# Patient Record
Sex: MALE | Race: WHITE
[De-identification: names, ages, dates, MRNs, and addresses within clinical notes are randomized per-mention and may not be internally consistent; named-entity substitution may affect disease eponyms.]

---

## 2017-05-18 ENCOUNTER — HOSPITAL ENCOUNTER (OUTPATIENT)
Dept: HOSPITAL 8 - RAD | Age: 77
Discharge: HOME | End: 2017-05-18
Attending: FAMILY MEDICINE
Payer: MEDICARE

## 2017-05-18 DIAGNOSIS — R90.82: ICD-10-CM

## 2017-05-18 DIAGNOSIS — I67.82: Primary | ICD-10-CM

## 2017-05-18 LAB — BUN SERPL-MCNC: 21 MG/DL (ref 7–18)

## 2017-05-18 PROCEDURE — 36415 COLL VENOUS BLD VENIPUNCTURE: CPT

## 2017-05-18 PROCEDURE — 70553 MRI BRAIN STEM W/O & W/DYE: CPT

## 2017-05-18 PROCEDURE — A9585 GADOBUTROL INJECTION: HCPCS

## 2017-05-18 PROCEDURE — 84520 ASSAY OF UREA NITROGEN: CPT

## 2017-05-18 PROCEDURE — 82565 ASSAY OF CREATININE: CPT

## 2017-07-11 ENCOUNTER — HOSPITAL ENCOUNTER (OUTPATIENT)
Facility: MEDICAL CENTER | Age: 77
DRG: 853 | End: 2017-07-11
Attending: NURSE PRACTITIONER
Payer: MEDICARE

## 2017-07-11 ENCOUNTER — OFFICE VISIT (OUTPATIENT)
Dept: URGENT CARE | Facility: CLINIC | Age: 77
End: 2017-07-11
Payer: MEDICARE

## 2017-07-11 VITALS
HEART RATE: 95 BPM | RESPIRATION RATE: 14 BRPM | OXYGEN SATURATION: 93 % | HEIGHT: 68 IN | WEIGHT: 125 LBS | BODY MASS INDEX: 18.94 KG/M2 | TEMPERATURE: 99.3 F | SYSTOLIC BLOOD PRESSURE: 124 MMHG | DIASTOLIC BLOOD PRESSURE: 70 MMHG

## 2017-07-11 DIAGNOSIS — R30.0 DYSURIA: ICD-10-CM

## 2017-07-11 DIAGNOSIS — R39.9 UTI SYMPTOMS: ICD-10-CM

## 2017-07-11 DIAGNOSIS — R82.90 URINE FINDINGS ABNORMAL: ICD-10-CM

## 2017-07-11 LAB
APPEARANCE UR: NORMAL
BILIRUB UR STRIP-MCNC: NORMAL MG/DL
COLOR UR AUTO: NORMAL
GLUCOSE UR STRIP.AUTO-MCNC: NORMAL MG/DL
KETONES UR STRIP.AUTO-MCNC: 40 MG/DL
LEUKOCYTE ESTERASE UR QL STRIP.AUTO: NORMAL
NITRITE UR QL STRIP.AUTO: NORMAL
PH UR STRIP.AUTO: 5 [PH] (ref 5–8)
PROT UR QL STRIP: 100 MG/DL
RBC UR QL AUTO: NORMAL
SP GR UR STRIP.AUTO: 1.02
UROBILINOGEN UR STRIP-MCNC: 1 MG/DL

## 2017-07-11 PROCEDURE — 99204 OFFICE O/P NEW MOD 45 MIN: CPT | Performed by: NURSE PRACTITIONER

## 2017-07-11 PROCEDURE — 81002 URINALYSIS NONAUTO W/O SCOPE: CPT | Performed by: NURSE PRACTITIONER

## 2017-07-11 RX ORDER — MELOXICAM 15 MG/1
15 TABLET ORAL DAILY
Status: ON HOLD | COMMUNITY
Start: 2017-06-12 | End: 2017-12-25

## 2017-07-11 RX ORDER — OMEPRAZOLE 20 MG/1
20 CAPSULE, DELAYED RELEASE ORAL DAILY
Status: ON HOLD | COMMUNITY
Start: 2017-06-12 | End: 2017-12-25

## 2017-07-11 RX ORDER — ALFUZOSIN HYDROCHLORIDE 10 MG/1
10 TABLET, EXTENDED RELEASE ORAL DAILY
COMMUNITY
Start: 2017-04-12 | End: 2017-12-18

## 2017-07-11 RX ORDER — MULTIVIT WITH MINERALS/LUTEIN
TABLET ORAL
COMMUNITY
End: 2017-07-14

## 2017-07-11 RX ORDER — PREDNISONE 20 MG/1
TABLET ORAL
COMMUNITY
Start: 2017-05-19 | End: 2017-07-14

## 2017-07-11 RX ORDER — DIPHENOXYLATE HYDROCHLORIDE AND ATROPINE SULFATE 2.5; .025 MG/1; MG/1
TABLET ORAL
COMMUNITY
End: 2017-07-14

## 2017-07-11 RX ORDER — GABAPENTIN 100 MG/1
100 CAPSULE ORAL 3 TIMES DAILY
COMMUNITY
End: 2017-12-19

## 2017-07-11 RX ORDER — CIPROFLOXACIN 500 MG/1
500 TABLET, FILM COATED ORAL 2 TIMES DAILY
Qty: 20 TAB | Refills: 0 | Status: SHIPPED | OUTPATIENT
Start: 2017-07-11 | End: 2017-07-14

## 2017-07-11 NOTE — PROGRESS NOTES
Chief Complaint   Patient presents with   • Dysuria     x 3 day, pt state he has been having lower abdominal pain and dark urine       HISTORY OF PRESENT ILLNESS: Patient is a 76 y.o. male who presents today due to four days of urinary burning, urgency, and frequency. Reports some associated pelvic pressure and fatigue. Denies confusion, hematuria, fever, chills, flank pain, N/V, testicle pain or swelling. Denies a history of pyelononephritis or kidney stones. Denies a history of UTIs. Admits to history of enlarged prostate, takes Uroxatral daily. He saw his urologist last month with no changes to regimen. He has not taken anything else for symptom relief.     There are no active problems to display for this patient.      Allergies:Review of patient's allergies indicates no known allergies.    Current Outpatient Prescriptions Ordered in [facility]   Medication Sig Dispense Refill   • gabapentin (NEURONTIN) 100 MG Cap Take 100 mg by mouth.     • meloxicam (MOBIC) 15 MG tablet      • omeprazole (PRILOSEC) 20 MG delayed-release capsule      • predniSONE (DELTASONE) 20 MG Tab      • alfuzosin (UROXATRAL) 10 MG SR tablet      • lamotrigine (LAMICTAL) 200 MG tablet Take 200 mg by mouth every day.     • alfuzosin (UROXATRAL) 10 MG SR tablet Take  by mouth every day.     • tramadol (ULTRAM) 50 MG TABS Take  mg by mouth every four hours as needed.     • Multiple Vitamin (MULTI-VITAMINS) Tab Take  by mouth.     • vitamin E (VITAMIN E) 1000 UNIT Cap Take  by mouth.       No current Epic-ordered facility-administered medications on file.       No past medical history on file.    Social History   Substance Use Topics   • Smoking status: Never Smoker    • Smokeless tobacco: Not on file   • Alcohol Use: Yes       No family status information on file.   No family history on file.    ROS:  Review of Systems   Constitutional: Negative for fever, chills, weight loss and malaise/fatigue.   HENT: Negative for ear pain, nosebleeds,  "congestion, sore throat and neck pain.    Eyes: Negative for vision changes.   Neuro: Negative for headache, sensory changes, weakness, seizure, LOC.   Cardiovascular: Negative for chest pain, palpitations, orthopnea and leg swelling.   Respiratory: Negative for cough, sputum production, shortness of breath and wheezing.   Gastrointestinal: Positive for lower abdominal pressure. Negative for abdominal pain, nausea, vomiting or diarrhea.   Genitourinary: Positive for dysuria, urgency and frequency. Negative for hematuria or flank pain.   Skin: Negative for rash, diaphoresis.     Exam:  Blood pressure 124/70, pulse 95, temperature 37.4 °C (99.3 °F), resp. rate 14, height 1.727 m (5' 8\"), weight 56.7 kg (125 lb), SpO2 93 %.  General: well-nourished, well-developed male in NAD  Head: normocephalic, atraumatic  Eyes: PERRLA, no conjunctival injection, acuity grossly intact, lids normal.  Lymph: no cervical adenopathy. No supraclavicular adenopathy.   Neuro: alert and oriented. Cranial nerves 1-12 grossly intact. No sensory deficit.   Cardiovascular: regular rate and rhythm. No edema.  Pulmonary: no distress. Chest is symmetrical with respiration, no wheezes, crackles, or rhonchi.   Abdomen: soft, non-tender, no guarding, no hepatosplenomegaly. No CVA tenderness.   Musculoskeletal: no clubbing, appropriate muscle tone, gait is stable.  Skin: warm, dry, intact, no clubbing, no cyanosis, no rashes.   Psych: appropriate mood, affect, judgement.         Assessment/Plan:  1. UTI symptoms  POCT Urinalysis    URINE CULTURE(NEW)    ciprofloxacin (CIPRO) 500 MG Tab   2. Dysuria  URINE CULTURE(NEW)    ciprofloxacin (CIPRO) 500 MG Tab   3. Urine findings abnormal           Cipro as directed for suspected UTI. Increase fluid intake. Urine sent for culture. Follow up as soon as possible with PCP and/or urologist for abnormalities in urine (protein, ketone, etc)  Supportive care, differential diagnoses, and indications for immediate " follow-up discussed with patient.   Pathogenesis of diagnosis discussed including typical length and natural progression.   Instructed to return to clinic or nearest emergency department for any change in condition, further concerns, or worsening of symptoms.  Patient states understanding of the plan of care and discharge instructions.  Instructed to make an appointment, for follow up, with their primary care provider.        Please note that this dictation was created using voice recognition software. I have made every reasonable attempt to correct obvious errors, but I expect that there are errors of grammar and possibly content that I did not discover before finalizing the note.      SELENA Najera.

## 2017-07-11 NOTE — MR AVS SNAPSHOT
"        Marcelo Colon   2017 11:45 AM   Office Visit   MRN: [id number]    Department:  [facility]   Dept Phone:  [phone]    Description:  Male : 1940   Provider:  DEVORA Franklin           Reason for Visit     Dysuria x 3 day, pt state he has been having lower abdominal pain and dark urine      Allergies as of 2017     No Known Allergies      You were diagnosed with     UTI symptoms   [985832]       Dysuria   [788.1.ICD-9-CM]       Urine findings abnormal   [355157]         Vital Signs     Blood Pressure Pulse Temperature Respirations Height Weight    124/70 mmHg 95 37.4 °C (99.3 °F) 14 1.727 m (5' 8\") 56.7 kg (125 lb)    Body Mass Index Oxygen Saturation Smoking Status             19.01 kg/m2 93% Never Smoker          Basic Information     Date Of Birth Sex Race Ethnicity Preferred Language    1940 Male White Non- English      Health Maintenance        Date Due Completion Dates    IMM DTaP/Tdap/Td Vaccine (1 - Tdap) 10/25/1960 ---    COLONOSCOPY 10/25/1991 ---    IMM ZOSTER VACCINE 10/25/2001 ---    IMM PNEUMOCOCCAL 65+ (ADULT) LOW/MEDIUM RISK SERIES (1 of 2 - PCV13) 10/25/2006 ---    IMM INFLUENZA (1) 2017 ---            Current Immunizations     No immunizations on file.      Below and/or attached are the medications your provider expects you to take. Review all of your home medications and newly ordered medications with your provider and/or pharmacist. Follow medication instructions as directed by your provider and/or pharmacist. Please keep your medication list with you and share with your provider. Update the information when medications are discontinued, doses are changed, or new medications (including over-the-counter products) are added; and carry medication information at all times in the event of emergency situations     Allergies:  No Known Allergies          Medications  Valid as of: 2017 - 12:21 PM    Generic Name Brand Name Tablet Size " Instructions for use    Alfuzosin HCl (TABLET SR 24 HR) UROXATRAL 10 MG Take  by mouth every day.        Alfuzosin HCl (TABLET SR 24 HR) UROXATRAL 10 MG         Ciprofloxacin HCl (Tab) CIPRO 500 MG Take 1 Tab by mouth 2 times a day for 10 days.        Gabapentin (Cap) NEURONTIN 100 MG Take 100 mg by mouth.        LamoTRIgine (Tab) LAMICTAL 200 MG Take 200 mg by mouth every day.        Meloxicam (Tab) MOBIC 15 MG         Multiple Vitamin (Tab) MULTI-VITAMINS  Take  by mouth.        Omeprazole (CAPSULE DELAYED RELEASE) PRILOSEC 20 MG         PredniSONE (Tab) DELTASONE 20 MG         TraMADol HCl (Tab) ULTRAM 50 MG Take  mg by mouth every four hours as needed.        Vitamin E (Cap) VITAMIN E 1000 UNIT Take  by mouth.        .                 Medicines prescribed today were sent to:     [facility] [address]    [address] [place] [address]    Phone: [phone] Fax: [phone]    Open 24 Hours?: No      Medication refill instructions:       If your prescription bottle indicates you have medication refills left, it is not necessary to call your provider’s office. Please contact your pharmacy and they will refill your medication.    If your prescription bottle indicates you do not have any refills left, you may request refills at any time through one of the following ways: The online [employer] system (except Urgent Care), by calling your provider’s office, or by asking your pharmacy to contact your provider’s office with a refill request. Medication refills are processed only during regular business hours and may not be available until the next business day. Your provider may request additional information or to have a follow-up visit with you prior to refilling your medication.   *Please Note: Medication refills are assigned a new Rx number when refilled electronically. Your pharmacy may indicate that no refills were authorized even though a new prescription for the same  medication is available at the pharmacy. Please request the medicine by name with the pharmacy before contacting your provider for a refill.        Your To Do List     Future Labs/Procedures Complete By Expires    URINE CULTURE(NEW)  As directed 7/11/2018         [employer] Access Code: [email or web address]  Expires: 8/10/2017 12:21 PM    Your email address is not on file at [facility].  Email Addresses are required for you to sign up for [employer], please contact [phone] to verify your personal information and to provide your email address prior to attempting to register for [employer].    Marcelo Colon  [address] [place] [address]    [employer]  A secure, online tool to manage your health information     [facility]’s [employer]® is a secure, online tool that connects you to your personalized health information from the privacy of your home -- day or night - making it very easy for you to manage your healthcare. Once the activation process is completed, you can even access your medical information using the [employer] ricardo, which is available for free in the Apple Ricardo store or Google Play store.     To learn more about [employer], visit [email or web address]    There are two levels of access available (as shown below):   My Chart Features  [facility] Primary Care Doctor [facility]  Specialists [facility]  Urgent  Care Non-[facility] Primary Care Doctor   Email your healthcare team securely and privately 24/7 X X X    Manage appointments: schedule your next appointment; view details of past/upcoming appointments X      Request prescription refills. X      View recent personal medical records, including lab and immunizations X X X X   View health record, including health history, allergies, medications X X X X   Read reports about your outpatient visits, procedures, consult and ER notes X X X X   See your discharge summary, which is a recap of your hospital and/or ER visit that includes your diagnosis, lab results, and care plan X X  X      How to register for [employer]:  Once your e-mail address has been verified, follow the following steps to sign up for [employer].     1. Go to  [email or web address]  2. Click on the Sign Up Now box, which takes you to the New Member Sign Up page. You will need to provide the following information:  a. Enter your [employer] Access Code exactly as it appears at the top of this page. (You will not need to use this code after you’ve completed the sign-up process. If you do not sign up before the expiration date, you must request a new code.)   b. Enter your date of birth.   c. Enter your home email address.   d. Click Submit, and follow the next screen’s instructions.  3. Create a U2opia Mobilet ID. This will be your [employer] login ID and cannot be changed, so think of one that is secure and easy to remember.  4. Create a [employer] Mobilet password. You can change your password at any time.  5. Enter your Password Reset Question and Answer. This can be used at a later time if you forget your password.   6. Enter your e-mail address. This allows you to receive e-mail notifications when new information is available in [employer].  7. Click Sign Up. You can now view your health information.    For assistance activating your [employer] account, call [phone]

## 2017-07-13 LAB
BACTERIA UR CULT: NORMAL
SIGNIFICANT IND 70042: NORMAL
SOURCE SOURCE: NORMAL

## 2017-07-14 ENCOUNTER — HOSPITAL ENCOUNTER (INPATIENT)
Facility: MEDICAL CENTER | Age: 77
LOS: 35 days | DRG: 853 | End: 2017-08-18
Attending: EMERGENCY MEDICINE | Admitting: INTERNAL MEDICINE
Payer: MEDICARE

## 2017-07-14 ENCOUNTER — TELEPHONE (OUTPATIENT)
Dept: URGENT CARE | Facility: PHYSICIAN GROUP | Age: 77
End: 2017-07-14

## 2017-07-14 ENCOUNTER — APPOINTMENT (OUTPATIENT)
Dept: RADIOLOGY | Facility: MEDICAL CENTER | Age: 77
DRG: 853 | End: 2017-07-14
Attending: EMERGENCY MEDICINE
Payer: MEDICARE

## 2017-07-14 ENCOUNTER — RESOLUTE PROFESSIONAL BILLING HOSPITAL PROF FEE (OUTPATIENT)
Dept: HOSPITALIST | Facility: MEDICAL CENTER | Age: 77
End: 2017-07-14
Payer: MEDICARE

## 2017-07-14 DIAGNOSIS — R74.8 ALKALINE PHOSPHATASE ELEVATION: ICD-10-CM

## 2017-07-14 DIAGNOSIS — K65.1 INTRA-ABDOMINAL ABSCESS (HCC): ICD-10-CM

## 2017-07-14 DIAGNOSIS — E87.1 HYPONATREMIA: ICD-10-CM

## 2017-07-14 DIAGNOSIS — N39.0 URINARY TRACT INFECTION WITHOUT HEMATURIA, SITE UNSPECIFIED: ICD-10-CM

## 2017-07-14 DIAGNOSIS — K65.1 PELVIC ABSCESS IN MALE (HCC): ICD-10-CM

## 2017-07-14 DIAGNOSIS — A41.9 SEPSIS, DUE TO UNSPECIFIED ORGANISM: ICD-10-CM

## 2017-07-14 DIAGNOSIS — R74.01 ELEVATED ALT MEASUREMENT: ICD-10-CM

## 2017-07-14 PROBLEM — F32.A DEPRESSION: Status: ACTIVE | Noted: 2017-07-14

## 2017-07-14 PROBLEM — N40.0 BPH (BENIGN PROSTATIC HYPERTROPHY): Status: ACTIVE | Noted: 2017-07-14

## 2017-07-14 PROBLEM — M19.90 OSTEOARTHRITIS: Status: ACTIVE | Noted: 2017-07-14

## 2017-07-14 LAB
ALBUMIN SERPL BCP-MCNC: 3.3 G/DL (ref 3.2–4.9)
ALBUMIN/GLOB SERPL: 0.8 G/DL
ALP SERPL-CCNC: 164 U/L (ref 30–99)
ALT SERPL-CCNC: 64 U/L (ref 2–50)
ANION GAP SERPL CALC-SCNC: 10 MMOL/L (ref 0–11.9)
APPEARANCE UR: CLEAR
AST SERPL-CCNC: 41 U/L (ref 12–45)
BACTERIA #/AREA URNS HPF: NEGATIVE /HPF
BASOPHILS # BLD AUTO: 0 % (ref 0–1.8)
BASOPHILS # BLD: 0 K/UL (ref 0–0.12)
BILIRUB SERPL-MCNC: 1.1 MG/DL (ref 0.1–1.5)
BILIRUB UR QL STRIP.AUTO: ABNORMAL
BUN SERPL-MCNC: 14 MG/DL (ref 8–22)
CALCIUM SERPL-MCNC: 9.8 MG/DL (ref 8.5–10.5)
CHLORIDE SERPL-SCNC: 92 MMOL/L (ref 96–112)
CO2 SERPL-SCNC: 26 MMOL/L (ref 20–33)
COLOR UR: ABNORMAL
CREAT SERPL-MCNC: 1.01 MG/DL (ref 0.5–1.4)
EOSINOPHIL # BLD AUTO: 0 K/UL (ref 0–0.51)
EOSINOPHIL NFR BLD: 0 % (ref 0–6.9)
EPI CELLS #/AREA URNS HPF: ABNORMAL /HPF
ERYTHROCYTE [DISTWIDTH] IN BLOOD BY AUTOMATED COUNT: 45.4 FL (ref 35.9–50)
GFR SERPL CREATININE-BSD FRML MDRD: >60 ML/MIN/1.73 M 2
GLOBULIN SER CALC-MCNC: 3.9 G/DL (ref 1.9–3.5)
GLUCOSE SERPL-MCNC: 125 MG/DL (ref 65–99)
GLUCOSE UR STRIP.AUTO-MCNC: NEGATIVE MG/DL
HCT VFR BLD AUTO: 39 % (ref 42–52)
HGB BLD-MCNC: 12.8 G/DL (ref 14–18)
HYALINE CASTS #/AREA URNS LPF: ABNORMAL /LPF
KETONES UR STRIP.AUTO-MCNC: 15 MG/DL
LACTATE BLD-SCNC: 1.1 MMOL/L (ref 0.5–2)
LACTATE BLD-SCNC: 1.9 MMOL/L (ref 0.5–2)
LEUKOCYTE ESTERASE UR QL STRIP.AUTO: ABNORMAL
LYMPHOCYTES # BLD AUTO: 1.67 K/UL (ref 1–4.8)
LYMPHOCYTES NFR BLD: 11.1 % (ref 22–41)
MANUAL DIFF BLD: NORMAL
MCH RBC QN AUTO: 29 PG (ref 27–33)
MCHC RBC AUTO-ENTMCNC: 32.8 G/DL (ref 33.7–35.3)
MCV RBC AUTO: 88.2 FL (ref 81.4–97.8)
MICRO URNS: ABNORMAL
MONOCYTES # BLD AUTO: 0.65 K/UL (ref 0–0.85)
MONOCYTES NFR BLD AUTO: 4.3 % (ref 0–13.4)
MORPHOLOGY BLD-IMP: NORMAL
NEUTROPHILS # BLD AUTO: 12.69 K/UL (ref 1.82–7.42)
NEUTROPHILS NFR BLD: 76.9 % (ref 44–72)
NEUTS BAND NFR BLD MANUAL: 7.7 % (ref 0–10)
NITRITE UR QL STRIP.AUTO: POSITIVE
NRBC # BLD AUTO: 0 K/UL
NRBC BLD AUTO-RTO: 0 /100 WBC
PH UR STRIP.AUTO: 5.5 [PH]
PLATELET # BLD AUTO: 440 K/UL (ref 164–446)
PLATELET BLD QL SMEAR: NORMAL
PMV BLD AUTO: 8.2 FL (ref 9–12.9)
POTASSIUM SERPL-SCNC: 3.7 MMOL/L (ref 3.6–5.5)
PROT SERPL-MCNC: 7.2 G/DL (ref 6–8.2)
PROT UR QL STRIP: 100 MG/DL
RBC # BLD AUTO: 4.42 M/UL (ref 4.7–6.1)
RBC # URNS HPF: ABNORMAL /HPF
RBC BLD AUTO: NORMAL
RBC UR QL AUTO: NEGATIVE
SODIUM SERPL-SCNC: 128 MMOL/L (ref 135–145)
SP GR UR STRIP.AUTO: 1.02
TSH SERPL DL<=0.005 MIU/L-ACNC: 0.34 UIU/ML (ref 0.3–3.7)
UROBILINOGEN UR STRIP.AUTO-MCNC: 2 MG/DL
WBC # BLD AUTO: 15 K/UL (ref 4.8–10.8)
WBC #/AREA URNS HPF: ABNORMAL /HPF

## 2017-07-14 PROCEDURE — A9270 NON-COVERED ITEM OR SERVICE: HCPCS | Performed by: EMERGENCY MEDICINE

## 2017-07-14 PROCEDURE — 700111 HCHG RX REV CODE 636 W/ 250 OVERRIDE (IP): Performed by: EMERGENCY MEDICINE

## 2017-07-14 PROCEDURE — 99223 1ST HOSP IP/OBS HIGH 75: CPT | Mod: AI | Performed by: INTERNAL MEDICINE

## 2017-07-14 PROCEDURE — 700101 HCHG RX REV CODE 250: Performed by: EMERGENCY MEDICINE

## 2017-07-14 PROCEDURE — 700101 HCHG RX REV CODE 250: Performed by: INTERNAL MEDICINE

## 2017-07-14 PROCEDURE — 700102 HCHG RX REV CODE 250 W/ 637 OVERRIDE(OP): Performed by: INTERNAL MEDICINE

## 2017-07-14 PROCEDURE — 84443 ASSAY THYROID STIM HORMONE: CPT

## 2017-07-14 PROCEDURE — 700102 HCHG RX REV CODE 250 W/ 637 OVERRIDE(OP): Performed by: EMERGENCY MEDICINE

## 2017-07-14 PROCEDURE — 96361 HYDRATE IV INFUSION ADD-ON: CPT

## 2017-07-14 PROCEDURE — 700105 HCHG RX REV CODE 258: Performed by: EMERGENCY MEDICINE

## 2017-07-14 PROCEDURE — 770020 HCHG ROOM/CARE - TELE (206)

## 2017-07-14 PROCEDURE — 83605 ASSAY OF LACTIC ACID: CPT

## 2017-07-14 PROCEDURE — 85007 BL SMEAR W/DIFF WBC COUNT: CPT

## 2017-07-14 PROCEDURE — 700117 HCHG RX CONTRAST REV CODE 255: Performed by: EMERGENCY MEDICINE

## 2017-07-14 PROCEDURE — 71010 DX-CHEST-PORTABLE (1 VIEW): CPT

## 2017-07-14 PROCEDURE — 99285 EMERGENCY DEPT VISIT HI MDM: CPT

## 2017-07-14 PROCEDURE — 81001 URINALYSIS AUTO W/SCOPE: CPT

## 2017-07-14 PROCEDURE — 96365 THER/PROPH/DIAG IV INF INIT: CPT

## 2017-07-14 PROCEDURE — 85027 COMPLETE CBC AUTOMATED: CPT

## 2017-07-14 PROCEDURE — 80053 COMPREHEN METABOLIC PANEL: CPT

## 2017-07-14 PROCEDURE — 74177 CT ABD & PELVIS W/CONTRAST: CPT

## 2017-07-14 PROCEDURE — 87086 URINE CULTURE/COLONY COUNT: CPT

## 2017-07-14 PROCEDURE — 96367 TX/PROPH/DG ADDL SEQ IV INF: CPT

## 2017-07-14 PROCEDURE — A9270 NON-COVERED ITEM OR SERVICE: HCPCS | Performed by: INTERNAL MEDICINE

## 2017-07-14 PROCEDURE — 87040 BLOOD CULTURE FOR BACTERIA: CPT | Mod: 91

## 2017-07-14 RX ORDER — AMOXICILLIN 250 MG
2 CAPSULE ORAL 2 TIMES DAILY
Status: DISCONTINUED | OUTPATIENT
Start: 2017-07-14 | End: 2017-07-22

## 2017-07-14 RX ORDER — TAMSULOSIN HYDROCHLORIDE 0.4 MG/1
0.4 CAPSULE ORAL
Status: DISCONTINUED | OUTPATIENT
Start: 2017-07-14 | End: 2017-08-18 | Stop reason: HOSPADM

## 2017-07-14 RX ORDER — TRAMADOL HYDROCHLORIDE 50 MG/1
50 TABLET ORAL EVERY 4 HOURS PRN
Status: DISCONTINUED | OUTPATIENT
Start: 2017-07-14 | End: 2017-08-11

## 2017-07-14 RX ORDER — BISACODYL 10 MG
10 SUPPOSITORY, RECTAL RECTAL
Status: DISCONTINUED | OUTPATIENT
Start: 2017-07-14 | End: 2017-07-22

## 2017-07-14 RX ORDER — ALFUZOSIN HYDROCHLORIDE 10 MG/1
10 TABLET, EXTENDED RELEASE ORAL DAILY
Status: DISCONTINUED | OUTPATIENT
Start: 2017-07-14 | End: 2017-07-14

## 2017-07-14 RX ORDER — ONDANSETRON 2 MG/ML
4 INJECTION INTRAMUSCULAR; INTRAVENOUS EVERY 4 HOURS PRN
Status: DISCONTINUED | OUTPATIENT
Start: 2017-07-14 | End: 2017-08-18 | Stop reason: HOSPADM

## 2017-07-14 RX ORDER — LAMOTRIGINE 150 MG/1
150 TABLET ORAL DAILY
COMMUNITY
End: 2017-12-19

## 2017-07-14 RX ORDER — POLYETHYLENE GLYCOL 3350 17 G/17G
1 POWDER, FOR SOLUTION ORAL
Status: DISCONTINUED | OUTPATIENT
Start: 2017-07-14 | End: 2017-07-22

## 2017-07-14 RX ORDER — ACETAMINOPHEN 325 MG/1
650 TABLET ORAL ONCE
Status: COMPLETED | OUTPATIENT
Start: 2017-07-14 | End: 2017-07-14

## 2017-07-14 RX ORDER — MORPHINE SULFATE 4 MG/ML
1-5 INJECTION, SOLUTION INTRAMUSCULAR; INTRAVENOUS EVERY 4 HOURS PRN
Status: DISCONTINUED | OUTPATIENT
Start: 2017-07-14 | End: 2017-07-14

## 2017-07-14 RX ORDER — OXYCODONE HYDROCHLORIDE 5 MG/1
5 TABLET ORAL EVERY 4 HOURS PRN
Status: DISCONTINUED | OUTPATIENT
Start: 2017-07-14 | End: 2017-08-18 | Stop reason: HOSPADM

## 2017-07-14 RX ORDER — CIPROFLOXACIN 500 MG/1
500 TABLET, FILM COATED ORAL 2 TIMES DAILY
Status: ON HOLD | COMMUNITY
Start: 2017-07-11 | End: 2017-08-11

## 2017-07-14 RX ORDER — SODIUM CHLORIDE AND POTASSIUM CHLORIDE 150; 900 MG/100ML; MG/100ML
INJECTION, SOLUTION INTRAVENOUS CONTINUOUS
Status: DISCONTINUED | OUTPATIENT
Start: 2017-07-14 | End: 2017-07-21

## 2017-07-14 RX ORDER — GABAPENTIN 100 MG/1
100 CAPSULE ORAL 3 TIMES DAILY
Status: DISCONTINUED | OUTPATIENT
Start: 2017-07-14 | End: 2017-07-19

## 2017-07-14 RX ORDER — SODIUM CHLORIDE 9 MG/ML
30 INJECTION, SOLUTION INTRAVENOUS
Status: COMPLETED | OUTPATIENT
Start: 2017-07-14 | End: 2017-07-14

## 2017-07-14 RX ORDER — LORAZEPAM 2 MG/ML
0.5 INJECTION INTRAMUSCULAR EVERY 6 HOURS PRN
Status: DISCONTINUED | OUTPATIENT
Start: 2017-07-14 | End: 2017-07-22

## 2017-07-14 RX ORDER — CEFTRIAXONE 1 G/1
1 INJECTION, POWDER, FOR SOLUTION INTRAMUSCULAR; INTRAVENOUS ONCE
Status: COMPLETED | OUTPATIENT
Start: 2017-07-14 | End: 2017-07-14

## 2017-07-14 RX ORDER — LABETALOL HYDROCHLORIDE 5 MG/ML
10 INJECTION, SOLUTION INTRAVENOUS EVERY 4 HOURS PRN
Status: DISCONTINUED | OUTPATIENT
Start: 2017-07-14 | End: 2017-08-18 | Stop reason: HOSPADM

## 2017-07-14 RX ORDER — LORAZEPAM 1 MG/1
0.5 TABLET ORAL EVERY 6 HOURS PRN
Status: DISCONTINUED | OUTPATIENT
Start: 2017-07-14 | End: 2017-07-22

## 2017-07-14 RX ORDER — LAMOTRIGINE 100 MG/1
150 TABLET ORAL DAILY
Status: DISCONTINUED | OUTPATIENT
Start: 2017-07-14 | End: 2017-08-18 | Stop reason: HOSPADM

## 2017-07-14 RX ORDER — MORPHINE SULFATE 10 MG/ML
5 INJECTION, SOLUTION INTRAMUSCULAR; INTRAVENOUS EVERY 4 HOURS PRN
Status: DISCONTINUED | OUTPATIENT
Start: 2017-07-14 | End: 2017-07-22

## 2017-07-14 RX ORDER — ONDANSETRON 4 MG/1
4 TABLET, ORALLY DISINTEGRATING ORAL EVERY 4 HOURS PRN
Status: DISCONTINUED | OUTPATIENT
Start: 2017-07-14 | End: 2017-08-18 | Stop reason: HOSPADM

## 2017-07-14 RX ORDER — MELOXICAM 7.5 MG/1
15 TABLET ORAL DAILY
Status: DISCONTINUED | OUTPATIENT
Start: 2017-07-14 | End: 2017-07-20

## 2017-07-14 RX ORDER — OMEPRAZOLE 20 MG/1
20 CAPSULE, DELAYED RELEASE ORAL DAILY
Status: DISCONTINUED | OUTPATIENT
Start: 2017-07-14 | End: 2017-08-18 | Stop reason: HOSPADM

## 2017-07-14 RX ORDER — MORPHINE SULFATE 4 MG/ML
1-4 INJECTION, SOLUTION INTRAMUSCULAR; INTRAVENOUS EVERY 4 HOURS PRN
Status: DISCONTINUED | OUTPATIENT
Start: 2017-07-14 | End: 2017-07-22

## 2017-07-14 RX ORDER — TRAMADOL HYDROCHLORIDE 50 MG/1
50-100 TABLET ORAL EVERY 4 HOURS PRN
Status: DISCONTINUED | OUTPATIENT
Start: 2017-07-14 | End: 2017-07-14

## 2017-07-14 RX ORDER — TRAMADOL HYDROCHLORIDE 50 MG/1
100 TABLET ORAL EVERY 4 HOURS PRN
Status: DISCONTINUED | OUTPATIENT
Start: 2017-07-14 | End: 2017-08-11

## 2017-07-14 RX ADMIN — OMEPRAZOLE 20 MG: 20 CAPSULE, DELAYED RELEASE ORAL at 16:01

## 2017-07-14 RX ADMIN — CEFTRIAXONE SODIUM 1 G: 1 INJECTION, POWDER, FOR SOLUTION INTRAMUSCULAR; INTRAVENOUS at 14:05

## 2017-07-14 RX ADMIN — GABAPENTIN 100 MG: 100 CAPSULE ORAL at 20:13

## 2017-07-14 RX ADMIN — SODIUM CHLORIDE 1677 ML: 9 INJECTION, SOLUTION INTRAVENOUS at 12:01

## 2017-07-14 RX ADMIN — POTASSIUM CHLORIDE AND SODIUM CHLORIDE: 900; 150 INJECTION, SOLUTION INTRAVENOUS at 16:01

## 2017-07-14 RX ADMIN — ACETAMINOPHEN 650 MG: 325 TABLET, FILM COATED ORAL at 12:04

## 2017-07-14 RX ADMIN — IOHEXOL 100 ML: 350 INJECTION, SOLUTION INTRAVENOUS at 13:42

## 2017-07-14 RX ADMIN — TAMSULOSIN HYDROCHLORIDE 0.4 MG: 0.4 CAPSULE ORAL at 16:01

## 2017-07-14 RX ADMIN — TRAMADOL HYDROCHLORIDE 50 MG: 50 TABLET, COATED ORAL at 20:13

## 2017-07-14 RX ADMIN — GABAPENTIN 100 MG: 100 CAPSULE ORAL at 16:01

## 2017-07-14 RX ADMIN — METRONIDAZOLE 500 MG: 500 INJECTION, SOLUTION INTRAVENOUS at 14:43

## 2017-07-14 RX ADMIN — METRONIDAZOLE 500 MG: 500 INJECTION, SOLUTION INTRAVENOUS at 20:13

## 2017-07-14 ASSESSMENT — COGNITIVE AND FUNCTIONAL STATUS - GENERAL
SUGGESTED CMS G CODE MODIFIER DAILY ACTIVITY: CK
WALKING IN HOSPITAL ROOM: A LITTLE
EATING MEALS: A LITTLE
MOBILITY SCORE: 18
SUGGESTED CMS G CODE MODIFIER MOBILITY: CK
STANDING UP FROM CHAIR USING ARMS: A LOT
MOVING TO AND FROM BED TO CHAIR: A LITTLE
PERSONAL GROOMING: A LITTLE
DRESSING REGULAR LOWER BODY CLOTHING: A LOT
CLIMB 3 TO 5 STEPS WITH RAILING: A LOT
TOILETING: A LOT
DRESSING REGULAR UPPER BODY CLOTHING: A LITTLE
HELP NEEDED FOR BATHING: A LOT
DAILY ACTIVITIY SCORE: 15

## 2017-07-14 ASSESSMENT — LIFESTYLE VARIABLES
HOW MANY TIMES IN THE PAST YEAR HAVE YOU HAD 5 OR MORE DRINKS IN A DAY: 0
CONSUMPTION TOTAL: NEGATIVE
HAVE YOU EVER FELT YOU SHOULD CUT DOWN ON YOUR DRINKING: NO
CONSUMPTION TOTAL: NEGATIVE
HAVE PEOPLE ANNOYED YOU BY CRITICIZING YOUR DRINKING: NO
HAVE YOU EVER FELT YOU SHOULD CUT DOWN ON YOUR DRINKING: NO
TOTAL SCORE: 0
HOW MANY TIMES IN THE PAST YEAR HAVE YOU HAD 5 OR MORE DRINKS IN A DAY: 0
AVERAGE NUMBER OF DAYS PER WEEK YOU HAVE A DRINK CONTAINING ALCOHOL: 4
EVER_SMOKED: YES
HAVE PEOPLE ANNOYED YOU BY CRITICIZING YOUR DRINKING: NO
TOTAL SCORE: 0
TOTAL SCORE: 0
AVERAGE NUMBER OF DAYS PER WEEK YOU HAVE A DRINK CONTAINING ALCOHOL: 2
EVER_SMOKED: YES
TOTAL SCORE: 0
TOTAL SCORE: 0
ON A TYPICAL DAY WHEN YOU DRINK ALCOHOL HOW MANY DRINKS DO YOU HAVE: 2
EVER HAD A DRINK FIRST THING IN THE MORNING TO STEADY YOUR NERVES TO GET RID OF A HANGOVER: NO
ALCOHOL_USE: YES
TOTAL SCORE: 0
DO YOU DRINK ALCOHOL: YES
ON A TYPICAL DAY WHEN YOU DRINK ALCOHOL HOW MANY DRINKS DO YOU HAVE: 2
EVER FELT BAD OR GUILTY ABOUT YOUR DRINKING: NO
EVER FELT BAD OR GUILTY ABOUT YOUR DRINKING: NO
EVER HAD A DRINK FIRST THING IN THE MORNING TO STEADY YOUR NERVES TO GET RID OF A HANGOVER: NO

## 2017-07-14 ASSESSMENT — PAIN SCALES - GENERAL
PAINLEVEL_OUTOF10: 6
PAINLEVEL_OUTOF10: 3

## 2017-07-14 ASSESSMENT — ENCOUNTER SYMPTOMS
EYES NEGATIVE: 1
BLOOD IN STOOL: 0
DIARRHEA: 1
CARDIOVASCULAR NEGATIVE: 1
WEAKNESS: 0
FEVER: 0
ABDOMINAL PAIN: 1
MYALGIAS: 1
SHORTNESS OF BREATH: 0
NEUROLOGICAL NEGATIVE: 1
DIZZINESS: 0
PSYCHIATRIC NEGATIVE: 1
COUGH: 0
HEADACHES: 0
BLURRED VISION: 0
BRUISES/BLEEDS EASILY: 0
DEPRESSION: 0
RESPIRATORY NEGATIVE: 1

## 2017-07-14 ASSESSMENT — PATIENT HEALTH QUESTIONNAIRE - PHQ9
SUM OF ALL RESPONSES TO PHQ QUESTIONS 1-9: 0
2. FEELING DOWN, DEPRESSED, IRRITABLE, OR HOPELESS: NOT AT ALL
SUM OF ALL RESPONSES TO PHQ9 QUESTIONS 1 AND 2: 0
1. LITTLE INTEREST OR PLEASURE IN DOING THINGS: NOT AT ALL

## 2017-07-14 ASSESSMENT — COPD QUESTIONNAIRES
DO YOU EVER COUGH UP ANY MUCUS OR PHLEGM?: NO/ONLY WITH OCCASIONAL COLDS OR INFECTIONS
HAVE YOU SMOKED AT LEAST 100 CIGARETTES IN YOUR ENTIRE LIFE: YES
COPD SCREENING SCORE: 4
DURING THE PAST 4 WEEKS HOW MUCH DID YOU FEEL SHORT OF BREATH: NONE/LITTLE OF THE TIME

## 2017-07-14 NOTE — ED PROVIDER NOTES
ED Provider Note    Scribed for Rosales Bruno M.D. by Prince Pagan. 7/14/2017  11:53 AM    Primary care provider: Irvin Ferreira M.D.  Means of arrival: Wheel Chair  History obtained from: Patient  History limited by: None    CHIEF COMPLAINT  Chief Complaint   Patient presents with   • UTI     started yesterday   • Abdominal Pain     x3-4 days, sharp pain, better when laying down   • Diarrhea       HPI  Marcelo Colon is a 76 y.o. male who presents to the Emergency Department complaining of severe groin pain onset four days ago. He reports that when sitting down the patient experiences an abrupt episode of pain. The patient states that he visited urgent care yesterday and told he has a UTI. He was told to come here if his symptoms increased in severity. The patient began taking his antibiotics yesterday afternoon. The patient has associated urinary retention and dark orange colored urine, and diarrhea with a consistency of jello and white in color. He denies any lower extremity pain, swelling of the genitals, or vomiting. He was previously diagnosed with adeline, but [place] told him that his diagnoses was not likely correct.    REVIEW OF SYSTEMS  Pertinent positives include severe groin pain, urinary retention, dark orange colored urine, and diarrhea.   Pertinent negatives include no vomiting, swelling of the genitals, or vomiting.    All other systems reviewed and negative.    C    PAST MEDICAL HISTORY   has a past medical history of Enlarged prostate; Depression; Neck pain; and GERD (gastroesophageal reflux disease).    SURGICAL HISTORY  patient denies any surgical history    SOCIAL HISTORY  Social History   Substance Use Topics   • Smoking status: Never Smoker    • Smokeless tobacco: None   • Alcohol Use: Yes      History   Drug Use No       FAMILY HISTORY  History reviewed. No pertinent family history.    CURRENT MEDICATIONS  Home Medications     Reviewed by Nuris Tracy  "(Pharmacy Tech) on 07/14/17 at 1217  Med List Status: Complete    Medication Last Dose Status    alfuzosin (UROXATRAL) 10 MG SR tablet 7/13/2017 Active    ciprofloxacin (CIPRO) 500 MG Tab 7/13/2017 Active    gabapentin (NEURONTIN) 100 MG Cap 7/13/2017 Active    lamotrigine (LAMICTAL) 150 MG tablet 7/13/2017 Active    meloxicam (MOBIC) 15 MG tablet 7/13/2017 Active    omeprazole (PRILOSEC) 20 MG delayed-release capsule 7/13/2017 Active    tramadol (ULTRAM) 50 MG TABS 7/13/2017 Active                ALLERGIES  No Known Allergies    PHYSICAL EXAM  VITAL SIGNS: /68 mmHg  Pulse 105  Temp(Src) 38.1 °C (100.6 °F)  Resp 16  Ht 1.727 m (5' 8\")  Wt 55.9 kg (123 lb 3.8 oz)  BMI 18.74 kg/m2  SpO2 94%    Nursing note and vitals reviewed.  Constitutional: Well-developed and well-nourished. No distress.   HENT: Head is normocephalic and atraumatic. Oropharynx is clear and moist without exudate or erythema.   Eyes: Pupils are equal, round, and reactive to light. Conjunctiva are normal.   Cardiovascular: Normal rate and regular rhythm. No murmur heard. Normal radial pulses.  Pulmonary/Chest: Breath sounds normal. No wheezes or rales.   Abdominal: Soft, Mild lower abdominal tenderness. Non-distended. Normal active bowel sounds. No guarding or peritoneal signs. No palpable abdominal aortic aneurysm. No masses. No tenderness at McBurney's point.   Musculoskeletal: Extremities exhibit normal range of motion without edema or tenderness.   Neurological: Awake, alert and oriented to person, place, and time. No focal deficits noted.  Skin: Skin is warm and dry. No rash.  Psychiatric: Normal mood and affect. Appropriate for clinical situation    DIAGNOSTIC STUDIES / PROCEDURES    LABS  Results for orders placed or performed during the hospital encounter of 07/14/17   Lactic acid (lactate)   Result Value Ref Range    Lactic Acid 1.9 0.5 - 2.0 mmol/L   CBC WITH DIFFERENTIAL   Result Value Ref Range    WBC 15.0 (H) 4.8 - 10.8 K/uL "    RBC 4.42 (L) 4.70 - 6.10 M/uL    Hemoglobin 12.8 (L) 14.0 - 18.0 g/dL    Hematocrit 39.0 (L) 42.0 - 52.0 %    MCV 88.2 81.4 - 97.8 fL    MCH 29.0 27.0 - 33.0 pg    MCHC 32.8 (L) 33.7 - 35.3 g/dL    RDW 45.4 35.9 - 50.0 fL    Platelet Count 440 164 - 446 K/uL    MPV 8.2 (L) 9.0 - 12.9 fL    Nucleated RBC 0.00 /100 WBC    NRBC (Absolute) 0.00 K/uL    Neutrophils-Polys 76.90 (H) 44.00 - 72.00 %    Lymphocytes 11.10 (L) 22.00 - 41.00 %    Monocytes 4.30 0.00 - 13.40 %    Eosinophils 0.00 0.00 - 6.90 %    Basophils 0.00 0.00 - 1.80 %    Neutrophils (Absolute) 12.69 (H) 1.82 - 7.42 K/uL    Lymphs (Absolute) 1.67 1.00 - 4.80 K/uL    Monos (Absolute) 0.65 0.00 - 0.85 K/uL    Eos (Absolute) 0.00 0.00 - 0.51 K/uL    Baso (Absolute) 0.00 0.00 - 0.12 K/uL   COMP METABOLIC PANEL   Result Value Ref Range    Sodium 128 (L) 135 - 145 mmol/L    Potassium 3.7 3.6 - 5.5 mmol/L    Chloride 92 (L) 96 - 112 mmol/L    Co2 26 20 - 33 mmol/L    Anion Gap 10.0 0.0 - 11.9    Glucose 125 (H) 65 - 99 mg/dL    Bun 14 8 - 22 mg/dL    Creatinine 1.01 0.50 - 1.40 mg/dL    Calcium 9.8 8.5 - 10.5 mg/dL    AST(SGOT) 41 12 - 45 U/L    ALT(SGPT) 64 (H) 2 - 50 U/L    Alkaline Phosphatase 164 (H) 30 - 99 U/L    Total Bilirubin 1.1 0.1 - 1.5 mg/dL    Albumin 3.3 3.2 - 4.9 g/dL    Total Protein 7.2 6.0 - 8.2 g/dL    Globulin 3.9 (H) 1.9 - 3.5 g/dL    A-G Ratio 0.8 g/dL   URINALYSIS   Result Value Ref Range    Color DK Yellow     Character Clear     Specific Gravity 1.023 <1.035    Ph 5.5 5.0-8.0    Glucose Negative Negative mg/dL    Ketones 15 (A) Negative mg/dL    Protein 100 (A) Negative mg/dL    Bilirubin Moderate (A) Negative    Urobilinogen, Urine 2.0 Negative    Nitrite Positive (A) Negative    Leukocyte Esterase Small (A) Negative    Occult Blood Negative Negative    Micro Urine Req Microscopic    URINE MICROSCOPIC (W/UA)   Result Value Ref Range    WBC 5-10 (A) /hpf    RBC 10-20 (A) /hpf    Bacteria Negative None /hpf    Epithelial Cells Few /hpf     Hyaline Cast 11-20 (A) /lpf   ESTIMATED GFR   Result Value Ref Range    GFR If African American >60 >60 mL/min/1.73 m 2    GFR If Non African American >60 >60 mL/min/1.73 m 2   DIFFERENTIAL MANUAL   Result Value Ref Range    Bands-Stabs 7.70 0.00 - 10.00 %    Manual Diff Status PERFORMED    PERIPHERAL SMEAR REVIEW   Result Value Ref Range    Peripheral Smear Review see below    PLATELET ESTIMATE   Result Value Ref Range    Plt Estimation Normal    MORPHOLOGY   Result Value Ref Range    RBC Morphology Normal      All labs reviewed by me.    RADIOLOGY  CT-ABDOMEN-PELVIS WITH   Final Result      1.  Small inflammatory phlegmon/developing abscess in the RIGHT lower quadrant, in close proximity to the appendiceal tip, concerning for perforated appendicitis.   2.  Large complex fluid collection in the central pelvis, likely abscess, with adjacent inflammation of sigmoid colon.  This may be secondary to appendicitis or diverticulitis.   3.  Minimal pneumoperitoneum consistent with bowel perforation.   4.  Enlarged prostate.         DX-CHEST-PORTABLE (1 VIEW)   Final Result      No pulmonary consolidation.        The radiologist's interpretation of all radiological studies have been reviewed by me.    COURSE & MEDICAL DECISION MAKING  Nursing notes, VS, PMSFHx reviewed in chart.       11:53 AM - Patient seen and examined at bedside. I informed the patient that I would like imaging and blood tests. He understands and agrees. Patient will be treated with Tylenol tablet 650 mg and NS bolus infusion 1677 ml for dehydration. Ordered DX chest lactic acid, Urine microscopic with U/A, CBC with differential, CMP, U/A , Urine culture, and Blood culture to evaluate his symptoms. The differential diagnoses include but are not limited to: Urinary tract infection, colitis    12:00 PM I ordered an estimated GFR, Differential manual, Peripheral smear review, platelet summary, and morphology to evaluate.    1:20 PM I ordered a CT abdomen  pelvis with contrast to evaluate.    1:45 PM I ordered Rocephin injection 1g to treat the patient's symptoms.    2:11 PM Radiologist informed me that the patient has an 8 cm intraabdominal abscess.    2:12 PM Paged General Surgery.    2:20 PM I discussed the patient's case and the above findings with Dr. Rodriguez (General Surgery) who agrees to evaluate the patient.    2:24 PM I discussed the patient's case and the above findings with Dr. Howell (Hospitalist) who agrees to transfer care of the patient at this time.    CRITICAL CARE  I provided critical care services, which included medication orders, frequent reevaluations of the patient's condition and response to treatment, ordering and reviewing test results, and discussing the case with various consultants.  The critical care time associated with the care of the patient was 35 minutes. Review chart for interventions. This time is exclusive of any other billable procedures.     DISPOSITION:  Patient will be admitted to Dr. Howell, Hospitalist, in guarded condition.    FINAL IMPRESSION  1. Intra-abdominal abscess (CMS-HCC)    2. Urinary tract infection without hematuria, site unspecified    3. Hyponatremia    4. Elevated ALT measurement    5. Alkaline phosphatase elevation    6. Sepsis, due to unspecified organism (CMS-HCC)       Total critical care time of 35 minutes, as outlined above.     I, Prince Pagan (Scribe), am scribing for, and in the presence of, Rosales Bruno M.D..    Electronically signed by: Prince Pagan ([place]), 7/14/2017    Rosales ABDULLAHI M.D. personally performed the services described in this documentation, as scribed by Prince Pagan in my presence, and it is both accurate and complete.    The note accurately reflects work and decisions made by me.  Rosales Bruno  7/14/2017  4:59 PM

## 2017-07-14 NOTE — H&P
Hospital Medicine History and Physical    Date of Service  7/14/2017    Chief Complaint  Chief Complaint   Patient presents with   • UTI     started yesterday   • Abdominal Pain     x3-4 days, sharp pain, better when laying down   • Diarrhea       History of Presenting Illness  76 y.o. male who presented 7/14/2017 with history or BPH presents with B groin ache over last several days, UTI noted yesterday, CT shows large diverticular abscess on L and likely a perforated appendiceal abscess on R.  Looks remarkably comfortable and non-toxic appearing, ?chronic?  Dr. Rodriguez consulted, will have IR place drains, follow for now with abx, get cultures, will have ID consult tomorrow.  Some diarrhea yesterday, since resolved.    Primary Care Physician  Irvin Ferreira M.D.    Consultants  Dr. Rodriguez, surgery    Code Status  Full    Review of Systems  Review of Systems   Constitutional: Negative for fever and malaise/fatigue.   HENT: Negative.    Eyes: Negative.  Negative for blurred vision.   Respiratory: Negative.  Negative for cough and shortness of breath.    Cardiovascular: Negative.  Negative for chest pain and leg swelling.   Gastrointestinal: Positive for abdominal pain and diarrhea. Negative for blood in stool.   Genitourinary: Positive for dysuria and urgency. Negative for frequency.   Musculoskeletal: Positive for myalgias.   Skin: Negative.  Negative for rash.   Neurological: Negative.  Negative for dizziness, weakness and headaches.   Endo/Heme/Allergies: Negative.  Does not bruise/bleed easily.   Psychiatric/Behavioral: Negative.  Negative for depression.          Past Medical History  Past Medical History   Diagnosis Date   • Enlarged prostate    • Depression    • Neck pain    • GERD (gastroesophageal reflux disease)        Surgical History  History reviewed. No pertinent past surgical history.    Medications    Current facility-administered medications:   •  metronidazole (FLAGYL) IVPB 500 mg,  500 mg, Intravenous, Once, Rosales Bruno M.D., Last Rate: 100 mL/hr at 07/14/17 1443, 500 mg at 07/14/17 1443  •  gabapentin (NEURONTIN) capsule 100 mg, 100 mg, Oral, TID, Dennis Howell M.D.  •  lamotrigine (LAMICTAL) tablet 150 mg, 150 mg, Oral, DAILY, Dennis Howell M.D.  •  meloxicam (MOBIC) tablet 15 mg, 15 mg, Oral, DAILY, Dennis Howell M.D.  •  omeprazole (PRILOSEC) capsule 20 mg, 20 mg, Oral, DAILY, Dennis Howell M.D.  •  [START ON 7/15/2017] cefTRIAXone (ROCEPHIN) 2 g in  mL IVPB, 2 g, Intravenous, Q24HRS, Dennis Howell M.D.  •  metronidazole (FLAGYL) IVPB 500 mg, 500 mg, Intravenous, Q8HRS, Dennis Howell M.D.  •  senna-docusate (PERICOLACE or SENOKOT S) 8.6-50 MG per tablet 2 Tab, 2 Tab, Oral, BID **AND** polyethylene glycol/lytes (MIRALAX) PACKET 1 Packet, 1 Packet, Oral, QDAY PRN **AND** magnesium hydroxide (MILK OF MAGNESIA) suspension 30 mL, 30 mL, Oral, QDAY PRN **AND** bisacodyl (DULCOLAX) suppository 10 mg, 10 mg, Rectal, QDAY PRN, Dennis Howell M.D.  •  Respiratory Care per Protocol, , Nebulization, Continuous RT, Dennis Howell M.D.  •  0.9 % NaCl with KCl 20 mEq infusion, , Intravenous, Continuous, Dennis Howell M.D.  •  [START ON 7/15/2017] enoxaparin (LOVENOX) inj 40 mg, 40 mg, Subcutaneous, DAILY, Dennis Howell M.D.  •  labetalol (NORMODYNE,TRANDATE) injection 10 mg, 10 mg, Intravenous, Q4HRS PRN, Dennis Howell M.D.  •  ondansetron (ZOFRAN) syringe/vial injection 4 mg, 4 mg, Intravenous, Q4HRS PRN, Dennis Howell M.D.  •  ondansetron (ZOFRAN ODT) dispertab 4 mg, 4 mg, Oral, Q4HRS PRN, Dennis Howell M.D.  •  lorazepam (ATIVAN) tablet 0.5 mg, 0.5 mg, Oral, Q6HRS PRN **OR** lorazepam (ATIVAN) injection 0.5 mg, 0.5 mg, Intravenous, Q6HRS PRN, Dennis Howell M.D.  •  oxycodone immediate-release (ROXICODONE) tablet 5 mg, 5 mg, Oral, Q4HRS PRN, Dennis Howell M.D.  •  tamsulosin (FLOMAX) capsule 0.4 mg, 0.4 mg, Oral, AFTER BREAKFAST, Dennis Howell M.D.  •  tramadol  (ULTRAM) 50 MG tablet 50 mg, 50 mg, Oral, Q4HRS PRN **OR** tramadol (ULTRAM) 50 MG tablet 100 mg, 100 mg, Oral, Q4HRS PRN, Dennis Howell M.D.  •  morphine (pf) 4 mg/ml injection 1-4 mg, 1-4 mg, Intravenous, Q4HRS PRN **OR** morphine (pf) 10 mg/ml injection 5 mg, 5 mg, Intravenous, Q4HRS PRN, Dennis Howell M.D.    Current outpatient prescriptions:   •  ciprofloxacin (CIPRO) 500 MG Tab, Take 500 mg by mouth 2 times a day. 10 day course started on 17, Disp: , Rfl:   •  lamotrigine (LAMICTAL) 150 MG tablet, Take 150 mg by mouth every day., Disp: , Rfl:   •  gabapentin (NEURONTIN) 100 MG Cap, Take 100 mg by mouth every bedtime. Pt may increase to BID and then to TID, Disp: , Rfl:   •  meloxicam (MOBIC) 15 MG tablet, Take 15 mg by mouth every day., Disp: , Rfl:   •  omeprazole (PRILOSEC) 20 MG delayed-release capsule, Take 20 mg by mouth every day., Disp: , Rfl:   •  alfuzosin (UROXATRAL) 10 MG SR tablet, Take 10 mg by mouth every day., Disp: , Rfl:   •  tramadol (ULTRAM) 50 MG TABS, Take  mg by mouth every four hours as needed., Disp: , Rfl:     Family History  History reviewed. No pertinent family history.    Social History  Social History   Substance Use Topics   • Smoking status: Never Smoker    • Smokeless tobacco: None   • Alcohol Use: Yes       Allergies  No Known Allergies     Physical Exam  Laboratory   Hemodynamics  Temp (24hrs), Av.1 °C (100.6 °F), Min:38.1 °C (100.6 °F), Max:38.1 °C (100.6 °F)   Temperature: (!) 38.1 °C (100.6 °F)  Pulse  Av.5  Min: 57  Max: 105 Heart Rate (Monitored): 74  Blood Pressure : 106/68 mmHg, NIBP: 104/56 mmHg      Respiratory      Respiration: 16, Pulse Oximetry: 96 %        RLL Breath Sounds: Diminished, LLL Breath Sounds: Diminished    Physical Exam   Constitutional: He is oriented to person, place, and time. He appears well-developed and well-nourished. No distress.   HENT:   Head: Normocephalic and atraumatic.   Right Ear: External ear normal.   Left Ear:  External ear normal.   Nose: Nose normal.   Mouth/Throat: Oropharynx is clear and moist. No oropharyngeal exudate.   Eyes: Conjunctivae and EOM are normal. Pupils are equal, round, and reactive to light. Right eye exhibits no discharge. Left eye exhibits no discharge. No scleral icterus.   Neck: Normal range of motion. Neck supple. No JVD present. No tracheal deviation present. No thyromegaly present.   Cardiovascular: Normal rate, regular rhythm, normal heart sounds and intact distal pulses.  Exam reveals no gallop and no friction rub.    No murmur heard.  Pulmonary/Chest: Effort normal and breath sounds normal. No stridor. No respiratory distress. He has no wheezes. He has no rales. He exhibits no tenderness.   Abdominal: Soft. Bowel sounds are normal. He exhibits no distension and no mass. There is no tenderness. There is no rebound and no guarding.   Fairly soft abdomen   Musculoskeletal: Normal range of motion. He exhibits no edema or tenderness.   Lymphadenopathy:     He has no cervical adenopathy.   Neurological: He is alert and oriented to person, place, and time. He has normal reflexes. No cranial nerve deficit. Coordination normal.   Skin: Skin is warm and dry. No rash noted. He is not diaphoretic. No erythema. No pallor.   Psychiatric: He has a normal mood and affect. His behavior is normal. Judgment and thought content normal.   Nursing note and vitals reviewed.      Recent Labs      07/14/17   1200   WBC  15.0*   RBC  4.42*   HEMOGLOBIN  12.8*   HEMATOCRIT  39.0*   MCV  88.2   MCH  29.0   MCHC  32.8*   RDW  45.4   PLATELETCT  440   MPV  8.2*     Recent Labs      07/14/17   1200   SODIUM  128*   POTASSIUM  3.7   CHLORIDE  92*   CO2  26   GLUCOSE  125*   BUN  14   CREATININE  1.01   CALCIUM  9.8     Recent Labs      07/14/17   1200   ALTSGPT  64*   ASTSGOT  41   ALKPHOSPHAT  164*   TBILIRUBIN  1.1   GLUCOSE  125*                   Imaging  CT abd/pelvis    1.  Small inflammatory phlegmon/developing  abscess in the RIGHT lower quadrant, in close proximity to the appendiceal tip, concerning for perforated appendicitis.  2.  Large complex fluid collection in the central pelvis, likely abscess, with adjacent inflammation of sigmoid colon.  This may be secondary to appendicitis or diverticulitis.  3.  Minimal pneumoperitoneum consistent with bowel perforation.  4.  Enlarged prostate.    CXR negative     Assessment/Plan     I anticipate this patient will require at least two midnights for appropriate medical management, necessitating inpatient admission.    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  Will have IR place drains, Dr. Rodriguez following, rocephin, flagyl    Sepsis (CMS-[facility])  Assessment & Plan  IVF, rocephin, flagyl, ?appendiceal abscess is acute?  Following closely    UTI (urinary tract infection)  Assessment & Plan  Rocephin, follow cx, IVF    Hyponatremia  Assessment & Plan  IVF, follow    BPH (benign prostatic hypertrophy)  Assessment & Plan  Flomax    Depression  Assessment & Plan  Follow    Osteoarthritis  Assessment & Plan  Mobic, prns        VTE prophylaxis: lovenox.

## 2017-07-14 NOTE — TELEPHONE ENCOUNTER
The patient was called for re-evaluation, urine culture negative, a message was left, encouraged to call back to the clinic or return to clinic with any questions or concerns. Follow up with PCP.

## 2017-07-14 NOTE — IP AVS SNAPSHOT
" <p align=\"LEFT\"><IMG SRC=\"//[email or web address]$/[email or web address]\" alt=\"Image\" WIDTH=\"50%\" HEIGHT=\"200\" BORDER=\"\"></p>                   Name:Marcelo Colon  Medical Record Number:[id number]  CSN: [id number]    YOB: 1940   Age: 76 y.o.  Sex: male  HT:1.727 m (5' 8\") WT: 66.4 kg (146 lb 6.2 oz)          Admit Date: 7/14/2017     Discharge Date:   Today's Date: 8/18/2017  Attending Doctor:  Garcia Bernal M.D.                  Allergies:  Review of patient's allergies indicates no known allergies.             Medication List      Take these Medications        Instructions    alfuzosin 10 MG SR tablet   Commonly known as:  UROXATRAL    Take 10 mg by mouth every day.   Dose:  10 mg       amoxicillin-clavulanate 875-125 MG Tabs   Commonly known as:  AUGMENTIN    Take 1 Tab by mouth every 12 hours.   Dose:  1 Tab       fluconazole 200 MG Tabs   Commonly known as:  DIFLUCAN    Take 1 Tab by mouth every day.   Dose:  200 mg       gabapentin 100 MG Caps   Commonly known as:  NEURONTIN    Take 100 mg by mouth every bedtime. Pt may increase to BID and then to TID   Dose:  100 mg       lamotrigine 150 MG tablet   Commonly known as:  LAMICTAL    Take 150 mg by mouth every day.   Dose:  150 mg       linezolid 600 MG Tabs   Commonly known as:  ZYVOX    Take 1 Tab by mouth every 12 hours.   Dose:  600 mg       meloxicam 15 MG tablet   Commonly known as:  MOBIC    Take 15 mg by mouth every day.   Dose:  15 mg       NS SOLN 250 mL with vancomycin 10 GM SOLR 1,300 mg    1,300 mg by Intravenous route every 24 hours for 7 days.   Dose:  20 mg/kg       omeprazole 20 MG delayed-release capsule   Commonly known as:  PRILOSEC    Take 20 mg by mouth every day.   Dose:  20 mg       oxybutynin 5 MG Tabs   Commonly known as:  DITROPAN    Take 1 Tab by mouth 2 Times a Day.   Dose:  5 mg       oxycodone immediate-release 5 MG Tabs   Commonly known as:  ROXICODONE    Take 1 Tab by mouth every four hours as needed.   Dose:  5 mg       oyster " shell calcium/vitamin D 250-125 MG-UNIT Tabs tablet    Take 1 Tab by mouth every day.   Dose:  1 Tab       polyethylene glycol/lytes Pack   Commonly known as:  MIRALAX    Take 1 Packet by mouth every day.   Dose:  17 g       tamsulosin 0.4 MG capsule   Commonly known as:  FLOMAX    Take 1 Cap by mouth ONE-HALF HOUR AFTER BREAKFAST.   Dose:  0.4 mg       tramadol 50 MG Tabs   Commonly known as:  ULTRAM    Take  mg by mouth every four hours as needed.   Dose:   mg

## 2017-07-14 NOTE — IP AVS SNAPSHOT
8/18/2017    Marcelo Colon  [address] [place] [address]    Dear Marcelo:    [facility] wants to ensure your discharge home is safe and you or your loved ones have had all of your questions answered regarding your care after you leave the hospital.    Below is a list of resources and contact information should you have any questions regarding your hospital stay, follow-up instructions, or active medical symptoms.    Questions or Concerns Regarding… Contact   Medical Questions Related to Your Discharge  (7 days a week, 8am-5pm) Contact a Nurse Care Coordinator   [phone]   Medical Questions Not Related to Your Discharge  (24 hours a day / 7 days a week)  Contact the Nurse Health Line   [phone]    Medications or Discharge Instructions Refer to your discharge packet   or contact your [facility] Primary Care Provider   [phone]   Follow-up Appointment(s) Schedule your appointment via Qoopl   or contact Scheduling [phone]   Billing Review your statement via Qoopl  or contact Billing [phone]   Medical Records Review your records via Qoopl   or contact Medical Records [phone]     You may receive a telephone call within two days of discharge. This call is to make certain you understand your discharge instructions and have the opportunity to have any questions answered. You can also easily access your medical information, test results and upcoming appointments via the Qoopl free online health management tool. You can learn more and sign up at [facility] [facility]/Qoopl. For assistance setting up your Qoopl account, please call [phone].    Once again, we want to ensure your discharge home is safe and that you have a clear understanding of any next steps in your care. If you have any questions or concerns, please do not hesitate to contact us, we are here for you. Thank you for choosing [facility] for your healthcare needs.    Sincerely,    Your [facility] Healthcare Team

## 2017-07-14 NOTE — IP AVS SNAPSHOT
ChangeMob Access Code: [email or web address]  Expires: 9/17/2017  3:16 PM    Your email address is not on file at "[employer]".  Email Addresses are required for you to sign up for ChangeMob, please contact [phone] to verify your personal information and to provide your email address prior to attempting to register for ChangeMob.    Marcelo Colon  [address] [place] [address]    TrialPayt  A secure, online tool to manage your health information     "[employer]"’s ChangeMob® is a secure, online tool that connects you to your personalized health information from the privacy of your home -- day or night - making it very easy for you to manage your healthcare. Once the activation process is completed, you can even access your medical information using the ChangeMob ricardo, which is available for free in the Apple Ricardo store or Google Play store.     To learn more about ChangeMob, visit [email or web address]    There are two levels of access available (as shown below):   My Chart Features  [facility] Primary Care Doctor [facility]  Specialists [facility]  Urgent  Care [facility] Primary Care Doctor   Email your healthcare team securely and privately 24/7 X X X    Manage appointments: schedule your next appointment; view details of past/upcoming appointments X      Request prescription refills. X      View recent personal medical records, including lab and immunizations X X X X   View health record, including health history, allergies, medications X X X X   Read reports about your outpatient visits, procedures, consult and ER notes X X X X   See your discharge summary, which is a recap of your hospital and/or ER visit that includes your diagnosis, lab results, and care plan X X  X     How to register for TrialPayt:  Once your e-mail address has been verified, follow the following steps to sign up for ChangeMob.     1. Go to  [email or web address]  2. Click on the Sign Up Now box, which takes you to the New Member Sign Up page. You will  need to provide the following information:  a. Enter your Ariisto Access Code exactly as it appears at the top of this page. (You will not need to use this code after you’ve completed the sign-up process. If you do not sign up before the expiration date, you must request a new code.)   b. Enter your date of birth.   c. Enter your home email address.   d. Click Submit, and follow the next screen’s instructions.  3. Create a [email or web address] ID. This will be your Ariisto login ID and cannot be changed, so think of one that is secure and easy to remember.  4. Create a Ariisto password. You can change your password at any time.  5. Enter your Password Reset Question and Answer. This can be used at a later time if you forget your password.   6. Enter your e-mail address. This allows you to receive e-mail notifications when new information is available in Ariisto.  7. Click Sign Up. You can now view your health information.    For assistance activating your Ariisto account, call [phone]

## 2017-07-14 NOTE — ED NOTES
The Medication Reconciliation process has been completed by interviewing the patient and calling [facility]    Allergies have been reviewed  Antibiotic use in 30 days - Cipro started 7/11/17    Home Pharmacy:  [facility]

## 2017-07-14 NOTE — ED NOTES
Wheeled to triage  Chief Complaint   Patient presents with   • UTI     started yesterday   • Abdominal Pain     x3-4 days, sharp pain, better when laying down   • Diarrhea     Pt has also had a 12lb weight loss in about 10 days.  Sepsis score of 3, charge RN aware, taken to rm 21.

## 2017-07-14 NOTE — IP AVS SNAPSHOT
" Home Care Instructions                                                                                                                  Name:Marcelo Colon  Medical Record Number:[id number]  CSN: [id number]    YOB: 1940   Age: 76 y.o.  Sex: male  HT:1.727 m (5' 8\") WT: 66.4 kg (146 lb 6.2 oz)          Admit Date: 7/14/2017     Discharge Date:   Today's Date: 8/18/2017  Attending Doctor:  Garcia Bernal M.D.                  Allergies:  Review of patient's allergies indicates no known allergies.            Discharge Instructions       Discharge Instructions    Discharged to other by medical transportation with escort. Discharged via wheelchair, hospital escort: Yes.  Special equipment needed: Wheelchair    Be sure to schedule a follow-up appointment with your primary care doctor or any specialists as instructed.     Discharge Plan:   Diet Plan: Discussed  Activity Level: Discussed  Confirmed Follow up Appointment: Patient to Call and Schedule Appointment  Confirmed Symptoms Management: Discussed  Medication Reconciliation Updated: Yes  Influenza Vaccine Indication: Not indicated: Previously immunized this influenza season and > 8 years of age    I understand that a diet low in cholesterol, fat, and sodium is recommended for good health. Unless I have been given specific instructions below for another diet, I accept this instruction as my diet prescription.   Other diet: GI Soft (Low Fiber)    Special Instructions: None    · Is patient discharged on Warfarin / Coumadin?   No     · Is patient Post Blood Transfusion?  No    Depression / Suicide Risk    As you are discharged from this [facility] facility, it is important to learn how to keep safe from harming yourself.    Recognize the warning signs:  · Abrupt changes in personality, positive or negative- including increase in energy   · Giving away possessions  · Change in eating patterns- significant weight changes-  positive or negative  · Change in " sleeping patterns- unable to sleep or sleeping all the time   · Unwillingness or inability to communicate  · Depression  · Unusual sadness, discouragement and loneliness  · Talk of wanting to die  · Neglect of personal appearance   · Rebelliousness- reckless behavior  · Withdrawal from people/activities they love  · Confusion- inability to concentrate     If you or a loved one observes any of these behaviors or has concerns about self-harm, here's what you can do:  · Talk about it- your feelings and reasons for harming yourself  · Remove any means that you might use to hurt yourself (examples: pills, rope, extension cords, firearm)  · Get professional help from the community (Mental Health, Substance Abuse, psychological counseling)  · Do not be alone:Call your Safe Contact- someone whom you trust who will be there for you.  · Call your local CRISIS HOTLINE 019-9710 or 044-817-3017  · Call your local Children's Mobile Crisis Response Team [place] (513) 639-0913 or www.Kleo  · Call the toll free National Suicide Prevention Hotlines   · National Suicide Prevention Lifeline 073-230-GFXP (9589)  · National Hope Line Network 800-SUICIDE (442-0189)      Exploratory Laparotomy, Adult  Exploratory laparotomy is a surgical procedure to examine the organs inside your belly (abdomen). Another name for this is abdominal exploration. You may have this procedure if you have abdominal pain, trauma, bleeding, infection, or obstruction. The procedure may be done if your health care provider cannot make a diagnosis from only an exam and testing.  Exploratory laparotomy may be a planned procedure or an emergency procedure. You may have surgical treatment as part of the laparotomy, or you may have additional treatment after your laparotomy. This will depend on what your surgeon finds during the procedure.  LET YOUR HEALTH CARE PROVIDER KNOW ABOUT:  · Any allergies you have.  · All medicines you are taking, including  vitamins, herbs, eye drops, creams, and over-the-counter medicines.  · Previous problems you or members of your family have had with the use of anesthetics.  · Any blood disorders you have.  · Previous surgeries you have had.  · Medical conditions you have.  RISKS AND COMPLICATIONS  Generally, this is a safe procedure. However, problems can occur and include:  2. Bleeding.  3. Infection.  4. A blood clot that forms in your leg and travels to your lungs.  5. Damage to organs inside your abdomen.  6. Scar tissue that blocks your digestive tract.  BEFORE THE PROCEDURE  · Ask your health care provider about:  ¨ Changing or stopping your regular medicines. This is especially important if you are taking diabetes medicines or blood thinners.  ¨ Taking medicines such as aspirin and ibuprofen. These medicines can thin your blood. Do not take these medicines before your procedure if your health care provider instructs you not to.  · Do not eat or drink anything after midnight on the night before the procedure or as directed by your health care provider.  · You may be given instructions for clearing out your bowel before surgery (bowel prep). If you are already in the hospital, the bowel prep may be done there.  PROCEDURE  · An IV tube may be inserted into a vein. You may receive fluids and medicine through the IV tube. This may include antibiotic medicine to treat or prevent infection.  · You will be given a medicine that makes you go to sleep (general anesthetic).  · You may have a tube placed through your nose and into your stomach (nasogastric tube) to drain your stomach fluids.  · You may have a tube placed into your bladder (urinary catheter) to drain urine.  · Your abdomen will be cleaned with a germ-killing solution (antiseptic).  · The surgeon will make a surgical cut (incision) in your abdomen. This is usually an up-and-down incision in the midsection of your abdomen. The incision will go through the inside lining of  your abdomen (peritoneum).  · Your surgeon will spread the incision wide enough to examine the inside of your abdomen.  · The rest of the procedure will depend on what the surgeon finds:  ¨ The surgeon will check all organs in your abdomen for damage or obstruction. Repairs will be made when possible.  ¨ If there is blood in the abdomen, the surgeon will look for the source of the bleeding in order to stop it.  ¨ If there is yellowish-white fluid (pus) or gastric fluids in your abdomen, the surgeon will check for an infection or a hole (perforation) in your digestive tract.  ¨ If the surgeon finds infection, a drain may be placed to empty fluid that can build up in your abdomen after surgery.  ¨ If there is a growth (tumor) inside your abdomen, the surgeon may remove a piece of the growth (biopsy) to examine it under a microscope.  · When all procedures are complete, the surgeon will close your abdomen with layers of stitches (sutures).  · The incision through the skin of your abdomen will be closed with sutures or staples.  AFTER THE PROCEDURE  · Your blood pressure, heart rate, breathing rate, and blood oxygen level will be monitored often until the medicines you were given have worn off.  · You will continue to receive fluids and nutrition through your IV tube. This will stop when you can eat and drink on your own.  · You may also get antibiotic medicine and pain medicine through your IV tube.  · Your nasogastric tube may be removed when you start to pass gas.  · Your urinary catheter may be removed when the anesthetic wears off.     This information is not intended to replace advice given to you by your health care provider. Make sure you discuss any questions you have with your health care provider.     Document Released: 09/12/2002 Document Revised: 01/08/2016 Document Reviewed: 08/05/2015  [employer] Interactive Patient Education ©2016 [employer]    Colostomy Surgery, Adult  A colostomy surgery is a  procedure that redirects a section of the large intestine (colon) to an opening in the abdomen. This opening is called a stoma or ostomy. A bag is attached to the stoma on the outside of the body. This bag collects waste, since the waste can no longer travel through the rest of the colon. Where the stoma is located and what it looks like depends on the type of colostomy performed. A colostomy may be temporary or permanent. The hospital stay after this procedure is typically 3-7 days.  LET YOUR HEALTH CARE PROVIDER KNOW ABOUT:  · Allergies to food or medicine.  · Medicines taken, including vitamins, health supplements, herbs, eye drops, over-the-counter medicines, and creams.  · Use of steroids (by mouth or creams).  · Previous problems with anesthetics or numbing medicines.  · History of bleeding problems or blood clots.  · Previous surgery.  · Other health problems, including diabetes and kidney problems.  · Possibility of pregnancy, if this applies.  RISKS AND COMPLICATIONS  General surgical complications may include:  7. Reaction to anesthetics.  8. Damage to surrounding nerves, tissues, or structures.  9. Infection.  10. Blood clot.  11. Bleeding.  12. Scarring.  13. Pain that lasts longer than 3 months.  Specific risks for colostomy, while rare, may include:  · Intestinal blockage.  · Skin irritation.  · Wound opening.  · Narrowing or collapsing of the stoma.  · Hernia.  BEFORE THE PROCEDURE  It is important to follow your health care provider's instructions prior to your procedure to avoid complications. Steps before your procedure may include:  · A physical exam, blood and urine tests, stool test, X-rays, and other procedures.  · Chemotherapy or radiation therapy.  · A review of the procedure, the anesthetic being used, and what to expect after the procedure. You may meet with an ostomy advisor.  You may be asked to:  · Stop taking certain medicines for several days prior to your procedure such as blood  thinners (including aspirin).  · Take certain medicines, such as antibiotics or stool softeners.  · Follow a special diet for several days prior to the procedure and to avoid eating and drinking after midnight the night before the procedure. This will help you to avoid complications from the anesthetic.  · Take an antibacterial shower the night before, or the morning of, the procedure.  · Quit smoking. Smoking increases the chances of an infection or a healing problem after your procedure.  Arrange for someone to drive you home after surgery. You should also arrange to have someone help you with activities while you recover.  PROCEDURE  There are several types of colostomy procedures. The 2 main procedure types are loop colostomy or end colostomy. During a loop colostomy, the surgeon pulls 2 ends of the intestine out toward the abdomen, using 2 openings. During an end colostomy, the surgeon pulls 1 end of the intestine out toward the abdomen, through 1 opening. You will be given medicine that makes you sleep (general anesthetic). The procedure may be done as open surgery, with a large cut (incision), or as laparoscopic surgery, with several small incisions.  AFTER THE PROCEDURE  · You will be given pain medicine.  · You may be able to suck on ice. You may begin drinking clear fluids the next day and begin a normal diet after 2 days, or as directed by your health care provider.  · Your stoma will be covered with bandages or a pouch.  · Initial drainage from the stoma will be liquid.  · The stoma may be dark-colored, swollen, and bruised until it has more time to heal.     This information is not intended to replace advice given to you by your health care provider. Make sure you discuss any questions you have with your health care provider.     Document Released: 05/09/2012 Document Revised: 05/03/2016 Document Reviewed: 05/09/2012  [employer] Interactive Patient Education ©2016 [employer]    Infection Control in the  Home  If you have an infection or you are taking care of someone who has an infection, it is important to know how to keep the infection from spreading. Follow these guidelines to help stop the spread of infection, and talk to your health care provider.  HOW ARE INFECTIONS SPREAD?  In order for an infection to spread, the following must be present:  · A germ. This may be a virus, bacteria, fungus, or parasite.  · A place for the germ to live. This may be:  ¨ On or in a person, animal, plant, or food.  ¨ In soil or water.  ¨ On surfaces, such as a door handle.  · A susceptible host. This is a person or animal who does not have resistance (immunity) to the germ.  · A way for the germ to enter the host. This may occur by:  ¨ Direct contact. This may happen by making contact--such as shaking hands or hugging--with an infected person or animal. Some germs can also travel through the air and spread to you if an infected person coughs or sneezes on you or near you.  ¨ Indirect contact. This is when the germ enters the host through contact with an infected object. Examples include eating contaminated food, drinking contaminated water, or touching a contaminated surface with your hands and then touching your face, nose, or mouth soon after that.  HOW CAN I HELP TO PREVENT INFECTION FROM SPREADING?  There are several things that you can do to help prevent infection from spreading.  Hand Washing  It is very important to wash your hands correctly, following these steps:  14. Wet your hands with clean, running water.  15. Apply soap to your hands. Liquid soap is better than bar soap.  16. Rub your hands together quickly to create lather.  17. Keep rubbing your hands together for at least 20 seconds. Thoroughly scrub all parts of your hands, including under your fingernails and between your fingers.  18. Rinse your hands with clean, running water until all of the soap is gone.  19. Dry your hands with an air dryer or a clean  paper or cloth towel, or let your hands air-dry. Do not use your clothing or a soiled towel to dry your hands.  20. If you are in a public restroom, use your towel to turn off the water faucet and to open the bathroom door.  Make sure to wash your hands:  · Before:  ¨ Visiting a baby or anyone with a weakened or lowered defense (immune) system.  ¨ Putting in and taking out any contact lenses.  · After:  ¨ Working or playing outside.  ¨ Touching an animal or its toys or leash.  ¨ Handling livestock.  ¨ Using the bathroom or helping a child or adult to use the bathroom.  ¨ Using household  or toxic chemicals.  ¨ Touching or taking out the garbage.  ¨ Touching anything dirty around your home.  ¨ Handling soiled clothes or rags.  ¨ Taking care of a sick child. This includes touching used tissues, toys, and clothes.  ¨ Sneezing, coughing, or blowing your nose.  ¨ Using public transportation.  ¨ Shaking hands.  ¨ Using a phone, including your mobile phone.  ¨ Touching money.  · Before and after:  ¨ Preparing food.  ¨ Preparing a bottle for a baby.  ¨ Feeding a baby or a young child.  ¨ Eating.  ¨ Visiting or taking care of someone who is sick.  ¨ Changing a diaper.  ¨ Changing a bandage (dressing) or taking care of an injury or wound.  ¨ Giving or taking medicine.  Taking Care of Your Home  · Make sure that you have enough cleaning supplies at all times. These include:  ¨ Disinfectants.  ¨ Reusable cleaning cloths. Wash these after each use.  ¨ Paper towels.  ¨ Utility gloves. Replace your gloves if they are cracked or torn or if they start to peel.  · Use bleach safely. Never mix it with other cleaning products, especially those that contain ammonia. This mixture can create a dangerous gas that may be deadly.  · Take care of your cleaning supplies. Toilet brushes, mops, and sponges can breed germs. Soak them in bleach and water for 5 minutes after each use.  · Do not pour used mop water down the sink. Pour it  down the toilet instead.  · Maintain proper ventilation in your home.  · If you have a pet, ensure that your pet stays clean. Do not let people with weak immune systems touch bird droppings, fish tank water, or a litter box.  ¨ If you have a cat, be sure to change the litter every day.  · In the bathroom, make sure you:  ¨ Provide liquid soap.  ¨ Change towels and washcloths frequently. Avoid sharing towels and washcloths.  ¨ Change toothbrushes often and store them separately in a clean, dry place.  ¨ Disinfect the toilet.  ¨ Clean the tub, shower, and sink with standard cleaning products.    ¨ Mop the floor with a standard .  ¨ Do not share personal items, such as razors, toothbrushes, drinking glasses, deodorant, renner, brushes, towels, and washcloths.    · In the kitchen, make sure you:  ¨ Store food carefully.  ¨ Refrigerate leftovers promptly in covered containers.  ¨ Throw out stale or spoiled food.  ¨ Clean the inside of your refrigerator each week.  ¨ Keep your refrigerator set at 40°F (4°C) or less, and set your freezer at 0°F (-18°C) or less.  ¨ Thaw foods in the refrigerator or microwave, not at room temperature.  ¨ Serve foods at the proper temperature. Do not eat raw meat. Make sure it is cooked to the appropriate temperature. Cook eggs until they are firm.  ¨ Wash fruits and vegetables under running water.  ¨ Use separate cutting boards, plates, and utensils for raw foods and cooked foods.  ¨ Keep work surfaces clean.  ¨ Use a clean spoon each time you sample food while cooking.  ¨ Wash your dishes in hot, soapy water. Air-dry your dishes or use a .  ¨ Do not share forks, cups, or spoons during meals.  · Wear gloves if laundry is visibly soiled.  · Change linens each week or whenever they are soiled.  · Do not shake soiled linens. Doing that may send germs into the air. Put dressings, sanitary or incontinence pads, diapers, and gloves in plastic garbage bags for disposal.     This  information is not intended to replace advice given to you by your health care provider. Make sure you discuss any questions you have with your health care provider.     Document Released: 09/26/2009 Document Revised: 01/08/2016 Document Reviewed: 08/20/2015  [employer] Interactive Patient Education ©2016 [employer]  Colostomy Home Guide  A colostomy is an opening for stool to leave your body when a medical condition prevents it from leaving through the usual opening (rectum). During a surgery, a piece of large intestine (colon) is brought through a hole in the abdominal wall. The new opening is called a stoma or ostomy. A bag or pouch fits over the stoma to catch stool and gas. Your stool may be liquid, somewhat pasty, or formed.  CARING FOR YOUR STOMA   Normally, the stoma looks a lot like the inside of your cheek: pink, red, and moist. At first it may be swollen, but this swelling will decrease within 6 weeks.  Keep the skin around your stoma clean and dry. You can gently wash your stoma and the skin around your stoma in the shower with a clean, soft washcloth. If you develop any skin irritation, your caregiver may give you a stoma powder or ointment to help heal the area. Do not use any products other than those specifically given to you by your caregiver.   Your stoma should not be uncomfortable. If you notice any stinging or burning, your pouch may be leaking, and the skin around your stoma may be coming into contact with stool. This can cause skin irritation. If you notice stinging, replace your pouch with a new one and discard the old one.  OSTOMY POUCHES   The pouch that fits over the ostomy can be made up of either 1 or 2 pieces. A one-piece pouch has a skin barrier piece and the pouch itself in one unit. A two-piece pouch has a skin barrier with a separate pouch that snaps on and off of the skin barrier. Either way, you should empty the pouch when it is only  to ½ full. Do not let more stool or gas build  up. This could cause the pouch to leak.  Some ostomy bags have a built-in gas release valve. Ostomy deodorizer (5 drops) can be put into the pouch to prevent odor. Some people use ostomy lubricant drops inside the pouch to help the stool slide out of the bag more easily and completely.   EMPTYING YOUR OSTOMY POUCH   You may get lessons on how to empty your pouch from a wound-ostomy nurse before you leave the hospital. Here are the basic steps:  · Wash your hands with soap and water.  · Sit far back on the toilet.  · Put several pieces of toilet paper into the toilet water. This will prevent splashing as you empty the stool into the toilet bowl.  · Unclip or unvelcro the tail end of the pouch.  · Unroll the tail and empty stool into the toilet.  · Clean the tail with toilet paper.  · Reroll the tail, and clip or velcro it closed.  · Wash your hands again.  CHANGING YOUR OSTOMY POUCH   Change your ostomy pouch about every 3 to 4 days for the first 6 weeks, then every 5 to7 days. Always change the bag sooner if there is any leakage or you begin to notice any discomfort or irritation of the skin around the stoma. When possible, plan to change your ostomy pouch before eating or drinking as this will lessen the chance of stool coming out during the pouch change. A wound-ostomy nurse may teach you how to change your pouch before you leave the hospital. Here are the basic steps:  21. Lay out your supplies.  22. Wash your hands with soap and water.  23. Carefully remove the old pouch.  24. Wash the stoma and allow it to dry. Men may be advised to shave any hair around the stoma very carefully. This will make the adhesive stick better.  25. Use the stoma measuring guide that comes with your pouch set to decide what size hole you will need to cut in the skin barrier piece. Choose the smallest possible size that will hold the stoma but will not touch it.  26. Use the guide to trace the [place] on the back of the skin barrier  piece. Cut out the hole.  27. Hold the skin barrier piece over the stoma to make sure the hole is the correct size.  28. Remove the adhesive paper backing from the skin barrier piece.  29. Squeeze stoma paste around the opening of the skin barrier piece.  30. Clean and dry the skin around the stoma again.  31. Carefully fit the skin barrier piece over your stoma.  32. If you are using a two-piece pouch, snap the pouch onto the skin barrier piece.  33. Close the tail of the pouch.  34. Put your hand over the top of the skin barrier piece to help warm it for about 5 minutes, so that it conforms to your body better.  35. Wash your hands again.  DIET TIPS   · Continue to follow your usual diet.  · Drink about eight 8 oz glasses of water each day.  · You can prevent gas by eating slowly and chewing your food thoroughly.  · If you feel concerned that you have too much gas, you can cut back on gas-producing foods, such as:  ¨ Spicy foods.  ¨ Onions and garlic.  ¨ Cruciferous vegetables (cabbage, broccoli, cauliflower, Blue sprouts).  ¨ Beans and legumes.  ¨ Some cheeses.  ¨ Eggs.  ¨ Fish.  ¨ Bubbly (carbonated) drinks.  ¨ Chewing gum.  GENERAL TIPS   · You can shower with or without the bag in place.  · Always keep the bag on if you are bathing or swimming.  · If your bag gets wet, you can dry it with a blow-dryer set to cool.  · Avoid wearing tight clothing directly over your stoma so that it does not become irritated or bleed. Tight clothing can also prevent stool from draining into the pouch.  · It is helpful to always have an extra skin barrier and pouch with you when traveling. Do not leave them anywhere too warm, as parts of them can melt.  · Do not let your seat belt rest on your stoma. Try to keep the seat belt either above or below your stoma, or use a tiny pillow to cushion it.  · You can still participate in sports, but you should avoid activities in which there is a risk of getting hit in the  abdomen.  · You can still have sex. It is a good idea to empty your pouch prior to sex. Some people and their partners feel very comfortable seeing the pouch during sex. Others choose to wear lingerie or a T-shirt that covers the device.  SEEK IMMEDIATE MEDICAL CARE IF:  · You notice a change in the size or color of the stoma, especially if it becomes very red, purple, black, or pale white.  · You have bloody stools or bleeding from the stoma.  · You have abdominal pain, nausea, vomiting, or bloating.  · There is anything unusual protruding from the stoma.  · You have irritation or red skin around the stoma.  · No stool is passing from the stoma.  · You have diarrhea (requiring more frequent than normal pouch emptying).     This information is not intended to replace advice given to you by your health care provider. Make sure you discuss any questions you have with your health care provider.     Document Released: 12/20/2004 Document Revised: 03/11/2013 Document Reviewed: 05/16/2012  [employer] Patient Education ©2016 [employer]         Discharge Medication Instructions:    Below are the medications your physician expects you to take upon discharge:    Review all your home medications and newly ordered medications with your doctor and/or pharmacist. Follow medication instructions as directed by your doctor and/or pharmacist.    Please keep your medication list with you and share with your physician.               Medication List      START taking these medications        Instructions    Morning Afternoon Evening Bedtime    amoxicillin-clavulanate 875-125 MG Tabs   Last time this was given:  1 Tab on 8/18/2017  9:48 AM   Commonly known as:  AUGMENTIN        Take 1 Tab by mouth every 12 hours.   Dose:  1 Tab                        fluconazole 200 MG Tabs   Last time this was given:  200 mg on 8/17/2017  9:26 AM   Commonly known as:  DIFLUCAN        Take 1 Tab by mouth every day.   Dose:  200 mg                         linezolid 600 MG Tabs   Last time this was given:  600 mg on 8/17/2017  9:26 AM   Commonly known as:  ZYVOX        Take 1 Tab by mouth every 12 hours.   Dose:  600 mg                        NS SOLN 250 mL with vancomycin 10 GM SOLR 1,300 mg   Last time this was given:  1,300 mg on 8/18/2017 12:17 PM        1,300 mg by Intravenous route every 24 hours for 7 days.   Dose:  20 mg/kg                        oxybutynin 5 MG Tabs   Last time this was given:  5 mg on 8/18/2017  9:49 AM   Commonly known as:  DITROPAN        Take 1 Tab by mouth 2 Times a Day.   Dose:  5 mg                        oxycodone immediate-release 5 MG Tabs   Last time this was given:  5 mg on 8/18/2017 12:16 PM   Commonly known as:  ROXICODONE        Take 1 Tab by mouth every four hours as needed.   Dose:  5 mg                        oyster shell calcium/vitamin D 250-125 MG-UNIT Tabs tablet   Last time this was given:  1 Tab on 8/18/2017  9:49 AM        Take 1 Tab by mouth every day.   Dose:  1 Tab                        polyethylene glycol/lytes Pack   Last time this was given:  1 Packet on 8/18/2017  9:48 AM   Commonly known as:  MIRALAX        Take 1 Packet by mouth every day.   Dose:  17 g                        tamsulosin 0.4 MG capsule   Last time this was given:  0.4 mg on 8/18/2017  9:48 AM   Commonly known as:  FLOMAX        Take 1 Cap by mouth ONE-HALF HOUR AFTER BREAKFAST.   Dose:  0.4 mg                          CONTINUE taking these medications        Instructions    Morning Afternoon Evening Bedtime    alfuzosin 10 MG SR tablet   Commonly known as:  UROXATRAL        Take 10 mg by mouth every day.   Dose:  10 mg                        gabapentin 100 MG Caps   Last time this was given:  400 mg on 8/18/2017  2:50 PM   Commonly known as:  NEURONTIN        Take 100 mg by mouth every bedtime. Pt may increase to BID and then to TID   Dose:  100 mg                        lamotrigine 150 MG tablet   Last time this was given:  150  mg on 8/18/2017  9:48 AM   Commonly known as:  LAMICTAL        Take 150 mg by mouth every day.   Dose:  150 mg                        meloxicam 15 MG tablet   Last time this was given:  15 mg on 7/20/2017  9:44 AM   Commonly known as:  MOBIC        Take 15 mg by mouth every day.   Dose:  15 mg                        omeprazole 20 MG delayed-release capsule   Last time this was given:  20 mg on 8/18/2017  9:49 AM   Commonly known as:  PRILOSEC        Take 20 mg by mouth every day.   Dose:  20 mg                        tramadol 50 MG Tabs   Last time this was given:  50 mg on 8/5/2017  3:02 PM   Commonly known as:  ULTRAM        Take  mg by mouth every four hours as needed.   Dose:   mg                          STOP taking these medications     ciprofloxacin 500 MG Tabs   Commonly known as:  CIPRO                    Where to Get Your Medications      Information about where to get these medications is not yet available     ! Ask your nurse or doctor about these medications    - amoxicillin-clavulanate 875-125 MG Tabs  - fluconazole 200 MG Tabs  - linezolid 600 MG Tabs  - NS SOLN 250 mL with vancomycin 10 GM SOLR 1,300 mg  - oxybutynin 5 MG Tabs  - oxycodone immediate-release 5 MG Tabs  - oyster shell calcium/vitamin D 250-125 MG-UNIT Tabs tablet  - polyethylene glycol/lytes Pack  - tamsulosin 0.4 MG capsule            Orders for after discharge     REFERRAL TO SKILLED NURSING FACILITY    Complete by:  As directed              Instructions           Diet / Nutrition:    Follow any diet instructions given to you by your doctor or the dietician, including how much salt (sodium) you are allowed each day.    If you are overweight, talk to your doctor about a weight reduction plan.    Activity:    Remain physically active following your doctor's instructions about exercise and activity.    Rest often.     Any time you become even a little tired or short of breath, SIT DOWN and rest.    Worsening  Symptoms:    Report any of the following signs and symptoms to the doctor's office immediately:    *Pain of jaw, arm, or neck  *Chest pain not relieved by medication                               *Dizziness or loss of consciousness  *Difficulty breathing even when at rest   *More tired than usual                                       *Bleeding drainage or swelling of surgical site  *Swelling of feet, ankles, legs or stomach                 *Fever (>100ºF)  *Pink or blood tinged sputum  *Weight gain (3lbs/day or 5lbs /week)           *Shock from internal defibrillator (if applicable)  *Palpitations or irregular heartbeats                *Cool and/or numb extremities    Stroke Awareness    Common Risk Factors for Stroke include:    Age  Atrial Fibrillation  Carotid Artery Stenosis  Diabetes Mellitus  Excessive alcohol consumption  High blood pressure  Overweight   Physical inactivity  Smoking    Warning signs and symptoms of a stroke include:    *Sudden numbness or weakness of the face, arm or leg (especially on one side of the body).  *Sudden confusion, trouble speaking or understanding.  *Sudden trouble seeing in one or both eyes.  *Sudden trouble walking, dizziness, loss of balance or coordination.Sudden severe headache with no known cause.    It is very important to get treatment quickly when a stroke occurs. If you experience any of the above warning signs, call 911 immediately.                   Disclaimer         Quit Smoking / Tobacco Use:    I understand the use of any tobacco products increases my chance of suffering from future heart disease or stroke and could cause other illnesses which may shorten my life. Quitting the use of tobacco products is the single most important thing I can do to improve my health. For further information on smoking / tobacco cessation call a Toll Free Quit Line at 1-345.341.3725 (*[employer] [place]) or 1-773.313.5089 (American [employer] Association) or you can access the web  based program at www.lungusa.org.    [place] Tobacco Users Help Line:  (701) 430-7516       Toll Free: 1-935.699.5786  Quit Tobacco Program [facility] (678)132-4798    Crisis Hotline:    [place] Crisis Hotline:  9-670-YUPQIBI or 1-973.853.3283    [place] Crisis Hotline:    1-289.874.4094 or 929-796-1104    Discharge Survey:   Thank you for choosing [facility]. We hope we did everything we could to make your hospital stay a pleasant one. You may be receiving a phone survey and we would appreciate your time and participation in answering the questions. Your input is very valuable to us in our efforts to improve our service to our patients and their families.        My signature on this form indicates that:    1. I have reviewed and understand the above information.  2. My questions regarding this information have been answered to my satisfaction.  3. I have formulated a plan with my discharge nurse to obtain my prescribed medications for home.                  Disclaimer         __________________________________                     __________       ________                       Patient Signature                                                 Date                    Time

## 2017-07-15 ENCOUNTER — APPOINTMENT (OUTPATIENT)
Dept: RADIOLOGY | Facility: MEDICAL CENTER | Age: 77
DRG: 853 | End: 2017-07-15
Attending: INTERNAL MEDICINE
Payer: MEDICARE

## 2017-07-15 PROBLEM — D72.829 LEUCOCYTOSIS: Status: ACTIVE | Noted: 2017-07-15

## 2017-07-15 LAB
ALBUMIN SERPL BCP-MCNC: 2.4 G/DL (ref 3.2–4.9)
ALBUMIN/GLOB SERPL: 0.8 G/DL
ALP SERPL-CCNC: 117 U/L (ref 30–99)
ALT SERPL-CCNC: 45 U/L (ref 2–50)
ANION GAP SERPL CALC-SCNC: 11 MMOL/L (ref 0–11.9)
AST SERPL-CCNC: 30 U/L (ref 12–45)
BILIRUB SERPL-MCNC: 0.6 MG/DL (ref 0.1–1.5)
BUN SERPL-MCNC: 11 MG/DL (ref 8–22)
CALCIUM SERPL-MCNC: 8.5 MG/DL (ref 8.5–10.5)
CHLORIDE SERPL-SCNC: 104 MMOL/L (ref 96–112)
CO2 SERPL-SCNC: 20 MMOL/L (ref 20–33)
CREAT SERPL-MCNC: 0.77 MG/DL (ref 0.5–1.4)
ERYTHROCYTE [DISTWIDTH] IN BLOOD BY AUTOMATED COUNT: 46.8 FL (ref 35.9–50)
GFR SERPL CREATININE-BSD FRML MDRD: >60 ML/MIN/1.73 M 2
GLOBULIN SER CALC-MCNC: 2.9 G/DL (ref 1.9–3.5)
GLUCOSE SERPL-MCNC: 96 MG/DL (ref 65–99)
HCT VFR BLD AUTO: 31.5 % (ref 42–52)
HGB BLD-MCNC: 10.2 G/DL (ref 14–18)
INR PPP: 1.28 (ref 0.87–1.13)
MCH RBC QN AUTO: 29.2 PG (ref 27–33)
MCHC RBC AUTO-ENTMCNC: 33 G/DL (ref 33.7–35.3)
MCV RBC AUTO: 88.5 FL (ref 81.4–97.8)
PLATELET # BLD AUTO: 357 K/UL (ref 164–446)
PMV BLD AUTO: 8.3 FL (ref 9–12.9)
POTASSIUM SERPL-SCNC: 3.6 MMOL/L (ref 3.6–5.5)
PROT SERPL-MCNC: 5.3 G/DL (ref 6–8.2)
PROTHROMBIN TIME: 16.4 SEC (ref 12–14.6)
RBC # BLD AUTO: 3.49 M/UL (ref 4.7–6.1)
SODIUM SERPL-SCNC: 135 MMOL/L (ref 135–145)
WBC # BLD AUTO: 11 K/UL (ref 4.8–10.8)

## 2017-07-15 PROCEDURE — 36415 COLL VENOUS BLD VENIPUNCTURE: CPT

## 2017-07-15 PROCEDURE — A9270 NON-COVERED ITEM OR SERVICE: HCPCS | Performed by: INTERNAL MEDICINE

## 2017-07-15 PROCEDURE — 87077 CULTURE AEROBIC IDENTIFY: CPT

## 2017-07-15 PROCEDURE — 87076 CULTURE ANAEROBE IDENT EACH: CPT

## 2017-07-15 PROCEDURE — 87070 CULTURE OTHR SPECIMN AEROBIC: CPT

## 2017-07-15 PROCEDURE — 87186 SC STD MICRODIL/AGAR DIL: CPT | Mod: 91

## 2017-07-15 PROCEDURE — 85027 COMPLETE CBC AUTOMATED: CPT

## 2017-07-15 PROCEDURE — 700111 HCHG RX REV CODE 636 W/ 250 OVERRIDE (IP): Performed by: INTERNAL MEDICINE

## 2017-07-15 PROCEDURE — 700102 HCHG RX REV CODE 250 W/ 637 OVERRIDE(OP): Performed by: INTERNAL MEDICINE

## 2017-07-15 PROCEDURE — 700101 HCHG RX REV CODE 250: Performed by: INTERNAL MEDICINE

## 2017-07-15 PROCEDURE — 700105 HCHG RX REV CODE 258: Performed by: INTERNAL MEDICINE

## 2017-07-15 PROCEDURE — 87205 SMEAR GRAM STAIN: CPT

## 2017-07-15 PROCEDURE — 0W9J30Z DRAINAGE OF PELVIC CAVITY WITH DRAINAGE DEVICE, PERCUTANEOUS APPROACH: ICD-10-PCS | Performed by: RADIOLOGY

## 2017-07-15 PROCEDURE — 85610 PROTHROMBIN TIME: CPT

## 2017-07-15 PROCEDURE — 770020 HCHG ROOM/CARE - TELE (206)

## 2017-07-15 PROCEDURE — 4410213 CT-DRAIN-PERITONEAL

## 2017-07-15 PROCEDURE — 80053 COMPREHEN METABOLIC PANEL: CPT

## 2017-07-15 PROCEDURE — 700111 HCHG RX REV CODE 636 W/ 250 OVERRIDE (IP)

## 2017-07-15 PROCEDURE — 99233 SBSQ HOSP IP/OBS HIGH 50: CPT | Performed by: INTERNAL MEDICINE

## 2017-07-15 PROCEDURE — 700111 HCHG RX REV CODE 636 W/ 250 OVERRIDE (IP): Performed by: RADIOLOGY

## 2017-07-15 RX ORDER — SODIUM CHLORIDE 9 MG/ML
500 INJECTION, SOLUTION INTRAVENOUS
Status: ACTIVE | OUTPATIENT
Start: 2017-07-15 | End: 2017-07-15

## 2017-07-15 RX ORDER — MIDAZOLAM HYDROCHLORIDE 1 MG/ML
.5-2 INJECTION INTRAMUSCULAR; INTRAVENOUS PRN
Status: ACTIVE | OUTPATIENT
Start: 2017-07-15 | End: 2017-07-15

## 2017-07-15 RX ORDER — MIDAZOLAM HYDROCHLORIDE 1 MG/ML
INJECTION INTRAMUSCULAR; INTRAVENOUS
Status: COMPLETED
Start: 2017-07-15 | End: 2017-07-15

## 2017-07-15 RX ORDER — ONDANSETRON 2 MG/ML
INJECTION INTRAMUSCULAR; INTRAVENOUS
Status: COMPLETED
Start: 2017-07-15 | End: 2017-07-15

## 2017-07-15 RX ORDER — ONDANSETRON 2 MG/ML
4 INJECTION INTRAMUSCULAR; INTRAVENOUS PRN
Status: ACTIVE | OUTPATIENT
Start: 2017-07-15 | End: 2017-07-15

## 2017-07-15 RX ADMIN — TRAMADOL HYDROCHLORIDE 50 MG: 50 TABLET, COATED ORAL at 06:04

## 2017-07-15 RX ADMIN — TAMSULOSIN HYDROCHLORIDE 0.4 MG: 0.4 CAPSULE ORAL at 09:12

## 2017-07-15 RX ADMIN — MIDAZOLAM HYDROCHLORIDE 0.5 MG: 1 INJECTION, SOLUTION INTRAMUSCULAR; INTRAVENOUS at 14:06

## 2017-07-15 RX ADMIN — ONDANSETRON 4 MG: 2 INJECTION INTRAMUSCULAR; INTRAVENOUS at 13:46

## 2017-07-15 RX ADMIN — GABAPENTIN 100 MG: 100 CAPSULE ORAL at 09:12

## 2017-07-15 RX ADMIN — MELOXICAM 15 MG: 7.5 TABLET ORAL at 09:13

## 2017-07-15 RX ADMIN — FENTANYL CITRATE 25 MCG: 50 INJECTION, SOLUTION INTRAMUSCULAR; INTRAVENOUS at 14:04

## 2017-07-15 RX ADMIN — OMEPRAZOLE 20 MG: 20 CAPSULE, DELAYED RELEASE ORAL at 09:14

## 2017-07-15 RX ADMIN — POTASSIUM CHLORIDE AND SODIUM CHLORIDE: 900; 150 INJECTION, SOLUTION INTRAVENOUS at 20:36

## 2017-07-15 RX ADMIN — METRONIDAZOLE 500 MG: 500 INJECTION, SOLUTION INTRAVENOUS at 15:35

## 2017-07-15 RX ADMIN — MIDAZOLAM HYDROCHLORIDE 1 MG: 1 INJECTION, SOLUTION INTRAMUSCULAR; INTRAVENOUS at 13:54

## 2017-07-15 RX ADMIN — GABAPENTIN 100 MG: 100 CAPSULE ORAL at 15:34

## 2017-07-15 RX ADMIN — OXYCODONE HYDROCHLORIDE 5 MG: 5 TABLET ORAL at 09:18

## 2017-07-15 RX ADMIN — FENTANYL CITRATE 50 MCG: 50 INJECTION, SOLUTION INTRAMUSCULAR; INTRAVENOUS at 13:51

## 2017-07-15 RX ADMIN — POTASSIUM CHLORIDE AND SODIUM CHLORIDE: 900; 150 INJECTION, SOLUTION INTRAVENOUS at 01:05

## 2017-07-15 RX ADMIN — LAMOTRIGINE 150 MG: 100 TABLET ORAL at 09:13

## 2017-07-15 RX ADMIN — GABAPENTIN 100 MG: 100 CAPSULE ORAL at 20:30

## 2017-07-15 RX ADMIN — TRAMADOL HYDROCHLORIDE 50 MG: 50 TABLET, COATED ORAL at 20:30

## 2017-07-15 RX ADMIN — MIDAZOLAM HYDROCHLORIDE 0.5 MG: 1 INJECTION, SOLUTION INTRAMUSCULAR; INTRAVENOUS at 14:17

## 2017-07-15 RX ADMIN — METRONIDAZOLE 500 MG: 500 INJECTION, SOLUTION INTRAVENOUS at 06:00

## 2017-07-15 RX ADMIN — POTASSIUM CHLORIDE AND SODIUM CHLORIDE: 900; 150 INJECTION, SOLUTION INTRAVENOUS at 09:06

## 2017-07-15 RX ADMIN — CEFTRIAXONE SODIUM 2 G: 2 INJECTION, POWDER, FOR SOLUTION INTRAMUSCULAR; INTRAVENOUS at 09:00

## 2017-07-15 RX ADMIN — MIDAZOLAM 1 MG: 1 INJECTION INTRAMUSCULAR; INTRAVENOUS at 13:54

## 2017-07-15 RX ADMIN — METRONIDAZOLE 500 MG: 500 INJECTION, SOLUTION INTRAVENOUS at 20:29

## 2017-07-15 RX ADMIN — FENTANYL CITRATE 25 MCG: 50 INJECTION, SOLUTION INTRAMUSCULAR; INTRAVENOUS at 14:14

## 2017-07-15 ASSESSMENT — ENCOUNTER SYMPTOMS
VOMITING: 0
COUGH: 0
ORTHOPNEA: 0
FEVER: 0
DIARRHEA: 0
EYE DISCHARGE: 0
SEIZURES: 0
ABDOMINAL PAIN: 1
STRIDOR: 0
PALPITATIONS: 0
DIZZINESS: 0
INSOMNIA: 0
SHORTNESS OF BREATH: 0
SPUTUM PRODUCTION: 0
MYALGIAS: 0
BLURRED VISION: 0
DEPRESSION: 0
HEADACHES: 0
ABDOMINAL PAIN: 0
BACK PAIN: 0
HEARTBURN: 1
CHILLS: 0
NAUSEA: 0
FOCAL WEAKNESS: 0
NERVOUS/ANXIOUS: 0
NECK PAIN: 0
WEIGHT LOSS: 0
EYE PAIN: 0
NAUSEA: 1

## 2017-07-15 ASSESSMENT — PAIN SCALES - GENERAL
PAINLEVEL_OUTOF10: 0
PAINLEVEL_OUTOF10: 8
PAINLEVEL_OUTOF10: 4
PAINLEVEL_OUTOF10: 4
PAINLEVEL_OUTOF10: 6
PAINLEVEL_OUTOF10: 2

## 2017-07-15 NOTE — CONSULTS
Chief complaint: BL groin pain    History of present illness: Mr. Colon is a 76-year-old male with bilateral groin pain for 4 days. He states that this was gradual in onset. He states that the pain as a 7 out of 10 at its worst. He specifically denies any abdominal pain, including right lower quadrant pain or left lower quadrant pain. He states he has been unable to urinate effectively for approximate 1 week, but has had no fevers, no chills, no nausea, and no vomiting. He states he is unsure of the last time he passed flatus, but his last bowel movement was 2-3 days ago. He has had a poor appetite for what he terms a while. He was seen in urgent care 3 days ago and diagnosed with a urinary tract infection and started on ciprofloxacin. He continued to have pain, and presented to the ER today where a CT scan was performed. This showed a large abscess in the pelvis, as well as a much smaller possible abscess near the appendix. He has been admitted to the medical service, and a surgical consultation has been obtained.    Past Medical History:   #1. Benign prostatic Hyperplasia   #2 chronic neck pain   #3 bipolar disorder with depression   #4 gastroesophageal reflux disease    Past Surgical History: Removal of right back mass    Allergies: No known drug allergies    Medications:  Uroxatral 10 mg by mouth daily  Ciprofloxacin 500 mg by mouth twice a day  Neurontin 100 mg by mouth daily  Lamictal 150 mg by mouth daily  Meloxicam 15 mg by mouth daily  Prilosec 20 mg by mouth daily  Tramadol  mg every 4 hours when necessary pain    Social History: He drinks 3-4 glasses of wine per night. He quit smoking 50 years ago. He denies drug use. He is a retired contractor.    Family History: Noncontributory    Review of Systems: Loss of appetite. He has lost 12 pounds in the last 1.5 weeks. Otherwise, a 16 point review of systems is negative except as noted in the HPI.    Physical Exam:    Vitals: Temperature 38.1, heart rate  74, blood pressure 106/68, respirations 16, weight 55.9 kg    HEENT: Pupils equal, round, and reactive to light. Extraocular movement is intact. He has moist oral mucosa.    Neck: Supple, no JVD, no lymphadenopathy    BACK: Nontender both in the midline at the costovertebral angles    Chest: Lungs are clear to auscultation bilaterally with a normal bilateral chest rise    Abdomen: Nondistended, positive decreased bowel sounds, soft, and completely nontender. He has no peritoneal signs. His bilateral inguinal regions are very minimally tender to palpation, and he has no obvious hernia.    : Bilateral inguinal regions minimally tender, without obvious hernia.    Rectal: Deferred    Extremities: 1+ pulses all 4 extremities    Neurologic: Cranial nerves II through XII grossly intact. He has no focal deficits, and his gait is not examined.    Labs: CBC is remarkable for an elevated white blood cell count at 15,000. He has a left shift, with 77% neutrophils. His sodium is low at 128, his chloride is low at 92, and his blood sugar is elevated at 125. His ALC is elevated at 64 and his alkaline phosphatase is elevated at 164. His lactic acid was 1.9 on admission and that dropped to 1.1, both of which are within normal limits. His urinalysis shows elevated ketones, elevated bilirubin, elevated protein, positive nitrate, and a small amount of leukocyte esterase. He has 5-10 white blood cells per high-powered field, 10-20 red blood cells per high-powered field, and no bacteria.    Imaging: CT scan of the abdomen and pelvis shows Large complex fluid collection centrally in the abdomen with irregular margins and air fluid level.  Complex measures 7.4 x 9.4 x 7.9 cm.  Wall thickening of the sigmoid colon.  Colonic diverticula present.  Small apparent fluid collection in the RIGHT lower quadrant with peripheral enhancement, measuring 2.2 cm.  This appears to be close proximity to the appendiceal tip.    Assessment: 76-year-old  male with  1. Benign prostatic Hyperplasia   2. chronic neck pain   3. bipolar disorder with depression   4. gastroesophageal reflux disease  5. Pelvic abscess    Given the fact that this patient has a completely benign exam and is not toxic appearing, I recommended that he undergo an interventional radiology drainage of this large abscess, as well as aspiration of the smaller abscess, if possible. I discussed this with Dr. Howell and Dr. Mg.    Plan:  Dr. Howell will order the interventional radiology drainage procedure. I will continue to follow along, and will be available if this patient needs surgical intervention in the future.

## 2017-07-15 NOTE — PROGRESS NOTES
CT guided drainage of large pelvic abscess performed by Dr Mg via left transgluteal approach. #8F looped drain inserted, secured to patient's skin, and attached to compressed bulb device. 300 ml malodorous green material aspirated and sample taken to the lab for culture.  Also attempted to localize 2nd collection but unsuccessful. Patient tolerated procedure well and was returned to his room in good condition.     [facility]F [facility] [id number] lot #[id number]

## 2017-07-15 NOTE — PROGRESS NOTES
[facility] Hospitalist Progress Note    Date of Service: 7/15/2017    Chief Complaint  76 y.o. male admitted 2017 with abdominal pain    Interval Problem Update  77 y/o M with PMH of peripheral neuropathy, BPH, Depression: admitted for above and found to have pelvis abscess on imaging.    Consultants/Specialty  Surgery    Disposition  Remain in the hospital        Review of Systems   Constitutional: Negative for fever, chills and weight loss.   HENT: Negative for congestion and nosebleeds.    Eyes: Negative for blurred vision, pain and discharge.   Respiratory: Negative for cough, sputum production, shortness of breath and stridor.    Cardiovascular: Negative for chest pain, palpitations and orthopnea.   Gastrointestinal: Positive for heartburn, nausea and abdominal pain. Negative for vomiting and diarrhea.   Genitourinary: Negative for dysuria, urgency and frequency.   Musculoskeletal: Negative for myalgias, back pain and neck pain.   Skin: Negative for itching and rash.   Neurological: Negative for dizziness, focal weakness, seizures and headaches.   Psychiatric/Behavioral: Negative for depression. The patient is not nervous/anxious and does not have insomnia.       Physical Exam  Laboratory/Imaging   Hemodynamics  Temp (24hrs), Av.7 °C (99.8 °F), Min:37.1 °C (98.8 °F), Max:38.1 °C (100.6 °F)   Temperature: 37.4 °C (99.4 °F)  Pulse  Av  Min: 57  Max: 105 Heart Rate (Monitored): 69  Blood Pressure : 144/69 mmHg, NIBP: 114/49 mmHg      Respiratory      Respiration: 20, Pulse Oximetry: 92 %        RUL Breath Sounds: Clear, RML Breath Sounds: Clear, RLL Breath Sounds: Diminished, TOSHIA Breath Sounds: Clear, LLL Breath Sounds: Diminished    Fluids    Intake/Output Summary (Last 24 hours) at 07/15/17 0864  Last data filed at 07/15/17 0400   Gross per 24 hour   Intake      0 ml   Output    600 ml   Net   -600 ml       Nutrition  Orders Placed This Encounter   Procedures   • Diet NPO     Standing Status: Standing       Number of Occurrences: 1      Standing Expiration Date:      Order Specific Question:  Restrict to:     Answer:  Sips with Medications [3]     Physical Exam   Constitutional: He is oriented to person, place, and time. No distress.   HENT:   Head: Normocephalic and atraumatic.   Mouth/Throat: Oropharynx is clear and moist.   Eyes: Conjunctivae and EOM are normal. Pupils are equal, round, and reactive to light.   Neck: Normal range of motion. Neck supple. No tracheal deviation present. No thyromegaly present.   Cardiovascular: Normal rate and regular rhythm.    No murmur heard.  Pulmonary/Chest: Effort normal and breath sounds normal. No respiratory distress. He has no wheezes.   Abdominal: Soft. Bowel sounds are normal. He exhibits no distension. There is tenderness.   Musculoskeletal: He exhibits no edema or tenderness.   Neurological: He is alert and oriented to person, place, and time. No cranial nerve deficit.   Skin: Skin is warm and dry. He is not diaphoretic. No erythema.   Psychiatric: He has a normal mood and affect. His behavior is normal. Thought content normal.       Recent Labs      07/14/17   1200  07/15/17   0236   WBC  15.0*  11.0*   RBC  4.42*  3.49*   HEMOGLOBIN  12.8*  10.2*   HEMATOCRIT  39.0*  31.5*   MCV  88.2  88.5   MCH  29.0  29.2   MCHC  32.8*  33.0*   RDW  45.4  46.8   PLATELETCT  440  357   MPV  8.2*  8.3*     Recent Labs      07/14/17   1200  07/15/17   0236   SODIUM  128*  135   POTASSIUM  3.7  3.6   CHLORIDE  92*  104   CO2  26  20   GLUCOSE  125*  96   BUN  14  11   CREATININE  1.01  0.77   CALCIUM  9.8  8.5                      Assessment/Plan     * Abdominal abscess (CMS-[facility])  Assessment & Plan  Bilateral pelvic abscesses with the larger one 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    Will have IR place drains, Dr. Rodriguez following, rocephin, flagyl    Sepsis (CMS-[facility])  Assessment & Plan  From pelvic abscess and possible appendiceal abscess on imaging  Surgery  consulted and recommended IR drainage  IVF, rocephin, flagyl  Follow culture    UTI (urinary tract infection)  Assessment & Plan  Rocephin, follow cx, IVF    Hyponatremia  Assessment & Plan  IVF, follow  Improving  Follow cmp in am    BPH (benign prostatic hypertrophy)  Assessment & Plan  Flomax    Depression  Assessment & Plan  Follow    Osteoarthritis  Assessment & Plan  Mobic, prns    Leucocytosis  Assessment & Plan  From abscess/sepsis  Plan as above    Labs reviewed, Medications reviewed and Radiology images reviewed        DVT Prophylaxis: Enoxaparin (Lovenox)      Antibiotics: Treating active infection/contamination beyond 24 hours perioperative coverage

## 2017-07-15 NOTE — ASSESSMENT & PLAN NOTE
Resolved  Continue IV abx of tygacyl and diflucan for 2 weeks for VRE and candida abscess  Monitor WBC

## 2017-07-15 NOTE — ASSESSMENT & PLAN NOTE
"Repeat CT 08/12/2017 revealing \"Interval decrease in size of the fluid collection in the pelvis measuring 2.6 x 1.9 x 1.6 cm. Pigtail catheter is along the edge of the collection\".   Now worsening thromboctyopenia. Leukopenia. I was concerned Zyvox related  Discussed with ID. Transitioned to Vancomycin (See interval history above)  Interval CT requested. F/U please.   If worsening abscess then vancomycin may not appropriate  Monitor for vancomycin toxicity and therapeutics    "

## 2017-07-15 NOTE — PROGRESS NOTES
Surgical Progress Note    Author: Jorge Rodriguez Date & Time created: 7/15/2017   10:41 AM     Interval Events:  States he had abdominal pain for the first time this morning, which has now resolved after being given ultram.  Scheduled for drain placement today.    Review of Systems   Constitutional: Negative for malaise/fatigue.   Gastrointestinal: Negative for nausea, vomiting and abdominal pain.     Hemodynamics:  Temp (24hrs), Av.6 °C (99.7 °F), Min:37.1 °C (98.8 °F), Max:38.1 °C (100.6 °F)  Temperature: 37.4 °C (99.4 °F)  Pulse  Av.7  Min: 57  Max: 105Heart Rate (Monitored): 69  Blood Pressure : 128/71 mmHg, NIBP: 114/49 mmHg     Respiratory:    Respiration: 16, Pulse Oximetry: 93 %        RUL Breath Sounds: Clear, RML Breath Sounds: Clear, RLL Breath Sounds: Diminished, TOSHIA Breath Sounds: Clear, LLL Breath Sounds: Diminished  Neuro:  GCS = 15       Fluids:    Intake/Output Summary (Last 24 hours) at 07/15/17 1041  Last data filed at 07/15/17 0400   Gross per 24 hour   Intake      0 ml   Output    600 ml   Net   -600 ml     Weight: 55.9 kg (123 lb 3.8 oz)  Current Diet Order   Procedures   • Diet NPO     Physical Exam   Constitutional: He is oriented to person, place, and time. He appears well-developed and well-nourished.   HENT:   Head: Normocephalic.   Eyes: Pupils are equal, round, and reactive to light. No scleral icterus.   Cardiovascular: Normal rate, regular rhythm and normal heart sounds.    Pulmonary/Chest: Effort normal. No respiratory distress.   Abdominal: Bowel sounds are normal. He exhibits no distension. There is no tenderness. There is no rebound.   Neurological: He is alert and oriented to person, place, and time.     Labs:  Recent Results (from the past 24 hour(s))   URINALYSIS    Collection Time: 17 11:45 AM   Result Value Ref Range    Color DK Yellow     Character Clear     Specific Gravity 1.023 <1.035    Ph 5.5 5.0-8.0    Glucose Negative Negative mg/dL    Ketones 15  (A) Negative mg/dL    Protein 100 (A) Negative mg/dL    Bilirubin Moderate (A) Negative    Urobilinogen, Urine 2.0 Negative    Nitrite Positive (A) Negative    Leukocyte Esterase Small (A) Negative    Occult Blood Negative Negative    Micro Urine Req Microscopic    URINE MICROSCOPIC (W/UA)    Collection Time: 07/14/17 11:45 AM   Result Value Ref Range    WBC 5-10 (A) /hpf    RBC 10-20 (A) /hpf    Bacteria Negative None /hpf    Epithelial Cells Few /hpf    Hyaline Cast 11-20 (A) /lpf   Lactic acid (lactate)    Collection Time: 07/14/17 12:00 PM   Result Value Ref Range    Lactic Acid 1.9 0.5 - 2.0 mmol/L   CBC WITH DIFFERENTIAL    Collection Time: 07/14/17 12:00 PM   Result Value Ref Range    WBC 15.0 (H) 4.8 - 10.8 K/uL    RBC 4.42 (L) 4.70 - 6.10 M/uL    Hemoglobin 12.8 (L) 14.0 - 18.0 g/dL    Hematocrit 39.0 (L) 42.0 - 52.0 %    MCV 88.2 81.4 - 97.8 fL    MCH 29.0 27.0 - 33.0 pg    MCHC 32.8 (L) 33.7 - 35.3 g/dL    RDW 45.4 35.9 - 50.0 fL    Platelet Count 440 164 - 446 K/uL    MPV 8.2 (L) 9.0 - 12.9 fL    Nucleated RBC 0.00 /100 WBC    NRBC (Absolute) 0.00 K/uL    Neutrophils-Polys 76.90 (H) 44.00 - 72.00 %    Lymphocytes 11.10 (L) 22.00 - 41.00 %    Monocytes 4.30 0.00 - 13.40 %    Eosinophils 0.00 0.00 - 6.90 %    Basophils 0.00 0.00 - 1.80 %    Neutrophils (Absolute) 12.69 (H) 1.82 - 7.42 K/uL    Lymphs (Absolute) 1.67 1.00 - 4.80 K/uL    Monos (Absolute) 0.65 0.00 - 0.85 K/uL    Eos (Absolute) 0.00 0.00 - 0.51 K/uL    Baso (Absolute) 0.00 0.00 - 0.12 K/uL   COMP METABOLIC PANEL    Collection Time: 07/14/17 12:00 PM   Result Value Ref Range    Sodium 128 (L) 135 - 145 mmol/L    Potassium 3.7 3.6 - 5.5 mmol/L    Chloride 92 (L) 96 - 112 mmol/L    Co2 26 20 - 33 mmol/L    Anion Gap 10.0 0.0 - 11.9    Glucose 125 (H) 65 - 99 mg/dL    Bun 14 8 - 22 mg/dL    Creatinine 1.01 0.50 - 1.40 mg/dL    Calcium 9.8 8.5 - 10.5 mg/dL    AST(SGOT) 41 12 - 45 U/L    ALT(SGPT) 64 (H) 2 - 50 U/L    Alkaline Phosphatase 164 (H) 30  - 99 U/L    Total Bilirubin 1.1 0.1 - 1.5 mg/dL    Albumin 3.3 3.2 - 4.9 g/dL    Total Protein 7.2 6.0 - 8.2 g/dL    Globulin 3.9 (H) 1.9 - 3.5 g/dL    A-G Ratio 0.8 g/dL   BLOOD CULTURE    Collection Time: 07/14/17 12:00 PM   Result Value Ref Range    Significant Indicator NEG     Source BLD     Site PERIPHERAL     Blood Culture       No Growth    Note: Blood cultures are incubated for 5 days and  are monitored continuously.Positive blood cultures  are called to the RN and reported as soon as  they are identified.     ESTIMATED GFR    Collection Time: 07/14/17 12:00 PM   Result Value Ref Range    GFR If African American >60 >60 mL/min/1.73 m 2    GFR If Non African American >60 >60 mL/min/1.73 m 2   DIFFERENTIAL MANUAL    Collection Time: 07/14/17 12:00 PM   Result Value Ref Range    Bands-Stabs 7.70 0.00 - 10.00 %    Manual Diff Status PERFORMED    PERIPHERAL SMEAR REVIEW    Collection Time: 07/14/17 12:00 PM   Result Value Ref Range    Peripheral Smear Review see below    PLATELET ESTIMATE    Collection Time: 07/14/17 12:00 PM   Result Value Ref Range    Plt Estimation Normal    MORPHOLOGY    Collection Time: 07/14/17 12:00 PM   Result Value Ref Range    RBC Morphology Normal    BLOOD CULTURE    Collection Time: 07/14/17 12:26 PM   Result Value Ref Range    Significant Indicator NEG     Source BLD     Site PERIPHERAL     Blood Culture       No Growth    Note: Blood cultures are incubated for 5 days and  are monitored continuously.Positive blood cultures  are called to the RN and reported as soon as  they are identified.     Lactic acid (lactate)    Collection Time: 07/14/17  3:16 PM   Result Value Ref Range    Lactic Acid 1.1 0.5 - 2.0 mmol/L   TSH (Thyroid Stimulating Hormone)    Collection Time: 07/14/17  3:19 PM   Result Value Ref Range    TSH 0.340 0.300 - 3.700 uIU/mL   CBC without Differential    Collection Time: 07/15/17  2:36 AM   Result Value Ref Range    WBC 11.0 (H) 4.8 - 10.8 K/uL    RBC 3.49 (L) 4.70  - 6.10 M/uL    Hemoglobin 10.2 (L) 14.0 - 18.0 g/dL    Hematocrit 31.5 (L) 42.0 - 52.0 %    MCV 88.5 81.4 - 97.8 fL    MCH 29.2 27.0 - 33.0 pg    MCHC 33.0 (L) 33.7 - 35.3 g/dL    RDW 46.8 35.9 - 50.0 fL    Platelet Count 357 164 - 446 K/uL    MPV 8.3 (L) 9.0 - 12.9 fL   Comp Metabolic Panel (CMP)    Collection Time: 07/15/17  2:36 AM   Result Value Ref Range    Sodium 135 135 - 145 mmol/L    Potassium 3.6 3.6 - 5.5 mmol/L    Chloride 104 96 - 112 mmol/L    Co2 20 20 - 33 mmol/L    Anion Gap 11.0 0.0 - 11.9    Glucose 96 65 - 99 mg/dL    Bun 11 8 - 22 mg/dL    Creatinine 0.77 0.50 - 1.40 mg/dL    Calcium 8.5 8.5 - 10.5 mg/dL    AST(SGOT) 30 12 - 45 U/L    ALT(SGPT) 45 2 - 50 U/L    Alkaline Phosphatase 117 (H) 30 - 99 U/L    Total Bilirubin 0.6 0.1 - 1.5 mg/dL    Albumin 2.4 (L) 3.2 - 4.9 g/dL    Total Protein 5.3 (L) 6.0 - 8.2 g/dL    Globulin 2.9 1.9 - 3.5 g/dL    A-G Ratio 0.8 g/dL   ESTIMATED GFR    Collection Time: 07/15/17  2:36 AM   Result Value Ref Range    GFR If African American >60 >60 mL/min/1.73 m 2    GFR If Non African American >60 >60 mL/min/1.73 m 2   PROTHROMBIN TIME    Collection Time: 07/15/17  8:57 AM   Result Value Ref Range    PT 16.4 (H) 12.0 - 14.6 sec    INR 1.28 (H) 0.87 - 1.13     Medical Decision Making, by Problem:  Active Hospital Problems    Diagnosis   • Sepsis (CMS-HCC) [A41.9]     Priority: High   • Abdominal abscess (CMS-HCC) [K65.1]     Priority: High   • UTI (urinary tract infection) [N39.0]     Priority: High   • Leucocytosis [D72.829]   • Hyponatremia [E87.1]   • BPH (benign prostatic hypertrophy) [N40.0]   • Depression [F32.9]   • Osteoarthritis [M19.90]     Plan:  Follow up drain placement, exam.    Quality Measures:  Labs reviewed and Medications reviewed  Ruff catheter: No Ruff            Antibiotics: Treating active infection/contamination beyond 24 hours perioperative coverage        Discussed patient condition with Patient

## 2017-07-15 NOTE — OR SURGEON
"Immediate Post- Operative Note        PostOp Diagnosis: PELVIC ABSCESS    PREVIOUSLY SEEN RLQ \"SMALL ROUNDED FLUID COLLECTION\" POSSIBLY APPENDIX IS NOW AIR FILLED AND CONTIGUOUS WITH A NORMAL BOWEL LOOP. THIS DOES NOT REPRESENT APPENDICITIS OR ABSCESS. NO NEEDLE ASPIRATION PERFORMED OR INDICATED.       Procedure(s): CT GUIDED CATHETER DRAINAGE WITH PLACEMENT OF 8 [place] LOCKING LOOP CATHETER  VIA LEFT TRANSGLUTEAL APPROACH    300 ML BEIGE COLORED THICK MALODOROUS PUS EVACUATED      Estimated Blood Loss: <1CC        Complications: NONE        7/15/2017     2:42 PM     Seven Mg      "

## 2017-07-16 LAB
ALBUMIN SERPL BCP-MCNC: 2.4 G/DL (ref 3.2–4.9)
ALBUMIN/GLOB SERPL: 0.9 G/DL
ALP SERPL-CCNC: 101 U/L (ref 30–99)
ALT SERPL-CCNC: 36 U/L (ref 2–50)
ANION GAP SERPL CALC-SCNC: 8 MMOL/L (ref 0–11.9)
AST SERPL-CCNC: 20 U/L (ref 12–45)
BACTERIA UR CULT: NORMAL
BASOPHILS # BLD AUTO: 0.5 % (ref 0–1.8)
BASOPHILS # BLD: 0.04 K/UL (ref 0–0.12)
BILIRUB SERPL-MCNC: 0.4 MG/DL (ref 0.1–1.5)
BUN SERPL-MCNC: 13 MG/DL (ref 8–22)
CALCIUM SERPL-MCNC: 8.6 MG/DL (ref 8.5–10.5)
CHLORIDE SERPL-SCNC: 108 MMOL/L (ref 96–112)
CO2 SERPL-SCNC: 24 MMOL/L (ref 20–33)
CREAT SERPL-MCNC: 0.78 MG/DL (ref 0.5–1.4)
EOSINOPHIL # BLD AUTO: 0.1 K/UL (ref 0–0.51)
EOSINOPHIL NFR BLD: 1.3 % (ref 0–6.9)
ERYTHROCYTE [DISTWIDTH] IN BLOOD BY AUTOMATED COUNT: 49.1 FL (ref 35.9–50)
GFR SERPL CREATININE-BSD FRML MDRD: >60 ML/MIN/1.73 M 2
GLOBULIN SER CALC-MCNC: 2.8 G/DL (ref 1.9–3.5)
GLUCOSE SERPL-MCNC: 106 MG/DL (ref 65–99)
GRAM STN SPEC: ABNORMAL
HCT VFR BLD AUTO: 32.3 % (ref 42–52)
HGB BLD-MCNC: 10.2 G/DL (ref 14–18)
IMM GRANULOCYTES # BLD AUTO: 0.17 K/UL (ref 0–0.11)
IMM GRANULOCYTES NFR BLD AUTO: 2.2 % (ref 0–0.9)
LYMPHOCYTES # BLD AUTO: 0.84 K/UL (ref 1–4.8)
LYMPHOCYTES NFR BLD: 10.7 % (ref 22–41)
MCH RBC QN AUTO: 28.7 PG (ref 27–33)
MCHC RBC AUTO-ENTMCNC: 31.6 G/DL (ref 33.7–35.3)
MCV RBC AUTO: 91 FL (ref 81.4–97.8)
MONOCYTES # BLD AUTO: 0.67 K/UL (ref 0–0.85)
MONOCYTES NFR BLD AUTO: 8.5 % (ref 0–13.4)
NEUTROPHILS # BLD AUTO: 6.03 K/UL (ref 1.82–7.42)
NEUTROPHILS NFR BLD: 76.8 % (ref 44–72)
NRBC # BLD AUTO: 0 K/UL
NRBC BLD AUTO-RTO: 0 /100 WBC
PLATELET # BLD AUTO: 321 K/UL (ref 164–446)
PMV BLD AUTO: 8.3 FL (ref 9–12.9)
POTASSIUM SERPL-SCNC: 4.2 MMOL/L (ref 3.6–5.5)
PROT SERPL-MCNC: 5.2 G/DL (ref 6–8.2)
RBC # BLD AUTO: 3.55 M/UL (ref 4.7–6.1)
SIGNIFICANT IND 70042: ABNORMAL
SIGNIFICANT IND 70042: NORMAL
SITE SITE: ABNORMAL
SITE SITE: NORMAL
SODIUM SERPL-SCNC: 140 MMOL/L (ref 135–145)
SOURCE SOURCE: ABNORMAL
SOURCE SOURCE: NORMAL
WBC # BLD AUTO: 7.9 K/UL (ref 4.8–10.8)

## 2017-07-16 PROCEDURE — 700111 HCHG RX REV CODE 636 W/ 250 OVERRIDE (IP): Performed by: INTERNAL MEDICINE

## 2017-07-16 PROCEDURE — 700102 HCHG RX REV CODE 250 W/ 637 OVERRIDE(OP): Performed by: INTERNAL MEDICINE

## 2017-07-16 PROCEDURE — A9270 NON-COVERED ITEM OR SERVICE: HCPCS | Performed by: INTERNAL MEDICINE

## 2017-07-16 PROCEDURE — 99233 SBSQ HOSP IP/OBS HIGH 50: CPT | Performed by: INTERNAL MEDICINE

## 2017-07-16 PROCEDURE — 770020 HCHG ROOM/CARE - TELE (206)

## 2017-07-16 PROCEDURE — 80053 COMPREHEN METABOLIC PANEL: CPT

## 2017-07-16 PROCEDURE — 83735 ASSAY OF MAGNESIUM: CPT

## 2017-07-16 PROCEDURE — 85025 COMPLETE CBC W/AUTO DIFF WBC: CPT

## 2017-07-16 PROCEDURE — 700105 HCHG RX REV CODE 258: Performed by: INTERNAL MEDICINE

## 2017-07-16 PROCEDURE — 700101 HCHG RX REV CODE 250: Performed by: INTERNAL MEDICINE

## 2017-07-16 PROCEDURE — 36415 COLL VENOUS BLD VENIPUNCTURE: CPT

## 2017-07-16 RX ADMIN — MELOXICAM 15 MG: 7.5 TABLET ORAL at 09:17

## 2017-07-16 RX ADMIN — METRONIDAZOLE 500 MG: 500 INJECTION, SOLUTION INTRAVENOUS at 20:26

## 2017-07-16 RX ADMIN — GABAPENTIN 100 MG: 100 CAPSULE ORAL at 09:18

## 2017-07-16 RX ADMIN — CEFTRIAXONE SODIUM 2 G: 2 INJECTION, POWDER, FOR SOLUTION INTRAMUSCULAR; INTRAVENOUS at 09:27

## 2017-07-16 RX ADMIN — OMEPRAZOLE 20 MG: 20 CAPSULE, DELAYED RELEASE ORAL at 09:18

## 2017-07-16 RX ADMIN — TAMSULOSIN HYDROCHLORIDE 0.4 MG: 0.4 CAPSULE ORAL at 08:30

## 2017-07-16 RX ADMIN — POTASSIUM CHLORIDE AND SODIUM CHLORIDE: 900; 150 INJECTION, SOLUTION INTRAVENOUS at 05:30

## 2017-07-16 RX ADMIN — POTASSIUM CHLORIDE AND SODIUM CHLORIDE: 900; 150 INJECTION, SOLUTION INTRAVENOUS at 20:28

## 2017-07-16 RX ADMIN — GABAPENTIN 100 MG: 100 CAPSULE ORAL at 20:26

## 2017-07-16 RX ADMIN — GABAPENTIN 100 MG: 100 CAPSULE ORAL at 15:57

## 2017-07-16 RX ADMIN — OXYCODONE HYDROCHLORIDE 5 MG: 5 TABLET ORAL at 20:26

## 2017-07-16 RX ADMIN — OXYCODONE HYDROCHLORIDE 5 MG: 5 TABLET ORAL at 09:18

## 2017-07-16 RX ADMIN — LAMOTRIGINE 150 MG: 100 TABLET ORAL at 09:27

## 2017-07-16 RX ADMIN — METRONIDAZOLE 500 MG: 500 INJECTION, SOLUTION INTRAVENOUS at 05:30

## 2017-07-16 RX ADMIN — METRONIDAZOLE 500 MG: 500 INJECTION, SOLUTION INTRAVENOUS at 15:58

## 2017-07-16 RX ADMIN — ENOXAPARIN SODIUM 40 MG: 100 INJECTION SUBCUTANEOUS at 09:20

## 2017-07-16 ASSESSMENT — ENCOUNTER SYMPTOMS
STRIDOR: 0
COUGH: 0
CONSTIPATION: 0
HEMOPTYSIS: 0
FOCAL WEAKNESS: 0
DIZZINESS: 0
NAUSEA: 0
ABDOMINAL PAIN: 1
FEVER: 0
TINGLING: 0
EYE DISCHARGE: 0
HEARTBURN: 1
BLURRED VISION: 0
DEPRESSION: 0
WEIGHT LOSS: 0
BACK PAIN: 0
NERVOUS/ANXIOUS: 0
SPUTUM PRODUCTION: 0
HEADACHES: 0
SHORTNESS OF BREATH: 0
ABDOMINAL PAIN: 0
SEIZURES: 0
NAUSEA: 1
ORTHOPNEA: 0
INSOMNIA: 0
DIARRHEA: 0
MYALGIAS: 0
EYE PAIN: 0
CHILLS: 0
NECK PAIN: 0
VOMITING: 0

## 2017-07-16 ASSESSMENT — PAIN SCALES - GENERAL
PAINLEVEL_OUTOF10: 6
PAINLEVEL_OUTOF10: 8

## 2017-07-16 NOTE — PROGRESS NOTES
Surgical Progress Note    Author: Jorge Rodriguez Date & Time created: 2017   7:26 AM     Interval Events:  Denies abdominal pain.  Tolerating diet.  Passing flatus.  Drain placed yesterday    Review of Systems   Gastrointestinal: Negative for nausea, vomiting and abdominal pain.     Hemodynamics:  Temp (24hrs), Av.9 °C (98.5 °F), Min:36.3 °C (97.4 °F), Max:37.4 °C (99.4 °F)  Temperature: 37 °C (98.6 °F)  Pulse  Av.8  Min: 57  Max: 105   Blood Pressure : 103/50 mmHg (RN notified. )     Respiratory:    Respiration: 16, Pulse Oximetry: 92 %        RUL Breath Sounds: Clear, RML Breath Sounds: Diminished, RLL Breath Sounds: Diminished, TOSHIA Breath Sounds: Clear, LLL Breath Sounds: Diminished  Neuro:  GCS = 15       Fluids:  No intake or output data in the 24 hours ending 17 0726  Weight: 61.1 kg (134 lb 11.2 oz)  Current Diet Order   Procedures   • DIET ORDER     Physical Exam   Constitutional: He appears well-developed and well-nourished. No distress.   HENT:   Head: Normocephalic.   Eyes: Pupils are equal, round, and reactive to light.   Cardiovascular: Normal rate, regular rhythm and normal heart sounds.    Pulmonary/Chest: Effort normal and breath sounds normal. No respiratory distress.   Abdominal: Soft. Bowel sounds are normal. He exhibits no distension. There is no tenderness. There is no rebound and no guarding.   Drain in place, output not recorded   Neurological: He is alert.   Psychiatric: He has a normal mood and affect. His behavior is normal.     Labs:  Recent Results (from the past 24 hour(s))   PROTHROMBIN TIME    Collection Time: 07/15/17  8:57 AM   Result Value Ref Range    PT 16.4 (H) 12.0 - 14.6 sec    INR 1.28 (H) 0.87 - 1.13   CBC WITH DIFFERENTIAL    Collection Time: 17  2:07 AM   Result Value Ref Range    WBC 7.9 4.8 - 10.8 K/uL    RBC 3.55 (L) 4.70 - 6.10 M/uL    Hemoglobin 10.2 (L) 14.0 - 18.0 g/dL    Hematocrit 32.3 (L) 42.0 - 52.0 %    MCV 91.0 81.4 - 97.8 fL     MCH 28.7 27.0 - 33.0 pg    MCHC 31.6 (L) 33.7 - 35.3 g/dL    RDW 49.1 35.9 - 50.0 fL    Platelet Count 321 164 - 446 K/uL    MPV 8.3 (L) 9.0 - 12.9 fL    Neutrophils-Polys 76.80 (H) 44.00 - 72.00 %    Lymphocytes 10.70 (L) 22.00 - 41.00 %    Monocytes 8.50 0.00 - 13.40 %    Eosinophils 1.30 0.00 - 6.90 %    Basophils 0.50 0.00 - 1.80 %    Immature Granulocytes 2.20 (H) 0.00 - 0.90 %    Nucleated RBC 0.00 /100 WBC    Neutrophils (Absolute) 6.03 1.82 - 7.42 K/uL    Lymphs (Absolute) 0.84 (L) 1.00 - 4.80 K/uL    Monos (Absolute) 0.67 0.00 - 0.85 K/uL    Eos (Absolute) 0.10 0.00 - 0.51 K/uL    Baso (Absolute) 0.04 0.00 - 0.12 K/uL    Immature Granulocytes (abs) 0.17 (H) 0.00 - 0.11 K/uL    NRBC (Absolute) 0.00 K/uL   COMP METABOLIC PANEL    Collection Time: 07/16/17  2:07 AM   Result Value Ref Range    Sodium 140 135 - 145 mmol/L    Potassium 4.2 3.6 - 5.5 mmol/L    Chloride 108 96 - 112 mmol/L    Co2 24 20 - 33 mmol/L    Anion Gap 8.0 0.0 - 11.9    Glucose 106 (H) 65 - 99 mg/dL    Bun 13 8 - 22 mg/dL    Creatinine 0.78 0.50 - 1.40 mg/dL    Calcium 8.6 8.5 - 10.5 mg/dL    AST(SGOT) 20 12 - 45 U/L    ALT(SGPT) 36 2 - 50 U/L    Alkaline Phosphatase 101 (H) 30 - 99 U/L    Total Bilirubin 0.4 0.1 - 1.5 mg/dL    Albumin 2.4 (L) 3.2 - 4.9 g/dL    Total Protein 5.2 (L) 6.0 - 8.2 g/dL    Globulin 2.8 1.9 - 3.5 g/dL    A-G Ratio 0.9 g/dL   ESTIMATED GFR    Collection Time: 07/16/17  2:07 AM   Result Value Ref Range    GFR If African American >60 >60 mL/min/1.73 m 2    GFR If Non African American >60 >60 mL/min/1.73 m 2     Medical Decision Making, by Problem:  Active Hospital Problems    Diagnosis   • Sepsis (CMS-[facility]) [A41.9]     Priority: High   • Abdominal abscess (CMS-[facility]) [K65.1]     Priority: High   • UTI (urinary tract infection) [N39.0]     Priority: High   • Leucocytosis [D72.829]   • Hyponatremia [E87.1]   • BPH (benign prostatic hypertrophy) [N40.0]   • Depression [F32.9]   • Osteoarthritis [M19.90]     Plan:  Follow  up drain output, culture and sensitivity    Quality Measures:  Labs reviewed and Medications reviewed  Ruff catheter: No Ruff      DVT Prophylaxis: Enoxaparin (Lovenox)    Ulcer prophylaxis: Not indicated  Antibiotics: Treating active infection/contamination beyond 24 hours perioperative coverage        Discussed patient condition with Patient

## 2017-07-16 NOTE — PROGRESS NOTES
[facility] Hospitalist Progress Note    Date of Service: 2017    Chief Complaint  76 y.o. male admitted 2017 with abdominal pain    Interval Problem Update  77 y/o M with PMH of peripheral neuropathy, BPH, Depression: admitted for above and found to have pelvis abscess on imaging.    No acute issue overnight. Denies abdominal pain, nausea, vomiting, tolerating diet.  Consultants/Specialty  Surgery    Disposition  Remain in the hospital        Review of Systems   Constitutional: Negative for fever, chills and weight loss.   HENT: Negative for congestion and nosebleeds.    Eyes: Negative for blurred vision, pain and discharge.   Respiratory: Negative for cough, hemoptysis, sputum production, shortness of breath and stridor.    Cardiovascular: Negative for chest pain and orthopnea.   Gastrointestinal: Positive for heartburn, nausea and abdominal pain. Negative for vomiting, diarrhea and constipation.   Genitourinary: Negative for dysuria, urgency and frequency.   Musculoskeletal: Negative for myalgias, back pain and neck pain.   Skin: Negative for itching and rash.   Neurological: Negative for dizziness, tingling, focal weakness, seizures and headaches.   Psychiatric/Behavioral: Negative for depression. The patient is not nervous/anxious and does not have insomnia.       Physical Exam  Laboratory/Imaging   Hemodynamics  Temp (24hrs), Av.9 °C (98.5 °F), Min:36.3 °C (97.4 °F), Max:37.4 °C (99.4 °F)   Temperature: 37 °C (98.6 °F)  Pulse  Av.8  Min: 57  Max: 105    Blood Pressure : 103/50 mmHg (RN notified. )      Respiratory      Respiration: 16, Pulse Oximetry: 92 %        RUL Breath Sounds: Clear, RML Breath Sounds: Diminished, RLL Breath Sounds: Diminished, TOSHIA Breath Sounds: Clear, LLL Breath Sounds: Diminished    Fluids  No intake or output data in the 24 hours ending 17 0730    Nutrition  Orders Placed This Encounter   Procedures   • DIET ORDER     Standing Status: Standing      Number of  Occurrences: 1      Standing Expiration Date:      Order Specific Question:  Diet:     Answer:  Regular [1]     Physical Exam   Constitutional: He is oriented to person, place, and time. No distress.   HENT:   Head: Normocephalic and atraumatic.   Mouth/Throat: Oropharynx is clear and moist.   Eyes: Conjunctivae and EOM are normal. Pupils are equal, round, and reactive to light. Left eye exhibits no discharge.   Neck: Normal range of motion. Neck supple. No tracheal deviation present. No thyromegaly present.   Cardiovascular: Normal rate and regular rhythm.    No murmur heard.  Pulmonary/Chest: Effort normal and breath sounds normal. No respiratory distress. He has no wheezes. He has no rales.   Abdominal: Soft. Bowel sounds are normal. He exhibits no distension. There is tenderness. There is no rebound.   Musculoskeletal: He exhibits no edema or tenderness.   Neurological: He is alert and oriented to person, place, and time. No cranial nerve deficit.   Skin: Skin is warm and dry. He is not diaphoretic. No erythema.   Psychiatric: He has a normal mood and affect. His behavior is normal. Thought content normal.       Recent Labs      07/14/17   1200  07/15/17   0236  07/16/17   0207   WBC  15.0*  11.0*  7.9   RBC  4.42*  3.49*  3.55*   HEMOGLOBIN  12.8*  10.2*  10.2*   HEMATOCRIT  39.0*  31.5*  32.3*   MCV  88.2  88.5  91.0   MCH  29.0  29.2  28.7   MCHC  32.8*  33.0*  31.6*   RDW  45.4  46.8  49.1   PLATELETCT  440  357  321   MPV  8.2*  8.3*  8.3*     Recent Labs      07/14/17   1200  07/15/17   0236  07/16/17   0207   SODIUM  128*  135  140   POTASSIUM  3.7  3.6  4.2   CHLORIDE  92*  104  108   CO2  26  20  24   GLUCOSE  125*  96  106*   BUN  14  11  13   CREATININE  1.01  0.77  0.78   CALCIUM  9.8  8.5  8.6     Recent Labs      07/15/17   0857   INR  1.28*                  Assessment/Plan     * Abdominal abscess (CMS-HCC)  Assessment & Plan  Bilateral pelvic abscesses with the larger one likely being chronic,  possibly both are chronic.    Plan as above.    Sepsis (CMS-HCC)  Assessment & Plan  From pelvic abscess and possible appendiceal abscess on imaging  IR drain placed yesterday  IVF, rocephin, flagyl  Follow culture  Surgery following    UTI (urinary tract infection)  Assessment & Plan  Rocephin, follow cx, IVF    Hyponatremia  Assessment & Plan  IVF, follow  Improved  Follow cmp in am    BPH (benign prostatic hypertrophy)  Assessment & Plan  Flomax    Depression  Assessment & Plan  Follow    Osteoarthritis  Assessment & Plan  Mobic, prns    Leucocytosis  Assessment & Plan  From abscess/sepsis  Plan as above      Labs reviewed, Medications reviewed and Radiology images reviewed        DVT Prophylaxis: Enoxaparin (Lovenox)      Antibiotics: Treating active infection/contamination beyond 24 hours perioperative coverage

## 2017-07-17 LAB — MAGNESIUM SERPL-MCNC: 1.7 MG/DL (ref 1.5–2.5)

## 2017-07-17 PROCEDURE — 700101 HCHG RX REV CODE 250: Performed by: INTERNAL MEDICINE

## 2017-07-17 PROCEDURE — 97535 SELF CARE MNGMENT TRAINING: CPT

## 2017-07-17 PROCEDURE — A9270 NON-COVERED ITEM OR SERVICE: HCPCS | Performed by: INTERNAL MEDICINE

## 2017-07-17 PROCEDURE — 700102 HCHG RX REV CODE 250 W/ 637 OVERRIDE(OP): Performed by: INTERNAL MEDICINE

## 2017-07-17 PROCEDURE — 99233 SBSQ HOSP IP/OBS HIGH 50: CPT | Performed by: INTERNAL MEDICINE

## 2017-07-17 PROCEDURE — 700105 HCHG RX REV CODE 258: Performed by: INTERNAL MEDICINE

## 2017-07-17 PROCEDURE — 700111 HCHG RX REV CODE 636 W/ 250 OVERRIDE (IP): Performed by: INTERNAL MEDICINE

## 2017-07-17 PROCEDURE — 770020 HCHG ROOM/CARE - TELE (206)

## 2017-07-17 RX ADMIN — GABAPENTIN 100 MG: 100 CAPSULE ORAL at 15:33

## 2017-07-17 RX ADMIN — OXYCODONE HYDROCHLORIDE 5 MG: 5 TABLET ORAL at 08:36

## 2017-07-17 RX ADMIN — GABAPENTIN 100 MG: 100 CAPSULE ORAL at 08:36

## 2017-07-17 RX ADMIN — ENOXAPARIN SODIUM 40 MG: 100 INJECTION SUBCUTANEOUS at 08:35

## 2017-07-17 RX ADMIN — MELOXICAM 15 MG: 7.5 TABLET ORAL at 08:36

## 2017-07-17 RX ADMIN — METRONIDAZOLE 500 MG: 500 INJECTION, SOLUTION INTRAVENOUS at 21:53

## 2017-07-17 RX ADMIN — CEFTRIAXONE SODIUM 2 G: 2 INJECTION, POWDER, FOR SOLUTION INTRAMUSCULAR; INTRAVENOUS at 08:37

## 2017-07-17 RX ADMIN — METRONIDAZOLE 500 MG: 500 INJECTION, SOLUTION INTRAVENOUS at 05:10

## 2017-07-17 RX ADMIN — OMEPRAZOLE 20 MG: 20 CAPSULE, DELAYED RELEASE ORAL at 08:36

## 2017-07-17 RX ADMIN — METRONIDAZOLE 500 MG: 500 INJECTION, SOLUTION INTRAVENOUS at 15:33

## 2017-07-17 RX ADMIN — LAMOTRIGINE 150 MG: 100 TABLET ORAL at 08:36

## 2017-07-17 RX ADMIN — POTASSIUM CHLORIDE AND SODIUM CHLORIDE: 900; 150 INJECTION, SOLUTION INTRAVENOUS at 15:35

## 2017-07-17 RX ADMIN — OXYCODONE HYDROCHLORIDE 5 MG: 5 TABLET ORAL at 20:04

## 2017-07-17 RX ADMIN — TRAMADOL HYDROCHLORIDE 50 MG: 50 TABLET, COATED ORAL at 21:53

## 2017-07-17 RX ADMIN — GABAPENTIN 100 MG: 100 CAPSULE ORAL at 20:04

## 2017-07-17 RX ADMIN — OXYCODONE HYDROCHLORIDE 5 MG: 5 TABLET ORAL at 15:35

## 2017-07-17 RX ADMIN — TAMSULOSIN HYDROCHLORIDE 0.4 MG: 0.4 CAPSULE ORAL at 08:36

## 2017-07-17 ASSESSMENT — ENCOUNTER SYMPTOMS
ABDOMINAL PAIN: 1
ABDOMINAL PAIN: 0
SEIZURES: 0
RESPIRATORY NEGATIVE: 1
CHILLS: 0
BACK PAIN: 0
NERVOUS/ANXIOUS: 0
INSOMNIA: 0
HEADACHES: 0
SHORTNESS OF BREATH: 0
DEPRESSION: 0
WEIGHT LOSS: 0
TINGLING: 0
EYE PAIN: 0
FEVER: 0
VOMITING: 0
COUGH: 0
CARDIOVASCULAR NEGATIVE: 1
DIARRHEA: 0
SPUTUM PRODUCTION: 0
NECK PAIN: 0
FOCAL WEAKNESS: 0
DIZZINESS: 0
CONSTIPATION: 0
HEARTBURN: 0
STRIDOR: 0
NAUSEA: 0
BLURRED VISION: 0
MYALGIAS: 0
EYE DISCHARGE: 0
ORTHOPNEA: 0

## 2017-07-17 ASSESSMENT — ACTIVITIES OF DAILY LIVING (ADL): TOILETING: INDEPENDENT

## 2017-07-17 ASSESSMENT — PAIN SCALES - GENERAL
PAINLEVEL_OUTOF10: 9
PAINLEVEL_OUTOF10: 7
PAINLEVEL_OUTOF10: 5
PAINLEVEL_OUTOF10: 3
PAINLEVEL_OUTOF10: 7
PAINLEVEL_OUTOF10: 3

## 2017-07-17 NOTE — PROGRESS NOTES
Surgical Progress Note    Author: Jorge Rodriguez Date & Time created: 2017   8:27 AM     Interval Events:  Tolerating diet with excellent appetite.  Pain well controlled.  Passing flatus, + BM this morning.  Drain output not recorded on night shift, 60 cc recorded on day shift yesterday.  Nurse states night shift nurse stated no drain output over night.    Review of Systems   Constitutional: Negative for fever, chills and malaise/fatigue.   Respiratory: Negative.    Cardiovascular: Negative.    Gastrointestinal: Negative for nausea, vomiting and abdominal pain.     Hemodynamics:  Temp (24hrs), Av.8 °C (98.2 °F), Min:36.3 °C (97.4 °F), Max:37.2 °C (98.9 °F)  Temperature: 37.1 °C (98.7 °F)  Pulse  Av.3  Min: 57  Max: 105   Blood Pressure : 130/66 mmHg     Respiratory:    Respiration: 17, Pulse Oximetry: 93 %        RUL Breath Sounds: Clear, RML Breath Sounds: Diminished, RLL Breath Sounds: Diminished, TOSHIA Breath Sounds: Clear, LLL Breath Sounds: Diminished  Neuro:  GCS = 15       Fluids:    Intake/Output Summary (Last 24 hours) at 17 0827  Last data filed at 17   Gross per 24 hour   Intake    480 ml   Output    870 ml   Net   -390 ml     Weight: 65 kg (143 lb 4.8 oz)  Current Diet Order   Procedures   • DIET ORDER     Physical Exam   Constitutional: He is oriented to person, place, and time. He appears well-developed and well-nourished. No distress.   HENT:   Head: Normocephalic.   Eyes: Pupils are equal, round, and reactive to light. No scleral icterus.   Cardiovascular: Normal rate.    Pulmonary/Chest: Effort normal. No respiratory distress.   Abdominal: Soft. Bowel sounds are normal. He exhibits no distension. There is no tenderness. There is no rebound and no guarding.   Drain in place with exit through buttock, with minimal fluid in suction cannister.   Neurological: He is alert and oriented to person, place, and time.   Psychiatric: He has a normal mood and affect. His  behavior is normal. Judgment and thought content normal.     Labs:  Recent Results (from the past 24 hour(s))   MAGNESIUM    Collection Time: 07/16/17 11:47 PM   Result Value Ref Range    Magnesium 1.7 1.5 - 2.5 mg/dL     Medical Decision Making, by Problem:  Active Hospital Problems    Diagnosis   • Sepsis (CMS-[facility]) [A41.9]     Priority: High   • Abdominal abscess (CMS-[facility]) [K65.1]     Priority: High   • UTI (urinary tract infection) [N39.0]     Priority: High   • Leucocytosis [D72.829]   • Hyponatremia [E87.1]   • BPH (benign prostatic hypertrophy) [N40.0]   • Depression [F32.9]   • Osteoarthritis [M19.90]     Plan:  Recommend leave drain in place today, consider re-imaging tomorrow if still no to minimal drain output, to give information on whether to remove or reposition drain.    Quality Measures:  Labs reviewed and Medications reviewed        DVT Prophylaxis: Enoxaparin (Lovenox)    Ulcer prophylaxis: Yes  Antibiotics: Treating active infection/contamination beyond 24 hours perioperative coverage        Discussed patient condition with RN and Patient

## 2017-07-17 NOTE — THERAPY
Occupational Therapy Contact Note    OT order received. Pt educated on the role of OT in acute care, pt initially agreeable to OOB ax then reported he was too tired. Went over appropriate AE/DME to consider looking into. Pt requesting OTR come back tomorrow for assessment. Will attempt tomorrow.     [place], OTR/L

## 2017-07-17 NOTE — CARE PLAN
Problem: Safety  Goal: Will remain free from injury  Outcome: PROGRESSING AS EXPECTED  Fall precautions in place. Bed in lowest position. Non-skid socks in place. Personal possessions within reach. Mobility sign on door. Bed-alarm on. Call light within reach. Pt educated regarding fall prevention and states understanding.     Problem: Venous Thromboembolism (VTW)/Deep Vein Thrombosis (DVT) Prevention:  Goal: Patient will participate in Venous Thrombosis (VTE)/Deep Vein Thrombosis (DVT)Prevention Measures  Outcome: PROGRESSING AS EXPECTED  Lovenox for VTE, frequent ambulation offered

## 2017-07-17 NOTE — DIETARY
"Nutrition Services: Patient seen for poor PO intake prior to admit.    76 y.o. male with admitting DX of Sepsis (CMS-HCC), Abdominal abscess (CMS-HCC) s/p drain placement.    Height: 172.7 cm (5' 8\")  Weight: 65 kg (143 lb 4.8 oz)  Body mass index is 21.79 kg/(m^2).     Patient stated his appetite is much improved;  happy to have snacks between meals.  Patient reported approximately 14# wt loss in the past two weeks.  Patient is on a regular diet now eating %  Labs, medications and past medical history reviewed.  Intervention:  Adding snacks between meals.  Recommend:  Nutrition Representative will continue to see him for ongoing meal and snack preferences.  RD following.      "

## 2017-07-17 NOTE — PROGRESS NOTES
[facility] Hospitalist Progress Note    Date of Service: 2017    Chief Complaint  76 y.o. male admitted 2017 with abdominal pain    Interval Problem Update  77 y/o M with PMH of peripheral neuropathy, BPH, Depression: admitted for above and found to have pelvis abscess on imaging.    + for abdominal pain. Ok with diet. No nausea, vomiting.  Consultants/Specialty  Surgery    Disposition  Remain in the hospital        Review of Systems   Constitutional: Negative for fever and weight loss.   HENT: Negative for congestion and nosebleeds.    Eyes: Negative for blurred vision, pain and discharge.   Respiratory: Negative for cough, sputum production, shortness of breath and stridor.    Cardiovascular: Negative for chest pain and orthopnea.   Gastrointestinal: Positive for abdominal pain. Negative for heartburn, nausea, vomiting, diarrhea and constipation.   Genitourinary: Negative for dysuria, urgency and frequency.   Musculoskeletal: Negative for myalgias, back pain and neck pain.   Skin: Negative for itching and rash.   Neurological: Negative for dizziness, tingling, focal weakness, seizures and headaches.   Psychiatric/Behavioral: Negative for depression. The patient is not nervous/anxious and does not have insomnia.       Physical Exam  Laboratory/Imaging   Hemodynamics  Temp (24hrs), Av.7 °C (98.1 °F), Min:36.3 °C (97.4 °F), Max:37.2 °C (98.9 °F)   Temperature: 36.3 °C (97.4 °F)  Pulse  Av.3  Min: 57  Max: 105    Blood Pressure : 107/50 mmHg (RN notified. )      Respiratory      Respiration: 18, Pulse Oximetry: 93 %        RUL Breath Sounds: Clear, RML Breath Sounds: Diminished, RLL Breath Sounds: Diminished, TOSHIA Breath Sounds: Clear, LLL Breath Sounds: Diminished    Fluids    Intake/Output Summary (Last 24 hours) at 17 0733  Last data filed at 17   Gross per 24 hour   Intake    480 ml   Output    870 ml   Net   -390 ml       Nutrition  Orders Placed This Encounter   Procedures   • DIET  ORDER     Standing Status: Standing      Number of Occurrences: 1      Standing Expiration Date:      Order Specific Question:  Diet:     Answer:  Regular [1]     Physical Exam   Constitutional: He is oriented to person, place, and time. No distress.   HENT:   Head: Normocephalic and atraumatic.   Mouth/Throat: Oropharynx is clear and moist.   Eyes: Conjunctivae and EOM are normal. Pupils are equal, round, and reactive to light. Right eye exhibits no discharge. Left eye exhibits no discharge.   Neck: Normal range of motion. Neck supple. No JVD present. No tracheal deviation present. No thyromegaly present.   Cardiovascular: Normal rate and regular rhythm.    No murmur heard.  Pulmonary/Chest: Effort normal and breath sounds normal. No respiratory distress. He has no rales.   Abdominal: Soft. Bowel sounds are normal. He exhibits no distension. There is tenderness. There is no rebound.   Musculoskeletal: He exhibits no edema or tenderness.   Neurological: He is alert and oriented to person, place, and time. No cranial nerve deficit.   Skin: Skin is warm and dry. No rash noted. He is not diaphoretic. No erythema.   Psychiatric: He has a normal mood and affect. His behavior is normal. Thought content normal.       Recent Labs      07/14/17   1200  07/15/17   0236  07/16/17   0207   WBC  15.0*  11.0*  7.9   RBC  4.42*  3.49*  3.55*   HEMOGLOBIN  12.8*  10.2*  10.2*   HEMATOCRIT  39.0*  31.5*  32.3*   MCV  88.2  88.5  91.0   MCH  29.0  29.2  28.7   MCHC  32.8*  33.0*  31.6*   RDW  45.4  46.8  49.1   PLATELETCT  440  357  321   MPV  8.2*  8.3*  8.3*     Recent Labs      07/14/17   1200  07/15/17   0236  07/16/17   0207   SODIUM  128*  135  140   POTASSIUM  3.7  3.6  4.2   CHLORIDE  92*  104  108   CO2  26  20  24   GLUCOSE  125*  96  106*   BUN  14  11  13   CREATININE  1.01  0.77  0.78   CALCIUM  9.8  8.5  8.6     Recent Labs      07/15/17   0857   INR  1.28*                  Assessment/Plan     * Abdominal abscess  (CMS-[facility])  Assessment & Plan  Bilateral pelvic abscesses with the larger one 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    Plan as above.    Sepsis (CMS-[facility])  Assessment & Plan  From pelvic abscess and possible appendiceal abscess on imaging  IR drain placed yesterday  IVF, rocephin, flagyl  Cultures: G+cocci, G+rods, G-rods  Awaiting final sensitivity  Surgery following  To get CT abd/pelvis tomorrow: and if improve, to remove drain    UTI (urinary tract infection)  Assessment & Plan  Rocephin  Culture: NTD    Hyponatremia  Assessment & Plan  IVF, follow  Improved  Follow cmp in am    BPH (benign prostatic hypertrophy)  Assessment & Plan  Flomax    Depression  Assessment & Plan  Follow    Osteoarthritis  Assessment & Plan  Mobic, prns    Leucocytosis  Assessment & Plan  From abscess/sepsis  Plan as above      Labs reviewed, Medications reviewed and Radiology images reviewed        DVT Prophylaxis: Enoxaparin (Lovenox)      Antibiotics: Treating active infection/contamination beyond 24 hours perioperative coverage

## 2017-07-17 NOTE — PROGRESS NOTES
Pt with 9 beats VT, Asymptomatic and vitals stable. KERRI Dyer notified. New lab orders received. Will continue to monitor

## 2017-07-18 ENCOUNTER — APPOINTMENT (OUTPATIENT)
Dept: RADIOLOGY | Facility: MEDICAL CENTER | Age: 77
DRG: 853 | End: 2017-07-18
Attending: HOSPITALIST
Payer: MEDICARE

## 2017-07-18 LAB
BACTERIA WND AEROBE CULT: ABNORMAL
GRAM STN SPEC: ABNORMAL
SIGNIFICANT IND 70042: ABNORMAL
SITE SITE: ABNORMAL
SOURCE SOURCE: ABNORMAL

## 2017-07-18 PROCEDURE — 700105 HCHG RX REV CODE 258: Performed by: INTERNAL MEDICINE

## 2017-07-18 PROCEDURE — 700101 HCHG RX REV CODE 250: Performed by: INTERNAL MEDICINE

## 2017-07-18 PROCEDURE — 700117 HCHG RX CONTRAST REV CODE 255: Performed by: INTERNAL MEDICINE

## 2017-07-18 PROCEDURE — 770020 HCHG ROOM/CARE - TELE (206)

## 2017-07-18 PROCEDURE — G8987 SELF CARE CURRENT STATUS: HCPCS | Mod: CK

## 2017-07-18 PROCEDURE — 700111 HCHG RX REV CODE 636 W/ 250 OVERRIDE (IP): Performed by: INTERNAL MEDICINE

## 2017-07-18 PROCEDURE — 99232 SBSQ HOSP IP/OBS MODERATE 35: CPT | Mod: GC | Performed by: INTERNAL MEDICINE

## 2017-07-18 PROCEDURE — A9270 NON-COVERED ITEM OR SERVICE: HCPCS | Performed by: INTERNAL MEDICINE

## 2017-07-18 PROCEDURE — 72193 CT PELVIS W/DYE: CPT

## 2017-07-18 PROCEDURE — 700111 HCHG RX REV CODE 636 W/ 250 OVERRIDE (IP): Performed by: HOSPITALIST

## 2017-07-18 PROCEDURE — 700102 HCHG RX REV CODE 250 W/ 637 OVERRIDE(OP): Performed by: INTERNAL MEDICINE

## 2017-07-18 PROCEDURE — G8988 SELF CARE GOAL STATUS: HCPCS | Mod: CI

## 2017-07-18 PROCEDURE — 700105 HCHG RX REV CODE 258: Performed by: HOSPITALIST

## 2017-07-18 PROCEDURE — 97166 OT EVAL MOD COMPLEX 45 MIN: CPT

## 2017-07-18 RX ORDER — AMPICILLIN 2 G/1
2 INJECTION, POWDER, FOR SOLUTION INTRAVENOUS EVERY 6 HOURS
Status: DISCONTINUED | OUTPATIENT
Start: 2017-07-18 | End: 2017-07-18

## 2017-07-18 RX ORDER — METRONIDAZOLE 500 MG/1
500 TABLET ORAL EVERY 8 HOURS
Status: DISCONTINUED | OUTPATIENT
Start: 2017-07-18 | End: 2017-07-18

## 2017-07-18 RX ADMIN — POTASSIUM CHLORIDE AND SODIUM CHLORIDE: 900; 150 INJECTION, SOLUTION INTRAVENOUS at 11:39

## 2017-07-18 RX ADMIN — IOHEXOL 100 ML: 350 INJECTION, SOLUTION INTRAVENOUS at 15:09

## 2017-07-18 RX ADMIN — OMEPRAZOLE 20 MG: 20 CAPSULE, DELAYED RELEASE ORAL at 08:02

## 2017-07-18 RX ADMIN — TRAMADOL HYDROCHLORIDE 100 MG: 50 TABLET, COATED ORAL at 11:45

## 2017-07-18 RX ADMIN — GABAPENTIN 100 MG: 100 CAPSULE ORAL at 20:11

## 2017-07-18 RX ADMIN — OXYCODONE HYDROCHLORIDE 5 MG: 5 TABLET ORAL at 15:34

## 2017-07-18 RX ADMIN — TAMSULOSIN HYDROCHLORIDE 0.4 MG: 0.4 CAPSULE ORAL at 08:03

## 2017-07-18 RX ADMIN — LAMOTRIGINE 150 MG: 100 TABLET ORAL at 08:02

## 2017-07-18 RX ADMIN — CEFTRIAXONE SODIUM 2 G: 2 INJECTION, POWDER, FOR SOLUTION INTRAMUSCULAR; INTRAVENOUS at 08:03

## 2017-07-18 RX ADMIN — GABAPENTIN 100 MG: 100 CAPSULE ORAL at 08:02

## 2017-07-18 RX ADMIN — GABAPENTIN 100 MG: 100 CAPSULE ORAL at 15:34

## 2017-07-18 RX ADMIN — MELOXICAM 15 MG: 7.5 TABLET ORAL at 08:02

## 2017-07-18 RX ADMIN — PIPERACILLIN SODIUM AND TAZOBACTAM SODIUM 3.38 G: 3; .375 INJECTION, POWDER, FOR SOLUTION INTRAVENOUS at 16:24

## 2017-07-18 RX ADMIN — ENOXAPARIN SODIUM 40 MG: 100 INJECTION SUBCUTANEOUS at 08:02

## 2017-07-18 RX ADMIN — OXYCODONE HYDROCHLORIDE 5 MG: 5 TABLET ORAL at 20:11

## 2017-07-18 RX ADMIN — OXYCODONE HYDROCHLORIDE 5 MG: 5 TABLET ORAL at 08:03

## 2017-07-18 RX ADMIN — METRONIDAZOLE 500 MG: 500 INJECTION, SOLUTION INTRAVENOUS at 06:01

## 2017-07-18 RX ADMIN — METRONIDAZOLE 500 MG: 500 TABLET ORAL at 15:34

## 2017-07-18 ASSESSMENT — PAIN SCALES - GENERAL
PAINLEVEL_OUTOF10: 0
PAINLEVEL_OUTOF10: 10
PAINLEVEL_OUTOF10: 5
PAINLEVEL_OUTOF10: 7
PAINLEVEL_OUTOF10: 5
PAINLEVEL_OUTOF10: 3

## 2017-07-18 ASSESSMENT — ENCOUNTER SYMPTOMS
NECK PAIN: 0
VOMITING: 0
CONSTIPATION: 0
EYE PAIN: 0
TINGLING: 0
NERVOUS/ANXIOUS: 0
BACK PAIN: 0
ORTHOPNEA: 0
SPUTUM PRODUCTION: 0
HEADACHES: 0
FEVER: 0
SEIZURES: 0
HEARTBURN: 0
NAUSEA: 0
ABDOMINAL PAIN: 1
DIZZINESS: 0
MYALGIAS: 0
BLURRED VISION: 0
STRIDOR: 0
FOCAL WEAKNESS: 0
COUGH: 0
INSOMNIA: 0
WEIGHT LOSS: 0
SHORTNESS OF BREATH: 0
EYE DISCHARGE: 0
DIARRHEA: 0
DEPRESSION: 0

## 2017-07-18 ASSESSMENT — COGNITIVE AND FUNCTIONAL STATUS - GENERAL
DRESSING REGULAR UPPER BODY CLOTHING: A LITTLE
DRESSING REGULAR LOWER BODY CLOTHING: A LITTLE
DAILY ACTIVITIY SCORE: 18
PERSONAL GROOMING: A LITTLE
TOILETING: A LITTLE
SUGGESTED CMS G CODE MODIFIER DAILY ACTIVITY: CK
HELP NEEDED FOR BATHING: A LOT

## 2017-07-18 NOTE — THERAPY
"Occupational Therapy Evaluation completed.   Functional Status:  Pt seen today for OT eval. Pt requires CGA for functional mobility with FWW. CGA for LB cares. Supv grooming seated due to fatigue. Pt educated on importance of maintaining endurance whilst in the hospital, however he is a bit self limiting at times. Pt limited by weakness, fatigue, and slightly impaired balance which impacts independence in ADLs and functional mobility.  Plan of Care: Will benefit from Occupational Therapy 3 times per week  Discharge Recommendations:  Equipment: Front-Wheel Walker, Shower Chair and Will Continue to Assess for Equipment Needs. Post-acute therapy undetermined. Pt lives alone, but does have support from a friend down the streets. Depends on progress made. Please encourage patient to mobilize at least 3x/day.    See \"Rehab Therapy-Acute\" Patient Summary Report for complete documentation.    "

## 2017-07-18 NOTE — CARE PLAN
Problem: Safety  Goal: Will remain free from injury  Outcome: PROGRESSING AS EXPECTED  Safety measures maintained    Problem: Knowledge Deficit  Goal: Knowledge of disease process/condition, treatment plan, diagnostic tests, and medications will improve  Outcome: PROGRESSING AS EXPECTED  Pt educated on antibiotics

## 2017-07-18 NOTE — PROGRESS NOTES
Assumed care at 1915. Bedside report received from Day BART Nance. Patient's chart and MAR reviewed. 12 hour chart check complete. Pt is awake in chair. Family at bedside. Patient was updated on plan of care for the night. Questions answered and concerns addressed.  Pt denies any additional needs at this time. White board updated. Call light, phone and personal belongings within reach. Vital signs stable

## 2017-07-18 NOTE — PROGRESS NOTES
Updated JOYA Dyer about 9/10 pain and new sanguineous drainage from MAREK drain when patient got back into bed. Minimal drainage. Vitals stable. Continue to monitor. Pt. Medicated per MAR.

## 2017-07-18 NOTE — PROGRESS NOTES
[facility] Hospitalist Progress Note    Date of Service: 2017    Chief Complaint  76 y.o. male admitted 2017 with abdominal pain    Interval Problem Update  75 y/o M with PMH of peripheral neuropathy, BPH, Depression: admitted for above and found to have pelvis abscess on imaging.    No acute events overnight - patient states he feels well    Abdominal abscess - IR drains in place, no real drainage, surgery following  CT scan for today    Consultants/Specialty  Surgery    Disposition  Remain in the hospital        Review of Systems   Constitutional: Negative for fever and weight loss.   HENT: Negative for congestion and nosebleeds.    Eyes: Negative for blurred vision, pain and discharge.   Respiratory: Negative for cough, sputum production, shortness of breath and stridor.    Cardiovascular: Negative for chest pain and orthopnea.   Gastrointestinal: Positive for abdominal pain. Negative for heartburn, nausea, vomiting, diarrhea and constipation.   Genitourinary: Negative for dysuria, urgency and frequency.   Musculoskeletal: Negative for myalgias, back pain and neck pain.   Skin: Negative for itching and rash.   Neurological: Negative for dizziness, tingling, focal weakness, seizures and headaches.   Psychiatric/Behavioral: Negative for depression. The patient is not nervous/anxious and does not have insomnia.       Physical Exam  Laboratory/Imaging   Hemodynamics  Temp (24hrs), Av.8 °C (98.3 °F), Min:36.3 °C (97.3 °F), Max:37.1 °C (98.7 °F)   Temperature: 37 °C (98.6 °F)  Pulse  Av.2  Min: 57  Max: 105    Blood Pressure : 128/80 mmHg      Respiratory      Respiration: 18, Pulse Oximetry: 93 %        RUL Breath Sounds: Clear, RML Breath Sounds: Diminished, RLL Breath Sounds: Diminished, TOSHIA Breath Sounds: Clear, LLL Breath Sounds: Diminished    Fluids    Intake/Output Summary (Last 24 hours) at 17 1441  Last data filed at 17 1100   Gross per 24 hour   Intake   1320 ml   Output    840 ml    Net    480 ml       Nutrition  Orders Placed This Encounter   Procedures   • DIET ORDER     Standing Status: Standing      Number of Occurrences: 1      Standing Expiration Date:      Order Specific Question:  Diet:     Answer:  Regular [1]     Physical Exam   Constitutional: He is oriented to person, place, and time. No distress.   HENT:   Head: Normocephalic and atraumatic.   Mouth/Throat: Oropharynx is clear and moist.   Eyes: Conjunctivae and EOM are normal. Pupils are equal, round, and reactive to light. Right eye exhibits no discharge. Left eye exhibits no discharge.   Neck: Normal range of motion. Neck supple. No JVD present. No tracheal deviation present. No thyromegaly present.   Cardiovascular: Normal rate and regular rhythm.    No murmur heard.  Pulmonary/Chest: Effort normal and breath sounds normal. No respiratory distress. He has no rales.   Abdominal: Soft. Bowel sounds are normal. He exhibits no distension. There is tenderness. There is no rebound.   Musculoskeletal: He exhibits no edema or tenderness.   Neurological: He is alert and oriented to person, place, and time. No cranial nerve deficit.   Skin: Skin is warm and dry. No rash noted. He is not diaphoretic. No erythema.   Psychiatric: He has a normal mood and affect. His behavior is normal. Thought content normal.       Recent Labs      07/16/17   0207   WBC  7.9   RBC  3.55*   HEMOGLOBIN  10.2*   HEMATOCRIT  32.3*   MCV  91.0   MCH  28.7   MCHC  31.6*   RDW  49.1   PLATELETCT  321   MPV  8.3*     Recent Labs      07/16/17   0207   SODIUM  140   POTASSIUM  4.2   CHLORIDE  108   CO2  24   GLUCOSE  106*   BUN  13   CREATININE  0.78   CALCIUM  8.6                      Assessment/Plan     * Sepsis (CMS-HCC)  Assessment & Plan  From pelvic abscess and possible appendiceal abscess on imaging  IR drain placed yesterday  IVF, rocephin, ampicillin, flagyl  Cultures: bacteroides fragilis, e. 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    Plan as above.    UTI (urinary tract infection)  Assessment & Plan  Rocephin  Culture: NTD    Hyponatremia  Assessment & Plan  IVF, follow  Improved  Follow cmp in am    BPH (benign prostatic hypertrophy)  Assessment & Plan  Flomax    Depression  Assessment & Plan  Follow    Osteoarthritis  Assessment & Plan  stable    Leucocytosis  Assessment & Plan  From abscess/sepsis  Plan as above      Labs reviewed, Medications reviewed and Radiology images reviewed        DVT Prophylaxis: Enoxaparin (Lovenox)      Antibiotics: Treating active infection/contamination beyond 24 hours perioperative coverage

## 2017-07-19 ENCOUNTER — APPOINTMENT (OUTPATIENT)
Dept: RADIOLOGY | Facility: MEDICAL CENTER | Age: 77
DRG: 853 | End: 2017-07-19
Attending: INTERNAL MEDICINE
Payer: MEDICARE

## 2017-07-19 ENCOUNTER — APPOINTMENT (OUTPATIENT)
Dept: RADIOLOGY | Facility: MEDICAL CENTER | Age: 77
DRG: 853 | End: 2017-07-19
Attending: RADIOLOGY
Payer: MEDICARE

## 2017-07-19 LAB
ANION GAP SERPL CALC-SCNC: 6 MMOL/L (ref 0–11.9)
BACTERIA BLD CULT: NORMAL
BACTERIA BLD CULT: NORMAL
BASOPHILS # BLD AUTO: 0.6 % (ref 0–1.8)
BASOPHILS # BLD: 0.05 K/UL (ref 0–0.12)
BUN SERPL-MCNC: 4 MG/DL (ref 8–22)
CALCIUM SERPL-MCNC: 8.4 MG/DL (ref 8.5–10.5)
CHLORIDE SERPL-SCNC: 106 MMOL/L (ref 96–112)
CO2 SERPL-SCNC: 27 MMOL/L (ref 20–33)
CREAT SERPL-MCNC: 0.67 MG/DL (ref 0.5–1.4)
EOSINOPHIL # BLD AUTO: 0.3 K/UL (ref 0–0.51)
EOSINOPHIL NFR BLD: 3.9 % (ref 0–6.9)
ERYTHROCYTE [DISTWIDTH] IN BLOOD BY AUTOMATED COUNT: 48.4 FL (ref 35.9–50)
GFR SERPL CREATININE-BSD FRML MDRD: >60 ML/MIN/1.73 M 2
GLUCOSE SERPL-MCNC: 113 MG/DL (ref 65–99)
GRAM STN SPEC: NORMAL
HCT VFR BLD AUTO: 33.9 % (ref 42–52)
HGB BLD-MCNC: 10.9 G/DL (ref 14–18)
IMM GRANULOCYTES # BLD AUTO: 0.31 K/UL (ref 0–0.11)
IMM GRANULOCYTES NFR BLD AUTO: 4 % (ref 0–0.9)
LYMPHOCYTES # BLD AUTO: 0.76 K/UL (ref 1–4.8)
LYMPHOCYTES NFR BLD: 9.8 % (ref 22–41)
MCH RBC QN AUTO: 28.8 PG (ref 27–33)
MCHC RBC AUTO-ENTMCNC: 32.2 G/DL (ref 33.7–35.3)
MCV RBC AUTO: 89.4 FL (ref 81.4–97.8)
MONOCYTES # BLD AUTO: 0.73 K/UL (ref 0–0.85)
MONOCYTES NFR BLD AUTO: 9.4 % (ref 0–13.4)
NEUTROPHILS # BLD AUTO: 5.63 K/UL (ref 1.82–7.42)
NEUTROPHILS NFR BLD: 72.3 % (ref 44–72)
NRBC # BLD AUTO: 0 K/UL
NRBC BLD AUTO-RTO: 0 /100 WBC
PLATELET # BLD AUTO: 357 K/UL (ref 164–446)
PMV BLD AUTO: 8.2 FL (ref 9–12.9)
POTASSIUM SERPL-SCNC: 4.1 MMOL/L (ref 3.6–5.5)
RBC # BLD AUTO: 3.79 M/UL (ref 4.7–6.1)
SIGNIFICANT IND 70042: NORMAL
SITE SITE: NORMAL
SODIUM SERPL-SCNC: 139 MMOL/L (ref 135–145)
SOURCE SOURCE: NORMAL
WBC # BLD AUTO: 7.8 K/UL (ref 4.8–10.8)

## 2017-07-19 PROCEDURE — 85025 COMPLETE CBC W/AUTO DIFF WBC: CPT

## 2017-07-19 PROCEDURE — 87186 SC STD MICRODIL/AGAR DIL: CPT

## 2017-07-19 PROCEDURE — 700101 HCHG RX REV CODE 250: Performed by: INTERNAL MEDICINE

## 2017-07-19 PROCEDURE — 700111 HCHG RX REV CODE 636 W/ 250 OVERRIDE (IP)

## 2017-07-19 PROCEDURE — 87077 CULTURE AEROBIC IDENTIFY: CPT

## 2017-07-19 PROCEDURE — 700111 HCHG RX REV CODE 636 W/ 250 OVERRIDE (IP): Performed by: INTERNAL MEDICINE

## 2017-07-19 PROCEDURE — 49424 ASSESS CYST CONTRAST INJECT: CPT

## 2017-07-19 PROCEDURE — 770020 HCHG ROOM/CARE - TELE (206)

## 2017-07-19 PROCEDURE — 700111 HCHG RX REV CODE 636 W/ 250 OVERRIDE (IP): Performed by: HOSPITALIST

## 2017-07-19 PROCEDURE — 99153 MOD SED SAME PHYS/QHP EA: CPT

## 2017-07-19 PROCEDURE — 700102 HCHG RX REV CODE 250 W/ 637 OVERRIDE(OP): Performed by: INTERNAL MEDICINE

## 2017-07-19 PROCEDURE — 76080 X-RAY EXAM OF FISTULA: CPT

## 2017-07-19 PROCEDURE — 700105 HCHG RX REV CODE 258: Performed by: HOSPITALIST

## 2017-07-19 PROCEDURE — A9270 NON-COVERED ITEM OR SERVICE: HCPCS | Performed by: HOSPITALIST

## 2017-07-19 PROCEDURE — 700111 HCHG RX REV CODE 636 W/ 250 OVERRIDE (IP): Performed by: RADIOLOGY

## 2017-07-19 PROCEDURE — 80048 BASIC METABOLIC PNL TOTAL CA: CPT

## 2017-07-19 PROCEDURE — 0W2JX0Z CHANGE DRAINAGE DEVICE IN PELVIC CAVITY, EXTERNAL APPROACH: ICD-10-PCS | Performed by: RADIOLOGY

## 2017-07-19 PROCEDURE — 87070 CULTURE OTHR SPECIMN AEROBIC: CPT

## 2017-07-19 PROCEDURE — 99233 SBSQ HOSP IP/OBS HIGH 50: CPT | Mod: GC | Performed by: INTERNAL MEDICINE

## 2017-07-19 PROCEDURE — A9270 NON-COVERED ITEM OR SERVICE: HCPCS | Performed by: INTERNAL MEDICINE

## 2017-07-19 PROCEDURE — 700102 HCHG RX REV CODE 250 W/ 637 OVERRIDE(OP): Performed by: HOSPITALIST

## 2017-07-19 PROCEDURE — 87205 SMEAR GRAM STAIN: CPT

## 2017-07-19 PROCEDURE — 36415 COLL VENOUS BLD VENIPUNCTURE: CPT

## 2017-07-19 RX ORDER — MIDAZOLAM HYDROCHLORIDE 1 MG/ML
.5-2 INJECTION INTRAMUSCULAR; INTRAVENOUS PRN
Status: DISCONTINUED | OUTPATIENT
Start: 2017-07-19 | End: 2017-07-19

## 2017-07-19 RX ORDER — SODIUM CHLORIDE 9 MG/ML
500 INJECTION, SOLUTION INTRAVENOUS PRN
Status: COMPLETED | OUTPATIENT
Start: 2017-07-19 | End: 2017-07-25

## 2017-07-19 RX ORDER — GABAPENTIN 100 MG/1
200 CAPSULE ORAL 3 TIMES DAILY
Status: DISCONTINUED | OUTPATIENT
Start: 2017-07-19 | End: 2017-08-12

## 2017-07-19 RX ORDER — ONDANSETRON 2 MG/ML
4 INJECTION INTRAMUSCULAR; INTRAVENOUS PRN
Status: ACTIVE | OUTPATIENT
Start: 2017-07-19 | End: 2017-07-19

## 2017-07-19 RX ORDER — SODIUM CHLORIDE 9 MG/ML
500 INJECTION, SOLUTION INTRAVENOUS PRN
Status: COMPLETED | OUTPATIENT
Start: 2017-07-19 | End: 2017-07-28

## 2017-07-19 RX ORDER — MIDAZOLAM HYDROCHLORIDE 1 MG/ML
INJECTION INTRAMUSCULAR; INTRAVENOUS
Status: DISPENSED
Start: 2017-07-19 | End: 2017-07-19

## 2017-07-19 RX ORDER — MIDAZOLAM HYDROCHLORIDE 1 MG/ML
.5-2 INJECTION INTRAMUSCULAR; INTRAVENOUS PRN
Status: ACTIVE | OUTPATIENT
Start: 2017-07-19 | End: 2017-07-19

## 2017-07-19 RX ORDER — MIDAZOLAM HYDROCHLORIDE 1 MG/ML
INJECTION INTRAMUSCULAR; INTRAVENOUS
Status: COMPLETED
Start: 2017-07-19 | End: 2017-07-19

## 2017-07-19 RX ADMIN — FENTANYL CITRATE 50 MCG: 50 INJECTION, SOLUTION INTRAMUSCULAR; INTRAVENOUS at 11:27

## 2017-07-19 RX ADMIN — GABAPENTIN 100 MG: 100 CAPSULE ORAL at 14:48

## 2017-07-19 RX ADMIN — LAMOTRIGINE 150 MG: 100 TABLET ORAL at 09:14

## 2017-07-19 RX ADMIN — MORPHINE SULFATE 4 MG: 4 INJECTION INTRAVENOUS at 09:11

## 2017-07-19 RX ADMIN — GABAPENTIN 100 MG: 100 CAPSULE ORAL at 09:14

## 2017-07-19 RX ADMIN — MIDAZOLAM 1 MG: 1 INJECTION INTRAMUSCULAR; INTRAVENOUS at 11:24

## 2017-07-19 RX ADMIN — MELOXICAM 15 MG: 7.5 TABLET ORAL at 09:14

## 2017-07-19 RX ADMIN — POTASSIUM CHLORIDE AND SODIUM CHLORIDE: 900; 150 INJECTION, SOLUTION INTRAVENOUS at 20:05

## 2017-07-19 RX ADMIN — PIPERACILLIN SODIUM AND TAZOBACTAM SODIUM 3.38 G: 3; .375 INJECTION, POWDER, FOR SOLUTION INTRAVENOUS at 17:51

## 2017-07-19 RX ADMIN — PIPERACILLIN SODIUM AND TAZOBACTAM SODIUM 3.38 G: 3; .375 INJECTION, POWDER, FOR SOLUTION INTRAVENOUS at 06:07

## 2017-07-19 RX ADMIN — FENTANYL CITRATE 25 MCG: 50 INJECTION, SOLUTION INTRAMUSCULAR; INTRAVENOUS at 12:48

## 2017-07-19 RX ADMIN — FENTANYL CITRATE 50 MCG: 50 INJECTION, SOLUTION INTRAMUSCULAR; INTRAVENOUS at 11:24

## 2017-07-19 RX ADMIN — GABAPENTIN 200 MG: 100 CAPSULE ORAL at 20:17

## 2017-07-19 RX ADMIN — OXYCODONE HYDROCHLORIDE 5 MG: 5 TABLET ORAL at 18:29

## 2017-07-19 RX ADMIN — POTASSIUM CHLORIDE AND SODIUM CHLORIDE: 900; 150 INJECTION, SOLUTION INTRAVENOUS at 00:17

## 2017-07-19 RX ADMIN — PIPERACILLIN SODIUM AND TAZOBACTAM SODIUM 3.38 G: 3; .375 INJECTION, POWDER, FOR SOLUTION INTRAVENOUS at 23:45

## 2017-07-19 RX ADMIN — MIDAZOLAM HYDROCHLORIDE 1 MG: 1 INJECTION, SOLUTION INTRAMUSCULAR; INTRAVENOUS at 12:48

## 2017-07-19 RX ADMIN — MIDAZOLAM HYDROCHLORIDE 1 MG: 1 INJECTION, SOLUTION INTRAMUSCULAR; INTRAVENOUS at 11:27

## 2017-07-19 RX ADMIN — MIDAZOLAM HYDROCHLORIDE 1 MG: 1 INJECTION, SOLUTION INTRAMUSCULAR; INTRAVENOUS at 11:31

## 2017-07-19 RX ADMIN — MIDAZOLAM HYDROCHLORIDE 1 MG: 1 INJECTION, SOLUTION INTRAMUSCULAR; INTRAVENOUS at 11:24

## 2017-07-19 RX ADMIN — PIPERACILLIN SODIUM AND TAZOBACTAM SODIUM 3.38 G: 3; .375 INJECTION, POWDER, FOR SOLUTION INTRAVENOUS at 00:17

## 2017-07-19 RX ADMIN — FENTANYL CITRATE 50 MCG: 50 INJECTION, SOLUTION INTRAMUSCULAR; INTRAVENOUS at 12:57

## 2017-07-19 RX ADMIN — OMEPRAZOLE 20 MG: 20 CAPSULE, DELAYED RELEASE ORAL at 09:14

## 2017-07-19 RX ADMIN — TAMSULOSIN HYDROCHLORIDE 0.4 MG: 0.4 CAPSULE ORAL at 09:14

## 2017-07-19 ASSESSMENT — ENCOUNTER SYMPTOMS
RESPIRATORY NEGATIVE: 1
DEPRESSION: 0
BACK PAIN: 0
TINGLING: 0
CARDIOVASCULAR NEGATIVE: 1
DIZZINESS: 0
FOCAL WEAKNESS: 0
SPUTUM PRODUCTION: 0
EYE DISCHARGE: 0
SHORTNESS OF BREATH: 0
STRIDOR: 0
SEIZURES: 0
INSOMNIA: 0
HEADACHES: 0
WEIGHT LOSS: 0
VOMITING: 0
HEARTBURN: 0
NAUSEA: 0
CHILLS: 0
CONSTIPATION: 0
FEVER: 0
NERVOUS/ANXIOUS: 0
EYE PAIN: 0
COUGH: 0
MYALGIAS: 0
BLURRED VISION: 0
ABDOMINAL PAIN: 0
ORTHOPNEA: 0
ABDOMINAL PAIN: 1
NECK PAIN: 0
DIARRHEA: 0

## 2017-07-19 ASSESSMENT — PAIN SCALES - GENERAL
PAINLEVEL_OUTOF10: 2
PAINLEVEL_OUTOF10: 3
PAINLEVEL_OUTOF10: 0

## 2017-07-19 NOTE — PROGRESS NOTES
Assumed care at 1915. Bedside report received from Day BART Nance. Patient's chart and MAR reviewed. 12 hour chart check complete. Pt is awake in bed. Patient was updated on plan of care for the night. Questions answered and concerns addressed.  Pt denies any additional needs at this time. White board updated. Call light, phone and personal belongings within reach. Bed alarm on and working appropriately. Vital signs stable

## 2017-07-19 NOTE — PROGRESS NOTES
IR Progress Note:    Fluoroscopy guided peritoneal drain reposition attempted by Dr. Wei. Existing peritoneal drain removed.    Pt moved to CT by RN for Insertion of new peritoneal drain by Dr. Wei.  Pt tolerated procedure well and remained hemodynamically stable throughout. Report called to BART Gil. Pt on transport to T8.    [employer] Flexima Locking Pigtail Drainage Catheter, 12 F x 25 cm, REF#[id number], LOT # [id number]

## 2017-07-19 NOTE — OR SURGEON
Immediate Post- Operative Note        PostOp Diagnosis: perisigmoid abscess, possible fistula      Procedure(s): CT guided drain replacement      Estimated Blood Loss: Less than 5 ml        Complications: None            7/19/2017     1:07 PM     Jony Wei

## 2017-07-19 NOTE — PROGRESS NOTES
Sarah Clemons Fall Risk Assessment:     Last Known Fall: No falls  Mobility: Dizziness/generalized weakness  Medications: Cardiovascular or central nervous system meds  Mental Status/LOC/Awareness: Awake, alert, and oriented to date, place, and person  Toileting Needs: Use of assistive device (Bedside commode, bedpan, urinal)  Volume/Electrolyte Status: Use of IV fluids/tube feeds  Communication/Sensory: Visual (Glasses)/hearing deficit  Behavior: Appropriate behavior  Sarah Clemons Fall Risk Total: 10  Fall Risk Level: LOW RISK    Universal Fall Precautions:  call light/belongings in reach, bed in low position and locked, wheelchairs and assistive devices out of sight, siderails up x 2, use non-slip footwear, adequate lighting, clutter free and spill free environment, educate on level of risk, educate to call for assistance    Fall Risk Level Interventions:   TRIAL (TELE 8, NEURO, MED ERNESTO 5) Low Fall Risk Interventions  Place yellow fall risk ID band on patient: refused  Provide patient/family education based on risk assessment: completed  Educate patient/family to call staff for assistance when getting out of bed: completed  Place fall precaution signage outside patient door: completed      Patient Specific Interventions:     Medication: review medications with patient and family, assess for medications that can be discontinued or dosage decreased and limit combination of prn medications  Mental Status/LOC/Awareness: not applicable  Toileting: monitor intake and output/use of appropriate interventions  Volume/Electrolyte Status: ensure patient remains hydrated, monitor abnormal lab values and ensure IV fluids are appropriate  Communication/Sensory: update plan of care on whiteboard and ensure patient has glasses/contacts and hearing aids/dentures  Behavioral: not applicable  Mobility: schedule physical activity throughout the day, ensure bed is locked and in lowest position, provide appropriate assistive device,  instruct patient to exit bed on their strongest side and collaborate with doctor for possible PT/OT consult

## 2017-07-19 NOTE — PROGRESS NOTES
[facility] Hospitalist Progress Note    Date of Service: 2017    Chief Complaint  76 y.o. male admitted 2017 with abdominal pain    Interval Problem Update  75 y/o M with PMH of peripheral neuropathy, BPH, Depression: admitted for above and found to have pelvis abscess on imaging.    No acute events overnight - patient states he feels well    CT showed malposition of drain, patient taken down to IR, unable to reposition instead  Took to CT to put new drain in. Needle decompression, 10 cc pus.  Interventional radiologist believes possible fistula formation to the sigmoid  Colon.    Consultants/Specialty  Surgery  Interventional Radiology    Disposition  Inpatient for management of pelvic abscesses        Review of Systems   Constitutional: Negative for fever and weight loss.   HENT: Negative for congestion and nosebleeds.    Eyes: Negative for blurred vision, pain and discharge.   Respiratory: Negative for cough, sputum production, shortness of breath and stridor.    Cardiovascular: Negative for chest pain and orthopnea.   Gastrointestinal: Positive for abdominal pain. Negative for heartburn, nausea, vomiting, diarrhea and constipation.   Genitourinary: Negative for dysuria, urgency and frequency.   Musculoskeletal: Negative for myalgias, back pain and neck pain.   Skin: Negative for itching and rash.   Neurological: Negative for dizziness, tingling, focal weakness, seizures and headaches.   Psychiatric/Behavioral: Negative for depression. The patient is not nervous/anxious and does not have insomnia.       Physical Exam  Laboratory/Imaging   Hemodynamics  Temp (24hrs), Av.8 °C (98.3 °F), Min:36.4 °C (97.5 °F), Max:37.2 °C (99 °F)   Temperature:  (Pt. in IR )  Pulse  Av.1  Min: 57  Max: 105 Heart Rate (Monitored): 71  Blood Pressure : 141/77 mmHg, NIBP: 144/72 mmHg      Respiratory      Respiration: 18, Pulse Oximetry: 98 %        RUL Breath Sounds: Clear, RML Breath Sounds: Diminished, RLL Breath  Sounds: Diminished, TOSHIA Breath Sounds: Clear, LLL Breath Sounds: Diminished    Fluids    Intake/Output Summary (Last 24 hours) at 07/19/17 1323  Last data filed at 07/19/17 0607   Gross per 24 hour   Intake   1140 ml   Output   1125 ml   Net     15 ml       Nutrition  Orders Placed This Encounter   Procedures   • DIET NPO     Standing Status: Standing      Number of Occurrences: 8      Standing Expiration Date:      Order Specific Question:  Restrict to:     Answer:  Sips with Medications [3]     Physical Exam   Constitutional: He is oriented to person, place, and time. No distress.   HENT:   Head: Normocephalic and atraumatic.   Mouth/Throat: Oropharynx is clear and moist.   Eyes: Conjunctivae and EOM are normal. Pupils are equal, round, and reactive to light. Right eye exhibits no discharge. Left eye exhibits no discharge.   Neck: Normal range of motion. Neck supple. No JVD present. No tracheal deviation present. No thyromegaly present.   Cardiovascular: Normal rate and regular rhythm.    No murmur heard.  Pulmonary/Chest: Effort normal and breath sounds normal. No respiratory distress. He has no rales.   Abdominal: Soft. Bowel sounds are normal. He exhibits no distension. There is tenderness. There is no rebound.   Musculoskeletal: He exhibits no edema or tenderness.   Neurological: He is alert and oriented to person, place, and time. No cranial nerve deficit.   Skin: Skin is warm and dry. No rash noted. He is not diaphoretic. No erythema.   Psychiatric: He has a normal mood and affect. His behavior is normal. Thought content normal.       Recent Labs      07/19/17   0232   WBC  7.8   RBC  3.79*   HEMOGLOBIN  10.9*   HEMATOCRIT  33.9*   MCV  89.4   MCH  28.8   MCHC  32.2*   RDW  48.4   PLATELETCT  357   MPV  8.2*     Recent Labs      07/19/17   0232   SODIUM  139   POTASSIUM  4.1   CHLORIDE  106   CO2  27   GLUCOSE  113*   BUN  4*   CREATININE  0.67   CALCIUM  8.4*                      Assessment/Plan     *  Sepsis (CMS-HCC) (present on admission)  Assessment & Plan  From pelvic abscess and possible appendiceal abscess on imaging  IR drain placed yesterday  Cultures: bacteroides fragilis, e. Coli, enterococcus faecium, strep viridans  IV abx - zosyn  Surgery following  CT showed malposition of drain, down to IR, unable to reposition instead  Took to CT to put new drain in. Needle decompression, 10 cc pus.  Interventional radiologist believes possible fistula formation to the sigmoid  Colon.    Abdominal abscess (CMS-HCC) (present on admission)  Assessment & Plan  Bilateral pelvic abscesses   CT showed malposition of drain, down to IR, unable to reposition instead  Took to CT to put new drain in. Needle decompression, 10 cc pus.  Interventional radiologist believes possible fistula formation to the sigmoid  Colon.    UTI (urinary tract infection) (present on admission)  Assessment & Plan  Cont zosyn    Hyponatremia  Assessment & Plan  IVF, follow  Improved  Follow cmp in am    BPH (benign prostatic hypertrophy)  Assessment & Plan  Flomax    Depression  Assessment & Plan  Follow    Osteoarthritis  Assessment & Plan  stable    Leucocytosis  Assessment & Plan  From abscess/sepsis  Plan as above      Labs reviewed, Medications reviewed and Radiology images reviewed        DVT Prophylaxis: Enoxaparin (Lovenox)      Antibiotics: Treating active infection/contamination beyond 24 hours perioperative coverage

## 2017-07-19 NOTE — CARE PLAN
Problem: Safety  Goal: Will remain free from injury  Outcome: PROGRESSING AS EXPECTED  Patient without hospital acquired injury or fall so far this admission. Safety precautions including fall precautions continued and explained to patient. Patient in agreement with plan of care.    Problem: Pain Management  Goal: Pain level will decrease to patient’s comfort goal  Outcome: PROGRESSING AS EXPECTED  Patient continuing with pain at drain site but reports adequate relief with meds

## 2017-07-19 NOTE — THERAPY
Patient refused therapy evaluation due to fatigue and pain from procedure. Requested therapist return tomorrow.

## 2017-07-19 NOTE — CARE PLAN
Problem: Safety  Goal: Will remain free from falls  Outcome: PROGRESSING AS EXPECTED  Fall risk assessed and fall precautions in place, pt 1-1 assist. Bed alarm on, appropriate signs in place, call light and personal belongings within reach. Pt educated to call for help and not get up without assistance. Verbalized understanding.        Problem: Knowledge Deficit  Goal: Knowledge of disease process/condition, treatment plan, diagnostic tests, and medications will improve  Outcome: PROGRESSING AS EXPECTED  Pt educated regarding plan of care for the night, activity, diet, and medications. Verbalized understanding.

## 2017-07-19 NOTE — PROGRESS NOTES
Surgical Progress Note    Author: Jorge Rodriguez Date & Time created: 2017   7:12 AM     Interval Events:  CT scan done yesterday shows drain pulled back, no longer in fluid collection.  Minimal to no abdominal pain.  Passing flatus. Last BM yesterday,by patient report (also recorded in [facility]).  NPO for drain repositioning today    Review of Systems   Constitutional: Negative for fever, chills and malaise/fatigue.   Respiratory: Negative.    Cardiovascular: Negative.    Gastrointestinal: Negative for nausea, vomiting and abdominal pain.     Hemodynamics:  Temp (24hrs), Av.9 °C (98.4 °F), Min:36.4 °C (97.5 °F), Max:37.2 °C (99 °F)  Temperature: 36.8 °C (98.3 °F)  Pulse  Av.5  Min: 57  Max: 105   Blood Pressure : 118/56 mmHg     Respiratory:    Respiration: 18, Pulse Oximetry: 91 %        RUL Breath Sounds: Clear, RML Breath Sounds: Diminished, RLL Breath Sounds: Diminished, TOSHIA Breath Sounds: Clear, LLL Breath Sounds: Diminished  Neuro:  GCS = 15       Fluids:    Intake/Output Summary (Last 24 hours) at 17 0712  Last data filed at 17 0607   Gross per 24 hour   Intake   1500 ml   Output   1425 ml   Net     75 ml     Weight: 62.8 kg (138 lb 7.2 oz)  Current Diet Order   Procedures   • DIET NPO     Physical Exam   Constitutional: He is oriented to person, place, and time. He appears well-developed and well-nourished. No distress.   HENT:   Head: Normocephalic.   Eyes: Pupils are equal, round, and reactive to light. No scleral icterus.   Cardiovascular: Normal rate, regular rhythm and normal heart sounds.    Pulmonary/Chest: Effort normal and breath sounds normal. No respiratory distress.   Abdominal: Soft. Bowel sounds are normal. He exhibits no distension. There is no tenderness. There is no rebound and no guarding.   Drain in place, nothing in bulb  No drain output recorded in last 24 hours   Neurological: He is alert and oriented to person, place, and time.   Skin: Skin is warm and  dry.   Psychiatric: He has a normal mood and affect. His behavior is normal. Judgment and thought content normal.     Labs:  Recent Results (from the past 24 hour(s))   BASIC METABOLIC PANEL    Collection Time: 07/19/17  2:32 AM   Result Value Ref Range    Sodium 139 135 - 145 mmol/L    Potassium 4.1 3.6 - 5.5 mmol/L    Chloride 106 96 - 112 mmol/L    Co2 27 20 - 33 mmol/L    Glucose 113 (H) 65 - 99 mg/dL    Bun 4 (L) 8 - 22 mg/dL    Creatinine 0.67 0.50 - 1.40 mg/dL    Calcium 8.4 (L) 8.5 - 10.5 mg/dL    Anion Gap 6.0 0.0 - 11.9   CBC WITH DIFFERENTIAL    Collection Time: 07/19/17  2:32 AM   Result Value Ref Range    WBC 7.8 4.8 - 10.8 K/uL    RBC 3.79 (L) 4.70 - 6.10 M/uL    Hemoglobin 10.9 (L) 14.0 - 18.0 g/dL    Hematocrit 33.9 (L) 42.0 - 52.0 %    MCV 89.4 81.4 - 97.8 fL    MCH 28.8 27.0 - 33.0 pg    MCHC 32.2 (L) 33.7 - 35.3 g/dL    RDW 48.4 35.9 - 50.0 fL    Platelet Count 357 164 - 446 K/uL    MPV 8.2 (L) 9.0 - 12.9 fL    Neutrophils-Polys 72.30 (H) 44.00 - 72.00 %    Lymphocytes 9.80 (L) 22.00 - 41.00 %    Monocytes 9.40 0.00 - 13.40 %    Eosinophils 3.90 0.00 - 6.90 %    Basophils 0.60 0.00 - 1.80 %    Immature Granulocytes 4.00 (H) 0.00 - 0.90 %    Nucleated RBC 0.00 /100 WBC    Neutrophils (Absolute) 5.63 1.82 - 7.42 K/uL    Lymphs (Absolute) 0.76 (L) 1.00 - 4.80 K/uL    Monos (Absolute) 0.73 0.00 - 0.85 K/uL    Eos (Absolute) 0.30 0.00 - 0.51 K/uL    Baso (Absolute) 0.05 0.00 - 0.12 K/uL    Immature Granulocytes (abs) 0.31 (H) 0.00 - 0.11 K/uL    NRBC (Absolute) 0.00 K/uL   ESTIMATED GFR    Collection Time: 07/19/17  2:32 AM   Result Value Ref Range    GFR If African American >60 >60 mL/min/1.73 m 2    GFR If Non African American >60 >60 mL/min/1.73 m 2     Medical Decision Making, by Problem:  Active Hospital Problems    Diagnosis   • Sepsis (CMS-[facility]) [A41.9]     Priority: High   • Abdominal abscess (CMS-[facility]) [K65.1]     Priority: High   • UTI (urinary tract infection) [N39.0]     Priority: High   •  Leucocytosis [D72.829]   • Hyponatremia [E87.1]   • BPH (benign prostatic hypertrophy) [N40.0]   • Depression [F32.9]   • Osteoarthritis [M19.90]     Plan:  Agree with repositioning of peritoneal drain.  Will continue to follow.    Quality Measures:  Labs reviewed and Medications reviewed  Ruff catheter: No Ruff            Antibiotics: Treating active infection/contamination beyond 24 hours perioperative coverage        Discussed patient condition with Patient

## 2017-07-20 PROCEDURE — 700102 HCHG RX REV CODE 250 W/ 637 OVERRIDE(OP): Performed by: HOSPITALIST

## 2017-07-20 PROCEDURE — 700102 HCHG RX REV CODE 250 W/ 637 OVERRIDE(OP): Performed by: INTERNAL MEDICINE

## 2017-07-20 PROCEDURE — 97161 PT EVAL LOW COMPLEX 20 MIN: CPT

## 2017-07-20 PROCEDURE — 770020 HCHG ROOM/CARE - TELE (206)

## 2017-07-20 PROCEDURE — A9270 NON-COVERED ITEM OR SERVICE: HCPCS | Performed by: HOSPITALIST

## 2017-07-20 PROCEDURE — A9270 NON-COVERED ITEM OR SERVICE: HCPCS | Performed by: INTERNAL MEDICINE

## 2017-07-20 PROCEDURE — 700101 HCHG RX REV CODE 250: Performed by: INTERNAL MEDICINE

## 2017-07-20 PROCEDURE — 700111 HCHG RX REV CODE 636 W/ 250 OVERRIDE (IP): Performed by: HOSPITALIST

## 2017-07-20 PROCEDURE — G8979 MOBILITY GOAL STATUS: HCPCS | Mod: CI

## 2017-07-20 PROCEDURE — G8980 MOBILITY D/C STATUS: HCPCS | Mod: CI

## 2017-07-20 PROCEDURE — 99232 SBSQ HOSP IP/OBS MODERATE 35: CPT | Mod: GC | Performed by: INTERNAL MEDICINE

## 2017-07-20 PROCEDURE — 700105 HCHG RX REV CODE 258: Performed by: HOSPITALIST

## 2017-07-20 PROCEDURE — G8978 MOBILITY CURRENT STATUS: HCPCS | Mod: CI

## 2017-07-20 RX ORDER — SODIUM CHLORIDE 9 MG/ML
INJECTION, SOLUTION INTRAVENOUS CONTINUOUS
Status: DISCONTINUED | OUTPATIENT
Start: 2017-07-20 | End: 2017-07-21

## 2017-07-20 RX ADMIN — GABAPENTIN 200 MG: 100 CAPSULE ORAL at 20:37

## 2017-07-20 RX ADMIN — PIPERACILLIN SODIUM AND TAZOBACTAM SODIUM 3.38 G: 3; .375 INJECTION, POWDER, FOR SOLUTION INTRAVENOUS at 23:59

## 2017-07-20 RX ADMIN — OXYCODONE HYDROCHLORIDE 5 MG: 5 TABLET ORAL at 10:02

## 2017-07-20 RX ADMIN — PIPERACILLIN SODIUM AND TAZOBACTAM SODIUM 3.38 G: 3; .375 INJECTION, POWDER, FOR SOLUTION INTRAVENOUS at 18:00

## 2017-07-20 RX ADMIN — GABAPENTIN 200 MG: 100 CAPSULE ORAL at 09:44

## 2017-07-20 RX ADMIN — PIPERACILLIN SODIUM AND TAZOBACTAM SODIUM 3.38 G: 3; .375 INJECTION, POWDER, FOR SOLUTION INTRAVENOUS at 05:29

## 2017-07-20 RX ADMIN — OMEPRAZOLE 20 MG: 20 CAPSULE, DELAYED RELEASE ORAL at 09:44

## 2017-07-20 RX ADMIN — LAMOTRIGINE 150 MG: 100 TABLET ORAL at 09:44

## 2017-07-20 RX ADMIN — ENOXAPARIN SODIUM 40 MG: 100 INJECTION SUBCUTANEOUS at 15:35

## 2017-07-20 RX ADMIN — MELOXICAM 15 MG: 7.5 TABLET ORAL at 09:44

## 2017-07-20 RX ADMIN — GABAPENTIN 200 MG: 100 CAPSULE ORAL at 05:00

## 2017-07-20 RX ADMIN — SODIUM CHLORIDE: 9 INJECTION, SOLUTION INTRAVENOUS at 14:40

## 2017-07-20 RX ADMIN — PIPERACILLIN SODIUM AND TAZOBACTAM SODIUM 3.38 G: 3; .375 INJECTION, POWDER, FOR SOLUTION INTRAVENOUS at 12:42

## 2017-07-20 RX ADMIN — TAMSULOSIN HYDROCHLORIDE 0.4 MG: 0.4 CAPSULE ORAL at 09:44

## 2017-07-20 RX ADMIN — POTASSIUM CHLORIDE AND SODIUM CHLORIDE: 900; 150 INJECTION, SOLUTION INTRAVENOUS at 12:42

## 2017-07-20 ASSESSMENT — PATIENT HEALTH QUESTIONNAIRE - PHQ9
SUM OF ALL RESPONSES TO PHQ QUESTIONS 1-9: 0
SUM OF ALL RESPONSES TO PHQ9 QUESTIONS 1 AND 2: 0
1. LITTLE INTEREST OR PLEASURE IN DOING THINGS: NOT AT ALL
2. FEELING DOWN, DEPRESSED, IRRITABLE, OR HOPELESS: NOT AT ALL

## 2017-07-20 ASSESSMENT — COGNITIVE AND FUNCTIONAL STATUS - GENERAL
SUGGESTED CMS G CODE MODIFIER MOBILITY: CI
CLIMB 3 TO 5 STEPS WITH RAILING: A LITTLE
MOBILITY SCORE: 23

## 2017-07-20 ASSESSMENT — ENCOUNTER SYMPTOMS
NAUSEA: 0
SEIZURES: 0
VOMITING: 0
COUGH: 0
TINGLING: 0
DEPRESSION: 0
SHORTNESS OF BREATH: 0
BACK PAIN: 0
WEIGHT LOSS: 0
SPUTUM PRODUCTION: 0
FEVER: 0
CARDIOVASCULAR NEGATIVE: 1
DIZZINESS: 0
ABDOMINAL PAIN: 1
MYALGIAS: 0
DIARRHEA: 0
BLURRED VISION: 0
CONSTIPATION: 0
FOCAL WEAKNESS: 0
HEADACHES: 0
NECK PAIN: 0
EYE DISCHARGE: 0
INSOMNIA: 0
RESPIRATORY NEGATIVE: 1
NERVOUS/ANXIOUS: 0
EYE PAIN: 0
STRIDOR: 0
ORTHOPNEA: 0
CHILLS: 0
HEARTBURN: 0

## 2017-07-20 ASSESSMENT — GAIT ASSESSMENTS
GAIT LEVEL OF ASSIST: SUPERVISED
DISTANCE (FEET): 200
ASSISTIVE DEVICE: FRONT WHEEL WALKER

## 2017-07-20 ASSESSMENT — PAIN SCALES - GENERAL
PAINLEVEL_OUTOF10: 0
PAINLEVEL_OUTOF10: 7
PAINLEVEL_OUTOF10: 3

## 2017-07-20 NOTE — PROGRESS NOTES
Bedside shift report completed.  Pt with call bell in reach and all fall precautions in place.  Patient updated on plan of care for the shift.

## 2017-07-20 NOTE — CARE PLAN
Problem: Knowledge Deficit  Goal: Knowledge of disease process/condition, treatment plan, diagnostic tests, and medications will improve  Intervention: Assess knowledge level of disease process/condition, treatment plan, diagnostic tests, and medications  Pt educated regarding activity, diet, meds and plan of care. Verbalized understanding.

## 2017-07-20 NOTE — PROGRESS NOTES
[facility] Hospitalist Progress Note    Date of Service: 2017    Chief Complaint  75 y/o M with PMH of peripheral neuropathy, BPH, Depression: admitted for above and found to have pelvis abscess on imaging.    Interval Problem Update  No acute events overnight - patient states he feels well    Yesterday, CT showed malposition of drain, patient taken down to IR, unable to reposition instead, Took to CT to put new drain in. Needle decompression, 10 cc pus.  Interventional radiologist believes possible fistula formation to the sigmoid  Colon.    Consultants/Specialty  Surgery  Interventional Radiology    Disposition  Inpatient for management of pelvic abscesses        Review of Systems   Constitutional: Negative for fever and weight loss.   HENT: Negative for congestion and nosebleeds.    Eyes: Negative for blurred vision, pain and discharge.   Respiratory: Negative for cough, sputum production, shortness of breath and stridor.    Cardiovascular: Negative for chest pain and orthopnea.   Gastrointestinal: Positive for abdominal pain. Negative for heartburn, nausea, vomiting, diarrhea and constipation.   Genitourinary: Negative for dysuria, urgency and frequency.   Musculoskeletal: Negative for myalgias, back pain and neck pain.   Skin: Negative for itching and rash.   Neurological: Negative for dizziness, tingling, focal weakness, seizures and headaches.   Psychiatric/Behavioral: Negative for depression. The patient is not nervous/anxious and does not have insomnia.       Physical Exam  Laboratory/Imaging   Hemodynamics  Temp (24hrs), Av.9 °C (98.4 °F), Min:36 °C (96.8 °F), Max:37.3 °C (99.2 °F)   Temperature: 36.7 °C (98 °F)  Pulse  Av.1  Min: 57  Max: 105    Blood Pressure : (!) 90/54 mmHg      Respiratory      Respiration: 16, Pulse Oximetry: 90 %        RUL Breath Sounds: Clear, RML Breath Sounds: Clear, RLL Breath Sounds: Diminished, TOSHIA Breath Sounds: Clear, LLL Breath Sounds:  Diminished    Fluids    Intake/Output Summary (Last 24 hours) at 07/20/17 1414  Last data filed at 07/20/17 0948   Gross per 24 hour   Intake    240 ml   Output   1100 ml   Net   -860 ml       Nutrition  Orders Placed This Encounter   Procedures   • DIET ORDER     Standing Status: Standing      Number of Occurrences: 1      Standing Expiration Date:      Order Specific Question:  Diet:     Answer:  Regular [1]     Physical Exam   Constitutional: He is oriented to person, place, and time. No distress.   HENT:   Head: Normocephalic and atraumatic.   Mouth/Throat: Oropharynx is clear and moist.   Eyes: Conjunctivae and EOM are normal. Pupils are equal, round, and reactive to light. Right eye exhibits no discharge. Left eye exhibits no discharge.   Neck: Normal range of motion. Neck supple. No JVD present. No tracheal deviation present. No thyromegaly present.   Cardiovascular: Normal rate and regular rhythm.    No murmur heard.  Pulmonary/Chest: Effort normal and breath sounds normal. No respiratory distress. He has no rales.   Abdominal: Soft. Bowel sounds are normal. He exhibits no distension. There is tenderness. There is no rebound.   Musculoskeletal: He exhibits no edema or tenderness.   Neurological: He is alert and oriented to person, place, and time. No cranial nerve deficit.   Skin: Skin is warm and dry. No rash noted. He is not diaphoretic. No erythema.   Psychiatric: He has a normal mood and affect. His behavior is normal. Thought content normal.       Recent Labs      07/19/17   0232   WBC  7.8   RBC  3.79*   HEMOGLOBIN  10.9*   HEMATOCRIT  33.9*   MCV  89.4   MCH  28.8   MCHC  32.2*   RDW  48.4   PLATELETCT  357   MPV  8.2*     Recent Labs      07/19/17   0232   SODIUM  139   POTASSIUM  4.1   CHLORIDE  106   CO2  27   GLUCOSE  113*   BUN  4*   CREATININE  0.67   CALCIUM  8.4*                      Assessment/Plan     * Sepsis (CMS-[facility]) (present on admission)  Assessment & Plan  From pelvic abscess and  possible appendiceal abscess on imaging  IR drain placed yesterday  Cultures: bacteroides fragilis, e. Coli, enterococcus faecium, strep viridans  IV abx - zosyn  Surgery following  CT showed malposition of drain, down to IR, unable to reposition instead  Took to CT to put new drain in. Needle decompression, 10 cc pus.  Interventional radiologist believes possible fistula formation to the sigmoid  Colon.    Surgery wants to monitor output from drain    Abdominal abscess (CMS-[facility]) (present on admission)  Assessment & Plan  Bilateral pelvic abscesses   CT showed malposition of drain, down to IR, unable to reposition instead  Took to CT to put new drain in. Needle decompression, 10 cc pus.  Interventional radiologist believes possible fistula formation to the sigmoid  Colon. Cont zosyn    Surgery wants to monitor output from drain.    UTI (urinary tract infection) (present on admission)  Assessment & Plan  Cont zosyn    Hyponatremia  Assessment & Plan  IVF, follow  Improved  Follow cmp in am    BPH (benign prostatic hypertrophy)  Assessment & Plan  Flomax    Depression  Assessment & Plan  Follow    Osteoarthritis  Assessment & Plan  stable    Leucocytosis  Assessment & Plan  From abscess/sepsis  Plan as above      Labs reviewed, Medications reviewed and Radiology images reviewed        DVT Prophylaxis: Enoxaparin (Lovenox)      Antibiotics: Treating active infection/contamination beyond 24 hours perioperative coverage

## 2017-07-20 NOTE — PROGRESS NOTES
Surgical Progress Note    Author: Jorge Rodriguez Date & Time created: 2017   12:44 PM     Interval Events:  CT scan repositioned yesterday, now effectively draining.  Tolerating diet.  Pain well controlled.  Passing flatus.    Review of Systems   Constitutional: Negative for fever, chills, weight loss and malaise/fatigue.   Respiratory: Negative.    Cardiovascular: Negative.    Gastrointestinal: Positive for abdominal pain (Minimal). Negative for nausea and vomiting.     Hemodynamics:  Temp (24hrs), Av.9 °C (98.4 °F), Min:36 °C (96.8 °F), Max:37.3 °C (99.2 °F)  Temperature: 36.7 °C (98 °F)  Pulse  Av.1  Min: 57  Max: 105Heart Rate (Monitored): 71  Blood Pressure : (!) 90/54 mmHg, NIBP: 144/72 mmHg     Respiratory:    Respiration: 16, Pulse Oximetry: 90 %        RUL Breath Sounds: Clear, RML Breath Sounds: Clear, RLL Breath Sounds: Diminished, TOSHIA Breath Sounds: Clear, LLL Breath Sounds: Diminished  Neuro:  GCS = 15       Fluids:    Intake/Output Summary (Last 24 hours) at 17 1244  Last data filed at 17 0948   Gross per 24 hour   Intake    240 ml   Output   1100 ml   Net   -860 ml     Weight: 63 kg (138 lb 14.2 oz)  Current Diet Order   Procedures   • DIET ORDER     Physical Exam   Constitutional: He is oriented to person, place, and time. He appears well-developed and well-nourished. No distress.   HENT:   Head: Normocephalic.   Eyes: Pupils are equal, round, and reactive to light. No scleral icterus.   Cardiovascular: Normal rate, regular rhythm and normal heart sounds.    Pulmonary/Chest: Effort normal and breath sounds normal. No respiratory distress.   Abdominal: Soft. Bowel sounds are normal. He exhibits no distension. There is no tenderness. There is no rebound and no guarding.   Drain output not recorded.  Drain with purulent material in collection chamber   Neurological: He is alert and oriented to person, place, and time.   Skin: Skin is warm and dry.   Psychiatric: He has a  normal mood and affect. His behavior is normal. Judgment and thought content normal.     Labs:  Recent Results (from the past 24 hour(s))   GRAM STAIN    Collection Time: 07/19/17  1:03 PM   Result Value Ref Range    Significant Indicator .     Source WND     Site Pelvic Abscess     Gram Stain Result       Many WBCs.  Many Gram positive cocci.  Rare Yeast.       Medical Decision Making, by Problem:  Active Hospital Problems    Diagnosis   • Sepsis (CMS-[facility]) [A41.9]     Priority: High   • Abdominal abscess (CMS-[facility]) [K65.1]     Priority: High   • UTI (urinary tract infection) [N39.0]     Priority: High   • Leucocytosis [D72.829]   • Hyponatremia [E87.1]   • BPH (benign prostatic hypertrophy) [N40.0]   • Depression [F32.9]   • Osteoarthritis [M19.90]     Plan:  Contnue drain.  Record output in I&Os (ordered by me)    Quality Measures:  Labs reviewed and Medications reviewed  Ruff catheter: No Ruff            Antibiotics: Treating active infection/contamination beyond 24 hours perioperative coverage        Discussed patient condition with Patient

## 2017-07-20 NOTE — THERAPY
"Physical Therapy Evaluation completed.   Bed Mobility:  Supine to Sit: Supervised  Transfers: Sit to Stand: Supervised  Gait: Level Of Assist: Supervised with Front-Wheel Walker       Plan of Care: Patient with no further skilled PT needs in the acute care setting at this time  Discharge Recommendations: Equipment: Front-Wheel Walker.  See \"Rehab Therapy-Acute\" Patient Summary Report for complete documentation.     "

## 2017-07-20 NOTE — CARE PLAN
Problem: Safety  Goal: Will remain free from injury  Outcome: PROGRESSING AS EXPECTED  Education provided to patient on using the call bell and not to get up with out assistance.      Bed left in low position.  Bed and Chair alarms used.  Hourly rounding completed.  Personal Items left within reach.        Problem: Infection  Goal: Will remain free from infection  Outcome: PROGRESSING AS EXPECTED  Education provided on Antibiotics, Wound Care, Hand Washing and Labs as applicable to patient.

## 2017-07-20 NOTE — PLAN OF CARE (IOPOC)
Sarah Clemons Fall Risk Assessment:     Last Known Fall: No falls  Mobility: Dizziness/generalized weakness  Medications: Cardiovascular or central nervous system meds  Mental Status/LOC/Awareness: Awake, alert, and oriented to date, place, and person  Toileting Needs: Use of assistive device (Bedside commode, bedpan, urinal)  Volume/Electrolyte Status: Use of IV fluids/tube feeds  Communication/Sensory: Visual (Glasses)/hearing deficit  Behavior: Appropriate behavior  Sarah Clemons Fall Risk Total: 10  Fall Risk Level: LOW RISK    Universal Fall Precautions:  call light/belongings in reach, bed in low position and locked, wheelchairs and assistive devices out of sight, siderails up x 2, use non-slip footwear, educate on level of risk, educate to call for assistance, adequate lighting    Fall Risk Level Interventions:   TRIAL (TELE 8, NEURO, MED ERNESTO 5) Low Fall Risk Interventions  Place yellow fall risk ID band on patient: refused  Provide patient/family education based on risk assessment: completed  Educate patient/family to call staff for assistance when getting out of bed: completed  Place fall precaution signage outside patient door: completed      Patient Specific Interventions:     Medication: review medications with patient and family, assess for medications that can be discontinued or dosage decreased and limit combination of prn medications  Mental Status/LOC/Awareness: reorient patient, reinforce falls education, encourage family to stay with patient, check on patient hourly, utilize bed/chair fall alarm, reinforce the use of call light and provide activity  Toileting: instruct patient/family on the need to call for assistance when toileting and do not leave patient unattended in bathroom/refer to toileting scripting  Volume/Electrolyte Status: ensure patient remains hydrated, advance diet as tolerated, monitor abnormal lab values and ensure IV fluids are appropriate  Communication/Sensory: update plan of  care on whiteboard, ensure proper positioning when transferrng/ambulating, ensure patient has glasses/contacts and hearing aids/dentures, for visually impaired patients orient to their room surrounding and do not change their surroundings and have patients with hearing deficit repeat information back to you to ensure proper understanding  Behavioral: engage patient in daily activities, administer medication as ordered and instruct/reinforce fall program rationale  Mobility: schedule physical activity throughout the day, dangle prior to standing, utilize bed/chair fall alarm, ensure bed is locked and in lowest position, provide appropriate assistive device, instruct patient to exit bed on their strongest side and collaborate with doctor for possible PT/OT consult

## 2017-07-21 PROCEDURE — A9270 NON-COVERED ITEM OR SERVICE: HCPCS | Performed by: HOSPITALIST

## 2017-07-21 PROCEDURE — 700102 HCHG RX REV CODE 250 W/ 637 OVERRIDE(OP): Performed by: HOSPITALIST

## 2017-07-21 PROCEDURE — 700102 HCHG RX REV CODE 250 W/ 637 OVERRIDE(OP): Performed by: INTERNAL MEDICINE

## 2017-07-21 PROCEDURE — 700111 HCHG RX REV CODE 636 W/ 250 OVERRIDE (IP): Performed by: HOSPITALIST

## 2017-07-21 PROCEDURE — 99232 SBSQ HOSP IP/OBS MODERATE 35: CPT | Performed by: INTERNAL MEDICINE

## 2017-07-21 PROCEDURE — 700111 HCHG RX REV CODE 636 W/ 250 OVERRIDE (IP): Performed by: INTERNAL MEDICINE

## 2017-07-21 PROCEDURE — A9270 NON-COVERED ITEM OR SERVICE: HCPCS | Performed by: INTERNAL MEDICINE

## 2017-07-21 PROCEDURE — 770006 HCHG ROOM/CARE - MED/SURG/GYN SEMI*

## 2017-07-21 PROCEDURE — 700105 HCHG RX REV CODE 258: Performed by: HOSPITALIST

## 2017-07-21 RX ORDER — AMPICILLIN 500 MG/1
500 CAPSULE ORAL EVERY 6 HOURS
Status: DISCONTINUED | OUTPATIENT
Start: 2017-07-21 | End: 2017-07-21

## 2017-07-21 RX ORDER — METRONIDAZOLE 500 MG/1
500 TABLET ORAL EVERY 8 HOURS
Status: DISCONTINUED | OUTPATIENT
Start: 2017-07-21 | End: 2017-07-21

## 2017-07-21 RX ORDER — FLUCONAZOLE 2 MG/ML
400 INJECTION, SOLUTION INTRAVENOUS EVERY 24 HOURS
Status: DISCONTINUED | OUTPATIENT
Start: 2017-07-22 | End: 2017-07-23

## 2017-07-21 RX ORDER — METRONIDAZOLE 500 MG/1
500 TABLET ORAL EVERY 8 HOURS
Status: COMPLETED | OUTPATIENT
Start: 2017-07-21 | End: 2017-07-23

## 2017-07-21 RX ORDER — AMPICILLIN 500 MG/1
500 CAPSULE ORAL EVERY 6 HOURS
Status: DISCONTINUED | OUTPATIENT
Start: 2017-07-21 | End: 2017-07-22

## 2017-07-21 RX ADMIN — METRONIDAZOLE 500 MG: 500 TABLET ORAL at 21:23

## 2017-07-21 RX ADMIN — TAMSULOSIN HYDROCHLORIDE 0.4 MG: 0.4 CAPSULE ORAL at 08:22

## 2017-07-21 RX ADMIN — METRONIDAZOLE 500 MG: 500 TABLET ORAL at 14:17

## 2017-07-21 RX ADMIN — SODIUM CHLORIDE: 9 INJECTION, SOLUTION INTRAVENOUS at 02:11

## 2017-07-21 RX ADMIN — AMPICILLIN 500 MG: 500 CAPSULE ORAL at 11:51

## 2017-07-21 RX ADMIN — LAMOTRIGINE 150 MG: 100 TABLET ORAL at 08:22

## 2017-07-21 RX ADMIN — GABAPENTIN 200 MG: 100 CAPSULE ORAL at 08:22

## 2017-07-21 RX ADMIN — OXYCODONE HYDROCHLORIDE 5 MG: 5 TABLET ORAL at 21:24

## 2017-07-21 RX ADMIN — OMEPRAZOLE 20 MG: 20 CAPSULE, DELAYED RELEASE ORAL at 08:23

## 2017-07-21 RX ADMIN — GABAPENTIN 200 MG: 100 CAPSULE ORAL at 21:24

## 2017-07-21 RX ADMIN — PIPERACILLIN SODIUM AND TAZOBACTAM SODIUM 3.38 G: 3; .375 INJECTION, POWDER, FOR SOLUTION INTRAVENOUS at 05:34

## 2017-07-21 RX ADMIN — AMPICILLIN 500 MG: 500 CAPSULE ORAL at 23:35

## 2017-07-21 RX ADMIN — OXYCODONE HYDROCHLORIDE 5 MG: 5 TABLET ORAL at 11:50

## 2017-07-21 RX ADMIN — FLUCONAZOLE 800 MG: 2 INJECTION, SOLUTION INTRAVENOUS at 14:16

## 2017-07-21 RX ADMIN — METRONIDAZOLE 500 MG: 500 TABLET ORAL at 09:24

## 2017-07-21 RX ADMIN — GABAPENTIN 200 MG: 100 CAPSULE ORAL at 14:17

## 2017-07-21 RX ADMIN — AMPICILLIN 500 MG: 500 CAPSULE ORAL at 17:53

## 2017-07-21 RX ADMIN — ENOXAPARIN SODIUM 40 MG: 100 INJECTION SUBCUTANEOUS at 08:21

## 2017-07-21 ASSESSMENT — ENCOUNTER SYMPTOMS
DIZZINESS: 0
CHILLS: 0
RESPIRATORY NEGATIVE: 1
MYALGIAS: 0
VOMITING: 0
FEVER: 0
NAUSEA: 0
SHORTNESS OF BREATH: 0
ABDOMINAL PAIN: 0
FOCAL WEAKNESS: 0
DIARRHEA: 0
HEADACHES: 0
BLURRED VISION: 0
DEPRESSION: 0
CARDIOVASCULAR NEGATIVE: 1

## 2017-07-21 ASSESSMENT — PAIN SCALES - GENERAL
PAINLEVEL_OUTOF10: 3
PAINLEVEL_OUTOF10: 6
PAINLEVEL_OUTOF10: 0
PAINLEVEL_OUTOF10: 0
PAINLEVEL_OUTOF10: 7
PAINLEVEL_OUTOF10: 6
PAINLEVEL_OUTOF10: 0

## 2017-07-21 NOTE — PROGRESS NOTES
Sarah Clemons Fall Risk Assessment:     Last Known Fall: No falls  Mobility: Dizziness/generalized weakness  Medications: No meds  Mental Status/LOC/Awareness: Awake, alert, and oriented to date, place, and person  Toileting Needs: Use of assistive device (Bedside commode, bedpan, urinal)  Volume/Electrolyte Status: No problems  Communication/Sensory: Visual (Glasses)/hearing deficit  Behavior: Appropriate behavior  Sarah Clemons Fall Risk Total: 7  Fall Risk Level: LOW RISK    Universal Fall Precautions:  call light/belongings in reach, bed in low position and locked, wheelchairs and assistive devices out of sight, siderails up x 2, use non-slip footwear, adequate lighting, clutter free and spill free environment, educate on level of risk, educate to call for assistance    Fall Risk Level Interventions:   TRIAL (TELE 8, NEURO, MED ERNESTO 5) Low Fall Risk Interventions  Place yellow fall risk ID band on patient: refused  Provide patient/family education based on risk assessment: completed  Educate patient/family to call staff for assistance when getting out of bed: completed  Place fall precaution signage outside patient door: completed      Patient Specific Interventions:     Medication: review medications with patient and family  Mental Status/LOC/Awareness: reinforce falls education, check on patient hourly, utilize bed/chair fall alarm, reinforce the use of call light and provide activity  Toileting: provide frquent toileting, monitor intake and output/use of appropriate interventions, instruct male patients prone to dizziness to void while sitting, instruct patient/family on the need to call for assistance when toileting and do not leave patient unattended in bathroom/refer to toileting scripting  Volume/Electrolyte Status: ensure patient remains hydrated and monitor abnormal lab values  Communication/Sensory: update plan of care on whiteboard, ensure proper positioning when transferrng/ambulating and ensure  patient has glasses/contacts and hearing aids/dentures  Behavioral: encourage patient to voice feelings, engage patient in daily activities, administer medication as ordered and instruct/reinforce fall program rationale  Mobility: schedule physical activity throughout the day, provide comfort measures during transport, dangle prior to standing, utilize bed/chair fall alarm, ensure bed is locked and in lowest position, provide appropriate assistive device and instruct patient to exit bed on their strongest side

## 2017-07-21 NOTE — DIETARY
Nutrition: consult received for pt with poor po intake and weight loss PTA.  Please see dietitian note 7/17.  Pt with adequate intake of meals and seen daily by nutrition representative for meals and supplement preferences.

## 2017-07-21 NOTE — CARE PLAN
Problem: Mobility  Goal: Risk for activity intolerance will decrease  Outcome: PROGRESSING AS EXPECTED  Ambulates frequently x1 assist with walker.

## 2017-07-21 NOTE — PROGRESS NOTES
[facility] Hospitalist Progress Note    Date of Service: 2017    Chief Complaint  77 y/o M with PMH of peripheral neuropathy, BPH, Depression: admitted for above and found to have pelvis abscess on imaging.    Interval Problem Update  -Abscess-drainage continues, last 24 hours was 55 mL. Cultures are now growing Candida albicans as well, so fluconazole was added as well. Denies any abdominal pain. Had a loose stool this AM. Eating food without issue.     Consultants/Specialty  Surgery  Interventional Radiology    Disposition  Inpatient for management of pelvic abscesses        Review of Systems   Constitutional: Negative for fever and chills.   Eyes: Negative for blurred vision.   Respiratory: Negative for shortness of breath.    Cardiovascular: Negative for chest pain.   Gastrointestinal: Negative for nausea, vomiting, abdominal pain and diarrhea.   Genitourinary: Negative for dysuria.   Musculoskeletal: Negative for myalgias.   Skin: Negative for itching and rash.   Neurological: Negative for dizziness, focal weakness and headaches.   Psychiatric/Behavioral: Negative for depression.   All other systems reviewed and are negative.     Physical Exam  Laboratory/Imaging   Hemodynamics  Temp (24hrs), Av.2 °C (98.9 °F), Min:36.8 °C (98.2 °F), Max:37.9 °C (100.2 °F)   Temperature: 37.6 °C (99.6 °F)  Pulse  Av.6  Min: 57  Max: 105    Blood Pressure : 153/91 mmHg      Respiratory      Respiration: 20, Pulse Oximetry: 92 %        RUL Breath Sounds: Clear, RML Breath Sounds: Clear, RLL Breath Sounds: Diminished, TOSHIA Breath Sounds: Clear, LLL Breath Sounds: Diminished    Fluids    Intake/Output Summary (Last 24 hours) at 17 1454  Last data filed at 17 0833   Gross per 24 hour   Intake   2145 ml   Output   1505 ml   Net    640 ml       Nutrition  Orders Placed This Encounter   Procedures   • DIET ORDER     Standing Status: Standing      Number of Occurrences: 1      Standing Expiration Date:      Order  Specific Question:  Diet:     Answer:  Regular [1]     Physical Exam   Constitutional: He is oriented to person, place, and time. He appears well-developed and well-nourished. No distress.   HENT:   Head: Normocephalic and atraumatic.   Mouth/Throat: Oropharynx is clear and moist. No oropharyngeal exudate.   Eyes: Conjunctivae and EOM are normal. Pupils are equal, round, and reactive to light. Right eye exhibits no discharge. Left eye exhibits no discharge.   Neck: Normal range of motion. Neck supple.   Cardiovascular: Normal rate, regular rhythm, normal heart sounds and intact distal pulses.    No murmur heard.  Pulmonary/Chest: Effort normal and breath sounds normal. No stridor. No respiratory distress.   Abdominal: Soft. Bowel sounds are normal. He exhibits no distension. There is tenderness. There is no rebound.   Drain in the posterior gluteal region, dark fluid present in the bag   Musculoskeletal: Normal range of motion. He exhibits no edema or tenderness.   Neurological: He is alert and oriented to person, place, and time. No cranial nerve deficit.   Skin: Skin is warm and dry. No rash noted. He is not diaphoretic. No erythema.   Psychiatric: He has a normal mood and affect. His behavior is normal. Thought content normal.   Nursing note and vitals reviewed.      Recent Labs      07/19/17   0232   WBC  7.8   RBC  3.79*   HEMOGLOBIN  10.9*   HEMATOCRIT  33.9*   MCV  89.4   MCH  28.8   MCHC  32.2*   RDW  48.4   PLATELETCT  357   MPV  8.2*     Recent Labs      07/19/17   0232   SODIUM  139   POTASSIUM  4.1   CHLORIDE  106   CO2  27   GLUCOSE  113*   BUN  4*   CREATININE  0.67   CALCIUM  8.4*                      Assessment/Plan     * Sepsis (CMS-HCC) (present on admission)  Assessment & Plan  -unclear etiology? Diverticulitis, colon cancer?, not bacteremic  Cultures: bacteroides fragilis, e. Coli, enterococcus faecium, strep viridans and candida albicans  -switch to PO ampicillin and PO flagyl  -added  fluconazole as well  Surgery following  -2 repeat drain placements by IR now, maybe surgery next week if does not fully improve  -monitor drain output closely    Abdominal abscess (CMS-HCC) (present on admission)  Assessment & Plan  -see above    UTI (urinary tract infection) (present on admission)  Assessment & Plan  -urine culture negatve    Hyponatremia  Assessment & Plan  -resolved    BPH (benign prostatic hypertrophy)  Assessment & Plan  Flomax    Depression  Assessment & Plan  Follow    Osteoarthritis  Assessment & Plan  stable    Leucocytosis  Assessment & Plan  From abscess/sepsis  -resolved      Labs reviewed, Medications reviewed and Radiology images reviewed        DVT Prophylaxis: Enoxaparin (Lovenox)      Antibiotics: Treating active infection/contamination beyond 24 hours perioperative coverage

## 2017-07-21 NOTE — CARE PLAN
Problem: Safety  Goal: Will remain free from falls  Outcome: PROGRESSING AS EXPECTED  Pt teaching given regarding safety and fall precautions; pt verbalized understanding; bed in lowest position; bed strip alarm on for pt safety; treaded socks on; appropriate sign on door placed for number of assistance needed; pt belongings and call light within reach.        Problem: Venous Thromboembolism (VTW)/Deep Vein Thrombosis (DVT) Prevention:  Goal: Patient will participate in Venous Thrombosis (VTE)/Deep Vein Thrombosis (DVT)Prevention Measures  Lovenox per MD order.    Problem: Knowledge Deficit  Goal: Knowledge of disease process/condition, treatment plan, diagnostic tests, and medications will improve  Outcome: PROGRESSING AS EXPECTED  Pt teaching given about unit routine, medications, activity, plan of care discussed.        Problem: Pain Management  Goal: Pain level will decrease to patient’s comfort goal  Outcome: PROGRESSING AS EXPECTED  Pt denies pain at this time; PRN pain medication available per MD order.    Problem: Mobility  Goal: Risk for activity intolerance will decrease  Outcome: PROGRESSING AS EXPECTED  Pt ambulated to the bathroom with a walker, with assist x1.

## 2017-07-21 NOTE — PROGRESS NOTES
Bedside report received from BART Ramirez; pt in bed, with eyes closed, no distress noted; pt responds to verbal commands; assumed pt care; plan of care discussed; teaching given regarding fall precautions and skin breakdown prevention; needs met; pt denies other needs for now; call light within reach; bed in lowest position; will continue to monitor.

## 2017-07-21 NOTE — PROGRESS NOTES
Sarah Clemons Fall Risk Assessment:     Last Known Fall: No falls  Mobility: Dizziness/generalized weakness  Medications: No meds  Mental Status/LOC/Awareness: Awake, alert, and oriented to date, place, and person  Toileting Needs: Use of assistive device (Bedside commode, bedpan, urinal)  Volume/Electrolyte Status: Use of IV fluids/tube feeds  Communication/Sensory: Visual (Glasses)/hearing deficit  Behavior: Appropriate behavior  Sarah Clemons Fall Risk Total: 9  Fall Risk Level: LOW RISK    Universal Fall Precautions:  call light/belongings in reach, bed in low position and locked, wheelchairs and assistive devices out of sight, siderails up x 2, use non-slip footwear, adequate lighting, clutter free and spill free environment, educate on level of risk, educate to call for assistance    Fall Risk Level Interventions:   TRIAL (TELE 8, NEURO, MED ERNESTO 5) Low Fall Risk Interventions  Place yellow fall risk ID band on patient: refused  Provide patient/family education based on risk assessment: completed  Educate patient/family to call staff for assistance when getting out of bed: completed  Place fall precaution signage outside patient door: completed      Patient Specific Interventions:     Medication: review medications with patient and family, assess for medications that can be discontinued or dosage decreased, limit combination of prn medications and provide patients who received diuretics/laxatives frequent toileting  Mental Status/LOC/Awareness: reorient patient, reinforce falls education, check on patient hourly, utilize bed/chair fall alarm, reinforce the use of call light and provide activity  Toileting: consider obtaining elevated toilet seat or bedside commode (BSC), provide frquent toileting, monitor intake and output/use of appropriate interventions, instruct male patients prone to dizziness to void while sitting and do not leave patient unattended in bathroom/refer to toileting  scripting  Volume/Electrolyte Status: ensure patient remains hydrated, monitor abnormal lab values and ensure IV fluids are appropriate  Communication/Sensory: update plan of care on whiteboard, provide communication alternatives/, ensure proper positioning when transferrng/ambulating and ensure patient has glasses/contacts and hearing aids/dentures  Behavioral: encourage patient to voice feelings, engage patient in daily activities, administer medication as ordered and instruct/reinforce fall program rationale  Mobility: provide comfort measures during transport, dangle prior to standing, utilize bed/chair fall alarm, ensure bed is locked and in lowest position, provide appropriate assistive device and instruct patient to exit bed on their strongest side

## 2017-07-21 NOTE — PROGRESS NOTES
Surgical Progress Note    Author: Jorge Rodriguez Date & Time created: 2017   12:40 PM     Interval Events:  Tolerating diet  Denies abdominal pain.  Rare yeast in abscess noted    Review of Systems   Respiratory: Negative.    Cardiovascular: Negative.    Gastrointestinal: Negative for nausea, vomiting and abdominal pain.     Hemodynamics:  Temp (24hrs), Av.2 °C (98.9 °F), Min:36.8 °C (98.2 °F), Max:37.9 °C (100.2 °F)  Temperature: 37.6 °C (99.6 °F)  Pulse  Av.6  Min: 57  Max: 105   Blood Pressure : 153/91 mmHg     Respiratory:    Respiration: 20, Pulse Oximetry: 92 %        RUL Breath Sounds: Clear, RML Breath Sounds: Clear, RLL Breath Sounds: Diminished, TOSHIA Breath Sounds: Clear, LLL Breath Sounds: Diminished  Neuro:  GCS = 15       Fluids:    Intake/Output Summary (Last 24 hours) at 17 1240  Last data filed at 17 0833   Gross per 24 hour   Intake   2595 ml   Output   1505 ml   Net   1090 ml     Weight: 65 kg (143 lb 4.8 oz)  Current Diet Order   Procedures   • DIET ORDER     Physical Exam   Constitutional: He is oriented to person, place, and time. He appears well-developed and well-nourished. No distress.   HENT:   Head: Normocephalic.   Eyes: Pupils are equal, round, and reactive to light. No scleral icterus.   Cardiovascular: Normal rate and regular rhythm.    Pulmonary/Chest: Effort normal and breath sounds normal. No respiratory distress.   Abdominal: Soft. Bowel sounds are normal. He exhibits no distension. There is no tenderness. There is no rebound and no guarding.   55 ml recorded out drain in last 24 hours   Neurological: He is alert and oriented to person, place, and time.   Skin: Skin is warm and dry.   Psychiatric: He has a normal mood and affect. His behavior is normal. Judgment and thought content normal.     Labs:  No results found for this or any previous visit (from the past 24 hour(s)).  Medical Decision Making, by Problem:  Active Hospital Problems    Diagnosis    • Sepsis (CMS-HCC) [A41.9]     Priority: High   • Abdominal abscess (CMS-HCC) [K65.1]     Priority: High   • UTI (urinary tract infection) [N39.0]     Priority: High   • Leucocytosis [D72.829]   • Hyponatremia [E87.1]   • BPH (benign prostatic hypertrophy) [N40.0]   • Depression [F32.9]   • Osteoarthritis [M19.90]     Plan:  Start Fluconazole.  Follow drain output.  Consider re-scan if drain output decreases significantly, vs consider surgery next week if no drop off in output.    Quality Measures:  Labs reviewed and Medications reviewed  Ruff catheter: No Ruff      DVT Prophylaxis: Enoxaparin (Lovenox)    Ulcer prophylaxis: Yes  Antibiotics: Treating active infection/contamination beyond 24 hours perioperative coverage        Discussed patient condition with Patient

## 2017-07-22 PROCEDURE — A9270 NON-COVERED ITEM OR SERVICE: HCPCS | Performed by: INTERNAL MEDICINE

## 2017-07-22 PROCEDURE — 99233 SBSQ HOSP IP/OBS HIGH 50: CPT | Performed by: INTERNAL MEDICINE

## 2017-07-22 PROCEDURE — 700105 HCHG RX REV CODE 258

## 2017-07-22 PROCEDURE — 700111 HCHG RX REV CODE 636 W/ 250 OVERRIDE (IP): Performed by: INTERNAL MEDICINE

## 2017-07-22 PROCEDURE — 700102 HCHG RX REV CODE 250 W/ 637 OVERRIDE(OP): Performed by: HOSPITALIST

## 2017-07-22 PROCEDURE — A9270 NON-COVERED ITEM OR SERVICE: HCPCS | Performed by: HOSPITALIST

## 2017-07-22 PROCEDURE — 700102 HCHG RX REV CODE 250 W/ 637 OVERRIDE(OP): Performed by: INTERNAL MEDICINE

## 2017-07-22 PROCEDURE — 700111 HCHG RX REV CODE 636 W/ 250 OVERRIDE (IP): Performed by: HOSPITALIST

## 2017-07-22 PROCEDURE — 770006 HCHG ROOM/CARE - MED/SURG/GYN SEMI*

## 2017-07-22 PROCEDURE — 700111 HCHG RX REV CODE 636 W/ 250 OVERRIDE (IP)

## 2017-07-22 RX ADMIN — FLUCONAZOLE 400 MG: 2 INJECTION, SOLUTION INTRAVENOUS at 08:29

## 2017-07-22 RX ADMIN — METRONIDAZOLE 500 MG: 500 TABLET ORAL at 20:53

## 2017-07-22 RX ADMIN — AMPICILLIN 500 MG: 500 CAPSULE ORAL at 06:38

## 2017-07-22 RX ADMIN — LAMOTRIGINE 150 MG: 100 TABLET ORAL at 08:28

## 2017-07-22 RX ADMIN — ENOXAPARIN SODIUM 40 MG: 100 INJECTION SUBCUTANEOUS at 08:29

## 2017-07-22 RX ADMIN — GABAPENTIN 200 MG: 100 CAPSULE ORAL at 13:53

## 2017-07-22 RX ADMIN — GABAPENTIN 200 MG: 100 CAPSULE ORAL at 20:53

## 2017-07-22 RX ADMIN — OXYCODONE HYDROCHLORIDE 5 MG: 5 TABLET ORAL at 13:57

## 2017-07-22 RX ADMIN — TAMSULOSIN HYDROCHLORIDE 0.4 MG: 0.4 CAPSULE ORAL at 08:29

## 2017-07-22 RX ADMIN — METRONIDAZOLE 500 MG: 500 TABLET ORAL at 13:53

## 2017-07-22 RX ADMIN — GABAPENTIN 200 MG: 100 CAPSULE ORAL at 08:29

## 2017-07-22 RX ADMIN — VANCOMYCIN HYDROCHLORIDE 1700 MG: 100 INJECTION, POWDER, LYOPHILIZED, FOR SOLUTION INTRAVENOUS at 11:46

## 2017-07-22 RX ADMIN — OXYCODONE HYDROCHLORIDE 5 MG: 5 TABLET ORAL at 17:52

## 2017-07-22 RX ADMIN — OMEPRAZOLE 20 MG: 20 CAPSULE, DELAYED RELEASE ORAL at 08:29

## 2017-07-22 RX ADMIN — METRONIDAZOLE 500 MG: 500 TABLET ORAL at 06:38

## 2017-07-22 ASSESSMENT — PATIENT HEALTH QUESTIONNAIRE - PHQ9
4. FEELING TIRED OR HAVING LITTLE ENERGY: SEVERAL DAYS
2. FEELING DOWN, DEPRESSED, IRRITABLE, OR HOPELESS: NOT AT ALL
7. TROUBLE CONCENTRATING ON THINGS, SUCH AS READING THE NEWSPAPER OR WATCHING TELEVISION: NOT AT ALL
SUM OF ALL RESPONSES TO PHQ9 QUESTIONS 1 AND 2: 3
9. THOUGHTS THAT YOU WOULD BE BETTER OFF DEAD, OR OF HURTING YOURSELF: NOT AT ALL
3. TROUBLE FALLING OR STAYING ASLEEP OR SLEEPING TOO MUCH: NOT AT ALL
5. POOR APPETITE OR OVEREATING: SEVERAL DAYS
1. LITTLE INTEREST OR PLEASURE IN DOING THINGS: NEARLY EVERY DAY
SUM OF ALL RESPONSES TO PHQ QUESTIONS 1-9: 5
8. MOVING OR SPEAKING SO SLOWLY THAT OTHER PEOPLE COULD HAVE NOTICED. OR THE OPPOSITE, BEING SO FIGETY OR RESTLESS THAT YOU HAVE BEEN MOVING AROUND A LOT MORE THAN USUAL: NOT AT ALL
6. FEELING BAD ABOUT YOURSELF - OR THAT YOU ARE A FAILURE OR HAVE LET YOURSELF OR YOUR FAMILY DOWN: NOT AL ALL

## 2017-07-22 ASSESSMENT — ENCOUNTER SYMPTOMS
BLURRED VISION: 0
DIZZINESS: 0
FEVER: 0
SHORTNESS OF BREATH: 0
MYALGIAS: 0
FOCAL WEAKNESS: 0
DEPRESSION: 0
CHILLS: 0
DIARRHEA: 0
HEADACHES: 0
ABDOMINAL PAIN: 0
VOMITING: 0
NAUSEA: 0

## 2017-07-22 ASSESSMENT — PAIN SCALES - GENERAL
PAINLEVEL_OUTOF10: 6
PAINLEVEL_OUTOF10: 0
PAINLEVEL_OUTOF10: 0

## 2017-07-22 NOTE — PROGRESS NOTES
"Pharmacy Kinetics 76 y.o. male on vancomycin day # 1 2017    Currently on Vancomycin 1700 mg iv x1 LD (1145)    Indication for Treatment: Intra-abdominal infection    Pertinent history per medical record: Admitted on 2017 for groin ache over last several days, UTI noted - started on Cipro, CT shows large diverticular abscess on L and likely a perforated appendiceal abscess on R. ON 7/15, IR drain of large pelvic abscess - started on Rocephin and Flagyl.  PMH of BPH.    Other antibiotics: Flagyl 500mg PO q8h, Fluconazole 400mg IV q24h    Allergies: Review of patient's allergies indicates no known allergies.     List concerns for renal function: low albumin, age    Pertinent cultures to date:   17: pelvic abscess - enterococcus faecium, candida albicans  7/15/17: pelvic abscess - E.coli, Enterococcus faecium, viridans strep, bacteroides fragilis  17: blood - negative  1417: urine - negative    No results for input(s): WBC, NEUTSPOLYS, BANDSSTABS in the last 72 hours.  No results for input(s): BUN, CREATININE, ALBUMIN in the last 72 hours.  No results for input(s): VANCOTROUGH, VANCOPEAK, VANCORANDOM in the last 72 hours.  Intake/Output Summary (Last 24 hours) at 17 0805  Last data filed at 17 0500   Gross per 24 hour   Intake    240 ml   Output    820 ml   Net   -580 ml      Blood pressure 100/62, pulse 64, temperature 36.3 °C (97.4 °F), resp. rate 18, height 1.727 m (5' 8\"), weight 66.4 kg (146 lb 6.2 oz), SpO2 93 %. Temp (24hrs), Av °C (98.6 °F), Min:36.3 °C (97.4 °F), Max:37.6 °C (99.6 °F)      A/P   1. Vancomycin dose change: 1300mg IV q24h  (1200)  2. Next vancomycin level: tomorrow, 17, 1130  3. Goal trough: 12-16 mcg/mL  4. Comments: Cultures from the  show ampicillin resistance - Ampicillin was changed to Vancomycin. Drains are still in place and having output. Little concern for renal function besides age; start 20mg/kg per protocol. Ordered CBC and CMP for labs " assessment per protocol. Pharmacy to continue to monitor. Trough ordered for prior to 2nd dose to assess accumulation.     Rachna Krishna, RonaldD.

## 2017-07-22 NOTE — PROGRESS NOTES
Pt AAOx4. Ambulates x1 assist with walker. Ampicillin d/c'd. Vano started. Continues w/Flagyl and Diflucan. Plan for possible re-scan of abd abscess, and decision on possible surgery intervention. JUAN CARLOS.

## 2017-07-22 NOTE — PROGRESS NOTES
Pt AAOx4. Ambulates x1 assist with walker. IVF and Zosyn d/c'd. Anti's changed to Ampicillin and Flagyl, Diflucan ordered. Transfer order to surgical. WCTM.

## 2017-07-22 NOTE — CARE PLAN
Problem: Safety  Goal: Will remain free from injury  Intervention: Provide assistance with mobility  Pt ambulated SBA with FWW to bathroom. Pt unsteady at times.      Problem: Knowledge Deficit  Goal: Knowledge of disease process/condition, treatment plan, diagnostic tests, and medications will improve  Intervention: Explain information regarding disease process/condition, treatment plan, diagnostic tests, and medications and document in education  Pt educated to current POC r/t discontinuing drain in abscess vs. Surgery. Pt verbalized understanding.

## 2017-07-22 NOTE — CARE PLAN
Problem: Respiratory:  Goal: Respiratory status will improve  Outcome: PROGRESSING AS EXPECTED  No signs or symptoms of respiratory distress or discomfort noted or reported this shift.

## 2017-07-22 NOTE — PROGRESS NOTES
Sarah Clemons Fall Risk Assessment:     Last Known Fall: No falls  Mobility: Dizziness/generalized weakness  Medications: No meds  Mental Status/LOC/Awareness: Awake, alert, and oriented to date, place, and person  Toileting Needs: Use of assistive device (Bedside commode, bedpan, urinal)  Volume/Electrolyte Status: No problems  Communication/Sensory: Visual (Glasses)/hearing deficit  Behavior: Appropriate behavior  Sarah Clemons Fall Risk Total: 7  Fall Risk Level: LOW RISK    Universal Fall Precautions:  call light/belongings in reach, bed in low position and locked, wheelchairs and assistive devices out of sight, siderails up x 2, use non-slip footwear, adequate lighting, clutter free and spill free environment, educate on level of risk, educate to call for assistance    Fall Risk Level Interventions:   TRIAL (TELE 8, NEURO, MED ERNESTO 5) Low Fall Risk Interventions  Place yellow fall risk ID band on patient: refused (education provided on fall risk interventions)  Provide patient/family education based on risk assessment: completed  Educate patient/family to call staff for assistance when getting out of bed: completed  Place fall precaution signage outside patient door: completed      Patient Specific Interventions:     Medication: review medications with patient and family  Mental Status/LOC/Awareness: reinforce falls education, utilize bed/chair fall alarm, reinforce the use of call light and provide activity  Toileting: provide frquent toileting, monitor intake and output/use of appropriate interventions, instruct male patients prone to dizziness to void while sitting and instruct patient/family on the need to call for assistance when toileting  Volume/Electrolyte Status: ensure patient remains hydrated, administer medications as ordered for nausea and vomiting and monitor abnormal lab values  Communication/Sensory: update plan of care on whiteboard, provide communication alternatives/, ensure proper  positioning when transferrng/ambulating and ensure patient has glasses/contacts and hearing aids/dentures  Behavioral: encourage patient to voice feelings, engage patient in daily activities, administer medication as ordered and instruct/reinforce fall program rationale  Mobility: schedule physical activity throughout the day, provide comfort measures during transport, dangle prior to standing, utilize bed/chair fall alarm, ensure bed is locked and in lowest position, provide appropriate assistive device and instruct patient to exit bed on their strongest side

## 2017-07-22 NOTE — PROGRESS NOTES
Report received by day shift RN. Pt sleeping. POC, labs and orders reviewed. Bed locked in low position with fall precautions in place and bed alarm on. Call light in place and bed side table with in reach.

## 2017-07-22 NOTE — PROGRESS NOTES
Date & Time:   7/22/2017   3:27 PM        Patient ID:             Name:             Marcelo Colon   YOB: 1940  Age:                 76 y.o.  male   MRN:               [id number]    ________________________________________________________________________      Events:       Patient without complaints  Tolerating PO  +multiple loose bms  Denies abdominal pain           Interval Exam:       Filed Vitals:    07/21/17 1642 07/21/17 2012 07/22/17 0416 07/22/17 0759   BP: 138/63 109/64 100/62 125/72   Pulse: 65 63 64 69   Temp: 37.3 °C (99.1 °F) 36.7 °C (98.1 °F) 36.3 °C (97.4 °F) 37.3 °C (99.1 °F)   Resp: 20 18 18 18   Height:       Weight:  66.4 kg (146 lb 6.2 oz)     SpO2: 92% 94% 93% 94%     Weight/BMI: Body mass index is 22.26 kg/(m^2).  Pulse Oximetry: 94 %, O2 (LPM): 0, O2 Delivery: None (Room Air)    Intake/Output Summary (Last 24 hours) at 07/22/17 1527  Last data filed at 07/22/17 1351   Gross per 24 hour   Intake    240 ml   Output    970 ml   Net   -730 ml       Physical Exam  HEENT: PERRLA.  Pupillary light reflex intact.  No thyromegaly.  No supraclavicular or cervical LAD.  CV: S1 and S2 present. No murmurs, rubs or gallops.  Regular rate and rhythm. No carotid bruits or radiation of heart sounds to the axilla.  Capillary refill <3 seconds.  Lungs: Clear to auscultation bilaterally.  Symmetric thoracic expansion.  No changes to tactile fremitus. No dullness to percussion.  Abd: soft, distended. Non-tender. Drain 20cc light brown fluid  Extremities: No LE edema. Dorsalis pedis and posterior tibialis pulses 2+ bilaterally.  Neuro: CNII-XII grossly intact.  No new focal deficits.       \\   \    ________________________________________________________________________     Current Assessment and Plan:   Continue current management  Monitor drain output  Plan to repeat CT abdomen/pelvis in upcoming week

## 2017-07-22 NOTE — PROGRESS NOTES
[facility] Hospitalist Progress Note    Date of Service: 2017    Chief Complaint  77 y/o M with PMH of peripheral neuropathy, BPH, Depression: admitted for above and found to have pelvis abscess on imaging.    Interval Problem Update  Abscess-drainage continues. Denies any abdominal pain.   Had a loose stool this AM. Eating food without issue.     Consultants/Specialty  Surgery  Interventional Radiology    Disposition  Inpatient for management of pelvic abscesses        Review of Systems   Constitutional: Negative for fever and chills.   Eyes: Negative for blurred vision.   Respiratory: Negative for shortness of breath.    Cardiovascular: Negative for chest pain.   Gastrointestinal: Negative for nausea, vomiting, abdominal pain and diarrhea.   Genitourinary: Negative for dysuria.   Musculoskeletal: Negative for myalgias.   Skin: Negative for itching and rash.   Neurological: Negative for dizziness, focal weakness and headaches.   Psychiatric/Behavioral: Negative for depression.   All other systems reviewed and are negative.     Physical Exam  Laboratory/Imaging   Hemodynamics  Temp (24hrs), Av.1 °C (98.7 °F), Min:36.3 °C (97.4 °F), Max:37.6 °C (99.6 °F)   Temperature: 37.3 °C (99.1 °F)  Pulse  Av  Min: 57  Max: 105    Blood Pressure : 125/72 mmHg      Respiratory      Respiration: 18, Pulse Oximetry: 94 %        RUL Breath Sounds: Clear, RML Breath Sounds: Clear, RLL Breath Sounds: Diminished, TOSHIA Breath Sounds: Clear, LLL Breath Sounds: Diminished    Fluids    Intake/Output Summary (Last 24 hours) at 17 0855  Last data filed at 17 0500   Gross per 24 hour   Intake    240 ml   Output    470 ml   Net   -230 ml       Nutrition  Orders Placed This Encounter   Procedures   • DIET ORDER     Standing Status: Standing      Number of Occurrences: 1      Standing Expiration Date:      Order Specific Question:  Diet:     Answer:  Regular [1]     Physical Exam   Constitutional: He is oriented to person,  place, and time. He appears well-developed and well-nourished. No distress.   HENT:   Head: Normocephalic and atraumatic.   Mouth/Throat: Oropharynx is clear and moist. No oropharyngeal exudate.   Eyes: Conjunctivae and EOM are normal. Pupils are equal, round, and reactive to light. Right eye exhibits no discharge. Left eye exhibits no discharge.   Neck: Normal range of motion. Neck supple.   Cardiovascular: Normal rate, regular rhythm, normal heart sounds and intact distal pulses.    No murmur heard.  Pulmonary/Chest: Effort normal and breath sounds normal. No stridor. No respiratory distress.   Abdominal: Soft. Bowel sounds are normal. He exhibits no distension. There is tenderness. There is no rebound.   Drain in the posterior gluteal region, dark fluid present in the bag   Musculoskeletal: Normal range of motion. He exhibits no edema or tenderness.   Neurological: He is alert and oriented to person, place, and time. No cranial nerve deficit.   Skin: Skin is warm and dry. No rash noted. He is not diaphoretic. No erythema.   Psychiatric: He has a normal mood and affect. His behavior is normal. Thought content normal.   Nursing note and vitals reviewed.                               Assessment/Plan     * Sepsis (CMS-HCC) (present on admission)  Assessment & Plan  -unclear etiology? Diverticulitis, colon cancer?, not bacteremic  Cultures: bacteroides fragilis, e. Coli, enterococcus faecium, strep viridans and candida albicans  -switch to PO ampicillin and PO flagyl  -added fluconazole as well  Surgery following  -2 repeat drain placements by IR now, maybe surgery next week if does not fully improve  -monitor drain output closely    Abdominal abscess (CMS-HCC) (present on admission)  Assessment & Plan  -see above    UTI (urinary tract infection) (present on admission)  Assessment & Plan  -urine culture negatve    Hyponatremia  Assessment & Plan  -resolved    BPH (benign prostatic hypertrophy)  Assessment &  Plan  Flomax    Depression  Assessment & Plan  Follow    Osteoarthritis  Assessment & Plan  stable    Leucocytosis  Assessment & Plan  From abscess/sepsis  -resolved      Labs reviewed, Medications reviewed and Radiology images reviewed        DVT Prophylaxis: Enoxaparin (Lovenox)      Antibiotics: Treating active infection/contamination beyond 24 hours perioperative coverage

## 2017-07-23 PROBLEM — E87.1 HYPONATREMIA: Status: RESOLVED | Noted: 2017-07-14 | Resolved: 2017-07-23

## 2017-07-23 PROBLEM — D72.829 LEUCOCYTOSIS: Status: RESOLVED | Noted: 2017-07-15 | Resolved: 2017-07-23

## 2017-07-23 LAB
ALBUMIN SERPL BCP-MCNC: 2.5 G/DL (ref 3.2–4.9)
ALBUMIN/GLOB SERPL: 1 G/DL
ALP SERPL-CCNC: 82 U/L (ref 30–99)
ALT SERPL-CCNC: 22 U/L (ref 2–50)
ANION GAP SERPL CALC-SCNC: 7 MMOL/L (ref 0–11.9)
AST SERPL-CCNC: 17 U/L (ref 12–45)
BILIRUB SERPL-MCNC: 0.4 MG/DL (ref 0.1–1.5)
BUN SERPL-MCNC: 6 MG/DL (ref 8–22)
CALCIUM SERPL-MCNC: 8.7 MG/DL (ref 8.5–10.5)
CHLORIDE SERPL-SCNC: 106 MMOL/L (ref 96–112)
CO2 SERPL-SCNC: 30 MMOL/L (ref 20–33)
CREAT SERPL-MCNC: 0.76 MG/DL (ref 0.5–1.4)
ERYTHROCYTE [DISTWIDTH] IN BLOOD BY AUTOMATED COUNT: 50.6 FL (ref 35.9–50)
GFR SERPL CREATININE-BSD FRML MDRD: >60 ML/MIN/1.73 M 2
GLOBULIN SER CALC-MCNC: 2.5 G/DL (ref 1.9–3.5)
GLUCOSE SERPL-MCNC: 97 MG/DL (ref 65–99)
HCT VFR BLD AUTO: 31.2 % (ref 42–52)
HGB BLD-MCNC: 10 G/DL (ref 14–18)
MCH RBC QN AUTO: 29 PG (ref 27–33)
MCHC RBC AUTO-ENTMCNC: 32.1 G/DL (ref 33.7–35.3)
MCV RBC AUTO: 90.4 FL (ref 81.4–97.8)
PLATELET # BLD AUTO: 283 K/UL (ref 164–446)
PMV BLD AUTO: 8.4 FL (ref 9–12.9)
POTASSIUM SERPL-SCNC: 4 MMOL/L (ref 3.6–5.5)
PROT SERPL-MCNC: 5 G/DL (ref 6–8.2)
RBC # BLD AUTO: 3.45 M/UL (ref 4.7–6.1)
SODIUM SERPL-SCNC: 143 MMOL/L (ref 135–145)
VANCOMYCIN TROUGH SERPL-MCNC: 8.8 UG/ML (ref 10–20)
WBC # BLD AUTO: 5.5 K/UL (ref 4.8–10.8)

## 2017-07-23 PROCEDURE — A9270 NON-COVERED ITEM OR SERVICE: HCPCS | Performed by: INTERNAL MEDICINE

## 2017-07-23 PROCEDURE — 700102 HCHG RX REV CODE 250 W/ 637 OVERRIDE(OP): Performed by: HOSPITALIST

## 2017-07-23 PROCEDURE — 36415 COLL VENOUS BLD VENIPUNCTURE: CPT

## 2017-07-23 PROCEDURE — 80202 ASSAY OF VANCOMYCIN: CPT

## 2017-07-23 PROCEDURE — 700102 HCHG RX REV CODE 250 W/ 637 OVERRIDE(OP): Performed by: INTERNAL MEDICINE

## 2017-07-23 PROCEDURE — 700111 HCHG RX REV CODE 636 W/ 250 OVERRIDE (IP): Performed by: HOSPITALIST

## 2017-07-23 PROCEDURE — 700111 HCHG RX REV CODE 636 W/ 250 OVERRIDE (IP)

## 2017-07-23 PROCEDURE — 99232 SBSQ HOSP IP/OBS MODERATE 35: CPT | Performed by: HOSPITALIST

## 2017-07-23 PROCEDURE — 700111 HCHG RX REV CODE 636 W/ 250 OVERRIDE (IP): Performed by: INTERNAL MEDICINE

## 2017-07-23 PROCEDURE — 770006 HCHG ROOM/CARE - MED/SURG/GYN SEMI*

## 2017-07-23 PROCEDURE — 700105 HCHG RX REV CODE 258

## 2017-07-23 PROCEDURE — 80053 COMPREHEN METABOLIC PANEL: CPT

## 2017-07-23 PROCEDURE — 85027 COMPLETE CBC AUTOMATED: CPT

## 2017-07-23 PROCEDURE — A9270 NON-COVERED ITEM OR SERVICE: HCPCS | Performed by: HOSPITALIST

## 2017-07-23 RX ORDER — FLUCONAZOLE 200 MG/1
400 TABLET ORAL DAILY
Status: DISCONTINUED | OUTPATIENT
Start: 2017-07-24 | End: 2017-07-28

## 2017-07-23 RX ORDER — SODIUM CHLORIDE 9 MG/ML
INJECTION, SOLUTION INTRAVENOUS
Status: COMPLETED
Start: 2017-07-23 | End: 2017-07-23

## 2017-07-23 RX ADMIN — OXYCODONE HYDROCHLORIDE 5 MG: 5 TABLET ORAL at 14:33

## 2017-07-23 RX ADMIN — GABAPENTIN 200 MG: 100 CAPSULE ORAL at 14:33

## 2017-07-23 RX ADMIN — OMEPRAZOLE 20 MG: 20 CAPSULE, DELAYED RELEASE ORAL at 08:18

## 2017-07-23 RX ADMIN — GABAPENTIN 200 MG: 100 CAPSULE ORAL at 08:19

## 2017-07-23 RX ADMIN — OXYCODONE HYDROCHLORIDE 5 MG: 5 TABLET ORAL at 08:44

## 2017-07-23 RX ADMIN — FLUCONAZOLE 400 MG: 2 INJECTION, SOLUTION INTRAVENOUS at 11:36

## 2017-07-23 RX ADMIN — TAMSULOSIN HYDROCHLORIDE 0.4 MG: 0.4 CAPSULE ORAL at 08:19

## 2017-07-23 RX ADMIN — METRONIDAZOLE 500 MG: 500 TABLET ORAL at 05:45

## 2017-07-23 RX ADMIN — ENOXAPARIN SODIUM 40 MG: 100 INJECTION SUBCUTANEOUS at 08:19

## 2017-07-23 RX ADMIN — VANCOMYCIN HYDROCHLORIDE 1300 MG: 100 INJECTION, POWDER, LYOPHILIZED, FOR SOLUTION INTRAVENOUS at 13:40

## 2017-07-23 RX ADMIN — SODIUM CHLORIDE: 9 INJECTION, SOLUTION INTRAVENOUS at 08:45

## 2017-07-23 RX ADMIN — LAMOTRIGINE 150 MG: 100 TABLET ORAL at 08:18

## 2017-07-23 ASSESSMENT — PAIN SCALES - GENERAL
PAINLEVEL_OUTOF10: 6
PAINLEVEL_OUTOF10: 6
PAINLEVEL_OUTOF10: 4

## 2017-07-23 ASSESSMENT — ENCOUNTER SYMPTOMS
DIZZINESS: 0
HEADACHES: 0
FEVER: 0
COUGH: 0
PALPITATIONS: 0
DIARRHEA: 0
NAUSEA: 0
FOCAL WEAKNESS: 0
CONSTIPATION: 0
WEAKNESS: 1
ABDOMINAL PAIN: 1
CHILLS: 0
VOMITING: 0
NERVOUS/ANXIOUS: 1
SHORTNESS OF BREATH: 0
MYALGIAS: 0

## 2017-07-23 NOTE — PROGRESS NOTES
"[facility] Hospitalist Progress Note    Date of Service: 2017    Chief Complaint  75 y/o M with PMH of peripheral neuropathy, BPH, Depression: admitted for above and found to have pelvis abscess on imaging.    Interval Problem Update   - feels okay, pain fairly minimal, but very anxious. \"Just cut me open and fix it so I can go home.\" Acutely about the same.     Consultants/Specialty  [facility]    Disposition  Clinical      Review of Systems   Constitutional: Negative for fever and chills.   Respiratory: Negative for cough and shortness of breath.    Cardiovascular: Negative for chest pain and palpitations.   Gastrointestinal: Positive for abdominal pain. Negative for nausea, vomiting, diarrhea and constipation.   Genitourinary: Negative for dysuria.   Musculoskeletal: Negative for myalgias.   Skin: Negative for itching.   Neurological: Positive for weakness. Negative for dizziness, focal weakness and headaches.   Psychiatric/Behavioral: The patient is nervous/anxious (anxious to get out of the hospital).    All other systems reviewed and are negative.     Physical Exam  Laboratory/Imaging   Hemodynamics  Temp (24hrs), Av.1 °C (98.8 °F), Min:36.9 °C (98.5 °F), Max:37.3 °C (99.2 °F)   Temperature: 36.9 °C (98.5 °F)  Pulse  Av  Min: 57  Max: 105    Blood Pressure : 125/63 mmHg      Respiratory      Respiration: 18, Pulse Oximetry: 92 %        RUL Breath Sounds: Clear, RML Breath Sounds: Clear, RLL Breath Sounds: Diminished, TOSHIA Breath Sounds: Clear, LLL Breath Sounds: Diminished    Fluids    Intake/Output Summary (Last 24 hours) at 17 1433  Last data filed at 17 1330   Gross per 24 hour   Intake    590 ml   Output    660 ml   Net    -70 ml       Nutrition  Orders Placed This Encounter   Procedures   • DIET ORDER     Standing Status: Standing      Number of Occurrences: 1      Standing Expiration Date:      Order Specific Question:  Diet:     Answer:  Regular [1]     Physical Exam "   Constitutional: He is oriented to person, place, and time. He appears well-developed and well-nourished.   HENT:   Head: Normocephalic and atraumatic.   Mouth/Throat: Oropharynx is clear and moist.   Eyes: Conjunctivae and EOM are normal. Pupils are equal, round, and reactive to light. No scleral icterus.   Neck: Normal range of motion. Neck supple. No tracheal deviation present. No thyromegaly present.   Cardiovascular: Normal rate, regular rhythm, normal heart sounds and intact distal pulses.    No murmur heard.  Pulmonary/Chest: Effort normal and breath sounds normal. No respiratory distress. He has no wheezes.   Abdominal: Soft. Bowel sounds are normal. He exhibits no distension. There is tenderness.   Drain with minimal yellowish output   Musculoskeletal: Normal range of motion. He exhibits no edema or tenderness.   Lymphadenopathy:     He has no cervical adenopathy.        Right: No supraclavicular adenopathy present.        Left: No supraclavicular adenopathy present.   Neurological: He is alert and oriented to person, place, and time. No cranial nerve deficit.   Skin: Skin is warm and dry.   Vitals reviewed.      Recent Labs      07/23/17   0331   WBC  5.5   RBC  3.45*   HEMOGLOBIN  10.0*   HEMATOCRIT  31.2*   MCV  90.4   MCH  29.0   MCHC  32.1*   RDW  50.6*   PLATELETCT  283   MPV  8.4*     Recent Labs      07/23/17   0331   SODIUM  143   POTASSIUM  4.0   CHLORIDE  106   CO2  30   GLUCOSE  97   BUN  6*   CREATININE  0.76   CALCIUM  8.7                      Assessment/Plan     * Sepsis (CMS-HCC) (present on admission)  Assessment & Plan  Cultures: bacteroides fragilis, e. Coli, enterococcus faecium, strep viridans and candida albicans  Active Orders     Ordered     Ordering Provider       Sun Jul 23, 2017  2:17 PM    07/23/17 1417  fluconazole (DIFLUCAN) tablet 400 mg   DAILY      Nanci Jeffers, CLEMENCIAD       Sat Jul 22, 2017  2:32 PM    07/22/17 1432  vancomycin 1,300 mg in  mL IVPB   EVERY 24  HOURS     Comments:  Per Pharmacokinetic Protocol    Rachna Krishna, PHARMD       Sat Jul 22, 2017  7:17 AM    07/22/17 0717  MD ALERT... vancomycin per pharmacy protocol   PHARMACY TO DOSE     Question:  Indication(s) for vancomycin?  Answer:  Intra-abdominal infection    Osman Vega M.D.      Surgery?  Following drain output--minimal: 10 mL in 24 hrs    Abdominal abscess (CMS-HCC) (present on admission)  Assessment & Plan  As in sepsis    UTI (urinary tract infection) (present on admission)  Assessment & Plan  Ruled out    BPH (benign prostatic hypertrophy) (present on admission)  Assessment & Plan  Flomax    Depression (present on admission)  Assessment & Plan  Stable    Osteoarthritis (present on admission)  Assessment & Plan  Stable, prn analgesia      Labs reviewed, Medications reviewed and Radiology images reviewed        DVT Prophylaxis: Enoxaparin (Lovenox)      Antibiotics: Treating active infection/contamination beyond 24 hours perioperative coverage

## 2017-07-23 NOTE — CARE PLAN
Problem: Safety  Goal: Will remain free from falls  Intervention: Assess risk factors for falls  Pt up with standby assist. Call light within reach. Calls for assistance as needed.       Problem: Venous Thromboembolism (VTW)/Deep Vein Thrombosis (DVT) Prevention:  Goal: Patient will participate in Venous Thrombosis (VTE)/Deep Vein Thrombosis (DVT)Prevention Measures  SCDs in place        Problem: Knowledge Deficit  Goal: Knowledge of disease process/condition, treatment plan, diagnostic tests, and medications will improve  Pt instructed on pain management, ambulating, drain and call light    Problem: Pain Management  Goal: Pain level will decrease to patient’s comfort goal  Pain controlled with oral pain meds

## 2017-07-23 NOTE — PROGRESS NOTES
2 RN skin check, +blanching coccyx, right posterior elbow with old shear injury, +blanching, waffle cushion in place, discussed turning off side, unable to place mepilex d/t IR placed drain in L buttock.

## 2017-07-23 NOTE — PROGRESS NOTES
"Pharmacy Kinetics 76 y.o. male on vancomycin day #2  2017    Currently on Vancomycin 1300 mg iv q24hr (@1200)    Indication for Treatment: Intra-abdominal infection    Pertinent history per medical record: Admitted on 2017 for groin ache over last several days, UTI noted - started on Cipro, CT shows large diverticular abscess on L and likely a perforated appendiceal abscess on R. ON 7/15, IR drain of large pelvic abscess - started on Rocephin and Flagyl.  PMH of BPH.    Other antibiotics: fluconazole 400mg IV q24h    Allergies: Review of patient's allergies indicates no known allergies.     List concerns for renal function: low albumin, age    Pertinent cultures to date:    17: pelvic abscess - enterococcus faecium, candida albicans  7/15/17: pelvic abscess - E.coli, Enterococcus faecium, viridans strep, bacteroides fragilis  17: blood - negative  1417: urine - negative    ENTEROCOCCUS FAECIUM      Antibiotic Sensitivity Microscan Unit Status     Ampicillin Resistant >8 mcg/mL Final     Daptomycin Sensitive 4 mcg/mL Final     Gent Synergy Resistant >500 mcg/mL Final     Penicillin Resistant >8 mcg/mL Final     Strep Synergy Sensitive <=1000 mcg/mL Final     Vancomycin Sensitive 1 mcg/mL Final        Recent Labs      17   0331   WBC  5.5     Recent Labs      17   0331   BUN  6*   CREATININE  0.76   ALBUMIN  2.5*     Recent Labs      17   1138   VANCOTROUGH  8.8*     Intake/Output Summary (Last 24 hours) at 17 1409  Last data filed at 17 1330   Gross per 24 hour   Intake    590 ml   Output    660 ml   Net    -70 ml      Blood pressure 125/63, pulse 81, temperature 36.9 °C (98.5 °F), resp. rate 18, height 1.727 m (5' 8\"), weight 66.4 kg (146 lb 6.2 oz), SpO2 92 %. Temp (24hrs), Av.1 °C (98.8 °F), Min:36.9 °C (98.5 °F), Max:37.3 °C (99.2 °F)    A/P   1. Next vancomycin level: 2-3 days (not ordered)  2. Goal trough: 12-16 mcg/mL  3. Comments: Level was obtained " after the load dose to assess clearance. Patient is clearing the medication and the level is not at steady state so I will continue with current dosing per protocol. I will check a level in a few days closer to steady states. Cultures positive for enterococcus faecium, strep viridans, e. Coli, and b. Fragilis - see sensitivities above. Pharmacy will continue to monitor and adjust dosing as clinically appropriate.    Nanci Jeffers, PharmD

## 2017-07-23 NOTE — CARE PLAN
Problem: Safety  Goal: Will remain free from injury  Outcome: PROGRESSING AS EXPECTED  Fall risks in place, call light in reach, frequent checks    Problem: Mobility  Goal: Risk for activity intolerance will decrease  Outcome: PROGRESSING AS EXPECTED  Encourage ambulation as tolerated, up for all meals, assist as needed

## 2017-07-23 NOTE — PROGRESS NOTES
"Pt AA&Ox4. Pain rating at 6. Medicated per MAR. No c/o n/v, n/t, SOB. Pt tolerating regular diet. Percutaneous drain in left posterior with serosang/purulent output. Pt up with standby assist. Up to chair for breakfast this am. Pt having diarrhea. Commode at bedside. Plan of care discussed. Hourly rounding in place.call light within reach. Blood pressure 101/50, pulse 81, temperature 37 °C (98.6 °F), resp. rate 18, height 1.727 m (5' 8\"), weight 66.4 kg (146 lb 6.2 oz), SpO2 91 %.    "

## 2017-07-24 ENCOUNTER — APPOINTMENT (OUTPATIENT)
Dept: RADIOLOGY | Facility: MEDICAL CENTER | Age: 77
DRG: 853 | End: 2017-07-24
Attending: SURGERY
Payer: MEDICARE

## 2017-07-24 PROCEDURE — 700105 HCHG RX REV CODE 258

## 2017-07-24 PROCEDURE — A9270 NON-COVERED ITEM OR SERVICE: HCPCS | Performed by: HOSPITALIST

## 2017-07-24 PROCEDURE — 700102 HCHG RX REV CODE 250 W/ 637 OVERRIDE(OP): Performed by: HOSPITALIST

## 2017-07-24 PROCEDURE — 74177 CT ABD & PELVIS W/CONTRAST: CPT

## 2017-07-24 PROCEDURE — 99232 SBSQ HOSP IP/OBS MODERATE 35: CPT | Performed by: HOSPITALIST

## 2017-07-24 PROCEDURE — 700102 HCHG RX REV CODE 250 W/ 637 OVERRIDE(OP)

## 2017-07-24 PROCEDURE — 700111 HCHG RX REV CODE 636 W/ 250 OVERRIDE (IP)

## 2017-07-24 PROCEDURE — 770006 HCHG ROOM/CARE - MED/SURG/GYN SEMI*

## 2017-07-24 PROCEDURE — 700111 HCHG RX REV CODE 636 W/ 250 OVERRIDE (IP): Performed by: HOSPITALIST

## 2017-07-24 PROCEDURE — 700102 HCHG RX REV CODE 250 W/ 637 OVERRIDE(OP): Performed by: INTERNAL MEDICINE

## 2017-07-24 PROCEDURE — A9270 NON-COVERED ITEM OR SERVICE: HCPCS | Performed by: INTERNAL MEDICINE

## 2017-07-24 PROCEDURE — 700105 HCHG RX REV CODE 258: Performed by: HOSPITALIST

## 2017-07-24 PROCEDURE — 700117 HCHG RX CONTRAST REV CODE 255: Performed by: HOSPITALIST

## 2017-07-24 PROCEDURE — A9270 NON-COVERED ITEM OR SERVICE: HCPCS

## 2017-07-24 RX ORDER — METRONIDAZOLE 500 MG/1
500 TABLET ORAL EVERY 8 HOURS
Status: DISCONTINUED | OUTPATIENT
Start: 2017-07-24 | End: 2017-07-27

## 2017-07-24 RX ADMIN — TAMSULOSIN HYDROCHLORIDE 0.4 MG: 0.4 CAPSULE ORAL at 08:15

## 2017-07-24 RX ADMIN — LAMOTRIGINE 150 MG: 100 TABLET ORAL at 08:15

## 2017-07-24 RX ADMIN — METRONIDAZOLE 500 MG: 500 TABLET ORAL at 21:26

## 2017-07-24 RX ADMIN — OMEPRAZOLE 20 MG: 20 CAPSULE, DELAYED RELEASE ORAL at 08:16

## 2017-07-24 RX ADMIN — OXYCODONE HYDROCHLORIDE 5 MG: 5 TABLET ORAL at 12:30

## 2017-07-24 RX ADMIN — FLUCONAZOLE 400 MG: 200 TABLET ORAL at 08:16

## 2017-07-24 RX ADMIN — GABAPENTIN 200 MG: 100 CAPSULE ORAL at 08:15

## 2017-07-24 RX ADMIN — OXYCODONE HYDROCHLORIDE 5 MG: 5 TABLET ORAL at 21:28

## 2017-07-24 RX ADMIN — GABAPENTIN 200 MG: 100 CAPSULE ORAL at 21:26

## 2017-07-24 RX ADMIN — VANCOMYCIN HYDROCHLORIDE 1300 MG: 100 INJECTION, POWDER, LYOPHILIZED, FOR SOLUTION INTRAVENOUS at 13:21

## 2017-07-24 RX ADMIN — IOHEXOL 100 ML: 350 INJECTION, SOLUTION INTRAVENOUS at 14:05

## 2017-07-24 RX ADMIN — METRONIDAZOLE 500 MG: 500 TABLET ORAL at 15:10

## 2017-07-24 RX ADMIN — GABAPENTIN 200 MG: 100 CAPSULE ORAL at 15:10

## 2017-07-24 RX ADMIN — ENOXAPARIN SODIUM 40 MG: 100 INJECTION SUBCUTANEOUS at 08:16

## 2017-07-24 RX ADMIN — OXYCODONE HYDROCHLORIDE 5 MG: 5 TABLET ORAL at 08:16

## 2017-07-24 RX ADMIN — CEFTRIAXONE SODIUM 1 G: 1 INJECTION, POWDER, FOR SOLUTION INTRAMUSCULAR; INTRAVENOUS at 12:28

## 2017-07-24 ASSESSMENT — ENCOUNTER SYMPTOMS
NAUSEA: 0
MYALGIAS: 0
ABDOMINAL PAIN: 1
FOCAL WEAKNESS: 0
WEAKNESS: 1
COUGH: 0
PALPITATIONS: 0
FEVER: 0
CONSTITUTIONAL NEGATIVE: 1
VOMITING: 0
DIZZINESS: 0
CONSTIPATION: 0
ABDOMINAL PAIN: 0
NERVOUS/ANXIOUS: 1
DIARRHEA: 0
CHILLS: 0
SHORTNESS OF BREATH: 0
HEADACHES: 0

## 2017-07-24 ASSESSMENT — PAIN SCALES - GENERAL
PAINLEVEL_OUTOF10: 5
PAINLEVEL_OUTOF10: 4
PAINLEVEL_OUTOF10: 7

## 2017-07-24 NOTE — PROGRESS NOTES
"Pt AA&Ox4. Pain rating at 7. Medicated per MAR. Pt up to the bathroom this am with standby assist and fWW. Tolerating regular diet. Left posterior drain with scant brownish output. Plan of care discussed. Hourly rounding in place. Bed alarm on. Call light within reach. Blood pressure 121/61, pulse 70, temperature 36.5 °C (97.7 °F), resp. rate 16, height 1.727 m (5' 8\"), weight 66.4 kg (146 lb 6.2 oz), SpO2 92 %.    "

## 2017-07-24 NOTE — PROGRESS NOTES
Pharmacy Kinetics 76 y.o. male on vancomycin day #3  7/24/2017    Currently on Vancomycin 1300 mg iv q24hr (@1200)    Indication for Treatment: Intra-abdominal infection    Pertinent history per medical record: Admitted on 7/14/2017 for groin ache over last several days, UTI noted - started on Cipro, CT shows large diverticular abscess on L and likely a perforated appendiceal abscess on R. ON 7/15, IR drain of large pelvic abscess - started on Rocephin and Flagyl.  PMH of BPH.    Other antibiotics: fluconazole 400mg PO q24h, ceftriaxone 1 gm q24h    Allergies: Review of patient's allergies indicates no known allergies.     List concerns for renal function: low albumin, age    Pertinent cultures to date:    7/19/17: pelvic abscess - enterococcus faecium, candida albicans  7/15/17: pelvic abscess - E.coli, Enterococcus faecium, viridans strep, bacteroides fragilis  7/14/17: blood - negative  7/1417: urine - negative    ENTEROCOCCUS FAECIUM      Antibiotic Sensitivity Microscan Unit Status     Ampicillin Resistant >8 mcg/mL Final     Daptomycin Sensitive 4 mcg/mL Final     Gent Synergy Resistant >500 mcg/mL Final     Penicillin Resistant >8 mcg/mL Final     Strep Synergy Sensitive <=1000 mcg/mL Final     Vancomycin Sensitive 1 mcg/mL Final        ESCHERICHIA COLI      Antibiotic Sensitivity Microscan Unit Status     Ampicillin Resistant >16 mcg/mL Final     Cefepime Sensitive <=8 mcg/mL Final     Cefotaxime Sensitive <=2 mcg/mL Final     Cefotetan Sensitive <=16 mcg/mL Final     Ceftazidime Sensitive <=1 mcg/mL Final     Ceftriaxone Sensitive <=8 mcg/mL Final     Cefuroxime Sensitive <=4 mcg/mL Final     Ciprofloxacin Resistant >2 mcg/mL Final     Ertapenem Sensitive <=1 mcg/mL Final     Gentamicin Resistant >8 mcg/mL Final     Pip/Tazobactam Sensitive <=16 mcg/mL Final     Tigecycline Sensitive <=2 mcg/mL Final     Tobramycin Resistant >8 mcg/mL Final     Trimeth/Sulfa Sensitive <=2/38 mcg/mL Final        Recent Labs  "     17   0331   WBC  5.5     Recent Labs      17   0331   BUN  6*   CREATININE  0.76   ALBUMIN  2.5*     Recent Labs      17   1138   VANCOTROUGH  8.8*     Intake/Output Summary (Last 24 hours) at 17 1059  Last data filed at 17 0808   Gross per 24 hour   Intake    630 ml   Output   1700 ml   Net  -1070 ml      Blood pressure 148/72, pulse 81, temperature 37.3 °C (99.1 °F), resp. rate 17, height 1.727 m (5' 8\"), weight 66.4 kg (146 lb 6.2 oz), SpO2 94 %. Temp (24hrs), Av.1 °C (98.8 °F), Min:36.5 °C (97.7 °F), Max:37.4 °C (99.4 °F)    A/P   1. Next vancomycin level: 17 at 1130 (not ordered)  2. Goal trough: 12-16 mcg/mL  3. Comments: Continuing with vancomycin. ID to see the patient. I will obtain a level in 2 days to assess dosing when patient is closer to steady state - a level was drawn yesterday but it was after the first dose to assess whether the patient could tolerate q24h dosing. Cultures and sensitives as above - therapy changed to vanco+C3+fluconazole. Pharmacy will continue to monitor and adjust dosing as clinically appropriate.    Nanci Jeffers, PharmD  "

## 2017-07-24 NOTE — PROGRESS NOTES
VSS  A&O x 4  No complaints of pain or nausea at this time.  Percutaneous drain flushed with 10cc.   + urine output.   Tolerating diet well  BL elbows redness with blanching  BL Heels redness with blanching.   POC discussed  No further needs at this time

## 2017-07-24 NOTE — CONSULTS
ADULT INFECTIOUS DISEASE CONSULT     Date of Service: 7/24/2017    Consult Requested By: Oliver Fish M.D.    Reason for Consultation: Pelvic abscess    History of Present Illness:   Marcelo Colon is a 76 y.o. Male developed groin pain for 4 days prior to coming into the hospital. He went to the urgent care and was told he had a UTI. But his pain continued to get worse. Hence he came into the emergency room on 7/14/2017. His CT scan shows mild developing abscess in the right lower quadrant. As well as large complex fluid collection in the central pelvis. There was minimal pneumoperitoneum consistent with bowel perforation. He was seen by surgery. Was decided not to take him to the OR. On 7/15/2017 he underwent a CT guided catheter drainage. He underwent another CT-guided drain replacement on 7/19/2017. His cultures have grown Escherichia coli, viridans strep, and Bacteroides fragilis and Candida albicans.  In view of that infectious disease consult has been called.              Review Of Systems:  Gen.-Denies any fevers. Denies any chills. Complains of malaise  HEENT- denies any sore throat, headache or vision changes  Pulmonary- denies any cough, shortness of breath  Cardiovascular- denies any chest pain, leg swelling.   GI- complains of lower abdominal pain  Musculoskeletal- denies any joint pains or swelling  Neuro- denies any weakness or sensory change  Psych- denies any depression or suicidal ideation  Genitourinary- denies any frequency or dysuria        PMH:   Past Medical History   Diagnosis Date   • Enlarged prostate    • Depression    • Neck pain    • GERD (gastroesophageal reflux disease)          PSH:  History reviewed. No pertinent past surgical history.    FAMILY HX:  History reviewed. No pertinent family history.    SOCIAL HX:  Social History     Social History   • Marital Status: Single     Spouse Name: N/A   • Number of Children: N/A   • Years of Education: N/A     Occupational History   •  Not on file.     Social History Main Topics   • Smoking status: Former Smoker -- 2.00 packs/day for 14 years   • Smokeless tobacco: Not on file   • Alcohol Use: 0.0 oz/week     0 Standard drinks or equivalent per week   • Drug Use: Yes      Comment: history of cocaine and marijuana use still about 10 years ago. Denies any IV drug use.   • Sexual Activity: Not on file     Other Topics Concern   • Not on file     Social History Narrative     Allergies/Intolerances:  No Known Allergies    History reviewed with the patient    Other Current Medications:    Current facility-administered medications:   •  metronidazole (FLAGYL) tablet 500 mg, 500 mg, Oral, Q8HRS, Oliver Fish M.D., 500 mg at 07/24/17 1510  •  cefTRIAXone (ROCEPHIN) 1 g in  mL IVPB, 1 g, Intravenous, Q24HRS, Oliver Fish M.D., Stopped at 07/24/17 1258  •  fluconazole (DIFLUCAN) tablet 400 mg, 400 mg, Oral, DAILY, Nanci Jeffers, PHARMD, 400 mg at 07/24/17 0816  •  MD ALERT... vancomycin per pharmacy protocol, , Other, pharmacy to dose, Osman Vega M.D.  •  vancomycin 1,300 mg in  mL IVPB, 20 mg/kg, Intravenous, Q24HR, Rachna Krishna, PHARMD, Stopped at 07/24/17 1521  •  NS (BOLUS) infusion 500 mL, 500 mL, Intravenous, PRN, Jony Wei M.D., Stopped at 07/20/17 1440  •  NS (BOLUS) infusion 500 mL, 500 mL, Intravenous, PRN, Jony Wei M.D., Stopped at 07/20/17 1440  •  gabapentin (NEURONTIN) capsule 200 mg, 200 mg, Oral, TID, Jolene Means M.D., 200 mg at 07/24/17 1510  •  lamotrigine (LAMICTAL) tablet 150 mg, 150 mg, Oral, DAILY, Dennis Howell M.D., 150 mg at 07/24/17 0815  •  omeprazole (PRILOSEC) capsule 20 mg, 20 mg, Oral, DAILY, Dennis Howell M.D., 20 mg at 07/24/17 0816  •  Respiratory Care per Protocol, , Nebulization, Continuous RT, Dennis Howell M.D.  •  labetalol (NORMODYNE,TRANDATE) injection 10 mg, 10 mg, Intravenous, Q4HRS PRN, Dennis Howell M.D.  •  ondansetron (ZOFRAN) syringe/vial  "injection 4 mg, 4 mg, Intravenous, Q4HRS PRN, Dennis Howell M.D.  •  ondansetron (ZOFRAN ODT) dispertab 4 mg, 4 mg, Oral, Q4HRS PRN, Dennis Howell M.D.  •  oxycodone immediate-release (ROXICODONE) tablet 5 mg, 5 mg, Oral, Q4HRS PRN, Dennis Howell M.D., 5 mg at 17 1230  •  tamsulosin (FLOMAX) capsule 0.4 mg, 0.4 mg, Oral, AFTER BREAKFAST, Dennis Howell M.D., 0.4 mg at 17 0815  •  tramadol (ULTRAM) 50 MG tablet 50 mg, 50 mg, Oral, Q4HRS PRN, 50 mg at 17 2153 **OR** tramadol (ULTRAM) 50 MG tablet 100 mg, 100 mg, Oral, Q4HRS PRN, Dennis Howell M.D., 100 mg at 17 1145  [unfilled]    Most Recent Vital Signs:  /72 mmHg  Pulse 81  Temp(Src) 37.3 °C (99.1 °F)  Resp 17  Ht 1.727 m (5' 8\")  Wt 66.4 kg (146 lb 6.2 oz)  BMI 22.26 kg/m2  SpO2 94%  Temp  Av °C (98.6 °F)  Min: 36 °C (96.8 °F)  Max: 38.1 °C (100.6 °F)    Physical Exam:  General: Nontoxic, no acute distress  HEENT: sclera anicteric, PERRL, EOMI, MMM, no oral lesions  Neck: supple, no lymphadenopathy  Chest: CTAB, no r/r/w, normal work of breathing.  Cardiac: Regular, no murmurs no gallops heard  Abdomen: + bowel sounds, soft, non-tender, non-distended. Has a drain with some purulence  Extremities: No edema. No joint swelling.  Skin: no rashes or erythema  Neuro: Alert and oriented times 3, non-focal exam    Pertinent Lab Results:  Recent Labs      17   0331   WBC  5.5      Recent Labs      17   0331   HEMOGLOBIN  10.0*   HEMATOCRIT  31.2*   MCV  90.4   MCH  29.0   PLATELETCT  283         Recent Labs      17   0331   SODIUM  143   POTASSIUM  4.0   CHLORIDE  106   CO2  30   CREATININE  0.76        Recent Labs      17   0331   ALBUMIN  2.5*        Pertinent Micro:  Results     Procedure Component Value Units Date/Time    CULTURE WOUND W/ GRAM STAIN [[id number]]  (Abnormal)  (Susceptibility) Collected:  17 1303    Order Status:  Completed Specimen Information:  Wound Updated:  " 07/21/17 1443     Gram Stain Result --      Result:        Many WBCs.  Many Gram positive cocci.  Rare Yeast.       Significant Indicator POS (POS)      Source WND      Site Pelvic Abscess      Culture Result Wound -- (A)      Culture Result Wound -- (A)      Result:        Enterococcus faecium  Heavy growth  Combination therapy with ampicillin, penicillin, or  vancomycin (for susceptible strains) plus an aminoglycoside  is usually indicated for serious enterococcal infections,  such as endocarditis unless high-level resistance to both  gentamicin and streptomycin is documented; such combinations  are predicted to result in synergistic killing of the  Enterococcus.  The susceptibility profile for this organism indicates that  Penicillin would not be an effective component of combination  therapy.  The susceptibility profile for this organism indicates that  Gentamycin would not be an effective component of combination  therapy.       Culture Result Wound -- (A)      Result:        Candida albicans  Heavy growth      Culture & Susceptibility     ENTEROCOCCUS FAECIUM     Antibiotic Sensitivity Microscan Unit Status    Ampicillin Resistant >8 mcg/mL Final    Daptomycin Sensitive 4 mcg/mL Final    Gent Synergy Resistant >500 mcg/mL Final    Penicillin Resistant >8 mcg/mL Final    Strep Synergy Sensitive <=1000 mcg/mL Final    Vancomycin Sensitive 1 mcg/mL Final                       GRAM STAIN [[id number]] Collected:  07/19/17 1303    Order Status:  Completed Specimen Information:  Wound Updated:  07/19/17 1622     Significant Indicator .      Source WND      Site Pelvic Abscess      Gram Stain Result --      Result:        Many WBCs.  Many Gram positive cocci.  Rare Yeast.      BLOOD CULTURE [[id number]] Collected:  07/14/17 1200    Order Status:  Completed Specimen Information:  Blood from Peripheral Updated:  07/19/17 1500     Significant Indicator NEG      Source BLD      Site PERIPHERAL      Blood Culture No growth  after 5 days of incubation.     BLOOD CULTURE [[id number]] Collected:  07/14/17 1226    Order Status:  Completed Specimen Information:  Blood from Peripheral Updated:  07/19/17 1500     Significant Indicator NEG      Source BLD      Site PERIPHERAL      Blood Culture No growth after 5 days of incubation.     CULTURE WOUND W/ GRAM STAIN [[id number]]  (Abnormal)  (Susceptibility) Collected:  07/15/17 1429    Order Status:  Completed Specimen Information:  Wound Updated:  07/18/17 1145     Significant Indicator POS (POS)      Source WND      Site Pelvic Abscess      Culture Result Wound -- (A)      Gram Stain Result -- (A)      Result:        Many WBCs.  Many Gram positive cocci.  Many Gram negative rods.  Many Gram positive rods.       Culture Result Wound -- (A)      Result:        Escherichia coli  Rare growth       Culture Result Wound -- (A)      Result:        Enterococcus faecium  Rare growth  Combination therapy with ampicillin, penicillin, or  vancomycin (for susceptible strains) plus an aminoglycoside  is usually indicated for serious enterococcal infections,  such as endocarditis unless high-level resistance to both  gentamicin and streptomycin is documented; such combinations  are predicted to result in synergistic killing of the  Enterococcus.       Culture Result Wound -- (A)      Result:        Viridans Streptococcus  Light growth       Culture Result Wound -- (A)      Result:        Bacteroides fragilis  Moderate growth      Culture & Susceptibility     ENTEROCOCCUS FAECIUM     Antibiotic Sensitivity Microscan Unit Status    Ampicillin Sensitive <=2 mcg/mL Final    Daptomycin Sensitive 4 mcg/mL Final    Gent Synergy Sensitive <=500 mcg/mL Final    Penicillin Sensitive 8 mcg/mL Final    Vancomycin Sensitive 1 mcg/mL Final              ESCHERICHIA COLI     Antibiotic Sensitivity Microscan Unit Status    Ampicillin Resistant >16 mcg/mL Final    Cefepime Sensitive <=8 mcg/mL Final    Cefotaxime Sensitive <=2  mcg/mL Final    Cefotetan Sensitive <=16 mcg/mL Final    Ceftazidime Sensitive <=1 mcg/mL Final    Ceftriaxone Sensitive <=8 mcg/mL Final    Cefuroxime Sensitive <=4 mcg/mL Final    Ciprofloxacin Resistant >2 mcg/mL Final    Ertapenem Sensitive <=1 mcg/mL Final    Gentamicin Resistant >8 mcg/mL Final    Pip/Tazobactam Sensitive <=16 mcg/mL Final    Tigecycline Sensitive <=2 mcg/mL Final    Tobramycin Resistant >8 mcg/mL Final    Trimeth/Sulfa Sensitive <=2/38 mcg/mL Final                           BLOOD CULTURE   Date Value Ref Range Status   07/14/2017 No growth after 5 days of incubation.  Final        Studies:    IMPRESSION:   Pelvic abscess  Perforated colon  History of drug use-denies IVDA  Recent use of steroids          PLAN:   Marcelo Colon is a 76 y.o. male history of tobacco use, cocaine use in the past admitted with pelvic abscess. It was probably caused by a perforation from significant constipation he has had. Also of note is that he was recently diagnosed with lupus for which he was started on steroids. He went to [facility] where they determined that he did not have lupus. Continue with the Rocephin, Flagyl Diflucan and vancomycin. He probably needs washout. He is for another CT scan today. Follow the results.      Discussed with IM. Will continue to follow    Mis Herbert M.D.

## 2017-07-24 NOTE — PROGRESS NOTES
"[facility] Hospitalist Progress Note    Date of Service: 2017    Chief Complaint  75 y/o M with PMH of peripheral neuropathy, BPH, Depression: admitted for above and found to have pelvis abscess on imaging.    Interval Problem Update   - he is very anxious to discharge. I discussed the case with Dr. Rodriguez and we will get repeat imaging. If the abscess is resolved, we can pull the drain. If the abscess just simply is not draining, Dr. Rodriguez plans to take him to the operating room. I explained this all to the patient.     - feels okay, pain fairly minimal, but very anxious. \"Just cut me open and fix it so I can go home.\" Acutely about the same.     Consultants/Specialty  [facility] - Surgery    Disposition  Clinical      Review of Systems   Constitutional: Negative for fever and chills.   Respiratory: Negative for cough and shortness of breath.    Cardiovascular: Negative for chest pain and palpitations.   Gastrointestinal: Positive for abdominal pain. Negative for nausea, vomiting, diarrhea and constipation.   Genitourinary: Negative for dysuria.   Musculoskeletal: Negative for myalgias.   Skin: Negative for itching.   Neurological: Positive for weakness. Negative for dizziness, focal weakness and headaches.   Psychiatric/Behavioral: The patient is nervous/anxious (anxious to get out of the hospital).    All other systems reviewed and are negative.     Physical Exam  Laboratory/Imaging   Hemodynamics  Temp (24hrs), Av.1 °C (98.8 °F), Min:36.5 °C (97.7 °F), Max:37.4 °C (99.4 °F)   Temperature: 37.3 °C (99.1 °F)  Pulse  Av.9  Min: 57  Max: 105    Blood Pressure : 148/72 mmHg      Respiratory      Respiration: 17, Pulse Oximetry: 94 %        RUL Breath Sounds: Clear, RML Breath Sounds: Clear, RLL Breath Sounds: Diminished, TOSHIA Breath Sounds: Clear, LLL Breath Sounds: Diminished    Fluids    Intake/Output Summary (Last 24 hours) at 17 1353  Last data filed at 17 1320   Gross per 24 " hour   Intake    160 ml   Output   1950 ml   Net  -1790 ml       Nutrition  Orders Placed This Encounter   Procedures   • DIET ORDER     Standing Status: Standing      Number of Occurrences: 1      Standing Expiration Date:      Order Specific Question:  Diet:     Answer:  Regular [1]     Physical Exam   Constitutional: He is oriented to person, place, and time. He appears well-developed and well-nourished.   HENT:   Head: Normocephalic and atraumatic.   Mouth/Throat: Oropharynx is clear and moist.   Eyes: Conjunctivae and EOM are normal. Pupils are equal, round, and reactive to light. No scleral icterus.   Neck: Normal range of motion. Neck supple. No tracheal deviation present. No thyromegaly present.   Cardiovascular: Normal rate, regular rhythm, normal heart sounds and intact distal pulses.    No murmur heard.  Pulmonary/Chest: Effort normal and breath sounds normal. No respiratory distress. He has no wheezes.   Abdominal: Soft. Bowel sounds are normal. He exhibits no distension. There is tenderness.   Drain with minimal yellowish output   Musculoskeletal: Normal range of motion. He exhibits no edema or tenderness.   Lymphadenopathy:     He has no cervical adenopathy.        Right: No supraclavicular adenopathy present.        Left: No supraclavicular adenopathy present.   Neurological: He is alert and oriented to person, place, and time. No cranial nerve deficit.   Skin: Skin is warm and dry.   Vitals reviewed.      Recent Labs      07/23/17   0331   WBC  5.5   RBC  3.45*   HEMOGLOBIN  10.0*   HEMATOCRIT  31.2*   MCV  90.4   MCH  29.0   MCHC  32.1*   RDW  50.6*   PLATELETCT  283   MPV  8.4*     Recent Labs      07/23/17   0331   SODIUM  143   POTASSIUM  4.0   CHLORIDE  106   CO2  30   GLUCOSE  97   BUN  6*   CREATININE  0.76   CALCIUM  8.7                      Assessment/Plan     * Sepsis (CMS-HCC) (present on admission)  Assessment & Plan  Cultures: bacteroides fragilis, e. Coli, enterococcus faecium, strep  viridans and candida albicans  Active Orders     Ordered     Ordering Provider       Sun Jul 23, 2017  2:17 PM    07/23/17 1417  fluconazole (DIFLUCAN) tablet 400 mg   DAILY      Nanci Jeffers PHARMD       Sat Jul 22, 2017  2:32 PM    07/22/17 1432  vancomycin 1,300 mg in  mL IVPB   EVERY 24 HOURS     Comments:  Per Pharmacokinetic Protocol    Rachna Krishna PHARMD       Sat Jul 22, 2017  7:17 AM    07/22/17 Ifoema BURNETTE ALERT... vancomycin per pharmacy protocol   PHARMACY TO DOSE     Question:  Indication(s) for vancomycin?  Answer:  Intra-abdominal infection    Osman Vega M.D.      Surgery?  I have asked infectious disease to consult as well.    Abdominal abscess (CMS-HCC) (present on admission)  Assessment & Plan  Active Orders     Ordered     Ordering Provider       Mon Jul 24, 2017 10:21 AM    07/24/17 1021  cefTRIAXone (ROCEPHIN) 1 g in  mL IVPB   EVERY 24 HOURS      Oliver Fish M.D.    07/24/17 1021  metronidazole (FLAGYL) tablet 500 mg   EVERY 8 HOURS      Oliver Fish M.D.       [place] Jul 23, 2017  2:17 PM    07/23/17 1417  fluconazole (DIFLUCAN) tablet 400 mg   DAILY      Nanci Jeffers PHARMD       Sat Jul 22, 2017  2:32 PM    07/22/17 1432  vancomycin 1,300 mg in  mL IVPB   EVERY 24 HOURS     Comments:  Per Pharmacokinetic Protocol    Rachna Krishna PHARMD       Sat Jul 22, 2017  7:17 AM    07/22/17 Ifeoma BURNETTE ALERT... vancomycin per pharmacy protocol   PHARMACY TO DOSE     Question:  Indication(s) for vancomycin?  Answer:  Intra-abdominal infection    Osman Vega M.D.       As in sepsis    BPH (benign prostatic hypertrophy) (present on admission)  Assessment & Plan  Flomax    Depression (present on admission)  Assessment & Plan  Stable    Osteoarthritis (present on admission)  Assessment & Plan  Stable, prn analgesia      Labs reviewed, Medications reviewed and Radiology images reviewed        DVT Prophylaxis: Enoxaparin (Lovenox)      Antibiotics:  Treating active infection/contamination beyond 24 hours perioperative coverage

## 2017-07-24 NOTE — PROGRESS NOTES
"Surgical Progress Note    Author: Jorge Rodriguez Date & Time created: 2017   8:18 AM     Interval Events:  Drain output recorded, noted to be 0 ml out in last 24 hours. (from 10 ml, 20 ml, 55 ml in last three days)  \"Eating like a stevadore,\" + flatus, + BM, notes BM firming up  Pain minimal, well controlled.    Review of Systems   Constitutional: Negative.    Gastrointestinal: Negative for nausea, vomiting and abdominal pain.     Hemodynamics:  Temp (24hrs), Av °C (98.6 °F), Min:36.5 °C (97.7 °F), Max:37.4 °C (99.4 °F)  Temperature: 36.5 °C (97.7 °F)  Pulse  Av.9  Min: 57  Max: 105   Blood Pressure : 121/61 mmHg     Respiratory:    Respiration: 16, Pulse Oximetry: 92 %        RUL Breath Sounds: Clear, RML Breath Sounds: Clear, RLL Breath Sounds: Diminished, TOSHIA Breath Sounds: Clear, LLL Breath Sounds: Diminished  Neuro:  GCS = 15       Fluids:    Intake/Output Summary (Last 24 hours) at 17 0818  Last data filed at 17 0808   Gross per 24 hour   Intake    950 ml   Output   1700 ml   Net   -750 ml        Current Diet Order   Procedures   • DIET ORDER     Physical Exam   Constitutional: He is oriented to person, place, and time. He appears well-developed and well-nourished. No distress.   HENT:   Head: Normocephalic.   Eyes: Pupils are equal, round, and reactive to light. No scleral icterus.   Cardiovascular: Normal rate, regular rhythm and normal heart sounds.    Pulmonary/Chest: Effort normal and breath sounds normal. No respiratory distress.   Abdominal: Soft. Bowel sounds are normal. He exhibits no distension. There is tenderness (minimally, RLQ). There is no rebound and no guarding.   Drain output recorded as 0 in last 24 hours.   Neurological: He is alert and oriented to person, place, and time.   Skin: Skin is warm and dry.   Psychiatric: He has a normal mood and affect. His behavior is normal. Judgment and thought content normal.     Labs:  Recent Results (from the past 24 " hour(s))   VANCOMYCIN TROUGH LEVEL    Collection Time: 07/23/17 11:38 AM   Result Value Ref Range    Vancomycin Trough 8.8 (L) 10.0 - 20.0 ug/mL     Medical Decision Making, by Problem:  Active Hospital Problems    Diagnosis   • Sepsis (CMS-[facility]) [A41.9]     Priority: High   • Abdominal abscess (CMS-[facility]) [K65.1]     Priority: High   • UTI (urinary tract infection) [N39.0]     Priority: High   • BPH (benign prostatic hypertrophy) [N40.0]     Priority: Low   • Depression [F32.9]     Priority: Low   • Osteoarthritis [M19.90]     Priority: Low     Plan:  Recommend CT scan of pelvis to eval abscess, effectiveness of drain        Consider surgery if no significant improvement of abscess.  Will discuss with primary service.    Quality Measures:  Medications reviewed and Labs reviewed  Ruff catheter: No Ruff      DVT Prophylaxis: Enoxaparin (Lovenox)      Antibiotics: Treating active infection/contamination beyond 24 hours perioperative coverage        Discussed patient condition with Patient

## 2017-07-25 LAB
EKG IMPRESSION: NORMAL
GRAM STN SPEC: NORMAL
SIGNIFICANT IND 70042: NORMAL
SITE SITE: NORMAL
SOURCE SOURCE: NORMAL

## 2017-07-25 PROCEDURE — 501428 HCHG STAPLER, CURVED: Performed by: SURGERY

## 2017-07-25 PROCEDURE — 700102 HCHG RX REV CODE 250 W/ 637 OVERRIDE(OP): Performed by: HOSPITALIST

## 2017-07-25 PROCEDURE — 0DTJ0ZZ RESECTION OF APPENDIX, OPEN APPROACH: ICD-10-PCS | Performed by: SURGERY

## 2017-07-25 PROCEDURE — 0DN80ZZ RELEASE SMALL INTESTINE, OPEN APPROACH: ICD-10-PCS | Performed by: SURGERY

## 2017-07-25 PROCEDURE — 0DNN0ZZ RELEASE SIGMOID COLON, OPEN APPROACH: ICD-10-PCS | Performed by: SURGERY

## 2017-07-25 PROCEDURE — 93010 ELECTROCARDIOGRAM REPORT: CPT | Performed by: INTERNAL MEDICINE

## 2017-07-25 PROCEDURE — 700102 HCHG RX REV CODE 250 W/ 637 OVERRIDE(OP)

## 2017-07-25 PROCEDURE — 97535 SELF CARE MNGMENT TRAINING: CPT

## 2017-07-25 PROCEDURE — 87077 CULTURE AEROBIC IDENTIFY: CPT

## 2017-07-25 PROCEDURE — 88304 TISSUE EXAM BY PATHOLOGIST: CPT

## 2017-07-25 PROCEDURE — 700111 HCHG RX REV CODE 636 W/ 250 OVERRIDE (IP): Performed by: HOSPITALIST

## 2017-07-25 PROCEDURE — 700105 HCHG RX REV CODE 258: Performed by: HOSPITALIST

## 2017-07-25 PROCEDURE — A9270 NON-COVERED ITEM OR SERVICE: HCPCS | Performed by: HOSPITALIST

## 2017-07-25 PROCEDURE — 700102 HCHG RX REV CODE 250 W/ 637 OVERRIDE(OP): Performed by: INTERNAL MEDICINE

## 2017-07-25 PROCEDURE — 500122 HCHG BOVIE, BLADE: Performed by: SURGERY

## 2017-07-25 PROCEDURE — 501838 HCHG SUTURE GENERAL: Performed by: SURGERY

## 2017-07-25 PROCEDURE — 770001 HCHG ROOM/CARE - MED/SURG/GYN PRIV*

## 2017-07-25 PROCEDURE — 160029 HCHG SURGERY MINUTES - 1ST 30 MINS LEVEL 4: Performed by: SURGERY

## 2017-07-25 PROCEDURE — 502703 HCHG DEVICE, LIGASURE V SEALER: Performed by: SURGERY

## 2017-07-25 PROCEDURE — 700111 HCHG RX REV CODE 636 W/ 250 OVERRIDE (IP)

## 2017-07-25 PROCEDURE — 502240 HCHG MISC OR SUPPLY RC 0272: Performed by: SURGERY

## 2017-07-25 PROCEDURE — 160002 HCHG RECOVERY MINUTES (STAT): Performed by: SURGERY

## 2017-07-25 PROCEDURE — 700105 HCHG RX REV CODE 258: Performed by: RADIOLOGY

## 2017-07-25 PROCEDURE — 501704 HCHG VISCERA (FISH) RETAINER MED: Performed by: SURGERY

## 2017-07-25 PROCEDURE — 500697 HCHG HEMOCLIP, LARGE (ORANGE): Performed by: SURGERY

## 2017-07-25 PROCEDURE — 160048 HCHG OR STATISTICAL LEVEL 1-5: Performed by: SURGERY

## 2017-07-25 PROCEDURE — 160041 HCHG SURGERY MINUTES - EA ADDL 1 MIN LEVEL 4: Performed by: SURGERY

## 2017-07-25 PROCEDURE — 501433 HCHG STAPLER, GIA MULTIFIRE 60/80: Performed by: SURGERY

## 2017-07-25 PROCEDURE — 88307 TISSUE EXAM BY PATHOLOGIST: CPT

## 2017-07-25 PROCEDURE — A9270 NON-COVERED ITEM OR SERVICE: HCPCS | Performed by: INTERNAL MEDICINE

## 2017-07-25 PROCEDURE — 500698 HCHG HEMOCLIP, MEDIUM: Performed by: SURGERY

## 2017-07-25 PROCEDURE — 501452 HCHG STAPLES, GIA MULTIFIRE 60/80: Performed by: SURGERY

## 2017-07-25 PROCEDURE — 3E1M38Z IRRIGATION OF PERITONEAL CAVITY USING IRRIGATING SUBSTANCE, PERCUTANEOUS APPROACH: ICD-10-PCS | Performed by: SURGERY

## 2017-07-25 PROCEDURE — 160036 HCHG PACU - EA ADDL 30 MINS PHASE I: Performed by: SURGERY

## 2017-07-25 PROCEDURE — 700101 HCHG RX REV CODE 250: Performed by: SURGERY

## 2017-07-25 PROCEDURE — 700105 HCHG RX REV CODE 258

## 2017-07-25 PROCEDURE — 501445 HCHG STAPLER, SKIN DISP: Performed by: SURGERY

## 2017-07-25 PROCEDURE — 0D1M0Z4 BYPASS DESCENDING COLON TO CUTANEOUS, OPEN APPROACH: ICD-10-PCS | Performed by: SURGERY

## 2017-07-25 PROCEDURE — 160009 HCHG ANES TIME/MIN: Performed by: SURGERY

## 2017-07-25 PROCEDURE — 87070 CULTURE OTHR SPECIMN AEROBIC: CPT

## 2017-07-25 PROCEDURE — 502704 HCHG DEVICE, LIGASURE IMPACT: Performed by: SURGERY

## 2017-07-25 PROCEDURE — 87205 SMEAR GRAM STAIN: CPT

## 2017-07-25 PROCEDURE — 500445 HCHG HEMOSTAT, SURGICEL 4X8: Performed by: SURGERY

## 2017-07-25 PROCEDURE — A6402 STERILE GAUZE <= 16 SQ IN: HCPCS | Performed by: SURGERY

## 2017-07-25 PROCEDURE — 87186 SC STD MICRODIL/AGAR DIL: CPT

## 2017-07-25 PROCEDURE — 160035 HCHG PACU - 1ST 60 MINS PHASE I: Performed by: SURGERY

## 2017-07-25 PROCEDURE — 0DTN0ZZ RESECTION OF SIGMOID COLON, OPEN APPROACH: ICD-10-PCS | Performed by: SURGERY

## 2017-07-25 PROCEDURE — 87075 CULTR BACTERIA EXCEPT BLOOD: CPT

## 2017-07-25 PROCEDURE — A9270 NON-COVERED ITEM OR SERVICE: HCPCS

## 2017-07-25 PROCEDURE — 93005 ELECTROCARDIOGRAM TRACING: CPT | Performed by: SURGERY

## 2017-07-25 RX ORDER — ONDANSETRON 2 MG/ML
4 INJECTION INTRAMUSCULAR; INTRAVENOUS EVERY 4 HOURS PRN
Status: DISCONTINUED | OUTPATIENT
Start: 2017-07-25 | End: 2017-08-03

## 2017-07-25 RX ORDER — SODIUM CHLORIDE AND POTASSIUM CHLORIDE 150; 450 MG/100ML; MG/100ML
INJECTION, SOLUTION INTRAVENOUS CONTINUOUS
Status: DISCONTINUED | OUTPATIENT
Start: 2017-07-25 | End: 2017-07-28

## 2017-07-25 RX ORDER — SODIUM CHLORIDE AND POTASSIUM CHLORIDE 150; 450 MG/100ML; MG/100ML
INJECTION, SOLUTION INTRAVENOUS CONTINUOUS
Status: DISCONTINUED | OUTPATIENT
Start: 2017-07-25 | End: 2017-07-25

## 2017-07-25 RX ORDER — KETOROLAC TROMETHAMINE 30 MG/ML
INJECTION, SOLUTION INTRAMUSCULAR; INTRAVENOUS
Status: COMPLETED
Start: 2017-07-25 | End: 2017-07-25

## 2017-07-25 RX ADMIN — METRONIDAZOLE 500 MG: 500 TABLET ORAL at 22:09

## 2017-07-25 RX ADMIN — HYDROMORPHONE HYDROCHLORIDE 0.2 MG: 1 INJECTION, SOLUTION INTRAMUSCULAR; INTRAVENOUS; SUBCUTANEOUS at 18:32

## 2017-07-25 RX ADMIN — GABAPENTIN 200 MG: 100 CAPSULE ORAL at 08:17

## 2017-07-25 RX ADMIN — CEFTRIAXONE SODIUM 1 G: 1 INJECTION, POWDER, FOR SOLUTION INTRAMUSCULAR; INTRAVENOUS at 08:15

## 2017-07-25 RX ADMIN — HYDROMORPHONE HYDROCHLORIDE 0.2 MG: 1 INJECTION, SOLUTION INTRAMUSCULAR; INTRAVENOUS; SUBCUTANEOUS at 18:57

## 2017-07-25 RX ADMIN — KETOROLAC TROMETHAMINE 30 MG: 30 INJECTION, SOLUTION INTRAMUSCULAR at 16:43

## 2017-07-25 RX ADMIN — OXYCODONE HYDROCHLORIDE 5 MG: 5 TABLET ORAL at 08:24

## 2017-07-25 RX ADMIN — Medication: at 18:47

## 2017-07-25 RX ADMIN — SODIUM CHLORIDE 500 ML: 9 INJECTION, SOLUTION INTRAVENOUS at 08:24

## 2017-07-25 RX ADMIN — HYDROMORPHONE HYDROCHLORIDE 0.2 MG: 1 INJECTION, SOLUTION INTRAMUSCULAR; INTRAVENOUS; SUBCUTANEOUS at 19:02

## 2017-07-25 RX ADMIN — OMEPRAZOLE 20 MG: 20 CAPSULE, DELAYED RELEASE ORAL at 08:17

## 2017-07-25 RX ADMIN — LAMOTRIGINE 150 MG: 100 TABLET ORAL at 08:17

## 2017-07-25 RX ADMIN — FLUCONAZOLE 400 MG: 200 TABLET ORAL at 08:17

## 2017-07-25 RX ADMIN — GABAPENTIN 200 MG: 100 CAPSULE ORAL at 22:09

## 2017-07-25 RX ADMIN — TAMSULOSIN HYDROCHLORIDE 0.4 MG: 0.4 CAPSULE ORAL at 08:17

## 2017-07-25 RX ADMIN — VANCOMYCIN HYDROCHLORIDE 1300 MG: 100 INJECTION, POWDER, LYOPHILIZED, FOR SOLUTION INTRAVENOUS at 11:59

## 2017-07-25 RX ADMIN — METRONIDAZOLE 500 MG: 500 TABLET ORAL at 08:17

## 2017-07-25 RX ADMIN — HYDROMORPHONE HYDROCHLORIDE 0.2 MG: 1 INJECTION, SOLUTION INTRAMUSCULAR; INTRAVENOUS; SUBCUTANEOUS at 19:07

## 2017-07-25 RX ADMIN — POTASSIUM CHLORIDE AND SODIUM CHLORIDE: 450; 150 INJECTION, SOLUTION INTRAVENOUS at 00:00

## 2017-07-25 RX ADMIN — HYDROMORPHONE HYDROCHLORIDE 0.2 MG: 1 INJECTION, SOLUTION INTRAMUSCULAR; INTRAVENOUS; SUBCUTANEOUS at 18:41

## 2017-07-25 ASSESSMENT — ENCOUNTER SYMPTOMS
NAUSEA: 0
VOMITING: 0
RESPIRATORY NEGATIVE: 1
CARDIOVASCULAR NEGATIVE: 1
ABDOMINAL PAIN: 1
FEVER: 0
SENSORY CHANGE: 0
SPEECH CHANGE: 0
SHORTNESS OF BREATH: 0
HEADACHES: 0
COUGH: 0
MYALGIAS: 0
NAUSEA: 1

## 2017-07-25 ASSESSMENT — COGNITIVE AND FUNCTIONAL STATUS - GENERAL
HELP NEEDED FOR BATHING: A LOT
PERSONAL GROOMING: A LITTLE
TOILETING: A LITTLE
DRESSING REGULAR LOWER BODY CLOTHING: A LITTLE
DAILY ACTIVITIY SCORE: 18
DRESSING REGULAR UPPER BODY CLOTHING: A LITTLE
SUGGESTED CMS G CODE MODIFIER DAILY ACTIVITY: CK

## 2017-07-25 ASSESSMENT — PAIN SCALES - GENERAL
PAINLEVEL_OUTOF10: 3
PAINLEVEL_OUTOF10: 0
PAINLEVEL_OUTOF10: 5
PAINLEVEL_OUTOF10: 0
PAINLEVEL_OUTOF10: 5
PAINLEVEL_OUTOF10: 0
PAINLEVEL_OUTOF10: 10
PAINLEVEL_OUTOF10: 10
PAINLEVEL_OUTOF10: 0
PAINLEVEL_OUTOF10: 8

## 2017-07-25 NOTE — PROGRESS NOTES
Pt down to pre op holding via bed with transport. Glasses and dentures left at bedside. IV pump with patient d/t vanco infusing. Right pinky finger with ring in place. Pt unable to remove. Surgical consent in place on chart.

## 2017-07-25 NOTE — PROGRESS NOTES
VSS  A&O x 4  No complaints of nausea at this time  Pain. Medicated per MAR  Percutaneous drain flushed with 10cc.    + urine output.    Tolerating diet well  BL elbows redness with blanching  BL Heels redness with blanching.    POC discussed  No further needs at this time

## 2017-07-25 NOTE — PROGRESS NOTES
Mr. Colon is a 75 YO male that was admitted for a pelvic abscess. He is taken to the OR by Dr. Rodriguez. I wasn't able to see patient at he is currently in the OR but spoke to care giver at bedside. Also, review chart including consultants note, labs and imaging.

## 2017-07-25 NOTE — CARE PLAN
Problem: Safety  Goal: Will remain free from injury  Outcome: PROGRESSING AS EXPECTED  Pt calls appropriately, Call light within reach, bed locked in lowest position, near nursing station.     Problem: Pain Management  Goal: Pain level will decrease to patient’s comfort goal  Outcome: PROGRESSING AS EXPECTED  Controlled with oral pain medications.

## 2017-07-25 NOTE — PROGRESS NOTES
Pharmacy Kinetics 76 y.o. male on vancomycin day #4 7/25/2017    Currently on Vancomycin 1300 mg iv q24hr (@1200)    Indication for Treatment: Intra-abdominal infection    Pertinent history per medical record: Admitted on 7/14/2017 for groin ache over last several days, UTI noted - started on Cipro, CT shows large diverticular abscess on L and likely a perforated appendiceal abscess on R. ON 7/15, IR drain of large pelvic abscess - started on Rocephin and Flagyl.  PMH of BPH.    Other antibiotics: fluconazole 400mg PO q24h, ceftriaxone 1 gm q24h    Allergies: Review of patient's allergies indicates no known allergies.     List concerns for renal function: low albumin, age    Pertinent cultures to date:    7/19/17: pelvic abscess - enterococcus faecium, candida albicans  7/15/17: pelvic abscess - E.coli, Enterococcus faecium, viridans strep, bacteroides fragilis  7/14/17: blood - negative  7/1417: urine - negative       ENTEROCOCCUS FAECIUM        Antibiotic  Sensitivity  Microscan  Unit  Status       Ampicillin  Resistant  >8  mcg/mL  Final       Daptomycin  Sensitive  4  mcg/mL  Final       Gent Synergy  Resistant  >500  mcg/mL  Final       Penicillin  Resistant  >8  mcg/mL  Final       Strep Synergy  Sensitive  <=1000  mcg/mL  Final       Vancomycin  Sensitive  1  mcg/mL  Final            ESCHERICHIA COLI        Antibiotic  Sensitivity  Microscan  Unit  Status       Ampicillin  Resistant  >16  mcg/mL  Final       Cefepime  Sensitive  <=8  mcg/mL  Final       Cefotaxime  Sensitive  <=2  mcg/mL  Final       Cefotetan  Sensitive  <=16  mcg/mL  Final       Ceftazidime  Sensitive  <=1  mcg/mL  Final       Ceftriaxone  Sensitive  <=8  mcg/mL  Final       Cefuroxime  Sensitive  <=4  mcg/mL  Final       Ciprofloxacin  Resistant  >2  mcg/mL  Final       Ertapenem  Sensitive  <=1  mcg/mL  Final       Gentamicin  Resistant  >8  mcg/mL  Final       Pip/Tazobactam  Sensitive  <=16  mcg/mL  Final       Tigecycline  Sensitive   "<=2  mcg/mL  Final       Tobramycin  Resistant  >8  mcg/mL  Final       Trimeth/Sulfa  Sensitive  <=2/38  mcg/mL  Final                Recent Labs      17   0331   WBC  5.5     Recent Labs      17   0331   BUN  6*   CREATININE  0.76   ALBUMIN  2.5*     Recent Labs      17   1138   VANCOTROUGH  8.8*     Intake/Output Summary (Last 24 hours) at 17 1210  Last data filed at 17 0900   Gross per 24 hour   Intake    785 ml   Output   2190 ml   Net  -1405 ml      Blood pressure 112/57, pulse 77, temperature 36.6 °C (97.8 °F), resp. rate 18, height 1.727 m (5' 8\"), weight 66.4 kg (146 lb 6.2 oz), SpO2 90 %. Temp (24hrs), Av.8 °C (98.2 °F), Min:36.6 °C (97.8 °F), Max:37.1 °C (98.8 °F)    A/P   1. Next vancomycin level: 17 at 1130 (ordered)  2. Goal trough: 12-16 mcg/mL  3. Comments: Continuing with vancomycin. ID consulting. I will obtain a level in tomorrow to assess dosing when patient is closer to steady state - a level was drawn but it was after the first dose to assess whether the patient could tolerate q24h dosing. Cultures and sensitives as above - therapy changed to vanco+C3+fluconazole. Pharmacy will continue to monitor and adjust dosing as clinically appropriate.    Nanci Jeffers, PharmD  "

## 2017-07-25 NOTE — OR SURGEON
Operative Report    PreOp Diagnosis: Pelvic abscess    PostOp Diagnosis: 1.  Pelvic abscess  2.  Inflammation of sigmoid colon    Procedure(s):  EXPLORATORY LAPAROTOMY- ABDOMINAL WASH OUT - Wound Class: Dirty or Infected  SIGMOID COLON RESECTION - Wound Class: Dirty or Infected  COLOSTOMY- FOR OSTOMY PLACEMENT AND OTHER PROCEDURES AS INDICATED - Wound Class: Dirty or Infected  APPENDECTOMY - Wound Class: Dirty or Infected    Surgeon(s):  MONIQUE Pena M.D.    Anesthesiologist/Type of Anesthesia:  Anesthesiologist: Willy Walter M.D./General    Surgical Staff:  Circulator: Haley Hernandez R.N.  Relief Scrub: Vanessa Kerr  Scrub Person: Patrica Hess Crosby: Sunitha Ramirez R.N.    Specimens:  1.  Pelvic abscess fluid to lab for Gram stain, C&S  2.  Sigmoid colon  3.  Appendix    Estimated Blood Loss: 150 ml    Findings: Inflamed sigmoid colon, densely adhered to posterior pelvis.  Large abscess cavity in pelvis.  Inflamed appendix, adhered to small bowel, with small bowel adhesions to the appendix    Complications: None        7/25/2017 3:55 PM Jorge Rodriguez

## 2017-07-25 NOTE — WOUND TEAM
"Ostomy Pre-Operative Marking and Teaching       Date of Surgery:  17  Type of Surgery:  Exploratory Laparotomy-Abdoominal washout; Sigmoid colon resection; colostomy for placement and other procedures as indicated  Surgeon: Dr. Rodriguez    Subjective:  \"It's near my appendix\"    Objective: Assessed patient to identify potential stoma site within his/her line of site on a flat pouching surface, within the rectus muscle, away from belt-line, bony prominences, exiting scars and umbilicus.    Assessment:  Potential site (s) located for: Colostomy__X__, Ileostomy__X__  Urostomy Other_________  1. Procedure explained to the patient.  2. Rectus muscle identified with patient in supine position.  3. Appropriate abdominal quadrant for planned surgical procedure identified.  4. Existing scars identified.  NONE  5. Potential sites evaluated with patient:      a. Supine  X      b. Sitting   X      c. Bending forward/ twisting at waist   6. Site (s) selected with respect to skin folds, creases, abdominal contours and belt line.  7. Special considerations:      a. Wheel chair bound      b. Child      c. Continent procedure      d. Patient with multiple stomas      e. Ambulatory  X  8. Potential site (s) marked with an \"X\" (skin prepped with skin protectant wipe, summer applied with indelible marker                       Site(s) marked:  RLQ, L middle quad= marked for ileostomy as well since patient is reporting the abscess is near his appendix (?).  Patient wears pants below waistline and no scars noted.  His abdomen is distended so marked away from potential skin folds as best as could be determined.    Patient Teachin. Reviewed nature of surgical procedure.  2. Reviewed basic anatomy and function of the GI or  Tract.  3. Reviewed and provided the patient with literature-Colostomy  4. Pouching system with hands on demonstrated.  5. Questions and concerns addressed  6.     Plan:  "

## 2017-07-25 NOTE — PROGRESS NOTES
Surgical Progress Note    Author: Jorge Rodriguez Date & Time created: 2017   9:24 AM     Interval Events:  CT scan results reviewed with patient yesterday.  Still with large abscess, decreased in size but not close to resolution.  Passing flatus, having BMs.  Scheduled for exploratory laparotomy, abdominal washout, sigmoid colectomy, probable colostomy placement, and other procedures as indicated today.    Review of Systems   Respiratory: Negative.    Cardiovascular: Negative.    Gastrointestinal: Positive for abdominal pain. Negative for nausea and vomiting.     Hemodynamics:  Temp (24hrs), Av.8 °C (98.2 °F), Min:36.6 °C (97.8 °F), Max:37.1 °C (98.8 °F)  Temperature: 36.6 °C (97.8 °F)  Pulse  Av  Min: 57  Max: 105   Blood Pressure : 112/57 mmHg     Respiratory:    Respiration: 18, Pulse Oximetry: 90 %        RUL Breath Sounds: Clear, RML Breath Sounds: Clear, RLL Breath Sounds: Diminished, TOSHIA Breath Sounds: Clear, LLL Breath Sounds: Diminished  Neuro:  GCS = 15       Fluids:    Intake/Output Summary (Last 24 hours) at 17 0924  Last data filed at 17 0800   Gross per 24 hour   Intake    920 ml   Output   1790 ml   Net   -870 ml        Current Diet Order   Procedures   • DIET NPO     Physical Exam   Constitutional: He is oriented to person, place, and time. He appears well-developed and well-nourished. No distress.   HENT:   Head: Normocephalic.   Eyes: Pupils are equal, round, and reactive to light. No scleral icterus.   Cardiovascular: Normal rate, regular rhythm and normal heart sounds.    Pulmonary/Chest: Effort normal and breath sounds normal. No respiratory distress.   Abdominal: Soft. He exhibits no distension. There is tenderness (mild, superpupic region.). There is no rebound and no guarding.   Neurological: He is alert and oriented to person, place, and time.   Skin: Skin is warm and dry.   Psychiatric: He has a normal mood and affect. His behavior is normal. Judgment and  thought content normal.     Labs:  No results found for this or any previous visit (from the past 24 hour(s)).  Medical Decision Making, by Problem:  Active Hospital Problems    Diagnosis   • Sepsis (CMS-[facility]) [A41.9]     Priority: High   • Abdominal abscess (CMS-[facility]) [K65.1]     Priority: High   • BPH (benign prostatic hypertrophy) [N40.0]     Priority: Low   • Depression [F32.9]     Priority: Low   • Osteoarthritis [M19.90]     Priority: Low     Plan:  To OR for exploratory laparotomy, abdominal washout, sigmoid colectomy, probable colostomy placement, and other procedures as indicated today.  Continue NPO now, IVF, IV antibiotics.    Quality Measures:  Labs reviewed and Medications reviewed  Ruff catheter: No Ruff      DVT Prophylaxis: Not indicated at this time, ambulatory and Contraindicated - High bleeding risk (Going to surgery today)  DVT prophylaxis - mechanical: SCDs    Antibiotics: Treating active infection/contamination beyond 24 hours perioperative coverage        Discussed patient condition with Patient

## 2017-07-25 NOTE — THERAPY
"Occupational Therapy Treatment completed with focus on ADLs, ADL transfers and patient education.  Functional Status:  Pt seen for OT tx today.  Pt was pleasant and cooperative throughout the session but continues to be limited by weakness, endurance, and self care.  Pt declined activity despite education due to fatigue and sx planned for this pm.  Educated on home safety and d/c planning.  Pt stated he will return home with 24/7 care from friends that he has prearranged.  Pt was requesting a shower chair and FWW for home but stated has all other equipment needed.   Plan of Care: Will benefit from Occupational Therapy 3 times per week  Discharge Recommendations:  Equipment Front-Wheel Walker, Shower Chair and Grab Bars. Post-acute therapy Discharge to a transitional care facility for continued skilled therapy services. and Discharge to home with outpatient or home health for additional skilled therapy services.    See \"Rehab Therapy-Acute\" Patient Summary Report for complete documentation.   "

## 2017-07-25 NOTE — CARE PLAN
Problem: Safety  Goal: Will remain free from injury  Bed alarm on and call light within reach.    Problem: Knowledge Deficit  Goal: Knowledge of disease process/condition, treatment plan, diagnostic tests, and medications will improve  Plan of care for shift discussed with pt. Pt to have surgery later today.    Problem: Pain Management  Goal: Pain level will decrease to patient’s comfort goal  Oxycodone provided for pain relief.

## 2017-07-26 ENCOUNTER — APPOINTMENT (OUTPATIENT)
Dept: RADIOLOGY | Facility: MEDICAL CENTER | Age: 77
DRG: 853 | End: 2017-07-26
Attending: INTERNAL MEDICINE
Payer: MEDICARE

## 2017-07-26 LAB
ALBUMIN SERPL BCP-MCNC: 2.3 G/DL (ref 3.2–4.9)
ALBUMIN SERPL BCP-MCNC: 2.5 G/DL (ref 3.2–4.9)
ALBUMIN/GLOB SERPL: 0.9 G/DL
ALP SERPL-CCNC: 80 U/L (ref 30–99)
ALT SERPL-CCNC: 16 U/L (ref 2–50)
ANION GAP SERPL CALC-SCNC: 6 MMOL/L (ref 0–11.9)
AST SERPL-CCNC: 21 U/L (ref 12–45)
BASOPHILS # BLD AUTO: 0.1 % (ref 0–1.8)
BASOPHILS # BLD: 0.01 K/UL (ref 0–0.12)
BILIRUB SERPL-MCNC: 0.4 MG/DL (ref 0.1–1.5)
BUN SERPL-MCNC: 7 MG/DL (ref 8–22)
BUN SERPL-MCNC: 8 MG/DL (ref 8–22)
CALCIUM SERPL-MCNC: 8.3 MG/DL (ref 8.5–10.5)
CALCIUM SERPL-MCNC: 8.5 MG/DL (ref 8.5–10.5)
CHLORIDE SERPL-SCNC: 102 MMOL/L (ref 96–112)
CHLORIDE SERPL-SCNC: 103 MMOL/L (ref 96–112)
CO2 SERPL-SCNC: 28 MMOL/L (ref 20–33)
CO2 SERPL-SCNC: 29 MMOL/L (ref 20–33)
CREAT SERPL-MCNC: 0.7 MG/DL (ref 0.5–1.4)
CREAT SERPL-MCNC: 0.75 MG/DL (ref 0.5–1.4)
EOSINOPHIL # BLD AUTO: 0 K/UL (ref 0–0.51)
EOSINOPHIL NFR BLD: 0 % (ref 0–6.9)
ERYTHROCYTE [DISTWIDTH] IN BLOOD BY AUTOMATED COUNT: 50.4 FL (ref 35.9–50)
GFR SERPL CREATININE-BSD FRML MDRD: >60 ML/MIN/1.73 M 2
GFR SERPL CREATININE-BSD FRML MDRD: >60 ML/MIN/1.73 M 2
GLOBULIN SER CALC-MCNC: 2.6 G/DL (ref 1.9–3.5)
GLUCOSE SERPL-MCNC: 131 MG/DL (ref 65–99)
GLUCOSE SERPL-MCNC: 143 MG/DL (ref 65–99)
HCT VFR BLD AUTO: 30.4 % (ref 42–52)
HGB BLD-MCNC: 9.9 G/DL (ref 14–18)
IMM GRANULOCYTES # BLD AUTO: 0.07 K/UL (ref 0–0.11)
IMM GRANULOCYTES NFR BLD AUTO: 0.9 % (ref 0–0.9)
LYMPHOCYTES # BLD AUTO: 0.45 K/UL (ref 1–4.8)
LYMPHOCYTES NFR BLD: 5.9 % (ref 22–41)
MCH RBC QN AUTO: 28.9 PG (ref 27–33)
MCHC RBC AUTO-ENTMCNC: 32.6 G/DL (ref 33.7–35.3)
MCV RBC AUTO: 88.6 FL (ref 81.4–97.8)
MONOCYTES # BLD AUTO: 0.32 K/UL (ref 0–0.85)
MONOCYTES NFR BLD AUTO: 4.2 % (ref 0–13.4)
NEUTROPHILS # BLD AUTO: 6.75 K/UL (ref 1.82–7.42)
NEUTROPHILS NFR BLD: 88.9 % (ref 44–72)
NRBC # BLD AUTO: 0 K/UL
NRBC BLD AUTO-RTO: 0 /100 WBC
PHOSPHATE SERPL-MCNC: 3.8 MG/DL (ref 2.5–4.5)
PLATELET # BLD AUTO: 262 K/UL (ref 164–446)
PMV BLD AUTO: 8.7 FL (ref 9–12.9)
POTASSIUM SERPL-SCNC: 4.1 MMOL/L (ref 3.6–5.5)
POTASSIUM SERPL-SCNC: 4.1 MMOL/L (ref 3.6–5.5)
PROT SERPL-MCNC: 4.9 G/DL (ref 6–8.2)
RBC # BLD AUTO: 3.43 M/UL (ref 4.7–6.1)
SODIUM SERPL-SCNC: 137 MMOL/L (ref 135–145)
SODIUM SERPL-SCNC: 137 MMOL/L (ref 135–145)
VANCOMYCIN TROUGH SERPL-MCNC: 10.1 UG/ML (ref 10–20)
WBC # BLD AUTO: 7.6 K/UL (ref 4.8–10.8)

## 2017-07-26 PROCEDURE — 700105 HCHG RX REV CODE 258

## 2017-07-26 PROCEDURE — 80053 COMPREHEN METABOLIC PANEL: CPT

## 2017-07-26 PROCEDURE — 700102 HCHG RX REV CODE 250 W/ 637 OVERRIDE(OP): Performed by: HOSPITALIST

## 2017-07-26 PROCEDURE — 700111 HCHG RX REV CODE 636 W/ 250 OVERRIDE (IP)

## 2017-07-26 PROCEDURE — 700102 HCHG RX REV CODE 250 W/ 637 OVERRIDE(OP)

## 2017-07-26 PROCEDURE — 302111 WAFER OST 2.25IN N IMG RD 2 PC (BARRIER): Performed by: SURGERY

## 2017-07-26 PROCEDURE — A9270 NON-COVERED ITEM OR SERVICE: HCPCS | Performed by: HOSPITALIST

## 2017-07-26 PROCEDURE — 80069 RENAL FUNCTION PANEL: CPT

## 2017-07-26 PROCEDURE — 700101 HCHG RX REV CODE 250: Performed by: SURGERY

## 2017-07-26 PROCEDURE — 36415 COLL VENOUS BLD VENIPUNCTURE: CPT

## 2017-07-26 PROCEDURE — 36569 INSJ PICC 5 YR+ W/O IMAGING: CPT

## 2017-07-26 PROCEDURE — 80202 ASSAY OF VANCOMYCIN: CPT

## 2017-07-26 PROCEDURE — A9270 NON-COVERED ITEM OR SERVICE: HCPCS

## 2017-07-26 PROCEDURE — 302098 PASTE RING (FLAT): Performed by: SURGERY

## 2017-07-26 PROCEDURE — A9270 NON-COVERED ITEM OR SERVICE: HCPCS | Performed by: INTERNAL MEDICINE

## 2017-07-26 PROCEDURE — 85025 COMPLETE CBC W/AUTO DIFF WBC: CPT

## 2017-07-26 PROCEDURE — 700111 HCHG RX REV CODE 636 W/ 250 OVERRIDE (IP): Performed by: HOSPITALIST

## 2017-07-26 PROCEDURE — 700102 HCHG RX REV CODE 250 W/ 637 OVERRIDE(OP): Performed by: INTERNAL MEDICINE

## 2017-07-26 PROCEDURE — C1751 CATH, INF, PER/CENT/MIDLINE: HCPCS

## 2017-07-26 PROCEDURE — 770001 HCHG ROOM/CARE - MED/SURG/GYN PRIV*

## 2017-07-26 PROCEDURE — 700105 HCHG RX REV CODE 258: Performed by: HOSPITALIST

## 2017-07-26 RX ADMIN — OMEPRAZOLE 20 MG: 20 CAPSULE, DELAYED RELEASE ORAL at 08:36

## 2017-07-26 RX ADMIN — METRONIDAZOLE 500 MG: 500 TABLET ORAL at 20:59

## 2017-07-26 RX ADMIN — POTASSIUM CHLORIDE AND SODIUM CHLORIDE: 450; 150 INJECTION, SOLUTION INTRAVENOUS at 12:33

## 2017-07-26 RX ADMIN — GABAPENTIN 200 MG: 100 CAPSULE ORAL at 15:11

## 2017-07-26 RX ADMIN — TAMSULOSIN HYDROCHLORIDE 0.4 MG: 0.4 CAPSULE ORAL at 08:35

## 2017-07-26 RX ADMIN — GABAPENTIN 200 MG: 100 CAPSULE ORAL at 20:59

## 2017-07-26 RX ADMIN — METRONIDAZOLE 500 MG: 500 TABLET ORAL at 15:11

## 2017-07-26 RX ADMIN — POTASSIUM CHLORIDE AND SODIUM CHLORIDE: 450; 150 INJECTION, SOLUTION INTRAVENOUS at 23:28

## 2017-07-26 RX ADMIN — CEFTRIAXONE SODIUM 1 G: 1 INJECTION, POWDER, FOR SOLUTION INTRAMUSCULAR; INTRAVENOUS at 08:36

## 2017-07-26 RX ADMIN — VANCOMYCIN HYDROCHLORIDE 900 MG: 100 INJECTION, POWDER, LYOPHILIZED, FOR SOLUTION INTRAVENOUS at 12:43

## 2017-07-26 RX ADMIN — LAMOTRIGINE 150 MG: 100 TABLET ORAL at 08:35

## 2017-07-26 RX ADMIN — METRONIDAZOLE 500 MG: 500 TABLET ORAL at 06:51

## 2017-07-26 RX ADMIN — GABAPENTIN 200 MG: 100 CAPSULE ORAL at 08:36

## 2017-07-26 RX ADMIN — FLUCONAZOLE 400 MG: 200 TABLET ORAL at 08:35

## 2017-07-26 ASSESSMENT — ENCOUNTER SYMPTOMS
NERVOUS/ANXIOUS: 0
SENSORY CHANGE: 0
CARDIOVASCULAR NEGATIVE: 1
NAUSEA: 0
WEAKNESS: 1
FOCAL WEAKNESS: 0
FEVER: 0
NAUSEA: 1
RESPIRATORY NEGATIVE: 1
PALPITATIONS: 0
SHORTNESS OF BREATH: 0
DIARRHEA: 0
SPEECH CHANGE: 0
COUGH: 0
DIZZINESS: 0
CONSTIPATION: 0
VOMITING: 0
MYALGIAS: 0
CHILLS: 0
ABDOMINAL PAIN: 1
HEADACHES: 0

## 2017-07-26 ASSESSMENT — PAIN SCALES - GENERAL
PAINLEVEL_OUTOF10: ASSUMED PAIN PRESENT
PAINLEVEL_OUTOF10: 10
PAINLEVEL_OUTOF10: ASSUMED PAIN PRESENT
PAINLEVEL_OUTOF10: ASSUMED PAIN PRESENT
PAINLEVEL_OUTOF10: 5
PAINLEVEL_OUTOF10: ASSUMED PAIN PRESENT

## 2017-07-26 NOTE — PROGRESS NOTES
Infectious Disease Progress Note    Author: Mis Herbert Date & Time created: 2017  8:36 PM    Interval History:  76-year-old white male admitted with pelvic abscess possibly due to bowel perforation due to constipation  17- low-grade fevers. Continues to have abdominal pain  Labs Reviewed, Medications Reviewed, Radiology Reviewed and Wound Reviewed.    Review of Systems:  Review of Systems   Constitutional: Positive for malaise/fatigue. Negative for fever.   Respiratory: Negative for cough and shortness of breath.    Cardiovascular: Negative for chest pain.   Gastrointestinal: Positive for nausea and abdominal pain.   Genitourinary: Negative for dysuria.   Musculoskeletal: Negative for myalgias.   Neurological: Negative for sensory change, speech change and headaches.       Hemodynamics:  Temp (24hrs), Av.9 °C (98.4 °F), Min:36.6 °C (97.8 °F), Max:37.9 °C (100.3 °F)  Temperature: 36.6 °C (97.8 °F)  Pulse  Av.9  Min: 57  Max: 105Heart Rate (Monitored): 82  Blood Pressure : 126/49 mmHg, NIBP: 142/72 mmHg       Physical Exam:  Physical Exam   Constitutional: He is oriented to person, place, and time. No distress.   HENT:   Mouth/Throat: No oropharyngeal exudate.   Eyes: No scleral icterus.   Neck: Neck supple.   Cardiovascular: Regular rhythm.    No murmur heard.  Pulmonary/Chest: He has no wheezes. He has no rales.   Abdominal: Soft. There is tenderness.   Lower quadrant drain present   Musculoskeletal: He exhibits no edema.   Neurological: He is alert and oriented to person, place, and time.   Vitals reviewed.      Labs:  Recent Labs      17   WBC  5.5   RBC  3.45*   HEMOGLOBIN  10.0*   HEMATOCRIT  31.2*   MCV  90.4   MCH  29.0   RDW  50.6*   PLATELETCT  283   MPV  8.4*     Recent Labs      17   SODIUM  143   POTASSIUM  4.0   CHLORIDE  106   CO2  30   GLUCOSE  97   BUN  6*     Recent Labs      17   ALBUMIN  2.5*   TBILIRUBIN  0.4   ALKPHOSPHAT  82    TOTPROTEIN  5.0*   ALTSGPT  22   ASTSGOT  17   CREATININE  0.76       Wound:       Fluids:  Intake/Output       07/23/17 0700 - 07/24/17 0659 07/24/17 0700 - 07/25/17 0659 07/25/17 0700 - 07/26/17 0659      [id number] 8815-8891 Total 9402-1654 2048-1326 Total 6429-6651 0908-4711 Total       Intake    P.O.  600  -- 600  --  480 480  --  118 118    P.O. 600 -- 600 -- 480 480 -- 118 118    I.V.  350  -- 350  440  -- 440  2185  -- 2185    Crystalloid Intake -- -- -- -- -- -- 1700 -- 1700    IV Volume -- -- -- -- -- -- 300 -- 300    IV Volume (NS) -- -- -- 90 -- 90 75 -- 75    IV Piggyback Volume (IV Piggyback) 350 -- 350 350 -- 350 110 -- 110    Total Intake 950 -- 950 440  118 2303       Output    Urine  --  800 800  1150  800 1950  1350  345 1695    Number of Times Voided 3 x 1 x 4 x -- -- -- -- -- --    Indwelling Cathether -- -- -- -- -- -- -- 345 345    Void (ml) --  800 1950 1350 -- 1350    Drains  0  -- 0  5  10 15  0  -- 0    Sukhwinder Oquendo 1 0 -- 0 5 10 15 0 -- 0    Stool  --  -- --  --  -- --  --  -- --    Number of Times Stooled 3 x -- 3 x 1 x 0 x 1 x 1 x -- 1 x    Blood  --  -- --  --  -- --  100  -- 100    Est. Blood Loss (mL) -- -- -- -- -- -- 100 -- 100    Total Output 0  810 1965 4312 394 3191       Net I/O     950 -800 150 -985 -524 -8008 261 -237 502             Assessment:  Active Hospital Problems    Diagnosis   • *Sepsis (CMS-[facility]) [A41.9]   • Abdominal abscess (CMS-[facility]) [K65.1]   • BPH (benign prostatic hypertrophy) [N40.0]   • Depression [F32.9]   • Osteoarthritis [M19.90]       Plan:  Pelvic abscess  Likely due to the perforation  Status post ir drain ×2  Cultures of enterococcus faecium, viridans strep, Bacteroides fragilis and Candida albicans  CT scan done on 7/24/2017 shows persistent pelvic abscess  For surgery today  Follow the OR cultures  Continue with the present  antibiotics

## 2017-07-26 NOTE — PROGRESS NOTES
"[facility] Hospitalist Progress Note    Date of Service: 2017    Chief Complaint  75 y/o M with PMH of peripheral neuropathy, BPH, Depression: admitted for above and found to have pelvis abscess on imaging.    Interval Problem Update  -Doing well this am, still requires the PCA. Tolerating diet. Not gas or stool in his ostomy.     -S/p Ex lap and abdominal washout     - he is very anxious to discharge. I discussed the case with Dr. Rodriguez and we will get repeat imaging. If the abscess is resolved, we can pull the drain. If the abscess just simply is not draining, Dr. Rodriguez plans to take him to the operating room. I explained this all to the patient.     - feels okay, pain fairly minimal, but very anxious. \"Just cut me open and fix it so I can go home.\" Acutely about the same.     Consultants/Specialty  [facility] - Surgery    Disposition  Clinical      Review of Systems   Constitutional: Negative for fever and chills.   Respiratory: Negative for cough and shortness of breath.    Cardiovascular: Negative for chest pain and palpitations.   Gastrointestinal: Positive for abdominal pain. Negative for nausea, vomiting, diarrhea and constipation.   Genitourinary: Negative for dysuria.   Musculoskeletal: Negative for myalgias.   Skin: Negative for itching.   Neurological: Positive for weakness. Negative for dizziness, focal weakness and headaches.   Psychiatric/Behavioral: The patient is not nervous/anxious (anxious to get out of the hospital).    All other systems reviewed and are negative.     Physical Exam  Laboratory/Imaging   Hemodynamics  Temp (24hrs), Av.4 °C (97.6 °F), Min:36 °C (96.8 °F), Max:36.8 °C (98.3 °F)   Temperature: 36.8 °C (98.3 °F)  Pulse  Av.7  Min: 55  Max: 105 Heart Rate (Monitored): 82  Blood Pressure : 110/49 mmHg, NIBP: 142/72 mmHg      Respiratory      Respiration: 18, Pulse Oximetry: 91 %        RUL Breath Sounds: Clear, RML Breath Sounds: Clear, RLL Breath " Sounds: Clear, TOSHIA Breath Sounds: Clear, LLL Breath Sounds: Clear    Fluids    Intake/Output Summary (Last 24 hours) at 07/26/17 1457  Last data filed at 07/26/17 1242   Gross per 24 hour   Intake   2321 ml   Output   1570 ml   Net    751 ml       Nutrition  Orders Placed This Encounter   Procedures   • Diet Order     Standing Status: Standing      Number of Occurrences: 1      Standing Expiration Date:      Order Specific Question:  Diet:     Answer:  Clear Liquid [10]     Physical Exam   Constitutional: He is oriented to person, place, and time. He appears well-developed and well-nourished.   HENT:   Head: Normocephalic and atraumatic.   Mouth/Throat: Oropharynx is clear and moist.   Eyes: Conjunctivae and EOM are normal. Pupils are equal, round, and reactive to light. No scleral icterus.   Neck: Normal range of motion. Neck supple. No tracheal deviation present. No thyromegaly present.   Cardiovascular: Normal rate, regular rhythm, normal heart sounds and intact distal pulses.    No murmur heard.  Pulmonary/Chest: Effort normal and breath sounds normal. No respiratory distress. He has no wheezes.   Abdominal: Soft. Bowel sounds are normal. He exhibits no distension. There is tenderness.   Colostomy in place, no stool and bowel gas in the bag. Ostomy site looks pink.    Musculoskeletal: Normal range of motion. He exhibits no edema or tenderness.   Lymphadenopathy:     He has no cervical adenopathy.        Right: No supraclavicular adenopathy present.        Left: No supraclavicular adenopathy present.   Neurological: He is alert and oriented to person, place, and time. No cranial nerve deficit.   Skin: Skin is warm and dry.   Vitals reviewed.      Recent Labs      07/26/17   0248   WBC  7.6   RBC  3.43*   HEMOGLOBIN  9.9*   HEMATOCRIT  30.4*   MCV  88.6   MCH  28.9   MCHC  32.6*   RDW  50.4*   PLATELETCT  262   MPV  8.7*     Recent Labs      07/26/17   0248  07/26/17   0803   SODIUM  137  137   POTASSIUM  4.1  4.1    CHLORIDE  102  103   CO2  29  28   GLUCOSE  143*  131*   BUN  7*  8   CREATININE  0.70  0.75   CALCIUM  8.3*  8.5                      Assessment/Plan     * Sepsis (CMS-HCC) (present on admission)  Assessment & Plan  Resolving  Continue IV abx per ID recommendations    Abdominal abscess (CMS-HCC) (present on admission)  Assessment & Plan  S/p ex lap and wash out on 07/25  Doing well post-op  Cultures grew Group D enterococcus and yeast from 07/25  ID following  Patient on vancomycin, rocephin, flagyl and diflucan  WBC WNL today      BPH (benign prostatic hypertrophy) (present on admission)  Assessment & Plan  Flomax    Depression (present on admission)  Assessment & Plan  Stable    Osteoarthritis (present on admission)  Assessment & Plan  Stable, prn analgesia      Labs reviewed, Medications reviewed and Radiology images reviewed  Ruff catheter: Critically Ill - Requiring Accurate Measurement of Urinary Output      DVT Prophylaxis: Enoxaparin (Lovenox)      Antibiotics: Treating active infection/contamination beyond 24 hours perioperative coverage

## 2017-07-26 NOTE — OR NURSING
Pt remains somnolent. VSS on 6L oxymask. Dr. Ng called and message left, waiting for response. Dr. Pollard in PACU and notified of above. Dr. Pollard came to bedside to assess pt. Pt nonverbal but opened eyes to painful stimulation. Order given to continue monitoring. No other orders given at this time.

## 2017-07-26 NOTE — PROGRESS NOTES
Received bedside report from BART Roblero. Pt is AAOx4. REESE. VSS. Pt complaining of pain; encouraged pt to use morphine PCA. -N/V; tolerating clears.+BS. Pt has L sided ostomy; stoma is red w/ small serous output. Wound care nurse to come today for ostomy teaching. Midline abdominal incision; CDI. Ruff in place; orders to d/c tomorrow. Pt up 1x assist. POC discussed. Bed locked and in the lowest position. Call light w/in reach. Hourly rounding in place.

## 2017-07-26 NOTE — PROGRESS NOTES
Surgical Progress Note    Author: Jorge Rodriguez Date & Time created: 2017   8:28 AM     Interval Events:  Pain well controlled.  Tolerating clear liquid diet.    Review of Systems   Constitutional: Negative for fever, chills and malaise/fatigue.   Respiratory: Negative.    Cardiovascular: Negative.    Gastrointestinal: Positive for abdominal pain. Negative for nausea and vomiting.     Hemodynamics:  Temp (24hrs), Av.6 °C (97.9 °F), Min:36 °C (96.8 °F), Max:37.9 °C (100.3 °F)  Temperature: 36.1 °C (97 °F)  Pulse  Av  Min: 57  Max: 105Heart Rate (Monitored): 82  Blood Pressure : 123/64 mmHg, NIBP: 142/72 mmHg     Respiratory:    Respiration: 18, Pulse Oximetry: 97 %        RUL Breath Sounds: Clear, RML Breath Sounds: Clear, RLL Breath Sounds: Clear, TOSHIA Breath Sounds: Clear, LLL Breath Sounds: Clear  Neuro:  GCS = 15        Fluids:    Intake/Output Summary (Last 24 hours) at 17 0828  Last data filed at 17 0651   Gross per 24 hour   Intake   2266 ml   Output   1670 ml   Net    596 ml        Current Diet Order   Procedures   • Diet Order     Physical Exam   Constitutional: He is oriented to person, place, and time. He appears well-developed and well-nourished. No distress.   HENT:   Head: Normocephalic.   Eyes: Pupils are equal, round, and reactive to light. No scleral icterus.   Cardiovascular: Normal rate, regular rhythm and normal heart sounds.    Pulmonary/Chest: Effort normal and breath sounds normal.   Abdominal: Soft. He exhibits no distension. There is tenderness. There is no rebound and no guarding.   Colostomy intact, healthy appearing  Midline incision dressed, dressing dry.   Neurological: He is alert and oriented to person, place, and time.   Psychiatric: He has a normal mood and affect. His behavior is normal. Judgment and thought content normal.     Labs:  Recent Results (from the past 24 hour(s))   EKG    Collection Time: 17 12:39 PM   Result Value Ref Range     Report       [facility]    Test Date:  2017  Pt Name:    NANCIE EARL                  Department: 141  MRN:        [id number]                      [facility]  Gender:     M                            Technician: PC  :        [id number]                   Requested By:EWA CARLOS  Order #:    [id number]                    Reading MD: Asif Colón MD    Measurements  Intervals                                Axis  Rate:       61                           P:          21  TN:         132                          QRS:        -68  QRSD:       166                          T:          -38  QT:         500  QTc:        504    Interpretive Statements  SINUS RHYTHM  RBBB AND LAFB  No previous ECG available for comparison    Electronically Signed On 2017 13:52:24 PDT by Asif Colón MD     GRAM STAIN    Collection Time: 17  2:45 PM   Result Value Ref Range    Significant Indicator .     Source WND     Site Peritoneal Abscess     Gram Stain Result Many WBCs.  Few Yeast.  Few Gram positive cocci.      CBC WITH DIFFERENTIAL    Collection Time: 17  2:48 AM   Result Value Ref Range    WBC 7.6 4.8 - 10.8 K/uL    RBC 3.43 (L) 4.70 - 6.10 M/uL    Hemoglobin 9.9 (L) 14.0 - 18.0 g/dL    Hematocrit 30.4 (L) 42.0 - 52.0 %    MCV 88.6 81.4 - 97.8 fL    MCH 28.9 27.0 - 33.0 pg    MCHC 32.6 (L) 33.7 - 35.3 g/dL    RDW 50.4 (H) 35.9 - 50.0 fL    Platelet Count 262 164 - 446 K/uL    MPV 8.7 (L) 9.0 - 12.9 fL    Neutrophils-Polys 88.90 (H) 44.00 - 72.00 %    Lymphocytes 5.90 (L) 22.00 - 41.00 %    Monocytes 4.20 0.00 - 13.40 %    Eosinophils 0.00 0.00 - 6.90 %    Basophils 0.10 0.00 - 1.80 %    Immature Granulocytes 0.90 0.00 - 0.90 %    Nucleated RBC 0.00 /100 WBC    Neutrophils (Absolute) 6.75 1.82 - 7.42 K/uL    Lymphs (Absolute) 0.45 (L) 1.00 - 4.80 K/uL    Monos (Absolute) 0.32 0.00 - 0.85 K/uL    Eos (Absolute) 0.00 0.00 - 0.51 K/uL    Baso (Absolute) 0.01 0.00 - 0.12 K/uL    Immature  Granulocytes (abs) 0.07 0.00 - 0.11 K/uL    NRBC (Absolute) 0.00 K/uL   Comp Metabolic Panel (CMP)    Collection Time: 07/26/17  2:48 AM   Result Value Ref Range    Sodium 137 135 - 145 mmol/L    Potassium 4.1 3.6 - 5.5 mmol/L    Chloride 102 96 - 112 mmol/L    Co2 29 20 - 33 mmol/L    Anion Gap 6.0 0.0 - 11.9    Glucose 143 (H) 65 - 99 mg/dL    Bun 7 (L) 8 - 22 mg/dL    Creatinine 0.70 0.50 - 1.40 mg/dL    Calcium 8.3 (L) 8.5 - 10.5 mg/dL    AST(SGOT) 21 12 - 45 U/L    ALT(SGPT) 16 2 - 50 U/L    Alkaline Phosphatase 80 30 - 99 U/L    Total Bilirubin 0.4 0.1 - 1.5 mg/dL    Albumin 2.3 (L) 3.2 - 4.9 g/dL    Total Protein 4.9 (L) 6.0 - 8.2 g/dL    Globulin 2.6 1.9 - 3.5 g/dL    A-G Ratio 0.9 g/dL   ESTIMATED GFR    Collection Time: 07/26/17  2:48 AM   Result Value Ref Range    GFR If African American >60 >60 mL/min/1.73 m 2    GFR If Non African American >60 >60 mL/min/1.73 m 2     Medical Decision Making, by Problem:  Active Hospital Problems    Diagnosis   • Sepsis (CMS-[facility]) [A41.9]     Priority: High   • Abdominal abscess (CMS-[facility]) [K65.1]     Priority: High   • BPH (benign prostatic hypertrophy) [N40.0]     Priority: Low   • Depression [F32.9]     Priority: Low   • Osteoarthritis [M19.90]     Priority: Low     Plan:  Continue CLD today, IVF today.  Colostomy teaching  Ambulate if possible  Ruff out tomorrow    Quality Measures:  Labs reviewed and Medications reviewed  Ruff catheter: One or Two Days Post Surgery (Day of Surgery being Day 0)      DVT Prophylaxis: Enoxaparin (Lovenox)  DVT prophylaxis - mechanical: SCDs  Ulcer prophylaxis: Yes  Antibiotics: Treating active infection/contamination beyond 24 hours perioperative coverage        Discussed patient condition with Family, RN and Patient

## 2017-07-26 NOTE — PROGRESS NOTES
Pt a&o x4. VSS, on 2L nc. Pain controlled with morphine pump. denies nausea, tolerating diet. Ruff has good output. Little drainage from ostomy. Repositions self. Call bell in reach. Bed alarm on

## 2017-07-26 NOTE — OP REPORT
Date of Surgery - 7/25/17    Preoperative Diagnosis - 1.  Intraperitoneal abscess  2.  Possible diverticulitis    Postoperative diagnosis - 1.  Intraperitoneal abscess  2.  Possible diverticulitis  3.  Possible appendicitis    Indication for surgery - 76-year-old male admitted on 7/14/17 with a CT proven intra-abdominal abscess. He has undergone IR drainage of this abscess, with repositioning of the prior drain, and has failed to resolve the abscess    Procedure(s) -   1.  Exploratory laparotomy   2.  Abdominal washout  3.  Sigmoid colon resection  4.  Colostomy placement  5.  Appendectomy    Surgeon - Tim Rodriguez M.D.    Asst. Surgeon - Emerald Haddad M.D.    Anesthesiologist - Willy Walter M.D.    Anesthesia - General endotracheal anesthesia    Findings - Inflamed sigmoid colon, densely adhered to posterior pelvis.  Large abscess cavity in pelvis.  Inflamed appendix, adhered to small bowel, with small bowel adhesions to the appendix    Procedure narrative - Signed informed consent was on the chart at the time of the procedure. The patient was brought to the operating room and placed in the supine position. General endotracheal anesthesia was induced. The patient was noted to be on appropriate scheduled antibiotics. A timeout was performed. A Ruff catheter was placed. Using a 10 blade scalpel, a midline incision was made from superior to the umbilicus to just superior to the pubic bone. The length of this incision was partially 30 cm. The soft tissue was dissected through using careful Bovie cautery to the midline fascia. The midline fascia was divided for a short distance above the umbilicus using Bovie cautery. Once the fascia was divided for a short distance, the underlying soft tissue was dissected through bluntly until the peritoneum was reached. This was divided sharply for a length adequate for the surgeon to place his fingers into the peritoneal space. There were some minimal adhesions to the  anterior abdominal wall, which were easily taken down using blunt dissection. As these adhesions were taken down, the fascia and peritoneum was divided over a surgeon's fingers for the length of the skin incision. A [place] retractor was placed once the adhesions had been taken down to the anterior abdominal wall. The small bowel was run, and there was noted to be one area where the small bowel was folded over on itself around the appendix. On exploration of the pelvis, there was noted to be a moderate to large-size abscess. This was opened and a sample of this abscess material was sent to the lab for Gram stain, culture and sensitivity. The abscess cavity was irrigated and suctioned dry, using 1 L of normal saline to wash out the area completely. The sigmoid colon was noted to be very inflamed and adhered to the surrounding structures. These adhesions were taken down using a combination of blunt dissection, sharp dissection, and careful Bovie cauterization. Eventually, I was able to get beyond the inflamed portion of the sigmoid colon, and made a window in the mesentery to the colon at approximately the rectosigmoid junction. The colon was divided at this site using a Contour SUSAN stapler. A site was chosen on the proximal sigmoid colon just below the end of the descending colon, where a 2nd division of the colon would take place. A window was made in the mesentery at the site, and the colon was divided with a SUSAN stapler. The mesentery to the sigmoid colon was taken down using a combination of the LigaSure impact device and individually ligating the large vessels to the sigmoid colon. The sigmoid colon was removed as a single specimen and sent to pathology. The pelvis was again irrigated and there was noted to be some bleeding in the rough surfaces left behind by the inflammation. A piece of Surgicel was placed in the pelvis and a dry lap was placed on top of that. Attention was turned to the appendix. The small  bowel that was adhered to the appendix was carefully dissected off of the appendix. It was unclear whether the source of his abscess was appendicitis. The appendix was removed by 1st taking down the mesoappendix using, again, the LigaSure impact device and ligating the appendiceal artery with a 2-0 Vicryl suture. Once the appendix was skeletonized to its base, a straight clamp was placed on the base of the appendix briefly then moved just distally. An 0 Vicryl suture was used to ligate the appendix at its base and the appendix was amputated just distal to this ligation. The appendix was sent to pathology. The appendiceal stump was imbricated into the cecum using a 2-0 Vicryl suture and a Z configuration suture. The white line of Toldt on the patient's left was then divided, freeing up the descending colon. Once it was noted that the descending colon had adequate length to pass through the abdominal wall without tension, a disc of skin was taken at the previously marked left lower quadrant colostomy site. The underlying soft tissue was taken as a column of tissue down to the muscular fascia. The external fascia was divided in a cruciate fashion and the underlying muscle was spread in a muscle-sparing fashion. The posterior fascia was then divided in a cruciate fashion. The tunnel through the anterior abdominal wall was dilated with 2 surgeon's fingers. The colon was then brought up through the tunnel through the anterior abdominal wall and was noted to be without twist and 2 not be under any tension while in the proper position. The colon was secured to the internal fascia at 3 positions using a 2-0 Vicryl suture. 2 separate 2-0 Prolene sutures were placed at the corners of the suture line on the rectum for identification later. The previously placed Surgicel was removed, and hemostasis was noted to be excellent. The liver, stomach, and spleen were all palpated and no abnormalities were appreciated. No other  abnormalities were noted in the small bowel or colon. The peritoneal space was irrigated with 2 additional liters of warm normal saline and this irrigant returned clear. The lap, needle, and instrument counts were noted to be correct The fascia was closed with a series of #1 Vicryl interrupted sutures. After irrigating the subcutaneous tissue, the skin was closed with a series of skin staples. The midline incision was excluded using 4 x 4's covered by clear Tegaderms. The colostomy was then opened by removing the staple line, and matured in the usual fashion using a series of 3-0 interrupted sutures. A colostomy bag was then fitted to the patient.    Estimated blood loss - 150 ml    Specimen(s) -   1.  Pelvic abscess fluid to lab for Gram stain, C&S  2.  Sigmoid colon  3.  Appendix    Complications - None    Disposition - The patient was noted to be stable in the postanesthesia care unit and was eventually transferred back up to the general surgery floor in fair condition

## 2017-07-26 NOTE — CARE PLAN
Problem: Infection  Goal: Will remain free from infection  Outcome: PROGRESSING AS EXPECTED  WBC 7.6. ID rounding on patient. Pt is on rocephin, fluconazole, flagyl and vanco     Problem: Pain Management  Goal: Pain level will decrease to patient’s comfort goal  Outcome: PROGRESSING AS EXPECTED  Pt has a morphone PCa; pt encouraged to press button as needed     Problem: Urinary Elimination:  Goal: Ability to reestablish a normal urinary elimination pattern will improve  Outcome: PROGRESSING AS EXPECTED  Ruff in place; good U/O. Pt on flomax

## 2017-07-26 NOTE — PROGRESS NOTES
Pharmacy Kinetics 76 y.o. male on vancomycin day #5 7/26/2017    Currently on Vancomycin 1300 mg iv q24hr (@1200)    Indication for Treatment: Intra-abdominal infection  ID following: Yes    Pertinent history per medical record: Admitted on 7/14/2017 for groin ache over last several days, UTI noted - started on Cipro, CT shows large diverticular abscess on L and likely a perforated appendiceal abscess on R. ON 7/15, IR drain of large pelvic abscess - started on Rocephin and Flagyl.  PMH of BPH.    Other antibiotics: fluconazole 400mg PO q24h, ceftriaxone 1 gm q24h    Allergies: Review of patient's allergies indicates no known allergies.     List concerns for renal function: low albumin, age    Pertinent cultures to date:    7/19/17: pelvic abscess - enterococcus faecium, candida albicans  7/15/17: pelvic abscess - E.coli, Enterococcus faecium, viridans strep, bacteroides fragilis  7/14/17: blood - negative  7/1417: urine - negative    ENTEROCOCCUS FAECIUM      Antibiotic Sensitivity Microscan Unit Status     Ampicillin Sensitive <=2 mcg/mL Final     Daptomycin Sensitive 4 mcg/mL Final     Gent Synergy Sensitive <=500 mcg/mL Final     Penicillin Sensitive 8 mcg/mL Final     Vancomycin Sensitive 1 mcg/mL Final        ESCHERICHIA COLI      Antibiotic Sensitivity Microscan Unit Status     Ampicillin Resistant >16 mcg/mL Final     Cefepime Sensitive <=8 mcg/mL Final     Cefotaxime Sensitive <=2 mcg/mL Final     Cefotetan Sensitive <=16 mcg/mL Final     Ceftazidime Sensitive <=1 mcg/mL Final     Ceftriaxone Sensitive <=8 mcg/mL Final     Cefuroxime Sensitive <=4 mcg/mL Final     Ciprofloxacin Resistant >2 mcg/mL Final     Ertapenem Sensitive <=1 mcg/mL Final     Gentamicin Resistant >8 mcg/mL Final     Pip/Tazobactam Sensitive <=16 mcg/mL Final     Tigecycline Sensitive <=2 mcg/mL Final     Tobramycin Resistant >8 mcg/mL Final     Trimeth/Sulfa Sensitive <=2/38 mcg/mL Final        Recent Labs      07/26/17   0248   WBC   "7.6   NEUTSPOLYS  88.90*     Recent Labs      17   0248  17   0803   BUN  7*  8   CREATININE  0.70  0.75   ALBUMIN  2.3*  2.5*     Recent Labs      17   1103   VANCOTROUGH  10.1     Intake/Output Summary (Last 24 hours) at 17 1212  Last data filed at 17 0800   Gross per 24 hour   Intake   2221 ml   Output   1270 ml   Net    951 ml      Blood pressure 125/67, pulse 55, temperature 36.7 °C (98 °F), resp. rate 17, height 1.727 m (5' 8\"), weight 66.4 kg (146 lb 6.2 oz), SpO2 94 %. Temp (24hrs), Av.6 °C (97.9 °F), Min:36 °C (96.8 °F), Max:37.9 °C (100.3 °F)    A/P   1. Vancomycin dose change: increase to 900 mg q12h ()  2. Next vancomycin level: 17 at 1230 (not ordered)  3. Goal trough: 12-16 mcg/mL  4. Comments: Repeat level was still sub-therapeutic. I will increase the total daily dose to target a trough closer to 14 mcg/mL. I will split the doses due to 1800 mg = > 25 mg/kg. I will obtain a level in 2 days (prior to the 4th total dose) at the new dosing to assess trough level unless otherwise indicated. ID is following the patient. Pharmacy will continue to monitor and adjust dosing as clinically appropriate.    Nanci Jeffers, PharmD  "

## 2017-07-26 NOTE — CARE PLAN
Problem: Communication  Goal: The ability to communicate needs accurately and effectively will improve  Intervention: [place] patient and significant other/support system to call light to alert staff of needs  Patient demonstrates ability to use call light appropriately and communicate needs with staff.       Problem: Pain Management  Goal: Pain level will decrease to patient’s comfort goal  Intervention: Follow pain managment plan developed in collaboration with patient and Interdisciplinary Team  Pt pain controlled with morphine PCA pump. Educated to report if pain not managed with pump

## 2017-07-26 NOTE — PROGRESS NOTES
Infectious Disease Progress Note    Author: Mis Herbert Date of service & Time created: 2017  12:27 PM    Interval History:  76-year-old white male admitted with pelvic abscess possibly due to bowel perforation due to constipation  17- low-grade fevers. Continues to have abdominal pain  17- complains of some abdominal pain. MAXIMUM TEMPERATURE 100.3. WBC 7.6  Labs Reviewed, Medications Reviewed, Radiology Reviewed and Wound Reviewed.    Review of Systems:  Review of Systems   Constitutional: Positive for malaise/fatigue. Negative for fever.   Respiratory: Negative for cough and shortness of breath.    Cardiovascular: Negative for chest pain.   Gastrointestinal: Positive for nausea and abdominal pain.   Genitourinary: Negative for dysuria.   Musculoskeletal: Negative for myalgias.   Neurological: Negative for sensory change, speech change and headaches.       Hemodynamics:  Temp (24hrs), Av.6 °C (97.9 °F), Min:36 °C (96.8 °F), Max:37.9 °C (100.3 °F)  Temperature: 36.7 °C (98 °F)  Pulse  Av.8  Min: 55  Max: 105Heart Rate (Monitored): 82  Blood Pressure : 125/67 mmHg, NIBP: 142/72 mmHg       Physical Exam:  Physical Exam   Constitutional: He is oriented to person, place, and time. No distress.   HENT:   Mouth/Throat: No oropharyngeal exudate.   Eyes: No scleral icterus.   Neck: Neck supple.   Cardiovascular: Regular rhythm.    No murmur heard.  Pulmonary/Chest: He has no wheezes. He has no rales.   Abdominal: Soft. There is tenderness.   Colostomy present  Midline incision bandaged   Musculoskeletal: He exhibits no edema.   Neurological: He is alert and oriented to person, place, and time.   Vitals reviewed.      Labs:  Recent Labs      17   0248   WBC  7.6   RBC  3.43*   HEMOGLOBIN  9.9*   HEMATOCRIT  30.4*   MCV  88.6   MCH  28.9   RDW  50.4*   PLATELETCT  262   MPV  8.7*   NEUTSPOLYS  88.90*   LYMPHOCYTES  5.90*   MONOCYTES  4.20   EOSINOPHILS  0.00   BASOPHILS  0.10     Recent Labs       07/26/17   0248  07/26/17   0803   SODIUM  137  137   POTASSIUM  4.1  4.1   CHLORIDE  102  103   CO2  29  28   GLUCOSE  143*  131*   BUN  7*  8     Recent Labs      07/26/17   0248  07/26/17   0803   ALBUMIN  2.3*  2.5*   TBILIRUBIN  0.4   --    ALKPHOSPHAT  80   --    TOTPROTEIN  4.9*   --    ALTSGPT  16   --    ASTSGOT  21   --    CREATININE  0.70  0.75       Wound:       Fluids:  Intake/Output       07/24/17 0700 - 07/25/17 0659 07/25/17 0700 - 07/26/17 0659 07/26/17 0700 - 07/27/17 0659      4588-8008 4278-5309 Total 8450-0157 5781-6712 Total 2431-5355 9069-8364 Total       Intake    P.O.  --  480 480  --  118 118  --  -- --    P.O. -- 480 480 -- 118 118 -- -- --    I.V.  440  -- 440  2185  3 2188  100  -- 100    Crystalloid Intake -- -- -- 1700 -- 1700 -- -- --    IV Volume -- -- -- 300 -- 300 -- -- --    PCA End of Shift Total Volume (ml) -- -- -- -- 3 3 -- -- --    IV Volume (NS) 90 -- 90 75 -- 75 -- -- --    IV Piggyback Volume (IV Piggyback) 350 -- 350 110 -- 110 100 -- 100    Total Intake 440  121 2306 100 -- 100       Output    Urine  1150  800 1950  1350  945 2295  --  -- --    Indwelling Cathether -- -- -- -- 945 945 -- -- --    Void (ml) 5641 880 5085 1350 -- 1350 -- -- --    Drains  5  10 15  0  -- 0  --  -- --    Sukhwinder Oquendo 1 5 10 15 0 -- 0 -- -- --    Stool  --  -- --  --  -- --  --  -- --    Number of Times Stooled 1 x 0 x 1 x 1 x -- 1 x -- -- --    Blood  --  -- --  100  -- 100  --  -- --    Est. Blood Loss (mL) -- -- -- 100 -- 100 -- -- --    Total Output 4273 206 6800 2033 585 1773 -- -- --       Net I/O     -715 -330 -1045 735 -824 -89 100 -- 100             Assessment:  Active Hospital Problems    Diagnosis   • *Sepsis (CMS-[facility]) [A41.9]   • Abdominal abscess (CMS-[facility]) [K65.1]   • BPH (benign prostatic hypertrophy) [N40.0]   • Depression [F32.9]   • Osteoarthritis [M19.90]       Plan:  Pelvic abscess  Likely due to the perforation  Status post ir drain ×2  Cultures of  enterococcus faecium, viridans strep, Bacteroides fragilis and Candida albicans  CT scan done on 7/24/2017 shows persistent pelvic abscess  Underwent exploratory Oquendo me, abdominal washout, sigmoid colon resection, colostomy placement and appendectomy on 7/26/17  Follow the OR cultures  Continue with the present  Antibiotics  Aim for 2 weeks of IV antibiotics  PICC line

## 2017-07-26 NOTE — WOUND TEAM
"[facility]  Inpatient Services  New Ostomy Management & Teaching    HPI:  Reviewed  PMH: Reviewed   SH: Reviewed    Subjective: \"I have neuropathy of my hands so I'm worried that I can't snap or close this thing\"    Objective:  Viable stoma LLQ without stool or flatus     Ostomy type:  colostomy   Stoma location:  LLQ   Stoma assessment:    Appearance: red, edematous    Size: 1 3/4\" oval    Protrusion: mildly budded    Output: none    MC jxn intact    Peristomal skin: clear     Ostomy Appliance (type and size): 2 1/4\" two piece    Interventions:  Met with patient and friend Jessie who is very supportive.  Reviewed written information and [employer] information obtained.  They observed removing old appliance and cleansing peristomal skin.  They watched how to make a pattern and then trace and cut barrier to fit.  Applied barrier to peristomal skin and patient able to partially snap on pouch.  They both closed end of pouch after demonstration.  Patient very motivated.  Stoma is lower than marked and instructed patient he will need to wear pants above stoma possibly.  Also instructed how to follow up at outpatient ostomy clinic at 4-6 weeks for appliance assessment.  Both very motivated.     Pt education: Questions and concerns addressed; see above    Evaluation:  Viable stoma without stool or flatus    Plan: Ostomy nurses to continue to follow for ostomy needs and teaching     Anticipated discharge needs: Supplies, supplier information, possible HH or outpatient ostomy clinic   "

## 2017-07-26 NOTE — OR NURSING
1830, pt woke up and is following commands appropriately, and is joking with RN. Report called to inpt RN. Pt medicated for pain and prior to transfer, reported pain tolerable. No nausea. VSS.

## 2017-07-27 PROBLEM — D64.9 NORMOCYTIC ANEMIA: Status: ACTIVE | Noted: 2017-07-27

## 2017-07-27 LAB
ALBUMIN SERPL BCP-MCNC: 2.5 G/DL (ref 3.2–4.9)
BASOPHILS # BLD AUTO: 0.1 % (ref 0–1.8)
BASOPHILS # BLD: 0.01 K/UL (ref 0–0.12)
BUN SERPL-MCNC: 8 MG/DL (ref 8–22)
CALCIUM SERPL-MCNC: 8.7 MG/DL (ref 8.5–10.5)
CHLORIDE SERPL-SCNC: 106 MMOL/L (ref 96–112)
CO2 SERPL-SCNC: 29 MMOL/L (ref 20–33)
CREAT SERPL-MCNC: 0.73 MG/DL (ref 0.5–1.4)
EOSINOPHIL # BLD AUTO: 0.03 K/UL (ref 0–0.51)
EOSINOPHIL NFR BLD: 0.4 % (ref 0–6.9)
ERYTHROCYTE [DISTWIDTH] IN BLOOD BY AUTOMATED COUNT: 52.1 FL (ref 35.9–50)
GFR SERPL CREATININE-BSD FRML MDRD: >60 ML/MIN/1.73 M 2
GLUCOSE SERPL-MCNC: 110 MG/DL (ref 65–99)
HCT VFR BLD AUTO: 28.4 % (ref 42–52)
HGB BLD-MCNC: 9.1 G/DL (ref 14–18)
IMM GRANULOCYTES # BLD AUTO: 0.06 K/UL (ref 0–0.11)
IMM GRANULOCYTES NFR BLD AUTO: 0.8 % (ref 0–0.9)
LYMPHOCYTES # BLD AUTO: 0.91 K/UL (ref 1–4.8)
LYMPHOCYTES NFR BLD: 12.7 % (ref 22–41)
MCH RBC QN AUTO: 29.2 PG (ref 27–33)
MCHC RBC AUTO-ENTMCNC: 32 G/DL (ref 33.7–35.3)
MCV RBC AUTO: 91 FL (ref 81.4–97.8)
MONOCYTES # BLD AUTO: 0.65 K/UL (ref 0–0.85)
MONOCYTES NFR BLD AUTO: 9.1 % (ref 0–13.4)
NEUTROPHILS # BLD AUTO: 5.51 K/UL (ref 1.82–7.42)
NEUTROPHILS NFR BLD: 76.9 % (ref 44–72)
NRBC # BLD AUTO: 0 K/UL
NRBC BLD AUTO-RTO: 0 /100 WBC
PHOSPHATE SERPL-MCNC: 3.1 MG/DL (ref 2.5–4.5)
PLATELET # BLD AUTO: 246 K/UL (ref 164–446)
PMV BLD AUTO: 8.6 FL (ref 9–12.9)
POTASSIUM SERPL-SCNC: 4.3 MMOL/L (ref 3.6–5.5)
RBC # BLD AUTO: 3.12 M/UL (ref 4.7–6.1)
SODIUM SERPL-SCNC: 139 MMOL/L (ref 135–145)
WBC # BLD AUTO: 7.2 K/UL (ref 4.8–10.8)

## 2017-07-27 PROCEDURE — A9270 NON-COVERED ITEM OR SERVICE: HCPCS

## 2017-07-27 PROCEDURE — 700102 HCHG RX REV CODE 250 W/ 637 OVERRIDE(OP)

## 2017-07-27 PROCEDURE — 700105 HCHG RX REV CODE 258: Performed by: INTERNAL MEDICINE

## 2017-07-27 PROCEDURE — A9270 NON-COVERED ITEM OR SERVICE: HCPCS | Performed by: INTERNAL MEDICINE

## 2017-07-27 PROCEDURE — 700105 HCHG RX REV CODE 258

## 2017-07-27 PROCEDURE — 51798 US URINE CAPACITY MEASURE: CPT

## 2017-07-27 PROCEDURE — 700102 HCHG RX REV CODE 250 W/ 637 OVERRIDE(OP): Performed by: HOSPITALIST

## 2017-07-27 PROCEDURE — A9270 NON-COVERED ITEM OR SERVICE: HCPCS | Performed by: NURSE PRACTITIONER

## 2017-07-27 PROCEDURE — 80069 RENAL FUNCTION PANEL: CPT

## 2017-07-27 PROCEDURE — 99232 SBSQ HOSP IP/OBS MODERATE 35: CPT | Performed by: INTERNAL MEDICINE

## 2017-07-27 PROCEDURE — 700101 HCHG RX REV CODE 250: Performed by: SURGERY

## 2017-07-27 PROCEDURE — A9270 NON-COVERED ITEM OR SERVICE: HCPCS | Performed by: HOSPITALIST

## 2017-07-27 PROCEDURE — 700111 HCHG RX REV CODE 636 W/ 250 OVERRIDE (IP): Performed by: INTERNAL MEDICINE

## 2017-07-27 PROCEDURE — 700111 HCHG RX REV CODE 636 W/ 250 OVERRIDE (IP)

## 2017-07-27 PROCEDURE — 85025 COMPLETE CBC W/AUTO DIFF WBC: CPT

## 2017-07-27 PROCEDURE — 700102 HCHG RX REV CODE 250 W/ 637 OVERRIDE(OP): Performed by: INTERNAL MEDICINE

## 2017-07-27 PROCEDURE — 700105 HCHG RX REV CODE 258: Performed by: HOSPITALIST

## 2017-07-27 PROCEDURE — 770021 HCHG ROOM/CARE - ISO PRIVATE

## 2017-07-27 PROCEDURE — 700111 HCHG RX REV CODE 636 W/ 250 OVERRIDE (IP): Performed by: SURGERY

## 2017-07-27 PROCEDURE — 700111 HCHG RX REV CODE 636 W/ 250 OVERRIDE (IP): Performed by: HOSPITALIST

## 2017-07-27 PROCEDURE — 700102 HCHG RX REV CODE 250 W/ 637 OVERRIDE(OP): Performed by: NURSE PRACTITIONER

## 2017-07-27 RX ORDER — OXYBUTYNIN CHLORIDE 5 MG/1
5 TABLET ORAL 2 TIMES DAILY
Status: DISCONTINUED | OUTPATIENT
Start: 2017-07-27 | End: 2017-08-18 | Stop reason: HOSPADM

## 2017-07-27 RX ADMIN — SODIUM CHLORIDE 100 MG: 9 INJECTION, SOLUTION INTRAVENOUS at 17:57

## 2017-07-27 RX ADMIN — OXYCODONE HYDROCHLORIDE 5 MG: 5 TABLET ORAL at 17:57

## 2017-07-27 RX ADMIN — Medication 50 MG: at 19:12

## 2017-07-27 RX ADMIN — POTASSIUM CHLORIDE AND SODIUM CHLORIDE: 450; 150 INJECTION, SOLUTION INTRAVENOUS at 16:31

## 2017-07-27 RX ADMIN — ENOXAPARIN SODIUM 30 MG: 100 INJECTION SUBCUTANEOUS at 16:26

## 2017-07-27 RX ADMIN — GABAPENTIN 200 MG: 100 CAPSULE ORAL at 16:26

## 2017-07-27 RX ADMIN — OXYBUTYNIN CHLORIDE 5 MG: 5 TABLET ORAL at 02:01

## 2017-07-27 RX ADMIN — METRONIDAZOLE 500 MG: 500 TABLET ORAL at 06:08

## 2017-07-27 RX ADMIN — OMEPRAZOLE 20 MG: 20 CAPSULE, DELAYED RELEASE ORAL at 09:05

## 2017-07-27 RX ADMIN — GABAPENTIN 200 MG: 100 CAPSULE ORAL at 21:00

## 2017-07-27 RX ADMIN — OXYBUTYNIN CHLORIDE 5 MG: 5 TABLET ORAL at 21:00

## 2017-07-27 RX ADMIN — LAMOTRIGINE 150 MG: 100 TABLET ORAL at 09:05

## 2017-07-27 RX ADMIN — VANCOMYCIN HYDROCHLORIDE 900 MG: 100 INJECTION, POWDER, LYOPHILIZED, FOR SOLUTION INTRAVENOUS at 16:24

## 2017-07-27 RX ADMIN — POTASSIUM CHLORIDE AND SODIUM CHLORIDE: 450; 150 INJECTION, SOLUTION INTRAVENOUS at 09:01

## 2017-07-27 RX ADMIN — VANCOMYCIN HYDROCHLORIDE 900 MG: 100 INJECTION, POWDER, LYOPHILIZED, FOR SOLUTION INTRAVENOUS at 01:27

## 2017-07-27 RX ADMIN — CEFTRIAXONE SODIUM 1 G: 1 INJECTION, POWDER, FOR SOLUTION INTRAMUSCULAR; INTRAVENOUS at 09:01

## 2017-07-27 RX ADMIN — ENOXAPARIN SODIUM 30 MG: 100 INJECTION SUBCUTANEOUS at 01:27

## 2017-07-27 RX ADMIN — OXYBUTYNIN CHLORIDE 5 MG: 5 TABLET ORAL at 09:05

## 2017-07-27 RX ADMIN — METRONIDAZOLE 500 MG: 500 TABLET ORAL at 16:27

## 2017-07-27 RX ADMIN — TAMSULOSIN HYDROCHLORIDE 0.4 MG: 0.4 CAPSULE ORAL at 09:06

## 2017-07-27 RX ADMIN — GABAPENTIN 200 MG: 100 CAPSULE ORAL at 09:06

## 2017-07-27 RX ADMIN — FLUCONAZOLE 400 MG: 200 TABLET ORAL at 09:06

## 2017-07-27 ASSESSMENT — ENCOUNTER SYMPTOMS
SHORTNESS OF BREATH: 0
NAUSEA: 0
FOCAL WEAKNESS: 0
DIZZINESS: 0
VOMITING: 0
NERVOUS/ANXIOUS: 0
DIARRHEA: 0
ABDOMINAL PAIN: 1
HEADACHES: 0
FEVER: 0
CONSTIPATION: 0
MYALGIAS: 0
NAUSEA: 1
PALPITATIONS: 0
RESPIRATORY NEGATIVE: 1
SPEECH CHANGE: 0
CHILLS: 0
COUGH: 0
WEAKNESS: 1
CARDIOVASCULAR NEGATIVE: 1
SENSORY CHANGE: 0

## 2017-07-27 ASSESSMENT — PAIN SCALES - GENERAL
PAINLEVEL_OUTOF10: 8
PAINLEVEL_OUTOF10: ASSUMED PAIN PRESENT
PAINLEVEL_OUTOF10: ASSUMED PAIN PRESENT
PAINLEVEL_OUTOF10: 7
PAINLEVEL_OUTOF10: ASSUMED PAIN PRESENT
PAINLEVEL_OUTOF10: 10

## 2017-07-27 NOTE — WOUND TEAM
"[facility]  Inpatient Services  New Ostomy Management & Teaching    HPI:  Reviewed  PMH: Reviewed   SH: Reviewed    Subjective: \"I really have a hard time with my hands\"    Objective:  Viable stoma LLQ without stool or flatus     Ostomy type:  colostomy   Stoma location:  LLQ   Stoma assessment:    Appearance: red, edematous    Size: 1 3/4\" oval    Protrusion: mildly budded    Output: none    MC jxn intact    Peristomal skin: clear     Ostomy Appliance (type and size): 2 1/4\" two piece    Interventions:  Met with patient and friend Jessie.  Patient needed alot of prompting and when he tried to trace pattern onto barrier, he was unable due to neuropathy.  He also was unable to cut barrier.  Discussed a moldable version of barrier which is stretched to opening size but patient thinks unable to do this.  Decided that Jessie could cut barriers weekly for patient.  Patient removed old appliance with some assistance and assisted with peristomal skin care.  I cut barrier to fit (Jessie is having health issues today and observed but couldn't participate) and then applied to peristomal skin.  Patient tried to snap on pouch but only got 1/2 attached.  Patient could close end of pouch with some prompting.  We discussed one piece pouch which would eliminate some of these issues.  Patient and Jessie report patient that patient will discharge to SNF for strengthening and continued ostomy support as Jessie unable to assist as much as patient will need initially with her health concerns.  Catalog marked and reviewed how to order supplies when discharge home from SNF     Pt education: Questions and concerns addressed; see above    Evaluation:  Patient will need assistance with ostomy appliance change.  Patient seems to be able to empty pouch.    Plan: Ostomy nurses to continue to follow for ostomy needs and teaching     Anticipated discharge needs:  Has everything for discharge   "

## 2017-07-27 NOTE — PROGRESS NOTES
PICC Insertion Procedure Note    Consents confirmed, vessel patency confirmed with ultrasound. Risks and benefits of procedure explained to patient/family and education regarding central line associated bloodstream infections provided. Questions answered.     PICC placed in LUE per MD order with ultrasound guidance. 4 Fr, single lumen PICC placed in basilic vein after 1 attempt(s). 3 cc's of 1% lidocaine injected intradermally, 21 gauge microintroducer needle and modified Seldinger technique used. 40 cm total catheter length, 3 cm external measurement inserted with good blood return. Each lumen flushed without resistance with 10 mL 0.9% normal saline. PICC line secured with Biopatch and Tegaderm.    PICC tip location in the SVC confirmed by radiograph with radiologist interpretation - PICC line pulled back approximately 3 cm from position of original radiograph - recommendation of Dr. Goldsmith. Pt tolerated procedure well.  Patient condition relayed to unit RN or ordering physician via this post procedure note in the EMR.     [employer] PICC ref # [id number], Lot # [id number]

## 2017-07-27 NOTE — CARE PLAN
Problem: Bowel/Gastric:  Goal: Normal bowel function is maintained or improved  Intervention: Educate patient and significant other/support system about diet, fluid intake, medications and activity to promote bowel function  Encourage pt to get up and ambulate and to drink plenty of fluids

## 2017-07-27 NOTE — DISCHARGE PLANNING
Discussed skilled facilities with S/O, she states she had been to all facilities except [facility]. She plans to visit [facility].

## 2017-07-27 NOTE — PROGRESS NOTES
Received call from Microbiology. Pt's abscess is showing that there will likely be VRE; however the test will not be confirm until tomorrow. Infection control called; will preemptively isolate until results are confirmed

## 2017-07-27 NOTE — DISCHARGE PLANNING
Pt's S/O brought in documents from Welfare for continued medical benefits. These documents were due on July 6, 2017. Pt requested that I fax to state Sherly PEREZ at . I did fax and called Sherly at , was only able to leave message requesting return call.

## 2017-07-27 NOTE — PROGRESS NOTES
Pharmacy Kinetics 76 y.o. male on vancomycin day # 6 7/27/2017    Currently on Vancomycin 900 mg q12h (01,13)    Indication for Treatment: Intra-abdominal infection  ID following: Yes    Pertinent history per medical record: Admitted on 7/14/2017 for groin ache over last several days, UTI noted - started on Cipro, CT shows large diverticular abscess on L and likely a perforated appendiceal abscess on R. ON 7/15, IR drain of large pelvic abscess - started on Rocephin and Flagyl.  PMH of BPH.    Other antibiotics: fluconazole 400mg PO q24h, ceftriaxone 1 gm q24h    Allergies: Review of patient's allergies indicates no known allergies.     List concerns for renal function: low albumin, age    Pertinent cultures to date:    7/19/17: pelvic abscess - enterococcus faecium, candida albicans  7/15/17: pelvic abscess - E.coli, Enterococcus faecium, viridans strep, bacteroides fragilis  7/14/17: blood - negative  7/1417: urine - negative        ENTEROCOCCUS FAECIUM        Antibiotic  Sensitivity  Microscan  Unit  Status       Ampicillin  Sensitive  <=2  mcg/mL  Final       Daptomycin  Sensitive  4  mcg/mL  Final       Gent Synergy  Sensitive  <=500  mcg/mL  Final       Penicillin  Sensitive  8  mcg/mL  Final       Vancomycin  Sensitive  1  mcg/mL  Final            ESCHERICHIA COLI        Antibiotic  Sensitivity  Microscan  Unit  Status       Ampicillin  Resistant  >16  mcg/mL  Final       Cefepime  Sensitive  <=8  mcg/mL  Final       Cefotaxime  Sensitive  <=2  mcg/mL  Final       Cefotetan  Sensitive  <=16  mcg/mL  Final       Ceftazidime  Sensitive  <=1  mcg/mL  Final       Ceftriaxone  Sensitive  <=8  mcg/mL  Final       Cefuroxime  Sensitive  <=4  mcg/mL  Final       Ciprofloxacin  Resistant  >2  mcg/mL  Final       Ertapenem  Sensitive  <=1  mcg/mL  Final       Gentamicin  Resistant  >8  mcg/mL  Final       Pip/Tazobactam  Sensitive  <=16  mcg/mL  Final       Tigecycline  Sensitive  <=2  mcg/mL  Final       Tobramycin   "Resistant  >8  mcg/mL  Final       Trimeth/Sulfa  Sensitive  <=2/38  mcg/mL  Final                Recent Labs      17   0248  17   0210   WBC  7.6  7.2   NEUTSPOLYS  88.90*  76.90*     Recent Labs      17   0248  17   0803  17   0210   BUN  7*  8  8   CREATININE  0.70  0.75  0.73   ALBUMIN  2.3*  2.5*  2.5*     Recent Labs      17   1103   VANCOTROUGH  10.1     Intake/Output Summary (Last 24 hours) at 17 0942  Last data filed at 17 0925   Gross per 24 hour   Intake   1882 ml   Output   2575 ml   Net   -693 ml      Blood pressure 108/60, pulse 55, temperature 36.3 °C (97.3 °F), resp. rate 18, height 1.727 m (5' 8\"), weight 66.4 kg (146 lb 6.2 oz), SpO2 98 %. Temp (24hrs), Av.6 °C (97.8 °F), Min:36.2 °C (97.2 °F), Max:36.8 °C (98.3 °F)    A/P   1. Next vancomycin level: 17 at 1230 (not ordered)  2. Goal trough: 12-16 mcg/mL  1. Comments: Dose was increased yesterday. ID is following the patient. S/s as above. Will obtain level tomorrow to assess new dosing regimen. Pharmacy will continue to monitor and adjust dosing as clinically appropriate.    Nanci Jeffers, PharmD    "

## 2017-07-27 NOTE — PROGRESS NOTES
Received bedside report from BART Roblero. Pt is AAOx4. REESE. VSS. Pt complaining of pain; encouraged pt to use morphine PCA. -N/V; tolerating clears.+BS. Pt has L sided ostomy; stoma is red w/ small serous output. Wound care nurse to come today for ostomy teaching. Midline abdominal incision; CDI. L arm PICC in place. Ruff d/c'd; awaiting void. Pt up 1x assist. POC discussed. Bed locked and in the lowest position. Call light w/in reach. Hourly rounding in place.

## 2017-07-27 NOTE — PROGRESS NOTES
Infectious Disease Progress Note    Author: Mis Herbert Date of service & Time created: 2017  4:55 PM    Interval History:  76-year-old white male admitted with pelvic abscess possibly due to bowel perforation due to constipation  17- low-grade fevers. Continues to have abdominal pain  17- complains of some abdominal pain. MAXIMUM TEMPERATURE 100.3. WBC 7.6  17- complains of abdominal pain. MAXIMUM TEMPERATURE is 98. WBC 7.2  Labs Reviewed, Medications Reviewed, Radiology Reviewed and Wound Reviewed.    Review of Systems:  Review of Systems   Constitutional: Positive for malaise/fatigue. Negative for fever.   Respiratory: Negative for cough and shortness of breath.    Cardiovascular: Negative for chest pain.   Gastrointestinal: Positive for nausea and abdominal pain.   Genitourinary: Negative for dysuria.   Musculoskeletal: Negative for myalgias.   Neurological: Negative for sensory change, speech change and headaches.       Hemodynamics:  Temp (24hrs), Av.4 °C (97.5 °F), Min:36.2 °C (97.2 °F), Max:36.7 °C (98 °F)  Temperature: 36.3 °C (97.3 °F)  Pulse  Av  Min: 55  Max: 105   Blood Pressure : 108/60 mmHg       Physical Exam:  Physical Exam   Constitutional: He is oriented to person, place, and time. No distress.   HENT:   Mouth/Throat: No oropharyngeal exudate.   Eyes: No scleral icterus.   Neck: Neck supple.   Cardiovascular: Regular rhythm.    No murmur heard.  Pulmonary/Chest: He has no wheezes. He has no rales.   Abdominal: Soft. There is tenderness.   Colostomy present  Midline incision bandaged   Musculoskeletal: He exhibits no edema.   Neurological: He is alert and oriented to person, place, and time.   Vitals reviewed.      Labs:  Recent Labs      17   0248  17   0210   WBC  7.6  7.2   RBC  3.43*  3.12*   HEMOGLOBIN  9.9*  9.1*   HEMATOCRIT  30.4*  28.4*   MCV  88.6  91.0   MCH  28.9  29.2   RDW  50.4*  52.1*   PLATELETCT  262  246   MPV  8.7*  8.6*   NEUTSPOLYS   88.90*  76.90*   LYMPHOCYTES  5.90*  12.70*   MONOCYTES  4.20  9.10   EOSINOPHILS  0.00  0.40   BASOPHILS  0.10  0.10     Recent Labs      07/26/17   0248  07/26/17   0803  07/27/17   0210   SODIUM  137  137  139   POTASSIUM  4.1  4.1  4.3   CHLORIDE  102  103  106   CO2  29  28  29   GLUCOSE  143*  131*  110*   BUN  7*  8  8     Recent Labs      07/26/17 0248 07/26/17   0803  07/27/17   0210   ALBUMIN  2.3*  2.5*  2.5*   TBILIRUBIN  0.4   --    --    ALKPHOSPHAT  80   --    --    TOTPROTEIN  4.9*   --    --    ALTSGPT  16   --    --    ASTSGOT  21   --    --    CREATININE  0.70  0.75  0.73       Wound:       Fluids:  Intake/Output       07/25/17 0700 - 07/26/17 0659 07/26/17 0700 - 07/27/17 0659 07/27/17 0700 - 07/28/17 0659      6901-1171 8384-5654 Total 3980-3200 0999-4466 Total 6681-0544 8601-1647 Total       Intake    P.O.  --  118 118  720  358 1078  360  -- 360    P.O. -- 118 118  360 -- 360    I.V.  2185  3 2188  1580  24 1604  1580  -- 1580    Crystalloid Intake 1700 -- 1700 -- -- -- -- -- --    IV Volume 300 -- 300 -- -- -- -- -- --    PCA End of Shift Total Volume (ml) -- 3 3 -- 24 24 -- -- --    IV Volume (NS K20) -- -- -- 1380 -- 1380 1380 -- 1380    IV Volume (NS) 75 -- 75 -- -- -- -- -- --    IV Piggyback Volume (IV Piggyback) 110 -- 110 200 -- 200 200 -- 200    Total Intake 2185 121 2306 2300 382 2682 1940 -- 1940       Output    Urine  1350  945 2295  500  1600 2100  675  -- 675    Indwelling Cathether -- 945  2100 675 -- 675    Void (ml) 1350 -- 1350 -- -- -- -- -- --    Drains  0  -- 0  --  -- --  --  -- --    Sukhwinder Oquendo 1 0 -- 0 -- -- -- -- -- --    Stool/Urine  --  -- --  0  0 0  --  -- --    Ostomy #1 (ml)  -- -- -- 0 0 0 -- -- --    Stool  --  -- --  --  -- --  --  -- --    Number of Times Stooled 1 x -- 1 x -- 0 x 0 x -- -- --    Blood  100  -- 100  --  -- --  --  -- --    Est. Blood Loss (mL) 100 -- 100 -- -- -- -- -- --    Total Output [phone] 500 1600  2100 675 -- 675       Net I/O     735 -824 -89 [phone] 107 2029 -- 1266             Assessment:  Active Hospital Problems    Diagnosis   • *Sepsis (CMS-HCC) [A41.9]   • Abdominal abscess (CMS-HCC) [K65.1]   • BPH (benign prostatic hypertrophy) [N40.0]   • Depression [F32.9]   • Osteoarthritis [M19.90]       Plan:  Pelvic abscess  Likely due to the perforation  Status post ir drain ×2  Cultures of enterococcus faecium, viridans strep, Bacteroides fragilis and Candida albicans  CT scan done on 7/24/2017 shows persistent pelvic abscess  Underwent exploratory laparotomy, abdominal washout, sigmoid colon resection, colostomy placement and appendectomy on 7/26/17  Follow the OR cultures-showing VRE  Changed to tigecycline  Aim for 2 weeks of IV antibiotics  PICC line placed 7/26/17

## 2017-07-27 NOTE — PROGRESS NOTES
Surgical Progress Note    Author: Jorge Rodriguez Date & Time created: 2017   8:49 AM     Interval Events:  Concerned about scrotal swelling.  Reports more pain today at site of colostomy.  Underwent colostomy education yesterday.  Denies nausea.  Ambulated in halls twice yesterday.  Continues clear liquid diet.    Review of Systems   Respiratory: Negative.    Cardiovascular: Negative.    Gastrointestinal: Positive for abdominal pain. Negative for nausea and vomiting.     Hemodynamics:  Temp (24hrs), Av.6 °C (97.8 °F), Min:36.2 °C (97.2 °F), Max:36.8 °C (98.3 °F)  Temperature: 36.7 °C (98 °F)  Pulse  Av.2  Min: 55  Max: 105   Blood Pressure : 112/58 mmHg     Respiratory:    Respiration: 18, Pulse Oximetry: 97 %        RUL Breath Sounds: Clear, RML Breath Sounds: Clear, RLL Breath Sounds: Clear, TOSHIA Breath Sounds: Clear, LLL Breath Sounds: Clear  Neuro:  GCS = 15       Fluids:    Intake/Output Summary (Last 24 hours) at 17 0849  Last data filed at 17 0604   Gross per 24 hour   Intake   1882 ml   Output   1900 ml   Net    -18 ml        Current Diet Order   Procedures   • Diet Order     Physical Exam   Constitutional: He is oriented to person, place, and time. He appears well-developed and well-nourished. No distress.   HENT:   Head: Normocephalic.   Eyes: Pupils are equal, round, and reactive to light. No scleral icterus.   Cardiovascular: Normal rate, regular rhythm and normal heart sounds.    Pulmonary/Chest: Effort normal and breath sounds normal. No respiratory distress.   Abdominal: Soft. Bowel sounds are normal. He exhibits distension. There is no tenderness. There is no rebound and no guarding.   Midline incision intact, dressed.  Colostomy intact, viable, nonproductive.   Musculoskeletal: He exhibits edema (scrotum).   Neurological: He is alert and oriented to person, place, and time.   Skin: Skin is warm and dry.   Psychiatric: He has a normal mood and affect. His behavior is  normal. Judgment and thought content normal.     Labs:  Recent Results (from the past 24 hour(s))   VANCOMYCIN TROUGH LEVEL    Collection Time: 07/26/17 11:03 AM   Result Value Ref Range    Vancomycin Trough 10.1 10.0 - 20.0 ug/mL   CBC WITH DIFFERENTIAL    Collection Time: 07/27/17  2:10 AM   Result Value Ref Range    WBC 7.2 4.8 - 10.8 K/uL    RBC 3.12 (L) 4.70 - 6.10 M/uL    Hemoglobin 9.1 (L) 14.0 - 18.0 g/dL    Hematocrit 28.4 (L) 42.0 - 52.0 %    MCV 91.0 81.4 - 97.8 fL    MCH 29.2 27.0 - 33.0 pg    MCHC 32.0 (L) 33.7 - 35.3 g/dL    RDW 52.1 (H) 35.9 - 50.0 fL    Platelet Count 246 164 - 446 K/uL    MPV 8.6 (L) 9.0 - 12.9 fL    Neutrophils-Polys 76.90 (H) 44.00 - 72.00 %    Lymphocytes 12.70 (L) 22.00 - 41.00 %    Monocytes 9.10 0.00 - 13.40 %    Eosinophils 0.40 0.00 - 6.90 %    Basophils 0.10 0.00 - 1.80 %    Immature Granulocytes 0.80 0.00 - 0.90 %    Nucleated RBC 0.00 /100 WBC    Neutrophils (Absolute) 5.51 1.82 - 7.42 K/uL    Lymphs (Absolute) 0.91 (L) 1.00 - 4.80 K/uL    Monos (Absolute) 0.65 0.00 - 0.85 K/uL    Eos (Absolute) 0.03 0.00 - 0.51 K/uL    Baso (Absolute) 0.01 0.00 - 0.12 K/uL    Immature Granulocytes (abs) 0.06 0.00 - 0.11 K/uL    NRBC (Absolute) 0.00 K/uL   RENAL FUNCTION PANEL    Collection Time: 07/27/17  2:10 AM   Result Value Ref Range    Sodium 139 135 - 145 mmol/L    Potassium 4.3 3.6 - 5.5 mmol/L    Chloride 106 96 - 112 mmol/L    Co2 29 20 - 33 mmol/L    Glucose 110 (H) 65 - 99 mg/dL    Creatinine 0.73 0.50 - 1.40 mg/dL    Bun 8 8 - 22 mg/dL    Calcium 8.7 8.5 - 10.5 mg/dL    Phosphorus 3.1 2.5 - 4.5 mg/dL    Albumin 2.5 (L) 3.2 - 4.9 g/dL   ESTIMATED GFR    Collection Time: 07/27/17  2:10 AM   Result Value Ref Range    GFR If African American >60 >60 mL/min/1.73 m 2    GFR If Non African American >60 >60 mL/min/1.73 m 2     Medical Decision Making, by Problem:  Active Hospital Problems    Diagnosis   • Sepsis (CMS-HCC) [A41.9]     Priority: High   • Abdominal abscess (CMS-HCC)  [K65.1]     Priority: High   • BPH (benign prostatic hypertrophy) [N40.0]     Priority: Low   • Depression [F32.9]     Priority: Low   • Osteoarthritis [M19.90]     Priority: Low     Plan:  D/C Ruff today.  Discussed with patient that swelling in scrotum due to generalized inflammation, and is expected.  Continue ambulation, IV antibiotics, IV fluid.   Continue clear liquid diet today.      Quality Measures:  Labs reviewed and Medications reviewed  Ruff catheter: One or Two Days Post Surgery (Day of Surgery being Day 0)      DVT Prophylaxis: Enoxaparin (Lovenox)  DVT prophylaxis - mechanical: SCDs  Ulcer prophylaxis: Yes  Antibiotics: Treating active infection/contamination beyond 24 hours perioperative coverage        Discussed patient condition with RN and Patient

## 2017-07-27 NOTE — DISCHARGE PLANNING
"Care Transition Team Assessment  Met with pt for CTT assessment. Pt lives alone in single story home. No living realtives. POA is Jessie Drew. Pt states he will need FWW ans shower bench for home. Will need SNF for long term IVABX. Eplained that DME will be provided when pt ready for D/C to home. Discussed SNF choices, he will ask S/O to visit facilities and help him decide. Pt states he has \"state mental health SW, Sherly /fax .\" Pt has documents that need to be faxed to Sherly, S/O to bring documents and we will fax.   Information Source  Orientation : Oriented x 4  Information Given By: Patient  Who is responsible for making decisions for patient? : Patient         Elopement Risk  Legal Hold: No  Ambulatory or Self Mobile in Wheelchair: Yes  Disoriented: No  Psychiatric Symptoms: None  History of Wandering: No  Elopement this Admit: No  Vocalizing Wanting to Leave: No  Displays Behaviors, Body Language Wanting to Leave: No-Not at Risk for Elopement  Elopement Risk: Not at Risk for Elopement    Interdisciplinary Discharge Planning  Does Admitting Nurse Feel This Could be a Complex Discharge?: No  Primary Care Physician: none  Lives with - Patient's Self Care Capacity: Alone and Able to Care For Self  Patient or legal guardian wants to designate a caregiver (see row info): No  Support Systems: Friends / Neighbors  Housing / Facility: 1 Story House  Do You Take your Prescribed Medications Regularly: Yes  Able to Return to Previous ADL's: Yes  Mobility Issues: Yes  Prior Services: Home-Independent  Patient Expects to be Discharged to::  (snf)    Discharge Preparedness  What is your plan after discharge?: Skilled nursing facility  What are your discharge supports?: Other (comment)              Vision / Hearing Impairment  Vision Impairment : Yes  Right Eye Vision: Impaired, Wears Glasses  Left Eye Vision: Impaired, Wears Glasses  Hearing Impairment : Yes  Hearing Impairment: Both Ears, " Hearing Device(s) Available    Values / Beliefs / Concerns  Values / Beliefs Concerns : No    Advance Directive  Advance Directive?: DPOA for Health Care  Durable Power of  Name and Contact :  (Jessie Drew [phone])    Domestic Abuse  Have you ever been the victim of abuse or violence?: No  Physical Abuse or Sexual Abuse: No  Verbal Abuse or Emotional Abuse: No  Possible Abuse Reported to:: Not Applicable    Psychological Assessment  History of Substance Abuse: None  History of Psychiatric Problems: Yes         Anticipated Discharge Information  Anticipated discharge disposition: SNF

## 2017-07-27 NOTE — CARE PLAN
Problem: Pain Management  Goal: Pain level will decrease to patient’s comfort goal  Intervention: Follow pain managment plan developed in collaboration with patient and Interdisciplinary Team  Pain controlled with PCA pump. Pt educated to report increase of pain

## 2017-07-28 LAB
ALBUMIN SERPL BCP-MCNC: 2.4 G/DL (ref 3.2–4.9)
BACTERIA WND AEROBE CULT: ABNORMAL
BASOPHILS # BLD AUTO: 0.3 % (ref 0–1.8)
BASOPHILS # BLD: 0.02 K/UL (ref 0–0.12)
BUN SERPL-MCNC: 9 MG/DL (ref 8–22)
CALCIUM SERPL-MCNC: 8.4 MG/DL (ref 8.5–10.5)
CHLORIDE SERPL-SCNC: 104 MMOL/L (ref 96–112)
CO2 SERPL-SCNC: 27 MMOL/L (ref 20–33)
CREAT SERPL-MCNC: 0.74 MG/DL (ref 0.5–1.4)
EOSINOPHIL # BLD AUTO: 0.1 K/UL (ref 0–0.51)
EOSINOPHIL NFR BLD: 1.6 % (ref 0–6.9)
ERYTHROCYTE [DISTWIDTH] IN BLOOD BY AUTOMATED COUNT: 53.6 FL (ref 35.9–50)
GFR SERPL CREATININE-BSD FRML MDRD: >60 ML/MIN/1.73 M 2
GLUCOSE SERPL-MCNC: 97 MG/DL (ref 65–99)
GRAM STN SPEC: ABNORMAL
HCT VFR BLD AUTO: 31.4 % (ref 42–52)
HGB BLD-MCNC: 10 G/DL (ref 14–18)
IMM GRANULOCYTES # BLD AUTO: 0.09 K/UL (ref 0–0.11)
IMM GRANULOCYTES NFR BLD AUTO: 1.4 % (ref 0–0.9)
LYMPHOCYTES # BLD AUTO: 0.81 K/UL (ref 1–4.8)
LYMPHOCYTES NFR BLD: 12.6 % (ref 22–41)
MCH RBC QN AUTO: 28.9 PG (ref 27–33)
MCHC RBC AUTO-ENTMCNC: 31.8 G/DL (ref 33.7–35.3)
MCV RBC AUTO: 90.8 FL (ref 81.4–97.8)
MONOCYTES # BLD AUTO: 0.61 K/UL (ref 0–0.85)
MONOCYTES NFR BLD AUTO: 9.5 % (ref 0–13.4)
NEUTROPHILS # BLD AUTO: 4.8 K/UL (ref 1.82–7.42)
NEUTROPHILS NFR BLD: 74.6 % (ref 44–72)
NRBC # BLD AUTO: 0 K/UL
NRBC BLD AUTO-RTO: 0 /100 WBC
PHOSPHATE SERPL-MCNC: 3.5 MG/DL (ref 2.5–4.5)
PLATELET # BLD AUTO: 260 K/UL (ref 164–446)
PMV BLD AUTO: 8.6 FL (ref 9–12.9)
POTASSIUM SERPL-SCNC: 4.3 MMOL/L (ref 3.6–5.5)
RBC # BLD AUTO: 3.46 M/UL (ref 4.7–6.1)
SIGNIFICANT IND 70042: ABNORMAL
SITE SITE: ABNORMAL
SODIUM SERPL-SCNC: 137 MMOL/L (ref 135–145)
SOURCE SOURCE: ABNORMAL
WBC # BLD AUTO: 6.4 K/UL (ref 4.8–10.8)

## 2017-07-28 PROCEDURE — 700111 HCHG RX REV CODE 636 W/ 250 OVERRIDE (IP): Performed by: INTERNAL MEDICINE

## 2017-07-28 PROCEDURE — 700102 HCHG RX REV CODE 250 W/ 637 OVERRIDE(OP)

## 2017-07-28 PROCEDURE — A9270 NON-COVERED ITEM OR SERVICE: HCPCS

## 2017-07-28 PROCEDURE — A9270 NON-COVERED ITEM OR SERVICE: HCPCS | Performed by: HOSPITALIST

## 2017-07-28 PROCEDURE — 700105 HCHG RX REV CODE 258

## 2017-07-28 PROCEDURE — 700111 HCHG RX REV CODE 636 W/ 250 OVERRIDE (IP): Performed by: SURGERY

## 2017-07-28 PROCEDURE — 700102 HCHG RX REV CODE 250 W/ 637 OVERRIDE(OP): Performed by: INTERNAL MEDICINE

## 2017-07-28 PROCEDURE — 700101 HCHG RX REV CODE 250: Performed by: SURGERY

## 2017-07-28 PROCEDURE — A9270 NON-COVERED ITEM OR SERVICE: HCPCS | Performed by: INTERNAL MEDICINE

## 2017-07-28 PROCEDURE — 770021 HCHG ROOM/CARE - ISO PRIVATE

## 2017-07-28 PROCEDURE — 85025 COMPLETE CBC W/AUTO DIFF WBC: CPT

## 2017-07-28 PROCEDURE — A9270 NON-COVERED ITEM OR SERVICE: HCPCS | Performed by: NURSE PRACTITIONER

## 2017-07-28 PROCEDURE — 700102 HCHG RX REV CODE 250 W/ 637 OVERRIDE(OP): Performed by: HOSPITALIST

## 2017-07-28 PROCEDURE — 80069 RENAL FUNCTION PANEL: CPT

## 2017-07-28 PROCEDURE — 700105 HCHG RX REV CODE 258: Performed by: INTERNAL MEDICINE

## 2017-07-28 PROCEDURE — 700105 HCHG RX REV CODE 258: Performed by: RADIOLOGY

## 2017-07-28 PROCEDURE — 99232 SBSQ HOSP IP/OBS MODERATE 35: CPT | Performed by: INTERNAL MEDICINE

## 2017-07-28 PROCEDURE — 700102 HCHG RX REV CODE 250 W/ 637 OVERRIDE(OP): Performed by: NURSE PRACTITIONER

## 2017-07-28 RX ORDER — SODIUM CHLORIDE 9 MG/ML
INJECTION, SOLUTION INTRAVENOUS
Status: COMPLETED
Start: 2017-07-28 | End: 2017-07-28

## 2017-07-28 RX ORDER — FLUCONAZOLE 200 MG/1
200 TABLET ORAL DAILY
Status: DISCONTINUED | OUTPATIENT
Start: 2017-07-29 | End: 2017-08-07

## 2017-07-28 RX ADMIN — SODIUM CHLORIDE: 9 INJECTION, SOLUTION INTRAVENOUS at 21:15

## 2017-07-28 RX ADMIN — TAMSULOSIN HYDROCHLORIDE 0.4 MG: 0.4 CAPSULE ORAL at 08:17

## 2017-07-28 RX ADMIN — FLUCONAZOLE 400 MG: 200 TABLET ORAL at 08:17

## 2017-07-28 RX ADMIN — LAMOTRIGINE 150 MG: 100 TABLET ORAL at 08:17

## 2017-07-28 RX ADMIN — GABAPENTIN 200 MG: 100 CAPSULE ORAL at 08:17

## 2017-07-28 RX ADMIN — OMEPRAZOLE 20 MG: 20 CAPSULE, DELAYED RELEASE ORAL at 08:17

## 2017-07-28 RX ADMIN — OXYBUTYNIN CHLORIDE 5 MG: 5 TABLET ORAL at 20:51

## 2017-07-28 RX ADMIN — SODIUM CHLORIDE 50 MG: 9 INJECTION, SOLUTION INTRAVENOUS at 08:16

## 2017-07-28 RX ADMIN — ENOXAPARIN SODIUM 30 MG: 100 INJECTION SUBCUTANEOUS at 14:53

## 2017-07-28 RX ADMIN — ENOXAPARIN SODIUM 30 MG: 100 INJECTION SUBCUTANEOUS at 03:15

## 2017-07-28 RX ADMIN — GABAPENTIN 200 MG: 100 CAPSULE ORAL at 20:51

## 2017-07-28 RX ADMIN — GABAPENTIN 200 MG: 100 CAPSULE ORAL at 14:53

## 2017-07-28 RX ADMIN — SODIUM CHLORIDE 500 ML: 9 INJECTION, SOLUTION INTRAVENOUS at 21:02

## 2017-07-28 RX ADMIN — POTASSIUM CHLORIDE AND SODIUM CHLORIDE: 450; 150 INJECTION, SOLUTION INTRAVENOUS at 03:15

## 2017-07-28 RX ADMIN — OXYBUTYNIN CHLORIDE 5 MG: 5 TABLET ORAL at 08:16

## 2017-07-28 RX ADMIN — SODIUM CHLORIDE 50 MG: 9 INJECTION, SOLUTION INTRAVENOUS at 21:13

## 2017-07-28 ASSESSMENT — PAIN SCALES - GENERAL
PAINLEVEL_OUTOF10: ASSUMED PAIN PRESENT
PAINLEVEL_OUTOF10: 1
PAINLEVEL_OUTOF10: 10
PAINLEVEL_OUTOF10: ASSUMED PAIN PRESENT
PAINLEVEL_OUTOF10: 10
PAINLEVEL_OUTOF10: 1

## 2017-07-28 ASSESSMENT — ENCOUNTER SYMPTOMS
WEAKNESS: 1
ABDOMINAL PAIN: 1
DIZZINESS: 0
VOMITING: 0
FEVER: 0
CHILLS: 0
CARDIOVASCULAR NEGATIVE: 1
HEADACHES: 0
NERVOUS/ANXIOUS: 0
SPEECH CHANGE: 0
CONSTIPATION: 0
COUGH: 0
MYALGIAS: 0
SENSORY CHANGE: 0
PALPITATIONS: 0
DIARRHEA: 0
SHORTNESS OF BREATH: 0
FOCAL WEAKNESS: 0
NAUSEA: 1
RESPIRATORY NEGATIVE: 1
NAUSEA: 0

## 2017-07-28 NOTE — PROGRESS NOTES
"[facility] Hospitalist Progress Note    Date of Service: 2017    Chief Complaint  77 y/o M with PMH of peripheral neuropathy, BPH, Depression: admitted for above and found to have pelvis abscess on imaging.    Interval Problem Update  -Remains on PCA pump. Still no signs of gas or stool in his colostomy. He is tolerating clear liquids.     -Doing well this am, still requires the PCA. Tolerating diet. Not gas or stool in his ostomy.     -S/p Ex lap and abdominal washout     - he is very anxious to discharge. I discussed the case with Dr. Rodriguez and we will get repeat imaging. If the abscess is resolved, we can pull the drain. If the abscess just simply is not draining, Dr. Rodriguez plans to take him to the operating room. I explained this all to the patient.     - feels okay, pain fairly minimal, but very anxious. \"Just cut me open and fix it so I can go home.\" Acutely about the same.     Consultants/Specialty  [facility] - Surgery  Dr. Herbert-ID    Disposition  Discharge planning TBD once more stable      Review of Systems   Constitutional: Negative for fever and chills.   Respiratory: Negative for cough and shortness of breath.    Cardiovascular: Negative for chest pain and palpitations.   Gastrointestinal: Positive for abdominal pain. Negative for nausea, vomiting, diarrhea and constipation.   Genitourinary: Negative for dysuria.   Musculoskeletal: Negative for myalgias.   Skin: Negative for itching.   Neurological: Positive for weakness. Negative for dizziness, focal weakness and headaches.   Psychiatric/Behavioral: The patient is not nervous/anxious (anxious to get out of the hospital).    All other systems reviewed and are negative.     Physical Exam  Laboratory/Imaging   Hemodynamics  Temp (24hrs), Av.5 °C (97.7 °F), Min:36.2 °C (97.2 °F), Max:36.9 °C (98.4 °F)   Temperature: 36.9 °C (98.4 °F)  Pulse  Av.9  Min: 55  Max: 105    Blood Pressure : (!) 97/56 mmHg  "     Respiratory      Respiration: 18, Pulse Oximetry: 99 %        RUL Breath Sounds: Clear, RML Breath Sounds: Clear, RLL Breath Sounds: Diminished, TOSHIA Breath Sounds: Clear, LLL Breath Sounds: Diminished    Fluids    Intake/Output Summary (Last 24 hours) at 07/27/17 2009  Last data filed at 07/27/17 2000   Gross per 24 hour   Intake 3245.5 ml   Output   2575 ml   Net  670.5 ml       Nutrition  Orders Placed This Encounter   Procedures   • Diet Order     Standing Status: Standing      Number of Occurrences: 1      Standing Expiration Date:      Order Specific Question:  Diet:     Answer:  Clear Liquid [10]     Physical Exam   Constitutional: He is oriented to person, place, and time. He appears well-developed and well-nourished.   HENT:   Head: Normocephalic and atraumatic.   Mouth/Throat: Oropharynx is clear and moist.   Eyes: Conjunctivae and EOM are normal. Pupils are equal, round, and reactive to light. No scleral icterus.   Neck: Normal range of motion. Neck supple. No tracheal deviation present. No thyromegaly present.   Cardiovascular: Normal rate, regular rhythm, normal heart sounds and intact distal pulses.    No murmur heard.  Pulmonary/Chest: Effort normal and breath sounds normal. No respiratory distress. He has no wheezes.   Abdominal: Soft. He exhibits no distension. There is tenderness.   Colostomy in place, no stool or bowel gas in the bag. Ostomy site looks pink.  Small amount of serosang fluid in the bag.  Hypoactive BSx4   Musculoskeletal: Normal range of motion. He exhibits no edema or tenderness.   Lymphadenopathy:     He has no cervical adenopathy.        Right: No supraclavicular adenopathy present.        Left: No supraclavicular adenopathy present.   Neurological: He is alert and oriented to person, place, and time. No cranial nerve deficit.   Skin: Skin is warm and dry. No erythema.   Vitals reviewed.      Recent Labs      07/26/17   0248  07/27/17   0210   WBC  7.6  7.2   RBC  3.43*  3.12*    HEMOGLOBIN  9.9*  9.1*   HEMATOCRIT  30.4*  28.4*   MCV  88.6  91.0   MCH  28.9  29.2   MCHC  32.6*  32.0*   RDW  50.4*  52.1*   PLATELETCT  262  246   MPV  8.7*  8.6*     Recent Labs      07/26/17   0248  07/26/17   0803  07/27/17   0210   SODIUM  137  137  139   POTASSIUM  4.1  4.1  4.3   CHLORIDE  102  103  106   CO2  29  28  29   GLUCOSE  143*  131*  110*   BUN  7*  8  8   CREATININE  0.70  0.75  0.73   CALCIUM  8.3*  8.5  8.7                      Assessment/Plan     * Sepsis (CMS-[facility]) (present on admission)  Assessment & Plan  Resolved  Continue IV abx per ID recommendations for VRE and candida abscess  Monitor WBC    Abdominal abscess (CMS-[facility]) (present on admission)  Assessment & Plan  S/p ex lap and wash out on 07/25  Doing well post-op  Cultures on 7/25 grew moderate VRE and yeast   ID following  Patient on tygacyl and diflucan  WBC WNL, afebrile  Pain control with PCA, switch to PO and IV PRN once pain has subsided      Normocytic anemia  Assessment & Plan  Stable H&H  Likely combination of intra-op losses and chronic disease    BPH (benign prostatic hypertrophy) (present on admission)  Assessment & Plan  Continue Flomax  Continue to have the garcia    Depression (present on admission)  Assessment & Plan  Stable    Osteoarthritis (present on admission)  Assessment & Plan  Stable, prn analgesia  Vitamin D and calcium once diet further advanced      Labs reviewed, Medications reviewed and Radiology images reviewed  Garcia catheter: Critically Ill - Requiring Accurate Measurement of Urinary Output      DVT Prophylaxis: Enoxaparin (Lovenox)      Antibiotics: Treating active infection/contamination beyond 24 hours perioperative coverage

## 2017-07-28 NOTE — ASSESSMENT & PLAN NOTE
Stable H&H, multi factorial  intra-op losses and chronic disease-stable  Monitor for acute symptoms.

## 2017-07-28 NOTE — PROGRESS NOTES
Report received from day shift RN, patient care assumed at 1900.   Patient assessment as documented.   Patient denies pain or discomfort at this time, morphine PCA in use.   Breathing is even and unlabored on 3L02 with .   Patient is ambulatory with 2-assist and FWW - weak on feet with shuffle/staggering gait  SCDs in use.   Midline incision with CDI dressing. LLQ ostomy.   Patient is resting in bed. Bed in low position, call light within reach. Hourly rounding in place.  Patient has no further complaints at this time, will continue to monitor.

## 2017-07-28 NOTE — PROGRESS NOTES
Infectious Disease Progress Note    Author: Mis Herbert Date of service & Time created: 2017  3:01 PM    Interval History:  76-year-old white male admitted with pelvic abscess possibly due to bowel perforation due to constipation  17- low-grade fevers. Continues to have abdominal pain  17- complains of some abdominal pain. MAXIMUM TEMPERATURE 100.3. WBC 7.6  17- complains of abdominal pain. MAXIMUM TEMPERATURE is 98. WBC 7.2  17- ongoing abdominal pain. No fevers. No new issues overnight.  Labs Reviewed, Medications Reviewed, Radiology Reviewed and Wound Reviewed.    Review of Systems:  Review of Systems   Constitutional: Positive for malaise/fatigue. Negative for fever.   Respiratory: Negative for cough and shortness of breath.    Cardiovascular: Negative for chest pain.   Gastrointestinal: Positive for nausea and abdominal pain.   Genitourinary: Negative for dysuria.   Musculoskeletal: Negative for myalgias.   Neurological: Negative for sensory change, speech change and headaches.       Hemodynamics:  Temp (24hrs), Av.7 °C (98 °F), Min:36.3 °C (97.3 °F), Max:37 °C (98.6 °F)  Temperature: 37 °C (98.6 °F)  Pulse  Av.6  Min: 55  Max: 105   Blood Pressure : (!) 98/61 mmHg       Physical Exam:  Physical Exam   Constitutional: He is oriented to person, place, and time. No distress.   HENT:   Mouth/Throat: No oropharyngeal exudate.   Eyes: No scleral icterus.   Neck: Neck supple.   Cardiovascular: Regular rhythm.    No murmur heard.  Pulmonary/Chest: He has no wheezes. He has no rales.   Abdominal: Soft. There is tenderness.   Colostomy present  Midline incision bandaged   Musculoskeletal: He exhibits no edema.   Neurological: He is alert and oriented to person, place, and time.   Vitals reviewed.      Labs:  Recent Labs      17   0248  17   0210  17   0315   WBC  7.6  7.2  6.4   RBC  3.43*  3.12*  3.46*   HEMOGLOBIN  9.9*  9.1*  10.0*   HEMATOCRIT  30.4*  28.4*   31.4*   MCV  88.6  91.0  90.8   MCH  28.9  29.2  28.9   RDW  50.4*  52.1*  53.6*   PLATELETCT  262  246  260   MPV  8.7*  8.6*  8.6*   NEUTSPOLYS  88.90*  76.90*  74.60*   LYMPHOCYTES  5.90*  12.70*  12.60*   MONOCYTES  4.20  9.10  9.50   EOSINOPHILS  0.00  0.40  1.60   BASOPHILS  0.10  0.10  0.30     Recent Labs      07/26/17   0803  07/27/17   0210  07/28/17   0315   SODIUM  137  139  137   POTASSIUM  4.1  4.3  4.3   CHLORIDE  103  106  104   CO2  28  29  27   GLUCOSE  131*  110*  97   BUN  8  8  9     Recent Labs      07/26/17   0248  07/26/17   0803  07/27/17 0210  07/28/17   0315   ALBUMIN  2.3*  2.5*  2.5*  2.4*   TBILIRUBIN  0.4   --    --    --    ALKPHOSPHAT  80   --    --    --    TOTPROTEIN  4.9*   --    --    --    ALTSGPT  16   --    --    --    ASTSGOT  21   --    --    --    CREATININE  0.70  0.75  0.73  0.74       Wound:       Fluids:  Intake/Output       07/26/17 0700 - 07/27/17 0659 07/27/17 0700 - 07/28/17 0659 07/28/17 0700 - 07/29/17 0659      0700-1859 3436-3154 Total 0700-1859 1900-0659 Total 2699-6440 2635-9040 Total       Intake    P.O.  720  358 1078  600  240 840  --  -- --    P.O.  600 240 840 -- -- --    I.V.  1580  24 1604  1695  1383 3078  560  -- 560    PCA End of Shift Total Volume (ml) -- 24 24 15 3 18 -- -- --    IV Volume (NS K20) 1380 -- 1380 1380 1380 2760 460 -- 460    IV Piggyback Volume (IV Piggyback) 200 -- 200 300 -- 300 100 -- 100    Total Intake 2300 382 2682 2295 1623 3918 560 -- 560       Output    Urine  500  1600 2100  1125  1000 2125  200  -- 200    Indwelling Cathether 500 1600 2100 675 -- 675 -- -- --    Void (ml) -- -- -- 450 1000 1450 200 -- 200    Stool/Urine  0  0 0  --  -- --  --  -- --    Ostomy #1 (ml)  0 0 0 -- -- -- -- -- --    Stool  --  -- --  --  -- --  --  -- --    Number of Times Stooled -- 0 x 0 x -- -- -- -- -- --    Total Output 500 1600 2100 1125 1000 2125 200 -- 200       Net I/O     5799 -1032 874 1170 623 1798 360 -- 360              Assessment:  Active Hospital Problems    Diagnosis   • *Sepsis (CMS-HCC) [A41.9]   • Abdominal abscess (CMS-HCC) [K65.1]   • BPH (benign prostatic hypertrophy) [N40.0]   • Depression [F32.9]   • Osteoarthritis [M19.90]       Plan:  Pelvic abscess  Likely due to the perforation  Status post ir drain ×2  Cultures of enterococcus faecium, viridans strep, Bacteroides fragilis and Candida albicans  CT scan done on 7/24/2017 shows persistent pelvic abscess  Underwent exploratory laparotomy, abdominal washout, sigmoid colon resection, colostomy placement and appendectomy on 7/26/17  Follow the OR cultures-showing VRE  Changed to tigecycline  Aim for 2 weeks of IV antibiotics through 8/8/17    ID will sign off. Please call as needed  PICC line placed 7/26/17

## 2017-07-28 NOTE — PROGRESS NOTES
[facility] Hospitalist Progress Note    Date of Service: 2017    Chief Complaint  75 y/o M with PMH of peripheral neuropathy, BPH, Depression: admitted for above and found to have pelvis abscess on imaging. S/p Ex lap and abdominal washout on .    Interval Problem Update  -Patient is sitting in chair. Says pain is worse today and refuses to have his PCA discontinued. Tolerating liquid diet well. Denies nausea or vomiting.     -Remains on PCA pump. Still no signs of gas or stool in his colostomy. He is tolerating clear liquids.     -Doing well this am, still requires the PCA. Tolerating diet. Not gas or stool in his ostomy.     -S/p Ex lap and abdominal washout    Consultants/Specialty  [facility] - Surgery  Dr. Herbert-ID    Disposition  Likely to SNF. Pending PT eval.       Review of Systems   Constitutional: Negative for fever and chills.   Respiratory: Negative for cough and shortness of breath.    Cardiovascular: Negative for chest pain and palpitations.   Gastrointestinal: Positive for abdominal pain. Negative for nausea, vomiting, diarrhea and constipation.   Genitourinary: Negative for dysuria.   Musculoskeletal: Negative for myalgias.   Skin: Negative for itching.   Neurological: Positive for weakness. Negative for dizziness, focal weakness and headaches.   Psychiatric/Behavioral: The patient is not nervous/anxious (anxious to get out of the hospital).    All other systems reviewed and are negative.     Physical Exam  Laboratory/Imaging   Hemodynamics  Temp (24hrs), Av.7 °C (98 °F), Min:36.3 °C (97.3 °F), Max:37 °C (98.6 °F)   Temperature: 37 °C (98.6 °F)  Pulse  Av.6  Min: 55  Max: 105    Blood Pressure : (!) 98/61 mmHg      Respiratory      Respiration: 18, Pulse Oximetry: 96 %        RUL Breath Sounds: Clear, RML Breath Sounds: Clear, RLL Breath Sounds: Diminished, TOSHIA Breath Sounds: Clear, LLL Breath Sounds: Diminished    Fluids    Intake/Output Summary (Last 24 hours) at  07/28/17 1413  Last data filed at 07/28/17 1113   Gross per 24 hour   Intake   3098 ml   Output   1400 ml   Net   1698 ml       Nutrition  Orders Placed This Encounter   Procedures   • Diet Order     Standing Status: Standing      Number of Occurrences: 1      Standing Expiration Date:      Order Specific Question:  Diet:     Answer:  Clear Liquid [10]     Physical Exam   Constitutional: He is oriented to person, place, and time. He appears well-developed and well-nourished.   HENT:   Head: Normocephalic and atraumatic.   Mouth/Throat: Oropharynx is clear and moist.   Eyes: Conjunctivae and EOM are normal. Pupils are equal, round, and reactive to light. No scleral icterus.   Neck: Normal range of motion. Neck supple. No tracheal deviation present. No thyromegaly present.   Cardiovascular: Normal rate, regular rhythm, normal heart sounds and intact distal pulses.    No murmur heard.  Pulmonary/Chest: Effort normal and breath sounds normal. No respiratory distress. He has no wheezes.   Abdominal: Soft. He exhibits no distension. There is tenderness.   Colostomy in place, no stool or bowel gas in the bag. Ostomy site looks pink.  Small amount of orange color liquid in his bag  Bowel sounds hypoactive but present   Musculoskeletal: Normal range of motion. He exhibits no edema or tenderness.   Lymphadenopathy:     He has no cervical adenopathy.        Right: No supraclavicular adenopathy present.        Left: No supraclavicular adenopathy present.   Neurological: He is alert and oriented to person, place, and time. No cranial nerve deficit.   Skin: Skin is warm and dry. No erythema.   Vitals reviewed.      Recent Labs      07/26/17   0248  07/27/17   0210  07/28/17   0315   WBC  7.6  7.2  6.4   RBC  3.43*  3.12*  3.46*   HEMOGLOBIN  9.9*  9.1*  10.0*   HEMATOCRIT  30.4*  28.4*  31.4*   MCV  88.6  91.0  90.8   MCH  28.9  29.2  28.9   MCHC  32.6*  32.0*  31.8*   RDW  50.4*  52.1*  53.6*   PLATELETCT  262  246  260   MPV   8.7*  8.6*  8.6*     Recent Labs      07/26/17   0803  07/27/17   0210  07/28/17   0315   SODIUM  137  139  137   POTASSIUM  4.1  4.3  4.3   CHLORIDE  103  106  104   CO2  28  29  27   GLUCOSE  131*  110*  97   BUN  8  8  9   CREATININE  0.75  0.73  0.74   CALCIUM  8.5  8.7  8.4*                      Assessment/Plan     * Sepsis (CMS-[facility]) (present on admission)  Assessment & Plan  Resolved  Continue IV abx of tygacyl and diflucan for 2 weeks for VRE and candida abscess  Monitor WBC    Abdominal abscess (CMS-[facility]) (present on admission)  Assessment & Plan  S/p ex lap and wash out on 07/25  Doing well post-op  Cultures on 7/25 grew moderate VRE and yeast   ID following  Patient on tygacyl and diflucan  WBC WNL, afebrile  Pain control with PCA, switch to PO and IV PRN once pain has subsided  PT/OT      Normocytic anemia  Assessment & Plan  Stable H&H  Likely combination of intra-op losses and chronic disease    BPH (benign prostatic hypertrophy) (present on admission)  Assessment & Plan  Continue Flomax  Continue to have the garcia    Depression (present on admission)  Assessment & Plan  Stable    Osteoarthritis (present on admission)  Assessment & Plan  Stable, prn analgesia  Vitamin D and calcium once diet further advanced      Labs reviewed, Medications reviewed and Radiology images reviewed  Garcia catheter: Critically Ill - Requiring Accurate Measurement of Urinary Output      DVT Prophylaxis: Enoxaparin (Lovenox)      Antibiotics: Treating active infection/contamination beyond 24 hours perioperative coverage

## 2017-07-28 NOTE — CARE PLAN
Problem: Venous Thromboembolism (VTW)/Deep Vein Thrombosis (DVT) Prevention:  Goal: Patient will participate in Venous Thrombosis (VTE)/Deep Vein Thrombosis (DVT)Prevention Measures  Patient wearing SCDs while in bed. Receiving LMWH injections daily.     Problem: Pain Management  Goal: Pain level will decrease to patient’s comfort goal  Morphine PCA in use.

## 2017-07-28 NOTE — CARE PLAN
"Problem: Pain Management  Goal: Pain level will decrease to patient’s comfort goal  Patient not in signs of distress, and not utilizing PCA as needed, but stating pain is severe. Educated patient on use of PCA button.     Problem: Mobility  Goal: Risk for activity intolerance will decrease  Patient up with one person assistance. Patient resistant to ambulate stating \"the pain is just too bad. Not today.\"         "

## 2017-07-28 NOTE — PROGRESS NOTES
Assumed care of patient. Patient stating pain is severe today. Abdomen mildly distended. Midline incision with island dressing is clean, dry and intact. Ostomy to left lower quadrant without gas or stool. Stoma is pink. Patient ambulating to chair, but resistant to ambulate in hallway. Educated patient on need for ambulating and on use of PCA. IV antibiotics administered. No other needs as at this time. Call light within reach.

## 2017-07-28 NOTE — PROGRESS NOTES
Surgical Progress Note    Author: Jorge Rodriguez Date & Time created: 2017   12:26 PM     Interval Events:  Complains of abdominal pain today.  Denies /V  Out of bed on exam  Plan is for 2 weeks of IV abx, SNF vs Rehab placement    Review of Systems   Constitutional: Negative for fever and chills.   Respiratory: Negative.    Cardiovascular: Negative.    Gastrointestinal: Positive for abdominal pain. Negative for nausea and vomiting.     Hemodynamics:  Temp (24hrs), Av.7 °C (98 °F), Min:36.3 °C (97.3 °F), Max:37 °C (98.6 °F)  Temperature: 37 °C (98.6 °F)  Pulse  Av.6  Min: 55  Max: 105   Blood Pressure : (!) 98/61 mmHg     Respiratory:    Respiration: 18, Pulse Oximetry: 96 %        RUL Breath Sounds: Clear, RML Breath Sounds: Clear, RLL Breath Sounds: Diminished, TOSHIA Breath Sounds: Clear, LLL Breath Sounds: Diminished  Neuro:  GCS  15       Fluids:    Intake/Output Summary (Last 24 hours) at 17 1226  Last data filed at 17 1113   Gross per 24 hour   Intake   3098 ml   Output   1400 ml   Net   1698 ml        Current Diet Order   Procedures   • Diet Order     Physical Exam   Constitutional: He is oriented to person, place, and time. He appears well-developed and well-nourished. No distress.   HENT:   Head: Normocephalic.   Eyes: Pupils are equal, round, and reactive to light. No scleral icterus.   Cardiovascular: Normal rate, regular rhythm and normal heart sounds.    Pulmonary/Chest: Effort normal and breath sounds normal. No respiratory distress.   Abdominal: Soft. He exhibits distension. There is tenderness. There is no rebound and no guarding.   Bowel sounds present.  Ostomy intact, viable.  No output via ostomy recorded or in bag.  Midline incision intact, stapled   Neurological: He is alert and oriented to person, place, and time.   Skin: Skin is warm and dry.   Psychiatric: He has a normal mood and affect. His behavior is normal. Judgment and thought content normal.      Labs:  Recent Results (from the past 24 hour(s))   CBC WITH DIFFERENTIAL    Collection Time: 07/28/17  3:15 AM   Result Value Ref Range    WBC 6.4 4.8 - 10.8 K/uL    RBC 3.46 (L) 4.70 - 6.10 M/uL    Hemoglobin 10.0 (L) 14.0 - 18.0 g/dL    Hematocrit 31.4 (L) 42.0 - 52.0 %    MCV 90.8 81.4 - 97.8 fL    MCH 28.9 27.0 - 33.0 pg    MCHC 31.8 (L) 33.7 - 35.3 g/dL    RDW 53.6 (H) 35.9 - 50.0 fL    Platelet Count 260 164 - 446 K/uL    MPV 8.6 (L) 9.0 - 12.9 fL    Neutrophils-Polys 74.60 (H) 44.00 - 72.00 %    Lymphocytes 12.60 (L) 22.00 - 41.00 %    Monocytes 9.50 0.00 - 13.40 %    Eosinophils 1.60 0.00 - 6.90 %    Basophils 0.30 0.00 - 1.80 %    Immature Granulocytes 1.40 (H) 0.00 - 0.90 %    Nucleated RBC 0.00 /100 WBC    Neutrophils (Absolute) 4.80 1.82 - 7.42 K/uL    Lymphs (Absolute) 0.81 (L) 1.00 - 4.80 K/uL    Monos (Absolute) 0.61 0.00 - 0.85 K/uL    Eos (Absolute) 0.10 0.00 - 0.51 K/uL    Baso (Absolute) 0.02 0.00 - 0.12 K/uL    Immature Granulocytes (abs) 0.09 0.00 - 0.11 K/uL    NRBC (Absolute) 0.00 K/uL   RENAL FUNCTION PANEL    Collection Time: 07/28/17  3:15 AM   Result Value Ref Range    Sodium 137 135 - 145 mmol/L    Potassium 4.3 3.6 - 5.5 mmol/L    Chloride 104 96 - 112 mmol/L    Co2 27 20 - 33 mmol/L    Glucose 97 65 - 99 mg/dL    Creatinine 0.74 0.50 - 1.40 mg/dL    Bun 9 8 - 22 mg/dL    Calcium 8.4 (L) 8.5 - 10.5 mg/dL    Phosphorus 3.5 2.5 - 4.5 mg/dL    Albumin 2.4 (L) 3.2 - 4.9 g/dL   ESTIMATED GFR    Collection Time: 07/28/17  3:15 AM   Result Value Ref Range    GFR If African American >60 >60 mL/min/1.73 m 2    GFR If Non African American >60 >60 mL/min/1.73 m 2     Medical Decision Making, by Problem:  Active Hospital Problems    Diagnosis   • Sepsis (CMS-[facility]) [A41.9]     Priority: High   • Abdominal abscess (CMS-[facility]) [K65.1]     Priority: High   • BPH (benign prostatic hypertrophy) [N40.0]     Priority: Low   • Depression [F32.9]     Priority: Low   • Osteoarthritis [M19.90]     Priority:  Low   • Normocytic anemia [D64.9]     Plan:  Continue on CLD until passing flatus into colostomy, then advance diet as tolerated.  Continue ambulation.  Dr. Madrid to take over surgical care of patient while in hosptial.    Quality Measures:  Labs reviewed and Medications reviewed  Ruff catheter: No Ruff      DVT Prophylaxis: Enoxaparin (Lovenox)    Ulcer prophylaxis: Yes  Antibiotics: Treating active infection/contamination beyond 24 hours perioperative coverage        Discussed patient condition with RN and Patient

## 2017-07-28 NOTE — DIETARY
Nutrition Services: Update     Pt is on day 14 of admit.  He is currently receiving a clear liquid diet x 3 days with variable po intake.   Per chart review, pt was on a Regular diet 7/15-7/18 and again 7/19-7/24 with po intake mostly >50% of meals.      Spoke w/pt at bedside, he appears thin but well nourished. He states he is doing well with his CL diet at this time. Discussed sending boost breeze BID, pt agreed and preferences obtained.     Per chart review, pt had surgery on 7/25: ex lap, abdominal washout, sigmoid colon resection, colostomy creation, appendectomy. POD#3 - clear liq diet day 3.     Recommendations:   · Advance diet past CL as pt is able per MD   Record percentage of meals conusmed in ADLs to help monitor po adequacy  Obtain supplement order    RD following

## 2017-07-28 NOTE — CARE PLAN
Problem: Nutritional:  Goal: Achieve adequate nutritional intake  Diet will adv past CL and patient will consume >50% of meals and supplement   Outcome: PROGRESSING SLOWER THAN EXPECTED

## 2017-07-28 NOTE — DISCHARGE PLANNING
Medical SW    Referral: Sw met w/ hosptialist, Dr Long, and bedside RN this AM during rounds.     Intervention: Pt is not medically cleared for d/c. Pt may need SNF rehab. Sw will acquire choice once order is placed in system. Pt will probably d/c w/ IV ABX for 2 weeks.    Plan: Sw to assist w/ d/c planning as needed.

## 2017-07-29 PROBLEM — F31.9 BIPOLAR 1 DISORDER, DEPRESSED (HCC): Chronic | Status: ACTIVE | Noted: 2017-07-29

## 2017-07-29 PROBLEM — K65.1 PELVIC ABSCESS IN MALE (HCC): Status: ACTIVE | Noted: 2017-07-14

## 2017-07-29 LAB
ALBUMIN SERPL BCP-MCNC: 2.1 G/DL (ref 3.2–4.9)
BASOPHILS # BLD AUTO: 0.3 % (ref 0–1.8)
BASOPHILS # BLD: 0.02 K/UL (ref 0–0.12)
BUN SERPL-MCNC: 11 MG/DL (ref 8–22)
CALCIUM SERPL-MCNC: 7.8 MG/DL (ref 8.5–10.5)
CHLORIDE SERPL-SCNC: 108 MMOL/L (ref 96–112)
CO2 SERPL-SCNC: 25 MMOL/L (ref 20–33)
CREAT SERPL-MCNC: 0.61 MG/DL (ref 0.5–1.4)
EOSINOPHIL # BLD AUTO: 0.08 K/UL (ref 0–0.51)
EOSINOPHIL NFR BLD: 1.3 % (ref 0–6.9)
ERYTHROCYTE [DISTWIDTH] IN BLOOD BY AUTOMATED COUNT: 53.3 FL (ref 35.9–50)
GFR SERPL CREATININE-BSD FRML MDRD: >60 ML/MIN/1.73 M 2
GLUCOSE SERPL-MCNC: 90 MG/DL (ref 65–99)
HCT VFR BLD AUTO: 31.7 % (ref 42–52)
HGB BLD-MCNC: 10.2 G/DL (ref 14–18)
IMM GRANULOCYTES # BLD AUTO: 0.07 K/UL (ref 0–0.11)
IMM GRANULOCYTES NFR BLD AUTO: 1.1 % (ref 0–0.9)
LYMPHOCYTES # BLD AUTO: 0.82 K/UL (ref 1–4.8)
LYMPHOCYTES NFR BLD: 13 % (ref 22–41)
MCH RBC QN AUTO: 28.9 PG (ref 27–33)
MCHC RBC AUTO-ENTMCNC: 32.2 G/DL (ref 33.7–35.3)
MCV RBC AUTO: 89.8 FL (ref 81.4–97.8)
MONOCYTES # BLD AUTO: 0.58 K/UL (ref 0–0.85)
MONOCYTES NFR BLD AUTO: 9.2 % (ref 0–13.4)
NEUTROPHILS # BLD AUTO: 4.74 K/UL (ref 1.82–7.42)
NEUTROPHILS NFR BLD: 75.1 % (ref 44–72)
NRBC # BLD AUTO: 0 K/UL
NRBC BLD AUTO-RTO: 0 /100 WBC
PHOSPHATE SERPL-MCNC: 3.3 MG/DL (ref 2.5–4.5)
PLATELET # BLD AUTO: 271 K/UL (ref 164–446)
PMV BLD AUTO: 8.9 FL (ref 9–12.9)
POTASSIUM SERPL-SCNC: 3.7 MMOL/L (ref 3.6–5.5)
RBC # BLD AUTO: 3.53 M/UL (ref 4.7–6.1)
SODIUM SERPL-SCNC: 140 MMOL/L (ref 135–145)
WBC # BLD AUTO: 6.3 K/UL (ref 4.8–10.8)

## 2017-07-29 PROCEDURE — 700111 HCHG RX REV CODE 636 W/ 250 OVERRIDE (IP): Performed by: INTERNAL MEDICINE

## 2017-07-29 PROCEDURE — 700102 HCHG RX REV CODE 250 W/ 637 OVERRIDE(OP): Performed by: INTERNAL MEDICINE

## 2017-07-29 PROCEDURE — 700105 HCHG RX REV CODE 258

## 2017-07-29 PROCEDURE — 700105 HCHG RX REV CODE 258: Performed by: INTERNAL MEDICINE

## 2017-07-29 PROCEDURE — A9270 NON-COVERED ITEM OR SERVICE: HCPCS | Performed by: NURSE PRACTITIONER

## 2017-07-29 PROCEDURE — 80069 RENAL FUNCTION PANEL: CPT

## 2017-07-29 PROCEDURE — A9270 NON-COVERED ITEM OR SERVICE: HCPCS | Performed by: INTERNAL MEDICINE

## 2017-07-29 PROCEDURE — 99232 SBSQ HOSP IP/OBS MODERATE 35: CPT | Performed by: INTERNAL MEDICINE

## 2017-07-29 PROCEDURE — 302255 BARRIER CREAM MOISTURE BAZA PROTECT (ZINC) 5OZ: Performed by: INTERNAL MEDICINE

## 2017-07-29 PROCEDURE — 700102 HCHG RX REV CODE 250 W/ 637 OVERRIDE(OP): Performed by: HOSPITALIST

## 2017-07-29 PROCEDURE — 700111 HCHG RX REV CODE 636 W/ 250 OVERRIDE (IP): Performed by: SURGERY

## 2017-07-29 PROCEDURE — 85025 COMPLETE CBC W/AUTO DIFF WBC: CPT

## 2017-07-29 PROCEDURE — 770021 HCHG ROOM/CARE - ISO PRIVATE

## 2017-07-29 PROCEDURE — A9270 NON-COVERED ITEM OR SERVICE: HCPCS | Performed by: HOSPITALIST

## 2017-07-29 PROCEDURE — 700102 HCHG RX REV CODE 250 W/ 637 OVERRIDE(OP): Performed by: NURSE PRACTITIONER

## 2017-07-29 RX ORDER — SODIUM CHLORIDE 9 MG/ML
INJECTION, SOLUTION INTRAVENOUS
Status: COMPLETED
Start: 2017-07-29 | End: 2017-07-29

## 2017-07-29 RX ORDER — OXYCODONE HYDROCHLORIDE 5 MG/1
5 TABLET ORAL EVERY 4 HOURS PRN
Status: DISCONTINUED | OUTPATIENT
Start: 2017-07-29 | End: 2017-08-03

## 2017-07-29 RX ORDER — MORPHINE SULFATE 4 MG/ML
4 INJECTION, SOLUTION INTRAMUSCULAR; INTRAVENOUS EVERY 4 HOURS PRN
Status: DISCONTINUED | OUTPATIENT
Start: 2017-07-29 | End: 2017-08-18 | Stop reason: HOSPADM

## 2017-07-29 RX ORDER — HYDROCODONE BITARTRATE AND ACETAMINOPHEN 7.5; 325 MG/1; MG/1
1 TABLET ORAL EVERY 6 HOURS PRN
Status: DISCONTINUED | OUTPATIENT
Start: 2017-07-29 | End: 2017-08-18 | Stop reason: HOSPADM

## 2017-07-29 RX ADMIN — GABAPENTIN 200 MG: 100 CAPSULE ORAL at 09:08

## 2017-07-29 RX ADMIN — SODIUM CHLORIDE 50 MG: 9 INJECTION, SOLUTION INTRAVENOUS at 20:18

## 2017-07-29 RX ADMIN — TAMSULOSIN HYDROCHLORIDE 0.4 MG: 0.4 CAPSULE ORAL at 09:08

## 2017-07-29 RX ADMIN — ENOXAPARIN SODIUM 30 MG: 100 INJECTION SUBCUTANEOUS at 03:49

## 2017-07-29 RX ADMIN — SODIUM CHLORIDE 50 MG: 9 INJECTION, SOLUTION INTRAVENOUS at 11:26

## 2017-07-29 RX ADMIN — ENOXAPARIN SODIUM 30 MG: 100 INJECTION SUBCUTANEOUS at 16:41

## 2017-07-29 RX ADMIN — GABAPENTIN 200 MG: 100 CAPSULE ORAL at 16:41

## 2017-07-29 RX ADMIN — SODIUM CHLORIDE: 9 INJECTION, SOLUTION INTRAVENOUS at 19:15

## 2017-07-29 RX ADMIN — OXYBUTYNIN CHLORIDE 5 MG: 5 TABLET ORAL at 09:08

## 2017-07-29 RX ADMIN — LAMOTRIGINE 150 MG: 100 TABLET ORAL at 09:09

## 2017-07-29 RX ADMIN — FLUCONAZOLE 200 MG: 200 TABLET ORAL at 09:08

## 2017-07-29 RX ADMIN — OMEPRAZOLE 20 MG: 20 CAPSULE, DELAYED RELEASE ORAL at 09:08

## 2017-07-29 RX ADMIN — GABAPENTIN 200 MG: 100 CAPSULE ORAL at 20:18

## 2017-07-29 RX ADMIN — OXYBUTYNIN CHLORIDE 5 MG: 5 TABLET ORAL at 20:18

## 2017-07-29 ASSESSMENT — PAIN SCALES - GENERAL
PAINLEVEL_OUTOF10: 3
PAINLEVEL_OUTOF10: ASSUMED PAIN PRESENT
PAINLEVEL_OUTOF10: 0
PAINLEVEL_OUTOF10: ASSUMED PAIN PRESENT
PAINLEVEL_OUTOF10: 1

## 2017-07-29 ASSESSMENT — ENCOUNTER SYMPTOMS
DIARRHEA: 0
ABDOMINAL PAIN: 1
FEVER: 0
WEAKNESS: 1
NAUSEA: 0
MYALGIAS: 0
DIZZINESS: 0
SHORTNESS OF BREATH: 0
PALPITATIONS: 0
NERVOUS/ANXIOUS: 0
COUGH: 0
CHILLS: 0
HEADACHES: 0
CONSTIPATION: 0
FOCAL WEAKNESS: 0
VOMITING: 0

## 2017-07-29 NOTE — PROGRESS NOTES
Received pt in bed resting well, no apparent distress noted, no AMS/A&Ox4, appears agitated when POC was talked about, otherwise remains compliant with care.Per pt pain is well controlled at this time at 1/10, reminded pt regarding use of PCA and its S/e. Breathing even/unlabored, noted LCTAB, on continuous O2 at 15Lpm per NC/denied SOB, reminded pt to utilize IS able to pull between [id number], denied SOB. Abdomen tender s/p sx, (+)hyperBSx4, ostomy appliance in place, noted liquid output, tolerating current diet well, per pt last BM was 2 days, ago, explained to pt the possibility of using laxatives as per policy when pt doesn't have a BM for the next 48 hours, but pt verbalized that he wont take any, island dressing C/D/I. Bladder continent. PICC in-situ x TKO. Educated pt regarding importance of early ambulation but pt he wont this time. Care provided. Needs attended. Bed alarm/hourly rounding in place. No concerns. Contact precautions maintained. Will continue with care.

## 2017-07-29 NOTE — WOUND TEAM
Patient reportedly having a stressful day with spilling urine this AM in bed.  He has gotten up to chair for meals.  He was asleep upon my arrival and I woke him  And after brief discussion, he reports that he would like to defer any ostomy education today.  His appliance is intact with small serosanguinous drainage, no flatus  And stoma red.  Will have ostomy team follow up this weekend.  Patient unable to trace or cut barrier; see previous note.

## 2017-07-29 NOTE — PROGRESS NOTES
Surgical Progress Note    Author: Francheska Madrid Date & Time created: 2017   1:58 PM     Interval Events:  Better today than yesterday.  Minimal flatus into the colostomy bag.  Unable to walk yesterday due to precipitation of excruciating pain.  Family also states that he has had dififculty walking for some time due to chronic back problems.     ROS  Hemodynamics:  Temp (24hrs), Av.6 °C (97.8 °F), Min:36.4 °C (97.5 °F), Max:36.7 °C (98.1 °F)  Temperature: 36.6 °C (97.9 °F)  Pulse  Av.5  Min: 55  Max: 105   Blood Pressure : 122/72 mmHg     Respiratory:    Respiration: 16, Pulse Oximetry: 92 %        RUL Breath Sounds: Clear, RML Breath Sounds: Clear, RLL Breath Sounds: Clear, TOSHIA Breath Sounds: Clear, LLL Breath Sounds: Clear  Neuro:  GCS       Fluids:    Intake/Output Summary (Last 24 hours) at 17 1358  Last data filed at 17 1200   Gross per 24 hour   Intake 1117.5 ml   Output   3100 ml   Net -1982.5 ml        Current Diet Order   Procedures   • Diet Order     Physical Exam   Alert, visiting with friends/family.  Abdomen distended.  No erythema.    Labs:  Recent Results (from the past 24 hour(s))   CBC WITH DIFFERENTIAL    Collection Time: 17  3:55 AM   Result Value Ref Range    WBC 6.3 4.8 - 10.8 K/uL    RBC 3.53 (L) 4.70 - 6.10 M/uL    Hemoglobin 10.2 (L) 14.0 - 18.0 g/dL    Hematocrit 31.7 (L) 42.0 - 52.0 %    MCV 89.8 81.4 - 97.8 fL    MCH 28.9 27.0 - 33.0 pg    MCHC 32.2 (L) 33.7 - 35.3 g/dL    RDW 53.3 (H) 35.9 - 50.0 fL    Platelet Count 271 164 - 446 K/uL    MPV 8.9 (L) 9.0 - 12.9 fL    Neutrophils-Polys 75.10 (H) 44.00 - 72.00 %    Lymphocytes 13.00 (L) 22.00 - 41.00 %    Monocytes 9.20 0.00 - 13.40 %    Eosinophils 1.30 0.00 - 6.90 %    Basophils 0.30 0.00 - 1.80 %    Immature Granulocytes 1.10 (H) 0.00 - 0.90 %    Nucleated RBC 0.00 /100 WBC    Neutrophils (Absolute) 4.74 1.82 - 7.42 K/uL    Lymphs (Absolute) 0.82 (L) 1.00 - 4.80 K/uL    Monos (Absolute) 0.58 0.00 - 0.85  K/uL    Eos (Absolute) 0.08 0.00 - 0.51 K/uL    Baso (Absolute) 0.02 0.00 - 0.12 K/uL    Immature Granulocytes (abs) 0.07 0.00 - 0.11 K/uL    NRBC (Absolute) 0.00 K/uL   RENAL FUNCTION PANEL    Collection Time: 07/29/17  5:00 AM   Result Value Ref Range    Sodium 140 135 - 145 mmol/L    Potassium 3.7 3.6 - 5.5 mmol/L    Chloride 108 96 - 112 mmol/L    Co2 25 20 - 33 mmol/L    Glucose 90 65 - 99 mg/dL    Creatinine 0.61 0.50 - 1.40 mg/dL    Bun 11 8 - 22 mg/dL    Calcium 7.8 (L) 8.5 - 10.5 mg/dL    Phosphorus 3.3 2.5 - 4.5 mg/dL    Albumin 2.1 (L) 3.2 - 4.9 g/dL   ESTIMATED GFR    Collection Time: 07/29/17  5:00 AM   Result Value Ref Range    GFR If African American >60 >60 mL/min/1.73 m 2    GFR If Non African American >60 >60 mL/min/1.73 m 2     Medical Decision Making, by Problem:  Active Hospital Problems    Diagnosis   • Sepsis (CMS-HCC) [A41.9]     Priority: High   • Abdominal abscess (CMS-HCC) [K65.1]     Priority: High   • BPH (benign prostatic hypertrophy) [N40.0]     Priority: Low   • Depression [F32.9]     Priority: Low   • Osteoarthritis [M19.90]     Priority: Low   • Normocytic anemia [D64.9]     Plan:  Ileus as expected.  Continue current diet order.  IV abx per ID.  Ambulate as able, DVT prophylaxis.      Quality Measures:  Core Measures

## 2017-07-29 NOTE — CARE PLAN
Problem: Infection  Goal: Will remain free from infection  IV abx infused, kept dressing C/D/I, contact iso maintained, reminded pt to wash hands often.    Problem: Pain Management  Goal: Pain level will decrease to patient’s comfort goal  Pain assessed, PCA used as needed, educated pt regarding PCA use and s/e, offered ice packs.

## 2017-07-29 NOTE — WOUND TEAM
"[facility]  Inpatient Services  New Ostomy Management & Teaching    HPI:  Reviewed  PMH: Reviewed   SH: Reviewed    Subjective: \"I'm doing pretty good.\"    Objective:  Good sense of humor.      Ostomy type:  colostomy   Stoma location:  LLQ   Stoma assessment:    Appearance: red, edematous, slightly dusky from 1100 to 0100    Size: 1 3/4\" oval    Protrusion: mildly budded    Output: Small, loose, brown.    MC jxn: intact    Peristomal skin: Intact.     Ostomy Appliance (type and size): 2 1/4\" two piece    Interventions:  Met with patient and friend Jessie. Removed previous appliance, demonstrating to patient how to remove. Traced and cut out barrier. Patient placed barrier around stoma. This RN removed paper backing. Attached fecal pouch to barrier and secured the bottom.      Pt education: Questions and concerns addressed; see above    Evaluation:  Patient will need assistance with ostomy appliance change.  Patient seems to be able to empty pouch.    Plan: Ostomy nurses to continue to follow for ostomy needs and teaching     Anticipated discharge needs:  Has everything for discharge   "

## 2017-07-30 PROBLEM — A41.9 SEPSIS, UNSPECIFIED: Status: RESOLVED | Noted: 2017-07-14 | Resolved: 2017-07-30

## 2017-07-30 LAB
ALBUMIN SERPL BCP-MCNC: 2.2 G/DL (ref 3.2–4.9)
BACTERIA SPEC ANAEROBE CULT: NORMAL
BASOPHILS # BLD AUTO: 0.6 % (ref 0–1.8)
BASOPHILS # BLD: 0.03 K/UL (ref 0–0.12)
BUN SERPL-MCNC: 13 MG/DL (ref 8–22)
CALCIUM SERPL-MCNC: 7.9 MG/DL (ref 8.5–10.5)
CHLORIDE SERPL-SCNC: 104 MMOL/L (ref 96–112)
CO2 SERPL-SCNC: 29 MMOL/L (ref 20–33)
CREAT SERPL-MCNC: 0.66 MG/DL (ref 0.5–1.4)
EOSINOPHIL # BLD AUTO: 0.11 K/UL (ref 0–0.51)
EOSINOPHIL NFR BLD: 2.1 % (ref 0–6.9)
ERYTHROCYTE [DISTWIDTH] IN BLOOD BY AUTOMATED COUNT: 53.1 FL (ref 35.9–50)
GFR SERPL CREATININE-BSD FRML MDRD: >60 ML/MIN/1.73 M 2
GLUCOSE SERPL-MCNC: 101 MG/DL (ref 65–99)
HCT VFR BLD AUTO: 29.6 % (ref 42–52)
HGB BLD-MCNC: 9.5 G/DL (ref 14–18)
IMM GRANULOCYTES # BLD AUTO: 0.07 K/UL (ref 0–0.11)
IMM GRANULOCYTES NFR BLD AUTO: 1.3 % (ref 0–0.9)
LYMPHOCYTES # BLD AUTO: 0.9 K/UL (ref 1–4.8)
LYMPHOCYTES NFR BLD: 17.1 % (ref 22–41)
MCH RBC QN AUTO: 28.8 PG (ref 27–33)
MCHC RBC AUTO-ENTMCNC: 32.1 G/DL (ref 33.7–35.3)
MCV RBC AUTO: 89.7 FL (ref 81.4–97.8)
MONOCYTES # BLD AUTO: 0.64 K/UL (ref 0–0.85)
MONOCYTES NFR BLD AUTO: 12.2 % (ref 0–13.4)
NEUTROPHILS # BLD AUTO: 3.5 K/UL (ref 1.82–7.42)
NEUTROPHILS NFR BLD: 66.7 % (ref 44–72)
NRBC # BLD AUTO: 0 K/UL
NRBC BLD AUTO-RTO: 0 /100 WBC
PHOSPHATE SERPL-MCNC: 3.1 MG/DL (ref 2.5–4.5)
PLATELET # BLD AUTO: 244 K/UL (ref 164–446)
PMV BLD AUTO: 8.9 FL (ref 9–12.9)
POTASSIUM SERPL-SCNC: 3.6 MMOL/L (ref 3.6–5.5)
RBC # BLD AUTO: 3.3 M/UL (ref 4.7–6.1)
SIGNIFICANT IND 70042: NORMAL
SITE SITE: NORMAL
SODIUM SERPL-SCNC: 137 MMOL/L (ref 135–145)
SOURCE SOURCE: NORMAL
WBC # BLD AUTO: 5.3 K/UL (ref 4.8–10.8)

## 2017-07-30 PROCEDURE — 85025 COMPLETE CBC W/AUTO DIFF WBC: CPT

## 2017-07-30 PROCEDURE — 700102 HCHG RX REV CODE 250 W/ 637 OVERRIDE(OP): Performed by: HOSPITALIST

## 2017-07-30 PROCEDURE — A9270 NON-COVERED ITEM OR SERVICE: HCPCS | Performed by: INTERNAL MEDICINE

## 2017-07-30 PROCEDURE — 99232 SBSQ HOSP IP/OBS MODERATE 35: CPT | Performed by: INTERNAL MEDICINE

## 2017-07-30 PROCEDURE — 700111 HCHG RX REV CODE 636 W/ 250 OVERRIDE (IP): Performed by: INTERNAL MEDICINE

## 2017-07-30 PROCEDURE — 700105 HCHG RX REV CODE 258: Performed by: INTERNAL MEDICINE

## 2017-07-30 PROCEDURE — 770021 HCHG ROOM/CARE - ISO PRIVATE

## 2017-07-30 PROCEDURE — 700105 HCHG RX REV CODE 258

## 2017-07-30 PROCEDURE — 700111 HCHG RX REV CODE 636 W/ 250 OVERRIDE (IP): Performed by: SURGERY

## 2017-07-30 PROCEDURE — 700102 HCHG RX REV CODE 250 W/ 637 OVERRIDE(OP): Performed by: INTERNAL MEDICINE

## 2017-07-30 PROCEDURE — 80069 RENAL FUNCTION PANEL: CPT

## 2017-07-30 PROCEDURE — A9270 NON-COVERED ITEM OR SERVICE: HCPCS | Performed by: NURSE PRACTITIONER

## 2017-07-30 PROCEDURE — 700102 HCHG RX REV CODE 250 W/ 637 OVERRIDE(OP): Performed by: NURSE PRACTITIONER

## 2017-07-30 PROCEDURE — A9270 NON-COVERED ITEM OR SERVICE: HCPCS | Performed by: HOSPITALIST

## 2017-07-30 RX ORDER — SODIUM CHLORIDE 9 MG/ML
INJECTION, SOLUTION INTRAVENOUS
Status: COMPLETED
Start: 2017-07-30 | End: 2017-07-30

## 2017-07-30 RX ADMIN — SODIUM CHLORIDE 50 MG: 9 INJECTION, SOLUTION INTRAVENOUS at 21:34

## 2017-07-30 RX ADMIN — HYDROCODONE BITARTRATE AND ACETAMINOPHEN 1 TABLET: 7.5; 325 TABLET ORAL at 03:43

## 2017-07-30 RX ADMIN — LAMOTRIGINE 150 MG: 100 TABLET ORAL at 08:54

## 2017-07-30 RX ADMIN — SODIUM CHLORIDE 50 MG: 9 INJECTION, SOLUTION INTRAVENOUS at 08:59

## 2017-07-30 RX ADMIN — GABAPENTIN 200 MG: 100 CAPSULE ORAL at 08:53

## 2017-07-30 RX ADMIN — ENOXAPARIN SODIUM 30 MG: 100 INJECTION SUBCUTANEOUS at 16:52

## 2017-07-30 RX ADMIN — SODIUM CHLORIDE: 9 INJECTION, SOLUTION INTRAVENOUS at 18:45

## 2017-07-30 RX ADMIN — HYDROCODONE BITARTRATE AND ACETAMINOPHEN 1 TABLET: 7.5; 325 TABLET ORAL at 20:29

## 2017-07-30 RX ADMIN — TAMSULOSIN HYDROCHLORIDE 0.4 MG: 0.4 CAPSULE ORAL at 08:54

## 2017-07-30 RX ADMIN — OXYBUTYNIN CHLORIDE 5 MG: 5 TABLET ORAL at 08:53

## 2017-07-30 RX ADMIN — ENOXAPARIN SODIUM 30 MG: 100 INJECTION SUBCUTANEOUS at 03:29

## 2017-07-30 RX ADMIN — GABAPENTIN 200 MG: 100 CAPSULE ORAL at 16:53

## 2017-07-30 RX ADMIN — FLUCONAZOLE 200 MG: 200 TABLET ORAL at 08:54

## 2017-07-30 RX ADMIN — OMEPRAZOLE 20 MG: 20 CAPSULE, DELAYED RELEASE ORAL at 08:54

## 2017-07-30 RX ADMIN — OXYBUTYNIN CHLORIDE 5 MG: 5 TABLET ORAL at 20:29

## 2017-07-30 RX ADMIN — TRAMADOL HYDROCHLORIDE 100 MG: 50 TABLET, COATED ORAL at 08:54

## 2017-07-30 RX ADMIN — GABAPENTIN 200 MG: 100 CAPSULE ORAL at 20:29

## 2017-07-30 RX ADMIN — TRAMADOL HYDROCHLORIDE 100 MG: 50 TABLET, COATED ORAL at 16:53

## 2017-07-30 ASSESSMENT — ENCOUNTER SYMPTOMS
CHILLS: 0
SHORTNESS OF BREATH: 0
COUGH: 0
WEAKNESS: 1
VOMITING: 0
ABDOMINAL PAIN: 1
DIZZINESS: 0
MYALGIAS: 0
NERVOUS/ANXIOUS: 0
HEADACHES: 0
FEVER: 0
DIARRHEA: 0
NAUSEA: 0
FOCAL WEAKNESS: 0
PALPITATIONS: 0
CONSTIPATION: 0

## 2017-07-30 ASSESSMENT — PAIN SCALES - GENERAL
PAINLEVEL_OUTOF10: 2
PAINLEVEL_OUTOF10: 5
PAINLEVEL_OUTOF10: 4
PAINLEVEL_OUTOF10: ASSUMED PAIN PRESENT
PAINLEVEL_OUTOF10: 4
PAINLEVEL_OUTOF10: 4

## 2017-07-30 NOTE — PROGRESS NOTES
[facility] Hospitalist Progress Note    Date of Service: 2017    Chief Complaint  75 y/o M with PMH of peripheral neuropathy, BPH, Depression: admitted for above and found to have pelvis abscess on imaging. S/p Ex lap and abdominal washout on .    Interval Problem Update    -He states he is ready to come off the PCA. Pain somewhat better. He is complaining of a sore on his back. Worries about redness on his scrotum.     -Patient is sitting in chair. Says pain is worse today and refuses to have his PCA discontinued. Tolerating liquid diet well. Denies nausea or vomiting.     -Remains on PCA pump. Still no signs of gas or stool in his colostomy. He is tolerating clear liquids.     -Doing well this am, still requires the PCA. Tolerating diet. Not gas or stool in his ostomy.     -S/p Ex lap and abdominal washout    Consultants/Specialty  [facility] - Surgery  Dr. Herbert-ID    Disposition  Likely to SNF. Pending PT eval.       Review of Systems   Constitutional: Negative for fever and chills.   Respiratory: Negative for cough and shortness of breath.    Cardiovascular: Negative for chest pain and palpitations.   Gastrointestinal: Positive for abdominal pain. Negative for nausea, vomiting, diarrhea and constipation.   Genitourinary: Positive for dysuria.   Musculoskeletal: Negative for myalgias.   Skin: Negative for itching.   Neurological: Positive for weakness. Negative for dizziness, focal weakness and headaches.   Psychiatric/Behavioral: The patient is not nervous/anxious (anxious to get out of the hospital).    All other systems reviewed and are negative.     Physical Exam  Laboratory/Imaging   Hemodynamics  Temp (24hrs), Av.7 °C (98 °F), Min:36.4 °C (97.6 °F), Max:36.9 °C (98.5 °F)   Temperature: 36.9 °C (98.5 °F)  Pulse  Av.7  Min: 55  Max: 105    Blood Pressure : 107/52 mmHg      Respiratory      Respiration: 16, Pulse Oximetry: 92 %        RUL Breath Sounds: Clear, RML Breath  Sounds: Clear, RLL Breath Sounds: Clear, TOSHIA Breath Sounds: Clear, LLL Breath Sounds: Clear    Fluids    Intake/Output Summary (Last 24 hours) at 07/29/17 2009  Last data filed at 07/29/17 1700   Gross per 24 hour   Intake    810 ml   Output   2450 ml   Net  -1640 ml       Nutrition  Orders Placed This Encounter   Procedures   • Diet Order     Standing Status: Standing      Number of Occurrences: 1      Standing Expiration Date:      Order Specific Question:  Diet:     Answer:  Clear Liquid [10]     Physical Exam   Constitutional: He is oriented to person, place, and time. He appears well-developed and well-nourished.   HENT:   Head: Normocephalic and atraumatic.   Mouth/Throat: Oropharynx is clear and moist.   Eyes: Conjunctivae and EOM are normal. Pupils are equal, round, and reactive to light. No scleral icterus.   Neck: Normal range of motion. Neck supple. No tracheal deviation present. No thyromegaly present.   Cardiovascular: Normal rate, regular rhythm, normal heart sounds and intact distal pulses.    No murmur heard.  Pulmonary/Chest: Effort normal and breath sounds normal. No respiratory distress. He has no wheezes.   Abdominal: Soft. He exhibits no distension. There is tenderness.   Colostomy in place, no stool or bowel gas in the bag. Ostomy site looks pink.  Liquid and gas in the bag  NBSx4   Musculoskeletal: Normal range of motion. He exhibits no edema or tenderness.   Lymphadenopathy:     He has no cervical adenopathy.        Right: No supraclavicular adenopathy present.        Left: No supraclavicular adenopathy present.   Neurological: He is alert and oriented to person, place, and time. No cranial nerve deficit.   Skin: Skin is warm and dry. No erythema.   Vitals reviewed.      Recent Labs      07/27/17   0210  07/28/17   0315  07/29/17   0355   WBC  7.2  6.4  6.3   RBC  3.12*  3.46*  3.53*   HEMOGLOBIN  9.1*  10.0*  10.2*   HEMATOCRIT  28.4*  31.4*  31.7*   MCV  91.0  90.8  89.8   MCH  29.2  28.9   28.9   MCHC  32.0*  31.8*  32.2*   RDW  52.1*  53.6*  53.3*   PLATELETCT  246  260  271   MPV  8.6*  8.6*  8.9*     Recent Labs      07/27/17   0210  07/28/17   0315  07/29/17   0500   SODIUM  139  137  140   POTASSIUM  4.3  4.3  3.7   CHLORIDE  106  104  108   CO2  29  27  25   GLUCOSE  110*  97  90   BUN  8  9  11   CREATININE  0.73  0.74  0.61   CALCIUM  8.7  8.4*  7.8*                      Assessment/Plan     * Sepsis (CMS-HCC) (present on admission)  Assessment & Plan  Resolved  Continue IV abx of tygacyl and diflucan for 2 weeks for VRE and candida abscess  Monitor WBC    Pelvic abscess in male (CMS-HCC) (present on admission)  Assessment & Plan  S/p ex lap and wash out on 07/25  Doing well post-op  Cultures on 7/25 grew moderate VRE and yeast   ID following  Patient on tygacyl and diflucan  WBC WNL, afebrile  DCed pain pump, started PO pain meds  Tolerating liquid diet  PT/OT      Normocytic anemia  Assessment & Plan  Stable H&H  Likely combination of intra-op losses and chronic disease    Bipolar 1 disorder, depressed (CMS-HCC)  Assessment & Plan  On lamictal    BPH (benign prostatic hypertrophy) (present on admission)  Assessment & Plan  Continue Flomax  I believe he is incontinent and the redness on his scrotum is due to this  We will try to keep the area dry with poweder    Depression (present on admission)  Assessment & Plan  Stable    Osteoarthritis (present on admission)  Assessment & Plan  Stable, prn analgesia  Vitamin D and calcium once diet further advanced      Labs reviewed, Medications reviewed and Radiology images reviewed  Ruff catheter: Critically Ill - Requiring Accurate Measurement of Urinary Output      DVT Prophylaxis: Enoxaparin (Lovenox)      Antibiotics: Treating active infection/contamination beyond 24 hours perioperative coverage

## 2017-07-30 NOTE — PROGRESS NOTES
Received pt resting well in bed, stable with no apparent distress noted, no AMS/A&Ox4, VSS. Denied pain on initial encounter, oriented pt the PCA will be now discontinued and oral pain meds will be given once needed, verbalized understanding. Breathing even/unlabored, LCTAB, on continuous O2 at 1.5Lpm per NC, sats at mid to low 90's, denied SOB, encouraged pt to use IS more frequently. Abdomen tender s/p sx, (+)hyperBSx4, per pt had output to new ostomy, appliance in place. Island dressing C/D/I. PICC to MONALISA in-situ, (+)blood return, dressing replaced. Remains one person assist, reminded pt to ambulate frequently out to hallway or BR to hasten bowel motility but pt seems to be non-motivated as evidence by gestures. Bladder continent. Care provided. Needs attended. Bed alarm/hourly roudning in place. No concerns. Will continue with POC.

## 2017-07-30 NOTE — PROGRESS NOTES
[facility]ist Progress Note    Date of Service: 2017    Chief Complaint  77 y/o M with PMH of peripheral neuropathy, BPH, Depression: admitted for above and found to have pelvis abscess on imaging. S/p Ex lap and abdominal washout on .    Interval Problem Update    -He states he is ready to come off the PCA. Pain somewhat better. He is complaining of a sore on his back. Worries about redness on his scrotum.     -Patient is sitting in chair. Says pain is worse today and refuses to have his PCA discontinued. Tolerating liquid diet well. Denies nausea or vomiting.     -Remains on PCA pump. Still no signs of gas or stool in his colostomy. He is tolerating clear liquids.     -Doing well this am, still requires the PCA. Tolerating diet. Not gas or stool in his ostomy.     -S/p Ex lap and abdominal washout    Consultants/Specialty  [facility] - Surgery  Dr. Herbert-ID    Disposition  Likely to SNF. Will discuss with CM.       Review of Systems   Constitutional: Negative for fever and chills.   Respiratory: Negative for cough and shortness of breath.    Cardiovascular: Negative for chest pain and palpitations.   Gastrointestinal: Positive for abdominal pain. Negative for nausea, vomiting, diarrhea and constipation.   Genitourinary: Positive for dysuria.   Musculoskeletal: Negative for myalgias.   Skin: Negative for itching.   Neurological: Positive for weakness. Negative for dizziness, focal weakness and headaches.   Psychiatric/Behavioral: The patient is not nervous/anxious (anxious to get out of the hospital).    All other systems reviewed and are negative.     Physical Exam  Laboratory/Imaging   Hemodynamics  Temp (24hrs), Av.8 °C (98.3 °F), Min:36.7 °C (98 °F), Max:36.9 °C (98.5 °F)   Temperature: 36.9 °C (98.5 °F)  Pulse  Av.7  Min: 55  Max: 105    Blood Pressure : 121/70 mmHg      Respiratory      Respiration: 16, Pulse Oximetry: 90 %        RUL Breath Sounds: Clear, RML  Breath Sounds: Clear, RLL Breath Sounds: Clear, TOSHIA Breath Sounds: Clear, LLL Breath Sounds: Clear    Fluids    Intake/Output Summary (Last 24 hours) at 07/30/17 1350  Last data filed at 07/30/17 1200   Gross per 24 hour   Intake    890 ml   Output   2040 ml   Net  -1150 ml       Nutrition  Orders Placed This Encounter   Procedures   • Diet Order     Standing Status: Standing      Number of Occurrences: 1      Standing Expiration Date:      Order Specific Question:  Diet:     Answer:  Clear Liquid [10]     Physical Exam   Constitutional: He is oriented to person, place, and time. He appears well-developed and well-nourished.   HENT:   Head: Normocephalic and atraumatic.   Mouth/Throat: Oropharynx is clear and moist.   Eyes: Conjunctivae and EOM are normal. Pupils are equal, round, and reactive to light. No scleral icterus.   Neck: Normal range of motion. Neck supple. No tracheal deviation present. No thyromegaly present.   Cardiovascular: Normal rate, regular rhythm, normal heart sounds and intact distal pulses.    No murmur heard.  Pulmonary/Chest: Effort normal and breath sounds normal. No respiratory distress. He has no wheezes.   Abdominal: Soft. He exhibits no distension. There is no tenderness.   Colostomy in place, orange color liquid in the bag   Musculoskeletal: Normal range of motion. He exhibits no edema or tenderness.   Lymphadenopathy:     He has no cervical adenopathy.        Right: No supraclavicular adenopathy present.        Left: No supraclavicular adenopathy present.   Neurological: He is alert and oriented to person, place, and time. No cranial nerve deficit.   Skin: Skin is warm and dry. No erythema.   Vitals reviewed.      Recent Labs      07/28/17   0315  07/29/17   0355  07/30/17   0340   WBC  6.4  6.3  5.3   RBC  3.46*  3.53*  3.30*   HEMOGLOBIN  10.0*  10.2*  9.5*   HEMATOCRIT  31.4*  31.7*  29.6*   MCV  90.8  89.8  89.7   MCH  28.9  28.9  28.8   MCHC  31.8*  32.2*  32.1*   RDW  53.6*   53.3*  53.1*   PLATELETCT  260  271  244   MPV  8.6*  8.9*  8.9*     Recent Labs      07/28/17   0315  07/29/17   0500  07/30/17   0340   SODIUM  137  140  137   POTASSIUM  4.3  3.7  3.6   CHLORIDE  104  108  104   CO2  27  25  29   GLUCOSE  97  90  101*   BUN  9  11  13   CREATININE  0.74  0.61  0.66   CALCIUM  8.4*  7.8*  7.9*                      Assessment/Plan     Pelvic abscess in male (CMS-HCC) (present on admission)  Assessment & Plan  S/p ex lap and wash out on 07/25  Doing well post-op  Cultures on 7/25 grew moderate VRE and yeast   ID following  Patient on tygacyl and diflucan  WBC WNL, afebrile  DCed pain pump, started PO pain meds  Tolerating liquid diet  PT/OT      Normocytic anemia  Assessment & Plan  Stable H&H  Likely combination of intra-op losses and chronic disease    Bipolar 1 disorder, depressed (CMS-HCC)  Assessment & Plan  On lamictal    BPH (benign prostatic hypertrophy) (present on admission)  Assessment & Plan  Continue Flomax  I believe he is incontinent and the redness on his scrotum is due to this  We will try to keep the area dry with poweder    Depression (present on admission)  Assessment & Plan  Stable    Osteoarthritis (present on admission)  Assessment & Plan  Stable, prn analgesia  Vitamin D and calcium once diet further advanced      Labs reviewed, Medications reviewed and Radiology images reviewed  Ruff catheter: Critically Ill - Requiring Accurate Measurement of Urinary Output      DVT Prophylaxis: Enoxaparin (Lovenox)      Antibiotics: Treating active infection/contamination beyond 24 hours perioperative coverage

## 2017-07-30 NOTE — CARE PLAN
Problem: Pain Management  Goal: Pain level will decrease to patient’s comfort goal  Oriented pt that PCA has been d/c and oral pain meds is at hand when needed.     Problem: Fluid Volume:  Goal: Will maintain balanced intake and output  Encouraged pt to increase clear fluid intake to keep self hydrated vs carbonated drinks.

## 2017-07-30 NOTE — PROGRESS NOTES
Surgical Progress Note    Author: Francheska Madrid Date & Time created: 2017   3:38 PM     Interval Events:  Some flatus into his colostomy bag, tolerating clears.  Had a painful lovenox injection.     ROS  Hemodynamics:  Temp (24hrs), Av.8 °C (98.3 °F), Min:36.7 °C (98 °F), Max:36.9 °C (98.5 °F)  Temperature: 36.9 °C (98.5 °F)  Pulse  Av.7  Min: 55  Max: 105   Blood Pressure : 121/70 mmHg     Respiratory:    Respiration: 16, Pulse Oximetry: 90 %        RUL Breath Sounds: Clear, RML Breath Sounds: Clear, RLL Breath Sounds: Clear, TOSHIA Breath Sounds: Clear, LLL Breath Sounds: Clear  Neuro:  GCS       Fluids:    Intake/Output Summary (Last 24 hours) at 17 1538  Last data filed at 17 1200   Gross per 24 hour   Intake    890 ml   Output   2040 ml   Net  -1150 ml        Current Diet Order   Procedures   • Diet Order     Physical Exam   Alert, seems comfortable, interacts appropriately.  Abdomen very distended.  Small amt of flatus into the bag.     Labs:  Recent Results (from the past 24 hour(s))   CBC WITH DIFFERENTIAL    Collection Time: 17  3:40 AM   Result Value Ref Range    WBC 5.3 4.8 - 10.8 K/uL    RBC 3.30 (L) 4.70 - 6.10 M/uL    Hemoglobin 9.5 (L) 14.0 - 18.0 g/dL    Hematocrit 29.6 (L) 42.0 - 52.0 %    MCV 89.7 81.4 - 97.8 fL    MCH 28.8 27.0 - 33.0 pg    MCHC 32.1 (L) 33.7 - 35.3 g/dL    RDW 53.1 (H) 35.9 - 50.0 fL    Platelet Count 244 164 - 446 K/uL    MPV 8.9 (L) 9.0 - 12.9 fL    Neutrophils-Polys 66.70 44.00 - 72.00 %    Lymphocytes 17.10 (L) 22.00 - 41.00 %    Monocytes 12.20 0.00 - 13.40 %    Eosinophils 2.10 0.00 - 6.90 %    Basophils 0.60 0.00 - 1.80 %    Immature Granulocytes 1.30 (H) 0.00 - 0.90 %    Nucleated RBC 0.00 /100 WBC    Neutrophils (Absolute) 3.50 1.82 - 7.42 K/uL    Lymphs (Absolute) 0.90 (L) 1.00 - 4.80 K/uL    Monos (Absolute) 0.64 0.00 - 0.85 K/uL    Eos (Absolute) 0.11 0.00 - 0.51 K/uL    Baso (Absolute) 0.03 0.00 - 0.12 K/uL    Immature Granulocytes (abs)  0.07 0.00 - 0.11 K/uL    NRBC (Absolute) 0.00 K/uL   RENAL FUNCTION PANEL    Collection Time: 07/30/17  3:40 AM   Result Value Ref Range    Sodium 137 135 - 145 mmol/L    Potassium 3.6 3.6 - 5.5 mmol/L    Chloride 104 96 - 112 mmol/L    Co2 29 20 - 33 mmol/L    Glucose 101 (H) 65 - 99 mg/dL    Creatinine 0.66 0.50 - 1.40 mg/dL    Bun 13 8 - 22 mg/dL    Calcium 7.9 (L) 8.5 - 10.5 mg/dL    Phosphorus 3.1 2.5 - 4.5 mg/dL    Albumin 2.2 (L) 3.2 - 4.9 g/dL   ESTIMATED GFR    Collection Time: 07/30/17  3:40 AM   Result Value Ref Range    GFR If African American >60 >60 mL/min/1.73 m 2    GFR If Non African American >60 >60 mL/min/1.73 m 2     Medical Decision Making, by Problem:  Active Hospital Problems    Diagnosis   • Pelvic abscess in male (CMS-HCC) [K65.1]     Priority: High   • BPH (benign prostatic hypertrophy) [N40.0]     Priority: Low   • Depression [F32.9]     Priority: Low   • Osteoarthritis [M19.90]     Priority: Low   • Bipolar 1 disorder, depressed (CMS-HCC) [F31.9]   • Normocytic anemia [D64.9]     Plan:  Hold diet at clear, pt advised to keep to sips until much less distended.  Further care per IM team.    Quality Measures:  Core Measures

## 2017-07-31 LAB
BASOPHILS # BLD AUTO: 0.4 % (ref 0–1.8)
BASOPHILS # BLD: 0.02 K/UL (ref 0–0.12)
EOSINOPHIL # BLD AUTO: 0.09 K/UL (ref 0–0.51)
EOSINOPHIL NFR BLD: 1.9 % (ref 0–6.9)
ERYTHROCYTE [DISTWIDTH] IN BLOOD BY AUTOMATED COUNT: 53 FL (ref 35.9–50)
HCT VFR BLD AUTO: 32 % (ref 42–52)
HGB BLD-MCNC: 10.1 G/DL (ref 14–18)
IMM GRANULOCYTES # BLD AUTO: 0.05 K/UL (ref 0–0.11)
IMM GRANULOCYTES NFR BLD AUTO: 1 % (ref 0–0.9)
LYMPHOCYTES # BLD AUTO: 0.98 K/UL (ref 1–4.8)
LYMPHOCYTES NFR BLD: 20.2 % (ref 22–41)
MCH RBC QN AUTO: 28.3 PG (ref 27–33)
MCHC RBC AUTO-ENTMCNC: 31.6 G/DL (ref 33.7–35.3)
MCV RBC AUTO: 89.6 FL (ref 81.4–97.8)
MONOCYTES # BLD AUTO: 0.57 K/UL (ref 0–0.85)
MONOCYTES NFR BLD AUTO: 11.8 % (ref 0–13.4)
NEUTROPHILS # BLD AUTO: 3.14 K/UL (ref 1.82–7.42)
NEUTROPHILS NFR BLD: 64.7 % (ref 44–72)
NRBC # BLD AUTO: 0 K/UL
NRBC BLD AUTO-RTO: 0 /100 WBC
PLATELET # BLD AUTO: 235 K/UL (ref 164–446)
PMV BLD AUTO: 8.5 FL (ref 9–12.9)
RBC # BLD AUTO: 3.57 M/UL (ref 4.7–6.1)
WBC # BLD AUTO: 4.9 K/UL (ref 4.8–10.8)

## 2017-07-31 PROCEDURE — 97535 SELF CARE MNGMENT TRAINING: CPT

## 2017-07-31 PROCEDURE — G8978 MOBILITY CURRENT STATUS: HCPCS | Mod: CK

## 2017-07-31 PROCEDURE — A9270 NON-COVERED ITEM OR SERVICE: HCPCS | Performed by: INTERNAL MEDICINE

## 2017-07-31 PROCEDURE — 700102 HCHG RX REV CODE 250 W/ 637 OVERRIDE(OP): Performed by: NURSE PRACTITIONER

## 2017-07-31 PROCEDURE — 700105 HCHG RX REV CODE 258: Performed by: INTERNAL MEDICINE

## 2017-07-31 PROCEDURE — 700111 HCHG RX REV CODE 636 W/ 250 OVERRIDE (IP): Performed by: INTERNAL MEDICINE

## 2017-07-31 PROCEDURE — G8979 MOBILITY GOAL STATUS: HCPCS | Mod: CI

## 2017-07-31 PROCEDURE — 97530 THERAPEUTIC ACTIVITIES: CPT

## 2017-07-31 PROCEDURE — A9270 NON-COVERED ITEM OR SERVICE: HCPCS | Performed by: HOSPITALIST

## 2017-07-31 PROCEDURE — 700111 HCHG RX REV CODE 636 W/ 250 OVERRIDE (IP): Performed by: SURGERY

## 2017-07-31 PROCEDURE — 700102 HCHG RX REV CODE 250 W/ 637 OVERRIDE(OP): Performed by: HOSPITALIST

## 2017-07-31 PROCEDURE — A9270 NON-COVERED ITEM OR SERVICE: HCPCS | Performed by: NURSE PRACTITIONER

## 2017-07-31 PROCEDURE — 700102 HCHG RX REV CODE 250 W/ 637 OVERRIDE(OP): Performed by: INTERNAL MEDICINE

## 2017-07-31 PROCEDURE — 99232 SBSQ HOSP IP/OBS MODERATE 35: CPT | Performed by: INTERNAL MEDICINE

## 2017-07-31 PROCEDURE — 85025 COMPLETE CBC W/AUTO DIFF WBC: CPT

## 2017-07-31 PROCEDURE — 770021 HCHG ROOM/CARE - ISO PRIVATE

## 2017-07-31 PROCEDURE — 97162 PT EVAL MOD COMPLEX 30 MIN: CPT

## 2017-07-31 RX ORDER — SODIUM CHLORIDE 9 MG/ML
INJECTION, SOLUTION INTRAVENOUS
Status: ACTIVE
Start: 2017-07-31 | End: 2017-08-01

## 2017-07-31 RX ADMIN — ENOXAPARIN SODIUM 30 MG: 100 INJECTION SUBCUTANEOUS at 14:50

## 2017-07-31 RX ADMIN — OXYBUTYNIN CHLORIDE 5 MG: 5 TABLET ORAL at 07:49

## 2017-07-31 RX ADMIN — LAMOTRIGINE 150 MG: 100 TABLET ORAL at 07:49

## 2017-07-31 RX ADMIN — SODIUM CHLORIDE 50 MG: 9 INJECTION, SOLUTION INTRAVENOUS at 10:50

## 2017-07-31 RX ADMIN — HYDROCODONE BITARTRATE AND ACETAMINOPHEN 1 TABLET: 7.5; 325 TABLET ORAL at 16:05

## 2017-07-31 RX ADMIN — HYDROCODONE BITARTRATE AND ACETAMINOPHEN 1 TABLET: 7.5; 325 TABLET ORAL at 04:33

## 2017-07-31 RX ADMIN — SODIUM CHLORIDE 50 MG: 9 INJECTION, SOLUTION INTRAVENOUS at 20:11

## 2017-07-31 RX ADMIN — TAMSULOSIN HYDROCHLORIDE 0.4 MG: 0.4 CAPSULE ORAL at 07:50

## 2017-07-31 RX ADMIN — OMEPRAZOLE 20 MG: 20 CAPSULE, DELAYED RELEASE ORAL at 07:50

## 2017-07-31 RX ADMIN — GABAPENTIN 200 MG: 100 CAPSULE ORAL at 14:50

## 2017-07-31 RX ADMIN — FLUCONAZOLE 200 MG: 200 TABLET ORAL at 07:50

## 2017-07-31 RX ADMIN — GABAPENTIN 200 MG: 100 CAPSULE ORAL at 07:50

## 2017-07-31 RX ADMIN — OXYBUTYNIN CHLORIDE 5 MG: 5 TABLET ORAL at 20:11

## 2017-07-31 RX ADMIN — GABAPENTIN 200 MG: 100 CAPSULE ORAL at 20:11

## 2017-07-31 ASSESSMENT — COGNITIVE AND FUNCTIONAL STATUS - GENERAL
PERSONAL GROOMING: A LITTLE
HELP NEEDED FOR BATHING: A LOT
MOVING TO AND FROM BED TO CHAIR: A LITTLE
MOVING FROM LYING ON BACK TO SITTING ON SIDE OF FLAT BED: A LITTLE
SUGGESTED CMS G CODE MODIFIER MOBILITY: CK
CLIMB 3 TO 5 STEPS WITH RAILING: A LOT
SUGGESTED CMS G CODE MODIFIER DAILY ACTIVITY: CK
MOBILITY SCORE: 18
TOILETING: A LITTLE
WALKING IN HOSPITAL ROOM: A LITTLE
DAILY ACTIVITIY SCORE: 17
STANDING UP FROM CHAIR USING ARMS: A LITTLE
DRESSING REGULAR LOWER BODY CLOTHING: A LOT
DRESSING REGULAR UPPER BODY CLOTHING: A LITTLE

## 2017-07-31 ASSESSMENT — ENCOUNTER SYMPTOMS
MYALGIAS: 0
COUGH: 0
HEADACHES: 0
DIZZINESS: 0
FOCAL WEAKNESS: 0
VOMITING: 0
ABDOMINAL PAIN: 0
PALPITATIONS: 0
NAUSEA: 0
WEAKNESS: 1
SHORTNESS OF BREATH: 0

## 2017-07-31 ASSESSMENT — GAIT ASSESSMENTS
GAIT LEVEL OF ASSIST: CONTACT GUARD ASSIST
DISTANCE (FEET): 100
ASSISTIVE DEVICE: FRONT WHEEL WALKER
DEVIATION: SHUFFLED GAIT;BRADYKINETIC;DECREASED BASE OF SUPPORT

## 2017-07-31 ASSESSMENT — PAIN SCALES - GENERAL
PAINLEVEL_OUTOF10: 1
PAINLEVEL_OUTOF10: ASSUMED PAIN PRESENT
PAINLEVEL_OUTOF10: ASSUMED PAIN PRESENT
PAINLEVEL_OUTOF10: 3
PAINLEVEL_OUTOF10: 4

## 2017-07-31 NOTE — DISCHARGE PLANNING
[facility] received a SNF choice form per the choice form the referral has been sent to [facility]. [facility] is aware that a SNF order is required.

## 2017-07-31 NOTE — CARE PLAN
Problem: Nutritional:  Goal: Achieve adequate nutritional intake  Diet will adv past CL and patient will consume >50% of meals and supplement   Outcome: PROGRESSING AS EXPECTED

## 2017-07-31 NOTE — PROGRESS NOTES
Surgical Progress Note    Author: Francheska Madrid Date & Time created: 2017   8:09 AM     Interval Events:  Doing much better.  Passing flatus more regularly now.  Feels he can ambulate today with assistance.     ROS  Hemodynamics:  Temp (24hrs), Av.6 °C (97.9 °F), Min:36.2 °C (97.2 °F), Max:37.1 °C (98.8 °F)  Temperature: 36.2 °C (97.2 °F)  Pulse  Av.6  Min: 55  Max: 105   Blood Pressure : 127/55 mmHg     Respiratory:    Respiration: 18, Pulse Oximetry: 98 %        RUL Breath Sounds: Clear, RML Breath Sounds: Clear, RLL Breath Sounds: Clear;Diminished, TOSHIA Breath Sounds: Clear, LLL Breath Sounds: Clear;Diminished  Neuro:  GCS       Fluids:    Intake/Output Summary (Last 24 hours) at 17 0809  Last data filed at 17 0400   Gross per 24 hour   Intake   1100 ml   Output   1270 ml   Net   -170 ml        Current Diet Order   Procedures   • Diet Order     Physical Exam   Alert, comfortable, in good spirits.  Abdomen softly distended but much softer than yesterday.  Colostomy actively passing flatus.    Labs:  Recent Results (from the past 24 hour(s))   CBC WITH DIFFERENTIAL    Collection Time: 17  4:30 AM   Result Value Ref Range    WBC 4.9 4.8 - 10.8 K/uL    RBC 3.57 (L) 4.70 - 6.10 M/uL    Hemoglobin 10.1 (L) 14.0 - 18.0 g/dL    Hematocrit 32.0 (L) 42.0 - 52.0 %    MCV 89.6 81.4 - 97.8 fL    MCH 28.3 27.0 - 33.0 pg    MCHC 31.6 (L) 33.7 - 35.3 g/dL    RDW 53.0 (H) 35.9 - 50.0 fL    Platelet Count 235 164 - 446 K/uL    MPV 8.5 (L) 9.0 - 12.9 fL    Neutrophils-Polys 64.70 44.00 - 72.00 %    Lymphocytes 20.20 (L) 22.00 - 41.00 %    Monocytes 11.80 0.00 - 13.40 %    Eosinophils 1.90 0.00 - 6.90 %    Basophils 0.40 0.00 - 1.80 %    Immature Granulocytes 1.00 (H) 0.00 - 0.90 %    Nucleated RBC 0.00 /100 WBC    Neutrophils (Absolute) 3.14 1.82 - 7.42 K/uL    Lymphs (Absolute) 0.98 (L) 1.00 - 4.80 K/uL    Monos (Absolute) 0.57 0.00 - 0.85 K/uL    Eos (Absolute) 0.09 0.00 - 0.51 K/uL    Baso  (Absolute) 0.02 0.00 - 0.12 K/uL    Immature Granulocytes (abs) 0.05 0.00 - 0.11 K/uL    NRBC (Absolute) 0.00 K/uL     Medical Decision Making, by Problem:  Active Hospital Problems    Diagnosis   • Pelvic abscess in male (CMS-HCC) [K65.1]     Priority: High   • BPH (benign prostatic hypertrophy) [N40.0]     Priority: Low   • Depression [F32.9]     Priority: Low   • Osteoarthritis [M19.90]     Priority: Low   • Bipolar 1 disorder, depressed (CMS-HCC) [F31.9]   • Normocytic anemia [D64.9]     Plan:  Ileus resolving.  Continue clears for lunch, then full liquids for dinner.  Will reassess tomorrow for further diet advancement.  Ambulate as able.      Quality Measures:  Core Measures

## 2017-07-31 NOTE — DISCHARGE PLANNING
Met with pt and wife Jessie at bedside. Reviewed dc plan of skilled loc. Choice form completed for [facility]. Faxed to [facility] to send referral. Await acceptance.

## 2017-07-31 NOTE — THERAPY
"Physical Therapy Evaluation completed.   Bed Mobility:  Supine to Sit: Stand by Assist  Transfers: Sit to Stand: Contact Guard Assist (with FWW)  Gait: Level Of Assist: Contact Guard Assist with Front-Wheel Walker       Plan of Care: Will benefit from Physical Therapy 3 times per week  Discharge Recommendations: Equipment: Will Continue to Assess for Equipment Needs  Pt presents with impaired activity tolerance, abdominal pain, and dynamic balance s/p abdominal washout. Pt is most limited by activity tolerance,great effort to perform small tasks. Pt eager to improve but at times has difficulty relating short term work to long term goals. Acute PT will continue to follow; anticipate pt will benefit from SNF given lack of physical support at home. will follow.   See \"Rehab Therapy-Acute\" Patient Summary Report for complete documentation.     "

## 2017-07-31 NOTE — PROGRESS NOTES
Received at baseline this shift, NAD noted, no AMS/A&Ox4, VSS. C/o pain on initial encounter, medicated per MAR. Breathing even/unlabored, noted dim/clear to BLL, on continuous O2 at 1.5Lpm per NC at mid-high 90's/ reminded pt to use IS/denied SOB. Abdomen tender s/p sx, (+)hyperBSx4, tolerating current diet well, no c/o N/V, ostomy appliance in place, no BM noted at this time, educated pt the importance of ambulation to increase gastric motility as per is unmotivated, ostomy appliance in place, stoma appears red. PICC to MONALISA in-situ/ (+)blood return/dressing C/D/I. Noted 2+pitting to RLE and trace to LLE, educated pt to elevate extremity while laying in bed. Island dressing to abdomen C/D/I. Bladder continent. Care provided. Needs attended. Bed alarm/hourly rounding in place. No concerns. Will continue with POC.

## 2017-07-31 NOTE — THERAPY
"Occupational Therapy Treatment completed with focus on ADLs and ADL transfers.  Functional Status:  SBA supine to sit.  Max A LB dressing.  CGA sit to stand.  Min A UB dressing.  Pt walked in hallway with FWW and became increasingly fatigued and unsteady.  Pt returned to bed with mod A.  Plan of Care: Will benefit from Occupational Therapy 3 times per week  Discharge Recommendations:  Equipment Will Continue to Assess for Equipment Needs. Post-acute therapy Discharge to a transitional care facility for continued skilled therapy services.    See \"Rehab Therapy-Acute\" Patient Summary Report for complete documentation.   "

## 2017-07-31 NOTE — CARE PLAN
Problem: Communication  Goal: The ability to communicate needs accurately and effectively will improve  POC discussed with pt this shift.    Problem: Infection  Goal: Will remain free from infection  CHG done, IV abx infused, kept dressing C/D/I, contact precautions maintained.

## 2017-08-01 LAB
ALBUMIN SERPL BCP-MCNC: 2.2 G/DL (ref 3.2–4.9)
BASOPHILS # BLD AUTO: 0.8 % (ref 0–1.8)
BASOPHILS # BLD: 0.04 K/UL (ref 0–0.12)
BUN SERPL-MCNC: 12 MG/DL (ref 8–22)
CALCIUM SERPL-MCNC: 8.2 MG/DL (ref 8.5–10.5)
CHLORIDE SERPL-SCNC: 103 MMOL/L (ref 96–112)
CO2 SERPL-SCNC: 29 MMOL/L (ref 20–33)
CREAT SERPL-MCNC: 0.63 MG/DL (ref 0.5–1.4)
EOSINOPHIL # BLD AUTO: 0.11 K/UL (ref 0–0.51)
EOSINOPHIL NFR BLD: 2.3 % (ref 0–6.9)
ERYTHROCYTE [DISTWIDTH] IN BLOOD BY AUTOMATED COUNT: 52.6 FL (ref 35.9–50)
GFR SERPL CREATININE-BSD FRML MDRD: >60 ML/MIN/1.73 M 2
GLUCOSE SERPL-MCNC: 93 MG/DL (ref 65–99)
HCT VFR BLD AUTO: 31.1 % (ref 42–52)
HGB BLD-MCNC: 10 G/DL (ref 14–18)
IMM GRANULOCYTES # BLD AUTO: 0.08 K/UL (ref 0–0.11)
IMM GRANULOCYTES NFR BLD AUTO: 1.7 % (ref 0–0.9)
LYMPHOCYTES # BLD AUTO: 0.87 K/UL (ref 1–4.8)
LYMPHOCYTES NFR BLD: 18.2 % (ref 22–41)
MCH RBC QN AUTO: 28.7 PG (ref 27–33)
MCHC RBC AUTO-ENTMCNC: 32.2 G/DL (ref 33.7–35.3)
MCV RBC AUTO: 89.4 FL (ref 81.4–97.8)
MONOCYTES # BLD AUTO: 0.53 K/UL (ref 0–0.85)
MONOCYTES NFR BLD AUTO: 11.1 % (ref 0–13.4)
NEUTROPHILS # BLD AUTO: 3.14 K/UL (ref 1.82–7.42)
NEUTROPHILS NFR BLD: 65.9 % (ref 44–72)
NRBC # BLD AUTO: 0 K/UL
NRBC BLD AUTO-RTO: 0 /100 WBC
PHOSPHATE SERPL-MCNC: 3.1 MG/DL (ref 2.5–4.5)
PLATELET # BLD AUTO: 220 K/UL (ref 164–446)
PMV BLD AUTO: 8.8 FL (ref 9–12.9)
POTASSIUM SERPL-SCNC: 3.9 MMOL/L (ref 3.6–5.5)
RBC # BLD AUTO: 3.48 M/UL (ref 4.7–6.1)
SODIUM SERPL-SCNC: 136 MMOL/L (ref 135–145)
WBC # BLD AUTO: 4.8 K/UL (ref 4.8–10.8)

## 2017-08-01 PROCEDURE — 770021 HCHG ROOM/CARE - ISO PRIVATE

## 2017-08-01 PROCEDURE — A9270 NON-COVERED ITEM OR SERVICE: HCPCS | Performed by: INTERNAL MEDICINE

## 2017-08-01 PROCEDURE — 700111 HCHG RX REV CODE 636 W/ 250 OVERRIDE (IP): Performed by: SURGERY

## 2017-08-01 PROCEDURE — A9270 NON-COVERED ITEM OR SERVICE: HCPCS | Performed by: NURSE PRACTITIONER

## 2017-08-01 PROCEDURE — 700102 HCHG RX REV CODE 250 W/ 637 OVERRIDE(OP): Performed by: NURSE PRACTITIONER

## 2017-08-01 PROCEDURE — 700102 HCHG RX REV CODE 250 W/ 637 OVERRIDE(OP): Performed by: HOSPITALIST

## 2017-08-01 PROCEDURE — 700105 HCHG RX REV CODE 258: Performed by: INTERNAL MEDICINE

## 2017-08-01 PROCEDURE — 700102 HCHG RX REV CODE 250 W/ 637 OVERRIDE(OP): Performed by: INTERNAL MEDICINE

## 2017-08-01 PROCEDURE — A9270 NON-COVERED ITEM OR SERVICE: HCPCS | Performed by: HOSPITALIST

## 2017-08-01 PROCEDURE — 85025 COMPLETE CBC W/AUTO DIFF WBC: CPT

## 2017-08-01 PROCEDURE — 80069 RENAL FUNCTION PANEL: CPT

## 2017-08-01 PROCEDURE — 700102 HCHG RX REV CODE 250 W/ 637 OVERRIDE(OP): Performed by: SURGERY

## 2017-08-01 PROCEDURE — 99232 SBSQ HOSP IP/OBS MODERATE 35: CPT | Performed by: INTERNAL MEDICINE

## 2017-08-01 PROCEDURE — 700111 HCHG RX REV CODE 636 W/ 250 OVERRIDE (IP): Performed by: INTERNAL MEDICINE

## 2017-08-01 PROCEDURE — A9270 NON-COVERED ITEM OR SERVICE: HCPCS | Performed by: SURGERY

## 2017-08-01 RX ORDER — POLYETHYLENE GLYCOL 3350 17 G/17G
1 POWDER, FOR SOLUTION ORAL DAILY
Status: DISCONTINUED | OUTPATIENT
Start: 2017-08-01 | End: 2017-08-18 | Stop reason: HOSPADM

## 2017-08-01 RX ADMIN — OMEPRAZOLE 20 MG: 20 CAPSULE, DELAYED RELEASE ORAL at 07:48

## 2017-08-01 RX ADMIN — FLUCONAZOLE 200 MG: 200 TABLET ORAL at 07:48

## 2017-08-01 RX ADMIN — ENOXAPARIN SODIUM 30 MG: 100 INJECTION SUBCUTANEOUS at 05:26

## 2017-08-01 RX ADMIN — GABAPENTIN 200 MG: 100 CAPSULE ORAL at 14:31

## 2017-08-01 RX ADMIN — GABAPENTIN 200 MG: 100 CAPSULE ORAL at 20:18

## 2017-08-01 RX ADMIN — TAMSULOSIN HYDROCHLORIDE 0.4 MG: 0.4 CAPSULE ORAL at 07:48

## 2017-08-01 RX ADMIN — SODIUM CHLORIDE 50 MG: 9 INJECTION, SOLUTION INTRAVENOUS at 11:00

## 2017-08-01 RX ADMIN — HYDROCODONE BITARTRATE AND ACETAMINOPHEN 1 TABLET: 7.5; 325 TABLET ORAL at 07:47

## 2017-08-01 RX ADMIN — POLYETHYLENE GLYCOL 3350 1 PACKET: 17 POWDER, FOR SOLUTION ORAL at 11:00

## 2017-08-01 RX ADMIN — GABAPENTIN 200 MG: 100 CAPSULE ORAL at 07:47

## 2017-08-01 RX ADMIN — OXYBUTYNIN CHLORIDE 5 MG: 5 TABLET ORAL at 07:48

## 2017-08-01 RX ADMIN — HYDROCODONE BITARTRATE AND ACETAMINOPHEN 1 TABLET: 7.5; 325 TABLET ORAL at 14:32

## 2017-08-01 RX ADMIN — LAMOTRIGINE 150 MG: 100 TABLET ORAL at 07:48

## 2017-08-01 RX ADMIN — HYDROCODONE BITARTRATE AND ACETAMINOPHEN 1 TABLET: 7.5; 325 TABLET ORAL at 20:32

## 2017-08-01 RX ADMIN — ENOXAPARIN SODIUM 30 MG: 100 INJECTION SUBCUTANEOUS at 14:31

## 2017-08-01 RX ADMIN — OXYBUTYNIN CHLORIDE 5 MG: 5 TABLET ORAL at 20:18

## 2017-08-01 RX ADMIN — SODIUM CHLORIDE 50 MG: 9 INJECTION, SOLUTION INTRAVENOUS at 20:18

## 2017-08-01 ASSESSMENT — ENCOUNTER SYMPTOMS
ABDOMINAL PAIN: 0
HEADACHES: 0
SHORTNESS OF BREATH: 0
DIZZINESS: 0
PALPITATIONS: 0
WEAKNESS: 1
VOMITING: 0
MYALGIAS: 0
COUGH: 0
NAUSEA: 0
FOCAL WEAKNESS: 0

## 2017-08-01 ASSESSMENT — PAIN SCALES - GENERAL
PAINLEVEL_OUTOF10: 4
PAINLEVEL_OUTOF10: 6
PAINLEVEL_OUTOF10: 4

## 2017-08-01 NOTE — PROGRESS NOTES
"Assumed care of patient. Patient in bed stating pain is \"pretty great actually!\" Midline incision with island dressing is clean, dry and intact. Ostomy to left lower quadrant is with gas, but no stool. Patient ambulating with staff. Mood is improved. No other needs at this time. Call light within reach.   "

## 2017-08-01 NOTE — PROGRESS NOTES
Assumed care of patient. Patient stating his abdominal pain is a 4/10. Medicated per MAR. Midline incision with island dressing is clean, dry and intact. Ostomy to left lower quadrant is producing gas, but no stool noted by this RN. Patient ambulating with staff. PICC line to left upper extremity is patent. IV antibiotics ordered. No other needs at this time. Call light within reach.

## 2017-08-01 NOTE — CARE PLAN
Problem: Infection  Goal: Will remain free from infection  Outcome: PROGRESSING AS EXPECTED  Patient will continue abx as scheduled.     Problem: Pain Management  Goal: Pain level will decrease to patient’s comfort goal  Outcome: PROGRESSING AS EXPECTED  Patient will notify RN if pain intervention is needed.

## 2017-08-01 NOTE — CARE PLAN
Problem: Pain Management  Goal: Pain level will decrease to patient’s comfort goal  Patient requesting pain medication as needed.     Problem: Mobility  Goal: Risk for activity intolerance will decrease  Patient ambulating in hallway with staff and FWW.

## 2017-08-01 NOTE — PROGRESS NOTES
[facility] Hospitalist Progress Note    Date of Service: 2017    Chief Complaint  77 y/o M with PMH of peripheral neuropathy, BPH, Depression: admitted for above and found to have pelvis abscess on imaging. S/p Ex lap and abdominal washout on .    Interval Problem Update  -Doing better today. Getting ready to work with OT. Tolerating his diet well.    -Upset bc RN gave his lovenox shot to the Left side of his abd and now he has discomfort.    -He states he is ready to come off the PCA. Pain somewhat better. He is complaining of a sore on his back. Worries about redness on his scrotum.     -Patient is sitting in chair. Says pain is worse today and refuses to have his PCA discontinued. Tolerating liquid diet well. Denies nausea or vomiting.     -Remains on PCA pump. Still no signs of gas or stool in his colostomy. He is tolerating clear liquids.     -Doing well this am, still requires the PCA. Tolerating diet. Not gas or stool in his ostomy.     -S/p Ex lap and abdominal washout    Consultants/Specialty  [facility] - [facility]  Dr. Herbert-ID    Disposition  Likely to SNF. Pending approval.       Review of Systems   Respiratory: Negative for cough and shortness of breath.    Cardiovascular: Negative for chest pain and palpitations.   Gastrointestinal: Negative for nausea, vomiting and abdominal pain.   Genitourinary: Negative for dysuria.   Musculoskeletal: Negative for myalgias.   Neurological: Positive for weakness. Negative for dizziness, focal weakness and headaches.   Psychiatric/Behavioral: Nervous/anxious: anxious to get out of the hospital.    All other systems reviewed and are negative.     Physical Exam  Laboratory/Imaging   Hemodynamics  Temp (24hrs), Av.4 °C (97.6 °F), Min:36.2 °C (97.2 °F), Max:36.8 °C (98.2 °F)   Temperature: 36.6 °C (97.8 °F)  Pulse  Av.6  Min: 55  Max: 105    Blood Pressure : 108/59 mmHg      Respiratory      Respiration: 16, Pulse Oximetry: 90  %        RUL Breath Sounds: Clear, RML Breath Sounds: Clear, RLL Breath Sounds: Diminished, TOSHIA Breath Sounds: Clear, LLL Breath Sounds: Diminished    Fluids    Intake/Output Summary (Last 24 hours) at 07/31/17 2017  Last data filed at 07/31/17 1650   Gross per 24 hour   Intake    820 ml   Output   1170 ml   Net   -350 ml       Nutrition  Orders Placed This Encounter   Procedures   • Diet Order     Standing Status: Standing      Number of Occurrences: 1      Standing Expiration Date:      Order Specific Question:  Diet:     Answer:  Full Liquid [11]     Physical Exam   Constitutional: He is oriented to person, place, and time. He appears well-developed and well-nourished.   HENT:   Head: Normocephalic and atraumatic.   Mouth/Throat: Oropharynx is clear and moist.   Eyes: Conjunctivae and EOM are normal. Pupils are equal, round, and reactive to light. No scleral icterus.   Neck: Normal range of motion. Neck supple. No tracheal deviation present. No thyromegaly present.   Cardiovascular: Normal rate, regular rhythm, normal heart sounds and intact distal pulses.    No murmur heard.  Pulmonary/Chest: Effort normal and breath sounds normal. No respiratory distress. He has no wheezes.   Abdominal: Soft. He exhibits no distension. There is no tenderness.   Colostomy in place, orange color liquid in the bag   Musculoskeletal: Normal range of motion. He exhibits no edema or tenderness.   Lymphadenopathy:     He has no cervical adenopathy.        Right: No supraclavicular adenopathy present.        Left: No supraclavicular adenopathy present.   Neurological: He is alert and oriented to person, place, and time. No cranial nerve deficit.   Skin: Skin is warm and dry. No erythema.   Vitals reviewed.      Recent Labs      07/29/17   0355  07/30/17   0340  07/31/17   0430   WBC  6.3  5.3  4.9   RBC  3.53*  3.30*  3.57*   HEMOGLOBIN  10.2*  9.5*  10.1*   HEMATOCRIT  31.7*  29.6*  32.0*   MCV  89.8  89.7  89.6   MCH  28.9  28.8   28.3   MCHC  32.2*  32.1*  31.6*   RDW  53.3*  53.1*  53.0*   PLATELETCT  271  244  235   MPV  8.9*  8.9*  8.5*     Recent Labs      07/29/17   0500  07/30/17   0340   SODIUM  140  137   POTASSIUM  3.7  3.6   CHLORIDE  108  104   CO2  25  29   GLUCOSE  90  101*   BUN  11  13   CREATININE  0.61  0.66   CALCIUM  7.8*  7.9*                      Assessment/Plan     Pelvic abscess in male (CMS-HCC) (present on admission)  Assessment & Plan  S/p ex lap and wash out on 07/25  Doing well post-op  Cultures on 7/25 grew moderate VRE and yeast   ID following  Patient on tygacyl and diflucan  WBC WNL, afebrile  DCed pain pump, started PO pain meds  Tolerating liquid diet  PT/OT      Normocytic anemia  Assessment & Plan  Stable H&H  Likely combination of intra-op losses and chronic disease    Bipolar 1 disorder, depressed (CMS-HCC)  Assessment & Plan  On lamictal    BPH (benign prostatic hypertrophy) (present on admission)  Assessment & Plan  Continue Flomax  I believe he is incontinent and the redness on his scrotum is due to this  We will try to keep the area dry with poweder    Depression (present on admission)  Assessment & Plan  Stable    Osteoarthritis (present on admission)  Assessment & Plan  Stable, prn analgesia  Vitamin D and calcium once diet further advanced      Labs reviewed, Medications reviewed and Radiology images reviewed  Ruff catheter: Critically Ill - Requiring Accurate Measurement of Urinary Output      DVT Prophylaxis: Enoxaparin (Lovenox)      Antibiotics: Treating active infection/contamination beyond 24 hours perioperative coverage

## 2017-08-01 NOTE — CARE PLAN
Problem: Pain Management  Goal: Pain level will decrease to patient’s comfort goal  Patient requesting pain medication as needed.     Problem: Mobility  Goal: Risk for activity intolerance will decrease  Patient ambulating with staff in hallway.

## 2017-08-01 NOTE — PROGRESS NOTES
Surgical Progress Note    Author: Francheska Madrid Date & Time created: 2017   10:18 AM     Interval Events:  Not feeling as good today.  More abominal pain, less flatus into his colostomy, though no nausea.       ROS  Hemodynamics:  Temp (24hrs), Av.7 °C (98.1 °F), Min:36.6 °C (97.8 °F), Max:36.9 °C (98.4 °F)  Temperature: 36.7 °C (98.1 °F)  Pulse  Av.4  Min: 55  Max: 105   Blood Pressure : 133/70 mmHg     Respiratory:    Respiration: 16, Pulse Oximetry: 98 %        RUL Breath Sounds: Clear, RML Breath Sounds: Clear, RLL Breath Sounds: Diminished, TOSHIA Breath Sounds: Clear, LLL Breath Sounds: Diminished  Neuro:  GCS       Fluids:    Intake/Output Summary (Last 24 hours) at 17 1018  Last data filed at 17 0810   Gross per 24 hour   Intake    950 ml   Output   1390 ml   Net   -440 ml        Current Diet Order   Procedures   • Diet Order     Physical Exam   Alert, NAD.  Visiting with friend.  Abdomen still distended.  Colostomy viable.      Labs:  Recent Results (from the past 24 hour(s))   CBC WITH DIFFERENTIAL    Collection Time: 17  5:30 AM   Result Value Ref Range    WBC 4.8 4.8 - 10.8 K/uL    RBC 3.48 (L) 4.70 - 6.10 M/uL    Hemoglobin 10.0 (L) 14.0 - 18.0 g/dL    Hematocrit 31.1 (L) 42.0 - 52.0 %    MCV 89.4 81.4 - 97.8 fL    MCH 28.7 27.0 - 33.0 pg    MCHC 32.2 (L) 33.7 - 35.3 g/dL    RDW 52.6 (H) 35.9 - 50.0 fL    Platelet Count 220 164 - 446 K/uL    MPV 8.8 (L) 9.0 - 12.9 fL    Neutrophils-Polys 65.90 44.00 - 72.00 %    Lymphocytes 18.20 (L) 22.00 - 41.00 %    Monocytes 11.10 0.00 - 13.40 %    Eosinophils 2.30 0.00 - 6.90 %    Basophils 0.80 0.00 - 1.80 %    Immature Granulocytes 1.70 (H) 0.00 - 0.90 %    Nucleated RBC 0.00 /100 WBC    Neutrophils (Absolute) 3.14 1.82 - 7.42 K/uL    Lymphs (Absolute) 0.87 (L) 1.00 - 4.80 K/uL    Monos (Absolute) 0.53 0.00 - 0.85 K/uL    Eos (Absolute) 0.11 0.00 - 0.51 K/uL    Baso (Absolute) 0.04 0.00 - 0.12 K/uL    Immature Granulocytes (abs) 0.08  0.00 - 0.11 K/uL    NRBC (Absolute) 0.00 K/uL   RENAL FUNCTION PANEL    Collection Time: 08/01/17  5:30 AM   Result Value Ref Range    Sodium 136 135 - 145 mmol/L    Potassium 3.9 3.6 - 5.5 mmol/L    Chloride 103 96 - 112 mmol/L    Co2 29 20 - 33 mmol/L    Glucose 93 65 - 99 mg/dL    Creatinine 0.63 0.50 - 1.40 mg/dL    Bun 12 8 - 22 mg/dL    Calcium 8.2 (L) 8.5 - 10.5 mg/dL    Phosphorus 3.1 2.5 - 4.5 mg/dL    Albumin 2.2 (L) 3.2 - 4.9 g/dL   ESTIMATED GFR    Collection Time: 08/01/17  5:30 AM   Result Value Ref Range    GFR If African American >60 >60 mL/min/1.73 m 2    GFR If Non African American >60 >60 mL/min/1.73 m 2     Medical Decision Making, by Problem:  Active Hospital Problems    Diagnosis   • Pelvic abscess in male (CMS-formerly [facility]) [K65.1]     Priority: High   • BPH (benign prostatic hypertrophy) [N40.0]     Priority: Low   • Depression [F32.9]     Priority: Low   • Osteoarthritis [M19.90]     Priority: Low   • Bipolar 1 disorder, depressed (CMS-formerly [facility]) [F31.9]   • Normocytic anemia [D64.9]     Plan:  Continue Full liquid diet for now.  Re-evaluate tomorrow.  WBC normal without left shift.      Quality Measures:  Core Measures

## 2017-08-01 NOTE — DISCHARGE PLANNING
[facility] received a call from Joseph at [facility]. Joseph is asking if the Tygacil can be changed as this medica is to expensive.

## 2017-08-01 NOTE — DISCHARGE PLANNING
Reviewed with Dr Manrique. [facility] is unable to accept pt on Tygacil due to the expense of the med. Dr Manrique to contact ID MD to see about changing to a different ABX.

## 2017-08-02 PROCEDURE — A9270 NON-COVERED ITEM OR SERVICE: HCPCS | Performed by: INTERNAL MEDICINE

## 2017-08-02 PROCEDURE — 770021 HCHG ROOM/CARE - ISO PRIVATE

## 2017-08-02 PROCEDURE — 700102 HCHG RX REV CODE 250 W/ 637 OVERRIDE(OP): Performed by: INTERNAL MEDICINE

## 2017-08-02 PROCEDURE — 99232 SBSQ HOSP IP/OBS MODERATE 35: CPT | Performed by: HOSPITALIST

## 2017-08-02 PROCEDURE — 700102 HCHG RX REV CODE 250 W/ 637 OVERRIDE(OP): Performed by: HOSPITALIST

## 2017-08-02 PROCEDURE — A9270 NON-COVERED ITEM OR SERVICE: HCPCS | Performed by: NURSE PRACTITIONER

## 2017-08-02 PROCEDURE — 700111 HCHG RX REV CODE 636 W/ 250 OVERRIDE (IP): Performed by: INTERNAL MEDICINE

## 2017-08-02 PROCEDURE — 700111 HCHG RX REV CODE 636 W/ 250 OVERRIDE (IP): Performed by: SURGERY

## 2017-08-02 PROCEDURE — 700102 HCHG RX REV CODE 250 W/ 637 OVERRIDE(OP): Performed by: SURGERY

## 2017-08-02 PROCEDURE — 700102 HCHG RX REV CODE 250 W/ 637 OVERRIDE(OP): Performed by: NURSE PRACTITIONER

## 2017-08-02 PROCEDURE — 700105 HCHG RX REV CODE 258

## 2017-08-02 PROCEDURE — A9270 NON-COVERED ITEM OR SERVICE: HCPCS | Performed by: SURGERY

## 2017-08-02 PROCEDURE — A9270 NON-COVERED ITEM OR SERVICE: HCPCS | Performed by: HOSPITALIST

## 2017-08-02 PROCEDURE — 700105 HCHG RX REV CODE 258: Performed by: INTERNAL MEDICINE

## 2017-08-02 RX ORDER — SODIUM CHLORIDE 9 MG/ML
INJECTION, SOLUTION INTRAVENOUS
Status: COMPLETED
Start: 2017-08-02 | End: 2017-08-02

## 2017-08-02 RX ADMIN — ENOXAPARIN SODIUM 30 MG: 100 INJECTION SUBCUTANEOUS at 16:59

## 2017-08-02 RX ADMIN — OXYBUTYNIN CHLORIDE 5 MG: 5 TABLET ORAL at 08:23

## 2017-08-02 RX ADMIN — OMEPRAZOLE 20 MG: 20 CAPSULE, DELAYED RELEASE ORAL at 08:24

## 2017-08-02 RX ADMIN — LAMOTRIGINE 150 MG: 100 TABLET ORAL at 08:23

## 2017-08-02 RX ADMIN — SODIUM CHLORIDE: 9 INJECTION, SOLUTION INTRAVENOUS at 07:30

## 2017-08-02 RX ADMIN — OXYCODONE HYDROCHLORIDE 5 MG: 5 TABLET ORAL at 16:59

## 2017-08-02 RX ADMIN — POLYETHYLENE GLYCOL 3350 0.5 PACKET: 17 POWDER, FOR SOLUTION ORAL at 08:24

## 2017-08-02 RX ADMIN — GABAPENTIN 200 MG: 100 CAPSULE ORAL at 16:59

## 2017-08-02 RX ADMIN — SODIUM CHLORIDE 50 MG: 9 INJECTION, SOLUTION INTRAVENOUS at 08:21

## 2017-08-02 RX ADMIN — OXYCODONE HYDROCHLORIDE 5 MG: 5 TABLET ORAL at 12:33

## 2017-08-02 RX ADMIN — TAMSULOSIN HYDROCHLORIDE 0.4 MG: 0.4 CAPSULE ORAL at 08:22

## 2017-08-02 RX ADMIN — SODIUM CHLORIDE 50 MG: 9 INJECTION, SOLUTION INTRAVENOUS at 20:30

## 2017-08-02 RX ADMIN — ENOXAPARIN SODIUM 30 MG: 100 INJECTION SUBCUTANEOUS at 05:30

## 2017-08-02 RX ADMIN — GABAPENTIN 200 MG: 100 CAPSULE ORAL at 20:30

## 2017-08-02 RX ADMIN — FLUCONAZOLE 200 MG: 200 TABLET ORAL at 08:24

## 2017-08-02 RX ADMIN — GABAPENTIN 200 MG: 100 CAPSULE ORAL at 08:22

## 2017-08-02 RX ADMIN — OXYBUTYNIN CHLORIDE 5 MG: 5 TABLET ORAL at 20:30

## 2017-08-02 ASSESSMENT — ENCOUNTER SYMPTOMS
FEVER: 0
WEAKNESS: 1
CHILLS: 0
VOMITING: 0
PALPITATIONS: 0
SENSORY CHANGE: 0
MYALGIAS: 0
NAUSEA: 1
SPEECH CHANGE: 0
SHORTNESS OF BREATH: 0
COUGH: 0
FOCAL WEAKNESS: 0
NAUSEA: 0
NERVOUS/ANXIOUS: 1
HEADACHES: 0
ABDOMINAL PAIN: 1
STRIDOR: 0
DIZZINESS: 0

## 2017-08-02 ASSESSMENT — PAIN SCALES - GENERAL
PAINLEVEL_OUTOF10: 5
PAINLEVEL_OUTOF10: ASSUMED PAIN PRESENT
PAINLEVEL_OUTOF10: ASSUMED PAIN PRESENT
PAINLEVEL_OUTOF10: 4
PAINLEVEL_OUTOF10: 6
PAINLEVEL_OUTOF10: ASSUMED PAIN PRESENT

## 2017-08-02 NOTE — WOUND TEAM
"[facility]  Inpatient Services  New Ostomy Management & Teaching    HPI:  Reviewed  PMH: Reviewed   SH: Reviewed    Subjective: \"I'm okay.\"    Objective:  Appliance intact      Ostomy type:  colostomy   Stoma location:  LLQ   Stoma assessment:    Appearance: red, edematous, slightly dusky from 1100 to 0100    Size: 1 3/4\" oval    Protrusion: ~1\"    Output: Small, loose, brown.    MC jxn: intact    Peristomal skin: Intact     Ostomy Appliance (type and size): 2 1/4\" two piece    Interventions:  Met with patient. Staff removed previous appliance, demonstrating to patient how to remove. Pt helped clean skin. Staff traced pattern and pt started to cut out barrier, staff finished. Patient placed barrier around stoma with assistance to center. He  removed paper backing. Staff attached pouch to barrier and closed.      Pt education: Questions and concerns addressed    Evaluation:  Patient will need assistance with ostomy appliance change. Next education with friend Jessie to help pt with care.    Plan: Ostomy nurses to continue to follow for ostomy needs and teaching     Anticipated discharge needs:  Has everything for discharge   "

## 2017-08-02 NOTE — PROGRESS NOTES
"Skin tear noted on R buttock, no drainage noted. mepilex in place. Pt refusing waffle mattress at this time despite education. Pt stating, \"It makes it harder to turn, I'm declining it.\"  "

## 2017-08-02 NOTE — CARE PLAN
Problem: Venous Thromboembolism (VTW)/Deep Vein Thrombosis (DVT) Prevention:  Goal: Patient will participate in Venous Thrombosis (VTE)/Deep Vein Thrombosis (DVT)Prevention Measures  Outcome: PROGRESSING AS EXPECTED  Pt on lovenox

## 2017-08-02 NOTE — PROGRESS NOTES
[facility] Hospitalist Progress Note    Date of Service: 2017    Chief Complaint  77 y/o M with PMH of peripheral neuropathy, BPH, Depression: admitted for above and found to have pelvis abscess on imaging. S/p Ex lap and abdominal washout on .    Interval Problem Update  -Resting today. Tells me that he feels really tired today. Otherwise have no complaints.    -Doing better today. Getting ready to work with OT. Tolerating his diet well.    -Upset bc RN gave his lovenox shot to the Left side of his abd and now he has discomfort.    -He states he is ready to come off the PCA. Pain somewhat better. He is complaining of a sore on his back. Worries about redness on his scrotum.     -Patient is sitting in chair. Says pain is worse today and refuses to have his PCA discontinued. Tolerating liquid diet well. Denies nausea or vomiting.     -Remains on PCA pump. Still no signs of gas or stool in his colostomy. He is tolerating clear liquids.     -Doing well this am, still requires the PCA. Tolerating diet. Not gas or stool in his ostomy.     -S/p Ex lap and abdominal washout    Consultants/Specialty  [facility] - Surgery  Dr. Herbert-ID    Disposition  Per the Case mgt tygacil is too expensive and SNF won't take him on that.       Review of Systems   Respiratory: Negative for cough and shortness of breath.    Cardiovascular: Negative for chest pain and palpitations.   Gastrointestinal: Negative for nausea, vomiting and abdominal pain.   Genitourinary: Negative for dysuria.   Musculoskeletal: Negative for myalgias.   Neurological: Positive for weakness. Negative for dizziness, focal weakness and headaches.   Psychiatric/Behavioral: Nervous/anxious: anxious to get out of the hospital.    All other systems reviewed and are negative.     Physical Exam  Laboratory/Imaging   Hemodynamics  Temp (24hrs), Av.8 °C (98.2 °F), Min:36.7 °C (98.1 °F), Max:36.9 °C (98.4 °F)   Temperature: 36.7 °C  (98.1 °F)  Pulse  Av.4  Min: 55  Max: 105    Blood Pressure : 133/70 mmHg      Respiratory      Respiration: 16, Pulse Oximetry: 98 %        RUL Breath Sounds: Clear, RML Breath Sounds: Clear, RLL Breath Sounds: Diminished, TOSHIA Breath Sounds: Clear, LLL Breath Sounds: Diminished    Fluids    Intake/Output Summary (Last 24 hours) at 17  Last data filed at 17 1900   Gross per 24 hour   Intake    550 ml   Output    900 ml   Net   -350 ml       Nutrition  Orders Placed This Encounter   Procedures   • Diet Order     Standing Status: Standing      Number of Occurrences: 1      Standing Expiration Date:      Order Specific Question:  Diet:     Answer:  Full Liquid [11]     Physical Exam   Constitutional: He is oriented to person, place, and time. He appears well-developed and well-nourished.   HENT:   Head: Normocephalic and atraumatic.   Mouth/Throat: Oropharynx is clear and moist.   Eyes: Conjunctivae and EOM are normal. Pupils are equal, round, and reactive to light. No scleral icterus.   Neck: Normal range of motion. Neck supple. No tracheal deviation present. No thyromegaly present.   Cardiovascular: Normal rate, regular rhythm, normal heart sounds and intact distal pulses.    No murmur heard.  Pulmonary/Chest: Effort normal and breath sounds normal. No respiratory distress. He has no wheezes.   Abdominal: Soft. He exhibits no distension. There is no tenderness.   Colostomy in place, orange color liquid in the bag   Musculoskeletal: Normal range of motion. He exhibits no edema or tenderness.   Lymphadenopathy:     He has no cervical adenopathy.        Right: No supraclavicular adenopathy present.        Left: No supraclavicular adenopathy present.   Neurological: He is alert and oriented to person, place, and time. No cranial nerve deficit.   Skin: Skin is warm and dry. No erythema.   Vitals reviewed.      Recent Labs      17   0340  17   0430  17   0530   WBC  5.3  4.9  4.8    RBC  3.30*  3.57*  3.48*   HEMOGLOBIN  9.5*  10.1*  10.0*   HEMATOCRIT  29.6*  32.0*  31.1*   MCV  89.7  89.6  89.4   MCH  28.8  28.3  28.7   MCHC  32.1*  31.6*  32.2*   RDW  53.1*  53.0*  52.6*   PLATELETCT  244  235  220   MPV  8.9*  8.5*  8.8*     Recent Labs      07/30/17   0340  08/01/17   0530   SODIUM  137  136   POTASSIUM  3.6  3.9   CHLORIDE  104  103   CO2  29  29   GLUCOSE  101*  93   BUN  13  12   CREATININE  0.66  0.63   CALCIUM  7.9*  8.2*                      Assessment/Plan     Pelvic abscess in male (CMS-HCC) (present on admission)  Assessment & Plan  S/p ex lap and wash out on 07/25  Doing well post-op  Cultures on 7/25 grew moderate VRE and yeast   ID following  Patient on tygacil and diflucan  WBC WNL, afebrile  On PO pain meds  Tolerating diet  PT/OT      Normocytic anemia  Assessment & Plan  Stable H&H  Likely combination of intra-op losses and chronic disease  Monitor closely, transfuse if H&H<7    Bipolar 1 disorder, depressed (CMS-HCC)  Assessment & Plan  On lamictal    BPH (benign prostatic hypertrophy) (present on admission)  Assessment & Plan  Continue Flomax  I believe he is incontinent and the redness on his scrotum is due to this  We will try to keep the area dry with poweder    Depression (present on admission)  Assessment & Plan  Stable    Osteoarthritis (present on admission)  Assessment & Plan  Stable, prn analgesia  Vitamin D and calcium once diet further advanced      Labs reviewed, Medications reviewed and Radiology images reviewed  Ruff catheter: Critically Ill - Requiring Accurate Measurement of Urinary Output      DVT Prophylaxis: Enoxaparin (Lovenox)      Antibiotics: Treating active infection/contamination beyond 24 hours perioperative coverage

## 2017-08-02 NOTE — CARE PLAN
Problem: Infection  Goal: Will remain free from infection  Outcome: PROGRESSING AS EXPECTED  PICC line in place. Pt on tygacil and fluconazole . WBC 4.8

## 2017-08-02 NOTE — CARE PLAN
Problem: Bowel/Gastric:  Goal: Normal bowel function is maintained or improved  Outcome: PROGRESSING AS EXPECTED  Patients ostomy will put out soft stool.     Problem: Pain Management  Goal: Pain level will decrease to patient’s comfort goal  Outcome: PROGRESSING AS EXPECTED  Patient will call out when pain medication is needed.

## 2017-08-02 NOTE — PROGRESS NOTES
Surgical Progress Note    Author: Francheska Madrid Date & Time created: 2017   8:30 AM     Interval Events:  Doing much better today compared to yesterday.  Tolerating liquids.       ROS  Hemodynamics:  Temp (24hrs), Av.9 °C (98.4 °F), Min:36.8 °C (98.3 °F), Max:36.9 °C (98.5 °F)  Temperature: 36.9 °C (98.5 °F)  Pulse  Av.4  Min: 55  Max: 105   Blood Pressure : 134/76 mmHg     Respiratory:    Respiration: 17, Pulse Oximetry: 92 %           Neuro:  GCS       Fluids:    Intake/Output Summary (Last 24 hours) at 17 0830  Last data filed at 17 0820   Gross per 24 hour   Intake    400 ml   Output    730 ml   Net   -330 ml        Current Diet Order   Procedures   • Diet Order     Physical Exam   Alert, looks comfortable.  Abdomen softer.  Large amount of soft stool in his colostomy bag.    Labs:  No results found for this or any previous visit (from the past 24 hour(s)).  Medical Decision Making, by Problem:  Active Hospital Problems    Diagnosis   • Pelvic abscess in male (CMS-[facility]) [K65.1]     Priority: High   • BPH (benign prostatic hypertrophy) [N40.0]     Priority: Low   • Depression [F32.9]     Priority: Low   • Osteoarthritis [M19.90]     Priority: Low   • Bipolar 1 disorder, depressed (CMS-[facility]) [F31.9]   • Normocytic anemia [D64.9]     Plan:  Ileus resolving.  Advance to soft diet.      Quality Measures:  Core Measures

## 2017-08-02 NOTE — PROGRESS NOTES
Received bedside report from BART Bledsoe. Pt is AAOx4. REESE. VSS. Pt Denies pain. -N/V; advanced to GI soft diet.+BS. Pt has L sided ostomy; stoma is red w/ good stool output. Midline abdominal incision; CDI. L arm PICC in place. . Pt up 1x assist. POC discussed. Bed locked and in the lowest position. Call light w/in reach. Hourly rounding in place

## 2017-08-02 NOTE — PROGRESS NOTES
[facility] Hospitalist Progress Note    Date of Service: 2017    Chief Complaint  77 y/o M with PMH of peripheral neuropathy, BPH, Depression admitted for abdominal pain, diarrhea.  Dx with pelvis abscess on imaging. S/p Ex lap and abdominal washout on .    Interval Problem Update    Afeb on IV atbs for intra abd VRE infxn.  Tolerating full liquids. Plan advance today.  Notes starting to produce stool in ostomy and per rectum.      Consultants/Specialty  [facility] - Surgery  Dr. Herbert/ [place] -ID    Disposition  Likely to [facility] if can de escalate off IV  Tygacil       Review of Systems   Constitutional: Negative for fever and chills.   HENT: Negative for congestion.    Respiratory: Negative for cough, shortness of breath and stridor.    Cardiovascular: Negative for chest pain and palpitations.   Gastrointestinal: Positive for abdominal pain. Negative for nausea and vomiting.   Genitourinary: Negative for dysuria.   Musculoskeletal: Negative for myalgias.   Neurological: Positive for weakness. Negative for dizziness, focal weakness and headaches.   Psychiatric/Behavioral: The patient is nervous/anxious.    All other systems reviewed and are negative.     Physical Exam  Laboratory/Imaging   Hemodynamics  Temp (24hrs), Av.9 °C (98.4 °F), Min:36.8 °C (98.3 °F), Max:36.9 °C (98.5 °F)   Temperature: 36.8 °C (98.3 °F)  Pulse  Av.2  Min: 55  Max: 105    Blood Pressure : 142/66 mmHg      Respiratory      Respiration: 17, Pulse Oximetry: 95 %        RUL Breath Sounds: Clear, RML Breath Sounds: Clear, RLL Breath Sounds: Diminished, TOSHIA Breath Sounds: Clear, LLL Breath Sounds: Diminished    Fluids    Intake/Output Summary (Last 24 hours) at 17 1223  Last data filed at 17 0820   Gross per 24 hour   Intake    350 ml   Output    930 ml   Net   -580 ml       Nutrition  Orders Placed This Encounter   Procedures   • Diet Order     Standing Status: Standing      Number of Occurrences: 1      Standing  Expiration Date:      Order Specific Question:  Diet:     Answer:  Low Fiber(GI Soft) [2]     Physical Exam   Constitutional: He is oriented to person, place, and time. No distress.   HENT:   Head: Normocephalic and atraumatic.   Mouth/Throat: Oropharynx is clear and moist.   Eyes: Conjunctivae and EOM are normal. Pupils are equal, round, and reactive to light. No scleral icterus.   Neck: Normal range of motion. Neck supple. No tracheal deviation present. No thyromegaly present.   Cardiovascular: Normal rate, regular rhythm, normal heart sounds and intact distal pulses.    No murmur heard.  Pulmonary/Chest: Effort normal and breath sounds normal. No respiratory distress. He has no wheezes.   Abdominal: Soft. He exhibits no distension. There is no tenderness.   Colostomy in place, brownish watery stool present.    Musculoskeletal: Normal range of motion. He exhibits no edema or tenderness.   Lymphadenopathy:     He has no cervical adenopathy.        Right: No supraclavicular adenopathy present.        Left: No supraclavicular adenopathy present.   Neurological: He is alert and oriented to person, place, and time. No cranial nerve deficit.   Skin: Skin is warm and dry. He is not diaphoretic. No erythema.   Psychiatric:   Anxious    Vitals reviewed.      Recent Labs      07/31/17   0430  08/01/17   0530   WBC  4.9  4.8   RBC  3.57*  3.48*   HEMOGLOBIN  10.1*  10.0*   HEMATOCRIT  32.0*  31.1*   MCV  89.6  89.4   MCH  28.3  28.7   MCHC  31.6*  32.2*   RDW  53.0*  52.6*   PLATELETCT  235  220   MPV  8.5*  8.8*     Recent Labs      08/01/17   0530   SODIUM  136   POTASSIUM  3.9   CHLORIDE  103   CO2  29   GLUCOSE  93   BUN  12   CREATININE  0.63   CALCIUM  8.2*                      Assessment/Plan     Pelvic abscess in male (CMS-HCC) (present on admission)  Assessment & Plan  VRE, yeast infection --S/p   Ex lap , washout, Sigmoid colon resection w Colostomy placement, Appi.  Improved bowel activity - tolerating diet-- adv  diet per surgery   IV tygacil and diflucan- discussed with ID - dr. Marquez to re assess atb trmt. ? Zyvox.  Continue oral fluconazole for candida coverage.   cw prn PO pain meds  PT/OT  Discussed with SW- working on tx to [facility] if can de escalate IV Tygacil .    Normocytic anemia  Assessment & Plan  Stable H&H, multi factorial  intra-op losses and chronic disease- no acute symptoms of bleed.  Fu h/h-  transfuse if H&H<7    Bipolar 1 disorder, depressed (CMS-HCC)  Assessment & Plan  Continue  lamictal    BPH (benign prostatic hypertrophy) (present on admission)  Assessment & Plan  Continue Flomax  Incontinence - Keep scrotal region dry w powder.     Depression (present on admission)  Assessment & Plan  Stable    Osteoarthritis (present on admission)  Assessment & Plan  Stable, prn analgesia  Vitamin D and calcium when consistently tolerates orals.      Labs reviewed, Medications reviewed and Radiology images reviewed  Ruff catheter: No Ruff  Central line in place: Concentrated IV drugs    DVT Prophylaxis: Enoxaparin (Lovenox)      Antibiotics: Treating active infection/contamination beyond 24 hours perioperative coverage

## 2017-08-02 NOTE — PROGRESS NOTES
Infectious Disease Progress Note    Author: Connie Marquez M.D. Date of service & Time created: 2017  1:31 PM    Interval History:  76-year-old white male admitted with pelvic abscess possibly due to bowel perforation due to constipation  17- low-grade fevers. Continues to have abdominal pain  17- complains of some abdominal pain. MAXIMUM TEMPERATURE 100.3. WBC 7.6  17- complains of abdominal pain. MAXIMUM TEMPERATURE is 98. WBC 7.2  17- ongoing abdominal pain. No fevers. No new issues overnight.   AF WBC 4.8 Called for less expensive abx recommendations and new GI sxs. Stool in toilet  Labs Reviewed, Medications Reviewed, Radiology Reviewed and Wound Reviewed.    Review of Systems:  Review of Systems   Constitutional: Positive for malaise/fatigue. Negative for fever.   Respiratory: Negative for cough and shortness of breath.    Cardiovascular: Negative for chest pain.   Gastrointestinal: Positive for nausea and abdominal pain.   Genitourinary: Negative for dysuria.   Musculoskeletal: Negative for myalgias.   Neurological: Negative for sensory change, speech change and headaches.       Hemodynamics:  Temp (24hrs), Av.9 °C (98.4 °F), Min:36.8 °C (98.3 °F), Max:36.9 °C (98.5 °F)  Temperature: 36.8 °C (98.3 °F)  Pulse  Av.2  Min: 55  Max: 105   Blood Pressure : 142/66 mmHg       Physical Exam:  Physical Exam   Constitutional: He is oriented to person, place, and time. He appears well-developed. No distress.   Cachectic and frail  Chronically ill   HENT:   Head: Normocephalic and atraumatic.   Mouth/Throat: No oropharyngeal exudate.   Eyes: EOM are normal. Pupils are equal, round, and reactive to light. No scleral icterus.   Neck: Neck supple.   Cardiovascular: Normal rate and regular rhythm.    No murmur heard.  Pulmonary/Chest: Effort normal. No respiratory distress.   Abdominal: Soft. There is tenderness.   Colostomy formed stool     Musculoskeletal: He exhibits no edema.    Neurological: He is alert and oriented to person, place, and time.   Skin:   Sacrum dressed   Vitals reviewed.      Labs:  Recent Labs      07/31/17   0430  08/01/17   0530   WBC  4.9  4.8   RBC  3.57*  3.48*   HEMOGLOBIN  10.1*  10.0*   HEMATOCRIT  32.0*  31.1*   MCV  89.6  89.4   MCH  28.3  28.7   RDW  53.0*  52.6*   PLATELETCT  235  220   MPV  8.5*  8.8*   NEUTSPOLYS  64.70  65.90   LYMPHOCYTES  20.20*  18.20*   MONOCYTES  11.80  11.10   EOSINOPHILS  1.90  2.30   BASOPHILS  0.40  0.80     Recent Labs      08/01/17   0530   SODIUM  136   POTASSIUM  3.9   CHLORIDE  103   CO2  29   GLUCOSE  93   BUN  12     Recent Labs      08/01/17   0530   ALBUMIN  2.2*   CREATININE  0.63       Wound:      Results     Procedure Component Value Units Date/Time    ANAEROBIC CULTURE [[id number]] Collected:  07/25/17 1445    Order Status:  Completed Specimen Information:  Wound Updated:  07/30/17 1334     Significant Indicator NEG      Source WND      Site Peritoneal Abscess      Anaerobic Culture, Culture Res No Anaerobes isolated.     Narrative:      CALL  [place]  141 tel. [phone],  CALLED  141 tel. [phone] 07/28/2017, 08:05, RB PERF. RESULTS CALLED  TO:[phone]    CULTURE WOUND W/ GRAM STAIN [[id number]]  (Abnormal)  (Susceptibility) Collected:  07/25/17 1445    Order Status:  Completed Specimen Information:  Wound Updated:  07/30/17 1334     Significant Indicator POS (POS)      Source WND      Site Peritoneal Abscess      Culture Result Wound -- (A)      Gram Stain Result --      Result:        Many WBCs.  Few Yeast.  Few Gram positive cocci.       Culture Result Wound -- (A)      Result:        Enterococcus faecium  Combination therapy with ampicillin, penicillin, or  vancomycin (for susceptible strains) plus an aminoglycoside  is usually indicated for serious enterococcal infections,  such as endocarditis unless high-level resistance to both  gentamicin and streptomycin is documented; such combinations  are predicted to result  in synergistic killing of the  Enterococcus.  VANCOMYCIN RESISTANT ENTEROCOCCI(VRE)       Culture Result Wound -- (A)      Result:        Candida albicans  Light growth      Narrative:      CALL  [place]  141 tel. [phone],  CALLED  141 tel. [phone] 07/28/2017, 08:05, RB PERF. RESULTS CALLED  TO:[phone]    Culture & Susceptibility     ENTEROCOCCUS FAECIUM     Antibiotic Sensitivity Microscan Unit Status    Ampicillin Resistant >8 mcg/mL Final    Daptomycin Sensitive 2 mcg/mL Final    Gent Synergy Sensitive <=500 mcg/mL Final    Linezolid Sensitive 2 mcg/mL Final    Penicillin Resistant >8 mcg/mL Final    Vancomycin Resistant >16 mcg/mL Final                             Fluids:  Intake/Output       07/31/17 0700 - 08/01/17 0659 08/01/17 0700 - 08/02/17 0659 08/02/17 0700 - 08/03/17 0659      [id number] 1195-1167 Total 8314-9481 1923-0408 Total 1856-7473 2378-4659 Total       Intake    P.O.  240  320 560  0  300 300  --  -- --    P.O. 240 320 560 0 300 300 -- -- --    I.V.  290  100 390  130  -- 130  50  -- 50    IV Volume -- -- -- 50 -- 50 -- -- --    IV Volume (NS) 240 -- 240 80 -- 80 -- -- --    IV Piggyback Volume (IV Piggyback) 50 100 150 -- -- -- 50 -- 50    Total Intake 530 420 950 130 300 430 50 -- 50       Output    Urine  500  650 1150  225  700 925  200  -- 200    Number of Times Voided 2 x 2 x 4 x 1 x -- 1 x -- -- --    Void (ml)  225 700 925 200 -- 200    Stool/Urine  0  15 15  0  30 30  0  -- 0    Ostomy #1 (ml)  0 15 15 0 30 30 0 -- 0    Stool  --  -- --  --  -- --  --  -- --    Number of Times Stooled 0 x 0 x 0 x -- -- -- 0 x -- 0 x    Total Output  225 730 955 200 -- 200       Net I/O     30 -245 -215 -95 -430 -525 -150 -- -150             Assessment:  Active Hospital Problems    Diagnosis   • *Sepsis (CMS-HCC) [A41.9]   • Abdominal abscess (CMS-HCC) [K65.1]   • BPH (benign prostatic hypertrophy) [N40.0]   • Depression [F32.9]   • Osteoarthritis [M19.90]       Plan:  Pelvic  abscess due to bowel perforation  AFebrile  Leukocytosis  Status postIR drain ×2  Cultures of Enterococcus faecium, viridans strep, Bacteroides fragilis and Candida albicans  CT scan done on 7/24/2017 shows persistent pelvic abscess  s/p exploratory laparotomy, abdominal washout, sigmoid colon resection, colostomy placement and appendectomy on 7/26/17  OR cultures-showing VRE and Candida  Can change tigecycline to Zyvox for VRE with Zosyn to cover Ecoli, Bfrag, strep  Continue fluc for Candida    Possible new bowel perforation, additional work  Stool coming from rectum  Surgery to comment  Repeat CT indicated    DW IM Dr Curran

## 2017-08-02 NOTE — DISCHARGE PLANNING
PASRR Screening # [id number] went to manual review. Submitted H&P and progress note per Amita at medicaid’s request.

## 2017-08-03 ENCOUNTER — APPOINTMENT (OUTPATIENT)
Dept: RADIOLOGY | Facility: MEDICAL CENTER | Age: 77
DRG: 853 | End: 2017-08-03
Attending: HOSPITALIST
Payer: MEDICARE

## 2017-08-03 PROCEDURE — 99232 SBSQ HOSP IP/OBS MODERATE 35: CPT | Performed by: HOSPITALIST

## 2017-08-03 PROCEDURE — A9270 NON-COVERED ITEM OR SERVICE: HCPCS | Performed by: HOSPITALIST

## 2017-08-03 PROCEDURE — 74177 CT ABD & PELVIS W/CONTRAST: CPT

## 2017-08-03 PROCEDURE — A9270 NON-COVERED ITEM OR SERVICE: HCPCS | Performed by: INTERNAL MEDICINE

## 2017-08-03 PROCEDURE — 700111 HCHG RX REV CODE 636 W/ 250 OVERRIDE (IP): Performed by: INTERNAL MEDICINE

## 2017-08-03 PROCEDURE — 700102 HCHG RX REV CODE 250 W/ 637 OVERRIDE(OP): Performed by: NURSE PRACTITIONER

## 2017-08-03 PROCEDURE — 700117 HCHG RX CONTRAST REV CODE 255: Performed by: INTERNAL MEDICINE

## 2017-08-03 PROCEDURE — A9270 NON-COVERED ITEM OR SERVICE: HCPCS | Performed by: NURSE PRACTITIONER

## 2017-08-03 PROCEDURE — 700105 HCHG RX REV CODE 258: Performed by: INTERNAL MEDICINE

## 2017-08-03 PROCEDURE — 770021 HCHG ROOM/CARE - ISO PRIVATE

## 2017-08-03 PROCEDURE — 700102 HCHG RX REV CODE 250 W/ 637 OVERRIDE(OP): Performed by: INTERNAL MEDICINE

## 2017-08-03 PROCEDURE — 700105 HCHG RX REV CODE 258

## 2017-08-03 PROCEDURE — 700102 HCHG RX REV CODE 250 W/ 637 OVERRIDE(OP): Performed by: HOSPITALIST

## 2017-08-03 PROCEDURE — 700111 HCHG RX REV CODE 636 W/ 250 OVERRIDE (IP): Performed by: SURGERY

## 2017-08-03 PROCEDURE — 97530 THERAPEUTIC ACTIVITIES: CPT

## 2017-08-03 RX ORDER — SODIUM CHLORIDE 9 MG/ML
INJECTION, SOLUTION INTRAVENOUS
Status: COMPLETED
Start: 2017-08-03 | End: 2017-08-03

## 2017-08-03 RX ADMIN — SODIUM CHLORIDE 50 MG: 9 INJECTION, SOLUTION INTRAVENOUS at 20:17

## 2017-08-03 RX ADMIN — CALCIUM CARBONATE-CHOLECALCIFEROL TAB 250 MG-125 UNIT 1 TABLET: 250-125 TAB at 14:12

## 2017-08-03 RX ADMIN — OXYBUTYNIN CHLORIDE 5 MG: 5 TABLET ORAL at 20:17

## 2017-08-03 RX ADMIN — ENOXAPARIN SODIUM 30 MG: 100 INJECTION SUBCUTANEOUS at 05:18

## 2017-08-03 RX ADMIN — HYDROCODONE BITARTRATE AND ACETAMINOPHEN 1 TABLET: 7.5; 325 TABLET ORAL at 17:54

## 2017-08-03 RX ADMIN — OMEPRAZOLE 20 MG: 20 CAPSULE, DELAYED RELEASE ORAL at 08:56

## 2017-08-03 RX ADMIN — GABAPENTIN 200 MG: 100 CAPSULE ORAL at 20:17

## 2017-08-03 RX ADMIN — FLUCONAZOLE 200 MG: 200 TABLET ORAL at 08:56

## 2017-08-03 RX ADMIN — GABAPENTIN 200 MG: 100 CAPSULE ORAL at 14:12

## 2017-08-03 RX ADMIN — GABAPENTIN 200 MG: 100 CAPSULE ORAL at 08:56

## 2017-08-03 RX ADMIN — ENOXAPARIN SODIUM 30 MG: 100 INJECTION SUBCUTANEOUS at 17:51

## 2017-08-03 RX ADMIN — OXYBUTYNIN CHLORIDE 5 MG: 5 TABLET ORAL at 08:56

## 2017-08-03 RX ADMIN — IOHEXOL 100 ML: 350 INJECTION, SOLUTION INTRAVENOUS at 16:34

## 2017-08-03 RX ADMIN — LAMOTRIGINE 150 MG: 100 TABLET ORAL at 08:56

## 2017-08-03 RX ADMIN — TAMSULOSIN HYDROCHLORIDE 0.4 MG: 0.4 CAPSULE ORAL at 08:56

## 2017-08-03 RX ADMIN — SODIUM CHLORIDE 500 ML: 9 INJECTION, SOLUTION INTRAVENOUS at 08:53

## 2017-08-03 RX ADMIN — SODIUM CHLORIDE 50 MG: 9 INJECTION, SOLUTION INTRAVENOUS at 08:53

## 2017-08-03 ASSESSMENT — PAIN SCALES - GENERAL
PAINLEVEL_OUTOF10: 4
PAINLEVEL_OUTOF10: 3

## 2017-08-03 ASSESSMENT — ENCOUNTER SYMPTOMS
FOCAL WEAKNESS: 0
COUGH: 0
HALLUCINATIONS: 0
SPEECH CHANGE: 0
SENSORY CHANGE: 0
ABDOMINAL PAIN: 1
MYALGIAS: 0
EYE DISCHARGE: 0
FEVER: 0
NAUSEA: 0
VOMITING: 0
NERVOUS/ANXIOUS: 1
WEAKNESS: 1
SHORTNESS OF BREATH: 0
NERVOUS/ANXIOUS: 0
CHILLS: 0
EYE REDNESS: 0
HEADACHES: 0
DIZZINESS: 0

## 2017-08-03 ASSESSMENT — COGNITIVE AND FUNCTIONAL STATUS - GENERAL
CLIMB 3 TO 5 STEPS WITH RAILING: A LOT
SUGGESTED CMS G CODE MODIFIER MOBILITY: CK
MOBILITY SCORE: 17
WALKING IN HOSPITAL ROOM: A LITTLE
TURNING FROM BACK TO SIDE WHILE IN FLAT BAD: A LITTLE
MOVING TO AND FROM BED TO CHAIR: A LITTLE
MOVING FROM LYING ON BACK TO SITTING ON SIDE OF FLAT BED: A LITTLE
STANDING UP FROM CHAIR USING ARMS: A LITTLE

## 2017-08-03 ASSESSMENT — GAIT ASSESSMENTS
ASSISTIVE DEVICE: FRONT WHEEL WALKER
DEVIATION: SHUFFLED GAIT;OTHER (COMMENT)
GAIT LEVEL OF ASSIST: CONTACT GUARD ASSIST
DISTANCE (FEET): 40

## 2017-08-03 NOTE — DISCHARGE PLANNING
Dr. Waite was considering sending the patient to SNF today.  He said that the antibiotic will be changed to PO Zyvox 600 mg twice a day.  But he doesn't know how long the patient will need to be on it so we will have to wait for Dr. Marquez's recommendation.  Also, he needs to order a CT of the abdomen to see if the abscess has gotten smaller.  So Dr. Waite said that we will plan on SNF for tomorrow if [facility] will take the patient, once we get the length of time the patient will need to be on the PO Zyvox.  If the abscess has gotten larger, we may need to keep the patient in the hospital longer.

## 2017-08-03 NOTE — PROGRESS NOTES
Infectious Disease Progress Note    Author: Connie Marquez M.D. Date of service & Time created: 8/3/2017  2:19 PM    Interval History:  76-year-old white male admitted with pelvic abscess possibly due to bowel perforation due to constipation  17- low-grade fevers. Continues to have abdominal pain  17- complains of some abdominal pain. MAXIMUM TEMPERATURE 100.3. WBC 7.6  17- complains of abdominal pain. MAXIMUM TEMPERATURE is 98. WBC 7.2  17- ongoing abdominal pain. No fevers. No new issues overnight.   AF WBC 4.8 Called for less expensive abx recommendations and new GI sxs. Stool in toilet  8/3 AF less anxious-pain better controlled  Labs Reviewed, Medications Reviewed, Radiology Reviewed and Wound Reviewed.    Review of Systems:  Review of Systems   Constitutional: Positive for malaise/fatigue. Negative for fever.   Respiratory: Negative for cough and shortness of breath.    Cardiovascular: Negative for chest pain.   Gastrointestinal: Positive for abdominal pain. Negative for vomiting.   Genitourinary: Negative for dysuria.   Musculoskeletal: Negative for myalgias.   Neurological: Negative for sensory change, speech change and headaches.   Psychiatric/Behavioral: The patient is nervous/anxious.        Hemodynamics:  Temp (24hrs), Av.9 °C (98.4 °F), Min:36.6 °C (97.9 °F), Max:37.1 °C (98.7 °F)  Temperature: 36.9 °C (98.4 °F)  Pulse  Av.2  Min: 55  Max: 105   Blood Pressure : 141/77 mmHg       Physical Exam:  Physical Exam   Constitutional: He is oriented to person, place, and time. He appears well-developed. No distress.   Cachectic and frail  Chronically ill   HENT:   Head: Normocephalic and atraumatic.   Mouth/Throat: No oropharyngeal exudate.   Eyes: EOM are normal. Pupils are equal, round, and reactive to light. No scleral icterus.   Neck: Neck supple.   Cardiovascular: Normal rate and regular rhythm.    No murmur heard.  Pulmonary/Chest: Effort normal. No respiratory  distress.   Abdominal: Soft. He exhibits distension. There is tenderness.   Colostomy formed stool  Staples intact-midline incision     Musculoskeletal: He exhibits no edema.   Neurological: He is alert and oriented to person, place, and time.   Skin:   Sacrum dressed   Vitals reviewed.      Labs:  Recent Labs      08/01/17   0530   WBC  4.8   RBC  3.48*   HEMOGLOBIN  10.0*   HEMATOCRIT  31.1*   MCV  89.4   MCH  28.7   RDW  52.6*   PLATELETCT  220   MPV  8.8*   NEUTSPOLYS  65.90   LYMPHOCYTES  18.20*   MONOCYTES  11.10   EOSINOPHILS  2.30   BASOPHILS  0.80     Recent Labs      08/01/17   0530   SODIUM  136   POTASSIUM  3.9   CHLORIDE  103   CO2  29   GLUCOSE  93   BUN  12     Recent Labs      08/01/17   0530   ALBUMIN  2.2*   CREATININE  0.63       Wound:      Results     Procedure Component Value Units Date/Time    ANAEROBIC CULTURE [[id number]] Collected:  07/25/17 1445    Order Status:  Completed Specimen Information:  Wound Updated:  07/30/17 1334     Significant Indicator NEG      Source WND      Site Peritoneal Abscess      Anaerobic Culture, Culture Res No Anaerobes isolated.     Narrative:      CALL  [facility]  141 tel. [phone],  CALLED  141 tel. [phone] 07/28/2017, 08:05, RB PERF. RESULTS CALLED  TO:[phone]    CULTURE WOUND W/ GRAM STAIN [[id number]]  (Abnormal)  (Susceptibility) Collected:  07/25/17 1445    Order Status:  Completed Specimen Information:  Wound Updated:  07/30/17 1334     Significant Indicator POS (POS)      Source WND      Site Peritoneal Abscess      Culture Result Wound -- (A)      Gram Stain Result --      Result:        Many WBCs.  Few Yeast.  Few Gram positive cocci.       Culture Result Wound -- (A)      Result:        Enterococcus faecium  Combination therapy with ampicillin, penicillin, or  vancomycin (for susceptible strains) plus an aminoglycoside  is usually indicated for serious enterococcal infections,  such as endocarditis unless high-level resistance to both  gentamicin and  streptomycin is documented; such combinations  are predicted to result in synergistic killing of the  Enterococcus.  VANCOMYCIN RESISTANT ENTEROCOCCI(VRE)       Culture Result Wound -- (A)      Result:        Candida albicans  Light growth      Narrative:      CALL  [facility]ark  141 tel. [phone],  CALLED  141 tel. [phone] 07/28/2017, 08:05, RB PERF. RESULTS CALLED  TO:[phone]    Culture & Susceptibility     ENTEROCOCCUS FAECIUM     Antibiotic Sensitivity Microscan Unit Status    Ampicillin Resistant >8 mcg/mL Final    Daptomycin Sensitive 2 mcg/mL Final    Gent Synergy Sensitive <=500 mcg/mL Final    Linezolid Sensitive 2 mcg/mL Final    Penicillin Resistant >8 mcg/mL Final    Vancomycin Resistant >16 mcg/mL Final                             Fluids:  Intake/Output       08/01/17 0700 - 08/02/17 0659 08/02/17 0700 - 08/03/17 0659 08/03/17 0700 - 08/04/17 0659      3849-2623 2926-3636 Total 9191-0143 3032-3147 Total 9167-5150 1814-6264 Total       Intake    P.O.  0  300 300  360  150 510  --  -- --    P.O. 0 300 300 360 150 510 -- -- --    I.V.  130  -- 130  50  50 100  210  -- 210    IV Volume 50 -- 50 -- -- -- -- -- --    IV Volume (NS) 80 -- 80 -- -- -- 155 -- 155    IV Piggyback Volume (IV Piggyback) -- -- -- 50 50 100 55 -- 55    Total Intake 130 300 430 410 200 610 210 -- 210       Output    Urine  225  700 925  600  1050 1650  700  -- 700    Number of Times Voided 1 x -- 1 x -- -- -- -- -- --    Void (ml) 225 700  1650 700 -- 700    Stool/Urine  0  30 30  500  -- 500  --  -- --    Ostomy #1 (ml)  0 30 30 500 -- 500 -- -- --    Stool  --  -- --  --  -- --  --  -- --    Number of Times Stooled -- -- -- 0 x -- 0 x -- -- --    Total Output 225  1050 2150 700 -- 700       Net I/O     -95 -430 -525 -690 -850 -1540 -490 -- -490             Assessment:  Active Hospital Problems    Diagnosis   • *Sepsis (CMS-[facility]) [A41.9]   • Abdominal abscess (CMS-[facility]) [K65.1]   • BPH (benign prostatic hypertrophy)  [N40.0]   • Depression [F32.9]   • Osteoarthritis [M19.90]       Plan:  Pelvic abscess due to bowel perforation, additional work  Afebrile  Leukocytosis, resolved  Status pos tIR drain ×2  Cultures of Enterococcus faecium, viridans strep, Bacteroides fragilis and Candida albicans  CT scan done on 7/24/2017 shows persistent pelvic abscess over 5 cm  s/p exploratory laparotomy, abdominal washout, sigmoid colon resection, colostomy placement and appendectomy on 7/26/17  OR cultures-+VRE and Candida  Can change tigecycline to Zyvox for VRE with Zosyn to cover Ecoli, Bfrag, strep if abscess less than 3 cm  Continue fluc for Candida  Endpoint 2 weeks from when abscess less than 3 cm-repeat CT    Protein malnutrition  Anticipate poor wound healing  Alb 2.2    DW IM Dr Curran

## 2017-08-03 NOTE — DIETARY
"Nutrition Services: Update     Pt is on day 20 of admit.  Spoke w/pt in his room, he was sitting up in his bedside chair with family present.  Pt reports that his appetite is \"ok\" but states that he does not like all of the meals he is receiving.  Discussed the nutrition rep to review meals with him, informed him that they may be calling but apparently the pt does not always wear his hearing aids to hear the phone. Encouraged the pt to call down to the kitchen if he does not hear from the nutrition rep to set up meals, pt verbalized understanding.     Pt is currently receiving a Low Fiber; GI soft diet - variable po intake.   Supplements: Boost Breeze, pt would like to try another supplement.  Discussed options and preferences obtained.   Ht: 68\"  Wt: (7/21) 66.4 kg, bed scale - noted with wt gain since admit, unsure of accuracy of bed scale   Body mass index is 22.26 kg/(m^2).    Recommendations:   · Encourage po intake of meals and supplements/snacks   Record percentage of meals conusmed in ADLs to help monitor po adequacy  Obtain weekly wts via stand up scale as pt is able to help monitor po adequacy   Monitor wt trends   RD to discuss calling pt and/or his RN to help ensure the pt is being reached for meal preferences   RD to change boost breeze to boost milkshakes once a day per pt request     RD following   "

## 2017-08-03 NOTE — THERAPY
"Physical Therapy Treatment completed.   Bed Mobility:  Supine to Sit:  (in chair upon entry and exit)  Transfers: Sit to Stand: Contact Guard Assist (required verbal cueing)  Gait: Level Of Assist: Contact Guard Assist with Front-Wheel Walker       Plan of Care: Will benefit from Physical Therapy 3 times per week  Discharge Recommendations: Equipment: Will Continue to Assess for Equipment Needs. Post-acute therapy: see below.    Pt ambulated ~40ft in room with FWW with CGA. Pt currently limited 2' to weakness and deconditioning, pt became fatigued, utilized compensatory gait pattern (B terminal knee extension in stance) at end of ambulation. Pt will benefit from continued skilled acute PT services. Anticipate pt will benefit from continued skilled PT services/placement prior to returning home.    See \"Rehab Therapy-Acute\" Patient Summary Report for complete documentation.       "

## 2017-08-03 NOTE — CARE PLAN
Problem: Pain Management  Goal: Pain level will decrease to patient’s comfort goal  Outcome: PROGRESSING AS EXPECTED  Patient will call out when pain meds are needed.     Problem: Mobility  Goal: Risk for activity intolerance will decrease  Outcome: PROGRESSING AS EXPECTED  Patient will ambulate around unit .

## 2017-08-03 NOTE — WOUND TEAM
Met with patient who reports friend Jessie thought the meeting was 1-2P and isn't going to be here today.  Patient tearful about passing stool per his rectum this AM.  Reviewed with patient the we had discussed this but he  Reports that he is overwhelmed and doesn't remember.  Arranged with Jessie to come tomorrow and will continue with ostomy  Instruction.

## 2017-08-03 NOTE — PROGRESS NOTES
[facility]ist Progress Note    Date of Service: 8/3/2017    Chief Complaint  75 y/o M with PMH of peripheral neuropathy, BPH, Depression admitted for abdominal pain, diarrhea.  Dx with pelvis abscess on imaging. S/p Ex lap and abdominal washout on .    Interval Problem Update    Tolerating GI soft diet- passing gas and stool- ostomy and rectal. Afeb.     Consultants/Specialty  [facility] - Surgery  Dr. Herbert/ [place] -ID    Disposition  Likely to [facility]  if can de escalate off IV  Tygacil       Review of Systems   Constitutional: Negative for fever and chills.   Eyes: Negative for discharge and redness.   Respiratory: Negative for cough and shortness of breath.    Cardiovascular: Negative for chest pain and leg swelling.   Gastrointestinal: Positive for abdominal pain (improved .). Negative for nausea and vomiting.   Genitourinary: Negative for dysuria.   Musculoskeletal: Negative for myalgias.   Neurological: Positive for weakness (generalized ). Negative for dizziness, focal weakness and headaches.   Psychiatric/Behavioral: Negative for hallucinations. The patient is not nervous/anxious.    All other systems reviewed and are negative.     Physical Exam  Laboratory/Imaging   Hemodynamics  Temp (24hrs), Av.9 °C (98.4 °F), Min:36.6 °C (97.9 °F), Max:37.1 °C (98.7 °F)   Temperature: 36.9 °C (98.4 °F)  Pulse  Av.2  Min: 55  Max: 105    Blood Pressure : 141/77 mmHg      Respiratory      Respiration: 18, Pulse Oximetry: 97 %             Fluids    Intake/Output Summary (Last 24 hours) at 17 1113  Last data filed at 17 0900   Gross per 24 hour   Intake    695 ml   Output   2450 ml   Net  -1755 ml       Nutrition  Orders Placed This Encounter   Procedures   • Diet Order     Standing Status: Standing      Number of Occurrences: 1      Standing Expiration Date:      Order Specific Question:  Diet:     Answer:  Low Fiber(GI Soft) [2]     Physical Exam   Constitutional: He is oriented to  person, place, and time. No distress.   HENT:   Head: Normocephalic and atraumatic.   Mouth/Throat: Oropharynx is clear and moist.   Eyes: Conjunctivae and EOM are normal. No scleral icterus.   Neck: Normal range of motion. Neck supple. No tracheal deviation present. No thyromegaly present.   Cardiovascular: Normal rate, regular rhythm, normal heart sounds and intact distal pulses.    No murmur heard.  Pulmonary/Chest: Effort normal and breath sounds normal. No respiratory distress. He has no wheezes.   Abdominal: Soft. Bowel sounds are normal. He exhibits no distension. There is no tenderness.   Colostomy in place, brownish watery stool present.    Musculoskeletal: Normal range of motion. He exhibits no edema or tenderness.   Lymphadenopathy:     He has no cervical adenopathy.        Right: No supraclavicular adenopathy present.        Left: No supraclavicular adenopathy present.   Neurological: He is alert and oriented to person, place, and time. No cranial nerve deficit.   Skin: Skin is warm and dry. He is not diaphoretic. No erythema.   Psychiatric:   Anxious    Vitals reviewed.      Recent Labs      08/01/17   0530   WBC  4.8   RBC  3.48*   HEMOGLOBIN  10.0*   HEMATOCRIT  31.1*   MCV  89.4   MCH  28.7   MCHC  32.2*   RDW  52.6*   PLATELETCT  220   MPV  8.8*     Recent Labs      08/01/17   0530   SODIUM  136   POTASSIUM  3.9   CHLORIDE  103   CO2  29   GLUCOSE  93   BUN  12   CREATININE  0.63   CALCIUM  8.2*                      Assessment/Plan     Pelvic abscess in male (CMS-HCC) (present on admission)  Assessment & Plan  VRE, yeast infection --S/p   Ex lap , washout, Sigmoid colon resection w Colostomy placement, Appi-  Bowel activity recovering.   cw GI diet.   IV tygacil and diflucan- discussed with ID dr. Marquez  -Will repeat CT for abscess comparison - change to oral Zyvox if abscess smaller.  Continue oral fluconazole for candida coverage.   cw prn PO pain meds  PT/OT  Anticipate tx to [facility].      Normocytic anemia  Assessment & Plan  Stable H&H, multi factorial  intra-op losses and chronic disease- no acute symptoms of bleed.      Bipolar 1 disorder, depressed (CMS-HCC)  Assessment & Plan  Continue  lamictal    BPH (benign prostatic hypertrophy) (present on admission)  Assessment & Plan  Continue Flomax  Incontinence - Keep scrotal region dry w powder.     Depression (present on admission)  Assessment & Plan  Stable    Osteoarthritis (present on admission)  Assessment & Plan  Stable, prn analgesia  Start ca, vit d    Discussed with SW , to contact [facility] for possible tx depending on CT results.  Labs reviewed, Medications reviewed and Radiology images reviewed  Ruff catheter: No Ruff  Central line in place: Concentrated IV drugs    DVT Prophylaxis: Enoxaparin (Lovenox)      Antibiotics: Treating active infection/contamination beyond 24 hours perioperative coverage

## 2017-08-03 NOTE — CARE PLAN
Problem: Nutritional:  Goal: Achieve adequate nutritional intake  Diet will adv past CL and patient will consume >50% of meals and supplement   Outcome: PROGRESSING SLOWER THAN EXPECTED  Variable po intake of low fiber; GI soft meals

## 2017-08-03 NOTE — CARE PLAN
Problem: Safety  Goal: Will remain free from injury  Call light within reach.     Problem: Bowel/Gastric:  Goal: Normal bowel function is maintained or improved  Ostomy with + stool to appliance.    Problem: Knowledge Deficit  Goal: Knowledge of disease process/condition, treatment plan, diagnostic tests, and medications will improve  Plan of care discussed with pt. Pt requesting to continue to rest this am

## 2017-08-03 NOTE — PROGRESS NOTES
Surgical Progress Note    Author: Francheskavega Madrid Date & Time created: 8/3/2017   6:56 AM     Interval Events:  Doing well.  Passed a stool through his rectum which prompted some concern and a call from the RN.  Pain much improved, tolerating PO.  Oral pain meds.     ROS  Hemodynamics:  Temp (24hrs), Av.9 °C (98.4 °F), Min:36.6 °C (97.9 °F), Max:37.1 °C (98.7 °F)  Temperature: 36.9 °C (98.5 °F)  Pulse  Av.3  Min: 55  Max: 105   Blood Pressure : 122/68 mmHg     Respiratory:    Respiration: 16, Pulse Oximetry: 93 %        RUL Breath Sounds: Clear, RML Breath Sounds: Clear, RLL Breath Sounds: Diminished, TOSHIA Breath Sounds: Clear, LLL Breath Sounds: Diminished  Neuro:  GCS       Fluids:    Intake/Output Summary (Last 24 hours) at 17 0656  Last data filed at 17 0500   Gross per 24 hour   Intake    610 ml   Output   2150 ml   Net  -1540 ml        Current Diet Order   Procedures   • Diet Order     Physical Exam   Alert, comfortable.  Abdomen soft.  Mild staple erythema.  Colostomy functioning.    Labs:  No results found for this or any previous visit (from the past 24 hour(s)).  Medical Decision Making, by Problem:  Active Hospital Problems    Diagnosis   • Pelvic abscess in male (CMS-[facility]) [K65.1]     Priority: High   • BPH (benign prostatic hypertrophy) [N40.0]     Priority: Low   • Depression [F32.9]     Priority: Low   • Osteoarthritis [M19.90]     Priority: Low   • Bipolar 1 disorder, depressed (CMS-[facility]) [F31.9]   • Normocytic anemia [D64.9]     Plan:  Explained to RN yesterday and pt today that stool and mucous is expected to pass from his rectum periodicaly.  Continue soft diet as tolerated.  Antibiotics per ID.  OK to DC staples tomorrow which will be POD 10. Dr. Nolan to cover from Friday-.    Quality Measures:  Core Measures

## 2017-08-04 ENCOUNTER — APPOINTMENT (OUTPATIENT)
Dept: RADIOLOGY | Facility: MEDICAL CENTER | Age: 77
DRG: 853 | End: 2017-08-04
Attending: HOSPITALIST
Payer: MEDICARE

## 2017-08-04 PROCEDURE — A9270 NON-COVERED ITEM OR SERVICE: HCPCS | Performed by: HOSPITALIST

## 2017-08-04 PROCEDURE — 700105 HCHG RX REV CODE 258

## 2017-08-04 PROCEDURE — 700102 HCHG RX REV CODE 250 W/ 637 OVERRIDE(OP): Performed by: HOSPITALIST

## 2017-08-04 PROCEDURE — A9270 NON-COVERED ITEM OR SERVICE: HCPCS | Performed by: INTERNAL MEDICINE

## 2017-08-04 PROCEDURE — 700102 HCHG RX REV CODE 250 W/ 637 OVERRIDE(OP): Performed by: NURSE PRACTITIONER

## 2017-08-04 PROCEDURE — A9270 NON-COVERED ITEM OR SERVICE: HCPCS | Performed by: SURGERY

## 2017-08-04 PROCEDURE — A9270 NON-COVERED ITEM OR SERVICE: HCPCS | Performed by: NURSE PRACTITIONER

## 2017-08-04 PROCEDURE — 700102 HCHG RX REV CODE 250 W/ 637 OVERRIDE(OP): Performed by: SURGERY

## 2017-08-04 PROCEDURE — 700102 HCHG RX REV CODE 250 W/ 637 OVERRIDE(OP): Performed by: INTERNAL MEDICINE

## 2017-08-04 PROCEDURE — 99233 SBSQ HOSP IP/OBS HIGH 50: CPT | Performed by: HOSPITALIST

## 2017-08-04 PROCEDURE — 700105 HCHG RX REV CODE 258: Performed by: INTERNAL MEDICINE

## 2017-08-04 PROCEDURE — 700111 HCHG RX REV CODE 636 W/ 250 OVERRIDE (IP): Performed by: SURGERY

## 2017-08-04 PROCEDURE — 770021 HCHG ROOM/CARE - ISO PRIVATE

## 2017-08-04 PROCEDURE — 700111 HCHG RX REV CODE 636 W/ 250 OVERRIDE (IP): Performed by: INTERNAL MEDICINE

## 2017-08-04 RX ORDER — SODIUM CHLORIDE 9 MG/ML
INJECTION, SOLUTION INTRAVENOUS
Status: COMPLETED
Start: 2017-08-04 | End: 2017-08-04

## 2017-08-04 RX ADMIN — SODIUM CHLORIDE 500 ML: 9 INJECTION, SOLUTION INTRAVENOUS at 14:28

## 2017-08-04 RX ADMIN — GABAPENTIN 200 MG: 100 CAPSULE ORAL at 21:29

## 2017-08-04 RX ADMIN — TAMSULOSIN HYDROCHLORIDE 0.4 MG: 0.4 CAPSULE ORAL at 07:44

## 2017-08-04 RX ADMIN — GABAPENTIN 200 MG: 100 CAPSULE ORAL at 13:38

## 2017-08-04 RX ADMIN — GABAPENTIN 200 MG: 100 CAPSULE ORAL at 07:44

## 2017-08-04 RX ADMIN — OXYCODONE HYDROCHLORIDE 5 MG: 5 TABLET ORAL at 21:31

## 2017-08-04 RX ADMIN — OXYBUTYNIN CHLORIDE 5 MG: 5 TABLET ORAL at 21:29

## 2017-08-04 RX ADMIN — SODIUM CHLORIDE 50 MG: 9 INJECTION, SOLUTION INTRAVENOUS at 21:29

## 2017-08-04 RX ADMIN — SODIUM CHLORIDE 50 MG: 9 INJECTION, SOLUTION INTRAVENOUS at 09:15

## 2017-08-04 RX ADMIN — FLUCONAZOLE 200 MG: 200 TABLET ORAL at 07:46

## 2017-08-04 RX ADMIN — CALCIUM CARBONATE-CHOLECALCIFEROL TAB 250 MG-125 UNIT 1 TABLET: 250-125 TAB at 07:44

## 2017-08-04 RX ADMIN — ENOXAPARIN SODIUM 30 MG: 100 INJECTION SUBCUTANEOUS at 06:46

## 2017-08-04 RX ADMIN — LAMOTRIGINE 150 MG: 100 TABLET ORAL at 07:44

## 2017-08-04 RX ADMIN — OMEPRAZOLE 20 MG: 20 CAPSULE, DELAYED RELEASE ORAL at 07:44

## 2017-08-04 RX ADMIN — POLYETHYLENE GLYCOL 3350 1 PACKET: 17 POWDER, FOR SOLUTION ORAL at 07:44

## 2017-08-04 RX ADMIN — OXYCODONE HYDROCHLORIDE 5 MG: 5 TABLET ORAL at 13:38

## 2017-08-04 RX ADMIN — OXYBUTYNIN CHLORIDE 5 MG: 5 TABLET ORAL at 07:44

## 2017-08-04 ASSESSMENT — ENCOUNTER SYMPTOMS
TREMORS: 0
CARDIOVASCULAR NEGATIVE: 1
VOMITING: 0
FOCAL WEAKNESS: 0
MYALGIAS: 0
SENSORY CHANGE: 0
COUGH: 0
NECK PAIN: 0
RESPIRATORY NEGATIVE: 1
SHORTNESS OF BREATH: 0
EYE DISCHARGE: 0
NERVOUS/ANXIOUS: 0
CHILLS: 0
EYE REDNESS: 0
GASTROINTESTINAL NEGATIVE: 1
FLANK PAIN: 0
WEAKNESS: 1
HALLUCINATIONS: 0
DIZZINESS: 0
FEVER: 0
HEADACHES: 0
NAUSEA: 0
ABDOMINAL PAIN: 1
SPEECH CHANGE: 0

## 2017-08-04 ASSESSMENT — PAIN SCALES - GENERAL
PAINLEVEL_OUTOF10: 3
PAINLEVEL_OUTOF10: 4
PAINLEVEL_OUTOF10: 3
PAINLEVEL_OUTOF10: 4

## 2017-08-04 NOTE — PROGRESS NOTES
Infectious Disease Progress Note    Author: Connie Marquez M.D. Date of service & Time created: 2017  2:26 PM    Interval History:  76-year-old white male admitted with pelvic abscess possibly due to bowel perforation due to constipation  17- low-grade fevers. Continues to have abdominal pain  17- complains of some abdominal pain. MAXIMUM TEMPERATURE 100.3. WBC 7.6  17- complains of abdominal pain. MAXIMUM TEMPERATURE is 98. WBC 7.2  17- ongoing abdominal pain. No fevers. No new issues overnight.   AF WBC 4.8 Called for less expensive abx recommendations and new GI sxs. Stool in toilet  8/3 AF less anxious-pain better controlled   AF eating improved  Labs Reviewed, Medications Reviewed, Radiology Reviewed and Wound Reviewed.    Review of Systems:  Review of Systems   Constitutional: Positive for malaise/fatigue. Negative for fever.   Respiratory: Negative for cough and shortness of breath.    Cardiovascular: Negative for chest pain.   Gastrointestinal: Positive for abdominal pain. Negative for vomiting.   Genitourinary: Negative for dysuria.   Musculoskeletal: Negative for myalgias.   Neurological: Negative for sensory change, speech change and headaches.       Hemodynamics:  Temp (24hrs), Av.8 °C (98.3 °F), Min:36.6 °C (97.8 °F), Max:37.1 °C (98.8 °F)  Temperature: 36.6 °C (97.8 °F)  Pulse  Av.3  Min: 55  Max: 105   Blood Pressure : 118/69 mmHg       Physical Exam:  Physical Exam   Constitutional: He is oriented to person, place, and time. He appears well-developed. No distress.   Cachectic and frail  Chronically ill   HENT:   Head: Normocephalic and atraumatic.   Mouth/Throat: No oropharyngeal exudate.   Eyes: EOM are normal. Pupils are equal, round, and reactive to light. No scleral icterus.   Neck: Neck supple.   Cardiovascular: Normal rate and regular rhythm.    No murmur heard.  Pulmonary/Chest: Effort normal. No respiratory distress.   Abdominal: Soft. He exhibits  distension. There is tenderness. There is no rebound and no guarding.   Colostomy formed stool  Staples intact-midline incision     Musculoskeletal: He exhibits no edema.   Neurological: He is alert and oriented to person, place, and time.   Skin:   Sacrum dressed   Nursing note and vitals reviewed.      Labs:  No results for input(s): WBC, RBC, HEMOGLOBIN, HEMATOCRIT, MCV, MCH, RDW, PLATELETCT, MPV, NEUTSPOLYS, LYMPHOCYTES, MONOCYTES, EOSINOPHILS, BASOPHILS, RBCMORPHOLO in the last 72 hours.  No results for input(s): SODIUM, POTASSIUM, CHLORIDE, CO2, GLUCOSE, BUN, CPKTOTAL in the last 72 hours.  No results for input(s): ALBUMIN, TBILIRUBIN, ALKPHOSPHAT, TOTPROTEIN, ALTSGPT, ASTSGOT, CREATININE in the last 72 hours.    Wound:      Results     Procedure Component Value Units Date/Time    ANAEROBIC CULTURE [[id number]] Collected:  07/25/17 1445    Order Status:  Completed Specimen Information:  Wound Updated:  07/30/17 1334     Significant Indicator NEG      Source WND      Site Peritoneal Abscess      Anaerobic Culture, Culture Res No Anaerobes isolated.     Narrative:      CALL  [place]  141 tel. [phone],  CALLED  141 tel. [phone] 07/28/2017, 08:05, RB PERF. RESULTS CALLED  TO:[phone]    CULTURE WOUND W/ GRAM STAIN [[id number]]  (Abnormal)  (Susceptibility) Collected:  07/25/17 1445    Order Status:  Completed Specimen Information:  Wound Updated:  07/30/17 1334     Significant Indicator POS (POS)      Source WND      Site Peritoneal Abscess      Culture Result Wound -- (A)      Gram Stain Result --      Result:        Many WBCs.  Few Yeast.  Few Gram positive cocci.       Culture Result Wound -- (A)      Result:        Enterococcus faecium  Combination therapy with ampicillin, penicillin, or  vancomycin (for susceptible strains) plus an aminoglycoside  is usually indicated for serious enterococcal infections,  such as endocarditis unless high-level resistance to both  gentamicin and streptomycin is documented; such  combinations  are predicted to result in synergistic killing of the  Enterococcus.  VANCOMYCIN RESISTANT ENTEROCOCCI(VRE)       Culture Result Wound -- (A)      Result:        Candida albicans  Light growth      Narrative:      CALL  [facility]  141 tel. [phone],  CALLED  141 tel. [phone] 07/28/2017, 08:05, RB PERF. RESULTS CALLED  TO:[phone]    Culture & Susceptibility     ENTEROCOCCUS FAECIUM     Antibiotic Sensitivity Microscan Unit Status    Ampicillin Resistant >8 mcg/mL Final    Daptomycin Sensitive 2 mcg/mL Final    Gent Synergy Sensitive <=500 mcg/mL Final    Linezolid Sensitive 2 mcg/mL Final    Penicillin Resistant >8 mcg/mL Final    Vancomycin Resistant >16 mcg/mL Final                             Fluids:  Intake/Output       08/02/17 0700 - 08/03/17 0659 08/03/17 0700 - 08/04/17 0659 08/04/17 0700 - 08/05/17 0659      [id number] 5836-7751 Total [id number] 4277-4491 Total 1647-1640 9973-2352 Total       Intake    P.O.  360  150 510  720  150 870  --  -- --    P.O. 360 150 510 720 150 870 -- -- --    I.V.  50  50 100  210  -- 210  210  -- 210    IV Volume (NS) -- -- -- 155 -- 155 155 -- 155    IV Piggyback Volume (IV Piggyback) 50 50 100 55 -- 55 55 -- 55    Total Intake 410 200 610  210 -- 210       Output    Urine  600  1050 1650  1050  100 1150  750  -- 750    Void (ml) 600 1050 1650 5704 924 2000 750 -- 750    Stool/Urine  500  -- 500  --  -- --  --  -- --    Ostomy #1 (ml)  500 -- 500 -- -- -- -- -- --    Stool  --  -- --  --  -- --  --  -- --    Number of Times Stooled 0 x -- 0 x -- -- -- -- -- --    Total Output 1100 1050 2150 1886 603 7131 750 -- 750       Net I/O     -690 -850 -1540 -120 50 -70 -540 -- -540        CT-ABDOMEN-PELVIS WITH (Final result) Result time: 08/03/17 16:43:02     Final result by Madina Dorman M.D. (08/03/17 16:43:02)     Impression:     4.5 x 4.7 x 5.4 cm pelvic fluid collection is mildly decreased in size compared to prior. There has been interval removal of  the previously noted pigtail catheter.  Small amount of free fluid in the abdomen and pelvis.  Mildly dilated loops of small bowel may represent mild ileus.  Small bilateral pleural effusions with overlying atelectasis/consolidation.  Moderate hiatal hernia.  Prominent atherosclerotic plaque.  Healing fractures of the fifth and sixth ribs on the right.          Assessment:  Active Hospital Problems    Diagnosis   • *Sepsis (CMS-HCC) [A41.9]   • Abdominal abscess (CMS-HCC) [K65.1]   • BPH (benign prostatic hypertrophy) [N40.0]   • Depression [F32.9]   • Osteoarthritis [M19.90]       Plan:  Pelvic abscess due to bowel perforation, additional work  Afebrile  Leukocytosis, resolved  S/p IR drain ×2  Cultures of Enterococcus faecium, viridans strep, Bacteroides fragilis and Candida albicans  CT scan done on 7/24/2017 shows persistent pelvic abscess over 5 cm  s/p exploratory laparotomy, abdominal washout, sigmoid colon resection, colostomy placement and appendectomy on 7/26/17  OR cultures-+VRE and Candida  Can change tigecycline to Zyvox for VRE with Zosyn to cover Ecoli, Bfrag, strep if abscess less than 3 cm  Continue fluc for Candida  Endpoint 2 weeks from when abscess less than 3 cm-repeat CT shows still 4.5 X  4.7 X 5.4cm-too large for abx to penetrate  Recommend drainage    Protein malnutrition  Anticipate poor wound healing  Alb 2.2    DW IM Dr Curran

## 2017-08-04 NOTE — PROGRESS NOTES
[facility]    Date of Service: 2017    Chief Complaint  75 y/o M with PMH of peripheral neuropathy, BPH, Depression admitted for abdominal pain, diarrhea.  Dx with pelvis abscess on imaging. S/p Ex lap and abdominal washout on .    Interval Problem Update    CT minimal decrease in pelvic abscess .  Afeb.  tolerating diet, abd pain controlled with norco.    Consultants/Specialty  [facility] - Surgery  Dr. Herbert/ [place] -ID    Disposition  Likely to [facility]  if can de escalate off IV  Tygacil       Review of Systems   Constitutional: Negative for fever and chills.   HENT: Negative for hearing loss.    Eyes: Negative for discharge and redness.   Respiratory: Negative for cough and shortness of breath.    Cardiovascular: Negative for chest pain and leg swelling.   Gastrointestinal: Positive for abdominal pain. Negative for nausea and vomiting.   Genitourinary: Negative for hematuria and flank pain.   Musculoskeletal: Negative for myalgias and neck pain.   Skin: Negative for itching and rash.   Neurological: Positive for weakness (generalized ). Negative for dizziness, tremors, focal weakness and headaches.   Psychiatric/Behavioral: Negative for hallucinations. The patient is not nervous/anxious.    All other systems reviewed and are negative.     Physical Exam  Laboratory/Imaging   Hemodynamics  Temp (24hrs), Av.8 °C (98.3 °F), Min:36.6 °C (97.8 °F), Max:37.1 °C (98.8 °F)   Temperature: 36.6 °C (97.8 °F)  Pulse  Av.3  Min: 55  Max: 105    Blood Pressure : 118/69 mmHg      Respiratory      Respiration: 14, Pulse Oximetry: 93 %             Fluids    Intake/Output Summary (Last 24 hours) at 17 1252  Last data filed at 17 1200   Gross per 24 hour   Intake    840 ml   Output   1200 ml   Net   -360 ml       Nutrition  Orders Placed This Encounter   Procedures   • Diet Order     Standing Status: Standing      Number of Occurrences: 1      Standing Expiration Date:       Order Specific Question:  Diet:     Answer:  Low Fiber(GI Soft) [2]     Physical Exam   Constitutional: He is oriented to person, place, and time. No distress.   HENT:   Head: Normocephalic and atraumatic.   Eyes: No scleral icterus.   Neck: Normal range of motion. Neck supple. No tracheal deviation present. No thyromegaly present.   Cardiovascular: Normal rate, regular rhythm, normal heart sounds and intact distal pulses.    No murmur heard.  Pulmonary/Chest: Effort normal and breath sounds normal. No respiratory distress. He has no wheezes.   Abdominal: Soft. Bowel sounds are normal. He exhibits no distension. There is no tenderness.   Colostomy in place, brownish watery stool present.    Musculoskeletal: Normal range of motion. He exhibits no edema or tenderness.   Lymphadenopathy:     He has no cervical adenopathy.        Right: No supraclavicular adenopathy present.        Left: No supraclavicular adenopathy present.   Neurological: He is alert and oriented to person, place, and time. No cranial nerve deficit.   Skin: Skin is warm and dry. He is not diaphoretic. No erythema.   Psychiatric: He has a normal mood and affect. His behavior is normal.   Vitals reviewed.                               Assessment/Plan     Pelvic abscess in male (CMS-HCC) (present on admission)  Assessment & Plan  VRE, yeast infection --S/p   Ex lap , washout, Sigmoid colon resection w Colostomy placement, Appi-  Bowel activity recovering- Fu CT 8-3  with minimal decreased size of pelvic abscess 4.5 x 4.7 x 5.4 cm   Will have IR place drain .   cw GI diet.   IV tygacil and diflucan- discussed with ID dr. Marquez  -Will repeat CT for abscess comparison - change to oral Zyvox when abscess smaller.  Continue oral fluconazole for candida coverage.   cw prn PO pain meds  PT/OT  Discussed with Dr. Marquez -ID, Surgery - Dr. Nolan     Normocytic anemia  Assessment & Plan  Stable H&H, multi factorial  intra-op losses and chronic disease- no acute  symptoms of bleed.      Bipolar 1 disorder, depressed (CMS-[facility])  Assessment & Plan  Continue  lamictal    BPH (benign prostatic hypertrophy) (present on admission)  Assessment & Plan  Continue Flomax  Incontinence - Keep scrotal region dry w powder.     Depression (present on admission)  Assessment & Plan  Stable    Osteoarthritis (present on admission)  Assessment & Plan  Stable, prn analgesia  ca, vit d    Discussed with SW   Labs reviewed, Medications reviewed and Radiology images reviewed  Ruff catheter: No Ruff  Central line in place: Concentrated IV drugs    DVT Prophylaxis: Enoxaparin (Lovenox)      Antibiotics: Treating active infection/contamination beyond 24 hours perioperative coverage

## 2017-08-04 NOTE — CARE PLAN
Problem: Safety  Goal: Will remain free from injury  Bed alarm on and call light within reach.    Problem: Knowledge Deficit  Goal: Knowledge of disease process/condition, treatment plan, diagnostic tests, and medications will improve  Plan of care for shift discussed with pt. Pt receptive. Await md rounding for further plan.    Problem: Pain Management  Goal: Pain level will decrease to patient’s comfort goal  Oxycodone prn

## 2017-08-04 NOTE — CARE PLAN
Problem: Pain Management  Goal: Pain level will decrease to patient’s comfort goal  Outcome: PROGRESSING AS EXPECTED  Patient pain level will remain at acceptable level.     Problem: Mobility  Goal: Risk for activity intolerance will decrease  Outcome: PROGRESSING AS EXPECTED  Patient will sit up in chair.

## 2017-08-04 NOTE — PROGRESS NOTES
Surgical Progress Note    Author: Hernandez Nolan Date & Time created: 2017   10:03 AM     Interval Events:    Review of Systems   Respiratory: Negative.    Cardiovascular: Negative.    Gastrointestinal: Negative.      Hemodynamics:  Temp (24hrs), Av.8 °C (98.3 °F), Min:36.6 °C (97.8 °F), Max:37.1 °C (98.8 °F)  Temperature: 36.6 °C (97.8 °F)  Pulse  Av.3  Min: 55  Max: 105   Blood Pressure : 118/69 mmHg     Respiratory:    Respiration: 14, Pulse Oximetry: 93 %           Neuro:  GCS       Fluids:    Intake/Output Summary (Last 24 hours) at 17 1003  Last data filed at 17 0800   Gross per 24 hour   Intake    840 ml   Output   1250 ml   Net   -410 ml        Current Diet Order   Procedures   • Diet Order     Physical Exam   Abdominal: Soft. Bowel sounds are normal. He exhibits no distension.   Ostomy working     Labs:  No results found for this or any previous visit (from the past 24 hour(s)).  Medical Decision Making, by Problem:  Active Hospital Problems    Diagnosis   • Pelvic abscess in male (CMS-[facility]) [K65.1]     Priority: High   • BPH (benign prostatic hypertrophy) [N40.0]     Priority: Low   • Depression [F32.9]     Priority: Low   • Osteoarthritis [M19.90]     Priority: Low   • Bipolar 1 disorder, depressed (CMS-[facility]) [F31.9]   • Normocytic anemia [D64.9]     Plan:  Stable, has small fluid collection in pelvis. Tolerating diet. Antibiotics as per ID. Will remove staples today    Quality Measures:  Core Measures    Discussed patient condition with Patient

## 2017-08-04 NOTE — WOUND TEAM
"[facility]  Inpatient Services  New Ostomy Management & Teaching    HPI:  Reviewed  PMH: Reviewed   SH: Reviewed    Subjective: \"I remember you!\"    Objective:  Good sense of humor. Friend Jessie at bedside.      Ostomy type:  Colostomy   Stoma location:  LLQ   Stoma assessment:    Appearance: Red, healthy, moist.    Size: 1 3/4\" oval    Protrusion: mildly budded    Output: Small, loose, brown.    MC jxn: intact    Peristomal skin: Intact.     Ostomy Appliance (type and size): 2 1/4\" two piece    Interventions:  Met with patient and friend Jessie. Patient removed half of previous appliance, this RN removed the rest. Patient traced barrier, this RN cut out barrier. Patient placed barrier around stoma. This RN removed paper backing. Jessie attached fecal pouch to barrier and patient secured the bottom.      Pt education: Questions and concerns addressed; see above    Evaluation:  Patient will need assistance with ostomy appliance change.  Patient seems to be able to empty pouch. Jessie is planning to prepare several barrier in advance for patient each week.     Plan: Ostomy nurses to continue to follow for ostomy needs and teaching     Anticipated discharge needs:  Has everything for discharge   "

## 2017-08-05 ENCOUNTER — APPOINTMENT (OUTPATIENT)
Dept: RADIOLOGY | Facility: MEDICAL CENTER | Age: 77
DRG: 853 | End: 2017-08-05
Attending: HOSPITALIST
Payer: MEDICARE

## 2017-08-05 LAB
GRAM STN SPEC: NORMAL
SIGNIFICANT IND 70042: NORMAL
SITE SITE: NORMAL
SOURCE SOURCE: NORMAL

## 2017-08-05 PROCEDURE — 99232 SBSQ HOSP IP/OBS MODERATE 35: CPT | Performed by: HOSPITALIST

## 2017-08-05 PROCEDURE — A9270 NON-COVERED ITEM OR SERVICE: HCPCS | Performed by: HOSPITALIST

## 2017-08-05 PROCEDURE — 700111 HCHG RX REV CODE 636 W/ 250 OVERRIDE (IP): Performed by: RADIOLOGY

## 2017-08-05 PROCEDURE — A9270 NON-COVERED ITEM OR SERVICE: HCPCS | Performed by: NURSE PRACTITIONER

## 2017-08-05 PROCEDURE — 700111 HCHG RX REV CODE 636 W/ 250 OVERRIDE (IP)

## 2017-08-05 PROCEDURE — 700111 HCHG RX REV CODE 636 W/ 250 OVERRIDE (IP): Performed by: INTERNAL MEDICINE

## 2017-08-05 PROCEDURE — 700102 HCHG RX REV CODE 250 W/ 637 OVERRIDE(OP): Performed by: INTERNAL MEDICINE

## 2017-08-05 PROCEDURE — 4410074 CT-DRAIN-PERITONEAL

## 2017-08-05 PROCEDURE — 0W9J30Z DRAINAGE OF PELVIC CAVITY WITH DRAINAGE DEVICE, PERCUTANEOUS APPROACH: ICD-10-PCS | Performed by: RADIOLOGY

## 2017-08-05 PROCEDURE — 770021 HCHG ROOM/CARE - ISO PRIVATE

## 2017-08-05 PROCEDURE — A9270 NON-COVERED ITEM OR SERVICE: HCPCS | Performed by: INTERNAL MEDICINE

## 2017-08-05 PROCEDURE — 700102 HCHG RX REV CODE 250 W/ 637 OVERRIDE(OP): Performed by: NURSE PRACTITIONER

## 2017-08-05 PROCEDURE — 700105 HCHG RX REV CODE 258

## 2017-08-05 PROCEDURE — 700102 HCHG RX REV CODE 250 W/ 637 OVERRIDE(OP): Performed by: HOSPITALIST

## 2017-08-05 PROCEDURE — 87205 SMEAR GRAM STAIN: CPT

## 2017-08-05 PROCEDURE — 700105 HCHG RX REV CODE 258: Performed by: INTERNAL MEDICINE

## 2017-08-05 PROCEDURE — 87070 CULTURE OTHR SPECIMN AEROBIC: CPT

## 2017-08-05 PROCEDURE — C1769 GUIDE WIRE: HCPCS

## 2017-08-05 PROCEDURE — 87075 CULTR BACTERIA EXCEPT BLOOD: CPT

## 2017-08-05 PROCEDURE — 700111 HCHG RX REV CODE 636 W/ 250 OVERRIDE (IP): Performed by: SURGERY

## 2017-08-05 RX ORDER — SODIUM CHLORIDE 9 MG/ML
INJECTION, SOLUTION INTRAVENOUS
Status: COMPLETED
Start: 2017-08-05 | End: 2017-08-06

## 2017-08-05 RX ORDER — NALOXONE HYDROCHLORIDE 0.4 MG/ML
INJECTION, SOLUTION INTRAMUSCULAR; INTRAVENOUS; SUBCUTANEOUS
Status: COMPLETED
Start: 2017-08-05 | End: 2017-08-05

## 2017-08-05 RX ORDER — MIDAZOLAM HYDROCHLORIDE 1 MG/ML
.5-2 INJECTION INTRAMUSCULAR; INTRAVENOUS PRN
Status: ACTIVE | OUTPATIENT
Start: 2017-08-05 | End: 2017-08-05

## 2017-08-05 RX ORDER — SODIUM CHLORIDE 9 MG/ML
INJECTION, SOLUTION INTRAVENOUS
Status: COMPLETED
Start: 2017-08-05 | End: 2017-08-05

## 2017-08-05 RX ORDER — MIDAZOLAM HYDROCHLORIDE 1 MG/ML
.5-2 INJECTION INTRAMUSCULAR; INTRAVENOUS PRN
Status: DISCONTINUED | OUTPATIENT
Start: 2017-08-05 | End: 2017-08-05

## 2017-08-05 RX ORDER — MIDAZOLAM HYDROCHLORIDE 1 MG/ML
INJECTION INTRAMUSCULAR; INTRAVENOUS
Status: COMPLETED
Start: 2017-08-05 | End: 2017-08-05

## 2017-08-05 RX ORDER — SODIUM CHLORIDE 9 MG/ML
500 INJECTION, SOLUTION INTRAVENOUS
Status: ACTIVE | OUTPATIENT
Start: 2017-08-05 | End: 2017-08-05

## 2017-08-05 RX ADMIN — GABAPENTIN 200 MG: 100 CAPSULE ORAL at 14:08

## 2017-08-05 RX ADMIN — TAMSULOSIN HYDROCHLORIDE 0.4 MG: 0.4 CAPSULE ORAL at 08:27

## 2017-08-05 RX ADMIN — SODIUM CHLORIDE 500 ML: 9 INJECTION, SOLUTION INTRAVENOUS at 18:50

## 2017-08-05 RX ADMIN — OXYCODONE HYDROCHLORIDE 5 MG: 5 TABLET ORAL at 14:08

## 2017-08-05 RX ADMIN — OMEPRAZOLE 20 MG: 20 CAPSULE, DELAYED RELEASE ORAL at 08:26

## 2017-08-05 RX ADMIN — FENTANYL CITRATE 50 MCG: 50 INJECTION, SOLUTION INTRAMUSCULAR; INTRAVENOUS at 12:56

## 2017-08-05 RX ADMIN — SODIUM CHLORIDE 50 MG: 9 INJECTION, SOLUTION INTRAVENOUS at 23:14

## 2017-08-05 RX ADMIN — GABAPENTIN 200 MG: 100 CAPSULE ORAL at 08:29

## 2017-08-05 RX ADMIN — HYDROCODONE BITARTRATE AND ACETAMINOPHEN 1 TABLET: 7.5; 325 TABLET ORAL at 17:39

## 2017-08-05 RX ADMIN — OXYCODONE HYDROCHLORIDE 5 MG: 5 TABLET ORAL at 23:16

## 2017-08-05 RX ADMIN — MIDAZOLAM 1 MG: 1 INJECTION INTRAMUSCULAR; INTRAVENOUS at 12:56

## 2017-08-05 RX ADMIN — MIDAZOLAM HYDROCHLORIDE 1 MG: 1 INJECTION, SOLUTION INTRAMUSCULAR; INTRAVENOUS at 13:06

## 2017-08-05 RX ADMIN — MIDAZOLAM HYDROCHLORIDE 1 MG: 1 INJECTION, SOLUTION INTRAMUSCULAR; INTRAVENOUS at 12:56

## 2017-08-05 RX ADMIN — FLUCONAZOLE 200 MG: 200 TABLET ORAL at 08:30

## 2017-08-05 RX ADMIN — LAMOTRIGINE 150 MG: 100 TABLET ORAL at 08:28

## 2017-08-05 RX ADMIN — ENOXAPARIN SODIUM 30 MG: 100 INJECTION SUBCUTANEOUS at 17:39

## 2017-08-05 RX ADMIN — SODIUM CHLORIDE 50 MG: 9 INJECTION, SOLUTION INTRAVENOUS at 08:35

## 2017-08-05 RX ADMIN — TRAMADOL HYDROCHLORIDE 50 MG: 50 TABLET, COATED ORAL at 15:02

## 2017-08-05 RX ADMIN — GABAPENTIN 200 MG: 100 CAPSULE ORAL at 23:13

## 2017-08-05 RX ADMIN — CALCIUM CARBONATE-CHOLECALCIFEROL TAB 250 MG-125 UNIT 1 TABLET: 250-125 TAB at 08:30

## 2017-08-05 RX ADMIN — FENTANYL CITRATE 50 MCG: 50 INJECTION, SOLUTION INTRAMUSCULAR; INTRAVENOUS at 13:07

## 2017-08-05 RX ADMIN — OXYBUTYNIN CHLORIDE 5 MG: 5 TABLET ORAL at 08:30

## 2017-08-05 RX ADMIN — OXYBUTYNIN CHLORIDE 5 MG: 5 TABLET ORAL at 23:13

## 2017-08-05 ASSESSMENT — ENCOUNTER SYMPTOMS
TREMORS: 0
VOMITING: 0
NECK PAIN: 0
HALLUCINATIONS: 0
WEAKNESS: 1
NERVOUS/ANXIOUS: 0
FEVER: 0
SENSORY CHANGE: 0
CARDIOVASCULAR NEGATIVE: 1
CHILLS: 0
COUGH: 0
NAUSEA: 0
HEADACHES: 0
MYALGIAS: 0
ABDOMINAL PAIN: 1
FOCAL WEAKNESS: 0
GASTROINTESTINAL NEGATIVE: 1
SPEECH CHANGE: 0
FLANK PAIN: 0
SHORTNESS OF BREATH: 0
RESPIRATORY NEGATIVE: 1

## 2017-08-05 ASSESSMENT — PAIN SCALES - GENERAL
PAINLEVEL_OUTOF10: 6
PAINLEVEL_OUTOF10: 3
PAINLEVEL_OUTOF10: 0
PAINLEVEL_OUTOF10: 6
PAINLEVEL_OUTOF10: 6

## 2017-08-05 NOTE — CARE PLAN
Problem: Safety  Goal: Will remain free from injury  Updated about POC. Reinforce call light use. Pt acknowledged understanding     Problem: Infection  Goal: Will remain free from infection  Iv abx continued. Am labs ordered    Problem: Pain Management  Goal: Pain level will decrease to patient’s comfort goal  Pain well controlled with oxycodone 10mg.

## 2017-08-05 NOTE — PROGRESS NOTES
Assumed care at 1845. Pt resting in bed. A&Ox 4. Up with 1 person assist. abd steristrips CDI. Ostomy in place. +output. PICC line to LUE. Iv abx continued. NPO after midnight. Drain placement in AM. Tolerating diet for now. Voiding well. Bed alarm in place. Call light within reach. Hourly rounding in place

## 2017-08-05 NOTE — PROGRESS NOTES
Bedside report received.  Assessment complete.  A&O x 4. Patient calls appropriately.  Numbness and tingling in hands. Moves all extremities.   Patient up with one assist.   Patient denies pain at this time.  Denies N&V. Pt is NPO for procedure in IR. Consent for procedure in chart.  Surgical incision closed with steri strips, well approximated.  positive void, pt has ostomy in place.  Patient denies SOB.  SCD's on.  Patient resting in bed awaiting IR procedure.  Review plan with of care with patient. Call light and personal belongings with in reach. Hourly rounding in place. All needs met at this time.

## 2017-08-05 NOTE — PROGRESS NOTES
CT Procedure Note:    Dr. Cobian placed a drain in the left buttock.  The pt tolerated the procedure well, vital signs stable.  Percustay applied to drain, CDI and soft.  Report given to Geraldine ARRIAGA.  Pt transported back to room.  3cc aspirate taken to lab.    Qovia Flexima APDL.  REF: [id number]. LOT: [id number]

## 2017-08-05 NOTE — PROGRESS NOTES
Infectious Disease Progress Note    Author: Connie Marquez M.D. Date of service & Time created: 2017  4:38 PM    Interval History:  76-year-old white male admitted with pelvic abscess possibly due to bowel perforation due to constipation  17- low-grade fevers. Continues to have abdominal pain  17- complains of some abdominal pain. MAXIMUM TEMPERATURE 100.3. WBC 7.6  17- complains of abdominal pain. MAXIMUM TEMPERATURE is 98. WBC 7.2  17- ongoing abdominal pain. No fevers. No new issues overnight.   AF WBC 4.8 Called for less expensive abx recommendations and new GI sxs. Stool in toilet  8/3 AF less anxious-pain better controlled   AF eating improved   AF no new complaints-denies SE abx Pain about the same  Labs Reviewed, Medications Reviewed, Radiology Reviewed and Wound Reviewed.    Review of Systems:  Review of Systems   Constitutional: Positive for malaise/fatigue. Negative for fever.   Respiratory: Negative for cough and shortness of breath.    Cardiovascular: Negative for chest pain.   Gastrointestinal: Positive for abdominal pain. Negative for vomiting.   Genitourinary: Negative for dysuria.   Musculoskeletal: Negative for myalgias.   Neurological: Negative for sensory change, speech change and headaches.       Hemodynamics:  Temp (24hrs), Av.9 °C (98.4 °F), Min:36.4 °C (97.6 °F), Max:37.1 °C (98.8 °F)  Temperature: 36.9 °C (98.4 °F)  Pulse  Av.3  Min: 55  Max: 105   Blood Pressure : (!) 170/80 mmHg       Physical Exam:  Physical Exam   Constitutional: He is oriented to person, place, and time. He appears well-developed. No distress.   Cachectic and frail  Chronically ill   HENT:   Head: Normocephalic and atraumatic.   Mouth/Throat: No oropharyngeal exudate.   Eyes: EOM are normal. Pupils are equal, round, and reactive to light. No scleral icterus.   Neck: Neck supple.   Cardiovascular: Normal rate and regular rhythm.    No murmur heard.  Pulmonary/Chest: Effort  normal. No respiratory distress.   Abdominal: Soft. He exhibits distension. There is tenderness. There is no rebound and no guarding.   Colostomy formed stool  Staples intact-midline incision     Musculoskeletal: He exhibits no edema.   Neurological: He is alert and oriented to person, place, and time.   Skin:   Sacrum dressed   Nursing note and vitals reviewed.      Labs:  No results for input(s): WBC, RBC, HEMOGLOBIN, HEMATOCRIT, MCV, MCH, RDW, PLATELETCT, MPV, NEUTSPOLYS, LYMPHOCYTES, MONOCYTES, EOSINOPHILS, BASOPHILS, RBCMORPHOLO in the last 72 hours.  No results for input(s): SODIUM, POTASSIUM, CHLORIDE, CO2, GLUCOSE, BUN, CPKTOTAL in the last 72 hours.  No results for input(s): ALBUMIN, TBILIRUBIN, ALKPHOSPHAT, TOTPROTEIN, ALTSGPT, ASTSGOT, CREATININE in the last 72 hours.    Wound:      Results     Procedure Component Value Units Date/Time    ANAEROBIC CULTURE [[id number]] Collected:  07/25/17 1445    Order Status:  Completed Specimen Information:  Wound Updated:  07/30/17 1334     Significant Indicator NEG      Source WND      Site Peritoneal Abscess      Anaerobic Culture, Culture Res No Anaerobes isolated.     Narrative:      CALL  [facility]  141 tel. [phone],  CALLED  141 tel. [phone] 07/28/2017, 08:05, RB PERF. RESULTS CALLED  TO:[phone]    CULTURE WOUND W/ GRAM STAIN [[id number]]  (Abnormal)  (Susceptibility) Collected:  07/25/17 1445    Order Status:  Completed Specimen Information:  Wound Updated:  07/30/17 1334     Significant Indicator POS (POS)      Source WND      Site Peritoneal Abscess      Culture Result Wound -- (A)      Gram Stain Result --      Result:        Many WBCs.  Few Yeast.  Few Gram positive cocci.       Culture Result Wound -- (A)      Result:        Enterococcus faecium  Combination therapy with ampicillin, penicillin, or  vancomycin (for susceptible strains) plus an aminoglycoside  is usually indicated for serious enterococcal infections,  such as endocarditis unless high-level  resistance to both  gentamicin and streptomycin is documented; such combinations  are predicted to result in synergistic killing of the  Enterococcus.  VANCOMYCIN RESISTANT ENTEROCOCCI(VRE)       Culture Result Wound -- (A)      Result:        Candida albicans  Light growth      Narrative:      CALL  [facility]  141 tel. [phone],  CALLED  141 tel. [phone] 07/28/2017, 08:05, RB PERF. RESULTS CALLED  TO:[phone]    Culture & Susceptibility     ENTEROCOCCUS FAECIUM     Antibiotic Sensitivity Microscan Unit Status    Ampicillin Resistant >8 mcg/mL Final    Daptomycin Sensitive 2 mcg/mL Final    Gent Synergy Sensitive <=500 mcg/mL Final    Linezolid Sensitive 2 mcg/mL Final    Penicillin Resistant >8 mcg/mL Final    Vancomycin Resistant >16 mcg/mL Final                             Fluids:  Intake/Output       08/03/17 0700 - 08/04/17 0659 08/04/17 0700 - 08/05/17 0659 08/05/17 0700 - 08/06/17 0659      [id number] 7883-3229 Total 9334-8552 8662-0192 Total 4402-6943 0507-5474 Total       Intake    P.O.  720  150 870  200  -- 200  0  -- 0    P.O. 720 150 870 200 -- 200 0 -- 0    I.V.  210  -- 210  210  140 350  70  -- 70    IV Volume (NS) 155 -- 155 155 40 195 70 -- 70    IV Piggyback Volume (IV Piggyback) 55 -- 55 55 100 155 -- -- --    Total Intake  410 140 550 70 -- 70       Output    Urine  1050  100 1150  950  300 1250  325  -- 325    Void (ml) 4227 441 0427  325 -- 325    Stool/Urine  --  -- --  --  0 0  --  -- --    Ostomy #1 (ml)  -- -- -- -- 0 0 -- -- --    Stool  --  -- --  --  -- --  --  -- --    Number of Times Stooled -- -- -- -- 0 x 0 x -- -- --    Total Output 0695 014 5250  325 -- 325       Net I/O     -120 50 -70 -540 -160 -700 -255 -- -255        CT-ABDOMEN-PELVIS WITH (Final result) Result time: 08/03/17 16:43:02     Final result by Madina Dorman M.D. (08/03/17 16:43:02)     Impression:     4.5 x 4.7 x 5.4 cm pelvic fluid collection is mildly decreased in size compared  to prior. There has been interval removal of the previously noted pigtail catheter.  Small amount of free fluid in the abdomen and pelvis.  Mildly dilated loops of small bowel may represent mild ileus.  Small bilateral pleural effusions with overlying atelectasis/consolidation.  Moderate hiatal hernia.  Prominent atherosclerotic plaque.  Healing fractures of the fifth and sixth ribs on the right.          Assessment:  Active Hospital Problems    Diagnosis   • *Sepsis (CMS-HCC) [A41.9]   • Abdominal abscess (CMS-HCC) [K65.1]   • BPH (benign prostatic hypertrophy) [N40.0]   • Depression [F32.9]   • Osteoarthritis [M19.90]       Plan:  Pelvic abscess due to bowel perforation, additional work  Afebrile  Leukocytosis, resolved  S/p IR drain ×2  Cultures of Enterococcus faecium, viridans strep, Bacteroides fragilis and Candida albicans  CT scan done on 7/24/2017 shows persistent pelvic abscess over 5 cm  s/p exploratory laparotomy, abdominal washout, sigmoid colon resection, colostomy placement and appendectomy on 7/26/17  OR cultures-+VRE and Candida  Can change tigecycline to Zyvox for VRE with Zosyn to cover Ecoli, Bfrag, strep once abscess less than 3 cm  Continue fluc for Candida  Endpoint 2 weeks from when abscess less than 3 cm-repeat CT shows still 4.5 X  4.7 X 5.4cm-too large for abx to penetrate  Plan for new drainage cath today    Protein malnutrition  Anticipate poor wound healing  Alb 2.2    DW IM Dr Curran

## 2017-08-05 NOTE — OR SURGEON
Immediate Post- Operative Note        PostOp Diagnosis: pelvic fluid collection    Procedure(s):CT guided drain placement      Estimated Blood Loss: Less than 5 ml        Complications: None            8/5/2017     1:19 PM     Foster Cobian

## 2017-08-05 NOTE — PROGRESS NOTES
Surgical Progress Note    Author: Hernandez Nolan Date & Time created: 2017   9:41 AM     Interval Events:    Review of Systems   Respiratory: Negative.    Cardiovascular: Negative.    Gastrointestinal: Negative.      Hemodynamics:  Temp (24hrs), Av.9 °C (98.4 °F), Min:36.4 °C (97.6 °F), Max:37.1 °C (98.8 °F)  Temperature: 37 °C (98.6 °F)  Pulse  Av.1  Min: 55  Max: 105   Blood Pressure : 126/66 mmHg     Respiratory:    Respiration: 17, Pulse Oximetry: 91 %           Neuro:  GCS       Fluids:    Intake/Output Summary (Last 24 hours) at 17 0941  Last data filed at 17 0820   Gross per 24 hour   Intake    455 ml   Output    875 ml   Net   -420 ml        Current Diet Order   Procedures   • DIET NPO     Physical Exam   Abdominal: Soft. Bowel sounds are normal. He exhibits no distension. There is no tenderness.     Labs:  No results found for this or any previous visit (from the past 24 hour(s)).  Medical Decision Making, by Problem:  Active Hospital Problems    Diagnosis   • Pelvic abscess in male (CMS-HCC) [K65.1]     Priority: High   • BPH (benign prostatic hypertrophy) [N40.0]     Priority: Low   • Depression [F32.9]     Priority: Low   • Osteoarthritis [M19.90]     Priority: Low   • Bipolar 1 disorder, depressed (CMS-HCC) [F31.9]   • Normocytic anemia [D64.9]     Plan:  Stable, for IR today today to drain residual fluid collection    Quality Measures:    Ruff catheter: No Ruff            Antibiotics: Treating active infection/contamination beyond 24 hours perioperative coverage        Discussed patient condition with Patient

## 2017-08-05 NOTE — PROGRESS NOTES
[facility] Hospitalist Progress Note    Date of Service: 2017    Chief Complaint  77 y/o M with PMH of peripheral neuropathy, BPH, Depression admitted for abdominal pain, diarrhea.  Dx with pelvis abscess on imaging. S/p Ex lap and abdominal washout on .    Interval Problem Update    Plan IR for CT pelvic abscess drainage. Afeb. on IV atbs. Passing gas and stool.     Consultants/Specialty  [facility] - Surgery  Dr. Herbert/ [place] -ID    Disposition  Likely to [facility] care when pelvic abscess improved.      Review of Systems   Constitutional: Negative for fever and chills.   HENT: Negative for hearing loss.    Respiratory: Negative for cough and shortness of breath.    Cardiovascular: Negative for chest pain and leg swelling.   Gastrointestinal: Positive for abdominal pain. Negative for nausea and vomiting.   Genitourinary: Negative for hematuria and flank pain.   Musculoskeletal: Negative for myalgias and neck pain.   Neurological: Positive for weakness (generalized ). Negative for tremors, speech change, focal weakness and headaches.   Psychiatric/Behavioral: Negative for hallucinations. The patient is not nervous/anxious.    All other systems reviewed and are negative.     Physical Exam  Laboratory/Imaging   Hemodynamics  Temp (24hrs), Av.9 °C (98.4 °F), Min:36.4 °C (97.6 °F), Max:37.1 °C (98.8 °F)   Temperature: 37 °C (98.6 °F)  Pulse  Av.1  Min: 55  Max: 105    Blood Pressure : 126/66 mmHg      Respiratory      Respiration: 17, Pulse Oximetry: 91 %             Fluids    Intake/Output Summary (Last 24 hours) at 17 1027  Last data filed at 17 0820   Gross per 24 hour   Intake    455 ml   Output    875 ml   Net   -420 ml       Nutrition  Orders Placed This Encounter   Procedures   • DIET NPO     Standing Status: Standing      Number of Occurrences: 1      Standing Expiration Date:      Order Specific Question:  Restrict to:     Answer:  Strict [1]     Physical Exam   Constitutional: He is  oriented to person, place, and time. No distress.   HENT:   Head: Normocephalic and atraumatic.   Eyes: EOM are normal. No scleral icterus.   Neck: Normal range of motion.   Cardiovascular: Normal rate, regular rhythm, normal heart sounds and intact distal pulses.    No murmur heard.  Pulmonary/Chest: Effort normal and breath sounds normal. No stridor. No respiratory distress. He has no wheezes.   Abdominal: Soft. Bowel sounds are normal. He exhibits no distension. There is no tenderness.   Colostomy in place, air loose stool present.    Musculoskeletal: Normal range of motion. He exhibits no edema or tenderness.   Neurological: He is alert and oriented to person, place, and time. No cranial nerve deficit.   Skin: Skin is warm and dry. He is not diaphoretic. No erythema.   Psychiatric: He has a normal mood and affect. His behavior is normal.   Vitals reviewed.                               Assessment/Plan     Pelvic abscess in male (CMS-HCC) (present on admission)  Assessment & Plan  VRE, yeast infection --S/p   Ex lap , washout, Sigmoid colon resection w Colostomy placement, Appi-  Bowel activity recovering- Fu CT 8-3  with minimal decreased size of pelvic abscess 4.5 x 4.7 x 5.4 cm   Plan IR for CT drainage.   cw GI diet.   IV tygacil and diflucan- discussed with ID   Oral fluconazole for candida coverage.   Pain control w prn norco, IV ms for severe pain.  PT/OT      Normocytic anemia  Assessment & Plan  Stable H&H, multi factorial  intra-op losses and chronic disease-stable  Monitor for acute symptoms.      Bipolar 1 disorder, depressed (CMS-HCC)  Assessment & Plan  Continue  lamictal    BPH (benign prostatic hypertrophy) (present on admission)  Assessment & Plan  Continue Flomax  Keep area dry.    Depression (present on admission)  Assessment & Plan  Stable    Osteoarthritis (present on admission)  Assessment & Plan  Stable, prn analgesia  ca, vit d      Labs reviewed, Medications reviewed and Radiology images  reviewed  Ruff catheter: No Ruff  Central line in place: Concentrated IV drugs    DVT Prophylaxis: Enoxaparin (Lovenox)      Antibiotics: Treating active infection/contamination beyond 24 hours perioperative coverage

## 2017-08-06 LAB
BASOPHILS # BLD AUTO: 0.8 % (ref 0–1.8)
BASOPHILS # BLD: 0.03 K/UL (ref 0–0.12)
EOSINOPHIL # BLD AUTO: 0.1 K/UL (ref 0–0.51)
EOSINOPHIL NFR BLD: 2.8 % (ref 0–6.9)
ERYTHROCYTE [DISTWIDTH] IN BLOOD BY AUTOMATED COUNT: 54.1 FL (ref 35.9–50)
HCT VFR BLD AUTO: 33.6 % (ref 42–52)
HGB BLD-MCNC: 10.9 G/DL (ref 14–18)
IMM GRANULOCYTES # BLD AUTO: 0.06 K/UL (ref 0–0.11)
IMM GRANULOCYTES NFR BLD AUTO: 1.7 % (ref 0–0.9)
LYMPHOCYTES # BLD AUTO: 1.3 K/UL (ref 1–4.8)
LYMPHOCYTES NFR BLD: 36.7 % (ref 22–41)
MCH RBC QN AUTO: 28.5 PG (ref 27–33)
MCHC RBC AUTO-ENTMCNC: 32.4 G/DL (ref 33.7–35.3)
MCV RBC AUTO: 88 FL (ref 81.4–97.8)
MONOCYTES # BLD AUTO: 0.4 K/UL (ref 0–0.85)
MONOCYTES NFR BLD AUTO: 11.3 % (ref 0–13.4)
NEUTROPHILS # BLD AUTO: 1.65 K/UL (ref 1.82–7.42)
NEUTROPHILS NFR BLD: 46.7 % (ref 44–72)
NRBC # BLD AUTO: 0 K/UL
NRBC BLD AUTO-RTO: 0 /100 WBC
PLATELET # BLD AUTO: 216 K/UL (ref 164–446)
PMV BLD AUTO: 8.8 FL (ref 9–12.9)
RBC # BLD AUTO: 3.82 M/UL (ref 4.7–6.1)
WBC # BLD AUTO: 3.5 K/UL (ref 4.8–10.8)

## 2017-08-06 PROCEDURE — 700102 HCHG RX REV CODE 250 W/ 637 OVERRIDE(OP): Performed by: INTERNAL MEDICINE

## 2017-08-06 PROCEDURE — 700105 HCHG RX REV CODE 258: Performed by: INTERNAL MEDICINE

## 2017-08-06 PROCEDURE — A9270 NON-COVERED ITEM OR SERVICE: HCPCS | Performed by: NURSE PRACTITIONER

## 2017-08-06 PROCEDURE — 700105 HCHG RX REV CODE 258

## 2017-08-06 PROCEDURE — 700102 HCHG RX REV CODE 250 W/ 637 OVERRIDE(OP): Performed by: HOSPITALIST

## 2017-08-06 PROCEDURE — 770021 HCHG ROOM/CARE - ISO PRIVATE

## 2017-08-06 PROCEDURE — A9270 NON-COVERED ITEM OR SERVICE: HCPCS | Performed by: HOSPITALIST

## 2017-08-06 PROCEDURE — 700102 HCHG RX REV CODE 250 W/ 637 OVERRIDE(OP): Performed by: NURSE PRACTITIONER

## 2017-08-06 PROCEDURE — A9270 NON-COVERED ITEM OR SERVICE: HCPCS | Performed by: SURGERY

## 2017-08-06 PROCEDURE — 99232 SBSQ HOSP IP/OBS MODERATE 35: CPT | Performed by: HOSPITALIST

## 2017-08-06 PROCEDURE — A9270 NON-COVERED ITEM OR SERVICE: HCPCS | Performed by: INTERNAL MEDICINE

## 2017-08-06 PROCEDURE — 700111 HCHG RX REV CODE 636 W/ 250 OVERRIDE (IP): Performed by: INTERNAL MEDICINE

## 2017-08-06 PROCEDURE — 700102 HCHG RX REV CODE 250 W/ 637 OVERRIDE(OP): Performed by: SURGERY

## 2017-08-06 PROCEDURE — 85025 COMPLETE CBC W/AUTO DIFF WBC: CPT

## 2017-08-06 PROCEDURE — 700111 HCHG RX REV CODE 636 W/ 250 OVERRIDE (IP): Performed by: SURGERY

## 2017-08-06 RX ADMIN — LAMOTRIGINE 150 MG: 100 TABLET ORAL at 08:59

## 2017-08-06 RX ADMIN — SODIUM CHLORIDE 500 ML: 9 INJECTION, SOLUTION INTRAVENOUS at 07:02

## 2017-08-06 RX ADMIN — POLYETHYLENE GLYCOL 3350 1 PACKET: 17 POWDER, FOR SOLUTION ORAL at 08:59

## 2017-08-06 RX ADMIN — TAMSULOSIN HYDROCHLORIDE 0.4 MG: 0.4 CAPSULE ORAL at 08:58

## 2017-08-06 RX ADMIN — SODIUM CHLORIDE 50 MG: 9 INJECTION, SOLUTION INTRAVENOUS at 20:20

## 2017-08-06 RX ADMIN — OMEPRAZOLE 20 MG: 20 CAPSULE, DELAYED RELEASE ORAL at 08:59

## 2017-08-06 RX ADMIN — ENOXAPARIN SODIUM 30 MG: 100 INJECTION SUBCUTANEOUS at 05:00

## 2017-08-06 RX ADMIN — OXYBUTYNIN CHLORIDE 5 MG: 5 TABLET ORAL at 08:59

## 2017-08-06 RX ADMIN — FLUCONAZOLE 200 MG: 200 TABLET ORAL at 08:59

## 2017-08-06 RX ADMIN — CALCIUM CARBONATE-CHOLECALCIFEROL TAB 250 MG-125 UNIT 1 TABLET: 250-125 TAB at 08:58

## 2017-08-06 RX ADMIN — OXYBUTYNIN CHLORIDE 5 MG: 5 TABLET ORAL at 20:20

## 2017-08-06 RX ADMIN — GABAPENTIN 200 MG: 100 CAPSULE ORAL at 08:59

## 2017-08-06 RX ADMIN — OXYCODONE HYDROCHLORIDE 5 MG: 5 TABLET ORAL at 20:20

## 2017-08-06 RX ADMIN — OXYCODONE HYDROCHLORIDE 5 MG: 5 TABLET ORAL at 08:59

## 2017-08-06 RX ADMIN — GABAPENTIN 200 MG: 100 CAPSULE ORAL at 15:13

## 2017-08-06 RX ADMIN — SODIUM CHLORIDE 50 MG: 9 INJECTION, SOLUTION INTRAVENOUS at 08:58

## 2017-08-06 RX ADMIN — ENOXAPARIN SODIUM 30 MG: 100 INJECTION SUBCUTANEOUS at 17:08

## 2017-08-06 RX ADMIN — GABAPENTIN 200 MG: 100 CAPSULE ORAL at 20:20

## 2017-08-06 ASSESSMENT — ENCOUNTER SYMPTOMS
SPEECH CHANGE: 0
FOCAL WEAKNESS: 0
NAUSEA: 0
HALLUCINATIONS: 0
WEAKNESS: 1
HEADACHES: 0
TREMORS: 0
VOMITING: 0
NECK PAIN: 0
FEVER: 0
GASTROINTESTINAL NEGATIVE: 1
COUGH: 0
CHILLS: 0
NERVOUS/ANXIOUS: 0
DIZZINESS: 0
ABDOMINAL PAIN: 1
CARDIOVASCULAR NEGATIVE: 1
RESPIRATORY NEGATIVE: 1
SHORTNESS OF BREATH: 0
MYALGIAS: 0
SENSORY CHANGE: 0

## 2017-08-06 ASSESSMENT — PAIN SCALES - GENERAL
PAINLEVEL_OUTOF10: 4
PAINLEVEL_OUTOF10: ASSUMED PAIN PRESENT
PAINLEVEL_OUTOF10: ASSUMED PAIN PRESENT
PAINLEVEL_OUTOF10: 5

## 2017-08-06 NOTE — PROGRESS NOTES
Assumed care at 1845. Pt resting in bed. A&ox 4. [place]. PICC to LUE. Iv abx continued. abd midline steristrips. Ostomy in place. +output. IR drain to left buttock. Tolerating diet. Voiding well. Bed alarm in place.Call light within reach. Hourly rounding in place

## 2017-08-06 NOTE — PROGRESS NOTES
Surgical Progress Note    Author: Hernandez Nolan Date & Time created: 2017   10:19 AM     Interval Events:    Review of Systems   Respiratory: Negative.    Cardiovascular: Negative.    Gastrointestinal: Negative.      Hemodynamics:  Temp (24hrs), Av.7 °C (98 °F), Min:36.2 °C (97.1 °F), Max:37.1 °C (98.8 °F)  Temperature: 36.2 °C (97.1 °F)  Pulse  Av.1  Min: 55  Max: 105   Blood Pressure : 136/76 mmHg     Respiratory:    Respiration: 17, Pulse Oximetry: 92 %        RUL Breath Sounds: Clear, RML Breath Sounds: Clear, RLL Breath Sounds: Diminished, TOSHIA Breath Sounds: Clear, LLL Breath Sounds: Diminished  Neuro:  GCS       Fluids:    Intake/Output Summary (Last 24 hours) at 17 1019  Last data filed at 17 0849   Gross per 24 hour   Intake    880 ml   Output    930 ml   Net    -50 ml        Current Diet Order   Procedures   • DIET ORDER     Physical Exam   Cardiovascular: Normal rate.    Pulmonary/Chest: Effort normal.   Abdominal: Soft. Bowel sounds are normal.   Ostomy working     Labs:  Recent Results (from the past 24 hour(s))   GRAM STAIN    Collection Time: 17  1:17 PM   Result Value Ref Range    Significant Indicator .     Source WND     Site Peritoneal Abscess     Gram Stain Result Moderate WBCs.  No organisms seen.      CBC WITH DIFFERENTIAL    Collection Time: 17  3:57 AM   Result Value Ref Range    WBC 3.5 (L) 4.8 - 10.8 K/uL    RBC 3.82 (L) 4.70 - 6.10 M/uL    Hemoglobin 10.9 (L) 14.0 - 18.0 g/dL    Hematocrit 33.6 (L) 42.0 - 52.0 %    MCV 88.0 81.4 - 97.8 fL    MCH 28.5 27.0 - 33.0 pg    MCHC 32.4 (L) 33.7 - 35.3 g/dL    RDW 54.1 (H) 35.9 - 50.0 fL    Platelet Count 216 164 - 446 K/uL    MPV 8.8 (L) 9.0 - 12.9 fL    Neutrophils-Polys 46.70 44.00 - 72.00 %    Lymphocytes 36.70 22.00 - 41.00 %    Monocytes 11.30 0.00 - 13.40 %    Eosinophils 2.80 0.00 - 6.90 %    Basophils 0.80 0.00 - 1.80 %    Immature Granulocytes 1.70 (H) 0.00 - 0.90 %    Nucleated RBC 0.00 /100 WBC     Neutrophils (Absolute) 1.65 (L) 1.82 - 7.42 K/uL    Lymphs (Absolute) 1.30 1.00 - 4.80 K/uL    Monos (Absolute) 0.40 0.00 - 0.85 K/uL    Eos (Absolute) 0.10 0.00 - 0.51 K/uL    Baso (Absolute) 0.03 0.00 - 0.12 K/uL    Immature Granulocytes (abs) 0.06 0.00 - 0.11 K/uL    NRBC (Absolute) 0.00 K/uL     Medical Decision Making, by Problem:  Active Hospital Problems    Diagnosis   • Pelvic abscess in male (CMS-HCC) [K65.1]     Priority: High   • BPH (benign prostatic hypertrophy) [N40.0]     Priority: Low   • Depression [F32.9]     Priority: Low   • Osteoarthritis [M19.90]     Priority: Low   • Bipolar 1 disorder, depressed (CMS-HCC) [F31.9]   • Normocytic anemia [D64.9]     Plan:  Fluid drained yesterday appears to be sterile, Wait another day or so for final cultures, continue current care.    Quality Measures:  Core Measures    Discussed patient condition with Patient

## 2017-08-06 NOTE — PROGRESS NOTES
[facility] Hospitalist Progress Note    Date of Service: 2017    Chief Complaint  77 y/o M with PMH of peripheral neuropathy, BPH, Depression admitted for abdominal pain, diarrhea.  Dx with pelvis abscess on imaging. S/p Ex lap and abdominal washout on .    Interval Problem Update    Pelvic drain - Ngtd, minimal output . Afeb on IV atbs .    Consultants/Specialty  [facility] - Surgery  Dr. Herbert/ [place] -ID    Disposition  Likely to [facility] care when pelvic abscess improved.      Review of Systems   Constitutional: Negative for fever and chills.   HENT: Negative for hearing loss.    Respiratory: Negative for cough and shortness of breath.    Cardiovascular: Negative for chest pain and leg swelling.   Gastrointestinal: Positive for abdominal pain. Negative for nausea and vomiting.   Musculoskeletal: Negative for myalgias and neck pain.   Neurological: Positive for weakness (generalized ). Negative for dizziness, tremors, speech change, focal weakness and headaches.   Psychiatric/Behavioral: Negative for hallucinations. The patient is not nervous/anxious.    All other systems reviewed and are negative.     Physical Exam  Laboratory/Imaging   Hemodynamics  Temp (24hrs), Av.7 °C (98 °F), Min:36.2 °C (97.1 °F), Max:37.1 °C (98.8 °F)   Temperature: 36.2 °C (97.1 °F)  Pulse  Av.1  Min: 55  Max: 105    Blood Pressure : 136/76 mmHg      Respiratory      Respiration: 17, Pulse Oximetry: 92 %        RUL Breath Sounds: Clear, RML Breath Sounds: Clear, RLL Breath Sounds: Diminished, TOSHIA Breath Sounds: Clear, LLL Breath Sounds: Diminished    Fluids    Intake/Output Summary (Last 24 hours) at 17 1139  Last data filed at 17 0849   Gross per 24 hour   Intake    850 ml   Output    930 ml   Net    -80 ml       Nutrition  Orders Placed This Encounter   Procedures   • DIET ORDER     Standing Status: Standing      Number of Occurrences: 1      Standing Expiration Date:      Order Specific Question:  Diet:      Answer:  Low Fiber(GI Soft) [2]     Physical Exam   Constitutional: He is oriented to person, place, and time. No distress.   HENT:   Head: Normocephalic and atraumatic.   Eyes: EOM are normal. No scleral icterus.   Neck: Normal range of motion.   Cardiovascular: Normal rate, regular rhythm, normal heart sounds and intact distal pulses.    No murmur heard.  Pulmonary/Chest: Effort normal and breath sounds normal. No stridor. No respiratory distress. He has no wheezes.   Abdominal: Soft. Bowel sounds are normal. He exhibits no distension. There is no tenderness.   Colostomy in place, air loose stool present.   Left back catheter present - serous drainage present .   Musculoskeletal: Normal range of motion. He exhibits no edema or tenderness.   Neurological: He is alert and oriented to person, place, and time. No cranial nerve deficit.   Skin: Skin is warm and dry. He is not diaphoretic. No erythema.   Psychiatric: He has a normal mood and affect. His behavior is normal.   Vitals reviewed.      Recent Labs      08/06/17   0357   WBC  3.5*   RBC  3.82*   HEMOGLOBIN  10.9*   HEMATOCRIT  33.6*   MCV  88.0   MCH  28.5   MCHC  32.4*   RDW  54.1*   PLATELETCT  216   MPV  8.8*                          Assessment/Plan     Pelvic abscess in male (CMS-HCC) (present on admission)  Assessment & Plan  VRE, yeast infection --S/p   Ex lap , washout, Sigmoid colon resection w Colostomy placement, Appi-  Bowel activity recovering- Fu CT 8-3  with minimal decreased size of pelvic abscess 4.5 x 4.7 x 5.4 cm - post Pelvic abscess drain placement 8-5-17   Fu cx results- anticipate drain out if cxs negative.  cw GI diet.   IV tygacil and diflucan  Oral fluconazole for candida coverage.   Pain control w prn norco, IV ms for severe pain.  PT/OT  Discussed with Dr. Nolan  Surgery and ID    Normocytic anemia  Assessment & Plan  Stable H&H, multi factorial  intra-op losses and chronic disease-stable  Monitor for acute symptoms.      Bipolar 1  disorder, depressed (CMS-[facility])  Assessment & Plan  Continue  lamictal    BPH (benign prostatic hypertrophy) (present on admission)  Assessment & Plan  Continue Flomax  Keep area dry.    Depression (present on admission)  Assessment & Plan  Stable    Osteoarthritis (present on admission)  Assessment & Plan  Stable, prn analgesia  ca, vit d    Debility - tx to [facility] when stable for rehab.   Labs reviewed, Medications reviewed and Radiology images reviewed  Ruff catheter: No Ruff  Central line in place: Concentrated IV drugs    DVT Prophylaxis: Enoxaparin (Lovenox)      Antibiotics: Treating active infection/contamination beyond 24 hours perioperative coverage

## 2017-08-06 NOTE — PROGRESS NOTES
Received report from BART Velasquez. Pt is Lenora. HUGH. VSS. Pt denies SOB. Pt c/o pain 5/10; oxy given. -N/V. L sided ostomy; w/ good stool output. Midline abdominal incision SCAR w/ steri strips. Pt has perirectal IR drain w/ small output; flushed w/ 10ml of NS. L arm PICC in place; pt on tygacil. Pt up 1x assist w/ FWW. POC discussed. Bed locked and in the lowest position. Call light w/in reach. Hourly rounding in plavr

## 2017-08-06 NOTE — WOUND TEAM
"[facility]  Inpatient Services  New Ostomy Management & Teaching    HPI:  Reviewed  PMH: Reviewed   SH: Reviewed    Subjective: \"Yes, lets change it.\"    Objective:  In bed, previous appliance intact.     Ostomy type:  Colostomy   Stoma location:  LLQ   Stoma assessment:    Appearance: Red, healthy, moist.    Size: 1 3/4\" oval    Protrusion: mildly budded    Output: Small, loose, brown.    MC jxn: intact    Peristomal skin: Intact.     Ostomy Appliance (type and size): 2 1/4\" two piece    Interventions:  Patient preformed all steps with minimal assistance and prompting.  Removed previous appliance.  Patient cleansed sukh-stomal skin with warm wash cloth.  Patient traced and cut  barrier . Patient placed barrier around stoma.  With some assistance patient attached and closed pouch.      Pt education: Questions and concerns addressed; see above    Evaluation:  Patient will need assistance with ostomy appliance change.  Patient seems to be able to empty pouch. Jessie is planning to prepare several barrier in advance for patient each week.     Plan: Ostomy nurses to continue to follow for ostomy needs and teaching     Anticipated discharge needs:  Has everything for discharge   "

## 2017-08-06 NOTE — CARE PLAN
Problem: Safety  Goal: Will remain free from injury  Outcome: PROGRESSING AS EXPECTED  Bed alarm is in place. Discussed need for patient to call for help. Pt demonstrates understanding by waiting for staff before beginning activities.     Problem: Pain Management  Goal: Pain level will decrease to patient’s comfort goal  Outcome: PROGRESSING AS EXPECTED  Discussed various methods of pain control including pharmacologic and non pharmacologic.

## 2017-08-06 NOTE — CARE PLAN
Problem: Safety  Goal: Will remain free from injury  Updated about POC. Reinforce call light use. Pt acknowledged understanding     Problem: Infection  Goal: Will remain free from infection  Iv abx continued. Am labs ordered    Problem: Pain Management  Goal: Pain level will decrease to patient’s comfort goal  Pain well controlled with oxycodone 10mg

## 2017-08-06 NOTE — CARE PLAN
Problem: Infection  Goal: Will remain free from infection  Outcome: PROGRESSING AS EXPECTED  ID following pt. Pt on fluconazole and tygacil     Problem: Bowel/Gastric:  Goal: Normal bowel function is maintained or improved  Outcome: PROGRESSING AS EXPECTED  L side ostomy w/ good stool output. Wound care helping pt with self care    Problem: Pain Management  Goal: Pain level will decrease to patient’s comfort goal  Outcome: PROGRESSING AS EXPECTED  Oxy given for pain

## 2017-08-06 NOTE — PROGRESS NOTES
Infectious Disease Progress Note    Author: Connie Marquez M.D. Date of service & Time created: 2017  2:48 PM    Interval History:  76-year-old white male admitted with pelvic abscess possibly due to bowel perforation due to constipation  17- low-grade fevers. Continues to have abdominal pain  17- complains of some abdominal pain. MAXIMUM TEMPERATURE 100.3. WBC 7.6  17- complains of abdominal pain. MAXIMUM TEMPERATURE is 98. WBC 7.2  17- ongoing abdominal pain. No fevers. No new issues overnight.   AF WBC 4.8 Called for less expensive abx recommendations and new GI sxs. Stool in toilet  8/3 AF less anxious-pain better controlled   eating improved   no new complaints-denies SE abx Pain about the same   new drain placed  Labs Reviewed, Medications Reviewed, Radiology Reviewed and Wound Reviewed.    Review of Systems:  Review of Systems   Constitutional: Positive for malaise/fatigue. Negative for fever.   Respiratory: Negative for cough and shortness of breath.    Cardiovascular: Negative for chest pain.   Gastrointestinal: Positive for abdominal pain. Negative for vomiting.   Genitourinary: Negative for dysuria.   Musculoskeletal: Negative for myalgias.   Neurological: Negative for sensory change, speech change and headaches.       Hemodynamics:  Temp (24hrs), Av.6 °C (97.9 °F), Min:36.2 °C (97.1 °F), Max:37.1 °C (98.8 °F)  Temperature: 36.2 °C (97.1 °F)  Pulse  Av.1  Min: 55  Max: 105   Blood Pressure : 136/76 mmHg       Physical Exam:  Physical Exam   Constitutional: He is oriented to person, place, and time. He appears well-developed. No distress.   Cachectic and frail  Chronically ill   HENT:   Head: Normocephalic and atraumatic.   Mouth/Throat: No oropharyngeal exudate.   Eyes: EOM are normal. Pupils are equal, round, and reactive to light. No scleral icterus.   Neck: Neck supple.   Cardiovascular: Normal rate and regular rhythm.    No murmur  heard.  Pulmonary/Chest: Effort normal. No respiratory distress.   Abdominal: Soft. He exhibits distension. There is tenderness. There is no rebound and no guarding.   Colostomy formed stool  Staples intact-midline incision  MAREK drain   Musculoskeletal: He exhibits no edema.   Neurological: He is alert and oriented to person, place, and time.   Skin:   Sacrum dressed   Nursing note and vitals reviewed.      Labs:  Recent Labs      08/06/17   0357   WBC  3.5*   RBC  3.82*   HEMOGLOBIN  10.9*   HEMATOCRIT  33.6*   MCV  88.0   MCH  28.5   RDW  54.1*   PLATELETCT  216   MPV  8.8*   NEUTSPOLYS  46.70   LYMPHOCYTES  36.70   MONOCYTES  11.30   EOSINOPHILS  2.80   BASOPHILS  0.80     No results for input(s): SODIUM, POTASSIUM, CHLORIDE, CO2, GLUCOSE, BUN, CPKTOTAL in the last 72 hours.  No results for input(s): ALBUMIN, TBILIRUBIN, ALKPHOSPHAT, TOTPROTEIN, ALTSGPT, ASTSGOT, CREATININE in the last 72 hours.    Wound:      Results     Procedure Component Value Units Date/Time    ANAEROBIC CULTURE [[id number]] Collected:  07/25/17 1445    Order Status:  Completed Specimen Information:  Wound Updated:  07/30/17 1334     Significant Indicator NEG      Source WND      Site Peritoneal Abscess      Anaerobic Culture, Culture Res No Anaerobes isolated.     Narrative:      CALL  Clark  141 tel. [phone],  CALLED  141 tel. [phone] 07/28/2017, 08:05, RB PERF. RESULTS CALLED  TO:[phone]    CULTURE WOUND W/ GRAM STAIN [[id number]]  (Abnormal)  (Susceptibility) Collected:  07/25/17 1445    Order Status:  Completed Specimen Information:  Wound Updated:  07/30/17 1334     Significant Indicator POS (POS)      Source WND      Site Peritoneal Abscess      Culture Result Wound -- (A)      Gram Stain Result --      Result:        Many WBCs.  Few Yeast.  Few Gram positive cocci.       Culture Result Wound -- (A)      Result:        Enterococcus faecium  Combination therapy with ampicillin, penicillin, or  vancomycin (for susceptible strains) plus  an aminoglycoside  is usually indicated for serious enterococcal infections,  such as endocarditis unless high-level resistance to both  gentamicin and streptomycin is documented; such combinations  are predicted to result in synergistic killing of the  Enterococcus.  VANCOMYCIN RESISTANT ENTEROCOCCI(VRE)       Culture Result Wound -- (A)      Result:        Candida albicans  Light growth      Narrative:      CALL  [facility]  141 tel. [phone],  CALLED  141 tel. [phone] 07/28/2017, 08:05, RB PERF. RESULTS CALLED  TO:[phone]    Culture & Susceptibility     ENTEROCOCCUS FAECIUM     Antibiotic Sensitivity Microscan Unit Status    Ampicillin Resistant >8 mcg/mL Final    Daptomycin Sensitive 2 mcg/mL Final    Gent Synergy Sensitive <=500 mcg/mL Final    Linezolid Sensitive 2 mcg/mL Final    Penicillin Resistant >8 mcg/mL Final    Vancomycin Resistant >16 mcg/mL Final                             Fluids:  Intake/Output       08/04/17 0700 - 08/05/17 0659 08/05/17 0700 - 08/06/17 0659 08/06/17 0700 - 08/07/17 0659      0711-4275 8951-9145 Total 0413-5362 6701-1021 Total 1525-4387 7026-1978 Total       Intake    P.O.  200  -- 200  360  -- 360  --  -- --    P.O. 200 -- 200 360 -- 360 -- -- --    I.V.  210  140 350  110  320 430  210  -- 210    IV Volume (NS) 155 40 195 110 220 330 160 -- 160    IV Piggyback Volume (IV Piggyback) 55 100 155 -- 100 100 50 -- 50    Total Intake 410 140 550 470 320 790 210 -- 210       Output    Urine  950  300 1250  325  800 1125  400  -- 400    Void (ml)   400 -- 400    Drains  --  -- --  0  30 30  10  -- 10    Sukhwinder Oquendo 1 -- -- -- 0 30 30 10 -- 10    Stool/Urine  --  0 0  --  -- --  --  -- --    Ostomy #1 (ml)  -- 0 0 -- -- -- -- -- --    Stool  --  -- --  --  -- --  --  -- --    Number of Times Stooled -- 0 x 0 x -- -- -- -- -- --    Total Output   410 -- 410       Net I/O     -540 -160 -700 145 -510 -365 -200 -- -200        CT-ABDOMEN-PELVIS  WITH (Final result) Result time: 08/03/17 16:43:02     Final result by Madina Dorman M.D. (08/03/17 16:43:02)     Impression:     4.5 x 4.7 x 5.4 cm pelvic fluid collection is mildly decreased in size compared to prior. There has been interval removal of the previously noted pigtail catheter.  Small amount of free fluid in the abdomen and pelvis.  Mildly dilated loops of small bowel may represent mild ileus.  Small bilateral pleural effusions with overlying atelectasis/consolidation.  Moderate hiatal hernia.  Prominent atherosclerotic plaque.  Healing fractures of the fifth and sixth ribs on the right.          Assessment:  Active Hospital Problems    Diagnosis   • *Sepsis (CMS-HCC) [A41.9]   • Abdominal abscess (CMS-HCC) [K65.1]   • BPH (benign prostatic hypertrophy) [N40.0]   • Depression [F32.9]   • Osteoarthritis [M19.90]       Plan:  Pelvic abscess due to bowel perforation, additional work  Afebrile  Leukocytosis, resolved  S/p IR drain ×2  Cultures of Enterococcus faecium, viridans strep, Bacteroides fragilis and Candida albicans  CT scan done on 7/24/2017 shows persistent pelvic abscess over 5 cm  s/p exploratory laparotomy, abdominal washout, sigmoid colon resection, colostomy placement and appendectomy on 7/26/17  OR cultures-+VRE and Candida  Repeat CT showed4.5 X  4.7 X 5.4cm-too large for abx to penetrate  s/p drainage cath 8/5-follow cxs  Can change tigecycline to Zyvox for VRE with Zosyn to cover Ecoli, Bfrag, strep once abscess less than 3 cm  Continue fluc for Candida  Endpoint 2 weeks from when abscess less than 3 cm    Protein malnutrition  Anticipate poor wound healing  Alb 2.2    DW Surgery

## 2017-08-07 PROCEDURE — 700111 HCHG RX REV CODE 636 W/ 250 OVERRIDE (IP): Performed by: INTERNAL MEDICINE

## 2017-08-07 PROCEDURE — 99232 SBSQ HOSP IP/OBS MODERATE 35: CPT | Performed by: HOSPITALIST

## 2017-08-07 PROCEDURE — 700102 HCHG RX REV CODE 250 W/ 637 OVERRIDE(OP): Performed by: NURSE PRACTITIONER

## 2017-08-07 PROCEDURE — 700102 HCHG RX REV CODE 250 W/ 637 OVERRIDE(OP): Performed by: HOSPITALIST

## 2017-08-07 PROCEDURE — A9270 NON-COVERED ITEM OR SERVICE: HCPCS | Performed by: HOSPITALIST

## 2017-08-07 PROCEDURE — 700105 HCHG RX REV CODE 258: Performed by: INTERNAL MEDICINE

## 2017-08-07 PROCEDURE — 700111 HCHG RX REV CODE 636 W/ 250 OVERRIDE (IP): Performed by: SURGERY

## 2017-08-07 PROCEDURE — 700102 HCHG RX REV CODE 250 W/ 637 OVERRIDE(OP): Performed by: INTERNAL MEDICINE

## 2017-08-07 PROCEDURE — A9270 NON-COVERED ITEM OR SERVICE: HCPCS | Performed by: INTERNAL MEDICINE

## 2017-08-07 PROCEDURE — 770021 HCHG ROOM/CARE - ISO PRIVATE

## 2017-08-07 PROCEDURE — A9270 NON-COVERED ITEM OR SERVICE: HCPCS | Performed by: SURGERY

## 2017-08-07 PROCEDURE — 700102 HCHG RX REV CODE 250 W/ 637 OVERRIDE(OP): Performed by: SURGERY

## 2017-08-07 PROCEDURE — A9270 NON-COVERED ITEM OR SERVICE: HCPCS | Performed by: NURSE PRACTITIONER

## 2017-08-07 PROCEDURE — 700105 HCHG RX REV CODE 258

## 2017-08-07 RX ORDER — SODIUM CHLORIDE 9 MG/ML
INJECTION, SOLUTION INTRAVENOUS
Status: COMPLETED
Start: 2017-08-07 | End: 2017-08-07

## 2017-08-07 RX ORDER — FLUCONAZOLE 200 MG/1
200 TABLET ORAL DAILY
Status: COMPLETED | OUTPATIENT
Start: 2017-08-08 | End: 2017-08-17

## 2017-08-07 RX ADMIN — GABAPENTIN 200 MG: 100 CAPSULE ORAL at 09:08

## 2017-08-07 RX ADMIN — SODIUM CHLORIDE 50 MG: 9 INJECTION, SOLUTION INTRAVENOUS at 22:04

## 2017-08-07 RX ADMIN — ENOXAPARIN SODIUM 30 MG: 100 INJECTION SUBCUTANEOUS at 17:15

## 2017-08-07 RX ADMIN — POLYETHYLENE GLYCOL 3350 1 PACKET: 17 POWDER, FOR SOLUTION ORAL at 09:09

## 2017-08-07 RX ADMIN — TAMSULOSIN HYDROCHLORIDE 0.4 MG: 0.4 CAPSULE ORAL at 09:08

## 2017-08-07 RX ADMIN — OXYBUTYNIN CHLORIDE 5 MG: 5 TABLET ORAL at 22:04

## 2017-08-07 RX ADMIN — FLUCONAZOLE 200 MG: 200 TABLET ORAL at 09:08

## 2017-08-07 RX ADMIN — GABAPENTIN 200 MG: 100 CAPSULE ORAL at 15:31

## 2017-08-07 RX ADMIN — ENOXAPARIN SODIUM 30 MG: 100 INJECTION SUBCUTANEOUS at 06:30

## 2017-08-07 RX ADMIN — LAMOTRIGINE 150 MG: 100 TABLET ORAL at 09:08

## 2017-08-07 RX ADMIN — GABAPENTIN 200 MG: 100 CAPSULE ORAL at 22:04

## 2017-08-07 RX ADMIN — SODIUM CHLORIDE 50 MG: 9 INJECTION, SOLUTION INTRAVENOUS at 09:07

## 2017-08-07 RX ADMIN — CALCIUM CARBONATE-CHOLECALCIFEROL TAB 250 MG-125 UNIT 1 TABLET: 250-125 TAB at 09:08

## 2017-08-07 RX ADMIN — OXYBUTYNIN CHLORIDE 5 MG: 5 TABLET ORAL at 09:09

## 2017-08-07 RX ADMIN — OMEPRAZOLE 20 MG: 20 CAPSULE, DELAYED RELEASE ORAL at 09:09

## 2017-08-07 RX ADMIN — SODIUM CHLORIDE 1000 ML: 9 INJECTION, SOLUTION INTRAVENOUS at 06:31

## 2017-08-07 ASSESSMENT — PAIN SCALES - GENERAL
PAINLEVEL_OUTOF10: 3
PAINLEVEL_OUTOF10: 4

## 2017-08-07 ASSESSMENT — ENCOUNTER SYMPTOMS
SHORTNESS OF BREATH: 0
HEADACHES: 0
NERVOUS/ANXIOUS: 0
STRIDOR: 0
HALLUCINATIONS: 0
MYALGIAS: 0
FEVER: 0
SPEECH CHANGE: 0
ABDOMINAL PAIN: 1
FOCAL WEAKNESS: 0
RESPIRATORY NEGATIVE: 1
WEAKNESS: 1
SENSORY CHANGE: 0
NECK PAIN: 0
CONSTITUTIONAL NEGATIVE: 1
VOMITING: 0
NAUSEA: 0
CHILLS: 0
CARDIOVASCULAR NEGATIVE: 1
COUGH: 0
ABDOMINAL PAIN: 0

## 2017-08-07 NOTE — PROGRESS NOTES
Surgical Progress Note    Author: Jorge Rodriguez Date & Time created: 2017   12:20 PM     Interval Events:  Tolerating diet.  Pain well controlled.  Ostomy working    Review of Systems   Constitutional: Negative.    Respiratory: Negative.    Cardiovascular: Negative.    Gastrointestinal: Negative for nausea, vomiting and abdominal pain.     Hemodynamics:  Temp (24hrs), Av.8 °C (98.3 °F), Min:36.6 °C (97.9 °F), Max:37.1 °C (98.8 °F)  Temperature: 36.6 °C (97.9 °F)  Pulse  Av.7  Min: 52  Max: 105   Blood Pressure : 138/66 mmHg     Respiratory:    Respiration: 16, Pulse Oximetry: 93 %           Neuro:  GCS = 15       Fluids:    Intake/Output Summary (Last 24 hours) at 17 1220  Last data filed at 17 1000   Gross per 24 hour   Intake    350 ml   Output   1365 ml   Net  -1015 ml        Current Diet Order   Procedures   • DIET ORDER     Physical Exam   Constitutional: He is oriented to person, place, and time. He appears well-developed and well-nourished. No distress.   HENT:   Head: Normocephalic.   Eyes: Pupils are equal, round, and reactive to light. No scleral icterus.   Cardiovascular: Normal rate, regular rhythm and normal heart sounds.    Pulmonary/Chest: Effort normal and breath sounds normal. No respiratory distress.   Abdominal: Soft. Bowel sounds are normal. He exhibits no distension. There is no tenderness. There is no rebound and no guarding.   Stool in ostomy collection bag.  Midline incision intact.   Ostomy productive of stool   Neurological: He is alert and oriented to person, place, and time.   Skin: Skin is warm and dry.   Psychiatric: He has a normal mood and affect. His behavior is normal. Judgment and thought content normal.     Labs:  No results found for this or any previous visit (from the past 24 hour(s)).  Medical Decision Making, by Problem:  Active Hospital Problems    Diagnosis   • Pelvic abscess in male (CMS-HCC) [K65.1]     Priority: High   • BPH (benign  prostatic hypertrophy) [N40.0]     Priority: Low   • Depression [F32.9]     Priority: Low   • Osteoarthritis [M19.90]     Priority: Low   • Bipolar 1 disorder, depressed (CMS-[facility]) [F31.9]   • Normocytic anemia [D64.9]     Plan:  Will begin seeing 3 days per week  Pull drain when output decreases    Quality Measures:  Labs reviewed and Medications reviewed  Ruff catheter: No Ruff      DVT Prophylaxis: Enoxaparin (Lovenox)      Antibiotics: Treating active infection/contamination beyond 24 hours perioperative coverage        Discussed patient condition with Family, RN and Patient

## 2017-08-07 NOTE — PROGRESS NOTES
Assumed care at 1845. Pt resting in bed. A&ox 4. Sitting up in chair. abd midline steri strips. Ostomy in place. +output. IR drain to left buttock. Flushed with 10cc saline per active order. Tolerating diet. +weakness. Transferred back to bed with 2 person assist. Unable to ambulate. O2 on RA. Pain well controlled. Bed alarm in place. Call light within reach. Hourly rounding in place.

## 2017-08-07 NOTE — CARE PLAN
Problem: Communication  Goal: The ability to communicate needs accurately and effectively will improve  Outcome: PROGRESSING AS EXPECTED    Problem: Safety  Goal: Will remain free from injury  Outcome: PROGRESSING AS EXPECTED    Problem: Infection  Goal: Will remain free from infection  Outcome: PROGRESSING AS EXPECTED

## 2017-08-07 NOTE — PROGRESS NOTES
Pt says pain is doing well @ a 4/10, Lungs clear to auscultation but diminished in the bases.  Bowels outputting through ostomy, loose and brown.  Pt currently up to chair with friends and /family

## 2017-08-07 NOTE — CARE PLAN
Problem: Safety  Goal: Will remain free from injury  Updated about POC. Reinforce call light use. Pt acknowledged understanding    Problem: Infection  Goal: Will remain free from infection  Iv abx continued. Remains afebrile    Problem: Pain Management  Goal: Pain level will decrease to patient’s comfort goal  Pain well controlled with oxycodone 5 mg prn

## 2017-08-07 NOTE — PROGRESS NOTES
Infectious Disease Progress Note    Author: Latonia Cadet M.D. Date of service & Time created: 2017  10:35 AM    Interval History:  76-year-old white male admitted with pelvic abscess possibly due to bowel perforation due to constipation  17- low-grade fevers. Continues to have abdominal pain  17- complains of some abdominal pain. MAXIMUM TEMPERATURE 100.3. WBC 7.6  17- complains of abdominal pain. MAXIMUM TEMPERATURE is 98. WBC 7.2  17- ongoing abdominal pain. No fevers. No new issues overnight.   AF WBC 4.8 Called for less expensive abx recommendations and new GI sxs. Stool in toilet  8/3 AF less anxious-pain better controlled   eating improved   no new complaints-denies SE abx Pain about the same   new drain placed   AF, no CBC, feels tired  Labs Reviewed, Medications Reviewed, Radiology Reviewed and Wound Reviewed.    Review of Systems:  Review of Systems   Constitutional: Positive for malaise/fatigue. Negative for fever.   Respiratory: Negative for cough and shortness of breath.    Cardiovascular: Negative for chest pain.   Gastrointestinal: Positive for abdominal pain. Negative for vomiting.   Genitourinary: Negative for dysuria.   Musculoskeletal: Negative for myalgias.   Neurological: Negative for sensory change, speech change and headaches.       Hemodynamics:  Temp (24hrs), Av.8 °C (98.3 °F), Min:36.6 °C (97.9 °F), Max:37.1 °C (98.8 °F)  Temperature: 36.6 °C (97.9 °F)  Pulse  Av.7  Min: 52  Max: 105   Blood Pressure : 138/66 mmHg       Physical Exam:  Physical Exam   Constitutional: He is oriented to person, place, and time. He appears well-developed. No distress.   Cachectic and frail  Chronically ill   HENT:   Head: Normocephalic and atraumatic.   Mouth/Throat: No oropharyngeal exudate.   Eyes: EOM are normal. Pupils are equal, round, and reactive to light. No scleral icterus.   Neck: Neck supple.   Cardiovascular: Normal rate and regular  rhythm.    No murmur heard.  Pulmonary/Chest: Effort normal. No respiratory distress.   Abdominal: Soft. He exhibits distension. There is tenderness. There is no rebound and no guarding.   Colostomy formed stool  Staples intact-midline incision  MAREK drain +serosang   Musculoskeletal: He exhibits no edema.   Neurological: He is alert and oriented to person, place, and time.   Skin:   Sacrum dressed   Nursing note and vitals reviewed.      Labs:  Recent Labs      08/06/17   0357   WBC  3.5*   RBC  3.82*   HEMOGLOBIN  10.9*   HEMATOCRIT  33.6*   MCV  88.0   MCH  28.5   RDW  54.1*   PLATELETCT  216   MPV  8.8*   NEUTSPOLYS  46.70   LYMPHOCYTES  36.70   MONOCYTES  11.30   EOSINOPHILS  2.80   BASOPHILS  0.80     No results for input(s): SODIUM, POTASSIUM, CHLORIDE, CO2, GLUCOSE, BUN, CPKTOTAL in the last 72 hours.  No results for input(s): ALBUMIN, TBILIRUBIN, ALKPHOSPHAT, TOTPROTEIN, ALTSGPT, ASTSGOT, CREATININE in the last 72 hours.    Wound:      Results     Procedure Component Value Units Date/Time    ANAEROBIC CULTURE [[id number]] Collected:  07/25/17 1445    Order Status:  Completed Specimen Information:  Wound Updated:  07/30/17 1334     Significant Indicator NEG      Source WND      Site Peritoneal Abscess      Anaerobic Culture, Culture Res No Anaerobes isolated.     Narrative:      CALL  Clark  141 tel. [phone],  CALLED  141 tel. [phone] 07/28/2017, 08:05, RB PERF. RESULTS CALLED  TO:[phone]    CULTURE WOUND W/ GRAM STAIN [[id number]]  (Abnormal)  (Susceptibility) Collected:  07/25/17 1445    Order Status:  Completed Specimen Information:  Wound Updated:  07/30/17 1334     Significant Indicator POS (POS)      Source WND      Site Peritoneal Abscess      Culture Result Wound -- (A)      Gram Stain Result --      Result:        Many WBCs.  Few Yeast.  Few Gram positive cocci.       Culture Result Wound -- (A)      Result:        Enterococcus faecium  Combination therapy with ampicillin, penicillin, or  vancomycin  (for susceptible strains) plus an aminoglycoside  is usually indicated for serious enterococcal infections,  such as endocarditis unless high-level resistance to both  gentamicin and streptomycin is documented; such combinations  are predicted to result in synergistic killing of the  Enterococcus.  VANCOMYCIN RESISTANT ENTEROCOCCI(VRE)       Culture Result Wound -- (A)      Result:        Candida albicans  Light growth      Narrative:      CALL  Cl[facility]  141 tel. [phone],  CALLED  141 tel. [phone] 07/28/2017, 08:05, RB PERF. RESULTS CALLED  TO:[phone]    Culture & Susceptibility     ENTEROCOCCUS FAECIUM     Antibiotic Sensitivity Microscan Unit Status    Ampicillin Resistant >8 mcg/mL Final    Daptomycin Sensitive 2 mcg/mL Final    Gent Synergy Sensitive <=500 mcg/mL Final    Linezolid Sensitive 2 mcg/mL Final    Penicillin Resistant >8 mcg/mL Final    Vancomycin Resistant >16 mcg/mL Final                             Fluids:  Intake/Output       08/05/17 0700 - 08/06/17 0659 08/06/17 0700 - 08/07/17 0659 08/07/17 0700 - 08/08/17 0659      2131-9540 2266-2853 Total 0539-1536 6321-3809 Total 9152-3917 8756-3540 Total       Intake    P.O.  360  -- 360  --  -- --  --  -- --    P.O. 360 -- 360 -- -- -- -- -- --    I.V.  110  270 380  290  270 560  --  -- --    IV Volume -- -- -- -- 50 50 -- -- --    IV Volume (NS) 110 220 330 240 220 460 -- -- --    IV Piggyback Volume (IV Piggyback) -- 50 50 50 -- 50 -- -- --    Total Intake 470 270 740 290 270 560 -- -- --       Output    Urine  325  800 1125  725  700 1425  --  -- --    Void (ml)   -- -- --    Drains  0  30 30  15  25 40  5  -- 5    Sukhwinder Oquendo 1 0 30 30 15 25 40 5 -- 5    Stool/Urine  --  -- --  0  -- 0  --  -- --    Ostomy #1 (ml)  -- -- -- 0 -- 0 -- -- --    Total Output   5 -- 5       Net I/O     145 -560 -415 -450 -455 -905 -5 -- -5        CT-ABDOMEN-PELVIS WITH (Final result) Result time: 08/03/17 16:43:02      Final result by Madina Dorman M.D. (08/03/17 16:43:02)     Impression:     4.5 x 4.7 x 5.4 cm pelvic fluid collection is mildly decreased in size compared to prior. There has been interval removal of the previously noted pigtail catheter.  Small amount of free fluid in the abdomen and pelvis.  Mildly dilated loops of small bowel may represent mild ileus.  Small bilateral pleural effusions with overlying atelectasis/consolidation.  Moderate hiatal hernia.  Prominent atherosclerotic plaque.  Healing fractures of the fifth and sixth ribs on the right.          Assessment:  Active Hospital Problems    Diagnosis   • *Sepsis (CMS-HCC) [A41.9]   • Abdominal abscess (CMS-HCC) [K65.1]   • BPH (benign prostatic hypertrophy) [N40.0]   • Depression [F32.9]   • Osteoarthritis [M19.90]       Plan:  Pelvic abscess due to bowel perforation, additional work  Afebrile  Leukocytosis, resolved  S/p IR drain ×2  Cultures of Enterococcus faecium, viridans strep, Bacteroides fragilis and Candida albicans  CT scan done on 7/24/2017 shows persistent pelvic abscess over 5 cm  s/p exploratory laparotomy, abdominal washout, sigmoid colon resection, colostomy placement and appendectomy on 7/26/17  OR cultures-+VRE and Candida  Repeat CT showed 4.5 X  4.7 X 5.4cm-too large for abx to penetrate  s/p drainage cath 8/5-follow cxs - NGTD  Can change tigecycline to Zyvox for VRE with Zosyn to cover Ecoli, Bfrag, strep once abscess less than 3 cm  Continue fluc for Candida  Will need repeat CT later this week  Endpoint 2 weeks from when abscess less than 3 cm    Protein malnutrition  Anticipate poor wound healing  Alb 2.2    DW IM

## 2017-08-07 NOTE — PROGRESS NOTES
[facility] Hospitalist Progress Note    Date of Service: 2017    Chief Complaint  75 y/o M with PMH of peripheral neuropathy, BPH, Depression admitted for abdominal pain, diarrhea.  Dx with pelvis abscess on imaging. S/p Ex lap and abdominal washout on .    Interval Problem Update    Pelvic drain - Ngtd, minimal output . Afeb on IV atbs . Repeat cxs ngtd.    Consultants/Specialty  [facility] - Surgery  Dr. Herbert/ [place] -ID    Disposition  Likely to [facility] care when pelvic abscess improved.      Review of Systems   Constitutional: Negative for fever and chills.   HENT: Negative for congestion and hearing loss.    Respiratory: Negative for cough, shortness of breath and stridor.    Cardiovascular: Negative for chest pain and leg swelling.   Gastrointestinal: Positive for abdominal pain. Negative for nausea and vomiting.   Musculoskeletal: Negative for myalgias and neck pain.   Skin: Negative for itching and rash.   Neurological: Positive for weakness (generalized ). Negative for speech change, focal weakness and headaches.   Psychiatric/Behavioral: Negative for hallucinations. The patient is not nervous/anxious.    All other systems reviewed and are negative.     Physical Exam  Laboratory/Imaging   Hemodynamics  Temp (24hrs), Av.8 °C (98.3 °F), Min:36.6 °C (97.9 °F), Max:37.1 °C (98.8 °F)   Temperature: 36.6 °C (97.9 °F)  Pulse  Av.7  Min: 52  Max: 105    Blood Pressure : 138/66 mmHg      Respiratory      Respiration: 16, Pulse Oximetry: 93 %        RUL Breath Sounds: Clear, RML Breath Sounds: Clear, RLL Breath Sounds: Diminished, TOSHIA Breath Sounds: Clear, LLL Breath Sounds: Diminished    Fluids    Intake/Output Summary (Last 24 hours) at 17 1349  Last data filed at 17 1300   Gross per 24 hour   Intake    350 ml   Output   1215 ml   Net   -865 ml       Nutrition  Orders Placed This Encounter   Procedures   • DIET ORDER     Standing Status: Standing      Number of Occurrences: 1       Standing Expiration Date:      Order Specific Question:  Diet:     Answer:  Low Fiber(GI Soft) [2]     Physical Exam   Constitutional: He is oriented to person, place, and time. He is active. No distress.   HENT:   Head: Normocephalic and atraumatic.   Eyes: EOM are normal. No scleral icterus.   Neck: Normal range of motion.   Cardiovascular: Normal rate, regular rhythm, normal heart sounds and intact distal pulses.    No murmur heard.  Pulmonary/Chest: Effort normal and breath sounds normal. No stridor. No respiratory distress. He has no wheezes.   Abdominal: Soft. Bowel sounds are normal. He exhibits no distension. There is no tenderness.   Colostomy in place, air loose stool present.   Left back catheter present - serous drainage present - blood tinged.   Musculoskeletal: Normal range of motion. He exhibits no edema or tenderness.   Neurological: He is alert and oriented to person, place, and time. No cranial nerve deficit.   Skin: Skin is warm and dry. He is not diaphoretic. No erythema.   Psychiatric: He has a normal mood and affect. His behavior is normal.   Vitals reviewed.      Recent Labs      08/06/17   0357   WBC  3.5*   RBC  3.82*   HEMOGLOBIN  10.9*   HEMATOCRIT  33.6*   MCV  88.0   MCH  28.5   MCHC  32.4*   RDW  54.1*   PLATELETCT  216   MPV  8.8*                          Assessment/Plan     Pelvic abscess in male (CMS-HCC) (present on admission)  Assessment & Plan  VRE, yeast infection --S/p   Ex lap , washout, Sigmoid colon resection w Colostomy placement, Appi-  Bowel activity recovering- Fu CT 8-3  with minimal decreased size of pelvic abscess 4.5 x 4.7 x 5.4 cm - post Pelvic abscess drain placement 8-5-17   Minimal output-- flush drain- follow output- anticipate dc if tapering off output.  cw GI diet.   IV tygacil and diflucan . Discussed with dr. Cadet.  Oral fluconazole for candida coverage.   Pain control w prn norco, IV ms for severe pain.  PT/OT      Normocytic anemia  Assessment &  Plan  Stable H&H, multi factorial  intra-op losses and chronic disease-stable  Monitor for acute symptoms.      Bipolar 1 disorder, depressed (CMS-[facility])  Assessment & Plan  Continue  lamictal    BPH (benign prostatic hypertrophy) (present on admission)  Assessment & Plan  Continue Flomax  Keep perineum area dry.    Depression (present on admission)  Assessment & Plan  Stable    Osteoarthritis (present on admission)  Assessment & Plan  Stable, prn analgesia  ca, vit d    Debility - tx to [facility] when stable for rehab.   Labs reviewed, Medications reviewed and Radiology images reviewed  Ruff catheter: No Ruff  Central line in place: Concentrated IV drugs    DVT Prophylaxis: Enoxaparin (Lovenox)      Antibiotics: Treating active infection/contamination beyond 24 hours perioperative coverage

## 2017-08-08 LAB
BACTERIA SPEC ANAEROBE CULT: NORMAL
BACTERIA WND AEROBE CULT: NORMAL
GRAM STN SPEC: NORMAL
SIGNIFICANT IND 70042: NORMAL
SIGNIFICANT IND 70042: NORMAL
SITE SITE: NORMAL
SITE SITE: NORMAL
SOURCE SOURCE: NORMAL
SOURCE SOURCE: NORMAL

## 2017-08-08 PROCEDURE — 700102 HCHG RX REV CODE 250 W/ 637 OVERRIDE(OP): Performed by: INTERNAL MEDICINE

## 2017-08-08 PROCEDURE — A9270 NON-COVERED ITEM OR SERVICE: HCPCS | Performed by: SURGERY

## 2017-08-08 PROCEDURE — 770021 HCHG ROOM/CARE - ISO PRIVATE

## 2017-08-08 PROCEDURE — A9270 NON-COVERED ITEM OR SERVICE: HCPCS | Performed by: NURSE PRACTITIONER

## 2017-08-08 PROCEDURE — 700102 HCHG RX REV CODE 250 W/ 637 OVERRIDE(OP): Performed by: NURSE PRACTITIONER

## 2017-08-08 PROCEDURE — 700102 HCHG RX REV CODE 250 W/ 637 OVERRIDE(OP): Performed by: SURGERY

## 2017-08-08 PROCEDURE — A9270 NON-COVERED ITEM OR SERVICE: HCPCS | Performed by: INTERNAL MEDICINE

## 2017-08-08 PROCEDURE — A9270 NON-COVERED ITEM OR SERVICE: HCPCS | Performed by: HOSPITALIST

## 2017-08-08 PROCEDURE — 700102 HCHG RX REV CODE 250 W/ 637 OVERRIDE(OP): Performed by: HOSPITALIST

## 2017-08-08 PROCEDURE — 700111 HCHG RX REV CODE 636 W/ 250 OVERRIDE (IP): Performed by: INTERNAL MEDICINE

## 2017-08-08 PROCEDURE — 700111 HCHG RX REV CODE 636 W/ 250 OVERRIDE (IP): Performed by: SURGERY

## 2017-08-08 PROCEDURE — 97116 GAIT TRAINING THERAPY: CPT

## 2017-08-08 PROCEDURE — 700105 HCHG RX REV CODE 258: Performed by: INTERNAL MEDICINE

## 2017-08-08 PROCEDURE — 99232 SBSQ HOSP IP/OBS MODERATE 35: CPT | Performed by: INTERNAL MEDICINE

## 2017-08-08 RX ADMIN — CALCIUM CARBONATE-CHOLECALCIFEROL TAB 250 MG-125 UNIT 1 TABLET: 250-125 TAB at 07:34

## 2017-08-08 RX ADMIN — ENOXAPARIN SODIUM 30 MG: 100 INJECTION SUBCUTANEOUS at 16:31

## 2017-08-08 RX ADMIN — OXYBUTYNIN CHLORIDE 5 MG: 5 TABLET ORAL at 21:07

## 2017-08-08 RX ADMIN — FLUCONAZOLE 200 MG: 200 TABLET ORAL at 07:34

## 2017-08-08 RX ADMIN — TAMSULOSIN HYDROCHLORIDE 0.4 MG: 0.4 CAPSULE ORAL at 07:34

## 2017-08-08 RX ADMIN — OMEPRAZOLE 20 MG: 20 CAPSULE, DELAYED RELEASE ORAL at 07:34

## 2017-08-08 RX ADMIN — ENOXAPARIN SODIUM 30 MG: 100 INJECTION SUBCUTANEOUS at 05:10

## 2017-08-08 RX ADMIN — SODIUM CHLORIDE 50 MG: 9 INJECTION, SOLUTION INTRAVENOUS at 09:33

## 2017-08-08 RX ADMIN — GABAPENTIN 200 MG: 100 CAPSULE ORAL at 07:34

## 2017-08-08 RX ADMIN — SODIUM CHLORIDE 50 MG: 9 INJECTION, SOLUTION INTRAVENOUS at 21:07

## 2017-08-08 RX ADMIN — GABAPENTIN 200 MG: 100 CAPSULE ORAL at 21:07

## 2017-08-08 RX ADMIN — OXYBUTYNIN CHLORIDE 5 MG: 5 TABLET ORAL at 07:34

## 2017-08-08 RX ADMIN — POLYETHYLENE GLYCOL 3350 1 PACKET: 17 POWDER, FOR SOLUTION ORAL at 07:33

## 2017-08-08 RX ADMIN — OXYCODONE HYDROCHLORIDE 5 MG: 5 TABLET ORAL at 21:06

## 2017-08-08 RX ADMIN — GABAPENTIN 200 MG: 100 CAPSULE ORAL at 16:25

## 2017-08-08 RX ADMIN — LAMOTRIGINE 150 MG: 100 TABLET ORAL at 07:34

## 2017-08-08 ASSESSMENT — ENCOUNTER SYMPTOMS
ABDOMINAL PAIN: 1
NAUSEA: 0
HEADACHES: 0
HALLUCINATIONS: 0
NECK PAIN: 0
SPEECH CHANGE: 0
NERVOUS/ANXIOUS: 0
CHILLS: 0
MYALGIAS: 0
COUGH: 0
VOMITING: 0
STRIDOR: 0
FOCAL WEAKNESS: 0
SENSORY CHANGE: 0
FEVER: 0
SHORTNESS OF BREATH: 0
WEAKNESS: 1

## 2017-08-08 ASSESSMENT — GAIT ASSESSMENTS
GAIT LEVEL OF ASSIST: MINIMAL ASSIST
ASSISTIVE DEVICE: FRONT WHEEL WALKER
DISTANCE (FEET): 15

## 2017-08-08 ASSESSMENT — PAIN SCALES - GENERAL
PAINLEVEL_OUTOF10: 4
PAINLEVEL_OUTOF10: 4

## 2017-08-08 NOTE — THERAPY
"Physical Therapy Treatment completed.   Bed Mobility:  Supine to Sit: Stand by Assist  Transfers: Sit to Stand: Minimal Assist  Gait: Level Of Assist: Minimal Assist with Front-Wheel Walker       Plan of Care: Will benefit from Physical Therapy 2 times per week  Discharge Recommendations: Equipment: Front-Wheel Walker. Post-acute therapy Discharge to a transitional care facility for continued skilled therapy services.     See \"Rehab Therapy-Acute\" Patient Summary Report for complete documentation.       "

## 2017-08-08 NOTE — CARE PLAN
Problem: Safety  Goal: Will remain free from injury  Outcome: PROGRESSING AS EXPECTED  Remains free of injury, bed alarm on.    Problem: Pain Management  Goal: Pain level will decrease to patient’s comfort goal  Outcome: PROGRESSING AS EXPECTED  Pain currently controlled, prn pain medications ordered.

## 2017-08-08 NOTE — CARE PLAN
Problem: Nutritional:  Goal: Achieve adequate nutritional intake  Diet will adv past CL and patient will consume >50% of meals and supplement   Outcome: PROGRESSING SLOWER THAN EXPECTED  Diet advanced to low fiber; GI soft diet.  Per discussion w/pt's RD, pt is doing well with meals.  He has snacks set up as well    Please record percentage of meals conusmed in ADLs to help monitor po adequacy    RD following

## 2017-08-08 NOTE — PROGRESS NOTES
[facility] Hospitalist Progress Note    Date of Service: 2017    Chief Complaint  75 y/o M with PMH of peripheral neuropathy, BPH, Depression admitted for abdominal pain, diarrhea.  Dx with pelvis abscess on imaging. S/p Ex lap and abdominal washout on .    Interval Problem Update    Pelvic drain - Ngtd, minimal output . Afeb on IV atbs . Repeat cxs ngtd.   - close watch, working with staff, tolerating abx. Ostomy functioning.  Wife at bedside, long talk.  She wants him to go to [facility].    Consultants/Specialty  [facility] - Surgery  Dr. Herbert/ [place] -ID    Disposition  Likely to [facility] care when pelvic abscess improved.      Review of Systems   Constitutional: Negative for fever and chills.   HENT: Negative for congestion and hearing loss.    Respiratory: Negative for cough, shortness of breath and stridor.    Cardiovascular: Negative for chest pain and leg swelling.   Gastrointestinal: Positive for abdominal pain. Negative for nausea and vomiting.   Musculoskeletal: Negative for myalgias and neck pain.   Skin: Negative for itching and rash.   Neurological: Positive for weakness (generalized ). Negative for speech change, focal weakness and headaches.   Psychiatric/Behavioral: Negative for hallucinations. The patient is not nervous/anxious.    All other systems reviewed and are negative.     Physical Exam  Laboratory/Imaging   Hemodynamics  Temp (24hrs), Av.4 °C (97.6 °F), Min:36.3 °C (97.3 °F), Max:36.6 °C (97.8 °F)   Temperature: 36.6 °C (97.8 °F)  Pulse  Av.6  Min: 52  Max: 105    Blood Pressure : 124/72 mmHg      Respiratory      Respiration: 18, Pulse Oximetry: 90 %        RUL Breath Sounds: Clear, RML Breath Sounds: Clear, RLL Breath Sounds: Diminished, TOSHIA Breath Sounds: Clear, LLL Breath Sounds: Diminished    Fluids    Intake/Output Summary (Last 24 hours) at 17 1133  Last data filed at 17 0432   Gross per 24 hour   Intake    220 ml   Output    980 ml   Net   -760 ml        Nutrition  Orders Placed This Encounter   Procedures   • DIET ORDER     Standing Status: Standing      Number of Occurrences: 1      Standing Expiration Date:      Order Specific Question:  Diet:     Answer:  Low Fiber(GI Soft) [2]     Physical Exam   Constitutional: He is oriented to person, place, and time. He is active. No distress.   HENT:   Head: Normocephalic and atraumatic.   Eyes: EOM are normal. No scleral icterus.   Neck: Normal range of motion.   Cardiovascular: Normal rate, regular rhythm, normal heart sounds and intact distal pulses.    No murmur heard.  Pulmonary/Chest: Effort normal and breath sounds normal. No stridor. No respiratory distress. He has no wheezes.   Abdominal: Soft. Bowel sounds are normal. He exhibits no distension. There is no tenderness.   Colostomy in place, air loose stool present.   Left back catheter present - serous drainage present - blood tinged.   Musculoskeletal: Normal range of motion. He exhibits no edema or tenderness.   Neurological: He is alert and oriented to person, place, and time. No cranial nerve deficit.   Skin: Skin is warm and dry. He is not diaphoretic. No erythema.   Psychiatric: He has a normal mood and affect. His behavior is normal.   Vitals reviewed.      Recent Labs      08/06/17   0357   WBC  3.5*   RBC  3.82*   HEMOGLOBIN  10.9*   HEMATOCRIT  33.6*   MCV  88.0   MCH  28.5   MCHC  32.4*   RDW  54.1*   PLATELETCT  216   MPV  8.8*                          Assessment/Plan     Pelvic abscess in male (CMS-HCC) (present on admission)  Assessment & Plan  VRE, yeast infection --S/p   Ex lap , washout, Sigmoid colon resection w Colostomy placement, Appi-  Bowel activity recovering- Fu CT 8-3  with minimal decreased size of pelvic abscess 4.5 x 4.7 x 5.4 cm - post Pelvic abscess drain placement 8-5-17   Minimal output-- flush drain- follow output- anticipate dc if tapering off output.  cw GI diet.   IV tygacil and diflucan . Discussed with dr. Cadet. Will  repeat the CT later this week.  Oral fluconazole for candida coverage.   Pain control w prn norco, IV ms for severe pain.  PT/OT      Normocytic anemia  Assessment & Plan  Stable H&H, multi factorial  intra-op losses and chronic disease-stable  Monitor for acute symptoms.      Bipolar 1 disorder, depressed (CMS-HCC)  Assessment & Plan  Continue  lamictal    BPH (benign prostatic hypertrophy) (present on admission)  Assessment & Plan  Continue Flomax  Keep perineum area dry.    Depression (present on admission)  Assessment & Plan  Stable    Osteoarthritis (present on admission)  Assessment & Plan  Stable, prn analgesia  ca, vit d    Debility - tx to [facility] when stable for rehab.   Labs reviewed, Medications reviewed and Radiology images reviewed  Ruff catheter: No Ruff  Central line in place: Concentrated IV drugs    DVT Prophylaxis: Enoxaparin (Lovenox)      Antibiotics: Treating active infection/contamination beyond 24 hours perioperative coverage

## 2017-08-08 NOTE — PROGRESS NOTES
Pt up with x1 assist and FWW.  Ambulated with PT and is currently up to chair.  IV antibiotic therapy continues.    IR drain flushed this AM with 10ml NSS per active order.    Ostomy output adequeate, tolerating soft GI diet.    Pt rates pain @ 2/10 has not asked for pain medicine

## 2017-08-08 NOTE — PROGRESS NOTES
Pt found resting in bed, on room air.  Pt updated on poc.  No acute symptoms nor signs of distress.  Pt reports pain is fine at this time, 3/10.  Abd incision suma, steri strips intact, no active drainage.   LLQ colostomy in place, producing soft moderate amount stool.  IR drain to left upper buttock, serous with some brown/red output.  -N/V, voids to urinal, last bm 8/7.  Left upper arm PICC patent.  Pt denies any other needs at this time.  Bed alarm on.  Call light within reach, will continue to monitor.

## 2017-08-08 NOTE — PROGRESS NOTES
Infectious Disease Progress Note    Author: Latonia Cadet M.D. Date of service & Time created: 2017  11:28 AM    Interval History:  76-year-old white male admitted with pelvic abscess possibly due to bowel perforation due to constipation  17- low-grade fevers. Continues to have abdominal pain  17- complains of some abdominal pain. MAXIMUM TEMPERATURE 100.3. WBC 7.6  17- complains of abdominal pain. MAXIMUM TEMPERATURE is 98. WBC 7.2  17- ongoing abdominal pain. No fevers. No new issues overnight.   AF WBC 4.8 Called for less expensive abx recommendations and new GI sxs. Stool in toilet  8/3 AF less anxious-pain better controlled   eating improved   no new complaints-denies SE abx Pain about the same   new drain placed   AF, no CBC, feels tired   AF, no CBC, sitting up in chair, friends visiting in room, denies any pain, tolerating abx without issues  Labs Reviewed, Medications Reviewed, Radiology Reviewed and Wound Reviewed.    Review of Systems:  Review of Systems   Constitutional: Positive for malaise/fatigue. Negative for fever.   Respiratory: Negative for cough and shortness of breath.    Cardiovascular: Negative for chest pain.   Gastrointestinal: Negative for vomiting.   Genitourinary: Negative for dysuria.   Musculoskeletal: Negative for myalgias.   Neurological: Negative for sensory change, speech change and headaches.       Hemodynamics:  Temp (24hrs), Av.4 °C (97.6 °F), Min:36.3 °C (97.3 °F), Max:36.6 °C (97.8 °F)  Temperature: 36.6 °C (97.8 °F)  Pulse  Av.6  Min: 52  Max: 105   Blood Pressure : 124/72 mmHg       Physical Exam:  Physical Exam   Constitutional: He is oriented to person, place, and time. He appears well-developed. No distress.   Cachectic and frail  Chronically ill   HENT:   Head: Normocephalic and atraumatic.   Mouth/Throat: No oropharyngeal exudate.   Eyes: EOM are normal. Pupils are equal, round, and reactive to light. No  scleral icterus.   Neck: Neck supple.   Cardiovascular: Normal rate and regular rhythm.    No murmur heard.  Pulmonary/Chest: Effort normal. No respiratory distress.   Abdominal: Soft. He exhibits distension. There is tenderness. There is no rebound and no guarding.   Colostomy formed stool  Staples intact-midline incision  MAREK drain +serosang   Musculoskeletal: He exhibits no edema.   Neurological: He is alert and oriented to person, place, and time.   Skin:   Sacrum dressed   Nursing note and vitals reviewed.      Labs:  Recent Labs      08/06/17   0357   WBC  3.5*   RBC  3.82*   HEMOGLOBIN  10.9*   HEMATOCRIT  33.6*   MCV  88.0   MCH  28.5   RDW  54.1*   PLATELETCT  216   MPV  8.8*   NEUTSPOLYS  46.70   LYMPHOCYTES  36.70   MONOCYTES  11.30   EOSINOPHILS  2.80   BASOPHILS  0.80     No results for input(s): SODIUM, POTASSIUM, CHLORIDE, CO2, GLUCOSE, BUN, CPKTOTAL in the last 72 hours.  No results for input(s): ALBUMIN, TBILIRUBIN, ALKPHOSPHAT, TOTPROTEIN, ALTSGPT, ASTSGOT, CREATININE in the last 72 hours.    Wound:      Results     Procedure Component Value Units Date/Time    ANAEROBIC CULTURE [[id number]] Collected:  07/25/17 1445    Order Status:  Completed Specimen Information:  Wound Updated:  07/30/17 1334     Significant Indicator NEG      Source WND      Site Peritoneal Abscess      Anaerobic Culture, Culture Res No Anaerobes isolated.     Narrative:      CALL  [place]  141 tel. [phone],  CALLED  141 tel. [phone] 07/28/2017, 08:05, RB PERF. RESULTS CALLED  TO:[phone]    CULTURE WOUND W/ GRAM STAIN [[id number]]  (Abnormal)  (Susceptibility) Collected:  07/25/17 1445    Order Status:  Completed Specimen Information:  Wound Updated:  07/30/17 1334     Significant Indicator POS (POS)      Source WND      Site Peritoneal Abscess      Culture Result Wound -- (A)      Gram Stain Result --      Result:        Many WBCs.  Few Yeast.  Few Gram positive cocci.       Culture Result Wound -- (A)      Result:         Enterococcus faecium  Combination therapy with ampicillin, penicillin, or  vancomycin (for susceptible strains) plus an aminoglycoside  is usually indicated for serious enterococcal infections,  such as endocarditis unless high-level resistance to both  gentamicin and streptomycin is documented; such combinations  are predicted to result in synergistic killing of the  Enterococcus.  VANCOMYCIN RESISTANT ENTEROCOCCI(VRE)       Culture Result Wound -- (A)      Result:        Candida albicans  Light growth      Narrative:      CALL  [place]  141 tel. [phone],  CALLED  141 tel. [phone] 07/28/2017, 08:05, RB PERF. RESULTS CALLED  TO:[phone]    Culture & Susceptibility     ENTEROCOCCUS FAECIUM     Antibiotic Sensitivity Microscan Unit Status    Ampicillin Resistant >8 mcg/mL Final    Daptomycin Sensitive 2 mcg/mL Final    Gent Synergy Sensitive <=500 mcg/mL Final    Linezolid Sensitive 2 mcg/mL Final    Penicillin Resistant >8 mcg/mL Final    Vancomycin Resistant >16 mcg/mL Final                             Fluids:  Intake/Output       08/06/17 0700 - 08/07/17 0659 08/07/17 0700 - 08/08/17 0659 08/08/17 0700 - 08/09/17 0659      [id number] 1549-3101 Total [id number] 3074-3180 Total 5160-6467 9875-8852 Total       Intake    P.O.  --  -- --  120  -- 120  --  -- --    P.O. -- -- -- 120 -- 120 -- -- --    I.V.  290  270 560  --  100 100  --  -- --    IV Volume -- 50 50 -- -- -- -- -- --    IV Volume (NS) 240 220 460 -- -- -- -- -- --    IV Piggyback Volume (IV Piggyback) 50 -- 50 -- 100 100 -- -- --    Total Intake 290 270 560 120 100 220 -- -- --       Output    Urine  725  700 1425  350  600 950  --  -- --    Void (ml)  350 600 950 -- -- --    Drains  15  25 40  15  20 35  --  -- --    Sukhwinder Oquendo 1 15 25 40 15 20 35 -- -- --    Stool/Urine  0  -- 0  --  -- --  --  -- --    Ostomy #1 (ml)  0 -- 0 -- -- -- -- -- --    Total Output  365 620 985 -- -- --       Net I/O     -450 -455 -905 -245 -520 -765  -- -- --        CT-ABDOMEN-PELVIS WITH (Final result) Result time: 08/03/17 16:43:02     Final result by Madina Dorman M.D. (08/03/17 16:43:02)     Impression:     4.5 x 4.7 x 5.4 cm pelvic fluid collection is mildly decreased in size compared to prior. There has been interval removal of the previously noted pigtail catheter.  Small amount of free fluid in the abdomen and pelvis.  Mildly dilated loops of small bowel may represent mild ileus.  Small bilateral pleural effusions with overlying atelectasis/consolidation.  Moderate hiatal hernia.  Prominent atherosclerotic plaque.  Healing fractures of the fifth and sixth ribs on the right.          Assessment:  Active Hospital Problems    Diagnosis   • *Sepsis (CMS-HCC) [A41.9]   • Abdominal abscess (CMS-HCC) [K65.1]   • BPH (benign prostatic hypertrophy) [N40.0]   • Depression [F32.9]   • Osteoarthritis [M19.90]       Plan:  Pelvic abscess due to bowel perforation  Afebrile  Leukocytosis, resolved  S/p IR drain ×2  Cultures of Enterococcus faecium, viridans strep, Bacteroides fragilis and Candida albicans  CT scan done on 7/24/2017 shows persistent pelvic abscess over 5 cm  s/p exploratory laparotomy, abdominal washout, sigmoid colon resection, colostomy placement and appendectomy on 7/26/17  OR cultures-+VRE and Candida  Repeat CT showed 4.5 X  4.7 X 5.4cm-too large for abx to penetrate  s/p drainage cath 8/5-follow cxs - NGTD  Continue tigecycline and fluc  Can change to Zyvox for VRE with augmentin to cover Ecoli, Bfrag, strep once abscess less than 3 cm  Will need repeat CT later this week  Endpoint 2 weeks from when abscess less than 3 cm    Protein malnutrition  Anticipate poor wound healing  Alb 2.2    DW IM Dr. Howell

## 2017-08-09 PROCEDURE — A9270 NON-COVERED ITEM OR SERVICE: HCPCS | Performed by: INTERNAL MEDICINE

## 2017-08-09 PROCEDURE — 700102 HCHG RX REV CODE 250 W/ 637 OVERRIDE(OP): Performed by: SURGERY

## 2017-08-09 PROCEDURE — 700102 HCHG RX REV CODE 250 W/ 637 OVERRIDE(OP): Performed by: INTERNAL MEDICINE

## 2017-08-09 PROCEDURE — 97535 SELF CARE MNGMENT TRAINING: CPT

## 2017-08-09 PROCEDURE — 700105 HCHG RX REV CODE 258: Performed by: INTERNAL MEDICINE

## 2017-08-09 PROCEDURE — A9270 NON-COVERED ITEM OR SERVICE: HCPCS | Performed by: NURSE PRACTITIONER

## 2017-08-09 PROCEDURE — A9270 NON-COVERED ITEM OR SERVICE: HCPCS | Performed by: SURGERY

## 2017-08-09 PROCEDURE — 700111 HCHG RX REV CODE 636 W/ 250 OVERRIDE (IP): Performed by: INTERNAL MEDICINE

## 2017-08-09 PROCEDURE — 99232 SBSQ HOSP IP/OBS MODERATE 35: CPT | Performed by: INTERNAL MEDICINE

## 2017-08-09 PROCEDURE — A9270 NON-COVERED ITEM OR SERVICE: HCPCS | Performed by: HOSPITALIST

## 2017-08-09 PROCEDURE — 700102 HCHG RX REV CODE 250 W/ 637 OVERRIDE(OP): Performed by: NURSE PRACTITIONER

## 2017-08-09 PROCEDURE — 700105 HCHG RX REV CODE 258

## 2017-08-09 PROCEDURE — 700111 HCHG RX REV CODE 636 W/ 250 OVERRIDE (IP): Performed by: SURGERY

## 2017-08-09 PROCEDURE — 700102 HCHG RX REV CODE 250 W/ 637 OVERRIDE(OP): Performed by: HOSPITALIST

## 2017-08-09 PROCEDURE — 770021 HCHG ROOM/CARE - ISO PRIVATE

## 2017-08-09 RX ORDER — SODIUM CHLORIDE 9 MG/ML
INJECTION, SOLUTION INTRAVENOUS
Status: COMPLETED
Start: 2017-08-09 | End: 2017-08-09

## 2017-08-09 RX ADMIN — LAMOTRIGINE 150 MG: 100 TABLET ORAL at 09:07

## 2017-08-09 RX ADMIN — GABAPENTIN 200 MG: 100 CAPSULE ORAL at 16:16

## 2017-08-09 RX ADMIN — SODIUM CHLORIDE 50 MG: 9 INJECTION, SOLUTION INTRAVENOUS at 21:22

## 2017-08-09 RX ADMIN — ENOXAPARIN SODIUM 30 MG: 100 INJECTION SUBCUTANEOUS at 16:16

## 2017-08-09 RX ADMIN — OXYBUTYNIN CHLORIDE 5 MG: 5 TABLET ORAL at 09:07

## 2017-08-09 RX ADMIN — OXYBUTYNIN CHLORIDE 5 MG: 5 TABLET ORAL at 21:22

## 2017-08-09 RX ADMIN — FLUCONAZOLE 200 MG: 200 TABLET ORAL at 09:07

## 2017-08-09 RX ADMIN — SODIUM CHLORIDE: 9 INJECTION, SOLUTION INTRAVENOUS at 01:00

## 2017-08-09 RX ADMIN — GABAPENTIN 200 MG: 100 CAPSULE ORAL at 21:22

## 2017-08-09 RX ADMIN — OMEPRAZOLE 20 MG: 20 CAPSULE, DELAYED RELEASE ORAL at 09:07

## 2017-08-09 RX ADMIN — SODIUM CHLORIDE 500 ML: 9 INJECTION, SOLUTION INTRAVENOUS at 21:21

## 2017-08-09 RX ADMIN — POLYETHYLENE GLYCOL 3350 1 PACKET: 17 POWDER, FOR SOLUTION ORAL at 09:07

## 2017-08-09 RX ADMIN — OXYCODONE HYDROCHLORIDE 5 MG: 5 TABLET ORAL at 18:33

## 2017-08-09 RX ADMIN — ENOXAPARIN SODIUM 30 MG: 100 INJECTION SUBCUTANEOUS at 05:45

## 2017-08-09 RX ADMIN — TAMSULOSIN HYDROCHLORIDE 0.4 MG: 0.4 CAPSULE ORAL at 09:07

## 2017-08-09 RX ADMIN — GABAPENTIN 200 MG: 100 CAPSULE ORAL at 09:07

## 2017-08-09 RX ADMIN — OXYCODONE HYDROCHLORIDE 5 MG: 5 TABLET ORAL at 12:59

## 2017-08-09 RX ADMIN — SODIUM CHLORIDE 50 MG: 9 INJECTION, SOLUTION INTRAVENOUS at 09:07

## 2017-08-09 RX ADMIN — CALCIUM CARBONATE-CHOLECALCIFEROL TAB 250 MG-125 UNIT 1 TABLET: 250-125 TAB at 09:07

## 2017-08-09 ASSESSMENT — ENCOUNTER SYMPTOMS
MYALGIAS: 0
CHILLS: 0
HEADACHES: 0
ABDOMINAL PAIN: 0
FEVER: 0
CARDIOVASCULAR NEGATIVE: 1
NAUSEA: 0
RESPIRATORY NEGATIVE: 1
NERVOUS/ANXIOUS: 0
CONSTITUTIONAL NEGATIVE: 1
STRIDOR: 0
NECK PAIN: 0
SENSORY CHANGE: 0
VOMITING: 0
HALLUCINATIONS: 0
ABDOMINAL PAIN: 1
SPEECH CHANGE: 0
FOCAL WEAKNESS: 0
SHORTNESS OF BREATH: 0
WEAKNESS: 1
COUGH: 0

## 2017-08-09 ASSESSMENT — COGNITIVE AND FUNCTIONAL STATUS - GENERAL
DAILY ACTIVITIY SCORE: 17
SUGGESTED CMS G CODE MODIFIER DAILY ACTIVITY: CK
DRESSING REGULAR UPPER BODY CLOTHING: A LITTLE
PERSONAL GROOMING: A LITTLE
TOILETING: A LITTLE
DRESSING REGULAR LOWER BODY CLOTHING: A LOT
HELP NEEDED FOR BATHING: A LOT

## 2017-08-09 ASSESSMENT — PAIN SCALES - GENERAL
PAINLEVEL_OUTOF10: 5
PAINLEVEL_OUTOF10: ASSUMED PAIN PRESENT
PAINLEVEL_OUTOF10: 5
PAINLEVEL_OUTOF10: 2
PAINLEVEL_OUTOF10: 4

## 2017-08-09 NOTE — PROGRESS NOTES
Infectious Disease Progress Note    Author: Latonia Cadet M.D. Date of service & Time created: 2017  12:03 PM    Interval History:  76-year-old white male admitted with pelvic abscess possibly due to bowel perforation due to constipation  17- low-grade fevers. Continues to have abdominal pain  17- complains of some abdominal pain. MAXIMUM TEMPERATURE 100.3. WBC 7.6  17- complains of abdominal pain. MAXIMUM TEMPERATURE is 98. WBC 7.2  17- ongoing abdominal pain. No fevers. No new issues overnight.   AF WBC 4.8 Called for less expensive abx recommendations and new GI sxs. Stool in toilet  8/3 AF less anxious-pain better controlled   eating improved   no new complaints-denies SE abx Pain about the same   new drain placed   AF, no CBC, feels tired   AF, no CBC, sitting up in chair, friends visiting in room, denies any pain, tolerating abx without issues   AF, no CBC, c/o pain on bottom, very weak and unable to get out of chair, needs assistance  Labs Reviewed, Medications Reviewed, Radiology Reviewed and Wound Reviewed.    Review of Systems:  Review of Systems   Constitutional: Positive for malaise/fatigue. Negative for fever.   Respiratory: Negative for cough and shortness of breath.    Cardiovascular: Negative for chest pain.   Gastrointestinal: Negative for vomiting.   Genitourinary: Negative for dysuria.   Musculoskeletal: Negative for myalgias.   Neurological: Positive for weakness. Negative for sensory change, speech change and headaches.       Hemodynamics:  Temp (24hrs), Av.7 °C (98.1 °F), Min:36.6 °C (97.8 °F), Max:36.9 °C (98.4 °F)  Temperature: 36.8 °C (98.3 °F)  Pulse  Av.4  Min: 52  Max: 105   Blood Pressure : 135/73 mmHg       Physical Exam:  Physical Exam   Constitutional: He is oriented to person, place, and time. He appears well-developed. No distress.   Cachectic and frail  Chronically ill   HENT:   Head: Normocephalic and atraumatic.    Mouth/Throat: No oropharyngeal exudate.   Eyes: EOM are normal. Pupils are equal, round, and reactive to light. No scleral icterus.   Neck: Neck supple.   Cardiovascular: Normal rate and regular rhythm.    No murmur heard.  Pulmonary/Chest: Effort normal. No respiratory distress.   Abdominal: Soft. He exhibits distension. There is tenderness. There is no rebound and no guarding.   Colostomy formed stool  Staples intact-midline incision  MAREK drain +serosang   Musculoskeletal: He exhibits no edema.   Neurological: He is alert and oriented to person, place, and time.   Skin:   Sacrum dressed   Nursing note and vitals reviewed.      Labs:  No results for input(s): WBC, RBC, HEMOGLOBIN, HEMATOCRIT, MCV, MCH, RDW, PLATELETCT, MPV, NEUTSPOLYS, LYMPHOCYTES, MONOCYTES, EOSINOPHILS, BASOPHILS, RBCMORPHOLO in the last 72 hours.  No results for input(s): SODIUM, POTASSIUM, CHLORIDE, CO2, GLUCOSE, BUN, CPKTOTAL in the last 72 hours.  No results for input(s): ALBUMIN, TBILIRUBIN, ALKPHOSPHAT, TOTPROTEIN, ALTSGPT, ASTSGOT, CREATININE in the last 72 hours.    Wound:      Results     Procedure Component Value Units Date/Time    ANAEROBIC CULTURE [[id number]] Collected:  07/25/17 1445    Order Status:  Completed Specimen Information:  Wound Updated:  07/30/17 1334     Significant Indicator NEG      Source WND      Site Peritoneal Abscess      Anaerobic Culture, Culture Res No Anaerobes isolated.     Narrative:      CALL  [facility]  141 tel. [phone],  CALLED  141 tel. [phone] 07/28/2017, 08:05, RB PERF. RESULTS CALLED  TO:[phone]    CULTURE WOUND W/ GRAM STAIN [[id number]]  (Abnormal)  (Susceptibility) Collected:  07/25/17 1445    Order Status:  Completed Specimen Information:  Wound Updated:  07/30/17 1334     Significant Indicator POS (POS)      Source WND      Site Peritoneal Abscess      Culture Result Wound -- (A)      Gram Stain Result --      Result:        Many WBCs.  Few Yeast.  Few Gram positive cocci.       Culture Result  Wound -- (A)      Result:        Enterococcus faecium  Combination therapy with ampicillin, penicillin, or  vancomycin (for susceptible strains) plus an aminoglycoside  is usually indicated for serious enterococcal infections,  such as endocarditis unless high-level resistance to both  gentamicin and streptomycin is documented; such combinations  are predicted to result in synergistic killing of the  Enterococcus.  VANCOMYCIN RESISTANT ENTEROCOCCI(VRE)       Culture Result Wound -- (A)      Result:        Candida albicans  Light growth      Narrative:      CALL  [facility]  141 tel. [phone],  CALLED  141 tel. [phone] 07/28/2017, 08:05, RB PERF. RESULTS CALLED  TO:[phone]    Culture & Susceptibility     ENTEROCOCCUS FAECIUM     Antibiotic Sensitivity Microscan Unit Status    Ampicillin Resistant >8 mcg/mL Final    Daptomycin Sensitive 2 mcg/mL Final    Gent Synergy Sensitive <=500 mcg/mL Final    Linezolid Sensitive 2 mcg/mL Final    Penicillin Resistant >8 mcg/mL Final    Vancomycin Resistant >16 mcg/mL Final                             Fluids:  Intake/Output       08/07/17 0700 - 08/08/17 0659 08/08/17 0700 - 08/09/17 0659 08/09/17 0700 - 08/10/17 0659      [id number] 0470-5089 Total 5547-6393 6001-6185 Total 5299-4584 0000-4302 Total       Intake    P.O.  120  -- 120  360  -- 360  240  -- 240    P.O. 120 -- 120 360 -- 360 240 -- 240    I.V.  --  100 100  --  50 50  50  -- 50    IV Piggyback Volume (IV Piggyback) -- 100 100 -- 50 50 50 -- 50    Total Intake 120 100 220 360 50 410 290 -- 290       Output    Urine  350  600 950  350  600 950  525  -- 525    Void (ml) 350 600 950 350 600 950 525 -- 525    Drains  15  20 35  --  5 5  0  -- 0    Sukhwinder Oquendo 1 15 20 35 -- 5 5 0 -- 0    Stool/Urine  --  -- --  --  -- --  0  -- 0    Ostomy #1 (ml)  -- -- -- -- -- -- 0 -- 0    Total Output 365 620 985 350 605 955 525 -- 525       Net I/O     -696 -275 -663 25 -403 -231 -235 -- -235        CT-ABDOMEN-PELVIS WITH (Final  result) Result time: 08/03/17 16:43:02     Final result by Madina Dorman M.D. (08/03/17 16:43:02)     Impression:     4.5 x 4.7 x 5.4 cm pelvic fluid collection is mildly decreased in size compared to prior. There has been interval removal of the previously noted pigtail catheter.  Small amount of free fluid in the abdomen and pelvis.  Mildly dilated loops of small bowel may represent mild ileus.  Small bilateral pleural effusions with overlying atelectasis/consolidation.  Moderate hiatal hernia.  Prominent atherosclerotic plaque.  Healing fractures of the fifth and sixth ribs on the right.          Assessment:  Active Hospital Problems    Diagnosis   • *Sepsis (CMS-HCC) [A41.9]   • Abdominal abscess (CMS-HCC) [K65.1]   • BPH (benign prostatic hypertrophy) [N40.0]   • Depression [F32.9]   • Osteoarthritis [M19.90]       Plan:  Pelvic abscess due to bowel perforation  Afebrile  Leukocytosis, resolved  S/p IR drain ×2  Cultures of Enterococcus faecium, viridans strep, Bacteroides fragilis and Candida albicans  CT scan done on 7/24/2017 shows persistent pelvic abscess over 5 cm  s/p exploratory laparotomy, abdominal washout, sigmoid colon resection, colostomy placement and appendectomy on 7/26/17  OR cultures-+VRE and Candida  Repeat CT showed 4.5 X  4.7 X 5.4cm-too large for abx to penetrate  s/p drainage cath 8/5-follow cxs - NGTD  Continue tigecycline and fluc for now  Can change to Zyvox for VRE with augmentin to cover Ecoli, Bfrag, strep once abscess less than 3 cm  Will need repeat CT later this week  Endpoint 2 weeks from when abscess less than 3 cm    Protein malnutrition  Anticipate poor wound healing  Alb 2.2    DW IM Dr. Howell and MSW

## 2017-08-09 NOTE — PROGRESS NOTES
Received report from BART Rodriguez. Pt is Lenora. HUGH. VSS. Pt denies SOB. Denies pain. -N/V. L sided ostomy; w/ good stool output. Midline abdominal incision SCAR w/ steri strips. Pt has perirectal IR drain w/ small output; flushed w/ 10ml of NS. L arm PICC in place; pt on tygacil. Pt up 1x assist w/ FWW. POC discussed. Bed locked and in the lowest position. Call light w/in reach. Hourly rounding in place

## 2017-08-09 NOTE — CARE PLAN
Problem: Infection  Goal: Will remain free from infection  Outcome: PROGRESSING AS EXPECTED   ID rounding on patient. Pt is on fluconazole and tygacil     Problem: Pain Management  Goal: Pain level will decrease to patient’s comfort goal  Outcome: PROGRESSING AS EXPECTED  Denies pain at this time

## 2017-08-09 NOTE — PROGRESS NOTES
[facility] Hospitalist Progress Note    Date of Service: 2017    Chief Complaint  75 y/o M with PMH of peripheral neuropathy, BPH, Depression admitted for abdominal pain, diarrhea.  Dx with pelvis abscess on imaging. S/p Ex lap and abdominal washout on .    Interval Problem Update    Pelvic drain - Ngtd, minimal output . Afeb on IV atbs . Repeat cxs ngtd.   - close watch, working with staff, tolerating abx. Ostomy functioning.  Wife at bedside, long talk.  She wants him to go to [facility].   - discussed plan, pending CT likely tomorrow.  Chatting on phone.    Consultants/Specialty  [facility] - Surgery  Dr. Herbert/ [place] -ID    Disposition  Likely to [facility] care when pelvic abscess improved.      Review of Systems   Constitutional: Negative for fever and chills.   HENT: Negative for congestion and hearing loss.    Respiratory: Negative for cough, shortness of breath and stridor.    Cardiovascular: Negative for chest pain and leg swelling.   Gastrointestinal: Positive for abdominal pain. Negative for nausea and vomiting.   Musculoskeletal: Negative for myalgias and neck pain.   Skin: Negative for itching and rash.   Neurological: Positive for weakness (generalized ). Negative for speech change, focal weakness and headaches.   Psychiatric/Behavioral: Negative for hallucinations. The patient is not nervous/anxious.    All other systems reviewed and are negative.     Physical Exam  Laboratory/Imaging   Hemodynamics  Temp (24hrs), Av.7 °C (98.1 °F), Min:36.6 °C (97.8 °F), Max:36.9 °C (98.4 °F)   Temperature: 36.8 °C (98.3 °F)  Pulse  Av.4  Min: 52  Max: 105    Blood Pressure : 135/73 mmHg      Respiratory      Respiration: 18, Pulse Oximetry: 95 %             Fluids    Intake/Output Summary (Last 24 hours) at 17 1301  Last data filed at 17 1200   Gross per 24 hour   Intake    880 ml   Output   1480 ml   Net   -600 ml       Nutrition  Orders Placed This Encounter   Procedures   • DIET  ORDER     Standing Status: Standing      Number of Occurrences: 1      Standing Expiration Date:      Order Specific Question:  Diet:     Answer:  Low Fiber(GI Soft) [2]     Physical Exam   Constitutional: He is oriented to person, place, and time. He is active. No distress.   HENT:   Head: Normocephalic and atraumatic.   Eyes: EOM are normal. No scleral icterus.   Neck: Normal range of motion.   Cardiovascular: Normal rate, regular rhythm, normal heart sounds and intact distal pulses.    No murmur heard.  Pulmonary/Chest: Effort normal and breath sounds normal. No stridor. No respiratory distress. He has no wheezes.   Abdominal: Soft. Bowel sounds are normal. He exhibits no distension. There is no tenderness.   Colostomy in place, air loose stool present.   Left back catheter present - serous drainage present - blood tinged.   Musculoskeletal: Normal range of motion. He exhibits no edema or tenderness.   Neurological: He is alert and oriented to person, place, and time. No cranial nerve deficit.   Skin: Skin is warm and dry. He is not diaphoretic. No erythema.   Psychiatric: He has a normal mood and affect. His behavior is normal.   Vitals reviewed.                               Assessment/Plan     Pelvic abscess in male (CMS-HCC) (present on admission)  Assessment & Plan  VRE, yeast infection --S/p   Ex lap , washout, Sigmoid colon resection w Colostomy placement, Appi-  Bowel activity recovering- Fu CT 8-3  with minimal decreased size of pelvic abscess 4.5 x 4.7 x 5.4 cm - post Pelvic abscess drain placement 8-5-17   Minimal output-- flush drain- follow output- anticipate dc if tapering off output.  cw GI diet.   IV tygacil and diflucan . Discussed with dr. Cadet. Will repeat the CT later this week.  Oral fluconazole for candida coverage.   Pain control w prn norco, IV ms for severe pain.  PT/OT      Normocytic anemia  Assessment & Plan  Stable H&H, multi factorial  intra-op losses and chronic  disease-stable  Monitor for acute symptoms.      Bipolar 1 disorder, depressed (CMS-[facility])  Assessment & Plan  Continue  lamictal    BPH (benign prostatic hypertrophy) (present on admission)  Assessment & Plan  Continue Flomax  Keep perineum area dry.    Depression (present on admission)  Assessment & Plan  Stable    Osteoarthritis (present on admission)  Assessment & Plan  Stable, prn analgesia  ca, vit d    Debility - tx to [facility] when stable for rehab.   Labs reviewed, Medications reviewed and Radiology images reviewed  Ruff catheter: No Ruff  Central line in place: Concentrated IV drugs    DVT Prophylaxis: Enoxaparin (Lovenox)      Antibiotics: Treating active infection/contamination beyond 24 hours perioperative coverage

## 2017-08-09 NOTE — CARE PLAN
Problem: Safety  Goal: Will remain free from injury  Outcome: PROGRESSING AS EXPECTED  Remains free of injury, bed alarm on, calls out appropriately.     Problem: Mobility  Goal: Risk for activity intolerance will decrease  Outcome: PROGRESSING AS EXPECTED  Pt able to ambulate with assistance and fww, has generalized weakness, just needs encouragement.     Problem: Urinary Elimination:  Goal: Ability to reestablish a normal urinary elimination pattern will improve  Outcome: PROGRESSING AS EXPECTED  Voids per urinal with no difficulty.

## 2017-08-09 NOTE — PROGRESS NOTES
"Surgical Progress Note    Author: Jorge Rodriguez Date & Time created: 2017   8:38 AM     Interval Events:  Denies abdominal pain, nausea, or vomittine.  Tolerating diet.  Dr. Cadet noted in her note yesterday \"Will need repeat CT later this week.\"  Drain output recorded as 5 ml in 24 hours prior to yesterday morning, 0 ml in 24 hours prior to this morning.    Review of Systems   Constitutional: Negative.    Respiratory: Negative.    Cardiovascular: Negative.    Gastrointestinal: Negative for nausea, vomiting and abdominal pain.     Hemodynamics:  Temp (24hrs), Av.7 °C (98.1 °F), Min:36.6 °C (97.8 °F), Max:36.9 °C (98.4 °F)  Temperature: 36.8 °C (98.3 °F)  Pulse  Av.4  Min: 52  Max: 105   Blood Pressure : 135/73 mmHg     Respiratory:    Respiration: 18, Pulse Oximetry: 95 %        RUL Breath Sounds: Clear, RML Breath Sounds: Clear, RLL Breath Sounds: Diminished, TOSHIA Breath Sounds: Clear, LLL Breath Sounds: Diminished  Neuro:  GCS = 15       Fluids:    Intake/Output Summary (Last 24 hours) at 17 0838  Last data filed at 17 0726   Gross per 24 hour   Intake    410 ml   Output   1255 ml   Net   -845 ml        Current Diet Order   Procedures   • DIET ORDER     Physical Exam   Constitutional: He is oriented to person, place, and time. He appears well-developed and well-nourished. No distress.   HENT:   Head: Normocephalic.   Eyes: Pupils are equal, round, and reactive to light. No scleral icterus.   Cardiovascular: Normal rate.    Pulmonary/Chest: Effort normal. No respiratory distress.   Abdominal: Soft. Bowel sounds are normal. He exhibits no distension. There is no tenderness. There is no rebound and no guarding.   Colostomy intact, productive of appropriate amount of stool (not recorded).  Midline incision intact.   Neurological: He is alert and oriented to person, place, and time.   Psychiatric: He has a normal mood and affect. His behavior is normal. Judgment and thought content " normal.     Labs:  No results found for this or any previous visit (from the past 24 hour(s)).  Medical Decision Making, by Problem:  Active Hospital Problems    Diagnosis   • Pelvic abscess in male (CMS-[facility]) [K65.1]     Priority: High   • BPH (benign prostatic hypertrophy) [N40.0]     Priority: Low   • Depression [F32.9]     Priority: Low   • Osteoarthritis [M19.90]     Priority: Low   • Bipolar 1 disorder, depressed (CMS-[facility]) [F31.9]   • Normocytic anemia [D64.9]     Plan:  Consider CT scan of abdomen/pelvis, as recommended by Dr. Cadet, to follow up resolution of fluid collection, possible removal of drain if fluid collection resolved.  Will continue to see 3x/week    Quality Measures:  Labs reviewed and Medications reviewed  Ruff catheter: No Ruff      DVT Prophylaxis: Enoxaparin (Lovenox)      Antibiotics: Treating active infection/contamination beyond 24 hours perioperative coverage        Discussed patient condition with Patient

## 2017-08-09 NOTE — THERAPY
"Occupational Therapy Treatment completed with focus on ADLs, ADL transfers and patient education.  Functional Status:  Pt seen for OT tx today.  Pt was pleasant and cooperative throughout the session but continues to be limited by weakness, endurance, and self care.  Pt completed supine to sit, sit to stand, and stand pivot with CGA.  Pt was unanble to ambulate due to pain on back of L LE.  Max A for LB dressing and extra time to follow through with ADL activities due to pain and weakness.  Pt would benefit from continued inpt OT to increase independence with functional transfers, functional endurance, and ADLs.  Plan of Care: Will benefit from Occupational Therapy 3 times per week  Discharge Recommendations:  Equipment Will Continue to Assess for Equipment Needs. Post-acute therapy Discharge to a transitional care facility for continued skilled therapy services.    See \"Rehab Therapy-Acute\" Patient Summary Report for complete documentation.   "

## 2017-08-10 ENCOUNTER — APPOINTMENT (OUTPATIENT)
Dept: RADIOLOGY | Facility: MEDICAL CENTER | Age: 77
DRG: 853 | End: 2017-08-10
Attending: INTERNAL MEDICINE
Payer: MEDICARE

## 2017-08-10 PROCEDURE — 97530 THERAPEUTIC ACTIVITIES: CPT

## 2017-08-10 PROCEDURE — 700102 HCHG RX REV CODE 250 W/ 637 OVERRIDE(OP): Performed by: HOSPITALIST

## 2017-08-10 PROCEDURE — 700102 HCHG RX REV CODE 250 W/ 637 OVERRIDE(OP): Performed by: INTERNAL MEDICINE

## 2017-08-10 PROCEDURE — 700102 HCHG RX REV CODE 250 W/ 637 OVERRIDE(OP): Performed by: SURGERY

## 2017-08-10 PROCEDURE — 700117 HCHG RX CONTRAST REV CODE 255: Performed by: INTERNAL MEDICINE

## 2017-08-10 PROCEDURE — 700105 HCHG RX REV CODE 258: Performed by: INTERNAL MEDICINE

## 2017-08-10 PROCEDURE — A9270 NON-COVERED ITEM OR SERVICE: HCPCS | Performed by: HOSPITALIST

## 2017-08-10 PROCEDURE — 770021 HCHG ROOM/CARE - ISO PRIVATE

## 2017-08-10 PROCEDURE — A9270 NON-COVERED ITEM OR SERVICE: HCPCS | Performed by: SURGERY

## 2017-08-10 PROCEDURE — 74177 CT ABD & PELVIS W/CONTRAST: CPT

## 2017-08-10 PROCEDURE — 99233 SBSQ HOSP IP/OBS HIGH 50: CPT | Performed by: INTERNAL MEDICINE

## 2017-08-10 PROCEDURE — A9270 NON-COVERED ITEM OR SERVICE: HCPCS | Performed by: NURSE PRACTITIONER

## 2017-08-10 PROCEDURE — A9270 NON-COVERED ITEM OR SERVICE: HCPCS | Performed by: INTERNAL MEDICINE

## 2017-08-10 PROCEDURE — 700111 HCHG RX REV CODE 636 W/ 250 OVERRIDE (IP): Performed by: SURGERY

## 2017-08-10 PROCEDURE — 700105 HCHG RX REV CODE 258

## 2017-08-10 PROCEDURE — 700102 HCHG RX REV CODE 250 W/ 637 OVERRIDE(OP): Performed by: NURSE PRACTITIONER

## 2017-08-10 PROCEDURE — 700111 HCHG RX REV CODE 636 W/ 250 OVERRIDE (IP): Performed by: INTERNAL MEDICINE

## 2017-08-10 RX ORDER — GABAPENTIN 100 MG/1
200 CAPSULE ORAL ONCE
Status: COMPLETED | OUTPATIENT
Start: 2017-08-10 | End: 2017-08-10

## 2017-08-10 RX ORDER — GABAPENTIN 100 MG/1
100 CAPSULE ORAL ONCE
Status: DISCONTINUED | OUTPATIENT
Start: 2017-08-10 | End: 2017-08-10

## 2017-08-10 RX ORDER — SODIUM CHLORIDE 9 MG/ML
INJECTION, SOLUTION INTRAVENOUS
Status: COMPLETED
Start: 2017-08-10 | End: 2017-08-10

## 2017-08-10 RX ADMIN — GABAPENTIN 200 MG: 100 CAPSULE ORAL at 15:58

## 2017-08-10 RX ADMIN — GABAPENTIN 200 MG: 100 CAPSULE ORAL at 09:21

## 2017-08-10 RX ADMIN — GABAPENTIN 200 MG: 100 CAPSULE ORAL at 23:20

## 2017-08-10 RX ADMIN — LAMOTRIGINE 150 MG: 100 TABLET ORAL at 09:21

## 2017-08-10 RX ADMIN — ENOXAPARIN SODIUM 30 MG: 100 INJECTION SUBCUTANEOUS at 04:42

## 2017-08-10 RX ADMIN — ENOXAPARIN SODIUM 30 MG: 100 INJECTION SUBCUTANEOUS at 15:58

## 2017-08-10 RX ADMIN — OXYBUTYNIN CHLORIDE 5 MG: 5 TABLET ORAL at 09:22

## 2017-08-10 RX ADMIN — SODIUM CHLORIDE 50 MG: 9 INJECTION, SOLUTION INTRAVENOUS at 09:21

## 2017-08-10 RX ADMIN — OXYBUTYNIN CHLORIDE 5 MG: 5 TABLET ORAL at 20:30

## 2017-08-10 RX ADMIN — OXYCODONE HYDROCHLORIDE 5 MG: 5 TABLET ORAL at 23:20

## 2017-08-10 RX ADMIN — HYDROCODONE BITARTRATE AND ACETAMINOPHEN 1 TABLET: 7.5; 325 TABLET ORAL at 20:30

## 2017-08-10 RX ADMIN — TAMSULOSIN HYDROCHLORIDE 0.4 MG: 0.4 CAPSULE ORAL at 09:21

## 2017-08-10 RX ADMIN — SODIUM CHLORIDE 50 MG: 9 INJECTION, SOLUTION INTRAVENOUS at 20:39

## 2017-08-10 RX ADMIN — FLUCONAZOLE 200 MG: 200 TABLET ORAL at 09:21

## 2017-08-10 RX ADMIN — SODIUM CHLORIDE: 9 INJECTION, SOLUTION INTRAVENOUS at 17:30

## 2017-08-10 RX ADMIN — POLYETHYLENE GLYCOL 3350 1 PACKET: 17 POWDER, FOR SOLUTION ORAL at 09:22

## 2017-08-10 RX ADMIN — IOHEXOL 100 ML: 350 INJECTION, SOLUTION INTRAVENOUS at 14:26

## 2017-08-10 RX ADMIN — OMEPRAZOLE 20 MG: 20 CAPSULE, DELAYED RELEASE ORAL at 09:22

## 2017-08-10 RX ADMIN — CALCIUM CARBONATE-CHOLECALCIFEROL TAB 250 MG-125 UNIT 1 TABLET: 250-125 TAB at 09:21

## 2017-08-10 RX ADMIN — OXYCODONE HYDROCHLORIDE 5 MG: 5 TABLET ORAL at 15:58

## 2017-08-10 ASSESSMENT — ENCOUNTER SYMPTOMS
HEADACHES: 0
WEAKNESS: 1
COUGH: 0
SPEECH CHANGE: 0
FEVER: 0
VOMITING: 0
SENSORY CHANGE: 0
SHORTNESS OF BREATH: 0
MYALGIAS: 0

## 2017-08-10 ASSESSMENT — COGNITIVE AND FUNCTIONAL STATUS - GENERAL
CLIMB 3 TO 5 STEPS WITH RAILING: A LOT
MOBILITY SCORE: 14
STANDING UP FROM CHAIR USING ARMS: A LITTLE
SUGGESTED CMS G CODE MODIFIER MOBILITY: CL
WALKING IN HOSPITAL ROOM: A LOT
TURNING FROM BACK TO SIDE WHILE IN FLAT BAD: A LOT
MOVING TO AND FROM BED TO CHAIR: A LOT
MOVING FROM LYING ON BACK TO SITTING ON SIDE OF FLAT BED: A LITTLE

## 2017-08-10 ASSESSMENT — GAIT ASSESSMENTS
GAIT LEVEL OF ASSIST: UNABLE TO PARTICIPATE
ASSISTIVE DEVICE: FRONT WHEEL WALKER

## 2017-08-10 ASSESSMENT — PAIN SCALES - GENERAL
PAINLEVEL_OUTOF10: 5
PAINLEVEL_OUTOF10: 4
PAINLEVEL_OUTOF10: 6
PAINLEVEL_OUTOF10: 5
PAINLEVEL_OUTOF10: ASSUMED PAIN PRESENT

## 2017-08-10 NOTE — DISCHARGE PLANNING
"I spoke with the patient and he said that he never did find out if his \"state mental health \", [facility], received the welfare papers that Larisa Quiñonez RN Case Manager, faxed to her on July 27, 2017.  Apparently these papers were due on July 6 and the patient is concerned and needs to know if Sherly received them.  I called Sherly (phone: [phone], fax [phone]) but only got her voice mail.  I requested that she call us back so that we can let the patient know whether or not she received the papers that were faxed to her.  "

## 2017-08-10 NOTE — THERAPY
"Physical Therapy Treatment completed.   Bed Mobility:  Supine to Sit: Moderate Assist  Transfers: Sit to Stand: Contact Guard Assist (min A to SBA throughout tx)  Gait: Level Of Assist: Unable to Participate     Plan of Care: Will benefit from Physical Therapy 3 times per week  Discharge Recommendations: Equipment: Will Continue to Assess for Equipment Needs. Post-acute therapy: Discharge to a transitional care facility for continued skilled therapy services.     Pt performed pregait activities standing EOB with FWW with mod A. Pt performed 5x STS with weight shifting and side stepping to both sides for each STS. Reduced assist and improved standing posture with each repetition. Pt exhibited flexed posture throughout tx, somewhat improved with verbal cueing. Gross weakness in BLE, R appeared weaker than L in functional mobility. Pt currently limited 2' to severe deconditioning. Pt will benefit from continued skilled acute PT services. Anticipate pt will benefit from skilled inpatient PT services/placement once medically cleared. Recommend pt up with nursing to chair for each meal (and return to bed) throughout day to increase mobility.    See \"Rehab Therapy-Acute\" Patient Summary Report for complete documentation.       "

## 2017-08-10 NOTE — PROGRESS NOTES
Received report from BART Rodriguez. Pt is Lenora. HUGH. VSS. Pt denies SOB. Denies pain. -N/V. L sided ostomy; w/ good stool output. Midline abdominal incision SCAR w/ steri strips. Pt has perirectal IR drain w/ small output; flushed w/ 10ml of NS. L arm PICC in place; pt on tygacil. Pt up 1x assist w/ FWW. POC discussed. Pt to have CT today. Bed locked and in the lowest position. Call light w/in reach. Hourly rounding in place

## 2017-08-10 NOTE — PROGRESS NOTES
Infectious Disease Progress Note    Author: Latonia Cadet M.D. Date of service & Time created: 8/10/2017  9:54 AM    Interval History:  76-year-old white male admitted with pelvic abscess possibly due to bowel perforation due to constipation  17- low-grade fevers. Continues to have abdominal pain  17- complains of some abdominal pain. MAXIMUM TEMPERATURE 100.3. WBC 7.6  17- complains of abdominal pain. MAXIMUM TEMPERATURE is 98. WBC 7.2  17- ongoing abdominal pain. No fevers. No new issues overnight.   AF WBC 4.8 Called for less expensive abx recommendations and new GI sxs. Stool in toilet  8/3 AF less anxious-pain better controlled   eating improved   AF no new complaints-denies SE abx Pain about the same   new drain placed   AF, no CBC, feels tired   AF, no CBC, sitting up in chair, friends visiting in room, denies any pain, tolerating abx without issues   AF, no CBC, c/o pain on bottom, very weak and unable to get out of chair, needs assistance  8/10 AF, no CBC, having some R groin pain, plan for repeat CT today  Labs Reviewed, Medications Reviewed, Radiology Reviewed and Wound Reviewed.    Review of Systems:  Review of Systems   Constitutional: Positive for malaise/fatigue. Negative for fever.   Respiratory: Negative for cough and shortness of breath.    Cardiovascular: Negative for chest pain.   Gastrointestinal: Negative for vomiting.   Genitourinary: Negative for dysuria.   Musculoskeletal: Negative for myalgias.   Neurological: Positive for weakness. Negative for sensory change, speech change and headaches.       Hemodynamics:  Temp (24hrs), Av.4 °C (97.6 °F), Min:36.1 °C (97 °F), Max:36.7 °C (98 °F)  Temperature: 36.1 °C (97 °F)  Pulse  Av.4  Min: 52  Max: 105   Blood Pressure : 120/66 mmHg       Physical Exam:  Physical Exam   Constitutional: He is oriented to person, place, and time. He appears well-developed. No distress.   Cachectic and  frail  Chronically ill   HENT:   Head: Normocephalic and atraumatic.   Mouth/Throat: No oropharyngeal exudate.   Eyes: EOM are normal. Pupils are equal, round, and reactive to light. No scleral icterus.   Neck: Neck supple.   Cardiovascular: Normal rate and regular rhythm.    No murmur heard.  Pulmonary/Chest: Effort normal. No respiratory distress.   Abdominal: Soft. There is no tenderness. There is no rebound and no guarding.   Colostomy formed stool  Staples removed. Steristrips in place  Surgical site well healing     Musculoskeletal: He exhibits no edema.   LUE PICC nontender   Neurological: He is alert and oriented to person, place, and time.   Skin:   Sacrum dressed   Nursing note and vitals reviewed.      Labs:  No results for input(s): WBC, RBC, HEMOGLOBIN, HEMATOCRIT, MCV, MCH, RDW, PLATELETCT, MPV, NEUTSPOLYS, LYMPHOCYTES, MONOCYTES, EOSINOPHILS, BASOPHILS, RBCMORPHOLO in the last 72 hours.  No results for input(s): SODIUM, POTASSIUM, CHLORIDE, CO2, GLUCOSE, BUN, CPKTOTAL in the last 72 hours.  No results for input(s): ALBUMIN, TBILIRUBIN, ALKPHOSPHAT, TOTPROTEIN, ALTSGPT, ASTSGOT, CREATININE in the last 72 hours.    Wound:      Results     Procedure Component Value Units Date/Time    ANAEROBIC CULTURE [[id number]] Collected:  07/25/17 1445    Order Status:  Completed Specimen Information:  Wound Updated:  07/30/17 1334     Significant Indicator NEG      Source WND      Site Peritoneal Abscess      Anaerobic Culture, Culture Res No Anaerobes isolated.     Narrative:      CALL  Clark  141 tel. [phone],  CALLED  141 tel. [phone] 07/28/2017, 08:05, RB PERF. RESULTS CALLED  TO:[phone]    CULTURE WOUND W/ GRAM STAIN [[id number]]  (Abnormal)  (Susceptibility) Collected:  07/25/17 1445    Order Status:  Completed Specimen Information:  Wound Updated:  07/30/17 1334     Significant Indicator POS (POS)      Source WND      Site Peritoneal Abscess      Culture Result Wound -- (A)      Gram Stain Result --       Result:        Many WBCs.  Few Yeast.  Few Gram positive cocci.       Culture Result Wound -- (A)      Result:        Enterococcus faecium  Combination therapy with ampicillin, penicillin, or  vancomycin (for susceptible strains) plus an aminoglycoside  is usually indicated for serious enterococcal infections,  such as endocarditis unless high-level resistance to both  gentamicin and streptomycin is documented; such combinations  are predicted to result in synergistic killing of the  Enterococcus.  VANCOMYCIN RESISTANT ENTEROCOCCI(VRE)       Culture Result Wound -- (A)      Result:        Candida albicans  Light growth      Narrative:      CALL  Clark  141 tel. [phone],  CALLED  141 tel. [phone] 07/28/2017, 08:05, RB PERF. RESULTS CALLED  TO:[phone]    Culture & Susceptibility     ENTEROCOCCUS FAECIUM     Antibiotic Sensitivity Microscan Unit Status    Ampicillin Resistant >8 mcg/mL Final    Daptomycin Sensitive 2 mcg/mL Final    Gent Synergy Sensitive <=500 mcg/mL Final    Linezolid Sensitive 2 mcg/mL Final    Penicillin Resistant >8 mcg/mL Final    Vancomycin Resistant >16 mcg/mL Final                             Fluids:  Intake/Output       08/08/17 0700 - 08/09/17 0659 08/09/17 0700 - 08/10/17 0659 08/10/17 0700 - 08/11/17 0659      4481-6555 2856-6994 Total 5384-2165 9442-0107 Total 2773-1450 2069-8970 Total       Intake    P.O.  360  -- 360  720  400 1120  --  -- --    P.O. 360 -- 360  -- -- --    I.V.  --  50 50  350  50 400  50  -- 50    IV Volume (NS) -- -- -- 300 -- 300 -- -- --    IV Piggyback Volume (IV Piggyback) -- 50 50 50 50 100 50 -- 50    Total Intake 360 50 410 8296 526 3856 50 -- 50       Output    Urine  350  600 950  1025  350 1375  --  -- --    Void (ml) 350  350 1375 -- -- --    Drains  --  5 5  0  20 20  --  -- --    Sukhwinder Oquendo 1 -- 5 5 0 20 20 -- -- --    Stool/Urine  --  -- --  200  100 300  --  -- --    Ostomy #1 (ml)  -- -- -- 200 100 300 -- -- --    Stool   --  -- --  --  -- --  --  -- --    Number of Times Stooled -- -- -- -- 0 x 0 x -- -- --    Total Output 350  470 1695 -- -- --       Net I/O     10 -555 -545 -155 -20 -175 50 -- 50        CT-ABDOMEN-PELVIS WITH (Final result) Result time: 08/03/17 16:43:02     Final result by Madina Dorman M.D. (08/03/17 16:43:02)     Impression:     4.5 x 4.7 x 5.4 cm pelvic fluid collection is mildly decreased in size compared to prior. There has been interval removal of the previously noted pigtail catheter.  Small amount of free fluid in the abdomen and pelvis.  Mildly dilated loops of small bowel may represent mild ileus.  Small bilateral pleural effusions with overlying atelectasis/consolidation.  Moderate hiatal hernia.  Prominent atherosclerotic plaque.  Healing fractures of the fifth and sixth ribs on the right.          Assessment:  Active Hospital Problems    Diagnosis   • *Sepsis (CMS-[facility]) [A41.9]   • Abdominal abscess (CMS-[facility]) [K65.1]   • BPH (benign prostatic hypertrophy) [N40.0]   • Depression [F32.9]   • Osteoarthritis [M19.90]       Plan:  Pelvic abscess due to bowel perforation  Afebrile  Leukocytosis, resolved  S/p IR drain ×2  Cultures of Enterococcus faecium, viridans strep, Bacteroides fragilis and Candida albicans  CT scan done on 7/24/2017 shows persistent pelvic abscess over 5 cm  s/p exploratory laparotomy, abdominal washout, sigmoid colon resection, colostomy placement and appendectomy on 7/26/17  OR cultures-+VRE and Candida  Repeat CT showed 4.5 X  4.7 X 5.4cm-too large for abx to penetrate  s/p drainage cath 8/5-follow cxs - NGTD  Continue tigecycline and fluc for now  Can change to Zyvox for VRE with augmentin to cover Ecoli, Bfrag, strep once abscess less than 3 cm  Repeat CT today  Endpoint 2 weeks from when abscess less than 3 cm    Protein malnutrition  Anticipate poor wound healing  Alb 2.2    DW IM Dr. Howell and MSW

## 2017-08-10 NOTE — CARE PLAN
Problem: Safety  Goal: Will remain free from injury  Outcome: PROGRESSING AS EXPECTED  No injury since admission to unit, bed alarm on, pt calls out appropriately for assistance.     Problem: Pain Management  Goal: Pain level will decrease to patient’s comfort goal  Outcome: PROGRESSING AS EXPECTED  Pain currently controlled. Prn pain meds ordered.    Problem: Urinary Elimination:  Goal: Ability to reestablish a normal urinary elimination pattern will improve  Outcome: PROGRESSING AS EXPECTED  Voids per urinal with adequate output.

## 2017-08-10 NOTE — PROGRESS NOTES
Pt found resting in bed, on room air.  Updated on poc.  No acute symptoms nor signs of distress. VSS.  Pt reports abd pain 2/10, controlled.  Abd incision suma, steri strips intact, no active drainage.    LLQ colostomy in place, producing soft brown moderate amount stool.  IR drain to left upper buttock, minimal serous drainage.  -N/V, voids to urinal, last bm 8/9 per colostomy.  Left upper arm PICC patent.  Pt denies any other needs at this time.  Bed alarm on.  Call light within reach, will continue to monitor.

## 2017-08-11 PROCEDURE — A9270 NON-COVERED ITEM OR SERVICE: HCPCS | Performed by: INTERNAL MEDICINE

## 2017-08-11 PROCEDURE — A9270 NON-COVERED ITEM OR SERVICE: HCPCS | Performed by: SURGERY

## 2017-08-11 PROCEDURE — 700102 HCHG RX REV CODE 250 W/ 637 OVERRIDE(OP): Performed by: SURGERY

## 2017-08-11 PROCEDURE — 700102 HCHG RX REV CODE 250 W/ 637 OVERRIDE(OP): Performed by: NURSE PRACTITIONER

## 2017-08-11 PROCEDURE — 99233 SBSQ HOSP IP/OBS HIGH 50: CPT | Performed by: INTERNAL MEDICINE

## 2017-08-11 PROCEDURE — 700105 HCHG RX REV CODE 258

## 2017-08-11 PROCEDURE — 700102 HCHG RX REV CODE 250 W/ 637 OVERRIDE(OP): Performed by: INTERNAL MEDICINE

## 2017-08-11 PROCEDURE — 770021 HCHG ROOM/CARE - ISO PRIVATE

## 2017-08-11 PROCEDURE — A9270 NON-COVERED ITEM OR SERVICE: HCPCS | Performed by: NURSE PRACTITIONER

## 2017-08-11 PROCEDURE — 700102 HCHG RX REV CODE 250 W/ 637 OVERRIDE(OP): Performed by: HOSPITALIST

## 2017-08-11 PROCEDURE — A9270 NON-COVERED ITEM OR SERVICE: HCPCS | Performed by: HOSPITALIST

## 2017-08-11 PROCEDURE — 700111 HCHG RX REV CODE 636 W/ 250 OVERRIDE (IP): Performed by: SURGERY

## 2017-08-11 RX ORDER — AMOXICILLIN AND CLAVULANATE POTASSIUM 875; 125 MG/1; MG/1
1 TABLET, FILM COATED ORAL EVERY 12 HOURS
Status: DISCONTINUED | OUTPATIENT
Start: 2017-08-11 | End: 2017-08-18 | Stop reason: HOSPADM

## 2017-08-11 RX ORDER — SODIUM CHLORIDE 9 MG/ML
INJECTION, SOLUTION INTRAVENOUS
Status: COMPLETED
Start: 2017-08-11 | End: 2017-08-11

## 2017-08-11 RX ORDER — AMOXICILLIN AND CLAVULANATE POTASSIUM 875; 125 MG/1; MG/1
1 TABLET, FILM COATED ORAL EVERY 12 HOURS
Refills: 0
Start: 2017-08-11 | End: 2017-12-18

## 2017-08-11 RX ORDER — TAMSULOSIN HYDROCHLORIDE 0.4 MG/1
0.4 CAPSULE ORAL
Qty: 30 CAP
Start: 2017-08-11 | End: 2018-01-24

## 2017-08-11 RX ORDER — LINEZOLID 600 MG/1
600 TABLET, FILM COATED ORAL EVERY 12 HOURS
Qty: 20 TAB | Refills: 0
Start: 2017-08-11 | End: 2017-12-18

## 2017-08-11 RX ORDER — POLYETHYLENE GLYCOL 3350 17 G/17G
17 POWDER, FOR SOLUTION ORAL DAILY
Refills: 3
Start: 2017-08-11 | End: 2017-12-18

## 2017-08-11 RX ORDER — OXYCODONE HYDROCHLORIDE 5 MG/1
5 TABLET ORAL EVERY 4 HOURS PRN
Qty: 30 TAB | Refills: 0
Start: 2017-08-11 | End: 2017-12-18

## 2017-08-11 RX ORDER — FLUCONAZOLE 200 MG/1
200 TABLET ORAL DAILY
Refills: 0
Start: 2017-08-11 | End: 2017-12-18

## 2017-08-11 RX ORDER — OXYBUTYNIN CHLORIDE 5 MG/1
5 TABLET ORAL 2 TIMES DAILY
Qty: 90 TAB
Start: 2017-08-11 | End: 2017-12-18

## 2017-08-11 RX ORDER — LINEZOLID 600 MG/1
600 TABLET, FILM COATED ORAL EVERY 12 HOURS
Status: DISCONTINUED | OUTPATIENT
Start: 2017-08-11 | End: 2017-08-17

## 2017-08-11 RX ADMIN — LINEZOLID 600 MG: 600 TABLET, FILM COATED ORAL at 08:14

## 2017-08-11 RX ADMIN — GABAPENTIN 200 MG: 100 CAPSULE ORAL at 14:35

## 2017-08-11 RX ADMIN — TAMSULOSIN HYDROCHLORIDE 0.4 MG: 0.4 CAPSULE ORAL at 08:14

## 2017-08-11 RX ADMIN — OMEPRAZOLE 20 MG: 20 CAPSULE, DELAYED RELEASE ORAL at 08:15

## 2017-08-11 RX ADMIN — OXYBUTYNIN CHLORIDE 5 MG: 5 TABLET ORAL at 08:15

## 2017-08-11 RX ADMIN — LINEZOLID 600 MG: 600 TABLET, FILM COATED ORAL at 19:59

## 2017-08-11 RX ADMIN — SODIUM CHLORIDE 500 ML: 9 INJECTION, SOLUTION INTRAVENOUS at 04:54

## 2017-08-11 RX ADMIN — GABAPENTIN 200 MG: 100 CAPSULE ORAL at 19:59

## 2017-08-11 RX ADMIN — ENOXAPARIN SODIUM 30 MG: 100 INJECTION SUBCUTANEOUS at 17:59

## 2017-08-11 RX ADMIN — ENOXAPARIN SODIUM 30 MG: 100 INJECTION SUBCUTANEOUS at 04:53

## 2017-08-11 RX ADMIN — LAMOTRIGINE 150 MG: 100 TABLET ORAL at 08:15

## 2017-08-11 RX ADMIN — CALCIUM CARBONATE-CHOLECALCIFEROL TAB 250 MG-125 UNIT 1 TABLET: 250-125 TAB at 08:15

## 2017-08-11 RX ADMIN — HYDROCODONE BITARTRATE AND ACETAMINOPHEN 1 TABLET: 7.5; 325 TABLET ORAL at 22:25

## 2017-08-11 RX ADMIN — OXYCODONE HYDROCHLORIDE 5 MG: 5 TABLET ORAL at 17:59

## 2017-08-11 RX ADMIN — FLUCONAZOLE 200 MG: 200 TABLET ORAL at 08:14

## 2017-08-11 RX ADMIN — OXYCODONE HYDROCHLORIDE 5 MG: 5 TABLET ORAL at 08:56

## 2017-08-11 RX ADMIN — AMOXICILLIN AND CLAVULANATE POTASSIUM 1 TABLET: 875; 125 TABLET, FILM COATED ORAL at 08:15

## 2017-08-11 RX ADMIN — GABAPENTIN 200 MG: 100 CAPSULE ORAL at 08:14

## 2017-08-11 RX ADMIN — OXYCODONE HYDROCHLORIDE 5 MG: 5 TABLET ORAL at 04:53

## 2017-08-11 RX ADMIN — POLYETHYLENE GLYCOL 3350 0.5 PACKET: 17 POWDER, FOR SOLUTION ORAL at 08:15

## 2017-08-11 RX ADMIN — AMOXICILLIN AND CLAVULANATE POTASSIUM 1 TABLET: 875; 125 TABLET, FILM COATED ORAL at 19:59

## 2017-08-11 RX ADMIN — OXYBUTYNIN CHLORIDE 5 MG: 5 TABLET ORAL at 19:59

## 2017-08-11 ASSESSMENT — ENCOUNTER SYMPTOMS
SPEECH CHANGE: 0
SENSORY CHANGE: 0
WEAKNESS: 1
MYALGIAS: 0
CHILLS: 0
NECK PAIN: 0
FOCAL WEAKNESS: 0
COUGH: 0
NERVOUS/ANXIOUS: 0
NAUSEA: 0
ABDOMINAL PAIN: 1
FEVER: 0
VOMITING: 0
MYALGIAS: 1
HEADACHES: 0
HALLUCINATIONS: 0
STRIDOR: 0
SHORTNESS OF BREATH: 0

## 2017-08-11 ASSESSMENT — PAIN SCALES - GENERAL
PAINLEVEL_OUTOF10: 10
PAINLEVEL_OUTOF10: ASSUMED PAIN PRESENT
PAINLEVEL_OUTOF10: 10
PAINLEVEL_OUTOF10: 7
PAINLEVEL_OUTOF10: 6
PAINLEVEL_OUTOF10: 6
PAINLEVEL_OUTOF10: ASSUMED PAIN PRESENT

## 2017-08-11 NOTE — CARE PLAN
Problem: Nutritional:  Goal: Achieve adequate nutritional intake  Diet will adv past CL and patient will consume >50% of meals and supplement   Outcome: MET Date Met:  08/11/17  Pt with consistent good PO of meals of >50%. Continue current POC as tolerated by pt. RD available PRN.

## 2017-08-11 NOTE — DISCHARGE PLANNING
Attempted to follow up with [facility], however, no answer. Voicemail left for Joseph.

## 2017-08-11 NOTE — DISCHARGE PLANNING
Medical Social Work    SW updated pt nurse regarding pt's potential transfer to SNF. Pt unlikely to d/c over the weekend due to limited contact iso beds.

## 2017-08-11 NOTE — PROGRESS NOTES
At baseline this shift, resting well in bed, no apparent distress noted, no AMS/A&OX4 VSS. C/o pain on initial encounter at 5/10, medicated per MAR. Breathing even/unlabored, noted LCTAB, on RA/denied SOB. Abdomen soft/nontender, (+)hyperBSx4, tolerating current diet well, ostomy appliance in place/ (+)BM, IR drain in-situ to bulb suction/flushed with NS/dressing C/D/I. PICC to MONALISA in-place/ (+)blood return, dressing C/D/I, IVF x TKO. Abdominal incision SCAR/steri-strips in place. Educated and encouraged pt to ambulate frequently. 1-2 person assist. Bladder continent. Care provided. Needs attended. Bed alarm/hourly rounding in place. No concerns at this time.

## 2017-08-11 NOTE — PROGRESS NOTES
Infectious Disease Progress Note    Author: Latonia Cadet M.D. Date of service & Time created: 2017  12:07 PM    Interval History:  76-year-old white male admitted with pelvic abscess possibly due to bowel perforation due to constipation  17- low-grade fevers. Continues to have abdominal pain  17- complains of some abdominal pain. MAXIMUM TEMPERATURE 100.3. WBC 7.6  17- complains of abdominal pain. MAXIMUM TEMPERATURE is 98. WBC 7.2  17- ongoing abdominal pain. No fevers. No new issues overnight.   AF WBC 4.8 Called for less expensive abx recommendations and new GI sxs. Stool in toilet  8/3 AF less anxious-pain better controlled   eating improved   no new complaints-denies SE abx Pain about the same   new drain placed   AF, no CBC, feels tired   AF, no CBC, sitting up in chair, friends visiting in room, denies any pain, tolerating abx without issues   AF, no CBC, c/o pain on bottom, very weak and unable to get out of chair, needs assistance  8/10 AF, no CBC, having some R groin pain, plan for repeat CT today   AF, no CBC, some LLE pain, CT with interval decrease,   Labs Reviewed, Medications Reviewed, Radiology Reviewed and Wound Reviewed.    Review of Systems:  Review of Systems   Constitutional: Positive for malaise/fatigue. Negative for fever.   Respiratory: Negative for cough and shortness of breath.    Cardiovascular: Negative for chest pain.   Gastrointestinal: Negative for vomiting.   Genitourinary: Negative for dysuria.   Musculoskeletal: Positive for myalgias.   Neurological: Positive for weakness. Negative for sensory change, speech change and headaches.       Hemodynamics:  Temp (24hrs), Av.5 °C (97.7 °F), Min:36.1 °C (97 °F), Max:36.7 °C (98.1 °F)  Temperature: 36.3 °C (97.4 °F)  Pulse  Av.1  Min: 52  Max: 105   Blood Pressure : 150/74 mmHg       Physical Exam:  Physical Exam   Constitutional: He is oriented to person, place, and  time. He appears well-developed. No distress.   Cachectic and frail  Chronically ill   HENT:   Head: Normocephalic and atraumatic.   Mouth/Throat: No oropharyngeal exudate.   Eyes: EOM are normal. Pupils are equal, round, and reactive to light. No scleral icterus.   Neck: Neck supple.   Cardiovascular: Normal rate and regular rhythm.    No murmur heard.  Pulmonary/Chest: Effort normal. No respiratory distress.   Abdominal: Soft. There is no tenderness. There is no rebound and no guarding.   Colostomy formed stool  Staples removed. Steristrips in place  Surgical site well healing     Musculoskeletal: He exhibits no edema.   LUE PICC nontender   Neurological: He is alert and oriented to person, place, and time.   Skin:   Sacrum dressed   Nursing note and vitals reviewed.      Labs:  No results for input(s): WBC, RBC, HEMOGLOBIN, HEMATOCRIT, MCV, MCH, RDW, PLATELETCT, MPV, NEUTSPOLYS, LYMPHOCYTES, MONOCYTES, EOSINOPHILS, BASOPHILS, RBCMORPHOLO in the last 72 hours.  No results for input(s): SODIUM, POTASSIUM, CHLORIDE, CO2, GLUCOSE, BUN, CPKTOTAL in the last 72 hours.  No results for input(s): ALBUMIN, TBILIRUBIN, ALKPHOSPHAT, TOTPROTEIN, ALTSGPT, ASTSGOT, CREATININE in the last 72 hours.    Wound:      Results     Procedure Component Value Units Date/Time    ANAEROBIC CULTURE [[id number]] Collected:  07/25/17 1445    Order Status:  Completed Specimen Information:  Wound Updated:  07/30/17 1334     Significant Indicator NEG      Source WND      Site Peritoneal Abscess      Anaerobic Culture, Culture Res No Anaerobes isolated.     Narrative:      CALL  Clark  141 tel. [phone],  CALLED  141 tel. [phone] 07/28/2017, 08:05, RB PERF. RESULTS CALLED  TO:[phone]    CULTURE WOUND W/ GRAM STAIN [[id number]]  (Abnormal)  (Susceptibility) Collected:  07/25/17 1445    Order Status:  Completed Specimen Information:  Wound Updated:  07/30/17 1334     Significant Indicator POS (POS)      Source WND      Site Peritoneal Abscess       Culture Result Wound -- (A)      Gram Stain Result --      Result:        Many WBCs.  Few Yeast.  Few Gram positive cocci.       Culture Result Wound -- (A)      Result:        Enterococcus faecium  Combination therapy with ampicillin, penicillin, or  vancomycin (for susceptible strains) plus an aminoglycoside  is usually indicated for serious enterococcal infections,  such as endocarditis unless high-level resistance to both  gentamicin and streptomycin is documented; such combinations  are predicted to result in synergistic killing of the  Enterococcus.  VANCOMYCIN RESISTANT ENTEROCOCCI(VRE)       Culture Result Wound -- (A)      Result:        Candida albicans  Light growth      Narrative:      CALL  Clark  141 tel. [phone],  CALLED  141 tel. [phone] 07/28/2017, 08:05, RB PERF. RESULTS CALLED  TO:[phone]    Culture & Susceptibility     ENTEROCOCCUS FAECIUM     Antibiotic Sensitivity Microscan Unit Status    Ampicillin Resistant >8 mcg/mL Final    Daptomycin Sensitive 2 mcg/mL Final    Gent Synergy Sensitive <=500 mcg/mL Final    Linezolid Sensitive 2 mcg/mL Final    Penicillin Resistant >8 mcg/mL Final    Vancomycin Resistant >16 mcg/mL Final                             Fluids:  Intake/Output       08/09/17 0700 - 08/10/17 0659 08/10/17 0700 - 08/11/17 0659 08/11/17 0700 - 08/12/17 0659      5021-6447 0722-7408 Total 7260-4940 5351-5645 Total 0444-5866 6026-1263 Total       Intake    P.O.  720  400 1120  --  -- --  --  -- --    P.O.  -- -- -- -- -- --    I.V.  350  50 400  410  410 820  --  -- --    IV Volume (NS) 300 -- 300 360 360 720 -- -- --    IV Piggyback Volume (IV Piggyback) 50 50 100 50 50 100 -- -- --    Total Intake 1476 540 4722 410 410 820 -- -- --       Output    Urine  1025  350 1375  650  1440 2090  550  -- 550    Number of Times Voided -- -- -- -- -- -- 1 x -- 1 x    Void (ml) 6882 338 0830 650 1440 2090 550 -- 550    Drains  0  20 20  0  0 0  5  -- 5    Sukhwinder Oquendo 1 0 20 20 0 0  0 5 -- 5    Stool/Urine  200  100 300  200  100 300  0  -- 0    Ostomy #1 (ml)  200 100 300 200 100 300 0 -- 0    Stool  --  -- --  --  -- --  --  -- --    Number of Times Stooled -- 0 x 0 x -- 0 x 0 x 0 x -- 0 x    Total Output 7063 068 2715 850 1540 2390 555 -- 555       [facility] I/O     -155 -20 -175 -440 -1130 -1570 -555 -- -555        CT-ABDOMEN-PELVIS WITH (Final result) Result time: 08/03/17 16:43:02     Final result by Madina Dorman M.D. (08/03/17 16:43:02)     Impression:     4.5 x 4.7 x 5.4 cm pelvic fluid collection is mildly decreased in size compared to prior. There has been interval removal of the previously noted pigtail catheter.  Small amount of free fluid in the abdomen and pelvis.  Mildly dilated loops of small bowel may represent mild ileus.  Small bilateral pleural effusions with overlying atelectasis/consolidation.  Moderate hiatal hernia.  Prominent atherosclerotic plaque.  Healing fractures of the fifth and sixth ribs on the right.          Assessment:  Active Hospital Problems    Diagnosis   • *Sepsis (CMS-HCC) [A41.9]   • Abdominal abscess (CMS-HCC) [K65.1]   • BPH (benign prostatic hypertrophy) [N40.0]   • Depression [F32.9]   • Osteoarthritis [M19.90]       Plan:  Pelvic abscess due to bowel perforation  Afebrile  Leukocytosis, resolved  S/p IR drain ×2  Cultures of Enterococcus faecium, viridans strep, Bacteroides fragilis and Candida albicans  CT scan done on 7/24/2017 shows persistent pelvic abscess over 5 cm  s/p exploratory laparotomy, abdominal washout, sigmoid colon resection, colostomy placement and appendectomy on 7/26/17  OR cultures-+VRE and Candida  Repeat CT showed 4.5 X  4.7 X 5.4cm-too large for abx to penetrate  s/p drainage cath 8/5-follow cxs - NGTD  Repeat CT 8/10 - interval decrease now measuring 2.6 x 1.9 x 1.6 cm  DC tigecycline.  Transition to PO Zyvox for VRE with augmentin to cover Ecoli, Bfrag, strep x 2 weeks  Stop date 8/25/17    Protein  malnutrition  Anticipate poor wound healing  Alb 2.2    Dispo: Transfer to SNF possibly today - OK from ID standpoint    FU ID clinic    DW IM Dr. Howell and MSW

## 2017-08-11 NOTE — DISCHARGE PLANNING
Medical Social Work    Per IDT rounds, pt is medically clear to transfer to [facility]. Pt currently receives Zyvox every 12 hours with 27 administrations remaining (approximately 2 weeks worth). SW placed call to [facility] to request f/u with [facility]. If [facility] is able to accept pt, SW will request D/C summary and alert nurse of transport time.

## 2017-08-11 NOTE — CARE PLAN
Problem: Safety  Goal: Will remain free from injury  Safety instructions given, fall precautions in place.    Problem: Mobility  Goal: Risk for activity intolerance will decrease  Educated pt regarding importance of ambulation, encouraged and planned with pt to ambulate this shift.

## 2017-08-11 NOTE — PROGRESS NOTES
[facility] Hospitalist Progress Note    Date of Service: 2017    Chief Complaint  75 y/o M with PMH of peripheral neuropathy, BPH, Depression admitted for abdominal pain, diarrhea.  Dx with pelvis abscess on imaging. S/p Ex lap and abdominal washout on .    Interval Problem Update    Pelvic drain - Ngtd, minimal output . Afeb on IV atbs . Repeat cxs ngtd.   - close watch, working with staff, tolerating abx. Ostomy functioning.  Wife at bedside, long talk.  She wants him to go to [facility].   - discussed plan, pending CT likely tomorrow.  Chatting on phone.   - discharge planning, awaiting SNF approval.    Consultants/Specialty  [place] - Surgery  Dr. Herbert/ [place] -ID    Disposition  Likely to [facility] when pelvic abscess improved.      Review of Systems   Constitutional: Negative for fever and chills.   HENT: Negative for congestion and hearing loss.    Respiratory: Negative for cough, shortness of breath and stridor.    Cardiovascular: Negative for chest pain and leg swelling.   Gastrointestinal: Positive for abdominal pain. Negative for nausea and vomiting.   Musculoskeletal: Negative for myalgias and neck pain.   Skin: Negative for itching and rash.   Neurological: Positive for weakness (generalized ). Negative for speech change, focal weakness and headaches.   Psychiatric/Behavioral: Negative for hallucinations. The patient is not nervous/anxious.    All other systems reviewed and are negative.     Physical Exam  Laboratory/Imaging   Hemodynamics  Temp (24hrs), Av.5 °C (97.7 °F), Min:36.1 °C (97 °F), Max:36.7 °C (98.1 °F)   Temperature: 36.3 °C (97.4 °F)  Pulse  Av.1  Min: 52  Max: 105    Blood Pressure : 150/74 mmHg      Respiratory      Respiration: 17, Pulse Oximetry: 92 %        RUL Breath Sounds: Clear, RML Breath Sounds: Clear, RLL Breath Sounds: Diminished, TOSHIA Breath Sounds: Clear, LLL Breath Sounds: Diminished    Fluids    Intake/Output Summary (Last 24 hours) at  08/11/17 1334  Last data filed at 08/11/17 1200   Gross per 24 hour   Intake    890 ml   Output   2745 ml   Net  -1855 ml       Nutrition  Orders Placed This Encounter   Procedures   • DIET ORDER     Standing Status: Standing      Number of Occurrences: 1      Standing Expiration Date:      Order Specific Question:  Diet:     Answer:  Low Fiber(GI Soft) [2]     Physical Exam   Constitutional: He is oriented to person, place, and time. He is active. No distress.   HENT:   Head: Normocephalic and atraumatic.   Eyes: EOM are normal. No scleral icterus.   Neck: Normal range of motion.   Cardiovascular: Normal rate, regular rhythm, normal heart sounds and intact distal pulses.    No murmur heard.  Pulmonary/Chest: Effort normal and breath sounds normal. No stridor. No respiratory distress. He has no wheezes.   Abdominal: Soft. Bowel sounds are normal. He exhibits no distension. There is no tenderness.   Colostomy in place, air loose stool present.   Left back catheter present - serous drainage present - blood tinged.   Musculoskeletal: Normal range of motion. He exhibits no edema or tenderness.   Neurological: He is alert and oriented to person, place, and time. No cranial nerve deficit.   Skin: Skin is warm and dry. He is not diaphoretic. No erythema.   Psychiatric: He has a normal mood and affect. His behavior is normal.   Vitals reviewed.                               Assessment/Plan     Pelvic abscess in male (CMS-HCC) (present on admission)  Assessment & Plan  VRE, yeast infection --S/p   Ex lap , washout, Sigmoid colon resection w Colostomy placement, Appi-  Bowel activity recovering- Fu CT 8-3  with minimal decreased size of pelvic abscess 4.5 x 4.7 x 5.4 cm - post Pelvic abscess drain placement 8-5-17   Minimal output-- flush drain- follow output- anticipate dc if tapering off output.  cw GI diet.   IV tygacil and diflucan . Discussed with dr. Cadet. Will repeat the CT later this week.  Oral fluconazole for  candida coverage.   Pain control w prn norco, IV ms for severe pain.  PT/OT      Normocytic anemia  Assessment & Plan  Stable H&H, multi factorial  intra-op losses and chronic disease-stable  Monitor for acute symptoms.      Bipolar 1 disorder, depressed (CMS-HCC)  Assessment & Plan  Continue  lamictal    BPH (benign prostatic hypertrophy) (present on admission)  Assessment & Plan  Continue Flomax  Keep perineum area dry.    Depression (present on admission)  Assessment & Plan  Stable    Osteoarthritis (present on admission)  Assessment & Plan  Stable, prn analgesia  ca, vit d    Debility - tx to [facility] when stable for rehab.   Labs reviewed, Medications reviewed and Radiology images reviewed  Ruff catheter: No Ruff  Central line in place: Concentrated IV drugs    DVT Prophylaxis: Enoxaparin (Lovenox)      Antibiotics: Treating active infection/contamination beyond 24 hours perioperative coverage

## 2017-08-11 NOTE — DISCHARGE PLANNING
Medical Social Work    SW requested another f/u with [facility]. Pt is medically clear to d/c, still awaiting call back from [facility] about bed availability.

## 2017-08-11 NOTE — PROGRESS NOTES
Report received, poc discussed, assumed care of pt.   Call light in reach, hourly rounding in place.   Pt gets up 1-2 assist, refusing at times, needs motivation.   GI soft diet.  + void. LBM 8/10.   Norco/oxy for pain.  LLQ ostomy. LUE PICC. L buttock IR drain.  Contact iso for VRE.  No further needs.

## 2017-08-11 NOTE — DISCHARGE SUMMARY
CHIEF COMPLAINT ON ADMISSION  Chief Complaint   Patient presents with   • UTI     started yesterday   • Abdominal Pain     x3-4 days, sharp pain, better when laying down   • Diarrhea       CODE STATUS  Full Code    HPI & HOSPITAL COURSE  This is a 76 y.o. male here with intraperitoneal abscess, likelly diverticular in origing.  S/p exploratory laparotomy, abdominal washout, sigmoid colon resection, colostomy placement and appendectomy on 7/26/17 Dr. Jorge Rodriguez.  Cultures of enterococcus faecium, viridans strep, Bacteroides fragilis and Candida albicans.  Treated initially with vancomycin, rocephin, flagyl and diflucan.  Repeat CT showed 4.5 X  4.7 X 5.4cm-too large for abx to penetrate, hence IR placed drainage cath 8/5.  Treated with tigecycline and fluconazole, repeat CT  Today shows interval decrease in size of the fluid collection in the pelvis measuring 2.6 x 1.9 x 1.6 cm. Pigtail catheter is along the edge of the collection.  Cleared for discharge by ID, will treat with Zyvox for VRE with augmentin to cover Ecoli, Bfrag, strep as well; as diflucan thru 8/24/17. His wife has selected [facility].    Therefore, he is discharged in fair and stable condition with close outpatient follow-up.    SPECIFIC OUTPATIENT FOLLOW-UP  ID Dr. Latonia Cadet 1 week  Surgery Dr. Jorge Rodriguez 1 week    DISCHARGE PROBLEM LIST  Principal Problem (Resolved):    Sepsis (CMS-[facility]) POA: Yes  Active Problems:    Pelvic abscess in male (CMS-HCC) POA: Yes    BPH (benign prostatic hypertrophy) POA: Yes    Depression POA: Yes    Osteoarthritis POA: Yes    Normocytic anemia POA: Unknown    Bipolar 1 disorder, depressed (CMS-HCC) (Chronic) POA: Unknown  Resolved Problems:    Hyponatremia POA: Yes    Leucocytosis POA: Yes      FOLLOW UP  No future appointments.  No follow-up provider specified.    MEDICATIONS ON DISCHARGE   Marcelo Colon   Home Medication Instructions FLORENCIO:[id number]    Printed on:08/11/17 9479   Medication  Information                      alfuzosin (UROXATRAL) 10 MG SR tablet  Take 10 mg by mouth every day.             amoxicillin-clavulanate (AUGMENTIN) 875-125 MG Tab  Take 1 Tab by mouth every 12 hours.             Calcium Carb-Cholecalciferol (OYSTER SHELL CALCIUM/VITAMIN D) 250-125 MG-UNIT Tab tablet  Take 1 Tab by mouth every day.             fluconazole (DIFLUCAN) 200 MG Tab  Take 1 Tab by mouth every day.             gabapentin (NEURONTIN) 100 MG Cap  Take 100 mg by mouth every bedtime. Pt may increase to BID and then to TID             lamotrigine (LAMICTAL) 150 MG tablet  Take 150 mg by mouth every day.             linezolid (ZYVOX) 600 MG Tab  Take 1 Tab by mouth every 12 hours.             meloxicam (MOBIC) 15 MG tablet  Take 15 mg by mouth every day.             omeprazole (PRILOSEC) 20 MG delayed-release capsule  Take 20 mg by mouth every day.             oxybutynin (DITROPAN) 5 MG Tab  Take 1 Tab by mouth 2 Times a Day.             oxycodone immediate-release (ROXICODONE) 5 MG Tab  Take 1 Tab by mouth every four hours as needed.             polyethylene glycol/lytes (MIRALAX) Pack  Take 1 Packet by mouth every day.             tamsulosin (FLOMAX) 0.4 MG capsule  Take 1 Cap by mouth ONE-HALF HOUR AFTER BREAKFAST.             tramadol (ULTRAM) 50 MG TABS  Take  mg by mouth every four hours as needed.                 DIET  Orders Placed This Encounter   Procedures   • DIET ORDER     Standing Status: Standing      Number of Occurrences: 1      Standing Expiration Date:      Order Specific Question:  Diet:     Answer:  Low Fiber(GI Soft) [2]       ACTIVITY  As tolerated.  Weight bearing as tolerated      CONSULTATIONS  Latonia Cadet ID  Jorge Rodriguez surgery    PROCEDURES  Ex-lap Dr. Rodriguez as desribed  IR drain placement    LABORATORY  Lab Results   Component Value Date/Time    SODIUM 136 08/01/2017 05:30 AM    POTASSIUM 3.9 08/01/2017 05:30 AM    CHLORIDE 103 08/01/2017 05:30 AM    CO2 29  08/01/2017 05:30 AM    GLUCOSE 93 08/01/2017 05:30 AM    BUN 12 08/01/2017 05:30 AM    CREATININE 0.63 08/01/2017 05:30 AM        Lab Results   Component Value Date/Time    WBC 3.5* 08/06/2017 03:57 AM    HEMOGLOBIN 10.9* 08/06/2017 03:57 AM    HEMATOCRIT 33.6* 08/06/2017 03:57 AM    PLATELET COUNT 216 08/06/2017 03:57 AM        Total time of the discharge process exceeds 40 minutes

## 2017-08-11 NOTE — DISCHARGE PLANNING
Contacted [facility]. Spoke to Joseph who states she will need to get a med cost on pt as patient is now on PO Zyvox. Per Joseph, they may not have an isolation bed available until Monday. Tanya(ANA) updated via phone.

## 2017-08-12 PROBLEM — G62.9 NEUROPATHY: Status: ACTIVE | Noted: 2017-08-12

## 2017-08-12 PROCEDURE — A9270 NON-COVERED ITEM OR SERVICE: HCPCS | Performed by: SURGERY

## 2017-08-12 PROCEDURE — 700102 HCHG RX REV CODE 250 W/ 637 OVERRIDE(OP): Performed by: HOSPITALIST

## 2017-08-12 PROCEDURE — 99232 SBSQ HOSP IP/OBS MODERATE 35: CPT | Performed by: INTERNAL MEDICINE

## 2017-08-12 PROCEDURE — A9270 NON-COVERED ITEM OR SERVICE: HCPCS | Performed by: HOSPITALIST

## 2017-08-12 PROCEDURE — A9270 NON-COVERED ITEM OR SERVICE: HCPCS | Performed by: INTERNAL MEDICINE

## 2017-08-12 PROCEDURE — 700102 HCHG RX REV CODE 250 W/ 637 OVERRIDE(OP): Performed by: INTERNAL MEDICINE

## 2017-08-12 PROCEDURE — 700111 HCHG RX REV CODE 636 W/ 250 OVERRIDE (IP): Performed by: SURGERY

## 2017-08-12 PROCEDURE — 770021 HCHG ROOM/CARE - ISO PRIVATE

## 2017-08-12 PROCEDURE — 700102 HCHG RX REV CODE 250 W/ 637 OVERRIDE(OP): Performed by: SURGERY

## 2017-08-12 PROCEDURE — 700105 HCHG RX REV CODE 258

## 2017-08-12 PROCEDURE — 93971 EXTREMITY STUDY: CPT

## 2017-08-12 PROCEDURE — 700102 HCHG RX REV CODE 250 W/ 637 OVERRIDE(OP): Performed by: NURSE PRACTITIONER

## 2017-08-12 PROCEDURE — A9270 NON-COVERED ITEM OR SERVICE: HCPCS | Performed by: NURSE PRACTITIONER

## 2017-08-12 RX ORDER — GABAPENTIN 400 MG/1
400 CAPSULE ORAL 3 TIMES DAILY
Status: DISCONTINUED | OUTPATIENT
Start: 2017-08-12 | End: 2017-08-18 | Stop reason: HOSPADM

## 2017-08-12 RX ORDER — SODIUM CHLORIDE 9 MG/ML
INJECTION, SOLUTION INTRAVENOUS
Status: COMPLETED
Start: 2017-08-12 | End: 2017-08-12

## 2017-08-12 RX ADMIN — AMOXICILLIN AND CLAVULANATE POTASSIUM 1 TABLET: 875; 125 TABLET, FILM COATED ORAL at 08:55

## 2017-08-12 RX ADMIN — LAMOTRIGINE 150 MG: 100 TABLET ORAL at 08:54

## 2017-08-12 RX ADMIN — LINEZOLID 600 MG: 600 TABLET, FILM COATED ORAL at 19:33

## 2017-08-12 RX ADMIN — GABAPENTIN 200 MG: 100 CAPSULE ORAL at 08:55

## 2017-08-12 RX ADMIN — GABAPENTIN 400 MG: 400 CAPSULE ORAL at 19:34

## 2017-08-12 RX ADMIN — OXYCODONE HYDROCHLORIDE 5 MG: 5 TABLET ORAL at 02:08

## 2017-08-12 RX ADMIN — HYDROCODONE BITARTRATE AND ACETAMINOPHEN 1 TABLET: 7.5; 325 TABLET ORAL at 19:34

## 2017-08-12 RX ADMIN — OMEPRAZOLE 20 MG: 20 CAPSULE, DELAYED RELEASE ORAL at 08:55

## 2017-08-12 RX ADMIN — FLUCONAZOLE 200 MG: 200 TABLET ORAL at 08:55

## 2017-08-12 RX ADMIN — ENOXAPARIN SODIUM 30 MG: 100 INJECTION SUBCUTANEOUS at 04:57

## 2017-08-12 RX ADMIN — POLYETHYLENE GLYCOL 3350 1 PACKET: 17 POWDER, FOR SOLUTION ORAL at 08:55

## 2017-08-12 RX ADMIN — GABAPENTIN 400 MG: 400 CAPSULE ORAL at 14:45

## 2017-08-12 RX ADMIN — TAMSULOSIN HYDROCHLORIDE 0.4 MG: 0.4 CAPSULE ORAL at 08:55

## 2017-08-12 RX ADMIN — LINEZOLID 600 MG: 600 TABLET, FILM COATED ORAL at 08:55

## 2017-08-12 RX ADMIN — OXYBUTYNIN CHLORIDE 5 MG: 5 TABLET ORAL at 19:33

## 2017-08-12 RX ADMIN — SODIUM CHLORIDE 1000 ML: 9 INJECTION, SOLUTION INTRAVENOUS at 02:08

## 2017-08-12 RX ADMIN — AMOXICILLIN AND CLAVULANATE POTASSIUM 1 TABLET: 875; 125 TABLET, FILM COATED ORAL at 19:34

## 2017-08-12 RX ADMIN — CALCIUM CARBONATE-CHOLECALCIFEROL TAB 250 MG-125 UNIT 1 TABLET: 250-125 TAB at 08:55

## 2017-08-12 RX ADMIN — OXYBUTYNIN CHLORIDE 5 MG: 5 TABLET ORAL at 08:55

## 2017-08-12 RX ADMIN — ENOXAPARIN SODIUM 30 MG: 100 INJECTION SUBCUTANEOUS at 16:20

## 2017-08-12 RX ADMIN — HYDROCODONE BITARTRATE AND ACETAMINOPHEN 1 TABLET: 7.5; 325 TABLET ORAL at 05:18

## 2017-08-12 ASSESSMENT — ENCOUNTER SYMPTOMS
STRIDOR: 0
CHILLS: 0
SHORTNESS OF BREATH: 0
RESPIRATORY NEGATIVE: 1
MUSCULOSKELETAL NEGATIVE: 1
SENSORY CHANGE: 0
SPEECH CHANGE: 0
NERVOUS/ANXIOUS: 0
WEAKNESS: 1
ABDOMINAL PAIN: 1
FEVER: 0
NECK PAIN: 0
CARDIOVASCULAR NEGATIVE: 1
VOMITING: 0
NAUSEA: 0
MYALGIAS: 0
HALLUCINATIONS: 0
COUGH: 0
HEADACHES: 0
MYALGIAS: 1
ABDOMINAL PAIN: 0
FOCAL WEAKNESS: 0

## 2017-08-12 ASSESSMENT — PAIN SCALES - GENERAL
PAINLEVEL_OUTOF10: ASSUMED PAIN PRESENT
PAINLEVEL_OUTOF10: 4
PAINLEVEL_OUTOF10: 0
PAINLEVEL_OUTOF10: 7
PAINLEVEL_OUTOF10: ASSUMED PAIN PRESENT
PAINLEVEL_OUTOF10: 6
PAINLEVEL_OUTOF10: 0
PAINLEVEL_OUTOF10: 3
PAINLEVEL_OUTOF10: ASSUMED PAIN PRESENT
PAINLEVEL_OUTOF10: ASSUMED PAIN PRESENT

## 2017-08-12 NOTE — PROGRESS NOTES
[facility] Hospitalist Progress Note    Date of Service: 2017    Chief Complaint  77 y/o M with PMH of peripheral neuropathy, BPH, Depression admitted for abdominal pain, diarrhea.  Dx with pelvis abscess on imaging. S/p Ex lap and abdominal washout on .    Interval Problem Update    Pelvic drain - Ngtd, minimal output . Afeb on IV atbs . Repeat cxs ngtd.   - close watch, working with staff, tolerating abx. Ostomy functioning.  Wife at bedside, long talk.  She wants him to go to [facility].   - discussed plan, pending CT likely tomorrow.  Chatting on phone.   - discharge planning, awaiting SNF approval.   - long talk about pain behind L leg, although no swelling, palpable cord, will r/o DVT. Increase neurontin, likely neuropathy.    Consultants/Specialty  [facility]  Dr. Herbert/ [place] -ID    Disposition  Likely to [facility] care when pelvic abscess improved.      Review of Systems   Constitutional: Negative for fever and chills.   HENT: Negative for congestion and hearing loss.    Respiratory: Negative for cough, shortness of breath and stridor.    Cardiovascular: Negative for chest pain and leg swelling.   Gastrointestinal: Positive for abdominal pain. Negative for nausea and vomiting.   Musculoskeletal: Negative for myalgias and neck pain.   Skin: Negative for itching and rash.   Neurological: Positive for weakness (generalized ). Negative for speech change, focal weakness and headaches.   Psychiatric/Behavioral: Negative for hallucinations. The patient is not nervous/anxious.    All other systems reviewed and are negative.     Physical Exam  Laboratory/Imaging   Hemodynamics  Temp (24hrs), Av.4 °C (97.6 °F), Min:36.3 °C (97.3 °F), Max:36.6 °C (97.8 °F)   Temperature: 36.6 °C (97.8 °F)  Pulse  Av.9  Min: 52  Max: 105    Blood Pressure : 133/54 mmHg      Respiratory      Respiration: 16, Pulse Oximetry: 94 %        RUL Breath Sounds: Clear, RML Breath Sounds: Clear, RLL Breath  Sounds: Clear, TOSHIA Breath Sounds: Clear, LLL Breath Sounds: Clear    Fluids    Intake/Output Summary (Last 24 hours) at 08/12/17 1126  Last data filed at 08/12/17 0800   Gross per 24 hour   Intake    720 ml   Output   1910 ml   Net  -1190 ml       Nutrition  Orders Placed This Encounter   Procedures   • DIET ORDER     Standing Status: Standing      Number of Occurrences: 1      Standing Expiration Date:      Order Specific Question:  Diet:     Answer:  Low Fiber(GI Soft) [2]     Physical Exam   Constitutional: He is oriented to person, place, and time. He is active. No distress.   HENT:   Head: Normocephalic and atraumatic.   Eyes: EOM are normal. No scleral icterus.   Neck: Normal range of motion.   Cardiovascular: Normal rate, regular rhythm, normal heart sounds and intact distal pulses.    No murmur heard.  Pulmonary/Chest: Effort normal and breath sounds normal. No stridor. No respiratory distress. He has no wheezes.   Abdominal: Soft. Bowel sounds are normal. He exhibits no distension. There is no tenderness.   Colostomy in place, air loose stool present.   Left back catheter present - serous drainage present - blood tinged.   Musculoskeletal: Normal range of motion. He exhibits no edema or tenderness.   Neurological: He is alert and oriented to person, place, and time. No cranial nerve deficit.   Skin: Skin is warm and dry. He is not diaphoretic. No erythema.   Psychiatric: He has a normal mood and affect. His behavior is normal.   Vitals reviewed.                               Assessment/Plan     Pelvic abscess in male (CMS-HCC) (present on admission)  Assessment & Plan  VRE, yeast infection --S/p   Ex lap , washout, Sigmoid colon resection w Colostomy placement, Appi-  Bowel activity recovering- Fu CT 8-3  with minimal decreased size of pelvic abscess 4.5 x 4.7 x 5.4 cm - post Pelvic abscess drain placement 8-5-17   Minimal output-- flush drain- follow output- anticipate dc if tapering off output.  cw GI  diet.   Stopped IV tygacil and diflucan . Repeat the CT shows decline, started augmentin, zyvox, oral diflucan  Drain per surgery    Normocytic anemia  Assessment & Plan  Stable H&H, multi factorial  intra-op losses and chronic disease-stable  Monitor for acute symptoms.      Bipolar 1 disorder, depressed (CMS-[facility])  Assessment & Plan  Continue  lamictal    Neuropathy (CMS-[facility])  Assessment & Plan  Affecting LLE, will increase neurontin, get duplex to r/o DVT.      BPH (benign prostatic hypertrophy) (present on admission)  Assessment & Plan  Continue Flomax  Keep perineum area dry.    Depression (present on admission)  Assessment & Plan  Stable    Osteoarthritis (present on admission)  Assessment & Plan  Stable, prn analgesia  ca, vit d    Debility - tx to [facility] when stable for rehab.   Labs reviewed, Medications reviewed and Radiology images reviewed  Ruff catheter: No Ruff  Central line in place: Concentrated IV drugs    DVT Prophylaxis: Enoxaparin (Lovenox)      Antibiotics: Treating active infection/contamination beyond 24 hours perioperative coverage

## 2017-08-12 NOTE — PROGRESS NOTES
Surgical Progress Note    Author: Jogre Rodrgiuez Date & Time created: 2017   9:13 AM     Interval Events:  Complains of left posterior leg pain  Review of Systems   Respiratory: Negative.    Cardiovascular: Negative.    Gastrointestinal: Negative for nausea, vomiting and abdominal pain.   Genitourinary: Hematuria: Complains of left posterior thigh pain, sudden in onsent yesterday, relieved with propping knee on pillow.   Musculoskeletal: Negative.      Hemodynamics:  Temp (24hrs), Av.4 °C (97.6 °F), Min:36.3 °C (97.3 °F), Max:36.6 °C (97.8 °F)  Temperature: 36.6 °C (97.8 °F)  Pulse  Av.9  Min: 52  Max: 105   Blood Pressure : 133/54 mmHg     Respiratory:    Respiration: 16, Pulse Oximetry: 94 %        RUL Breath Sounds: Clear, RML Breath Sounds: Clear, RLL Breath Sounds: Clear, TOSHIA Breath Sounds: Clear, LLL Breath Sounds: Clear  Neuro:  GCS       Fluids:    Intake/Output Summary (Last 24 hours) at 17 0913  Last data filed at 17 0800   Gross per 24 hour   Intake    720 ml   Output   1910 ml   Net  -1190 ml        Current Diet Order   Procedures   • DIET ORDER     Physical Exam   Constitutional: He is oriented to person, place, and time. He appears well-developed and well-nourished. No distress.   HENT:   Head: Normocephalic.   Eyes: Pupils are equal, round, and reactive to light.   Cardiovascular: Normal rate and regular rhythm.    Pulmonary/Chest: Effort normal.   Abdominal: Soft. Bowel sounds are normal. He exhibits no distension. There is no tenderness. There is no rebound and no guarding.   Incision intact, well healing.  Colostomy intact, productive of stool.   Musculoskeletal:   No point tenderness of posterior left thigh noted   Neurological: He is alert and oriented to person, place, and time.   Skin: Skin is warm and dry.   Psychiatric: He has a normal mood and affect. His behavior is normal. Judgment and thought content normal.     Labs:  No results found for this or any  previous visit (from the past 24 hour(s)).  Medical Decision Making, by Problem:  Active Hospital Problems    Diagnosis   • Pelvic abscess in male (CMS-[facility]) [K65.1]     Priority: High   • BPH (benign prostatic hypertrophy) [N40.0]     Priority: Low   • Depression [F32.9]     Priority: Low   • Osteoarthritis [M19.90]     Priority: Low   • Bipolar 1 disorder, depressed (CMS-[facility]) [F31.9]   • Normocytic anemia [D64.9]     Plan:  Discussed left posterior upper leg pain with Dr. Howell, who will order an ultrasound to evaluate.  Will order and follow up BMP now.    Quality Measures:  Labs reviewed and Medications reviewed  Ruff catheter: No Ruff      DVT Prophylaxis: Enoxaparin (Lovenox)              Discussed patient condition with RN, Patient and hospitalist

## 2017-08-12 NOTE — PROGRESS NOTES
Report received, poc discussed, assumed care of pt.    Call light in reach, hourly rounding in place.    Pt gets up 1 assist, refusing at times, needs motivation.    GI soft diet.  + void. LBM 8/11 through LLQ ostomy.    Norco/oxy for pain.  LUE PICC. L buttock IR drain.  Contact iso for VRE.  No further needs.  DC when iso bed available at [facility].    **Pt c/o unresolved L thigh pain.

## 2017-08-12 NOTE — PROGRESS NOTES
Received pt resting well in bed stable, no apparent distress noted,no AMS/A&Ox4, VSS. C/o pain on initial encounter, medicated per MAR. Breathing even/unlabored, LCTAB, on RA/denied SOB. Abdomen soft/nontender, (+)hyperBSx4, (-)N/V, (+)BM to ostomy. PICC in-situ/ (+)blood return/dressing C/D/I. Educated pt to utilize IS and ambulate frequently but refused to do so at this time. Abdl incision/SCAR/steri-strips in place. IR in-situ to bulb suction/ flushed with NS. Bladder continent. Care provided. Needs attended. Fall precautions in place. Contact iso maintained. Will continue with POC.

## 2017-08-12 NOTE — PROGRESS NOTES
Infectious Disease Progress Note    Author: Latonia Cadet M.D. Date of service & Time created: 2017  12:22 PM    Interval History:  76-year-old white male admitted with pelvic abscess possibly due to bowel perforation due to constipation  17- low-grade fevers. Continues to have abdominal pain  17- complains of some abdominal pain. MAXIMUM TEMPERATURE 100.3. WBC 7.6  17- complains of abdominal pain. MAXIMUM TEMPERATURE is 98. WBC 7.2  17- ongoing abdominal pain. No fevers. No new issues overnight.   AF WBC 4.8 Called for less expensive abx recommendations and new GI sxs. Stool in toilet  8/3 AF less anxious-pain better controlled   eating improved   no new complaints-denies SE abx Pain about the same   new drain placed   AF, no CBC, feels tired   AF, no CBC, sitting up in chair, friends visiting in room, denies any pain, tolerating abx without issues   AF, no CBC, c/o pain on bottom, very weak and unable to get out of chair, needs assistance  8/10 AF, no CBC, having some R groin pain, plan for repeat CT today   AF, no CBC, some LLE pain, CT with interval decrease,    AF, no CBC, states he's getting a sore near drain placement, tolerating PO abx, awaiting placement  Labs Reviewed, Medications Reviewed, Radiology Reviewed and Wound Reviewed.    Review of Systems:  Review of Systems   Constitutional: Positive for malaise/fatigue. Negative for fever.   Respiratory: Negative for cough and shortness of breath.    Cardiovascular: Negative for chest pain.   Gastrointestinal: Negative for vomiting.   Genitourinary: Negative for dysuria.   Musculoskeletal: Positive for myalgias.   Neurological: Positive for weakness. Negative for sensory change, speech change and headaches.       Hemodynamics:  Temp (24hrs), Av.4 °C (97.6 °F), Min:36.3 °C (97.3 °F), Max:36.6 °C (97.8 °F)  Temperature: 36.6 °C (97.8 °F)  Pulse  Av.9  Min: 52  Max: 105   Blood Pressure  : 133/54 mmHg       Physical Exam:  Physical Exam   Constitutional: He is oriented to person, place, and time. He appears well-developed. No distress.   Cachectic and frail  Chronically ill   HENT:   Head: Normocephalic and atraumatic.   Mouth/Throat: No oropharyngeal exudate.   Eyes: EOM are normal. Pupils are equal, round, and reactive to light. No scleral icterus.   Neck: Neck supple.   Cardiovascular: Normal rate and regular rhythm.    No murmur heard.  Pulmonary/Chest: Effort normal. No respiratory distress.   Abdominal: Soft. There is no tenderness. There is no rebound and no guarding.   Colostomy formed stool  Staples removed. Steristrips in place  Surgical site well healing  MAREK drain - scant serosang     Musculoskeletal: He exhibits no edema.   LUE PICC nontender   Neurological: He is alert and oriented to person, place, and time.   Skin:   Sacrum dressed   Nursing note and vitals reviewed.      Labs:  No results for input(s): WBC, RBC, HEMOGLOBIN, HEMATOCRIT, MCV, MCH, RDW, PLATELETCT, MPV, NEUTSPOLYS, LYMPHOCYTES, MONOCYTES, EOSINOPHILS, BASOPHILS, RBCMORPHOLO in the last 72 hours.  No results for input(s): SODIUM, POTASSIUM, CHLORIDE, CO2, GLUCOSE, BUN, CPKTOTAL in the last 72 hours.  No results for input(s): ALBUMIN, TBILIRUBIN, ALKPHOSPHAT, TOTPROTEIN, ALTSGPT, ASTSGOT, CREATININE in the last 72 hours.    Wound:      Results     Procedure Component Value Units Date/Time    ANAEROBIC CULTURE [[id number]] Collected:  07/25/17 1445    Order Status:  Completed Specimen Information:  Wound Updated:  07/30/17 1330     Significant Indicator NEG      Source WND      Site Peritoneal Abscess      Anaerobic Culture, Culture Res No Anaerobes isolated.     Narrative:      CALL  [place]  141 tel. [phone],  CALLED  141 tel. [phone] 07/28/2017, 08:05, RB PERF. RESULTS CALLED  TO:[phone]    CULTURE WOUND W/ GRAM STAIN [[id number]]  (Abnormal)  (Susceptibility) Collected:  07/25/17 1445    Order Status:  Completed  Specimen Information:  Wound Updated:  07/30/17 1334     Significant Indicator POS (POS)      Source WND      Site Peritoneal Abscess      Culture Result Wound -- (A)      Gram Stain Result --      Result:        Many WBCs.  Few Yeast.  Few Gram positive cocci.       Culture Result Wound -- (A)      Result:        Enterococcus faecium  Combination therapy with ampicillin, penicillin, or  vancomycin (for susceptible strains) plus an aminoglycoside  is usually indicated for serious enterococcal infections,  such as endocarditis unless high-level resistance to both  gentamicin and streptomycin is documented; such combinations  are predicted to result in synergistic killing of the  Enterococcus.  VANCOMYCIN RESISTANT ENTEROCOCCI(VRE)       Culture Result Wound -- (A)      Result:        Candida albicans  Light growth      Narrative:      CALL  [facility]  141 tel. [phone],  CALLED  141 tel. [phone] 07/28/2017, 08:05, RB PERF. RESULTS CALLED  TO:[phone]    Culture & Susceptibility     ENTEROCOCCUS FAECIUM     Antibiotic Sensitivity Microscan Unit Status    Ampicillin Resistant >8 mcg/mL Final    Daptomycin Sensitive 2 mcg/mL Final    Gent Synergy Sensitive <=500 mcg/mL Final    Linezolid Sensitive 2 mcg/mL Final    Penicillin Resistant >8 mcg/mL Final    Vancomycin Resistant >16 mcg/mL Final                             Fluids:  Intake/Output       08/10/17 0700 - 08/11/17 0659 08/11/17 0700 - 08/12/17 0659 08/12/17 0700 - 08/13/17 0659      3841-1787 7948-6694 Total 1767-7621 7444-6507 Total 2011-7562 1060-7526 Total       Intake    P.O.  --  -- --  360  -- 360  360  -- 360    P.O. -- -- -- 360 -- 360 360 -- 360    I.V.  410  410 820  360  360 720  --  -- --    IV Volume (NS) 360 360 720 360 360 720 -- -- --    IV Piggyback Volume (IV Piggyback) 50 50 100 -- -- -- -- -- --    Total Intake 410 410 820  360 -- 360       Output    Urine  650  1440 2090  1150  900 2050  700  -- 700    Number of Times Voided -- --  -- 3 x 2 x 5 x 2 x -- 2 x    Void (ml) 650 1440 2090 4424 638 6393 700 -- 700    Drains  0  0 0  5  10 15  0  -- 0    Sukhwinder Oquendo 1 0 0 0 5 10 15 0 -- 0    Stool/Urine  200  100 300  0  0 0  0  -- 0    Ostomy #1 (ml)  200 100 300 0 0 0 0 -- 0    Stool  --  -- --  --  -- --  --  -- --    Number of Times Stooled -- 0 x 0 x 2 x 2 x 4 x 2 x -- 2 x    Total Output 850 1540 2390 9902 413 1064 700 -- 700       Net I/O     -440 -1130 -1570 -435 -550 -985 -340 -- -340        CT-ABDOMEN-PELVIS WITH (Final result) Result time: 08/03/17 16:43:02     Final result by Madina Dorman M.D. (08/03/17 16:43:02)     Impression:     4.5 x 4.7 x 5.4 cm pelvic fluid collection is mildly decreased in size compared to prior. There has been interval removal of the previously noted pigtail catheter.  Small amount of free fluid in the abdomen and pelvis.  Mildly dilated loops of small bowel may represent mild ileus.  Small bilateral pleural effusions with overlying atelectasis/consolidation.  Moderate hiatal hernia.  Prominent atherosclerotic plaque.  Healing fractures of the fifth and sixth ribs on the right.          Assessment:  Active Hospital Problems    Diagnosis   • *Sepsis (CMS-HCC) [A41.9]   • Abdominal abscess (CMS-[facility]) [K65.1]   • BPH (benign prostatic hypertrophy) [N40.0]   • Depression [F32.9]   • Osteoarthritis [M19.90]       Plan:  Pelvic abscess due to bowel perforation  Afebrile  Leukocytosis, resolved  S/p IR drain ×2  Cultures of Enterococcus faecium, viridans strep, Bacteroides fragilis and Candida albicans  CT scan done on 7/24/2017 shows persistent pelvic abscess over 5 cm  s/p exploratory laparotomy, abdominal washout, sigmoid colon resection, colostomy placement and appendectomy on 7/26/17  OR cultures-+VRE and Candida  Repeat CT showed 4.5 X  4.7 X 5.4cm-too large for abx to penetrate  s/p drainage cath 8/5-follow cxs - NGTD  Repeat CT 8/10 - interval decrease now measuring 2.6 x 1.9 x 1.6 cm  DC  tigecycline.  Transition to PO Zyvox for VRE with augmentin to cover Ecoli, Bfrag, strep x 2 weeks  Stop date 8/25/17    Protein malnutrition  Anticipate poor wound healing  Alb 2.2    Dispo: Transfer to SNF - OK from ID standpoint    FU ID clinic    DW IM Dr. Howell and MSW

## 2017-08-13 LAB
ANION GAP SERPL CALC-SCNC: 4 MMOL/L (ref 0–11.9)
BUN SERPL-MCNC: 8 MG/DL (ref 8–22)
CALCIUM SERPL-MCNC: 8 MG/DL (ref 8.5–10.5)
CHLORIDE SERPL-SCNC: 107 MMOL/L (ref 96–112)
CO2 SERPL-SCNC: 29 MMOL/L (ref 20–33)
CREAT SERPL-MCNC: 0.64 MG/DL (ref 0.5–1.4)
GFR SERPL CREATININE-BSD FRML MDRD: >60 ML/MIN/1.73 M 2
GLUCOSE SERPL-MCNC: 108 MG/DL (ref 65–99)
POTASSIUM SERPL-SCNC: 3.6 MMOL/L (ref 3.6–5.5)
SODIUM SERPL-SCNC: 140 MMOL/L (ref 135–145)

## 2017-08-13 PROCEDURE — 700102 HCHG RX REV CODE 250 W/ 637 OVERRIDE(OP): Performed by: INTERNAL MEDICINE

## 2017-08-13 PROCEDURE — A9270 NON-COVERED ITEM OR SERVICE: HCPCS | Performed by: INTERNAL MEDICINE

## 2017-08-13 PROCEDURE — 99232 SBSQ HOSP IP/OBS MODERATE 35: CPT | Performed by: INTERNAL MEDICINE

## 2017-08-13 PROCEDURE — 700102 HCHG RX REV CODE 250 W/ 637 OVERRIDE(OP): Performed by: HOSPITALIST

## 2017-08-13 PROCEDURE — A9270 NON-COVERED ITEM OR SERVICE: HCPCS | Performed by: HOSPITALIST

## 2017-08-13 PROCEDURE — 80048 BASIC METABOLIC PNL TOTAL CA: CPT

## 2017-08-13 PROCEDURE — 700111 HCHG RX REV CODE 636 W/ 250 OVERRIDE (IP): Performed by: SURGERY

## 2017-08-13 PROCEDURE — 770021 HCHG ROOM/CARE - ISO PRIVATE

## 2017-08-13 PROCEDURE — A9270 NON-COVERED ITEM OR SERVICE: HCPCS | Performed by: NURSE PRACTITIONER

## 2017-08-13 PROCEDURE — 700105 HCHG RX REV CODE 258

## 2017-08-13 PROCEDURE — 700102 HCHG RX REV CODE 250 W/ 637 OVERRIDE(OP): Performed by: NURSE PRACTITIONER

## 2017-08-13 PROCEDURE — 700102 HCHG RX REV CODE 250 W/ 637 OVERRIDE(OP): Performed by: SURGERY

## 2017-08-13 PROCEDURE — A9270 NON-COVERED ITEM OR SERVICE: HCPCS | Performed by: SURGERY

## 2017-08-13 RX ORDER — SODIUM CHLORIDE 9 MG/ML
INJECTION, SOLUTION INTRAVENOUS
Status: COMPLETED
Start: 2017-08-13 | End: 2017-08-13

## 2017-08-13 RX ADMIN — LINEZOLID 600 MG: 600 TABLET, FILM COATED ORAL at 20:33

## 2017-08-13 RX ADMIN — SODIUM CHLORIDE 500 ML: 9 INJECTION, SOLUTION INTRAVENOUS at 15:51

## 2017-08-13 RX ADMIN — GABAPENTIN 400 MG: 400 CAPSULE ORAL at 15:51

## 2017-08-13 RX ADMIN — ENOXAPARIN SODIUM 30 MG: 100 INJECTION SUBCUTANEOUS at 04:35

## 2017-08-13 RX ADMIN — HYDROCODONE BITARTRATE AND ACETAMINOPHEN 1 TABLET: 7.5; 325 TABLET ORAL at 20:36

## 2017-08-13 RX ADMIN — LINEZOLID 600 MG: 600 TABLET, FILM COATED ORAL at 09:46

## 2017-08-13 RX ADMIN — AMOXICILLIN AND CLAVULANATE POTASSIUM 1 TABLET: 875; 125 TABLET, FILM COATED ORAL at 09:46

## 2017-08-13 RX ADMIN — GABAPENTIN 400 MG: 400 CAPSULE ORAL at 09:46

## 2017-08-13 RX ADMIN — OMEPRAZOLE 20 MG: 20 CAPSULE, DELAYED RELEASE ORAL at 09:46

## 2017-08-13 RX ADMIN — AMOXICILLIN AND CLAVULANATE POTASSIUM 1 TABLET: 875; 125 TABLET, FILM COATED ORAL at 20:33

## 2017-08-13 RX ADMIN — POLYETHYLENE GLYCOL 3350 1 PACKET: 17 POWDER, FOR SOLUTION ORAL at 09:46

## 2017-08-13 RX ADMIN — LAMOTRIGINE 150 MG: 100 TABLET ORAL at 09:46

## 2017-08-13 RX ADMIN — FLUCONAZOLE 200 MG: 200 TABLET ORAL at 09:46

## 2017-08-13 RX ADMIN — OXYBUTYNIN CHLORIDE 5 MG: 5 TABLET ORAL at 20:33

## 2017-08-13 RX ADMIN — TAMSULOSIN HYDROCHLORIDE 0.4 MG: 0.4 CAPSULE ORAL at 09:46

## 2017-08-13 RX ADMIN — OXYBUTYNIN CHLORIDE 5 MG: 5 TABLET ORAL at 09:46

## 2017-08-13 RX ADMIN — CALCIUM CARBONATE-CHOLECALCIFEROL TAB 250 MG-125 UNIT 1 TABLET: 250-125 TAB at 09:46

## 2017-08-13 RX ADMIN — GABAPENTIN 400 MG: 400 CAPSULE ORAL at 20:33

## 2017-08-13 RX ADMIN — ENOXAPARIN SODIUM 30 MG: 100 INJECTION SUBCUTANEOUS at 15:51

## 2017-08-13 ASSESSMENT — ENCOUNTER SYMPTOMS
VOMITING: 0
HEADACHES: 0
FOCAL WEAKNESS: 0
WEAKNESS: 1
MYALGIAS: 0
ABDOMINAL PAIN: 1
NECK PAIN: 0
SHORTNESS OF BREATH: 0
STRIDOR: 0
COUGH: 0
SENSORY CHANGE: 0
NAUSEA: 0
CHILLS: 0
FEVER: 0
MYALGIAS: 1
NERVOUS/ANXIOUS: 0
SPEECH CHANGE: 0
HALLUCINATIONS: 0

## 2017-08-13 ASSESSMENT — PAIN SCALES - GENERAL
PAINLEVEL_OUTOF10: ASSUMED PAIN PRESENT
PAINLEVEL_OUTOF10: 3
PAINLEVEL_OUTOF10: 3

## 2017-08-13 NOTE — CARE PLAN
Problem: Pain Management  Goal: Pain level will decrease to patient’s comfort goal  Outcome: PROGRESSING AS EXPECTED  No c/o pain today.    Problem: Mobility  Goal: Risk for activity intolerance will decrease  Outcome: PROGRESSING AS EXPECTED  Pt is a 1 person assist to chair, refusing waking despite education. Pt mobilized up to chair x 3.

## 2017-08-13 NOTE — PROGRESS NOTES
Received pt resting well in bed, no apparent distress noted, no AMS/A&Ox4, VSS. C/o- pain on initial encounter, medicated per MAR. Breathing even/unlabored, noted LCTAB, on RA/denied SOB. Abdomen soft/nontender, (+)hyperBSx4, tolerating current diet well w/o N/V, (+)BM to ostomy, midline incision SCAR/steri-strips in place, IR in-situ to bulb suction/dressing C/D/I. PICC patent/(+) blood return x TKO/dressing replaced. 1-2 person on transfers/ambulation, pt agreed pt to walk prior to HS in an hour. Care provided. Needs attended. Bed alarm/hourly rounding in place. No concerns. Will continue with POC.

## 2017-08-13 NOTE — PROGRESS NOTES
[facility] Hospitalist Progress Note    Date of Service: 2017    Chief Complaint  77 y/o M with PMH of peripheral neuropathy, BPH, Depression admitted for abdominal pain, diarrhea.  Dx with pelvis abscess on imaging. S/p Ex lap and abdominal washout on .    Interval Problem Update    Pelvic drain - Ngtd, minimal output . Afeb on IV atbs . Repeat cxs ngtd.   - close watch, working with staff, tolerating abx. Ostomy functioning.  Wife at bedside, long talk.  She wants him to go to [facility].   - discussed plan, pending CT likely tomorrow.  Chatting on phone.   - discharge planning, awaiting SNF approval.   - long talk about pain behind L leg, although no swelling, palpable cord, will r/o DVT. Increase neurontin, likely neuropathy.   - ongoing watch, working with staff.  Tolerating abx.  Discussed plan.    Consultants/Specialty  [place] - Surgery  Dr. Herbert/ [place] -ID    Disposition  Likely to [facility] when pelvic abscess improved.      Review of Systems   Constitutional: Negative for fever and chills.   HENT: Negative for congestion and hearing loss.    Respiratory: Negative for cough, shortness of breath and stridor.    Cardiovascular: Negative for chest pain and leg swelling.   Gastrointestinal: Positive for abdominal pain. Negative for nausea and vomiting.   Musculoskeletal: Negative for myalgias and neck pain.   Skin: Negative for itching and rash.   Neurological: Positive for weakness (generalized ). Negative for speech change, focal weakness and headaches.   Psychiatric/Behavioral: Negative for hallucinations. The patient is not nervous/anxious.    All other systems reviewed and are negative.     Physical Exam  Laboratory/Imaging   Hemodynamics  Temp (24hrs), Av °C (98.6 °F), Min:36.3 °C (97.3 °F), Max:37.6 °C (99.6 °F)   Temperature: 37.1 °C (98.7 °F)  Pulse  Av.7  Min: 52  Max: 105    Blood Pressure : 130/55 mmHg      Respiratory      Respiration: 17, Pulse Oximetry:  94 %        RUL Breath Sounds: Clear, RML Breath Sounds: Clear, RLL Breath Sounds: Clear, TOSHIA Breath Sounds: Clear, LLL Breath Sounds: Clear    Fluids    Intake/Output Summary (Last 24 hours) at 08/13/17 1137  Last data filed at 08/13/17 0805   Gross per 24 hour   Intake    840 ml   Output   1050 ml   Net   -210 ml       Nutrition  Orders Placed This Encounter   Procedures   • DIET ORDER     Standing Status: Standing      Number of Occurrences: 1      Standing Expiration Date:      Order Specific Question:  Diet:     Answer:  Low Fiber(GI Soft) [2]     Physical Exam   Constitutional: He is oriented to person, place, and time. He is active. No distress.   HENT:   Head: Normocephalic and atraumatic.   Eyes: EOM are normal. No scleral icterus.   Neck: Normal range of motion.   Cardiovascular: Normal rate, regular rhythm, normal heart sounds and intact distal pulses.    No murmur heard.  Pulmonary/Chest: Effort normal and breath sounds normal. No stridor. No respiratory distress. He has no wheezes.   Abdominal: Soft. Bowel sounds are normal. He exhibits no distension. There is no tenderness.   Colostomy in place, air loose stool present.   Left back catheter present - serous drainage present - blood tinged.   Musculoskeletal: Normal range of motion. He exhibits no edema or tenderness.   Neurological: He is alert and oriented to person, place, and time. No cranial nerve deficit.   Skin: Skin is warm and dry. He is not diaphoretic. No erythema.   Psychiatric: He has a normal mood and affect. His behavior is normal.   Vitals reviewed.          Recent Labs      08/13/17   0500   SODIUM  140   POTASSIUM  3.6   CHLORIDE  107   CO2  29   GLUCOSE  108*   BUN  8   CREATININE  0.64   CALCIUM  8.0*                      Assessment/Plan     Pelvic abscess in male (CMS-HCC) (present on admission)  Assessment & Plan  VRE, yeast infection --S/p   Ex lap , washout, Sigmoid colon resection w Colostomy placement, Appi-  Bowel activity  recovering- Fu CT 8-3  with minimal decreased size of pelvic abscess 4.5 x 4.7 x 5.4 cm - post Pelvic abscess drain placement 8-5-17   Minimal output-- flush drain- follow output- anticipate dc if tapering off output.  cw GI diet.   Stopped IV tygacil and diflucan . Repeat the CT shows decline, started augmentin, zyvox, oral diflucan  Drain per surgery    Normocytic anemia  Assessment & Plan  Stable H&H, multi factorial  intra-op losses and chronic disease-stable  Monitor for acute symptoms.      Bipolar 1 disorder, depressed (CMS-[facility])  Assessment & Plan  Continue  lamictal    Neuropathy (CMS-[facility])  Assessment & Plan  Affecting LLE, will increase neurontin, get duplex to r/o DVT.      BPH (benign prostatic hypertrophy) (present on admission)  Assessment & Plan  Continue Flomax  Keep perineum area dry.    Depression (present on admission)  Assessment & Plan  Stable    Osteoarthritis (present on admission)  Assessment & Plan  Stable, prn analgesia  ca, vit d    Debility - tx to [facility] when stable for rehab.   Labs reviewed, Medications reviewed and Radiology images reviewed  Ruff catheter: No Ruff  Central line in place: Concentrated IV drugs    DVT Prophylaxis: Enoxaparin (Lovenox)      Antibiotics: Treating active infection/contamination beyond 24 hours perioperative coverage

## 2017-08-13 NOTE — PROGRESS NOTES
Assumed care of patient. Patient stating pain is controlled. Midline incision with steri strips is approximated without signs of infection. Ostomy to left lower quadrant with stool and gas present. IR drain to buttocks is self suctioned. Patient needs much encouragement to ambulate and to participate in activities. No other needs at this time. Call light within reach.

## 2017-08-13 NOTE — PROGRESS NOTES
CT NOTE:  CALLED BY MARK BEDSIDE NURSE ABOUT DRAIN NOT STAYING SUCTION NEEDING TUBING TO BE CHANGE D/T CRACKED TUBING, CALLED DR. DURAN WITH UPDATE, OK TO CHANGE TUBING, TUBING CHANGE WITH SUCCESS, UPDATED NURSE MARK.

## 2017-08-13 NOTE — PROGRESS NOTES
Infectious Disease Progress Note    Author: Latonia Cadet M.D. Date of service & Time created: 2017  10:50 AM    Interval History:  76-year-old white male admitted with pelvic abscess possibly due to bowel perforation due to constipation  17- low-grade fevers. Continues to have abdominal pain  17- complains of some abdominal pain. MAXIMUM TEMPERATURE 100.3. WBC 7.6  17- complains of abdominal pain. MAXIMUM TEMPERATURE is 98. WBC 7.2  17- ongoing abdominal pain. No fevers. No new issues overnight.   AF WBC 4.8 Called for less expensive abx recommendations and new GI sxs. Stool in toilet  8/3 AF less anxious-pain better controlled   eating improved   no new complaints-denies SE abx Pain about the same   new drain placed   AF, no CBC, feels tired   AF, no CBC, sitting up in chair, friends visiting in room, denies any pain, tolerating abx without issues   AF, no CBC, c/o pain on bottom, very weak and unable to get out of chair, needs assistance  8/10 AF, no CBC, having some R groin pain, plan for repeat CT today   AF, no CBC, some LLE pain, CT with interval decrease,    AF, no CBC, states he's getting a sore near drain placement, tolerating PO abx, awaiting placement   AF, no new symptoms, resting comfortably and reading a book  Labs Reviewed, Medications Reviewed, Radiology Reviewed and Wound Reviewed.    Review of Systems:  Review of Systems   Constitutional: Positive for malaise/fatigue. Negative for fever.   Respiratory: Negative for cough and shortness of breath.    Cardiovascular: Negative for chest pain.   Gastrointestinal: Negative for vomiting.   Genitourinary: Negative for dysuria.   Musculoskeletal: Positive for myalgias.   Neurological: Positive for weakness. Negative for sensory change, speech change and headaches.       Hemodynamics:  Temp (24hrs), Av °C (98.6 °F), Min:36.3 °C (97.3 °F), Max:37.6 °C (99.6 °F)  Temperature: 37.1 °C  (98.7 °F)  Pulse  Av.7  Min: 52  Max: 105   Blood Pressure : 130/55 mmHg       Physical Exam:  Physical Exam   Constitutional: He is oriented to person, place, and time. He appears well-developed. No distress.   Cachectic and frail  Chronically ill   HENT:   Head: Normocephalic and atraumatic.   Mouth/Throat: No oropharyngeal exudate.   Eyes: EOM are normal. Pupils are equal, round, and reactive to light. No scleral icterus.   Neck: Neck supple.   Cardiovascular: Normal rate and regular rhythm.    No murmur heard.  Pulmonary/Chest: Effort normal. No respiratory distress.   Abdominal: Soft. There is no tenderness. There is no rebound and no guarding.   Colostomy formed stool  Staples removed. Steristrips in place  Surgical site well healing  MAREK drain - scant serosang     Musculoskeletal: He exhibits no edema.   LUE PICC nontender   Neurological: He is alert and oriented to person, place, and time.   Skin:   Sacrum dressed   Nursing note and vitals reviewed.      Labs:  No results for input(s): WBC, RBC, HEMOGLOBIN, HEMATOCRIT, MCV, MCH, RDW, PLATELETCT, MPV, NEUTSPOLYS, LYMPHOCYTES, MONOCYTES, EOSINOPHILS, BASOPHILS, RBCMORPHOLO in the last 72 hours.  Recent Labs      17   0500   SODIUM  140   POTASSIUM  3.6   CHLORIDE  107   CO2  29   GLUCOSE  108*   BUN  8     Recent Labs      17   0500   CREATININE  0.64       Wound:      Results     Procedure Component Value Units Date/Time    ANAEROBIC CULTURE [[id number]] Collected:  17 144    Order Status:  Completed Specimen Information:  Wound Updated:  17 1334     Significant Indicator NEG      Source WND      Site Peritoneal Abscess      Anaerobic Culture, Culture Res No Anaerobes isolated.     Narrative:      CALL  [place]  141 tel. [phone],  CALLED  141 tel. [phone] 2017, 08:05, RB PERF. RESULTS CALLED  TO:[phone]    CULTURE WOUND W/ GRAM STAIN [[id number]]  (Abnormal)  (Susceptibility) Collected:  17 1445    Order Status:   Completed Specimen Information:  Wound Updated:  07/30/17 1334     Significant Indicator POS (POS)      Source WND      Site Peritoneal Abscess      Culture Result Wound -- (A)      Gram Stain Result --      Result:        Many WBCs.  Few Yeast.  Few Gram positive cocci.       Culture Result Wound -- (A)      Result:        Enterococcus faecium  Combination therapy with ampicillin, penicillin, or  vancomycin (for susceptible strains) plus an aminoglycoside  is usually indicated for serious enterococcal infections,  such as endocarditis unless high-level resistance to both  gentamicin and streptomycin is documented; such combinations  are predicted to result in synergistic killing of the  Enterococcus.  VANCOMYCIN RESISTANT ENTEROCOCCI(VRE)       Culture Result Wound -- (A)      Result:        Candida albicans  Light growth      Narrative:      CALL  [place]  141 tel. [phone],  CALLED  141 tel. [phone] 07/28/2017, 08:05, RB PERF. RESULTS CALLED  TO:[phone]    Culture & Susceptibility     ENTEROCOCCUS FAECIUM     Antibiotic Sensitivity Microscan Unit Status    Ampicillin Resistant >8 mcg/mL Final    Daptomycin Sensitive 2 mcg/mL Final    Gent Synergy Sensitive <=500 mcg/mL Final    Linezolid Sensitive 2 mcg/mL Final    Penicillin Resistant >8 mcg/mL Final    Vancomycin Resistant >16 mcg/mL Final                             Fluids:  Intake/Output       08/11/17 0700 - 08/12/17 0659 08/12/17 0700 - 08/13/17 0659 08/13/17 0700 - 08/14/17 0659      8502-1595 5468-7053 Total 1969-9710 5007-4717 Total 2370-4809 2355-5163 Total       Intake    P.O.  360  -- 360  480  -- 480  --  -- --    P.O. 360 -- 360 480 -- 480 -- -- --    I.V.  360  360 720  360  360 720  120  -- 120    IV Volume (NS) 360 360 720 360 360 720 120 -- 120    Total Intake   120 -- 120       Output    Urine  1150  900 2050  900  450 1350  400  -- 400    Number of Times Voided 3 x 2 x 5 x 3 x 1 x 4 x 1 x -- 1 x    Void (ml) 1150 900  2050  400 -- 400    Drains  5  10 15  0  0 0  0  -- 0    Sukhwinder Oquendo 1 5 10 15 0 0 0 0 -- 0    Stool/Urine  0  0 0  0  0 0  0  -- 0    Ostomy #1 (ml)  0 0 0 0 0 0 0 -- 0    Stool  --  -- --  --  -- --  --  -- --    Number of Times Stooled 2 x 2 x 4 x 2 x 0 x 2 x 0 x -- 0 x    Total Output 5080 121 9703  400 -- 400       Net I/O     -435 -550 -985 -60 -90 -150 -280 -- -280        CT-ABDOMEN-PELVIS WITH (Final result) Result time: 08/03/17 16:43:02     Final result by Madina Dorman M.D. (08/03/17 16:43:02)     Impression:     4.5 x 4.7 x 5.4 cm pelvic fluid collection is mildly decreased in size compared to prior. There has been interval removal of the previously noted pigtail catheter.  Small amount of free fluid in the abdomen and pelvis.  Mildly dilated loops of small bowel may represent mild ileus.  Small bilateral pleural effusions with overlying atelectasis/consolidation.  Moderate hiatal hernia.  Prominent atherosclerotic plaque.  Healing fractures of the fifth and sixth ribs on the right.          Assessment:  Active Hospital Problems    Diagnosis   • *Sepsis (CMS-[facility]) [A41.9]   • Abdominal abscess (CMS-[facility]) [K65.1]   • BPH (benign prostatic hypertrophy) [N40.0]   • Depression [F32.9]   • Osteoarthritis [M19.90]       Plan:  Pelvic abscess due to bowel perforation  Afebrile  Leukocytosis, resolved  S/p IR drain ×2  Cultures of Enterococcus faecium, viridans strep, Bacteroides fragilis and Candida albicans  CT scan done on 7/24/2017 shows persistent pelvic abscess over 5 cm  s/p exploratory laparotomy, abdominal washout, sigmoid colon resection, colostomy placement and appendectomy on 7/26/17  OR cultures-+VRE and Candida  Repeat CT showed 4.5 X  4.7 X 5.4cm-too large for abx to penetrate  s/p drainage cath 8/5-follow cxs - NGTD  Repeat CT 8/10 - interval decrease now measuring 2.6 x 1.9 x 1.6 cm  DC tigecycline.  Transition to PO Zyvox for VRE with augmentin to cover Ecoli,  Bfrag, strep x 2 weeks  Stop date 8/25/17    Protein malnutrition  Anticipate poor wound healing  Alb 2.2    Dispo: Transfer to SNF - OK from ID standpoint    FU ID clinic    DW IM Dr. Howell and MSW

## 2017-08-14 PROCEDURE — A9270 NON-COVERED ITEM OR SERVICE: HCPCS | Performed by: INTERNAL MEDICINE

## 2017-08-14 PROCEDURE — 700102 HCHG RX REV CODE 250 W/ 637 OVERRIDE(OP): Performed by: INTERNAL MEDICINE

## 2017-08-14 PROCEDURE — A9270 NON-COVERED ITEM OR SERVICE: HCPCS | Performed by: HOSPITALIST

## 2017-08-14 PROCEDURE — A9270 NON-COVERED ITEM OR SERVICE: HCPCS | Performed by: NURSE PRACTITIONER

## 2017-08-14 PROCEDURE — 700102 HCHG RX REV CODE 250 W/ 637 OVERRIDE(OP): Performed by: HOSPITALIST

## 2017-08-14 PROCEDURE — 97535 SELF CARE MNGMENT TRAINING: CPT

## 2017-08-14 PROCEDURE — 700105 HCHG RX REV CODE 258

## 2017-08-14 PROCEDURE — A9270 NON-COVERED ITEM OR SERVICE: HCPCS | Performed by: SURGERY

## 2017-08-14 PROCEDURE — 700102 HCHG RX REV CODE 250 W/ 637 OVERRIDE(OP): Performed by: SURGERY

## 2017-08-14 PROCEDURE — 770021 HCHG ROOM/CARE - ISO PRIVATE

## 2017-08-14 PROCEDURE — 700102 HCHG RX REV CODE 250 W/ 637 OVERRIDE(OP): Performed by: NURSE PRACTITIONER

## 2017-08-14 PROCEDURE — 700111 HCHG RX REV CODE 636 W/ 250 OVERRIDE (IP): Performed by: SURGERY

## 2017-08-14 PROCEDURE — 99232 SBSQ HOSP IP/OBS MODERATE 35: CPT | Performed by: INTERNAL MEDICINE

## 2017-08-14 RX ORDER — SODIUM CHLORIDE 9 MG/ML
INJECTION, SOLUTION INTRAVENOUS
Status: COMPLETED
Start: 2017-08-14 | End: 2017-08-14

## 2017-08-14 RX ADMIN — OXYBUTYNIN CHLORIDE 5 MG: 5 TABLET ORAL at 10:10

## 2017-08-14 RX ADMIN — GABAPENTIN 400 MG: 400 CAPSULE ORAL at 15:21

## 2017-08-14 RX ADMIN — LINEZOLID 600 MG: 600 TABLET, FILM COATED ORAL at 19:54

## 2017-08-14 RX ADMIN — LAMOTRIGINE 150 MG: 100 TABLET ORAL at 10:09

## 2017-08-14 RX ADMIN — HYDROCODONE BITARTRATE AND ACETAMINOPHEN 1 TABLET: 7.5; 325 TABLET ORAL at 20:06

## 2017-08-14 RX ADMIN — LINEZOLID 600 MG: 600 TABLET, FILM COATED ORAL at 10:09

## 2017-08-14 RX ADMIN — OXYBUTYNIN CHLORIDE 5 MG: 5 TABLET ORAL at 20:06

## 2017-08-14 RX ADMIN — CALCIUM CARBONATE-CHOLECALCIFEROL TAB 250 MG-125 UNIT 1 TABLET: 250-125 TAB at 10:09

## 2017-08-14 RX ADMIN — GABAPENTIN 400 MG: 400 CAPSULE ORAL at 19:54

## 2017-08-14 RX ADMIN — GABAPENTIN 400 MG: 400 CAPSULE ORAL at 10:09

## 2017-08-14 RX ADMIN — OMEPRAZOLE 20 MG: 20 CAPSULE, DELAYED RELEASE ORAL at 10:10

## 2017-08-14 RX ADMIN — AMOXICILLIN AND CLAVULANATE POTASSIUM 1 TABLET: 875; 125 TABLET, FILM COATED ORAL at 19:54

## 2017-08-14 RX ADMIN — POLYETHYLENE GLYCOL 3350 1 PACKET: 17 POWDER, FOR SOLUTION ORAL at 10:28

## 2017-08-14 RX ADMIN — TAMSULOSIN HYDROCHLORIDE 0.4 MG: 0.4 CAPSULE ORAL at 10:09

## 2017-08-14 RX ADMIN — ENOXAPARIN SODIUM 30 MG: 100 INJECTION SUBCUTANEOUS at 05:00

## 2017-08-14 RX ADMIN — ENOXAPARIN SODIUM 30 MG: 100 INJECTION SUBCUTANEOUS at 18:09

## 2017-08-14 RX ADMIN — AMOXICILLIN AND CLAVULANATE POTASSIUM 1 TABLET: 875; 125 TABLET, FILM COATED ORAL at 10:09

## 2017-08-14 RX ADMIN — SODIUM CHLORIDE 500 ML: 9 INJECTION, SOLUTION INTRAVENOUS at 05:23

## 2017-08-14 RX ADMIN — HYDROCODONE BITARTRATE AND ACETAMINOPHEN 1 TABLET: 7.5; 325 TABLET ORAL at 10:10

## 2017-08-14 RX ADMIN — FLUCONAZOLE 200 MG: 200 TABLET ORAL at 10:09

## 2017-08-14 ASSESSMENT — ENCOUNTER SYMPTOMS
VOMITING: 0
SPEECH CHANGE: 0
STRIDOR: 0
ABDOMINAL PAIN: 0
WEIGHT LOSS: 0
FEVER: 0
MYALGIAS: 1
FOCAL WEAKNESS: 0
MYALGIAS: 0
CHILLS: 0
NAUSEA: 0
CARDIOVASCULAR NEGATIVE: 1
WEAKNESS: 1
SHORTNESS OF BREATH: 0
HEADACHES: 0
HALLUCINATIONS: 0
COUGH: 0
RESPIRATORY NEGATIVE: 1
NECK PAIN: 0
SENSORY CHANGE: 0
NERVOUS/ANXIOUS: 0

## 2017-08-14 ASSESSMENT — COGNITIVE AND FUNCTIONAL STATUS - GENERAL
DAILY ACTIVITIY SCORE: 17
DRESSING REGULAR LOWER BODY CLOTHING: A LOT
TOILETING: A LITTLE
HELP NEEDED FOR BATHING: A LOT
SUGGESTED CMS G CODE MODIFIER DAILY ACTIVITY: CK
DRESSING REGULAR UPPER BODY CLOTHING: A LITTLE
PERSONAL GROOMING: A LITTLE

## 2017-08-14 ASSESSMENT — PAIN SCALES - GENERAL
PAINLEVEL_OUTOF10: 3
PAINLEVEL_OUTOF10: 4

## 2017-08-14 NOTE — THERAPY
"Occupational Therapy Treatment completed with focus on ADLs, ADL transfers and patient education.  Functional Status:  Pt seen for OT tx today.  Pt was pleasant and cooperative throughout the session but continues to be limited by weakness, endurance, and self care.  Pt completed supine to sit mod I, sit to stand with CGA, and stand pivot with SBA.  Pt completed room ambulation using FWW with CGA and SOB room distance.  Pt was min A for LB dressing.  Pt would benefit from continued inpt OT to increase independence with functional transfers, functional endurance, and ADLs.  Plan of Care: Will benefit from Occupational Therapy 3 times per week  Discharge Recommendations:  Equipment Will Continue to Assess for Equipment Needs. Post-acute therapy Discharge to a transitional care facility for continued skilled therapy services.    See \"Rehab Therapy-Acute\" Patient Summary Report for complete documentation.   "

## 2017-08-14 NOTE — PROGRESS NOTES
"Surgical Progress Note    Author: Jorge Rodriguez Date & Time created: 2017   8:55 AM     Interval Events:  Noted that IM states in note \"drain per surgery.\"      - 5 ml recorded out drain in last 48 hours.  Tolerating diet, denies abdominal pain.    Review of Systems   Constitutional: Negative for fever, chills, weight loss and malaise/fatigue.   Respiratory: Negative.    Cardiovascular: Negative.    Gastrointestinal: Negative for nausea, vomiting and abdominal pain.     Hemodynamics:  Temp (24hrs), Av.8 °C (98.2 °F), Min:36.6 °C (97.8 °F), Max:37.2 °C (99 °F)  Temperature: 36.6 °C (97.9 °F)  Pulse  Av.8  Min: 49  Max: 105   Blood Pressure : 129/48 mmHg     Respiratory:    Respiration: 16, Pulse Oximetry: 95 %           Neuro:  GCS - 15       Fluids:    Intake/Output Summary (Last 24 hours) at 17 0855  Last data filed at 17 0536   Gross per 24 hour   Intake    720 ml   Output   1380 ml   Net   -660 ml        Current Diet Order   Procedures   • DIET ORDER     Physical Exam   Constitutional: He is oriented to person, place, and time. He appears well-developed and well-nourished. No distress.   HENT:   Head: Normocephalic.   Eyes: Pupils are equal, round, and reactive to light. No scleral icterus.   Cardiovascular: Normal rate and regular rhythm.    Pulmonary/Chest: Effort normal. No respiratory distress.   Abdominal: Soft. Bowel sounds are normal. He exhibits no distension. There is no tenderness. There is no rebound and no guarding.   Colostomy intact, productive of stool.  Midline incision intact.   Neurological: He is alert and oriented to person, place, and time.   Psychiatric: He has a normal mood and affect. His behavior is normal. Judgment and thought content normal.     Labs:  No results found for this or any previous visit (from the past 24 hour(s)).  Medical Decision Making, by Problem:  Active Hospital Problems    Diagnosis   • Pelvic abscess in male (CMS-HCC) [K65.1]     " Priority: High   • BPH (benign prostatic hypertrophy) [N40.0]     Priority: Low   • Depression [F32.9]     Priority: Low   • Osteoarthritis [M19.90]     Priority: Low   • Neuropathy (CMS-HCC) [G62.9]   • Bipolar 1 disorder, depressed (CMS-[facility]) [F31.9]   • Normocytic anemia [D64.9]     Plan:  Doing well.  D/C drain today.  Will continue in hospital for now, have also written for follow up with me in 7-10 days when discharged.    Quality Measures:  Medications reviewed and Labs reviewed  Ruff catheter: No Ruff      DVT Prophylaxis: Enoxaparin (Lovenox)      Antibiotics: Treating active infection/contamination beyond 24 hours perioperative coverage        Discussed patient condition with RN and Patient

## 2017-08-14 NOTE — PROGRESS NOTES
AOX4 numbness and tingling in hands and feet  Pain 4/10 hands - medicated  HR 52  +2 pitting edema bilateral feet with +1 in lower leg  Colostomy in place skin intact around collection bag  Hyperactive bowel sounds X4  IR drain in place -  Orders to remove  Mepilex on sacrum

## 2017-08-14 NOTE — PROGRESS NOTES
[facility] Hospitalist Progress Note    Date of Service: 2017    Chief Complaint  77 y/o M with PMH of peripheral neuropathy, BPH, Depression admitted for abdominal pain, diarrhea.  Dx with pelvis abscess on imaging. S/p Ex lap and abdominal washout on .    Interval Problem Update    Pelvic drain - Ngtd, minimal output . Afeb on IV atbs . Repeat cxs ngtd.   - close watch, working with staff, tolerating abx. Ostomy functioning.  Wife at bedside, long talk.  She wants him to go to [facility].   - discussed plan, pending CT likely tomorrow.  Chatting on phone.   - discharge planning, awaiting SNF approval.   - long talk about pain behind L leg, although no swelling, palpable cord, will r/o DVT. Increase neurontin, likely neuropathy.   - ongoing watch, working with staff.  Tolerating abx.  Discussed plan.   - awaiting SNF.  Tolerating abx.  [place].    Consultants/Specialty  [facility]  Dr. Herbert/ [place] -ID    Disposition  Likely to [facility] care when pelvic abscess improved.      Review of Systems   Constitutional: Negative for fever and chills.   HENT: Negative for congestion and hearing loss.    Respiratory: Negative for cough, shortness of breath and stridor.    Cardiovascular: Negative for chest pain and leg swelling.   Gastrointestinal: Negative for nausea, vomiting and abdominal pain.   Musculoskeletal: Negative for myalgias and neck pain.   Skin: Negative for itching and rash.   Neurological: Positive for weakness (generalized ). Negative for speech change, focal weakness and headaches.   Psychiatric/Behavioral: Negative for hallucinations. The patient is not nervous/anxious.    All other systems reviewed and are negative.     Physical Exam  Laboratory/Imaging   Hemodynamics  Temp (24hrs), Av.8 °C (98.2 °F), Min:36.6 °C (97.8 °F), Max:37.2 °C (99 °F)   Temperature: 36.6 °C (97.9 °F)  Pulse  Av.7  Min: 49  Max: 105    Blood Pressure : 130/92 mmHg      Respiratory       Respiration: 16, Pulse Oximetry: 95 %        RUL Breath Sounds: Clear, RML Breath Sounds: Clear, RLL Breath Sounds: Clear, TOSHIA Breath Sounds: Clear, LLL Breath Sounds: Clear    Fluids    Intake/Output Summary (Last 24 hours) at 08/14/17 1320  Last data filed at 08/14/17 0931   Gross per 24 hour   Intake    720 ml   Output   1605 ml   Net   -885 ml       Nutrition  Orders Placed This Encounter   Procedures   • DIET ORDER     Standing Status: Standing      Number of Occurrences: 1      Standing Expiration Date:      Order Specific Question:  Diet:     Answer:  Low Fiber(GI Soft) [2]     Physical Exam   Constitutional: He is oriented to person, place, and time. He is active. No distress.   HENT:   Head: Normocephalic and atraumatic.   Eyes: EOM are normal. No scleral icterus.   Neck: Normal range of motion.   Cardiovascular: Normal rate, regular rhythm, normal heart sounds and intact distal pulses.    No murmur heard.  Pulmonary/Chest: Effort normal and breath sounds normal. No stridor. No respiratory distress. He has no wheezes.   Abdominal: Soft. Bowel sounds are normal. He exhibits no distension. There is no tenderness.   Colostomy in place, air loose stool present.   Left back catheter present - serous drainage present - blood tinged.   Musculoskeletal: Normal range of motion. He exhibits no edema or tenderness.   Neurological: He is alert and oriented to person, place, and time. No cranial nerve deficit.   Skin: Skin is warm and dry. He is not diaphoretic. No erythema.   Psychiatric: He has a normal mood and affect. His behavior is normal.   Vitals reviewed.          Recent Labs      08/13/17   0500   SODIUM  140   POTASSIUM  3.6   CHLORIDE  107   CO2  29   GLUCOSE  108*   BUN  8   CREATININE  0.64   CALCIUM  8.0*                      Assessment/Plan     Pelvic abscess in male (CMS-HCC) (present on admission)  Assessment & Plan  VRE, yeast infection --S/p   Ex lap , washout, Sigmoid colon resection w Colostomy  placement, Appi-  Bowel activity recovering- Fu CT 8-3  with minimal decreased size of pelvic abscess 4.5 x 4.7 x 5.4 cm - post Pelvic abscess drain placement 8-5-17   Minimal output-- flush drain- follow output- anticipate dc if tapering off output.  cw GI diet.   Stopped IV tygacil and diflucan . Repeat the CT shows decline, started augmentin, zyvox, oral diflucan  Drain per surgery    Normocytic anemia  Assessment & Plan  Stable H&H, multi factorial  intra-op losses and chronic disease-stable  Monitor for acute symptoms.      Bipolar 1 disorder, depressed (CMS-[facility])  Assessment & Plan  Continue  lamictal    Neuropathy (CMS-[facility])  Assessment & Plan  Affecting LLE, will increase neurontin, get duplex to r/o DVT.      BPH (benign prostatic hypertrophy) (present on admission)  Assessment & Plan  Continue Flomax  Keep perineum area dry.    Depression (present on admission)  Assessment & Plan  Stable    Osteoarthritis (present on admission)  Assessment & Plan  Stable, prn analgesia  ca, vit d    Debility - tx to [facility] when stable for rehab.   Labs reviewed, Medications reviewed and Radiology images reviewed  Ruff catheter: No Ruff  Central line in place: Concentrated IV drugs    DVT Prophylaxis: Enoxaparin (Lovenox)      Antibiotics: Treating active infection/contamination beyond 24 hours perioperative coverage

## 2017-08-14 NOTE — DISCHARGE PLANNING
[facility] received a call from Joseph at [facility]. Per Joseph they do not have any Isolation beds Open. AWILDA Herr has been notified.

## 2017-08-15 PROCEDURE — 700102 HCHG RX REV CODE 250 W/ 637 OVERRIDE(OP): Performed by: HOSPITALIST

## 2017-08-15 PROCEDURE — A9270 NON-COVERED ITEM OR SERVICE: HCPCS | Performed by: SURGERY

## 2017-08-15 PROCEDURE — A9270 NON-COVERED ITEM OR SERVICE: HCPCS | Performed by: INTERNAL MEDICINE

## 2017-08-15 PROCEDURE — 99232 SBSQ HOSP IP/OBS MODERATE 35: CPT | Performed by: INTERNAL MEDICINE

## 2017-08-15 PROCEDURE — A9270 NON-COVERED ITEM OR SERVICE: HCPCS | Performed by: HOSPITALIST

## 2017-08-15 PROCEDURE — 770021 HCHG ROOM/CARE - ISO PRIVATE

## 2017-08-15 PROCEDURE — 700102 HCHG RX REV CODE 250 W/ 637 OVERRIDE(OP): Performed by: INTERNAL MEDICINE

## 2017-08-15 PROCEDURE — 700111 HCHG RX REV CODE 636 W/ 250 OVERRIDE (IP): Performed by: SURGERY

## 2017-08-15 PROCEDURE — 700102 HCHG RX REV CODE 250 W/ 637 OVERRIDE(OP): Performed by: NURSE PRACTITIONER

## 2017-08-15 PROCEDURE — A9270 NON-COVERED ITEM OR SERVICE: HCPCS | Performed by: NURSE PRACTITIONER

## 2017-08-15 PROCEDURE — 700102 HCHG RX REV CODE 250 W/ 637 OVERRIDE(OP): Performed by: SURGERY

## 2017-08-15 PROCEDURE — 700105 HCHG RX REV CODE 258

## 2017-08-15 RX ORDER — SODIUM CHLORIDE 9 MG/ML
INJECTION, SOLUTION INTRAVENOUS
Status: COMPLETED
Start: 2017-08-15 | End: 2017-08-15

## 2017-08-15 RX ADMIN — ENOXAPARIN SODIUM 30 MG: 100 INJECTION SUBCUTANEOUS at 05:25

## 2017-08-15 RX ADMIN — GABAPENTIN 400 MG: 400 CAPSULE ORAL at 20:51

## 2017-08-15 RX ADMIN — HYDROCODONE BITARTRATE AND ACETAMINOPHEN 1 TABLET: 7.5; 325 TABLET ORAL at 15:17

## 2017-08-15 RX ADMIN — FLUCONAZOLE 200 MG: 200 TABLET ORAL at 08:55

## 2017-08-15 RX ADMIN — CALCIUM CARBONATE-CHOLECALCIFEROL TAB 250 MG-125 UNIT 1 TABLET: 250-125 TAB at 08:55

## 2017-08-15 RX ADMIN — OMEPRAZOLE 20 MG: 20 CAPSULE, DELAYED RELEASE ORAL at 08:54

## 2017-08-15 RX ADMIN — LAMOTRIGINE 150 MG: 100 TABLET ORAL at 08:56

## 2017-08-15 RX ADMIN — OXYCODONE HYDROCHLORIDE 5 MG: 5 TABLET ORAL at 20:51

## 2017-08-15 RX ADMIN — GABAPENTIN 400 MG: 400 CAPSULE ORAL at 08:56

## 2017-08-15 RX ADMIN — OXYBUTYNIN CHLORIDE 5 MG: 5 TABLET ORAL at 20:51

## 2017-08-15 RX ADMIN — LINEZOLID 600 MG: 600 TABLET, FILM COATED ORAL at 20:51

## 2017-08-15 RX ADMIN — GABAPENTIN 400 MG: 400 CAPSULE ORAL at 15:17

## 2017-08-15 RX ADMIN — LINEZOLID 600 MG: 600 TABLET, FILM COATED ORAL at 08:54

## 2017-08-15 RX ADMIN — AMOXICILLIN AND CLAVULANATE POTASSIUM 1 TABLET: 875; 125 TABLET, FILM COATED ORAL at 08:55

## 2017-08-15 RX ADMIN — POLYETHYLENE GLYCOL 3350 1 PACKET: 17 POWDER, FOR SOLUTION ORAL at 08:57

## 2017-08-15 RX ADMIN — AMOXICILLIN AND CLAVULANATE POTASSIUM 1 TABLET: 875; 125 TABLET, FILM COATED ORAL at 20:50

## 2017-08-15 RX ADMIN — ENOXAPARIN SODIUM 30 MG: 100 INJECTION SUBCUTANEOUS at 17:52

## 2017-08-15 RX ADMIN — SODIUM CHLORIDE 500 ML: 9 INJECTION, SOLUTION INTRAVENOUS at 08:50

## 2017-08-15 RX ADMIN — TAMSULOSIN HYDROCHLORIDE 0.4 MG: 0.4 CAPSULE ORAL at 08:55

## 2017-08-15 RX ADMIN — OXYBUTYNIN CHLORIDE 5 MG: 5 TABLET ORAL at 08:56

## 2017-08-15 ASSESSMENT — PAIN SCALES - GENERAL
PAINLEVEL_OUTOF10: 3

## 2017-08-15 ASSESSMENT — ENCOUNTER SYMPTOMS
DIARRHEA: 0
DOUBLE VISION: 0
STRIDOR: 0
PALPITATIONS: 0
HALLUCINATIONS: 0
FLANK PAIN: 0
SHORTNESS OF BREATH: 0
ROS GI COMMENTS: COLOSTOMY
WEAKNESS: 1
CLAUDICATION: 0
NECK PAIN: 0
SPUTUM PRODUCTION: 0
DIAPHORESIS: 0
CHILLS: 0
TINGLING: 0
DEPRESSION: 0
SEIZURES: 0
WEIGHT LOSS: 0
HEADACHES: 0
SPEECH CHANGE: 0
MYALGIAS: 0
NERVOUS/ANXIOUS: 0
FEVER: 0
ABDOMINAL PAIN: 0
PND: 0
COUGH: 0
DIZZINESS: 0
VOMITING: 0
WHEEZING: 0
FOCAL WEAKNESS: 0
SENSORY CHANGE: 0
NAUSEA: 0
BLOOD IN STOOL: 0
HEMOPTYSIS: 0
TREMORS: 0
ORTHOPNEA: 0
LOSS OF CONSCIOUSNESS: 0
BLURRED VISION: 0

## 2017-08-15 NOTE — PROGRESS NOTES
Infectious Disease Progress Note    Author: Mis Herbert M.D. Date of service & Time created: 2017  5:51 PM    Interval History:  76-year-old white male admitted with pelvic abscess possibly due to bowel perforation due to constipation  17- low-grade fevers. Continues to have abdominal pain  17- complains of some abdominal pain. MAXIMUM TEMPERATURE 100.3. WBC 7.6  17- complains of abdominal pain. MAXIMUM TEMPERATURE is 98. WBC 7.2  17- ongoing abdominal pain. No fevers. No new issues overnight.   AF WBC 4.8 Called for less expensive abx recommendations and new GI sxs. Stool in toilet  8/3 AF less anxious-pain better controlled   eating improved   no new complaints-denies SE abx Pain about the same   new drain placed   AF, no CBC, feels tired   AF, no CBC, sitting up in chair, friends visiting in room, denies any pain, tolerating abx without issues   AF, no CBC, c/o pain on bottom, very weak and unable to get out of chair, needs assistance  8/10 AF, no CBC, having some R groin pain, plan for repeat CT today   AF, no CBC, some LLE pain, CT with interval decrease,    AF, no CBC, states he's getting a sore near drain placement, tolerating PO abx, awaiting placement   AF, no new symptoms, resting comfortably and reading a book  17-MAXIMUM TEMPERATURE 99. No labs available  Labs Reviewed, Medications Reviewed, Radiology Reviewed and Wound Reviewed.    Review of Systems:  Review of Systems   Constitutional: Positive for malaise/fatigue. Negative for fever.   Respiratory: Negative for cough and shortness of breath.    Cardiovascular: Negative for chest pain.   Gastrointestinal: Negative for vomiting.   Genitourinary: Negative for dysuria.   Musculoskeletal: Positive for myalgias.   Neurological: Positive for weakness. Negative for sensory change, speech change and headaches.       Hemodynamics:  Temp (24hrs), Av.8 °C (98.3 °F), Min:36.6 °C (97.9  °F), Max:37.2 °C (99 °F)  Temperature: 36.9 °C (98.4 °F)  Pulse  Av.6  Min: 49  Max: 105   Blood Pressure : 109/40 mmHg       Physical Exam:  Physical Exam   Constitutional: He is oriented to person, place, and time. He appears well-developed. No distress.   Cachectic and frail  Chronically ill   HENT:   Head: Normocephalic and atraumatic.   Mouth/Throat: No oropharyngeal exudate.   Eyes: EOM are normal. Pupils are equal, round, and reactive to light. No scleral icterus.   Neck: Neck supple.   Cardiovascular: Normal rate and regular rhythm.    No murmur heard.  Pulmonary/Chest: Effort normal. No respiratory distress.   Abdominal: Soft. There is no tenderness. There is no rebound and no guarding.   Colostomy formed stool  Staples removed. Steristrips in place  Surgical site well healing  MAREK drain - scant serosang     Musculoskeletal: He exhibits no edema.   LUE PICC nontender   Neurological: He is alert and oriented to person, place, and time.   Skin:   Sacrum dressed   Nursing note and vitals reviewed.      Labs:  No results for input(s): WBC, RBC, HEMOGLOBIN, HEMATOCRIT, MCV, MCH, RDW, PLATELETCT, MPV, NEUTSPOLYS, LYMPHOCYTES, MONOCYTES, EOSINOPHILS, BASOPHILS, RBCMORPHOLO in the last 72 hours.  Recent Labs      17   0500   SODIUM  140   POTASSIUM  3.6   CHLORIDE  107   CO2  29   GLUCOSE  108*   BUN  8     Recent Labs      17   0500   CREATININE  0.64       Wound:      Results     Procedure Component Value Units Date/Time    ANAEROBIC CULTURE [[id number]] Collected:  17 1445    Order Status:  Completed Specimen Information:  Wound Updated:  17 1334     Significant Indicator NEG      Source WND      Site Peritoneal Abscess      Anaerobic Culture, Culture Res No Anaerobes isolated.     Narrative:      CALL  [place]  141 tel. [phone],  CALLED  141 tel. [phone] 2017, 08:05, RB PERF. RESULTS CALLED  TO:[phone]    CULTURE WOUND W/ GRAM STAIN [[id number]]  (Abnormal)  (Susceptibility)  Collected:  07/25/17 1445    Order Status:  Completed Specimen Information:  Wound Updated:  07/30/17 1334     Significant Indicator POS (POS)      Source WND      Site Peritoneal Abscess      Culture Result Wound -- (A)      Gram Stain Result --      Result:        Many WBCs.  Few Yeast.  Few Gram positive cocci.       Culture Result Wound -- (A)      Result:        Enterococcus faecium  Combination therapy with ampicillin, penicillin, or  vancomycin (for susceptible strains) plus an aminoglycoside  is usually indicated for serious enterococcal infections,  such as endocarditis unless high-level resistance to both  gentamicin and streptomycin is documented; such combinations  are predicted to result in synergistic killing of the  Enterococcus.  VANCOMYCIN RESISTANT ENTEROCOCCI(VRE)       Culture Result Wound -- (A)      Result:        Candida albicans  Light growth      Narrative:      CALL  [facility]  141 tel. [phone],  CALLED  141 tel. [phone] 07/28/2017, 08:05, RB PERF. RESULTS CALLED  TO:[phone]    Culture & Susceptibility     ENTEROCOCCUS FAECIUM     Antibiotic Sensitivity Microscan Unit Status    Ampicillin Resistant >8 mcg/mL Final    Daptomycin Sensitive 2 mcg/mL Final    Gent Synergy Sensitive <=500 mcg/mL Final    Linezolid Sensitive 2 mcg/mL Final    Penicillin Resistant >8 mcg/mL Final    Vancomycin Resistant >16 mcg/mL Final                             Fluids:  Intake/Output       08/12/17 0700 - 08/13/17 0659 08/13/17 0700 - 08/14/17 0659 08/14/17 0700 - 08/15/17 0659      [id number] 2536-9617 Total 8917-8384 2448-1623 Total 8885-3203 8357-6049 Total       Intake    P.O.  480  -- 480  840  -- 840  240  -- 240    P.O. 480 -- 480 840 -- 840 240 -- 240    I.V.  360  360 720  240  -- 240  --  -- --    IV Volume (NS) 360 360 720 240 -- 240 -- -- --    Total Intake  1080 -- 1080 240 -- 240       Output    Urine  900  450 1350  800  625 1425  525  -- 525    Number of Times Voided 3 x 1 x 4 x 3 x --  3 x -- -- --    Void (ml)   525 -- 525    Drains  0  0 0  0  5 5  0  -- 0    Sukhwinder Oquendo 1 0 0 0 0 5 5 0 -- 0    Stool/Urine  0  0 0  0  350 350  150  -- 150    Ostomy #1 (ml)  0 0 0 0 350 350 150 -- 150    Stool  --  -- --  --  -- --  --  -- --    Number of Times Stooled 2 x 0 x 2 x 0 x 0 x 0 x -- -- --    Total Output   675 -- 675       Net I/O     -60 -90 -150 280 -980 -700 -435 -- -435        CT-ABDOMEN-PELVIS WITH (Final result) Result time: 08/03/17 16:43:02     Final result by Madina oDrman M.D. (08/03/17 16:43:02)     Impression:     4.5 x 4.7 x 5.4 cm pelvic fluid collection is mildly decreased in size compared to prior. There has been interval removal of the previously noted pigtail catheter.  Small amount of free fluid in the abdomen and pelvis.  Mildly dilated loops of small bowel may represent mild ileus.  Small bilateral pleural effusions with overlying atelectasis/consolidation.  Moderate hiatal hernia.  Prominent atherosclerotic plaque.  Healing fractures of the fifth and sixth ribs on the right.          Assessment:  Active Hospital Problems    Diagnosis   • *Sepsis (CMS-HCC) [A41.9]   • Abdominal abscess (CMS-[facility]) [K65.1]   • BPH (benign prostatic hypertrophy) [N40.0]   • Depression [F32.9]   • Osteoarthritis [M19.90]       Plan:  Pelvic abscess due to bowel perforation  Afebrile  Leukocytosis, resolved  S/p IR drain ×2  Cultures of Enterococcus faecium, viridans strep, Bacteroides fragilis and Candida albicans  CT scan done on 7/24/2017 shows persistent pelvic abscess over 5 cm  s/p exploratory laparotomy, abdominal washout, sigmoid colon resection, colostomy placement and appendectomy on 7/26/17  OR cultures-+VRE and Candida  Repeat CT showed 4.5 X  4.7 X 5.4cm-  s/p drainage cath 8/5-follow cxs - NGTD  Repeat CT 8/10 - interval decrease now measuring 2.6 x 1.9 x 1.6 cm  DC tigecycline.  Transition to PO Zyvox for VRE with augmentin to cover  Ecoli, Bfrag, strep x 2 weeks  Stop date 8/25/17  Check labs in the a.m.  Protein malnutrition  Anticipate poor wound healing  Alb 2.2    Dispo: Transfer to SNF - OK from ID standpoint     ID clinic    [facility] IM Dr. Howell

## 2017-08-15 NOTE — PROGRESS NOTES
Patient remains afebrile. No new issues overnight. Continue antibiotics through 8/25. ID will sign off. Please call as needed

## 2017-08-15 NOTE — PROGRESS NOTES
[facility]    Date of Service: 8/15/2017    Chief Complaint  75 y/o M with PMH of peripheral neuropathy, BPH, Depression admitted for abdominal pain, diarrhea.  Dx with pelvis abscess on imaging. S/p Ex lap and abdominal washout on .    Interval Problem Update  Seen and evaluated on rounds. He is awaiting skilled nursing facility placement. No complaints per patient. Remains on antibiotics per ID recommendations. PICC line in place. We'll discontinue tomorrow.    Consultants/Specialty  [facility] - Surgery  Dr. Herbert/ [place] -ID    Disposition  Awaiting placement      Review of Systems   Constitutional: Positive for malaise/fatigue. Negative for fever, chills, weight loss and diaphoresis.   HENT: Negative for congestion and hearing loss.    Eyes: Negative for blurred vision and double vision.   Respiratory: Negative for cough, hemoptysis, sputum production, shortness of breath, wheezing and stridor.    Cardiovascular: Negative for chest pain, palpitations, orthopnea, claudication, leg swelling and PND.   Gastrointestinal: Negative for nausea, vomiting, abdominal pain, diarrhea, blood in stool and melena.        Colostomy   Genitourinary: Negative for dysuria, urgency, frequency, hematuria and flank pain.        Increased UOP per patient last 24 hours   Musculoskeletal: Negative for myalgias and neck pain.   Skin: Negative for itching and rash.   Neurological: Positive for weakness (generalized ). Negative for dizziness, tingling, tremors, sensory change, speech change, focal weakness, seizures, loss of consciousness and headaches.   Psychiatric/Behavioral: Negative for depression, suicidal ideas and hallucinations. The patient is not nervous/anxious.    All other systems reviewed and are negative.     Physical Exam  Laboratory/Imaging   Hemodynamics  Temp (24hrs), Av.7 °C (98 °F), Min:36.3 °C (97.3 °F), Max:36.9 °C (98.4 °F)   Temperature: 36.3 °C (97.3 °F)  Pulse  Av.5  Min: 49   Max: 105    Blood Pressure : 145/48 mmHg      Respiratory      Respiration: 16, Pulse Oximetry: 93 %        RUL Breath Sounds: Clear, RML Breath Sounds: Clear, RLL Breath Sounds: Clear, TOSHIA Breath Sounds: Clear, LLL Breath Sounds: Clear    Fluids    Intake/Output Summary (Last 24 hours) at 08/15/17 1407  Last data filed at 08/15/17 1200   Gross per 24 hour   Intake    356 ml   Output   5975 ml   Net  -5619 ml       Nutrition  Orders Placed This Encounter   Procedures   • DIET ORDER     Standing Status: Standing      Number of Occurrences: 1      Standing Expiration Date:      Order Specific Question:  Diet:     Answer:  Low Fiber(GI Soft) [2]     Physical Exam   Constitutional: He is oriented to person, place, and time. He is active. No distress.   HENT:   Head: Normocephalic and atraumatic.   Mouth/Throat: Oropharynx is clear and moist. No oropharyngeal exudate.   Eyes: Conjunctivae and EOM are normal. Pupils are equal, round, and reactive to light. No scleral icterus.   Neck: Normal range of motion. No JVD present.   Cardiovascular: Normal rate, regular rhythm, normal heart sounds and intact distal pulses.    No murmur heard.  Pulmonary/Chest: Effort normal and breath sounds normal. No stridor. No respiratory distress. He has no wheezes. He has no rales.   Abdominal: Soft. Bowel sounds are normal. He exhibits no distension. There is no tenderness.   Colostomy in place, air loose stool present.      Musculoskeletal: Normal range of motion. He exhibits no edema or tenderness.   Neurological: He is alert and oriented to person, place, and time. No cranial nerve deficit.   Skin: Skin is warm and dry. He is not diaphoretic. No erythema.   Psychiatric: He has a normal mood and affect. His behavior is normal. Judgment and thought content normal.   Vitals reviewed.          Recent Labs      08/13/17   0500   SODIUM  140   POTASSIUM  3.6   CHLORIDE  107   CO2  29   GLUCOSE  108*   BUN  8   CREATININE  0.64   CALCIUM  8.0*                       Assessment/Plan     Pelvic abscess in male (CMS-[facility]) (present on admission)  Assessment & Plan  VRE, yeast infection --S/p   Ex lap , washout, Sigmoid colon resection w Colostomy placement, Appi-  Bowel activity recovering- Fu CT 8-3  with minimal decreased size of pelvic abscess 4.5 x 4.7 x 5.4 cm - post Pelvic abscess drain placement 8-5-17   Minimal output-- flush drain- follow output- anticipate dc if tapering off output.  cw GI diet.   Stopped IV tygacil and diflucan . Repeat the CT shows decline, started augmentin, zyvox, oral diflucan  Drain per surgery    Normocytic anemia  Assessment & Plan  Stable H&H, multi factorial  intra-op losses and chronic disease-stable  Monitor for acute symptoms.      Bipolar 1 disorder, depressed (CMS-HCC)  Assessment & Plan  Continue  lamictal    Neuropathy (CMS-HCC)  Assessment & Plan  Affecting LLE, will increase neurontin, get duplex to r/o DVT.      BPH (benign prostatic hypertrophy) (present on admission)  Assessment & Plan  Continue Flomax  Keep perineum area dry.    Depression (present on admission)  Assessment & Plan  Stable    Osteoarthritis (present on admission)  Assessment & Plan  Stable, prn analgesia  ca, vit d    Debility - tx to [facility] when stable for rehab.   Labs reviewed, Medications reviewed and Radiology images reviewed  Ruff catheter: No Ruff  Central line in place: Concentrated IV drugs    DVT Prophylaxis: Enoxaparin (Lovenox)      Antibiotics: Treating active infection/contamination beyond 24 hours perioperative coverage

## 2017-08-15 NOTE — PROGRESS NOTES
AOX4 numbness and tingling in hands  Pain 3/10 hands - declines pain medication  HR 50  +1 pitting edema bilateral feet and lower leg  Colostomy in place skin intact around collection bag  Mepilex on sacrum  Abdomen rounded semi soft around incision and colostomy

## 2017-08-16 PROCEDURE — A9270 NON-COVERED ITEM OR SERVICE: HCPCS | Performed by: HOSPITALIST

## 2017-08-16 PROCEDURE — 700102 HCHG RX REV CODE 250 W/ 637 OVERRIDE(OP): Performed by: NURSE PRACTITIONER

## 2017-08-16 PROCEDURE — 770021 HCHG ROOM/CARE - ISO PRIVATE

## 2017-08-16 PROCEDURE — 700105 HCHG RX REV CODE 258

## 2017-08-16 PROCEDURE — A9270 NON-COVERED ITEM OR SERVICE: HCPCS | Performed by: SURGERY

## 2017-08-16 PROCEDURE — 700111 HCHG RX REV CODE 636 W/ 250 OVERRIDE (IP): Performed by: SURGERY

## 2017-08-16 PROCEDURE — 700102 HCHG RX REV CODE 250 W/ 637 OVERRIDE(OP): Performed by: SURGERY

## 2017-08-16 PROCEDURE — 700102 HCHG RX REV CODE 250 W/ 637 OVERRIDE(OP): Performed by: INTERNAL MEDICINE

## 2017-08-16 PROCEDURE — 99232 SBSQ HOSP IP/OBS MODERATE 35: CPT | Performed by: INTERNAL MEDICINE

## 2017-08-16 PROCEDURE — A9270 NON-COVERED ITEM OR SERVICE: HCPCS | Performed by: NURSE PRACTITIONER

## 2017-08-16 PROCEDURE — A9270 NON-COVERED ITEM OR SERVICE: HCPCS | Performed by: INTERNAL MEDICINE

## 2017-08-16 PROCEDURE — 700102 HCHG RX REV CODE 250 W/ 637 OVERRIDE(OP): Performed by: HOSPITALIST

## 2017-08-16 RX ORDER — SODIUM CHLORIDE 9 MG/ML
INJECTION, SOLUTION INTRAVENOUS
Status: COMPLETED
Start: 2017-08-16 | End: 2017-08-16

## 2017-08-16 RX ADMIN — LINEZOLID 600 MG: 600 TABLET, FILM COATED ORAL at 10:28

## 2017-08-16 RX ADMIN — AMOXICILLIN AND CLAVULANATE POTASSIUM 1 TABLET: 875; 125 TABLET, FILM COATED ORAL at 22:20

## 2017-08-16 RX ADMIN — ENOXAPARIN SODIUM 30 MG: 100 INJECTION SUBCUTANEOUS at 16:53

## 2017-08-16 RX ADMIN — OXYBUTYNIN CHLORIDE 5 MG: 5 TABLET ORAL at 10:31

## 2017-08-16 RX ADMIN — GABAPENTIN 400 MG: 400 CAPSULE ORAL at 22:20

## 2017-08-16 RX ADMIN — CALCIUM CARBONATE-CHOLECALCIFEROL TAB 250 MG-125 UNIT 1 TABLET: 250-125 TAB at 10:32

## 2017-08-16 RX ADMIN — ENOXAPARIN SODIUM 30 MG: 100 INJECTION SUBCUTANEOUS at 05:39

## 2017-08-16 RX ADMIN — GABAPENTIN 400 MG: 400 CAPSULE ORAL at 10:28

## 2017-08-16 RX ADMIN — OXYBUTYNIN CHLORIDE 5 MG: 5 TABLET ORAL at 22:20

## 2017-08-16 RX ADMIN — OMEPRAZOLE 20 MG: 20 CAPSULE, DELAYED RELEASE ORAL at 10:31

## 2017-08-16 RX ADMIN — OXYCODONE HYDROCHLORIDE 5 MG: 5 TABLET ORAL at 10:31

## 2017-08-16 RX ADMIN — HYDROCODONE BITARTRATE AND ACETAMINOPHEN 1 TABLET: 7.5; 325 TABLET ORAL at 22:20

## 2017-08-16 RX ADMIN — TAMSULOSIN HYDROCHLORIDE 0.4 MG: 0.4 CAPSULE ORAL at 10:31

## 2017-08-16 RX ADMIN — LAMOTRIGINE 150 MG: 100 TABLET ORAL at 10:28

## 2017-08-16 RX ADMIN — POLYETHYLENE GLYCOL 3350 1 PACKET: 17 POWDER, FOR SOLUTION ORAL at 10:27

## 2017-08-16 RX ADMIN — LINEZOLID 600 MG: 600 TABLET, FILM COATED ORAL at 22:20

## 2017-08-16 RX ADMIN — AMOXICILLIN AND CLAVULANATE POTASSIUM 1 TABLET: 875; 125 TABLET, FILM COATED ORAL at 10:32

## 2017-08-16 RX ADMIN — GABAPENTIN 400 MG: 400 CAPSULE ORAL at 16:53

## 2017-08-16 RX ADMIN — SODIUM CHLORIDE: 9 INJECTION, SOLUTION INTRAVENOUS at 10:30

## 2017-08-16 RX ADMIN — FLUCONAZOLE 200 MG: 200 TABLET ORAL at 10:32

## 2017-08-16 ASSESSMENT — ENCOUNTER SYMPTOMS
HALLUCINATIONS: 0
NAUSEA: 0
SPUTUM PRODUCTION: 0
WEAKNESS: 1
WEIGHT LOSS: 0
PALPITATIONS: 0
HEMOPTYSIS: 0
FEVER: 0
PND: 0
LOSS OF CONSCIOUSNESS: 0
HEADACHES: 0
NECK PAIN: 0
MYALGIAS: 0
FLANK PAIN: 0
TREMORS: 0
FOCAL WEAKNESS: 0
VOMITING: 0
TINGLING: 0
RESPIRATORY NEGATIVE: 1
CLAUDICATION: 0
BLOOD IN STOOL: 0
SEIZURES: 0
DOUBLE VISION: 0
NERVOUS/ANXIOUS: 0
WHEEZING: 0
DIARRHEA: 0
DEPRESSION: 0
ORTHOPNEA: 0
COUGH: 0
ROS GI COMMENTS: COLOSTOMY
DIZZINESS: 0
STRIDOR: 0
CHILLS: 0
ABDOMINAL PAIN: 0
SHORTNESS OF BREATH: 0
SENSORY CHANGE: 0
DIAPHORESIS: 0
CARDIOVASCULAR NEGATIVE: 1
BLURRED VISION: 0
SPEECH CHANGE: 0

## 2017-08-16 ASSESSMENT — PAIN SCALES - GENERAL
PAINLEVEL_OUTOF10: ASSUMED PAIN PRESENT
PAINLEVEL_OUTOF10: 3

## 2017-08-16 NOTE — PROGRESS NOTES
"[facility] Progress Note        Date of Service: 8/16/2017    Chief Complaint  75 y/o M with PMH of peripheral neuropathy, BPH, Depression admitted for abdominal pain, diarrhea.  Dx with pelvis abscess on imaging. S/p Ex lap and abdominal washout on 7/25.    76 male admitted 7/14/2017 with abdominal pain. CT abdomen obtained on presentation revealing \"Small inflammatory phlegmon/developing abscess in the RIGHT lower quadrant, in close proximity to the appendiceal tip, concerning for perforated appendicitis. Large complex fluid collection in the central pelvis, likely abscess, with adjacent inflammation of sigmoid colon.  This may be secondary to appendicitis or diverticulitis. Minimal pneumoperitoneum consistent with bowel perforation. Enlarged prostate\". Surgical consultation was obtained. Recommendations were for IR drain placement. This was done on 07/15/2017. 300 ml of beige malodorous pus was drained. Cultures grew E.Coli, Enterococcus faecium, viridans streptococci and Bacteriodes fragilis. CT pelvis was repeated 07/18/2017 revealing \"The left transgluteal pelvic pigtail catheter pulled back slightly with the loop straddled the wall of the pelvic fluid collection. There is reaccumulation of fluid in the collection with air-fluid level, more than previously seen in post drain image, probably due to malfunction of the drain\". IR drain repeated by Dr Wei on 07/19/2017. Cultures from this grew Enterococcus Faecium and Candida albicans. ID consultation was subsequently obtained and antibiotics were subsequently continued per ID recommendations. CT repeated 07/24/2017 revealing \"Again seen posterior approach percutaneous catheter which extends into a pelvic abscess. The abscess cavity which contains an air/fluid level currently measures 5.5 x 5.2 cm in size\". Patient was subsequently taken to OR by Dr Rodriguez on 07/25/2017 and appendectomy, sigmoid colon resection with colostomy creation performed. " "Repeat CT 08/03/2017 revealing \"4.5 x 4.7 x 5.4 cm pelvic fluid collection is mildly decreased in size compared to prior. There has been interval removal of the previously noted pigtail catheter\". IR guided drain again placed on 08/06/2017 with repeat CT 08/12/2017 revealing \"Interval decrease in size of the fluid collection in the pelvis measuring 2.6 x 1.9 x 1.6 cm. Pigtail catheter is along the edge of the collection\". Meanwhile patient has remained on abx per ID recommendations.     Interval Problem Update  Seen and evaluated on rounds. He is awaiting skilled nursing facility placement. No complaints per patient. Remains on antibiotics per ID recommendations. PICC line in place. Supposed to go to SNF but cannot be done until Friday. Given this will continue PICC line. Remove day of discharge. On Linezolid. Monitor Linezolid toxicity. Repeat CBC in am tomorrow.     Consultants/Specialty  [facility] - Surgery  Dr. Herbert/ [place] -ID    Disposition  Awaiting placement. Anticipated Friday.       Review of Systems   Constitutional: Positive for malaise/fatigue. Negative for fever, chills, weight loss and diaphoresis.   HENT: Negative for congestion and hearing loss.    Eyes: Negative for blurred vision and double vision.   Respiratory: Negative for cough, hemoptysis, sputum production, shortness of breath, wheezing and stridor.    Cardiovascular: Negative for chest pain, palpitations, orthopnea, claudication, leg swelling and PND.   Gastrointestinal: Negative for nausea, vomiting, abdominal pain, diarrhea, blood in stool and melena.        Colostomy   Genitourinary: Negative for dysuria, urgency, frequency, hematuria and flank pain.        Increased UOP per patient last 24 hours   Musculoskeletal: Negative for myalgias and neck pain.   Skin: Negative for itching and rash.   Neurological: Positive for weakness (generalized ). Negative for dizziness, tingling, tremors, sensory change, speech change, focal weakness, " seizures, loss of consciousness and headaches.   Psychiatric/Behavioral: Negative for depression, suicidal ideas and hallucinations. The patient is not nervous/anxious.    All other systems reviewed and are negative.     Physical Exam  Laboratory/Imaging   Hemodynamics  Temp (24hrs), Av.6 °C (97.9 °F), Min:36.2 °C (97.2 °F), Max:37.1 °C (98.8 °F)   Temperature: 36.2 °C (97.2 °F)  Pulse  Av.2  Min: 49  Max: 105    Blood Pressure : 139/69 mmHg      Respiratory      Respiration: 18, Pulse Oximetry: 97 %             Fluids    Intake/Output Summary (Last 24 hours) at 17 1010  Last data filed at 17 0900   Gross per 24 hour   Intake    750 ml   Output   3125 ml   Net  -2375 ml       Nutrition  Orders Placed This Encounter   Procedures   • DIET ORDER     Standing Status: Standing      Number of Occurrences: 1      Standing Expiration Date:      Order Specific Question:  Diet:     Answer:  Low Fiber(GI Soft) [2]     Physical Exam   Constitutional: He is oriented to person, place, and time. He is active. No distress.   HENT:   Head: Normocephalic and atraumatic.   Mouth/Throat: Oropharynx is clear and moist. No oropharyngeal exudate.   Eyes: Conjunctivae and EOM are normal. Pupils are equal, round, and reactive to light. No scleral icterus.   Neck: Normal range of motion. No JVD present.   Cardiovascular: Normal rate, regular rhythm, normal heart sounds and intact distal pulses.    No murmur heard.  Pulmonary/Chest: Effort normal and breath sounds normal. No stridor. No respiratory distress. He has no wheezes. He has no rales.   Abdominal: Soft. Bowel sounds are normal. He exhibits no distension. There is no tenderness.   Colostomy in place, air loose stool present.      Musculoskeletal: Normal range of motion. He exhibits no edema or tenderness.   Neurological: He is alert and oriented to person, place, and time. No cranial nerve deficit.   Skin: Skin is warm and dry. He is not diaphoretic. No erythema.  "  Psychiatric: He has a normal mood and affect. His behavior is normal. Judgment and thought content normal.   Vitals reviewed.                               Assessment/Plan     Pelvic abscess in male (CMS-HCC) (present on admission)  Assessment & Plan  repeat CT 08/12/2017 revealing \"Interval decrease in size of the fluid collection in the pelvis measuring 2.6 x 1.9 x 1.6 cm. Pigtail catheter is along the edge of the collection\".   PO abx per ID recommendations now      Normocytic anemia (present on admission)  Assessment & Plan  Stable H&H, multi factorial  intra-op losses and chronic disease-stable  Monitor for acute symptoms.      Bipolar 1 disorder, depressed (CMS-[facility]) (present on admission)  Assessment & Plan  Continue  lamictal    Neuropathy (CMS-[facility]) (present on admission)  Assessment & Plan  Neurontin    BPH (benign prostatic hypertrophy) (present on admission)  Assessment & Plan  Continue Flomax  Keep perineum area dry.    Depression (present on admission)  Assessment & Plan  Stable    Osteoarthritis (present on admission)  Assessment & Plan  Stable, prn analgesia  ca, vit d      Labs reviewed, Medications reviewed and Radiology images reviewed  Ruff catheter: No Ruff  Central line in place: Concentrated IV drugs    DVT Prophylaxis: Enoxaparin (Lovenox)      Antibiotics: Treating active infection/contamination beyond 24 hours perioperative coverage            "

## 2017-08-16 NOTE — CARE PLAN
Problem: Infection  Goal: Will remain free from infection  Outcome: PROGRESSING AS EXPECTED  Pt switched to po abx. Pt on augmention, fluconazole and zyvox    Problem: Venous Thromboembolism (VTW)/Deep Vein Thrombosis (DVT) Prevention:  Goal: Patient will participate in Venous Thrombosis (VTE)/Deep Vein Thrombosis (DVT)Prevention Measures  Outcome: PROGRESSING AS EXPECTED  Lovenox given     Problem: Bowel/Gastric:  Goal: Normal bowel function is maintained or improved  Outcome: PROGRESSING AS EXPECTED  Pt has good stool output from ostomy

## 2017-08-16 NOTE — PROGRESS NOTES
Surgical Progress Note    Author: Jorge Rodriguez Date & Time created: 2017   8:34 AM     Interval Events:  Tolerating drain out.    Review of Systems   Constitutional: Negative for fever, chills, weight loss and malaise/fatigue.   Respiratory: Negative.    Cardiovascular: Negative.    Gastrointestinal: Negative for nausea, vomiting and abdominal pain.     Hemodynamics:  Temp (24hrs), Av.6 °C (97.9 °F), Min:36.2 °C (97.2 °F), Max:37.1 °C (98.8 °F)  Temperature: 36.2 °C (97.2 °F)  Pulse  Av.2  Min: 49  Max: 105   Blood Pressure : 139/69 mmHg     Respiratory:    Respiration: 18, Pulse Oximetry: 97 %        RUL Breath Sounds: Clear, RML Breath Sounds: Clear, RLL Breath Sounds: Clear, TOSHIA Breath Sounds: Clear, LLL Breath Sounds: Clear  Neuro:  GCS = 15      Fluids:    Intake/Output Summary (Last 24 hours) at 17 0834  Last data filed at 17 0755   Gross per 24 hour   Intake    610 ml   Output   2725 ml   Net  -2115 ml        Current Diet Order   Procedures   • DIET ORDER     Physical Exam   Constitutional: He is oriented to person, place, and time. He appears well-developed and well-nourished. No distress.   HENT:   Head: Normocephalic.   Eyes: Pupils are equal, round, and reactive to light. No scleral icterus.   Cardiovascular: Normal rate.    Pulmonary/Chest: Effort normal. No respiratory distress.   Abdominal: Soft. Bowel sounds are normal. He exhibits no distension. There is no tenderness. There is no rebound and no guarding.   Ostomy intact, productive of stool.   Neurological: He is alert and oriented to person, place, and time.   Psychiatric: He has a normal mood and affect. His behavior is normal. Judgment and thought content normal.     Labs:  No results found for this or any previous visit (from the past 24 hour(s)).  Medical Decision Making, by Problem:  Active Hospital Problems    Diagnosis   • Pelvic abscess in male (CMS-HCC) [K65.1]     Priority: High   • BPH (benign prostatic  hypertrophy) [N40.0]     Priority: Low   • Depression [F32.9]     Priority: Low   • Osteoarthritis [M19.90]     Priority: Low   • Neuropathy (CMS-HCC) [G62.9]   • Bipolar 1 disorder, depressed (CMS-HCC) [F31.9]   • Normocytic anemia [D64.9]     Plan:  Follow up as previously ordered as outpatient.  Anticipate transfer out soon    Quality Measures:  Labs reviewed  Ruff catheter: No Ruff      DVT Prophylaxis: Enoxaparin (Lovenox)    Ulcer prophylaxis: Yes  Antibiotics: Treating active infection/contamination beyond 24 hours perioperative coverage        Discussed patient condition with Patient

## 2017-08-16 NOTE — DISCHARGE PLANNING
CCS received a call back from Joseph at [facility]. Joseph stated that if the patient is not in isolation they will have a bed open on Friday 08/18/2017. CTTM on floor Holly has been notified.

## 2017-08-16 NOTE — DISCHARGE PLANNING
I let Dr. Aguilar know that [facility] could take the patient on Friday, 8/18, but they have no beds until then.  [facility] was advised that the VRE was ruled out.

## 2017-08-16 NOTE — PROGRESS NOTES
Received report from BART Bledsoe. Pt is AAAnna. HUGH. VSS. Pt denies SOB. Pt c/o pain 3/10; oxy given. -N/V. L sided ostomy; w/ good stool output. Midline abdominal incision SCAR w/ steri strips. L arm PICC in place; per Dr Aguilar, pt will have PICC d/c'd on 8/17. Pt up 1x assist w/ FWW; pt continues to need prompting to get up and ambulate.  POC discussed. Bed locked and in the lowest position. Call light w/in reach. Hourly rounding in place

## 2017-08-17 ENCOUNTER — APPOINTMENT (OUTPATIENT)
Dept: RADIOLOGY | Facility: MEDICAL CENTER | Age: 77
DRG: 853 | End: 2017-08-17
Attending: INTERNAL MEDICINE
Payer: MEDICARE

## 2017-08-17 LAB
ERYTHROCYTE [DISTWIDTH] IN BLOOD BY AUTOMATED COUNT: 62.4 FL (ref 35.9–50)
HCT VFR BLD AUTO: 30.9 % (ref 42–52)
HGB BLD-MCNC: 10.1 G/DL (ref 14–18)
MCH RBC QN AUTO: 29.6 PG (ref 27–33)
MCHC RBC AUTO-ENTMCNC: 32.7 G/DL (ref 33.7–35.3)
MCV RBC AUTO: 90.6 FL (ref 81.4–97.8)
PLATELET # BLD AUTO: 124 K/UL (ref 164–446)
PMV BLD AUTO: 9.5 FL (ref 9–12.9)
RBC # BLD AUTO: 3.41 M/UL (ref 4.7–6.1)
WBC # BLD AUTO: 2.5 K/UL (ref 4.8–10.8)

## 2017-08-17 PROCEDURE — A9270 NON-COVERED ITEM OR SERVICE: HCPCS | Performed by: SURGERY

## 2017-08-17 PROCEDURE — 700102 HCHG RX REV CODE 250 W/ 637 OVERRIDE(OP): Performed by: SURGERY

## 2017-08-17 PROCEDURE — A9270 NON-COVERED ITEM OR SERVICE: HCPCS | Performed by: INTERNAL MEDICINE

## 2017-08-17 PROCEDURE — 97530 THERAPEUTIC ACTIVITIES: CPT

## 2017-08-17 PROCEDURE — A9270 NON-COVERED ITEM OR SERVICE: HCPCS | Performed by: HOSPITALIST

## 2017-08-17 PROCEDURE — 700102 HCHG RX REV CODE 250 W/ 637 OVERRIDE(OP): Performed by: INTERNAL MEDICINE

## 2017-08-17 PROCEDURE — 99232 SBSQ HOSP IP/OBS MODERATE 35: CPT | Performed by: INTERNAL MEDICINE

## 2017-08-17 PROCEDURE — 700102 HCHG RX REV CODE 250 W/ 637 OVERRIDE(OP): Performed by: NURSE PRACTITIONER

## 2017-08-17 PROCEDURE — 85027 COMPLETE CBC AUTOMATED: CPT

## 2017-08-17 PROCEDURE — 97116 GAIT TRAINING THERAPY: CPT

## 2017-08-17 PROCEDURE — 700105 HCHG RX REV CODE 258

## 2017-08-17 PROCEDURE — 770021 HCHG ROOM/CARE - ISO PRIVATE

## 2017-08-17 PROCEDURE — 74177 CT ABD & PELVIS W/CONTRAST: CPT

## 2017-08-17 PROCEDURE — 700111 HCHG RX REV CODE 636 W/ 250 OVERRIDE (IP)

## 2017-08-17 PROCEDURE — 700102 HCHG RX REV CODE 250 W/ 637 OVERRIDE(OP): Performed by: HOSPITALIST

## 2017-08-17 PROCEDURE — 700117 HCHG RX CONTRAST REV CODE 255: Performed by: INTERNAL MEDICINE

## 2017-08-17 PROCEDURE — 700105 HCHG RX REV CODE 258: Performed by: INTERNAL MEDICINE

## 2017-08-17 PROCEDURE — A9270 NON-COVERED ITEM OR SERVICE: HCPCS | Performed by: NURSE PRACTITIONER

## 2017-08-17 PROCEDURE — 700111 HCHG RX REV CODE 636 W/ 250 OVERRIDE (IP): Performed by: SURGERY

## 2017-08-17 RX ORDER — SODIUM CHLORIDE, SODIUM LACTATE, POTASSIUM CHLORIDE, CALCIUM CHLORIDE 600; 310; 30; 20 MG/100ML; MG/100ML; MG/100ML; MG/100ML
INJECTION, SOLUTION INTRAVENOUS CONTINUOUS
Status: DISCONTINUED | OUTPATIENT
Start: 2017-08-17 | End: 2017-08-18 | Stop reason: HOSPADM

## 2017-08-17 RX ORDER — SODIUM CHLORIDE 9 MG/ML
INJECTION, SOLUTION INTRAVENOUS
Status: COMPLETED
Start: 2017-08-17 | End: 2017-08-17

## 2017-08-17 RX ADMIN — VANCOMYCIN HYDROCHLORIDE 1700 MG: 100 INJECTION, POWDER, LYOPHILIZED, FOR SOLUTION INTRAVENOUS at 12:30

## 2017-08-17 RX ADMIN — GABAPENTIN 400 MG: 400 CAPSULE ORAL at 14:02

## 2017-08-17 RX ADMIN — SODIUM CHLORIDE, POTASSIUM CHLORIDE, SODIUM LACTATE AND CALCIUM CHLORIDE: 600; 310; 30; 20 INJECTION, SOLUTION INTRAVENOUS at 14:02

## 2017-08-17 RX ADMIN — GABAPENTIN 400 MG: 400 CAPSULE ORAL at 09:26

## 2017-08-17 RX ADMIN — AMOXICILLIN AND CLAVULANATE POTASSIUM 1 TABLET: 875; 125 TABLET, FILM COATED ORAL at 20:22

## 2017-08-17 RX ADMIN — OXYBUTYNIN CHLORIDE 5 MG: 5 TABLET ORAL at 20:22

## 2017-08-17 RX ADMIN — LAMOTRIGINE 150 MG: 100 TABLET ORAL at 09:25

## 2017-08-17 RX ADMIN — ENOXAPARIN SODIUM 30 MG: 100 INJECTION SUBCUTANEOUS at 16:58

## 2017-08-17 RX ADMIN — GABAPENTIN 400 MG: 400 CAPSULE ORAL at 20:22

## 2017-08-17 RX ADMIN — LINEZOLID 600 MG: 600 TABLET, FILM COATED ORAL at 09:26

## 2017-08-17 RX ADMIN — OXYCODONE HYDROCHLORIDE 5 MG: 5 TABLET ORAL at 18:07

## 2017-08-17 RX ADMIN — OXYCODONE HYDROCHLORIDE 5 MG: 5 TABLET ORAL at 14:02

## 2017-08-17 RX ADMIN — SODIUM CHLORIDE 500 ML: 9 INJECTION, SOLUTION INTRAVENOUS at 12:28

## 2017-08-17 RX ADMIN — IOHEXOL 80 ML: 350 INJECTION, SOLUTION INTRAVENOUS at 18:00

## 2017-08-17 RX ADMIN — TAMSULOSIN HYDROCHLORIDE 0.4 MG: 0.4 CAPSULE ORAL at 09:25

## 2017-08-17 RX ADMIN — AMOXICILLIN AND CLAVULANATE POTASSIUM 1 TABLET: 875; 125 TABLET, FILM COATED ORAL at 09:26

## 2017-08-17 RX ADMIN — OXYBUTYNIN CHLORIDE 5 MG: 5 TABLET ORAL at 09:25

## 2017-08-17 RX ADMIN — FLUCONAZOLE 200 MG: 200 TABLET ORAL at 09:26

## 2017-08-17 RX ADMIN — CALCIUM CARBONATE-CHOLECALCIFEROL TAB 250 MG-125 UNIT 1 TABLET: 250-125 TAB at 09:26

## 2017-08-17 RX ADMIN — ENOXAPARIN SODIUM 30 MG: 100 INJECTION SUBCUTANEOUS at 05:17

## 2017-08-17 RX ADMIN — OMEPRAZOLE 20 MG: 20 CAPSULE, DELAYED RELEASE ORAL at 09:26

## 2017-08-17 RX ADMIN — POLYETHYLENE GLYCOL 3350 1 PACKET: 17 POWDER, FOR SOLUTION ORAL at 09:24

## 2017-08-17 ASSESSMENT — GAIT ASSESSMENTS
DISTANCE (FEET): 150
ASSISTIVE DEVICE: FRONT WHEEL WALKER
GAIT LEVEL OF ASSIST: STAND BY ASSIST

## 2017-08-17 ASSESSMENT — ENCOUNTER SYMPTOMS
WEIGHT LOSS: 0
DIAPHORESIS: 0
FLANK PAIN: 0
BLOOD IN STOOL: 0
HEMOPTYSIS: 0
FEVER: 0
SPUTUM PRODUCTION: 0
ORTHOPNEA: 0
PALPITATIONS: 0
TINGLING: 0
VOMITING: 0
ROS GI COMMENTS: COLOSTOMY
DIARRHEA: 0
DEPRESSION: 0
HEADACHES: 0
STRIDOR: 0
SENSORY CHANGE: 0
BLURRED VISION: 0
WEAKNESS: 1
NERVOUS/ANXIOUS: 0
CHILLS: 0
FOCAL WEAKNESS: 0
HALLUCINATIONS: 0
LOSS OF CONSCIOUSNESS: 0
NAUSEA: 0
COUGH: 0
DOUBLE VISION: 0
DIZZINESS: 0
TREMORS: 0
SEIZURES: 0
WHEEZING: 0
MYALGIAS: 0
PND: 0
NECK PAIN: 0
MYALGIAS: 1
CLAUDICATION: 0
ABDOMINAL PAIN: 0
SPEECH CHANGE: 0
SHORTNESS OF BREATH: 0

## 2017-08-17 ASSESSMENT — COGNITIVE AND FUNCTIONAL STATUS - GENERAL
TURNING FROM BACK TO SIDE WHILE IN FLAT BAD: A LITTLE
SUGGESTED CMS G CODE MODIFIER MOBILITY: CK
WALKING IN HOSPITAL ROOM: A LITTLE
MOVING TO AND FROM BED TO CHAIR: A LITTLE
STANDING UP FROM CHAIR USING ARMS: A LITTLE
MOBILITY SCORE: 17
CLIMB 3 TO 5 STEPS WITH RAILING: A LOT
MOVING FROM LYING ON BACK TO SITTING ON SIDE OF FLAT BED: A LITTLE

## 2017-08-17 ASSESSMENT — PAIN SCALES - GENERAL
PAINLEVEL_OUTOF10: 3
PAINLEVEL_OUTOF10: ASSUMED PAIN PRESENT
PAINLEVEL_OUTOF10: ASSUMED PAIN PRESENT

## 2017-08-17 NOTE — PROGRESS NOTES
Colleen from Lab called with critical result of WBC 2.5 at 0829. Critical lab result read back to Colleen.   Dr. Aguilar notified of critical lab result at 0829.  Critical lab result read back by Dr. Aguilar.

## 2017-08-17 NOTE — CARE PLAN
Problem: Infection  Goal: Will remain free from infection  Outcome: PROGRESSING SLOWER THAN EXPECTED  WBC 2.5; ID will be reconsulted. PICC to stay in place at this time     Problem: Venous Thromboembolism (VTW)/Deep Vein Thrombosis (DVT) Prevention:  Goal: Patient will participate in Venous Thrombosis (VTE)/Deep Vein Thrombosis (DVT)Prevention Measures  Outcome: PROGRESSING AS EXPECTED  Lovenox given

## 2017-08-17 NOTE — PROGRESS NOTES
Received report from BART Bledsoe. Pt is AAOx4. HUGH. VSS. Pt denies SOB. Pt c/o pain 3/10; declines pain meds. -N/V. L sided ostomy; w/ good stool output. Pt has healing midline abdominal incision SCAR. WBC 2.5; L arm PICC in place; per Dr Aguilar, to now keep in PICC for possible IV abx. Pt up 1x assist w/ FWW; pt continues to need prompting to get up and ambulate.  POC discussed. Bed locked and in the lowest position. Call light w/in reach. Hourly rounding in place

## 2017-08-17 NOTE — PROGRESS NOTES
"[facility] Hospitalist Progress Note        Date of Service: 8/17/2017    Chief Complaint  77 y/o M with PMH of peripheral neuropathy, BPH, Depression admitted for abdominal pain, diarrhea.  Dx with pelvis abscess on imaging. S/p Ex lap and abdominal washout on 7/25.    76 male admitted 7/14/2017 with abdominal pain. CT abdomen obtained on presentation revealing \"Small inflammatory phlegmon/developing abscess in the RIGHT lower quadrant, in close proximity to the appendiceal tip, concerning for perforated appendicitis. Large complex fluid collection in the central pelvis, likely abscess, with adjacent inflammation of sigmoid colon.  This may be secondary to appendicitis or diverticulitis. Minimal pneumoperitoneum consistent with bowel perforation. Enlarged prostate\". Surgical consultation was obtained. Recommendations were for IR drain placement. This was done on 07/15/2017. 300 ml of beige malodorous pus was drained. Cultures grew E.Coli, Enterococcus faecium, viridans streptococci and Bacteriodes fragilis. CT pelvis was repeated 07/18/2017 revealing \"The left transgluteal pelvic pigtail catheter pulled back slightly with the loop straddled the wall of the pelvic fluid collection. There is reaccumulation of fluid in the collection with air-fluid level, more than previously seen in post drain image, probably due to malfunction of the drain\". IR drain repeated by Dr Wei on 07/19/2017. Cultures from this grew Enterococcus Faecium and Candida albicans. ID consultation was subsequently obtained and antibiotics were subsequently continued per ID recommendations. CT repeated 07/24/2017 revealing \"Again seen posterior approach percutaneous catheter which extends into a pelvic abscess. The abscess cavity which contains an air/fluid level currently measures 5.5 x 5.2 cm in size\". Patient was subsequently taken to OR by Dr Rodriguez on 07/25/2017 and appendectomy, sigmoid colon resection with colostomy creation performed. " "Repeat CT 08/03/2017 revealing \"4.5 x 4.7 x 5.4 cm pelvic fluid collection is mildly decreased in size compared to prior. There has been interval removal of the previously noted pigtail catheter\". IR guided drain again placed on 08/06/2017 with repeat CT 08/12/2017 revealing \"Interval decrease in size of the fluid collection in the pelvis measuring 2.6 x 1.9 x 1.6 cm. Pigtail catheter is along the edge of the collection\". Meanwhile patient has remained on abx per ID recommendations. He has now developed thrombocytopenia and leukopenia. I am concerned this may be Zyvox related.     Interval Problem Update  Seen and evaluated on rounds. Interval CBC obtained yesterday by me for monitoring of Zyvox toxicity. This reveals worsening leukopenia and thrombocytopenia. Given this I discussed the case with Dr Herbert from ID. He likely has received adequate abx therapy. Previously VRE grown but polymicrobial. Dr Herbert recommended IV vancomycin and interval CT abdomen. She will further review the case. Luckily PICC line has not been removed at this point in time. Orders placed by me. Given initiation of vancomycin will initiate gentle IVF with plans for contrasted CT to be obtained to prevent BEE. Recheck CBC / BMP in am tomorrow. He is awaiting skilled nursing facility placement. No complaints per patient. Remains on antibiotics per ID recommendations. PICC line in place. Supposed to go to SNF but cannot be done until Friday. Please note if the CT reveals worsening abscess then Vancomycin may not be ideal and would have to transition to another agent. ID team / Pharmacy team to closely follow.     Consultants/Specialty  [facility] - Surgery  Dr. Herbert/ [facility] -ID    Disposition  Awaiting placement. Anticipated Friday. ID clearance  . Repeat CT abdomen.       Review of Systems   Constitutional: Positive for malaise/fatigue. Negative for fever, chills, weight loss and diaphoresis.   HENT: Negative for congestion and hearing " loss.    Eyes: Negative for blurred vision and double vision.   Respiratory: Negative for cough, hemoptysis, sputum production, shortness of breath, wheezing and stridor.    Cardiovascular: Negative for chest pain, palpitations, orthopnea, claudication, leg swelling and PND.   Gastrointestinal: Negative for nausea, vomiting, abdominal pain, diarrhea, blood in stool and melena.        Colostomy   Genitourinary: Negative for dysuria, urgency, frequency, hematuria and flank pain.   Musculoskeletal: Negative for myalgias and neck pain.   Skin: Negative for itching and rash.   Neurological: Positive for weakness (generalized ). Negative for dizziness, tingling, tremors, sensory change, speech change, focal weakness, seizures, loss of consciousness and headaches.   Psychiatric/Behavioral: Negative for depression, suicidal ideas and hallucinations. The patient is not nervous/anxious.    All other systems reviewed and are negative.     Physical Exam  Laboratory/Imaging   Hemodynamics  Temp (24hrs), Av.6 °C (97.9 °F), Min:36.2 °C (97.2 °F), Max:36.8 °C (98.3 °F)   Temperature: 36.6 °C (97.9 °F)  Pulse  Av  Min: 49  Max: 105    Blood Pressure : 139/57 mmHg      Respiratory      Respiration: 16, Pulse Oximetry: 93 %             Fluids    Intake/Output Summary (Last 24 hours) at 17 1147  Last data filed at 17 0923   Gross per 24 hour   Intake    360 ml   Output   2400 ml   Net  -2040 ml       Nutrition  Orders Placed This Encounter   Procedures   • DIET ORDER     Standing Status: Standing      Number of Occurrences: 1      Standing Expiration Date:      Order Specific Question:  Diet:     Answer:  Low Fiber(GI Soft) [2]     Physical Exam   Constitutional: He is oriented to person, place, and time. He is active. No distress.   HENT:   Head: Normocephalic and atraumatic.   Mouth/Throat: Oropharynx is clear and moist. No oropharyngeal exudate.   Eyes: Conjunctivae and EOM are normal. Pupils are equal, round,  "and reactive to light. No scleral icterus.   Neck: Normal range of motion. No JVD present.   Cardiovascular: Normal rate, regular rhythm, normal heart sounds and intact distal pulses.    No murmur heard.  Pulmonary/Chest: Effort normal and breath sounds normal. No stridor. No respiratory distress. He has no wheezes. He has no rales.   Abdominal: Soft. Bowel sounds are normal. He exhibits no distension. There is no tenderness.   Colostomy in place, air loose stool present.      Musculoskeletal: Normal range of motion. He exhibits no edema or tenderness.   Neurological: He is alert and oriented to person, place, and time. No cranial nerve deficit.   Skin: Skin is warm and dry. He is not diaphoretic. No erythema.   Psychiatric: He has a normal mood and affect. His behavior is normal. Judgment and thought content normal.   Vitals reviewed.      Recent Labs      08/17/17   0515   WBC  2.5*   RBC  3.41*   HEMOGLOBIN  10.1*   HEMATOCRIT  30.9*   MCV  90.6   MCH  29.6   MCHC  32.7*   RDW  62.4*   PLATELETCT  124*   MPV  9.5                          Assessment/Plan     Pelvic abscess in male (CMS-[facility]) (present on admission)  Assessment & Plan  Repeat CT 08/12/2017 revealing \"Interval decrease in size of the fluid collection in the pelvis measuring 2.6 x 1.9 x 1.6 cm. Pigtail catheter is along the edge of the collection\".   Now worsening thromboctyopenia. Leukopenia. I was concerned Zyvox related  Discussed with ID. Transitioned to Vancomycin (See interval history above)  Interval CT requested. F/U please.   If worsening abscess then vancomycin may not appropriate  Monitor for vancomycin toxicity and therapeutics      Normocytic anemia (present on admission)  Assessment & Plan  Stable H&H, multi factorial  intra-op losses and chronic disease-stable  Monitor for acute symptoms.      Bipolar 1 disorder, depressed (CMS-HCC) (present on admission)  Assessment & Plan  Continue  lamictal    Neuropathy (CMS-HCC) (present on " admission)  Assessment & Plan  Neurontin    BPH (benign prostatic hypertrophy) (present on admission)  Assessment & Plan  Continue Flomax  Keep perineum area dry.    Depression (present on admission)  Assessment & Plan  Stable    Osteoarthritis (present on admission)  Assessment & Plan  Stable, prn analgesia  ca, vit d      Labs reviewed, Medications reviewed and Radiology images reviewed  Ruff catheter: No Ruff  Central line in place: Concentrated IV drugs    DVT Prophylaxis: Enoxaparin (Lovenox)      Antibiotics: Treating active infection/contamination beyond 24 hours perioperative coverage

## 2017-08-17 NOTE — PROGRESS NOTES
Infectious Disease Progress Note    Author: Mis Herbert M.D. Date of service & Time created: 8/17/2017  2:27 PM    Interval History:  76-year-old white male admitted with pelvic abscess possibly due to bowel perforation due to constipation  7/25/17- low-grade fevers. Continues to have abdominal pain  7/26/17- complains of some abdominal pain. MAXIMUM TEMPERATURE 100.3. WBC 7.6  7/27/17- complains of abdominal pain. MAXIMUM TEMPERATURE is 98. WBC 7.2  7/28/17- ongoing abdominal pain. No fevers. No new issues overnight.  8/2 AF WBC 4.8 Called for less expensive abx recommendations and new GI sxs. Stool in toilet  8/3 AF less anxious-pain better controlled  8/4 AF eating improved  8/5 AF no new complaints-denies SE abx Pain about the same  8/6 AF new drain placed  8/7 AF, no CBC, feels tired  8/8 AF, no CBC, sitting up in chair, friends visiting in room, denies any pain, tolerating abx without issues  8/9 AF, no CBC, c/o pain on bottom, very weak and unable to get out of chair, needs assistance  8/10 AF, no CBC, having some R groin pain, plan for repeat CT today  8/11 AF, no CBC, some LLE pain, CT with interval decrease,   8/12 AF, no CBC, states he's getting a sore near drain placement, tolerating PO abx, awaiting placement  8/13 AF, no new symptoms, resting comfortably and reading a book  8/14/17-MAXIMUM TEMPERATURE 99. No labs available  8/17/17- MAXIMUM TEMPERATURE 98.3. WBC 2.5 and platelets 124. Creatinine 0.64  Labs Reviewed, Medications Reviewed, Radiology Reviewed and Wound Reviewed.    Review of Systems:  Review of Systems   Constitutional: Positive for malaise/fatigue. Negative for fever.   Respiratory: Negative for cough and shortness of breath.    Cardiovascular: Negative for chest pain.   Gastrointestinal: Negative for vomiting.   Genitourinary: Negative for dysuria.   Musculoskeletal: Positive for myalgias.   Neurological: Positive for weakness. Negative for sensory change, speech change and  headaches.       Hemodynamics:  Temp (24hrs), Av.6 °C (97.9 °F), Min:36.2 °C (97.2 °F), Max:36.8 °C (98.3 °F)  Temperature: 36.7 °C (98 °F)  Pulse  Av  Min: 49  Max: 105   Blood Pressure : 116/54 mmHg       Physical Exam:  Physical Exam   Constitutional: He is oriented to person, place, and time. He appears well-developed. No distress.   Cachectic and frail  Chronically ill   HENT:   Head: Normocephalic and atraumatic.   Mouth/Throat: No oropharyngeal exudate.   Eyes: EOM are normal. Pupils are equal, round, and reactive to light. No scleral icterus.   Neck: Neck supple.   Cardiovascular: Normal rate and regular rhythm.    No murmur heard.  Pulmonary/Chest: Effort normal. No respiratory distress.   Abdominal: Soft. There is no tenderness. There is no rebound and no guarding.   Colostomy formed stool  Staples removed. Steristrips in place  Surgical site well healing       Musculoskeletal: He exhibits no edema.   LUE PICC nontender   Neurological: He is alert and oriented to person, place, and time.   Skin:   Sacrum dressed   Nursing note and vitals reviewed.      Labs:  Recent Labs      17   0515   WBC  2.5*   RBC  3.41*   HEMOGLOBIN  10.1*   HEMATOCRIT  30.9*   MCV  90.6   MCH  29.6   RDW  62.4*   PLATELETCT  124*   MPV  9.5     No results for input(s): SODIUM, POTASSIUM, CHLORIDE, CO2, GLUCOSE, BUN, CPKTOTAL in the last 72 hours.  No results for input(s): ALBUMIN, TBILIRUBIN, ALKPHOSPHAT, TOTPROTEIN, ALTSGPT, ASTSGOT, CREATININE in the last 72 hours.    Wound:      Results     Procedure Component Value Units Date/Time    ANAEROBIC CULTURE [[id number]] Collected:  17 1445    Order Status:  Completed Specimen Information:  Wound Updated:  17 1334     Significant Indicator NEG      Source WND      Site Peritoneal Abscess      Anaerobic Culture, Culture Res No Anaerobes isolated.     Narrative:      CALL  [place]  141 tel. [phone],  CALLED  141 tel. [phone] 2017, 08:05, RB PERF.  RESULTS CALLED  TO:[phone]    CULTURE WOUND W/ GRAM STAIN [[id number]]  (Abnormal)  (Susceptibility) Collected:  07/25/17 1445    Order Status:  Completed Specimen Information:  Wound Updated:  07/30/17 1334     Significant Indicator POS (POS)      Source WND      Site Peritoneal Abscess      Culture Result Wound -- (A)      Gram Stain Result --      Result:        Many WBCs.  Few Yeast.  Few Gram positive cocci.       Culture Result Wound -- (A)      Result:        Enterococcus faecium  Combination therapy with ampicillin, penicillin, or  vancomycin (for susceptible strains) plus an aminoglycoside  is usually indicated for serious enterococcal infections,  such as endocarditis unless high-level resistance to both  gentamicin and streptomycin is documented; such combinations  are predicted to result in synergistic killing of the  Enterococcus.  VANCOMYCIN RESISTANT ENTEROCOCCI(VRE)       Culture Result Wound -- (A)      Result:        Candida albicans  Light growth      Narrative:      CALL  [facility]  141 tel. [phone],  CALLED  141 tel. [phone] 07/28/2017, 08:05, RB PERF. RESULTS CALLED  TO:[phone]    Culture & Susceptibility     ENTEROCOCCUS FAECIUM     Antibiotic Sensitivity Microscan Unit Status    Ampicillin Resistant >8 mcg/mL Final    Daptomycin Sensitive 2 mcg/mL Final    Gent Synergy Sensitive <=500 mcg/mL Final    Linezolid Sensitive 2 mcg/mL Final    Penicillin Resistant >8 mcg/mL Final    Vancomycin Resistant >16 mcg/mL Final                             Fluids:  Intake/Output       08/15/17 0700 - 08/16/17 0659 08/16/17 0700 - 08/17/17 0659 08/17/17 0700 - 08/18/17 0659      1685-7029 4680-4073 Total 1900-2848 1339-5057 Total 2186-3830 3686-6391 Total       Intake    P.O.  360  250 610  500  -- 500  --  -- --    P.O. 360 250 610 500 -- 500 -- -- --    I.V.  --  -- --  240  -- 240  490  -- 490    IV Volume (NS) -- -- -- 240 -- 240 240 -- 240    IV Piggyback Volume (IV Piggyback) -- -- -- -- -- -- 250 -- 250     Total Intake 360 250 610 740 -- 740 490 -- 490       Output    Urine  1450  500 1950  1700  750 2450  950  -- 950    Number of Times Voided 4 x 1 x 5 x -- -- -- -- -- --    Void (ml) 6242 084 5931 5042 207 8723 950 -- 950    Stool/Urine  700  0 700  400  0 400  500  -- 500    Ostomy #1 (ml)  700 0 700 400 0 400 500 -- 500    Stool  --  -- --  --  -- --  --  -- --    Number of Times Stooled -- 0 x 0 x 0 x 0 x 0 x -- -- --    Total Output 2150 500 2650 2100 750 2850 1450 -- 1450       Net I/O     -1790 -250 -2040 -1360 -750 -2110 -960 -- -960        CT-ABDOMEN-PELVIS WITH (Final result) Result time: 08/03/17 16:43:02     Final result by Madina Dorman M.D. (08/03/17 16:43:02)     Impression:     4.5 x 4.7 x 5.4 cm pelvic fluid collection is mildly decreased in size compared to prior. There has been interval removal of the previously noted pigtail catheter.  Small amount of free fluid in the abdomen and pelvis.  Mildly dilated loops of small bowel may represent mild ileus.  Small bilateral pleural effusions with overlying atelectasis/consolidation.  Moderate hiatal hernia.  Prominent atherosclerotic plaque.  Healing fractures of the fifth and sixth ribs on the right.          Assessment:  Active Hospital Problems    Diagnosis   • *Sepsis (CMS-[facility]) [A41.9]   • Abdominal abscess (CMS-[facility]) [K65.1]   • BPH (benign prostatic hypertrophy) [N40.0]   • Depression [F32.9]   • Osteoarthritis [M19.90]       Plan:  Pelvic abscess due to bowel perforation  Afebrile  Leukocytosis, resolved  S/p IR drain ×2  Cultures of Enterococcus faecium, viridans strep, Bacteroides fragilis and Candida albicans  CT scan done on 7/24/2017 shows persistent pelvic abscess over 5 cm  s/p exploratory laparotomy, abdominal washout, sigmoid colon resection, colostomy placement and appendectomy on 7/26/17  OR cultures-+VRE and Candida  Repeat cultures have shown enterococcus  which is sensitive to vancomycin  Repeat CT showed 4.5 X  4.7 X  5.4cm-  s/p drainage cath 8/5-follow cxs - NGTD  Repeat CT 8/10 - interval decrease now measuring 2.6 x 1.9 x 1.6 cm  Repeat another CT scan  Drain has been removed    Leukopenia and thrombocytopenia   Possibly  due to Zyvox  Change Zyvox to vancomycin  Continue antibiotics through 8/25/17 as planned previously    Protein malnutrition  Anticipate poor wound healing  Alb 2.2    Dispo: Transfer to [facility] -      ID clinic    NATALIA Howell

## 2017-08-17 NOTE — PROGRESS NOTES
"Pharmacy Kinetics 76 y.o. male on vancomycin day # 1  2017    Starting with Vancomycin 1700 mg iv x 1 then vanco 1300 mg iv q 24 hrs    Indication for Treatment: intra-abdominal infection    Pertinent history per medical record: Admitted on 2017 for pelvic abscess possibly due to bowel perforation due to constipation.  Pt had been on Zyvox since  per ID (was to continue thru ) and Augmentin.  Pt had thrombocytopenia and leukopenia and MD felt it may be related to Zyvox. ID recommended changing Zyvox to vanco and if abscess worsens, then may need another abx change.    Other antibiotics: Augmentin 875/125 mg po BID (thru )     Allergies: Review of patient's allergies indicates no known allergies.     List concerns for renal function: age, low albumin    Pertinent cultures to date:   17: Peritoneal abscess = VRE  17: Peritoneal abscess = Moderate WBCs. No organisms seen    Recent Labs      17   0515   WBC  2.5*     No results for input(s): BUN, CREATININE, ALBUMIN in the last 72 hours.  No results for input(s): VANCOTROUGH, VANCOPEAK, VANCORANDOM in the last 72 hours.  Intake/Output Summary (Last 24 hours) at 17 1206  Last data filed at 17 0923   Gross per 24 hour   Intake    240 ml   Output   2400 ml   Net  -2160 ml      Blood pressure 139/57, pulse 60, temperature 36.6 °C (97.9 °F), resp. rate 16, height 1.727 m (5' 8\"), weight 66.4 kg (146 lb 6.2 oz), SpO2 93 %. Temp (24hrs), Av.6 °C (97.9 °F), Min:36.2 °C (97.2 °F), Max:36.8 °C (98.3 °F)      A/P   1. Vancomycin dose change: new start  2. Next vancomycin level: prior to 3rd total dose (not ordered)  3. Goal trough: 12-16 mcg/ml  4. Comments: Pt will start with vanco loading dose today and then start vanco 20 mg/kg (1300 mg) iv q 24 hrs tomorrow. Pt does not have much in the way of risk factors for vanco accumulation although last BMP was , will get BMP tomorrow. Would recommend checking vanco level prior to " the 3rd total dose (not ordered).     Luan Valles, PharmD

## 2017-08-17 NOTE — THERAPY
"Physical Therapy Treatment completed.   Bed Mobility:  Supine to Sit: Supervised (HOB elevated and use of railing)  Transfers: Sit to Stand: Supervised (from EOB->FWW, w/rail assist)  Gait: Level Of Assist: Stand by Assist with Front-Wheel Walker       Plan of Care: Will benefit from Physical Therapy 3 times per week  Discharge Recommendations: Equipment: Will Continue to Assess for Equipment Needs. Post-acute therapy Discharge to a transitional care facility for continued skilled therapy services.     See \"Rehab Therapy-Acute\" Patient Summary Report for complete documentation.       "

## 2017-08-17 NOTE — DOCUMENTATION QUERY
"DOCUMENTATION QUERY    PROVIDERS: Please select “Cosign w/ note”to reply to query.    To better represent the severity of illness and risk of mortality of your patient, please review the following information and exercise your independent professional judgment in responding to this query.     Protein malnutrition (degree unspecified) and cachectic are documented in the  ID Progress Notes. Considering the clinical findings, risk factors, and treatment, can the degree of protein malnutrition be further specified?    • Mild Protein Calorie Malnutrition  • Moderate Protein Calorie Malnutrition  • Severe Protein Calorie Malnutrition  • Cachexia  • Underweight  • Patient does not have protein malnutrition  • Findings of no clinical significance  • Other explanation of clinical findings  • Unable to determine        The medical record reflects the following:   Clinical Findings  BMI 21.79; Albumin 2.2; Noted: 14# weight loss in the past two weeks' \"cachectic frail\"   Treatment  Dietary evaluation/monitoring/treatment; Protein supplements; added snacks; milkshakes; weights; recorded meals   Risk Factors  Pelvic abscess; Age; colostomy; Sepsis   Location within medical record  Dietary & Infectious Disease progress notes; Labs     Thank you,   Olinda Alejandre RN   Clinical   Phone - [phone]          "

## 2017-08-18 VITALS
BODY MASS INDEX: 22.19 KG/M2 | DIASTOLIC BLOOD PRESSURE: 67 MMHG | HEIGHT: 68 IN | OXYGEN SATURATION: 92 % | WEIGHT: 146.39 LBS | SYSTOLIC BLOOD PRESSURE: 133 MMHG | TEMPERATURE: 98.6 F | RESPIRATION RATE: 18 BRPM | HEART RATE: 97 BPM

## 2017-08-18 LAB
ALBUMIN SERPL BCP-MCNC: 2.4 G/DL (ref 3.2–4.9)
ALBUMIN/GLOB SERPL: 1 G/DL
ALP SERPL-CCNC: 88 U/L (ref 30–99)
ALT SERPL-CCNC: 46 U/L (ref 2–50)
ANION GAP SERPL CALC-SCNC: 4 MMOL/L (ref 0–11.9)
AST SERPL-CCNC: 30 U/L (ref 12–45)
BASOPHILS # BLD AUTO: 0.8 % (ref 0–1.8)
BASOPHILS # BLD: 0.02 K/UL (ref 0–0.12)
BILIRUB SERPL-MCNC: 0.6 MG/DL (ref 0.1–1.5)
BUN SERPL-MCNC: 11 MG/DL (ref 8–22)
CALCIUM SERPL-MCNC: 8.2 MG/DL (ref 8.5–10.5)
CHLORIDE SERPL-SCNC: 106 MMOL/L (ref 96–112)
CO2 SERPL-SCNC: 31 MMOL/L (ref 20–33)
CREAT SERPL-MCNC: 0.66 MG/DL (ref 0.5–1.4)
EOSINOPHIL # BLD AUTO: 0.12 K/UL (ref 0–0.51)
EOSINOPHIL NFR BLD: 4.5 % (ref 0–6.9)
ERYTHROCYTE [DISTWIDTH] IN BLOOD BY AUTOMATED COUNT: 61.8 FL (ref 35.9–50)
GFR SERPL CREATININE-BSD FRML MDRD: >60 ML/MIN/1.73 M 2
GLOBULIN SER CALC-MCNC: 2.3 G/DL (ref 1.9–3.5)
GLUCOSE SERPL-MCNC: 100 MG/DL (ref 65–99)
HCT VFR BLD AUTO: 28.3 % (ref 42–52)
HGB BLD-MCNC: 9.2 G/DL (ref 14–18)
IMM GRANULOCYTES # BLD AUTO: 0.01 K/UL (ref 0–0.11)
IMM GRANULOCYTES NFR BLD AUTO: 0.4 % (ref 0–0.9)
INR PPP: 1.06 (ref 0.87–1.13)
LYMPHOCYTES # BLD AUTO: 1.31 K/UL (ref 1–4.8)
LYMPHOCYTES NFR BLD: 49.6 % (ref 22–41)
MAGNESIUM SERPL-MCNC: 1.6 MG/DL (ref 1.5–2.5)
MCH RBC QN AUTO: 29.2 PG (ref 27–33)
MCHC RBC AUTO-ENTMCNC: 32.5 G/DL (ref 33.7–35.3)
MCV RBC AUTO: 89.8 FL (ref 81.4–97.8)
MONOCYTES # BLD AUTO: 0.19 K/UL (ref 0–0.85)
MONOCYTES NFR BLD AUTO: 7.2 % (ref 0–13.4)
NEUTROPHILS # BLD AUTO: 0.99 K/UL (ref 1.82–7.42)
NEUTROPHILS NFR BLD: 37.5 % (ref 44–72)
NRBC # BLD AUTO: 0 K/UL
NRBC BLD AUTO-RTO: 0 /100 WBC
PHOSPHATE SERPL-MCNC: 3.1 MG/DL (ref 2.5–4.5)
PLATELET # BLD AUTO: 104 K/UL (ref 164–446)
PMV BLD AUTO: 8.8 FL (ref 9–12.9)
POTASSIUM SERPL-SCNC: 3.8 MMOL/L (ref 3.6–5.5)
PROT SERPL-MCNC: 4.7 G/DL (ref 6–8.2)
PROTHROMBIN TIME: 14.1 SEC (ref 12–14.6)
RBC # BLD AUTO: 3.15 M/UL (ref 4.7–6.1)
SODIUM SERPL-SCNC: 141 MMOL/L (ref 135–145)
WBC # BLD AUTO: 2.6 K/UL (ref 4.8–10.8)

## 2017-08-18 PROCEDURE — A9270 NON-COVERED ITEM OR SERVICE: HCPCS | Performed by: INTERNAL MEDICINE

## 2017-08-18 PROCEDURE — 700102 HCHG RX REV CODE 250 W/ 637 OVERRIDE(OP): Performed by: INTERNAL MEDICINE

## 2017-08-18 PROCEDURE — 85610 PROTHROMBIN TIME: CPT

## 2017-08-18 PROCEDURE — 700105 HCHG RX REV CODE 258: Performed by: INTERNAL MEDICINE

## 2017-08-18 PROCEDURE — A9270 NON-COVERED ITEM OR SERVICE: HCPCS | Performed by: HOSPITALIST

## 2017-08-18 PROCEDURE — 80053 COMPREHEN METABOLIC PANEL: CPT

## 2017-08-18 PROCEDURE — 83735 ASSAY OF MAGNESIUM: CPT

## 2017-08-18 PROCEDURE — 700102 HCHG RX REV CODE 250 W/ 637 OVERRIDE(OP): Performed by: SURGERY

## 2017-08-18 PROCEDURE — 700102 HCHG RX REV CODE 250 W/ 637 OVERRIDE(OP): Performed by: HOSPITALIST

## 2017-08-18 PROCEDURE — 700102 HCHG RX REV CODE 250 W/ 637 OVERRIDE(OP): Performed by: NURSE PRACTITIONER

## 2017-08-18 PROCEDURE — 99239 HOSP IP/OBS DSCHRG MGMT >30: CPT | Performed by: INTERNAL MEDICINE

## 2017-08-18 PROCEDURE — 700111 HCHG RX REV CODE 636 W/ 250 OVERRIDE (IP): Performed by: SURGERY

## 2017-08-18 PROCEDURE — 84100 ASSAY OF PHOSPHORUS: CPT

## 2017-08-18 PROCEDURE — 700111 HCHG RX REV CODE 636 W/ 250 OVERRIDE (IP)

## 2017-08-18 PROCEDURE — A9270 NON-COVERED ITEM OR SERVICE: HCPCS | Performed by: SURGERY

## 2017-08-18 PROCEDURE — A9270 NON-COVERED ITEM OR SERVICE: HCPCS | Performed by: NURSE PRACTITIONER

## 2017-08-18 PROCEDURE — 700105 HCHG RX REV CODE 258

## 2017-08-18 PROCEDURE — 85025 COMPLETE CBC W/AUTO DIFF WBC: CPT

## 2017-08-18 RX ADMIN — TAMSULOSIN HYDROCHLORIDE 0.4 MG: 0.4 CAPSULE ORAL at 09:48

## 2017-08-18 RX ADMIN — OXYCODONE HYDROCHLORIDE 5 MG: 5 TABLET ORAL at 12:16

## 2017-08-18 RX ADMIN — GABAPENTIN 400 MG: 400 CAPSULE ORAL at 09:48

## 2017-08-18 RX ADMIN — GABAPENTIN 400 MG: 400 CAPSULE ORAL at 14:50

## 2017-08-18 RX ADMIN — CALCIUM CARBONATE-CHOLECALCIFEROL TAB 250 MG-125 UNIT 1 TABLET: 250-125 TAB at 09:49

## 2017-08-18 RX ADMIN — OMEPRAZOLE 20 MG: 20 CAPSULE, DELAYED RELEASE ORAL at 09:49

## 2017-08-18 RX ADMIN — OXYBUTYNIN CHLORIDE 5 MG: 5 TABLET ORAL at 09:49

## 2017-08-18 RX ADMIN — VANCOMYCIN HYDROCHLORIDE 1300 MG: 100 INJECTION, POWDER, LYOPHILIZED, FOR SOLUTION INTRAVENOUS at 12:17

## 2017-08-18 RX ADMIN — AMOXICILLIN AND CLAVULANATE POTASSIUM 1 TABLET: 875; 125 TABLET, FILM COATED ORAL at 09:48

## 2017-08-18 RX ADMIN — ENOXAPARIN SODIUM 30 MG: 100 INJECTION SUBCUTANEOUS at 05:25

## 2017-08-18 RX ADMIN — SODIUM CHLORIDE, POTASSIUM CHLORIDE, SODIUM LACTATE AND CALCIUM CHLORIDE: 600; 310; 30; 20 INJECTION, SOLUTION INTRAVENOUS at 05:25

## 2017-08-18 RX ADMIN — LAMOTRIGINE 150 MG: 100 TABLET ORAL at 09:48

## 2017-08-18 RX ADMIN — POLYETHYLENE GLYCOL 3350 1 PACKET: 17 POWDER, FOR SOLUTION ORAL at 09:48

## 2017-08-18 ASSESSMENT — ENCOUNTER SYMPTOMS
ORTHOPNEA: 0
NERVOUS/ANXIOUS: 0
SEIZURES: 0
RESPIRATORY NEGATIVE: 1
DOUBLE VISION: 0
COUGH: 0
VOMITING: 0
ABDOMINAL PAIN: 1
SHORTNESS OF BREATH: 0
CHILLS: 0
FEVER: 0
BLURRED VISION: 0
HALLUCINATIONS: 0
NECK PAIN: 0
TREMORS: 0
CARDIOVASCULAR NEGATIVE: 1
DIARRHEA: 0
MYALGIAS: 0
ABDOMINAL PAIN: 0
BLOOD IN STOOL: 0
DEPRESSION: 0
LOSS OF CONSCIOUSNESS: 0
HEADACHES: 0
STRIDOR: 0
CLAUDICATION: 0
WEIGHT LOSS: 0
SPEECH CHANGE: 0
WEAKNESS: 1
ROS GI COMMENTS: COLOSTOMY
HEMOPTYSIS: 0
TINGLING: 0
SPUTUM PRODUCTION: 0
NAUSEA: 0
WHEEZING: 0
FOCAL WEAKNESS: 0
PND: 0
SENSORY CHANGE: 0
DIAPHORESIS: 0
PALPITATIONS: 0
DIZZINESS: 0
FLANK PAIN: 0

## 2017-08-18 ASSESSMENT — PAIN SCALES - GENERAL: PAINLEVEL_OUTOF10: 3

## 2017-08-18 NOTE — DISCHARGE INSTRUCTIONS
Discharge Instructions    Discharged to other by medical transportation with escort. Discharged via wheelchair, hospital escort: Yes.  Special equipment needed: Wheelchair    Be sure to schedule a follow-up appointment with your primary care doctor or any specialists as instructed.     Discharge Plan:   Diet Plan: Discussed  Activity Level: Discussed  Confirmed Follow up Appointment: Patient to Call and Schedule Appointment  Confirmed Symptoms Management: Discussed  Medication Reconciliation Updated: Yes  Influenza Vaccine Indication: Not indicated: Previously immunized this influenza season and > 8 years of age    I understand that a diet low in cholesterol, fat, and sodium is recommended for good health. Unless I have been given specific instructions below for another diet, I accept this instruction as my diet prescription.   Other diet: GI Soft (Low Fiber)    Special Instructions: None    · Is patient discharged on Warfarin / Coumadin?   No     · Is patient Post Blood Transfusion?  No    Depression / Suicide Risk    As you are discharged from this [facility] facility, it is important to learn how to keep safe from harming yourself.    Recognize the warning signs:  · Abrupt changes in personality, positive or negative- including increase in energy   · Giving away possessions  · Change in eating patterns- significant weight changes-  positive or negative  · Change in sleeping patterns- unable to sleep or sleeping all the time   · Unwillingness or inability to communicate  · Depression  · Unusual sadness, discouragement and loneliness  · Talk of wanting to die  · Neglect of personal appearance   · Rebelliousness- reckless behavior  · Withdrawal from people/activities they love  · Confusion- inability to concentrate     If you or a loved one observes any of these behaviors or has concerns about self-harm, here's what you can do:  · Talk about it- your feelings and reasons for harming yourself  · Remove any means  that you might use to hurt yourself (examples: pills, rope, extension cords, firearm)  · Get professional help from the community (Mental Health, Substance Abuse, psychological counseling)  · Do not be alone:Call your Safe Contact- someone whom you trust who will be there for you.  · Call your local CRISIS HOTLINE 853-6961 or 938-223-3806  · Call your local Children's Mobile Crisis Response Team [place] (301) 096-4648 or www.dMetrics  · Call the toll free National Suicide Prevention Hotlines   · National Suicide Prevention Lifeline 425-750-UDRM (6857)  · National Hope Line Network 800-SUICIDE (354-2252)      Exploratory Laparotomy, Adult  Exploratory laparotomy is a surgical procedure to examine the organs inside your belly (abdomen). Another name for this is abdominal exploration. You may have this procedure if you have abdominal pain, trauma, bleeding, infection, or obstruction. The procedure may be done if your health care provider cannot make a diagnosis from only an exam and testing.  Exploratory laparotomy may be a planned procedure or an emergency procedure. You may have surgical treatment as part of the laparotomy, or you may have additional treatment after your laparotomy. This will depend on what your surgeon finds during the procedure.  LET YOUR HEALTH CARE PROVIDER KNOW ABOUT:  · Any allergies you have.  · All medicines you are taking, including vitamins, herbs, eye drops, creams, and over-the-counter medicines.  · Previous problems you or members of your family have had with the use of anesthetics.  · Any blood disorders you have.  · Previous surgeries you have had.  · Medical conditions you have.  RISKS AND COMPLICATIONS  Generally, this is a safe procedure. However, problems can occur and include:  2. Bleeding.  3. Infection.  4. A blood clot that forms in your leg and travels to your lungs.  5. Damage to organs inside your abdomen.  6. Scar tissue that blocks your digestive  tract.  BEFORE THE PROCEDURE  · Ask your health care provider about:  ¨ Changing or stopping your regular medicines. This is especially important if you are taking diabetes medicines or blood thinners.  ¨ Taking medicines such as aspirin and ibuprofen. These medicines can thin your blood. Do not take these medicines before your procedure if your health care provider instructs you not to.  · Do not eat or drink anything after midnight on the night before the procedure or as directed by your health care provider.  · You may be given instructions for clearing out your bowel before surgery (bowel prep). If you are already in the hospital, the bowel prep may be done there.  PROCEDURE  · An IV tube may be inserted into a vein. You may receive fluids and medicine through the IV tube. This may include antibiotic medicine to treat or prevent infection.  · You will be given a medicine that makes you go to sleep (general anesthetic).  · You may have a tube placed through your nose and into your stomach (nasogastric tube) to drain your stomach fluids.  · You may have a tube placed into your bladder (urinary catheter) to drain urine.  · Your abdomen will be cleaned with a germ-killing solution (antiseptic).  · The surgeon will make a surgical cut (incision) in your abdomen. This is usually an up-and-down incision in the midsection of your abdomen. The incision will go through the inside lining of your abdomen (peritoneum).  · Your surgeon will spread the incision wide enough to examine the inside of your abdomen.  · The rest of the procedure will depend on what the surgeon finds:  ¨ The surgeon will check all organs in your abdomen for damage or obstruction. Repairs will be made when possible.  ¨ If there is blood in the abdomen, the surgeon will look for the source of the bleeding in order to stop it.  ¨ If there is yellowish-white fluid (pus) or gastric fluids in your abdomen, the surgeon will check for an infection or a hole  (perforation) in your digestive tract.  ¨ If the surgeon finds infection, a drain may be placed to empty fluid that can build up in your abdomen after surgery.  ¨ If there is a growth (tumor) inside your abdomen, the surgeon may remove a piece of the growth (biopsy) to examine it under a microscope.  · When all procedures are complete, the surgeon will close your abdomen with layers of stitches (sutures).  · The incision through the skin of your abdomen will be closed with sutures or staples.  AFTER THE PROCEDURE  · Your blood pressure, heart rate, breathing rate, and blood oxygen level will be monitored often until the medicines you were given have worn off.  · You will continue to receive fluids and nutrition through your IV tube. This will stop when you can eat and drink on your own.  · You may also get antibiotic medicine and pain medicine through your IV tube.  · Your nasogastric tube may be removed when you start to pass gas.  · Your urinary catheter may be removed when the anesthetic wears off.     This information is not intended to replace advice given to you by your health care provider. Make sure you discuss any questions you have with your health care provider.     Document Released: 09/12/2002 Document Revised: 01/08/2016 Document Reviewed: 08/05/2015  [employer] Interactive Patient Education ©2016 [employer]    Colostomy Surgery, Adult  A colostomy surgery is a procedure that redirects a section of the large intestine (colon) to an opening in the abdomen. This opening is called a stoma or ostomy. A bag is attached to the stoma on the outside of the body. This bag collects waste, since the waste can no longer travel through the rest of the colon. Where the stoma is located and what it looks like depends on the type of colostomy performed. A colostomy may be temporary or permanent. The hospital stay after this procedure is typically 3-7 days.  LET YOUR HEALTH CARE PROVIDER KNOW ABOUT:  · Allergies to  food or medicine.  · Medicines taken, including vitamins, health supplements, herbs, eye drops, over-the-counter medicines, and creams.  · Use of steroids (by mouth or creams).  · Previous problems with anesthetics or numbing medicines.  · History of bleeding problems or blood clots.  · Previous surgery.  · Other health problems, including diabetes and kidney problems.  · Possibility of pregnancy, if this applies.  RISKS AND COMPLICATIONS  General surgical complications may include:  7. Reaction to anesthetics.  8. Damage to surrounding nerves, tissues, or structures.  9. Infection.  10. Blood clot.  11. Bleeding.  12. Scarring.  13. Pain that lasts longer than 3 months.  Specific risks for colostomy, while rare, may include:  · Intestinal blockage.  · Skin irritation.  · Wound opening.  · Narrowing or collapsing of the stoma.  · Hernia.  BEFORE THE PROCEDURE  It is important to follow your health care provider's instructions prior to your procedure to avoid complications. Steps before your procedure may include:  · A physical exam, blood and urine tests, stool test, X-rays, and other procedures.  · Chemotherapy or radiation therapy.  · A review of the procedure, the anesthetic being used, and what to expect after the procedure. You may meet with an ostomy advisor.  You may be asked to:  · Stop taking certain medicines for several days prior to your procedure such as blood thinners (including aspirin).  · Take certain medicines, such as antibiotics or stool softeners.  · Follow a special diet for several days prior to the procedure and to avoid eating and drinking after midnight the night before the procedure. This will help you to avoid complications from the anesthetic.  · Take an antibacterial shower the night before, or the morning of, the procedure.  · Quit smoking. Smoking increases the chances of an infection or a healing problem after your procedure.  Arrange for someone to drive you home after surgery. You  should also arrange to have someone help you with activities while you recover.  PROCEDURE  There are several types of colostomy procedures. The 2 main procedure types are loop colostomy or end colostomy. During a loop colostomy, the surgeon pulls 2 ends of the intestine out toward the abdomen, using 2 openings. During an end colostomy, the surgeon pulls 1 end of the intestine out toward the abdomen, through 1 opening. You will be given medicine that makes you sleep (general anesthetic). The procedure may be done as open surgery, with a large cut (incision), or as laparoscopic surgery, with several small incisions.  AFTER THE PROCEDURE  · You will be given pain medicine.  · You may be able to suck on ice. You may begin drinking clear fluids the next day and begin a normal diet after 2 days, or as directed by your health care provider.  · Your stoma will be covered with bandages or a pouch.  · Initial drainage from the stoma will be liquid.  · The stoma may be dark-colored, swollen, and bruised until it has more time to heal.     This information is not intended to replace advice given to you by your health care provider. Make sure you discuss any questions you have with your health care provider.     Document Released: 05/09/2012 Document Revised: 05/03/2016 Document Reviewed: 05/09/2012  [employer] Interactive Patient Education ©2016 [employer]    Infection Control in the Home  If you have an infection or you are taking care of someone who has an infection, it is important to know how to keep the infection from spreading. Follow these guidelines to help stop the spread of infection, and talk to your health care provider.  HOW ARE INFECTIONS SPREAD?  In order for an infection to spread, the following must be present:  · A germ. This may be a virus, bacteria, fungus, or parasite.  · A place for the germ to live. This may be:  ¨ On or in a person, animal, plant, or food.  ¨ In soil or water.  ¨ On surfaces, such as a  door handle.  · A susceptible host. This is a person or animal who does not have resistance (immunity) to the germ.  · A way for the germ to enter the host. This may occur by:  ¨ Direct contact. This may happen by making contact--such as shaking hands or hugging--with an infected person or animal. Some germs can also travel through the air and spread to you if an infected person coughs or sneezes on you or near you.  ¨ Indirect contact. This is when the germ enters the host through contact with an infected object. Examples include eating contaminated food, drinking contaminated water, or touching a contaminated surface with your hands and then touching your face, nose, or mouth soon after that.  HOW CAN I HELP TO PREVENT INFECTION FROM SPREADING?  There are several things that you can do to help prevent infection from spreading.  Hand Washing  It is very important to wash your hands correctly, following these steps:  14. Wet your hands with clean, running water.  15. Apply soap to your hands. Liquid soap is better than bar soap.  16. Rub your hands together quickly to create lather.  17. Keep rubbing your hands together for at least 20 seconds. Thoroughly scrub all parts of your hands, including under your fingernails and between your fingers.  18. Rinse your hands with clean, running water until all of the soap is gone.  19. Dry your hands with an air dryer or a clean paper or cloth towel, or let your hands air-dry. Do not use your clothing or a soiled towel to dry your hands.  20. If you are in a public restroom, use your towel to turn off the water faucet and to open the bathroom door.  Make sure to wash your hands:  · Before:  ¨ Visiting a baby or anyone with a weakened or lowered defense (immune) system.  ¨ Putting in and taking out any contact lenses.  · After:  ¨ Working or playing outside.  ¨ Touching an animal or its toys or leash.  ¨ Handling livestock.  ¨ Using the bathroom or helping a child or adult to  use the bathroom.  ¨ Using household  or toxic chemicals.  ¨ Touching or taking out the garbage.  ¨ Touching anything dirty around your home.  ¨ Handling soiled clothes or rags.  ¨ Taking care of a sick child. This includes touching used tissues, toys, and clothes.  ¨ Sneezing, coughing, or blowing your nose.  ¨ Using public transportation.  ¨ Shaking hands.  ¨ Using a phone, including your mobile phone.  ¨ Touching money.  · Before and after:  ¨ Preparing food.  ¨ Preparing a bottle for a baby.  ¨ Feeding a baby or a young child.  ¨ Eating.  ¨ Visiting or taking care of someone who is sick.  ¨ Changing a diaper.  ¨ Changing a bandage (dressing) or taking care of an injury or wound.  ¨ Giving or taking medicine.  Taking Care of Your Home  · Make sure that you have enough cleaning supplies at all times. These include:  ¨ Disinfectants.  ¨ Reusable cleaning cloths. Wash these after each use.  ¨ Paper towels.  ¨ Utility gloves. Replace your gloves if they are cracked or torn or if they start to peel.  · Use bleach safely. Never mix it with other cleaning products, especially those that contain ammonia. This mixture can create a dangerous gas that may be deadly.  · Take care of your cleaning supplies. Toilet brushes, mops, and sponges can breed germs. Soak them in bleach and water for 5 minutes after each use.  · Do not pour used mop water down the sink. Pour it down the toilet instead.  · Maintain proper ventilation in your home.  · If you have a pet, ensure that your pet stays clean. Do not let people with weak immune systems touch bird droppings, fish tank water, or a litter box.  ¨ If you have a cat, be sure to change the litter every day.  · In the bathroom, make sure you:  ¨ Provide liquid soap.  ¨ Change towels and washcloths frequently. Avoid sharing towels and washcloths.  ¨ Change toothbrushes often and store them separately in a clean, dry place.  ¨ Disinfect the toilet.  ¨ Clean the tub, shower, and  sink with standard cleaning products.    ¨ Mop the floor with a standard .  ¨ Do not share personal items, such as razors, toothbrushes, drinking glasses, deodorant, renner, brushes, towels, and washcloths.    · In the kitchen, make sure you:  ¨ Store food carefully.  ¨ Refrigerate leftovers promptly in covered containers.  ¨ Throw out stale or spoiled food.  ¨ Clean the inside of your refrigerator each week.  ¨ Keep your refrigerator set at 40°F (4°C) or less, and set your freezer at 0°F (-18°C) or less.  ¨ Thaw foods in the refrigerator or microwave, not at room temperature.  ¨ Serve foods at the proper temperature. Do not eat raw meat. Make sure it is cooked to the appropriate temperature. Cook eggs until they are firm.  ¨ Wash fruits and vegetables under running water.  ¨ Use separate cutting boards, plates, and utensils for raw foods and cooked foods.  ¨ Keep work surfaces clean.  ¨ Use a clean spoon each time you sample food while cooking.  ¨ Wash your dishes in hot, soapy water. Air-dry your dishes or use a .  ¨ Do not share forks, cups, or spoons during meals.  · Wear gloves if laundry is visibly soiled.  · Change linens each week or whenever they are soiled.  · Do not shake soiled linens. Doing that may send germs into the air. Put dressings, sanitary or incontinence pads, diapers, and gloves in plastic garbage bags for disposal.     This information is not intended to replace advice given to you by your health care provider. Make sure you discuss any questions you have with your health care provider.     Document Released: 09/26/2009 Document Revised: 01/08/2016 Document Reviewed: 08/20/2015  [employer] Interactive Patient Education ©2016 [employer]  Colostomy Home Guide  A colostomy is an opening for stool to leave your body when a medical condition prevents it from leaving through the usual opening (rectum). During a surgery, a piece of large intestine (colon) is brought through a hole  in the abdominal wall. The new opening is called a stoma or ostomy. A bag or pouch fits over the stoma to catch stool and gas. Your stool may be liquid, somewhat pasty, or formed.  CARING FOR YOUR STOMA   Normally, the stoma looks a lot like the inside of your cheek: pink, red, and moist. At first it may be swollen, but this swelling will decrease within 6 weeks.  Keep the skin around your stoma clean and dry. You can gently wash your stoma and the skin around your stoma in the shower with a clean, soft washcloth. If you develop any skin irritation, your caregiver may give you a stoma powder or ointment to help heal the area. Do not use any products other than those specifically given to you by your caregiver.   Your stoma should not be uncomfortable. If you notice any stinging or burning, your pouch may be leaking, and the skin around your stoma may be coming into contact with stool. This can cause skin irritation. If you notice stinging, replace your pouch with a new one and discard the old one.  OSTOMY POUCHES   The pouch that fits over the ostomy can be made up of either 1 or 2 pieces. A one-piece pouch has a skin barrier piece and the pouch itself in one unit. A two-piece pouch has a skin barrier with a separate pouch that snaps on and off of the skin barrier. Either way, you should empty the pouch when it is only  to ½ full. Do not let more stool or gas build up. This could cause the pouch to leak.  Some ostomy bags have a built-in gas release valve. Ostomy deodorizer (5 drops) can be put into the pouch to prevent odor. Some people use ostomy lubricant drops inside the pouch to help the stool slide out of the bag more easily and completely.   EMPTYING YOUR OSTOMY POUCH   You may get lessons on how to empty your pouch from a wound-ostomy nurse before you leave the hospital. Here are the basic steps:  · Wash your hands with soap and water.  · Sit far back on the toilet.  · Put several pieces of toilet paper into  the toilet water. This will prevent splashing as you empty the stool into the toilet bowl.  · Unclip or unvelcro the tail end of the pouch.  · Unroll the tail and empty stool into the toilet.  · Clean the tail with toilet paper.  · Reroll the tail, and clip or velcro it closed.  · Wash your hands again.  CHANGING YOUR OSTOMY POUCH   Change your ostomy pouch about every 3 to 4 days for the first 6 weeks, then every 5 to7 days. Always change the bag sooner if there is any leakage or you begin to notice any discomfort or irritation of the skin around the stoma. When possible, plan to change your ostomy pouch before eating or drinking as this will lessen the chance of stool coming out during the pouch change. A wound-ostomy nurse may teach you how to change your pouch before you leave the hospital. Here are the basic steps:  21. Lay out your supplies.  22. Wash your hands with soap and water.  23. Carefully remove the old pouch.  24. Wash the stoma and allow it to dry. Men may be advised to shave any hair around the stoma very carefully. This will make the adhesive stick better.  25. Use the stoma measuring guide that comes with your pouch set to decide what size hole you will need to cut in the skin barrier piece. Choose the smallest possible size that will hold the stoma but will not touch it.  26. Use the guide to trace the [place] on the back of the skin barrier piece. Cut out the hole.  27. Hold the skin barrier piece over the stoma to make sure the hole is the correct size.  28. Remove the adhesive paper backing from the skin barrier piece.  29. Squeeze stoma paste around the opening of the skin barrier piece.  30. Clean and dry the skin around the stoma again.  31. Carefully fit the skin barrier piece over your stoma.  32. If you are using a two-piece pouch, snap the pouch onto the skin barrier piece.  33. Close the tail of the pouch.  34. Put your hand over the top of the skin barrier piece to help warm it for  about 5 minutes, so that it conforms to your body better.  35. Wash your hands again.  DIET TIPS   · Continue to follow your usual diet.  · Drink about eight 8 oz glasses of water each day.  · You can prevent gas by eating slowly and chewing your food thoroughly.  · If you feel concerned that you have too much gas, you can cut back on gas-producing foods, such as:  ¨ Spicy foods.  ¨ Onions and garlic.  ¨ Cruciferous vegetables (cabbage, broccoli, cauliflower, [place] sprouts).  ¨ Beans and legumes.  ¨ Some cheeses.  ¨ Eggs.  ¨ Fish.  ¨ Bubbly (carbonated) drinks.  ¨ Chewing gum.  GENERAL TIPS   · You can shower with or without the bag in place.  · Always keep the bag on if you are bathing or swimming.  · If your bag gets wet, you can dry it with a blow-dryer set to cool.  · Avoid wearing tight clothing directly over your stoma so that it does not become irritated or bleed. Tight clothing can also prevent stool from draining into the pouch.  · It is helpful to always have an extra skin barrier and pouch with you when traveling. Do not leave them anywhere too warm, as parts of them can melt.  · Do not let your seat belt rest on your stoma. Try to keep the seat belt either above or below your stoma, or use a tiny pillow to cushion it.  · You can still participate in sports, but you should avoid activities in which there is a risk of getting hit in the abdomen.  · You can still have sex. It is a good idea to empty your pouch prior to sex. Some people and their partners feel very comfortable seeing the pouch during sex. Others choose to wear lingerie or a T-shirt that covers the device.  SEEK IMMEDIATE MEDICAL CARE IF:  · You notice a change in the size or color of the stoma, especially if it becomes very red, purple, black, or pale white.  · You have bloody stools or bleeding from the stoma.  · You have abdominal pain, nausea, vomiting, or bloating.  · There is anything unusual protruding from the stoma.  · You have  irritation or red skin around the stoma.  · No stool is passing from the stoma.  · You have diarrhea (requiring more frequent than normal pouch emptying).     This information is not intended to replace advice given to you by your health care provider. Make sure you discuss any questions you have with your health care provider.     Document Released: 12/20/2004 Document Revised: 03/11/2013 Document Reviewed: 05/16/2012  [employer] Interactive Patient Education ©2016 [employer]

## 2017-08-18 NOTE — PROGRESS NOTES
Surgical Progress Note    Author: Jorge Rodriguez Date & Time created: 2017   8:07 AM     Interval Events:  CT scan performed yesterday shows a 1.7 cm loculated air and fluid collection in the pelvis adjacent to the suture line previously measured 2.6 cm.   Tolerating diet.    Review of Systems   Constitutional: Negative for fever, chills, weight loss and malaise/fatigue.   Respiratory: Negative.    Cardiovascular: Negative.    Gastrointestinal: Positive for abdominal pain. Negative for nausea and vomiting.     Hemodynamics:  Temp (24hrs), Av.6 °C (97.9 °F), Min:36.3 °C (97.4 °F), Max:36.7 °C (98.1 °F)  Temperature: 36.3 °C (97.4 °F)  Pulse  Av.9  Min: 49  Max: 105   Blood Pressure : 136/46 mmHg     Respiratory:    Respiration: 17, Pulse Oximetry: 94 %        RUL Breath Sounds: Clear, RML Breath Sounds: Clear, RLL Breath Sounds: Diminished, TOSHIA Breath Sounds: Clear, LLL Breath Sounds: Diminished  Neuro:  GCS = 15       Fluids:    Intake/Output Summary (Last 24 hours) at 17 0807  Last data filed at 17 0500   Gross per 24 hour   Intake   2170 ml   Output   2770 ml   Net   -600 ml        Current Diet Order   Procedures   • DIET ORDER     Physical Exam   Constitutional: He is oriented to person, place, and time. He appears well-developed and well-nourished. No distress.   HENT:   Head: Normocephalic.   Eyes: Pupils are equal, round, and reactive to light. No scleral icterus.   Cardiovascular: Normal rate, regular rhythm and normal heart sounds.    Pulmonary/Chest: Effort normal and breath sounds normal. No respiratory distress.   Abdominal: Soft. Bowel sounds are normal. He exhibits no distension. There is tenderness (minimal, at colostomy site and at midline incision.  No hernia noted at either site.). There is no rebound and no guarding.   Colostomy intact, productive of stool.  Midline incision intact.  CT scan performed yesterday shows a 1.7 cm loculated air and fluid collection in the  pelvis adjacent to the suture line previously measured 2.6 cm.    Neurological: He is alert and oriented to person, place, and time.   Psychiatric: He has a normal mood and affect. His behavior is normal. Judgment and thought content normal.     Labs:  Recent Results (from the past 24 hour(s))   CBC WITH DIFFERENTIAL    Collection Time: 08/18/17  3:34 AM   Result Value Ref Range    WBC 2.6 (L) 4.8 - 10.8 K/uL    RBC 3.15 (L) 4.70 - 6.10 M/uL    Hemoglobin 9.2 (L) 14.0 - 18.0 g/dL    Hematocrit 28.3 (L) 42.0 - 52.0 %    MCV 89.8 81.4 - 97.8 fL    MCH 29.2 27.0 - 33.0 pg    MCHC 32.5 (L) 33.7 - 35.3 g/dL    RDW 61.8 (H) 35.9 - 50.0 fL    Platelet Count 104 (L) 164 - 446 K/uL    MPV 8.8 (L) 9.0 - 12.9 fL    Neutrophils-Polys 37.50 (L) 44.00 - 72.00 %    Lymphocytes 49.60 (H) 22.00 - 41.00 %    Monocytes 7.20 0.00 - 13.40 %    Eosinophils 4.50 0.00 - 6.90 %    Basophils 0.80 0.00 - 1.80 %    Immature Granulocytes 0.40 0.00 - 0.90 %    Nucleated RBC 0.00 /100 WBC    Neutrophils (Absolute) 0.99 (L) 1.82 - 7.42 K/uL    Lymphs (Absolute) 1.31 1.00 - 4.80 K/uL    Monos (Absolute) 0.19 0.00 - 0.85 K/uL    Eos (Absolute) 0.12 0.00 - 0.51 K/uL    Baso (Absolute) 0.02 0.00 - 0.12 K/uL    Immature Granulocytes (abs) 0.01 0.00 - 0.11 K/uL    NRBC (Absolute) 0.00 K/uL   COMP METABOLIC PANEL    Collection Time: 08/18/17  3:34 AM   Result Value Ref Range    Sodium 141 135 - 145 mmol/L    Potassium 3.8 3.6 - 5.5 mmol/L    Chloride 106 96 - 112 mmol/L    Co2 31 20 - 33 mmol/L    Anion Gap 4.0 0.0 - 11.9    Glucose 100 (H) 65 - 99 mg/dL    Bun 11 8 - 22 mg/dL    Creatinine 0.66 0.50 - 1.40 mg/dL    Calcium 8.2 (L) 8.5 - 10.5 mg/dL    AST(SGOT) 30 12 - 45 U/L    ALT(SGPT) 46 2 - 50 U/L    Alkaline Phosphatase 88 30 - 99 U/L    Total Bilirubin 0.6 0.1 - 1.5 mg/dL    Albumin 2.4 (L) 3.2 - 4.9 g/dL    Total Protein 4.7 (L) 6.0 - 8.2 g/dL    Globulin 2.3 1.9 - 3.5 g/dL    A-G Ratio 1.0 g/dL   MAGNESIUM    Collection Time: 08/18/17  3:34 AM    Result Value Ref Range    Magnesium 1.6 1.5 - 2.5 mg/dL   PHOSPHORUS    Collection Time: 08/18/17  3:34 AM   Result Value Ref Range    Phosphorus 3.1 2.5 - 4.5 mg/dL   PROTHROMBIN TIME    Collection Time: 08/18/17  3:34 AM   Result Value Ref Range    PT 14.1 12.0 - 14.6 sec    INR 1.06 0.87 - 1.13   ESTIMATED GFR    Collection Time: 08/18/17  3:34 AM   Result Value Ref Range    GFR If African American >60 >60 mL/min/1.73 m 2    GFR If Non African American >60 >60 mL/min/1.73 m 2     Medical Decision Making, by Problem:  Active Hospital Problems    Diagnosis   • Pelvic abscess in male (CMS-HCC) [K65.1]     Priority: High   • BPH (benign prostatic hypertrophy) [N40.0]     Priority: Low   • Depression [F32.9]     Priority: Low   • Osteoarthritis [M19.90]     Priority: Low   • Neuropathy (CMS-HCC) [G62.9]   • Bipolar 1 disorder, depressed (CMS-HCC) [F31.9]   • Normocytic anemia [D64.9]     Plan:  Please have patient follow up with me as previously ordered.  Will not have on call surgeon see this patient over the weekend, but [facility] will be available if needed.  I will see again next week, if still in hospital.    Quality Measures:  Labs reviewed, Medications reviewed and Radiology images reviewed  Ruff catheter: No Ruff      DVT Prophylaxis: Enoxaparin (Lovenox)    Ulcer prophylaxis: Yes  Antibiotics: Treating active infection/contamination beyond 24 hours perioperative coverage        Discussed patient condition with RN and Patient

## 2017-08-18 NOTE — PROGRESS NOTES
Received report from day shift RN.   Pt A&O x 4.   Pain reported at 3/10. Pt declines interventions at this time.  Pt denies nausea, vomiting, chest pain, shortness of breath at this time.   Pt on RA, saturation above 90.  Healing midline abd surgical incision noted, intact, no infection sign noted.  +BM, +flatus via colostomy. +void.   Abdomen round, tender, semi-firm.   Call light within reach. Bed locked and in lowest position.   Plan of care discussed with pt. All needs are met at this time.

## 2017-08-18 NOTE — DISCHARGE SUMMARY
"CHIEF COMPLAINT ON ADMISSION  Chief Complaint   Patient presents with   • UTI     started yesterday   • Abdominal Pain     x3-4 days, sharp pain, better when laying down   • Diarrhea       CODE STATUS  Full Code    HPI & HOSPITAL COURSE  76 male admitted 7/14/2017 with abdominal pain. CT abdomen obtained on presentation revealing \"Small inflammatory phlegmon/developing abscess in the RIGHT lower quadrant, in close proximity to the appendiceal tip, concerning for perforated appendicitis. Large complex fluid collection in the central pelvis, likely abscess, with adjacent inflammation of sigmoid colon.  This may be secondary to appendicitis or diverticulitis. Minimal pneumoperitoneum consistent with bowel perforation. Enlarged prostate\". Surgical consultation was obtained. Recommendations were for IR drain placement. This was done on 07/15/2017. 300 ml of beige malodorous pus was drained. Cultures grew E.Coli, Enterococcus faecium, viridans streptococci and Bacteriodes fragilis. CT pelvis was repeated 07/18/2017 revealing \"The left transgluteal pelvic pigtail catheter pulled back slightly with the loop straddled the wall of the pelvic fluid collection. There is reaccumulation of fluid in the collection with air-fluid level, more than previously seen in post drain image, probably due to malfunction of the drain\". IR drain repeated by Dr Wei on 07/19/2017. Cultures from this grew Enterococcus Faecium and Candida albicans. ID consultation was subsequently obtained and antibiotics were subsequently continued per ID recommendations. CT repeated 07/24/2017 revealing \"Again seen posterior approach percutaneous catheter which extends into a pelvic abscess. The abscess cavity which contains an air/fluid level currently measures 5.5 x 5.2 cm in size\". Patient was subsequently taken to OR by Dr Rodriguez on 07/25/2017 and appendectomy, sigmoid colon resection with colostomy creation performed. Repeat CT 08/03/2017 revealing " "\"4.5 x 4.7 x 5.4 cm pelvic fluid collection is mildly decreased in size compared to prior. There has been interval removal of the previously noted pigtail catheter\". IR guided drain again placed on 08/06/2017 with repeat CT 08/12/2017 revealing \"Interval decrease in size of the fluid collection in the pelvis measuring 2.6 x 1.9 x 1.6 cm. Pigtail catheter is along the edge of the collection\". Meanwhile patient has remained on abx per ID recommendations. He has developed thrombocytopenia and leukopenia and abx was changed to vancomycin per ID. Repeat CT abd was done on 8/17: with decreasing abscess.   She has been accepted to [facility] today.  Things to follow up at SNF  # to complete abx with vancomycin last dose 8/25/17  # to follow cbc to check wbc and platelets level  # follow up appointments as below.    Therefore, he is discharged in fair and stable condition with close outpatient follow-up.        DISCHARGE PROBLEM LIST  Principal Problem (Resolved):    Sepsis (CMS-[facility]) POA: Yes  Active Problems:    Pelvic abscess in male (CMS-HCC) POA: Yes    BPH (benign prostatic hypertrophy) POA: Yes    Depression POA: Yes    Osteoarthritis POA: Yes    Normocytic anemia POA: Yes    Bipolar 1 disorder, depressed (CMS-HCC) (Chronic) POA: Yes    Neuropathy (CMS-HCC) POA: Yes  Resolved Problems:    Hyponatremia POA: Yes    Leucocytosis POA: Yes      FOLLOW UP  Follow up with PCP in 1 week  Follow up with Dr. Ferrari in 1 week  Follow up with Dr. Rodriguez in 1 week    MEDICATIONS ON DISCHARGE   Marcelo Colon   Home Medication Instructions FLORENCIO:[id number]    Printed on:08/18/17 1229   Medication Information                      alfuzosin (UROXATRAL) 10 MG SR tablet  Take 10 mg by mouth every day.             amoxicillin-clavulanate (AUGMENTIN) 875-125 MG Tab  Take 1 Tab by mouth every 12 hours.             Calcium Carb-Cholecalciferol (OYSTER SHELL CALCIUM/VITAMIN D) 250-125 MG-UNIT Tab tablet  Take 1 Tab by mouth every day.          "    fluconazole (DIFLUCAN) 200 MG Tab  Take 1 Tab by mouth every day.             gabapentin (NEURONTIN) 100 MG Cap  Take 100 mg by mouth every bedtime. Pt may increase to BID and then to TID             lamotrigine (LAMICTAL) 150 MG tablet  Take 150 mg by mouth every day.             linezolid (ZYVOX) 600 MG Tab  Take 1 Tab by mouth every 12 hours.             meloxicam (MOBIC) 15 MG tablet  Take 15 mg by mouth every day.             NS SOLN 250 mL with vancomycin 10 GM SOLR 1,300 mg  1,300 mg by Intravenous route every 24 hours for 7 days.             omeprazole (PRILOSEC) 20 MG delayed-release capsule  Take 20 mg by mouth every day.             oxybutynin (DITROPAN) 5 MG Tab  Take 1 Tab by mouth 2 Times a Day.             oxycodone immediate-release (ROXICODONE) 5 MG Tab  Take 1 Tab by mouth every four hours as needed.             polyethylene glycol/lytes (MIRALAX) Pack  Take 1 Packet by mouth every day.             tamsulosin (FLOMAX) 0.4 MG capsule  Take 1 Cap by mouth ONE-HALF HOUR AFTER BREAKFAST.             tramadol (ULTRAM) 50 MG TABS  Take  mg by mouth every four hours as needed.                 DIET  Orders Placed This Encounter   Procedures   • DIET ORDER     Standing Status: Standing      Number of Occurrences: 1      Standing Expiration Date:      Order Specific Question:  Diet:     Answer:  Low Fiber(GI Soft) [2]       ACTIVITY  As tolerated.  Weight bearing as tolerated      CONSULTATIONS  [facility] - Surgery  Dr. Herbert/ [facility]    PROCEDURES      LABORATORY  Lab Results   Component Value Date/Time    SODIUM 141 08/18/2017 03:34 AM    POTASSIUM 3.8 08/18/2017 03:34 AM    CHLORIDE 106 08/18/2017 03:34 AM    CO2 31 08/18/2017 03:34 AM    GLUCOSE 100* 08/18/2017 03:34 AM    BUN 11 08/18/2017 03:34 AM    CREATININE 0.66 08/18/2017 03:34 AM        Lab Results   Component Value Date/Time    WBC 2.6* 08/18/2017 03:34 AM    HEMOGLOBIN 9.2* 08/18/2017 03:34 AM    HEMATOCRIT 28.3* 08/18/2017 03:34  AM    PLATELET COUNT 104* 08/18/2017 03:34 AM        Total time of the discharge process exceeds 45 minutes

## 2017-08-18 NOTE — PROGRESS NOTES
"Pharmacy Kinetics 76 y.o. male on vancomycin day # 2 2017    Currently Dose: Vancomycin 1300 mg iv q24hr  Received Load Dose: Yes (17 1230)    Indication for Treatment: intra-abdominal infection  ID Service Following: Yes (per chart review)    Pertinent history per medical record: Admitted on 2017 for pelvic abscess possibly due to bowel perforation due to constipation.  Pt had been on Zyvox since  per ID (was to continue thru ) and Augmentin.  Pt had thrombocytopenia and leukopenia and MD felt it may be related to Zyvox. ID recommended changing Zyvox to vanco and if abscess worsens, then may need another abx change.    Other antibiotics: amoxicillin/clavulanate 875/125 mg BID    Allergies: Review of patient's allergies indicates no known allergies.     List concerns for accumulation of vancomycin: age >70, low albumin/malnutrition    Pertinent cultures to date:   17 peritoneal wound culture x3 (NGTD)  17 peritoneal wound culture (VANCOMYCIN RESISTANT ENTEROCOCCI(VRE))    17 C/S Report  Culture & Susceptibility      ENTEROCOCCUS FAECIUM      Antibiotic Sensitivity Microscan Unit Status     Ampicillin Resistant >8 mcg/mL Final     Daptomycin Sensitive 2 mcg/mL Final     Gent Synergy Sensitive <=500 mcg/mL Final     Linezolid Sensitive 2 mcg/mL Final     Penicillin Resistant >8 mcg/mL Final     Vancomycin Resistant >16 mcg/mL Final        Recent Labs      17   0515  17   0334   WBC  2.5*  2.6*   NEUTSPOLYS   --   37.50*     Recent Labs      17   0334   BUN  11   CREATININE  0.66   ALBUMIN  2.4*     Intake/Output Summary (Last 24 hours) at 17 1117  Last data filed at 17 0835   Gross per 24 hour   Intake   1810 ml   Output   2920 ml   Net  -1110 ml      Blood pressure 134/56, pulse 61, temperature 36.3 °C (97.4 °F), resp. rate 18, height 1.727 m (5' 8\"), weight 66.4 kg (146 lb 6.2 oz), SpO2 93 %. Temp (24hrs), Av.6 °C (97.8 °F), Min:36.3 °C (97.4 " °F), Max:36.7 °C (98.1 °F)    Estimated Creatinine Clearance: 90.8 mL/min (by C-G formula based on Cr of 0.66).    A/P   1. Vancomycin dose change: not indicated   2. Next vancomycin level: 8/19/17 @1200  3. Goal trough: 12-16 mcg/mL  4. Comments: VS stable. Afebrile. WBC down/stable. Microbiology pending (noted VRE prior). Some factors for accumulation of vancomycin noted. ID consulted per chart review. Given factors agree with trough prior to 3rd total dose. Pharmacy will follow and continue to adjust as appropriate.    Prince Valles, PHARMD

## 2017-08-18 NOTE — CARE PLAN
Problem: Infection  Goal: Will remain free from infection  Outcome: PROGRESSING AS EXPECTED  Antibiotics given per MAR. No new signs of infection noted.     Problem: Pain Management  Goal: Pain level will decrease to patient’s comfort goal  Outcome: PROGRESSING AS EXPECTED  Pt resting in bed comfortably. Pt declines need for pain medication.

## 2017-08-18 NOTE — DISCHARGE PLANNING
The patient's chart is being copied and the [facility] transfer form completed.  We are waiting for Dr. Bernal to update the Discharge Summary.  [facility]'s van will  the patient at 2:00 PM and the  will be either Rickey or Todd. I advised the bedside nurse of this.    The transport time was changed to 3:00 PM because the patient's IV antibiotic needs time to infuse.  I advised the patient, the bedside nurse and the charge nurse of the change of transport time.

## 2017-08-18 NOTE — DISCHARGE PLANNING
CS spoke to Joseph at [facility]. Joseph requested clarification on the isolation status for the patient.

## 2017-08-18 NOTE — DISCHARGE PLANNING
[facility]S received a call from Fozia at [facility] the referral has been accepted. Transportation has been via the [facility] van to transfer the patient at 1400 Rickey/Todd will be picking up the patient.

## 2017-08-18 NOTE — PROGRESS NOTES
"[facility] Hospitalist Progress Note        Date of Service: 8/18/2017    Chief Complaint  75 y/o M with PMH of peripheral neuropathy, BPH, Depression admitted for abdominal pain, diarrhea.  Dx with pelvis abscess on imaging. S/p Ex lap and abdominal washout on 7/25.    76 male admitted 7/14/2017 with abdominal pain. CT abdomen obtained on presentation revealing \"Small inflammatory phlegmon/developing abscess in the RIGHT lower quadrant, in close proximity to the appendiceal tip, concerning for perforated appendicitis. Large complex fluid collection in the central pelvis, likely abscess, with adjacent inflammation of sigmoid colon.  This may be secondary to appendicitis or diverticulitis. Minimal pneumoperitoneum consistent with bowel perforation. Enlarged prostate\". Surgical consultation was obtained. Recommendations were for IR drain placement. This was done on 07/15/2017. 300 ml of beige malodorous pus was drained. Cultures grew E.Coli, Enterococcus faecium, viridans streptococci and Bacteriodes fragilis. CT pelvis was repeated 07/18/2017 revealing \"The left transgluteal pelvic pigtail catheter pulled back slightly with the loop straddled the wall of the pelvic fluid collection. There is reaccumulation of fluid in the collection with air-fluid level, more than previously seen in post drain image, probably due to malfunction of the drain\". IR drain repeated by Dr Wei on 07/19/2017. Cultures from this grew Enterococcus Faecium and Candida albicans. ID consultation was subsequently obtained and antibiotics were subsequently continued per ID recommendations. CT repeated 07/24/2017 revealing \"Again seen posterior approach percutaneous catheter which extends into a pelvic abscess. The abscess cavity which contains an air/fluid level currently measures 5.5 x 5.2 cm in size\". Patient was subsequently taken to OR by Dr Rodriguez on 07/25/2017 and appendectomy, sigmoid colon resection with colostomy creation performed. " "Repeat CT 08/03/2017 revealing \"4.5 x 4.7 x 5.4 cm pelvic fluid collection is mildly decreased in size compared to prior. There has been interval removal of the previously noted pigtail catheter\". IR guided drain again placed on 08/06/2017 with repeat CT 08/12/2017 revealing \"Interval decrease in size of the fluid collection in the pelvis measuring 2.6 x 1.9 x 1.6 cm. Pigtail catheter is along the edge of the collection\". Meanwhile patient has remained on abx per ID recommendations. He has now developed thrombocytopenia and leukopenia. I am concerned this may be Zyvox related.     Interval Problem Update  Seen and evaluated on rounds. Interval CBC obtained yesterday by me for monitoring of Zyvox toxicity. This reveals worsening leukopenia and thrombocytopenia. Given this I discussed the case with Dr Herbert from ID. He likely has received adequate abx therapy. Previously VRE grown but polymicrobial. Dr Herbert recommended IV vancomycin and interval CT abdomen. She will further review the case. Luckily PICC line has not been removed at this point in time. Orders placed by me. Given initiation of vancomycin will initiate gentle IVF with plans for contrasted CT to be obtained to prevent BEE. Recheck CBC / BMP in am tomorrow. He is awaiting skilled nursing facility placement. No complaints per patient. Remains on antibiotics per ID recommendations. PICC line in place. Supposed to go to SNF but cannot be done until Friday. Please note if the CT reveals worsening abscess then Vancomycin may not be ideal and would have to transition to another agent. ID team / Pharmacy team to closely follow.     Consultants/Specialty  [facility] - Surgery  Dr. Herbert/ [facility] -ID    Disposition  Awaiting placement. Anticipated Friday. ID clearance  . Repeat CT abdomen.       Review of Systems   Constitutional: Positive for malaise/fatigue. Negative for fever, chills, weight loss and diaphoresis.   HENT: Negative for congestion and hearing " loss.    Eyes: Negative for blurred vision and double vision.   Respiratory: Negative for cough, hemoptysis, sputum production, shortness of breath, wheezing and stridor.    Cardiovascular: Negative for chest pain, palpitations, orthopnea, claudication, leg swelling and PND.   Gastrointestinal: Negative for nausea, vomiting, abdominal pain, diarrhea, blood in stool and melena.        Colostomy   Genitourinary: Negative for dysuria, urgency, frequency, hematuria and flank pain.   Musculoskeletal: Negative for myalgias and neck pain.   Skin: Negative for itching and rash.   Neurological: Positive for weakness (generalized ). Negative for dizziness, tingling, tremors, sensory change, speech change, focal weakness, seizures, loss of consciousness and headaches.   Psychiatric/Behavioral: Negative for depression, suicidal ideas and hallucinations. The patient is not nervous/anxious.    All other systems reviewed and are negative.     Physical Exam  Laboratory/Imaging   Hemodynamics  Temp (24hrs), Av.6 °C (97.9 °F), Min:36.3 °C (97.4 °F), Max:36.7 °C (98.1 °F)   Temperature: 36.3 °C (97.4 °F)  Pulse  Av.9  Min: 49  Max: 105    Blood Pressure : 136/46 mmHg      Respiratory      Respiration: 17, Pulse Oximetry: 94 %        RUL Breath Sounds: Clear, RML Breath Sounds: Clear, RLL Breath Sounds: Diminished, TOSHIA Breath Sounds: Clear, LLL Breath Sounds: Diminished    Fluids    Intake/Output Summary (Last 24 hours) at 17 0832  Last data filed at 17 0500   Gross per 24 hour   Intake   2170 ml   Output   2770 ml   Net   -600 ml       Nutrition  Orders Placed This Encounter   Procedures   • DIET ORDER     Standing Status: Standing      Number of Occurrences: 1      Standing Expiration Date:      Order Specific Question:  Diet:     Answer:  Low Fiber(GI Soft) [2]     Physical Exam   Constitutional: He is oriented to person, place, and time. He is active. No distress.   HENT:   Head: Normocephalic and atraumatic.  "  Mouth/Throat: Oropharynx is clear and moist. No oropharyngeal exudate.   Eyes: Conjunctivae and EOM are normal. Pupils are equal, round, and reactive to light. No scleral icterus.   Neck: Normal range of motion. No JVD present.   Cardiovascular: Normal rate, regular rhythm, normal heart sounds and intact distal pulses.    No murmur heard.  Pulmonary/Chest: Effort normal and breath sounds normal. No stridor. No respiratory distress. He has no wheezes. He has no rales.   Abdominal: Soft. Bowel sounds are normal. He exhibits no distension. There is no tenderness.   Colostomy in place, air loose stool present.      Musculoskeletal: Normal range of motion. He exhibits no edema or tenderness.   Neurological: He is alert and oriented to person, place, and time. No cranial nerve deficit.   Skin: Skin is warm and dry. He is not diaphoretic. No erythema.   Psychiatric: He has a normal mood and affect. His behavior is normal. Judgment and thought content normal.   Vitals reviewed.      Recent Labs      08/17/17   0515  08/18/17   0334   WBC  2.5*  2.6*   RBC  3.41*  3.15*   HEMOGLOBIN  10.1*  9.2*   HEMATOCRIT  30.9*  28.3*   MCV  90.6  89.8   MCH  29.6  29.2   MCHC  32.7*  32.5*   RDW  62.4*  61.8*   PLATELETCT  124*  104*   MPV  9.5  8.8*     Recent Labs      08/18/17   0334   SODIUM  141   POTASSIUM  3.8   CHLORIDE  106   CO2  31   GLUCOSE  100*   BUN  11   CREATININE  0.66   CALCIUM  8.2*     Recent Labs      08/18/17   0334   INR  1.06                  Assessment/Plan     Pelvic abscess in male (CMS-HCC) (present on admission)  Assessment & Plan  Repeat CT 08/12/2017 revealing \"Interval decrease in size of the fluid collection in the pelvis measuring 2.6 x 1.9 x 1.6 cm. Pigtail catheter is along the edge of the collection\".   Now worsening thromboctyopenia. Leukopenia. I was concerned Zyvox related  Discussed with ID. Transitioned to Vancomycin (See interval history above)  Interval CT requested. F/U please.   If " worsening abscess then vancomycin may not appropriate  Monitor for vancomycin toxicity and therapeutics      Normocytic anemia (present on admission)  Assessment & Plan  Stable H&H, multi factorial  intra-op losses and chronic disease-stable  Monitor for acute symptoms.      Bipolar 1 disorder, depressed (CMS-HCC) (present on admission)  Assessment & Plan  Continue  lamictal    Neuropathy (CMS-HCC) (present on admission)  Assessment & Plan  Neurontin    BPH (benign prostatic hypertrophy) (present on admission)  Assessment & Plan  Continue Flomax  Keep perineum area dry.    Depression (present on admission)  Assessment & Plan  Stable    Osteoarthritis (present on admission)  Assessment & Plan  Stable, prn analgesia  ca, vit d      Labs reviewed, Medications reviewed and Radiology images reviewed  Ruff catheter: No Ruff  Central line in place: Concentrated IV drugs    DVT Prophylaxis: Enoxaparin (Lovenox)      Antibiotics: Treating active infection/contamination beyond 24 hours perioperative coverage

## 2017-08-18 NOTE — DISCHARGE SUMMARY
Called [facility] and spoke with Jim Weir the admissions nurse is not available for report at the moment.  Left my name, patient's name and call back number.  Patient transported off unit at 1530 by [employer].

## 2017-08-24 ENCOUNTER — OFFICE VISIT (OUTPATIENT)
Dept: INFECTIOUS DISEASES | Facility: MEDICAL CENTER | Age: 77
End: 2017-08-24
Payer: MEDICARE

## 2017-08-24 VITALS
TEMPERATURE: 99.4 F | OXYGEN SATURATION: 94 % | SYSTOLIC BLOOD PRESSURE: 110 MMHG | DIASTOLIC BLOOD PRESSURE: 62 MMHG | HEART RATE: 97 BPM | HEIGHT: 68 IN

## 2017-08-24 DIAGNOSIS — K65.1 PELVIC ABSCESS IN MALE (HCC): ICD-10-CM

## 2017-08-24 PROCEDURE — 99214 OFFICE O/P EST MOD 30 MIN: CPT | Performed by: NURSE PRACTITIONER

## 2017-08-24 NOTE — MR AVS SNAPSHOT
"        Marcelo Colon   2017 10:30 AM   Office Visit   MRN: [id number]    Department:  [facility]   Dept Phone:  [phone]    Description:  Male : 1940   Provider:  DEVORA Zarate           Reason for Visit     Hospital Follow-up pelvic abscess       Allergies as of 2017     No Known Allergies      Vital Signs     Blood Pressure Pulse Temperature Height Oxygen Saturation       110/62 mmHg 97 37.4 °C (99.4 °F) 1.727 m (5' 8\") 94%     Smoking Status                   Former Smoker           Basic Information     Date Of Birth Sex Race Ethnicity Preferred Language    1940 Male White Non- English      Your appointments     Aug 28, 2017  9:15 AM   New Patient with Mora Aparicio M.D.   [facility] - Internal Medicine (--)    [address] [place] [address]  [address] [place] [id number] [phone]           Please bring Photo ID, Insurance Cards, All Medication Bottles and copies of any legal documents (such as Living Will, Power of ) If speaking a language besides English please bring an adult . Please arrive 30 minutes prior for check in and registration. You will be receiving a confirmation call a few days before your appointment from our automated call confirmation system.              Problem List              ICD-10-CM Priority Class Noted - Resolved    Pelvic abscess in male (CMS-[facility]) K65.1 High  2017 - Present    BPH (benign prostatic hypertrophy) N40.0 Low  2017 - Present    Depression F32.9 Low  2017 - Present    Osteoarthritis M19.90 Low  2017 - Present    Normocytic anemia D64.9   2017 - Present    Bipolar 1 disorder, depressed (CMS-HCC) (Chronic) F31.9   2017 - Present    Neuropathy (CMS-HCC) G62.9   2017 - Present      Health Maintenance        Date Due Completion Dates    IMM DTaP/Tdap/Td Vaccine (1 - Tdap) 10/25/1960 ---    COLONOSCOPY 10/25/1991 ---    IMM ZOSTER VACCINE 10/25/2001 " ---    IMM INFLUENZA (1) 9/1/2017 10/1/2016    IMM PNEUMOCOCCAL 65+ (ADULT) LOW/MEDIUM RISK SERIES (2 of 2 - PPSV23) 9/1/2017 9/1/2016            Current Immunizations     13-VALENT PCV PREVNAR 9/1/2016    Influenza Vaccine Adult HD 10/1/2016      Below and/or attached are the medications your provider expects you to take. Review all of your home medications and newly ordered medications with your provider and/or pharmacist. Follow medication instructions as directed by your provider and/or pharmacist. Please keep your medication list with you and share with your provider. Update the information when medications are discontinued, doses are changed, or new medications (including over-the-counter products) are added; and carry medication information at all times in the event of emergency situations     Allergies:  No Known Allergies          Medications  Valid as of: August 24, 2017 - 11:13 AM    Generic Name Brand Name Tablet Size Instructions for use    Alfuzosin HCl (TABLET SR 24 HR) UROXATRAL 10 MG Take 10 mg by mouth every day.        Amoxicillin-Pot Clavulanate (Tab) AUGMENTIN 875-125 MG Take 1 Tab by mouth every 12 hours.        Calcium Carb-Cholecalciferol (Tab) oyster shell calcium/vitamin D 250-125 MG-UNIT Take 1 Tab by mouth every day.        Fluconazole (Tab) DIFLUCAN 200 MG Take 1 Tab by mouth every day.        Gabapentin (Cap) NEURONTIN 100 MG Take 100 mg by mouth every bedtime. Pt may increase to BID and then to TID        LamoTRIgine (Tab) LAMICTAL 150 MG Take 150 mg by mouth every day.        Linezolid (Tab) ZYVOX 600 MG Take 1 Tab by mouth every 12 hours.        Meloxicam (Tab) MOBIC 15 MG Take 15 mg by mouth every day.        MetFORMIN HCl (Tab) GLUCOPHAGE 500 MG Take 500 mg by mouth every day.        NS SOLN 250 mL with vancomycin 10 GM SOLR 1,300 mg   1,300 mg by Intravenous route every 24 hours for 7 days.        Omeprazole (CAPSULE DELAYED RELEASE) PRILOSEC 20 MG Take 20 mg by mouth every day.           Oxybutynin Chloride (Tab) DITROPAN 5 MG Take 1 Tab by mouth 2 Times a Day.        OxyCODONE HCl (Tab) ROXICODONE 5 MG Take 1 Tab by mouth every four hours as needed.        Polyethylene Glycol 3350 (Pack) MIRALAX  Take 1 Packet by mouth every day.        Tamsulosin HCl (Cap) FLOMAX 0.4 MG Take 1 Cap by mouth ONE-HALF HOUR AFTER BREAKFAST.        TraMADol HCl (Tab) ULTRAM 50 MG Take  mg by mouth every four hours as needed.        .                 Medicines prescribed today were sent to:     [facility] - [place], [place] - [address]    [address] [facility] [address]    Phone: [phone] Fax: [phone]    Open 24 Hours?: No      Medication refill instructions:       If your prescription bottle indicates you have medication refills left, it is not necessary to call your provider’s office. Please contact your pharmacy and they will refill your medication.    If your prescription bottle indicates you do not have any refills left, you may request refills at any time through one of the following ways: The online [employer] system (except Urgent Care), by calling your provider’s office, or by asking your pharmacy to contact your provider’s office with a refill request. Medication refills are processed only during regular business hours and may not be available until the next business day. Your provider may request additional information or to have a follow-up visit with you prior to refilling your medication.   *Please Note: Medication refills are assigned a new Rx number when refilled electronically. Your pharmacy may indicate that no refills were authorized even though a new prescription for the same medication is available at the pharmacy. Please request the medicine by name with the pharmacy before contacting your provider for a refill.           [employer] Access Code: [email or web address]  Expires: 9/17/2017  3:16 PM    [employer]  A secure, online tool to manage your health information      [employer]’s [email or web address]® is a secure, online tool that connects you to your personalized health information from the privacy of your home -- day or night - making it very easy for you to manage your healthcare. Once the activation process is completed, you can even access your medical information using the [email or web address] ricardo, which is available for free in the Apple Ricardo store or Google Play store.     [email or web address] provides the following levels of access (as shown below):   My Chart Features   [facility] Doctor [facility]  Specialists [facility]  Urgent  Care Non-[facility]  Primary Care  Doctor   Email your healthcare team securely and privately 24/7 X X X    Manage appointments: schedule your next appointment; view details of past/upcoming appointments X      Request prescription refills. X      View recent personal medical records, including lab and immunizations X X X X   View health record, including health history, allergies, medications X X X X   Read reports about your outpatient visits, procedures, consult and ER notes X X X X   See your discharge summary, which is a recap of your hospital and/or ER visit that includes your diagnosis, lab results, and care plan. X X       How to register for [email or web address]:  1. Go to  [email or web address].  2. Click on the Sign Up Now box, which takes you to the New Member Sign Up page. You will need to provide the following information:  a. Enter your [email or web address] Access Code exactly as it appears at the top of this page. (You will not need to use this code after you’ve completed the sign-up process. If you do not sign up before the expiration date, you must request a new code.)   b. Enter your date of birth.   c. Enter your home email address.   d. Click Submit, and follow the next screen’s instructions.  3. Create a [email or web address] ID. This will be your [email or web address] login ID and cannot be changed, so think of one that is secure and easy to remember.  4. Create a [email or web address] password. You can change your  password at any time.  5. Enter your Password Reset Question and Answer. This can be used at a later time if you forget your password.   6. Enter your e-mail address. This allows you to receive e-mail notifications when new information is available in [employer].  7. Click Sign Up. You can now view your health information.    For assistance activating your [employer] account, call [phone]

## 2017-08-24 NOTE — PROGRESS NOTES
[facility]    Subjective:     Chief Complaint   Patient presents with   • Hospital Follow-up     pelvic abscess      This is my first time meeting Mr. Colon.  He is currently residing at [facility].    Interval History: 76 y.o. Male with a history of enlarged prostate, depression, neck pain and GERD.  Hospitalized from 7/14- 8/18/17, admitted for worsening groin pain over the past 4 days prior to admission.  Pt went to the urgent care prior to admit and was told he had a UTI, but his pain continued to get worse.  CT abd pelvis on 7/14 showed developing abscess in the right lower quadrant as well as large complex fluid collection in the central pelvis measuring 7.4 x 9.4 x 7.9 cm. There was minimal pneumoperitoneum consistent with bowel perforation. He was seen by Dr. Rodriguez who recommended that the pt undergo an IR drainage of the abscess. On 7/15 he underwent a CT guided catheter drainage with Dr. Mg, where 300 mls of beige colored thick malodorus pus was evacuated; fluid cx +E coli, E faecium, Strep Viridans & B fragilis.  On 7/19 he underwent another CT guided catheter drainage with Dr. Wei, fluid cx +E faecium & Candida albicans.  S/p exploratory laparotomy, abd washout, sigmoid colon resection, colostomy placement and appendectomy on 7/25 with Dr. Mills.  Peritoneal fluid on 7/25 +VRE & Candida albicans.  Repeat IR drainage on 8/5 with Dr. Miranda.  Final repeat CT abd pelvis on 8/17 showed pelvic abscess down to 1.0 x 1.7 cm.  Pt was being treated with PO Zyvox, which was stopped and changed to IV Vanco d/t leukopenia and thrombocytopenia.  Pt discharged to [facility] IV Vancomycin through 8/25/17.     Hospital records reviewed    Today, 8/24/2017: Patient reports feeling well and was tolerating the IV Vanco without adverse effect.  The IV Vanco was stopped on 8/22 d/t low WBC count by Dr. Herbert after [facility] called regarding the labs.   "Additionally, the PO Augmentin, PO Fluconzole and PO Zyvox were stopped.  Pt denies feeling generally ill, fevers/chills, general malaise, headache, n/v/d, abdominal pain, chest pain or shortness of breath.  He feels his energy level has improved.  He is working with PT to build up his strength.  He is not able to ambulate independently as of yet, but can ambulate with a walker.  Pt reports that he has \"no ephraim in [facility],\" and concerned about the idea of them touching his PICC line in any way.  Abd surgical site well healed per pt, he denies any drainage, openings, odor, redness, pain/tenderness or swelling.     ROS  As documented above in my HPI.    Past Medical History   Diagnosis Date   • Enlarged prostate    • Depression    • Neck pain    • GERD (gastroesophageal reflux disease)        Social History   Substance Use Topics   • Smoking status: Former Smoker -- 2.00 packs/day for 14 years   • Smokeless tobacco: None   • Alcohol Use: 0.0 oz/week     0 Standard drinks or equivalent per week       Allergies: Review of patient's allergies indicates no known allergies.    Pt's medication and problem list reviewed.     Objective:     PE:  /62 mmHg  Pulse 97  Temp(Src) 37.4 °C (99.4 °F)  Ht 1.727 m (5' 8\")  Wt   SpO2 94%    Vital signs reviewed    Constitutional: Appears well-developed. No acute distress.  Speech fluent.  Elderly, frail.   Eyes: Conjunctivae normal and EOM are normal. Pupils are equal, round, and reactive to light.   Neck: Trachea midline. Normal range of motion. No JVD.  Cardiovascular: Normal rate, regular rhythm, normal heart sounds. No murmur, gallop, or friction rub. No edema.  Respiratory: No respiratory distress, unlabored respiratory effort.  Lungs clear to auscultation bilaterally. No wheezes or rales.   Abdomen: Soft, non tender, non-distended. BS + x 4. No masses.   LQ Colostomy with soft brown stool in place.  Abd surgical site- well healed and approximated, no " openings or drainage.  L flank MAREK site- well healed and approximated.  Musculoskeletal: Wheelchair.  Normal range of motion.  No joint or bone tenderness, swelling, erythema or deformity.    LUE PICC- non tender, no erythema.  Skin: Warm and dry. No visible rashes or lesions.  Neurological: No cranial nerve deficit. Coordination normal.    Psychiatric: Alert and oriented to person, place, and time. Normal mood, calm affect.       Labs:  WBC   Date/Time Value Ref Range Status   08/18/2017 03:34 AM 2.6* 4.8 - 10.8 K/uL Final     RBC   Date/Time Value Ref Range Status   08/18/2017 03:34 AM 3.15* 4.70 - 6.10 M/uL Final     HEMOGLOBIN   Date/Time Value Ref Range Status   08/18/2017 03:34 AM 9.2* 14.0 - 18.0 g/dL Final     HEMATOCRIT   Date/Time Value Ref Range Status   08/18/2017 03:34 AM 28.3* 42.0 - 52.0 % Final     MCV   Date/Time Value Ref Range Status   08/18/2017 03:34 AM 89.8 81.4 - 97.8 fL Final     MCH   Date/Time Value Ref Range Status   08/18/2017 03:34 AM 29.2 27.0 - 33.0 pg Final     MCHC   Date/Time Value Ref Range Status   08/18/2017 03:34 AM 32.5* 33.7 - 35.3 g/dL Final     MPV   Date/Time Value Ref Range Status   08/18/2017 03:34 AM 8.8* 9.0 - 12.9 fL Final        SODIUM   Date/Time Value Ref Range Status   08/18/2017 03:34  135 - 145 mmol/L Final     POTASSIUM   Date/Time Value Ref Range Status   08/18/2017 03:34 AM 3.8 3.6 - 5.5 mmol/L Final     CHLORIDE   Date/Time Value Ref Range Status   08/18/2017 03:34  96 - 112 mmol/L Final     CO2   Date/Time Value Ref Range Status   08/18/2017 03:34 AM 31 20 - 33 mmol/L Final     GLUCOSE   Date/Time Value Ref Range Status   08/18/2017 03:34 * 65 - 99 mg/dL Final     BUN   Date/Time Value Ref Range Status   08/18/2017 03:34 AM 11 8 - 22 mg/dL Final     CREATININE   Date/Time Value Ref Range Status   08/18/2017 03:34 AM 0.66 0.50 - 1.40 mg/dL Final       ALKALINE PHOSPHATASE   Date/Time Value Ref Range Status   08/18/2017 03:34 AM 88 30 - 99 U/L  Final     AST(SGOT)   Date/Time Value Ref Range Status   08/18/2017 03:34 AM 30 12 - 45 U/L Final     ALT(SGPT)   Date/Time Value Ref Range Status   08/18/2017 03:34 AM 46 2 - 50 U/L Final     TOTAL BILIRUBIN   Date/Time Value Ref Range Status   08/18/2017 03:34 AM 0.6 0.1 - 1.5 mg/dL Final      Assessment and Plan:   The following treatment plan was discussed with patient at length:    1. Pelvic abscess in male (CMS-HCC)      -PICC pulled, tip intact, covered with gauze, directed to keep covered for 24 hours.  No PO abx to follow.    -Monitor for s/sx of recurrence of abscess, if suspicious then recommend repeat CT.    -FU with surgeon as directed.     Follow up: PRN, RTC if needed. FU with PCP for ongoing chronic medical conditions.     SELENA Zarate.    Case discussed with Dr. Herbert.       >25 min spent face to face with patient, >50% of time spent counseling, coordinating care, reviewing records, discussing POC, educating patient.

## 2017-12-18 ENCOUNTER — HOSPITAL ENCOUNTER (INPATIENT)
Facility: MEDICAL CENTER | Age: 77
LOS: 8 days | DRG: 872 | End: 2017-12-26
Attending: EMERGENCY MEDICINE | Admitting: HOSPITALIST
Payer: MEDICARE

## 2017-12-18 ENCOUNTER — RESOLUTE PROFESSIONAL BILLING HOSPITAL PROF FEE (OUTPATIENT)
Dept: HOSPITALIST | Facility: MEDICAL CENTER | Age: 77
End: 2017-12-18
Payer: MEDICARE

## 2017-12-18 DIAGNOSIS — K92.2 GASTROINTESTINAL HEMORRHAGE, UNSPECIFIED GASTROINTESTINAL HEMORRHAGE TYPE: ICD-10-CM

## 2017-12-18 DIAGNOSIS — E87.20 LACTIC ACIDOSIS: ICD-10-CM

## 2017-12-18 DIAGNOSIS — K65.1 PELVIC ABSCESS IN MALE (HCC): ICD-10-CM

## 2017-12-18 LAB
ALBUMIN SERPL BCP-MCNC: 3.6 G/DL (ref 3.2–4.9)
ALBUMIN/GLOB SERPL: 1.2 G/DL
ALP SERPL-CCNC: 98 U/L (ref 30–99)
ALT SERPL-CCNC: 6 U/L (ref 2–50)
ANION GAP SERPL CALC-SCNC: 9 MMOL/L (ref 0–11.9)
AST SERPL-CCNC: 9 U/L (ref 12–45)
BASOPHILS # BLD AUTO: 0.3 % (ref 0–1.8)
BASOPHILS # BLD: 0.04 K/UL (ref 0–0.12)
BILIRUB SERPL-MCNC: 1.2 MG/DL (ref 0.1–1.5)
BLOOD CULTURE HOLD CXBCH: NORMAL
BUN SERPL-MCNC: 21 MG/DL (ref 8–22)
C DIFF DNA SPEC QL NAA+PROBE: NEGATIVE
C DIFF TOX A+B STL QL IA: POSITIVE
C DIFF TOX GENS STL QL NAA+PROBE: NORMAL
CALCIUM SERPL-MCNC: 9.6 MG/DL (ref 8.5–10.5)
CHLORIDE SERPL-SCNC: 95 MMOL/L (ref 96–112)
CO2 SERPL-SCNC: 27 MMOL/L (ref 20–33)
CREAT SERPL-MCNC: 1.01 MG/DL (ref 0.5–1.4)
EOSINOPHIL # BLD AUTO: 0 K/UL (ref 0–0.51)
EOSINOPHIL NFR BLD: 0 % (ref 0–6.9)
ERYTHROCYTE [DISTWIDTH] IN BLOOD BY AUTOMATED COUNT: 49.6 FL (ref 35.9–50)
GFR SERPL CREATININE-BSD FRML MDRD: >60 ML/MIN/1.73 M 2
GLOBULIN SER CALC-MCNC: 3 G/DL (ref 1.9–3.5)
GLUCOSE SERPL-MCNC: 128 MG/DL (ref 65–99)
HCT VFR BLD AUTO: 38.2 % (ref 42–52)
HGB BLD-MCNC: 12.4 G/DL (ref 14–18)
IMM GRANULOCYTES # BLD AUTO: 0.09 K/UL (ref 0–0.11)
IMM GRANULOCYTES NFR BLD AUTO: 0.6 % (ref 0–0.9)
LACTATE BLD-SCNC: 2.9 MMOL/L (ref 0.5–2)
LYMPHOCYTES # BLD AUTO: 0.64 K/UL (ref 1–4.8)
LYMPHOCYTES NFR BLD: 4.2 % (ref 22–41)
MCH RBC QN AUTO: 27.1 PG (ref 27–33)
MCHC RBC AUTO-ENTMCNC: 32.5 G/DL (ref 33.7–35.3)
MCV RBC AUTO: 83.4 FL (ref 81.4–97.8)
MONOCYTES # BLD AUTO: 1.12 K/UL (ref 0–0.85)
MONOCYTES NFR BLD AUTO: 7.4 % (ref 0–13.4)
NEUTROPHILS # BLD AUTO: 13.19 K/UL (ref 1.82–7.42)
NEUTROPHILS NFR BLD: 87.5 % (ref 44–72)
NRBC # BLD AUTO: 0 K/UL
NRBC BLD-RTO: 0 /100 WBC
PLATELET # BLD AUTO: 267 K/UL (ref 164–446)
PMV BLD AUTO: 8.3 FL (ref 9–12.9)
POTASSIUM SERPL-SCNC: 4.4 MMOL/L (ref 3.6–5.5)
PROT SERPL-MCNC: 6.6 G/DL (ref 6–8.2)
RBC # BLD AUTO: 4.58 M/UL (ref 4.7–6.1)
SODIUM SERPL-SCNC: 131 MMOL/L (ref 135–145)
WBC # BLD AUTO: 15.1 K/UL (ref 4.8–10.8)

## 2017-12-18 PROCEDURE — 80053 COMPREHEN METABOLIC PANEL: CPT

## 2017-12-18 PROCEDURE — 302106 OSTOMY POWDER

## 2017-12-18 PROCEDURE — 96375 TX/PRO/DX INJ NEW DRUG ADDON: CPT

## 2017-12-18 PROCEDURE — 700105 HCHG RX REV CODE 258: Performed by: HOSPITALIST

## 2017-12-18 PROCEDURE — A9270 NON-COVERED ITEM OR SERVICE: HCPCS | Performed by: HOSPITALIST

## 2017-12-18 PROCEDURE — C9113 INJ PANTOPRAZOLE SODIUM, VIA: HCPCS | Performed by: EMERGENCY MEDICINE

## 2017-12-18 PROCEDURE — 99223 1ST HOSP IP/OBS HIGH 75: CPT | Performed by: HOSPITALIST

## 2017-12-18 PROCEDURE — 83605 ASSAY OF LACTIC ACID: CPT

## 2017-12-18 PROCEDURE — 700105 HCHG RX REV CODE 258: Performed by: EMERGENCY MEDICINE

## 2017-12-18 PROCEDURE — 96361 HYDRATE IV INFUSION ADD-ON: CPT

## 2017-12-18 PROCEDURE — 700102 HCHG RX REV CODE 250 W/ 637 OVERRIDE(OP): Performed by: HOSPITALIST

## 2017-12-18 PROCEDURE — 99285 EMERGENCY DEPT VISIT HI MDM: CPT

## 2017-12-18 PROCEDURE — 96366 THER/PROPH/DIAG IV INF ADDON: CPT

## 2017-12-18 PROCEDURE — 85025 COMPLETE CBC W/AUTO DIFF WBC: CPT

## 2017-12-18 PROCEDURE — 87324 CLOSTRIDIUM AG IA: CPT

## 2017-12-18 PROCEDURE — 96365 THER/PROPH/DIAG IV INF INIT: CPT

## 2017-12-18 PROCEDURE — 700111 HCHG RX REV CODE 636 W/ 250 OVERRIDE (IP): Performed by: EMERGENCY MEDICINE

## 2017-12-18 PROCEDURE — 770020 HCHG ROOM/CARE - TELE (206)

## 2017-12-18 PROCEDURE — 87493 C DIFF AMPLIFIED PROBE: CPT

## 2017-12-18 PROCEDURE — 302106 OSTOMY POWDER: Performed by: EMERGENCY MEDICINE

## 2017-12-18 RX ORDER — ONDANSETRON 2 MG/ML
4 INJECTION INTRAMUSCULAR; INTRAVENOUS EVERY 4 HOURS PRN
Status: DISCONTINUED | OUTPATIENT
Start: 2017-12-18 | End: 2017-12-26 | Stop reason: HOSPADM

## 2017-12-18 RX ORDER — BISACODYL 10 MG
10 SUPPOSITORY, RECTAL RECTAL
Status: DISCONTINUED | OUTPATIENT
Start: 2017-12-18 | End: 2017-12-26 | Stop reason: HOSPADM

## 2017-12-18 RX ORDER — SODIUM CHLORIDE 9 MG/ML
1000 INJECTION, SOLUTION INTRAVENOUS ONCE
Status: COMPLETED | OUTPATIENT
Start: 2017-12-18 | End: 2017-12-18

## 2017-12-18 RX ORDER — PANTOPRAZOLE SODIUM 40 MG/10ML
40 INJECTION, POWDER, LYOPHILIZED, FOR SOLUTION INTRAVENOUS ONCE
Status: COMPLETED | OUTPATIENT
Start: 2017-12-18 | End: 2017-12-18

## 2017-12-18 RX ORDER — SODIUM CHLORIDE 9 MG/ML
INJECTION, SOLUTION INTRAVENOUS CONTINUOUS
Status: DISCONTINUED | OUTPATIENT
Start: 2017-12-18 | End: 2017-12-23

## 2017-12-18 RX ORDER — ONDANSETRON 4 MG/1
4 TABLET, ORALLY DISINTEGRATING ORAL EVERY 4 HOURS PRN
Status: DISCONTINUED | OUTPATIENT
Start: 2017-12-18 | End: 2017-12-26 | Stop reason: HOSPADM

## 2017-12-18 RX ORDER — LAMOTRIGINE 100 MG/1
150 TABLET ORAL DAILY
Status: DISCONTINUED | OUTPATIENT
Start: 2017-12-19 | End: 2017-12-19

## 2017-12-18 RX ORDER — POTASSIUM CHLORIDE 20 MEQ/1
20 TABLET, EXTENDED RELEASE ORAL DAILY
COMMUNITY
End: 2019-08-06

## 2017-12-18 RX ORDER — ACETAMINOPHEN 325 MG/1
650 TABLET ORAL EVERY 6 HOURS PRN
Status: DISCONTINUED | OUTPATIENT
Start: 2017-12-18 | End: 2017-12-26 | Stop reason: HOSPADM

## 2017-12-18 RX ORDER — POLYETHYLENE GLYCOL 3350 17 G/17G
1 POWDER, FOR SOLUTION ORAL
Status: DISCONTINUED | OUTPATIENT
Start: 2017-12-18 | End: 2017-12-26 | Stop reason: HOSPADM

## 2017-12-18 RX ORDER — DEXTROSE MONOHYDRATE 25 G/50ML
25 INJECTION, SOLUTION INTRAVENOUS
Status: DISCONTINUED | OUTPATIENT
Start: 2017-12-18 | End: 2017-12-20

## 2017-12-18 RX ORDER — AMOXICILLIN 250 MG
2 CAPSULE ORAL 2 TIMES DAILY
Status: DISCONTINUED | OUTPATIENT
Start: 2017-12-18 | End: 2017-12-26 | Stop reason: HOSPADM

## 2017-12-18 RX ADMIN — PANTOPRAZOLE SODIUM 40 MG: 40 INJECTION, POWDER, FOR SOLUTION INTRAVENOUS at 18:46

## 2017-12-18 RX ADMIN — SODIUM CHLORIDE 1000 ML: 9 INJECTION, SOLUTION INTRAVENOUS at 17:12

## 2017-12-18 RX ADMIN — SODIUM CHLORIDE 8 MG/HR: 9 INJECTION, SOLUTION INTRAVENOUS at 18:48

## 2017-12-18 RX ADMIN — ACETAMINOPHEN 650 MG: 325 TABLET, FILM COATED ORAL at 22:19

## 2017-12-18 RX ADMIN — SODIUM CHLORIDE: 9 INJECTION, SOLUTION INTRAVENOUS at 22:21

## 2017-12-18 ASSESSMENT — COPD QUESTIONNAIRES
HAVE YOU SMOKED AT LEAST 100 CIGARETTES IN YOUR ENTIRE LIFE: YES
DURING THE PAST 4 WEEKS HOW MUCH DID YOU FEEL SHORT OF BREATH: NONE/LITTLE OF THE TIME
COPD SCREENING SCORE: 5
DO YOU EVER COUGH UP ANY MUCUS OR PHLEGM?: NO/ONLY WITH OCCASIONAL COLDS OR INFECTIONS

## 2017-12-18 ASSESSMENT — LIFESTYLE VARIABLES: EVER_SMOKED: YES

## 2017-12-18 ASSESSMENT — PAIN SCALES - GENERAL: PAINLEVEL_OUTOF10: 0

## 2017-12-19 PROBLEM — A04.72 C. DIFFICILE DIARRHEA: Status: ACTIVE | Noted: 2017-12-19

## 2017-12-19 PROBLEM — E83.42 HYPOMAGNESEMIA: Status: ACTIVE | Noted: 2017-12-19

## 2017-12-19 PROBLEM — E11.9 TYPE 2 DIABETES MELLITUS (HCC): Status: ACTIVE | Noted: 2017-12-19

## 2017-12-19 LAB
ANION GAP SERPL CALC-SCNC: 7 MMOL/L (ref 0–11.9)
APTT PPP: 35.4 SEC (ref 24.7–36)
BASOPHILS # BLD AUTO: 0 % (ref 0–1.8)
BASOPHILS # BLD: 0 K/UL (ref 0–0.12)
BUN SERPL-MCNC: 18 MG/DL (ref 8–22)
CALCIUM SERPL-MCNC: 7.9 MG/DL (ref 8.5–10.5)
CHLORIDE SERPL-SCNC: 105 MMOL/L (ref 96–112)
CHOLEST SERPL-MCNC: 85 MG/DL (ref 100–199)
CO2 SERPL-SCNC: 23 MMOL/L (ref 20–33)
CREAT SERPL-MCNC: 0.74 MG/DL (ref 0.5–1.4)
EOSINOPHIL # BLD AUTO: 0 K/UL (ref 0–0.51)
EOSINOPHIL NFR BLD: 0 % (ref 0–6.9)
ERYTHROCYTE [DISTWIDTH] IN BLOOD BY AUTOMATED COUNT: 51.6 FL (ref 35.9–50)
GFR SERPL CREATININE-BSD FRML MDRD: >60 ML/MIN/1.73 M 2
GLUCOSE BLD-MCNC: 119 MG/DL (ref 65–99)
GLUCOSE BLD-MCNC: 123 MG/DL (ref 65–99)
GLUCOSE BLD-MCNC: 137 MG/DL (ref 65–99)
GLUCOSE SERPL-MCNC: 109 MG/DL (ref 65–99)
HCT VFR BLD AUTO: 30.1 % (ref 42–52)
HDLC SERPL-MCNC: 16 MG/DL
HGB BLD-MCNC: 10.6 G/DL (ref 14–18)
HGB BLD-MCNC: 9.8 G/DL (ref 14–18)
HGB BLD-MCNC: 9.9 G/DL (ref 14–18)
INR PPP: 1.33 (ref 0.87–1.13)
LACTATE BLD-SCNC: 2.2 MMOL/L (ref 0.5–2)
LDLC SERPL CALC-MCNC: 54 MG/DL
LYMPHOCYTES # BLD AUTO: 0.8 K/UL (ref 1–4.8)
LYMPHOCYTES NFR BLD: 6.2 % (ref 22–41)
MAGNESIUM SERPL-MCNC: 1.6 MG/DL (ref 1.5–2.5)
MANUAL DIFF BLD: NORMAL
MCH RBC QN AUTO: 27.4 PG (ref 27–33)
MCHC RBC AUTO-ENTMCNC: 32.9 G/DL (ref 33.7–35.3)
MCV RBC AUTO: 83.4 FL (ref 81.4–97.8)
MONOCYTES # BLD AUTO: 1.03 K/UL (ref 0–0.85)
MONOCYTES NFR BLD AUTO: 8 % (ref 0–13.4)
MORPHOLOGY BLD-IMP: NORMAL
NEUTROPHILS # BLD AUTO: 11.07 K/UL (ref 1.82–7.42)
NEUTROPHILS NFR BLD: 76.1 % (ref 44–72)
NEUTS BAND NFR BLD MANUAL: 9.7 % (ref 0–10)
NRBC # BLD AUTO: 0 K/UL
NRBC BLD-RTO: 0 /100 WBC
PLATELET # BLD AUTO: 251 K/UL (ref 164–446)
PLATELET BLD QL SMEAR: NORMAL
PMV BLD AUTO: 8.3 FL (ref 9–12.9)
POTASSIUM SERPL-SCNC: 3.9 MMOL/L (ref 3.6–5.5)
PROTHROMBIN TIME: 16.2 SEC (ref 12–14.6)
RBC # BLD AUTO: 3.61 M/UL (ref 4.7–6.1)
RBC BLD AUTO: NORMAL
SODIUM SERPL-SCNC: 135 MMOL/L (ref 135–145)
TRIGL SERPL-MCNC: 77 MG/DL (ref 0–149)
TSH SERPL DL<=0.005 MIU/L-ACNC: 0.49 UIU/ML (ref 0.38–5.33)
WBC # BLD AUTO: 12.9 K/UL (ref 4.8–10.8)

## 2017-12-19 PROCEDURE — 85027 COMPLETE CBC AUTOMATED: CPT

## 2017-12-19 PROCEDURE — 83735 ASSAY OF MAGNESIUM: CPT

## 2017-12-19 PROCEDURE — 85730 THROMBOPLASTIN TIME PARTIAL: CPT

## 2017-12-19 PROCEDURE — 700102 HCHG RX REV CODE 250 W/ 637 OVERRIDE(OP): Performed by: HOSPITALIST

## 2017-12-19 PROCEDURE — A9270 NON-COVERED ITEM OR SERVICE: HCPCS | Performed by: HOSPITALIST

## 2017-12-19 PROCEDURE — 770020 HCHG ROOM/CARE - TELE (206)

## 2017-12-19 PROCEDURE — 85610 PROTHROMBIN TIME: CPT

## 2017-12-19 PROCEDURE — 36415 COLL VENOUS BLD VENIPUNCTURE: CPT

## 2017-12-19 PROCEDURE — 80048 BASIC METABOLIC PNL TOTAL CA: CPT

## 2017-12-19 PROCEDURE — 96375 TX/PRO/DX INJ NEW DRUG ADDON: CPT

## 2017-12-19 PROCEDURE — 700111 HCHG RX REV CODE 636 W/ 250 OVERRIDE (IP): Performed by: HOSPITALIST

## 2017-12-19 PROCEDURE — 85007 BL SMEAR W/DIFF WBC COUNT: CPT

## 2017-12-19 PROCEDURE — 83605 ASSAY OF LACTIC ACID: CPT

## 2017-12-19 PROCEDURE — 80061 LIPID PANEL: CPT

## 2017-12-19 PROCEDURE — 700101 HCHG RX REV CODE 250: Performed by: HOSPITALIST

## 2017-12-19 PROCEDURE — 96367 TX/PROPH/DG ADDL SEQ IV INF: CPT

## 2017-12-19 PROCEDURE — 700105 HCHG RX REV CODE 258: Performed by: HOSPITALIST

## 2017-12-19 PROCEDURE — C9113 INJ PANTOPRAZOLE SODIUM, VIA: HCPCS | Performed by: HOSPITALIST

## 2017-12-19 PROCEDURE — 96366 THER/PROPH/DIAG IV INF ADDON: CPT

## 2017-12-19 PROCEDURE — 99233 SBSQ HOSP IP/OBS HIGH 50: CPT | Performed by: HOSPITALIST

## 2017-12-19 PROCEDURE — 82962 GLUCOSE BLOOD TEST: CPT | Mod: 91

## 2017-12-19 PROCEDURE — 96368 THER/DIAG CONCURRENT INF: CPT

## 2017-12-19 PROCEDURE — 85018 HEMOGLOBIN: CPT | Mod: 91

## 2017-12-19 PROCEDURE — 84443 ASSAY THYROID STIM HORMONE: CPT

## 2017-12-19 RX ORDER — GABAPENTIN 300 MG/1
300 CAPSULE ORAL 3 TIMES DAILY
Status: DISCONTINUED | OUTPATIENT
Start: 2017-12-19 | End: 2017-12-26 | Stop reason: HOSPADM

## 2017-12-19 RX ORDER — OMEPRAZOLE 20 MG/1
20 CAPSULE, DELAYED RELEASE ORAL DAILY
Status: DISCONTINUED | OUTPATIENT
Start: 2017-12-19 | End: 2017-12-25

## 2017-12-19 RX ORDER — SODIUM CHLORIDE 9 MG/ML
500 INJECTION, SOLUTION INTRAVENOUS
Status: DISCONTINUED | OUTPATIENT
Start: 2017-12-19 | End: 2017-12-26 | Stop reason: HOSPADM

## 2017-12-19 RX ORDER — METRONIDAZOLE 500 MG/1
500 TABLET ORAL EVERY 8 HOURS
Status: DISCONTINUED | OUTPATIENT
Start: 2017-12-19 | End: 2017-12-26 | Stop reason: HOSPADM

## 2017-12-19 RX ORDER — GABAPENTIN 300 MG/1
400 CAPSULE ORAL 3 TIMES DAILY
Status: ON HOLD | COMMUNITY
End: 2018-09-20 | Stop reason: CLARIF

## 2017-12-19 RX ORDER — TAMSULOSIN HYDROCHLORIDE 0.4 MG/1
0.4 CAPSULE ORAL
Status: DISCONTINUED | OUTPATIENT
Start: 2017-12-19 | End: 2017-12-26 | Stop reason: HOSPADM

## 2017-12-19 RX ORDER — LAMOTRIGINE 100 MG/1
200 TABLET ORAL DAILY
Status: DISCONTINUED | OUTPATIENT
Start: 2017-12-20 | End: 2017-12-26 | Stop reason: HOSPADM

## 2017-12-19 RX ORDER — LAMOTRIGINE 200 MG/1
200 TABLET ORAL DAILY
COMMUNITY
End: 2022-01-25 | Stop reason: SDUPTHER

## 2017-12-19 RX ORDER — MAGNESIUM SULFATE HEPTAHYDRATE 40 MG/ML
4 INJECTION, SOLUTION INTRAVENOUS ONCE
Status: COMPLETED | OUTPATIENT
Start: 2017-12-19 | End: 2017-12-19

## 2017-12-19 RX ORDER — SODIUM CHLORIDE 9 MG/ML
30 INJECTION, SOLUTION INTRAVENOUS
Status: DISCONTINUED | OUTPATIENT
Start: 2017-12-19 | End: 2017-12-26 | Stop reason: HOSPADM

## 2017-12-19 RX ADMIN — GABAPENTIN 300 MG: 300 CAPSULE ORAL at 14:48

## 2017-12-19 RX ADMIN — TAMSULOSIN HYDROCHLORIDE 0.4 MG: 0.4 CAPSULE ORAL at 14:48

## 2017-12-19 RX ADMIN — LAMOTRIGINE 150 MG: 100 TABLET ORAL at 09:16

## 2017-12-19 RX ADMIN — SODIUM CHLORIDE: 9 INJECTION, SOLUTION INTRAVENOUS at 18:26

## 2017-12-19 RX ADMIN — OMEPRAZOLE 20 MG: 20 CAPSULE, DELAYED RELEASE ORAL at 14:48

## 2017-12-19 RX ADMIN — SODIUM CHLORIDE: 9 INJECTION, SOLUTION INTRAVENOUS at 12:15

## 2017-12-19 RX ADMIN — METRONIDAZOLE 500 MG: 500 INJECTION, SOLUTION INTRAVENOUS at 09:18

## 2017-12-19 RX ADMIN — ONDANSETRON 4 MG: 2 INJECTION INTRAMUSCULAR; INTRAVENOUS at 12:34

## 2017-12-19 RX ADMIN — METRONIDAZOLE 500 MG: 500 TABLET ORAL at 21:30

## 2017-12-19 RX ADMIN — MAGNESIUM SULFATE IN WATER 4 G: 40 INJECTION, SOLUTION INTRAVENOUS at 15:28

## 2017-12-19 RX ADMIN — METRONIDAZOLE 500 MG: 500 INJECTION, SOLUTION INTRAVENOUS at 02:19

## 2017-12-19 RX ADMIN — GABAPENTIN 300 MG: 300 CAPSULE ORAL at 21:30

## 2017-12-19 RX ADMIN — SODIUM CHLORIDE 8 MG/HR: 9 INJECTION, SOLUTION INTRAVENOUS at 05:48

## 2017-12-19 RX ADMIN — METRONIDAZOLE 500 MG: 500 TABLET ORAL at 14:49

## 2017-12-19 ASSESSMENT — COGNITIVE AND FUNCTIONAL STATUS - GENERAL
CLIMB 3 TO 5 STEPS WITH RAILING: A LITTLE
SUGGESTED CMS G CODE MODIFIER DAILY ACTIVITY: CK
TOILETING: A LITTLE
CLIMB 3 TO 5 STEPS WITH RAILING: A LITTLE
MOBILITY SCORE: 18
SUGGESTED CMS G CODE MODIFIER DAILY ACTIVITY: CK
STANDING UP FROM CHAIR USING ARMS: A LITTLE
MOVING FROM LYING ON BACK TO SITTING ON SIDE OF FLAT BED: A LITTLE
DAILY ACTIVITIY SCORE: 19
MOVING TO AND FROM BED TO CHAIR: A LITTLE
WALKING IN HOSPITAL ROOM: A LITTLE
PERSONAL GROOMING: A LITTLE
SUGGESTED CMS G CODE MODIFIER MOBILITY: CK
STANDING UP FROM CHAIR USING ARMS: A LITTLE
TURNING FROM BACK TO SIDE WHILE IN FLAT BAD: A LITTLE
TURNING FROM BACK TO SIDE WHILE IN FLAT BAD: A LITTLE
SUGGESTED CMS G CODE MODIFIER MOBILITY: CK
HELP NEEDED FOR BATHING: A LITTLE
HELP NEEDED FOR BATHING: A LITTLE
DRESSING REGULAR LOWER BODY CLOTHING: A LITTLE
MOVING FROM LYING ON BACK TO SITTING ON SIDE OF FLAT BED: A LITTLE
TOILETING: A LITTLE
MOVING TO AND FROM BED TO CHAIR: A LITTLE
DAILY ACTIVITIY SCORE: 19
DRESSING REGULAR UPPER BODY CLOTHING: A LITTLE
DRESSING REGULAR UPPER BODY CLOTHING: A LITTLE
MOBILITY SCORE: 18
PERSONAL GROOMING: A LITTLE
DRESSING REGULAR LOWER BODY CLOTHING: A LITTLE
WALKING IN HOSPITAL ROOM: A LITTLE

## 2017-12-19 ASSESSMENT — LIFESTYLE VARIABLES
HAVE YOU EVER FELT YOU SHOULD CUT DOWN ON YOUR DRINKING: NO
EVER FELT BAD OR GUILTY ABOUT YOUR DRINKING: NO
ON A TYPICAL DAY WHEN YOU DRINK ALCOHOL HOW MANY DRINKS DO YOU HAVE: 2
TOTAL SCORE: 0
TOTAL SCORE: 0
EVER HAD A DRINK FIRST THING IN THE MORNING TO STEADY YOUR NERVES TO GET RID OF A HANGOVER: NO
CONSUMPTION TOTAL: NEGATIVE
ALCOHOL_USE: YES
HOW MANY TIMES IN THE PAST YEAR HAVE YOU HAD 5 OR MORE DRINKS IN A DAY: 0
TOTAL SCORE: 0
HAVE PEOPLE ANNOYED YOU BY CRITICIZING YOUR DRINKING: NO
EVER_SMOKED: YES
AVERAGE NUMBER OF DAYS PER WEEK YOU HAVE A DRINK CONTAINING ALCOHOL: 7

## 2017-12-19 ASSESSMENT — PAIN SCALES - GENERAL
PAINLEVEL_OUTOF10: 6
PAINLEVEL_OUTOF10: 0
PAINLEVEL_OUTOF10: 0
PAINLEVEL_OUTOF10: 6
PAINLEVEL_OUTOF10: 6
PAINLEVEL_OUTOF10: 0

## 2017-12-19 ASSESSMENT — ENCOUNTER SYMPTOMS
COUGH: 0
BLOOD IN STOOL: 1
NAUSEA: 0
DIARRHEA: 1
BLURRED VISION: 0
NECK PAIN: 0
CLAUDICATION: 0
CHILLS: 0
VOMITING: 0
HEARTBURN: 0
PND: 0
HEADACHES: 0
DEPRESSION: 0
ROS GI COMMENTS: AS PER HPI.
MYALGIAS: 0
ABDOMINAL PAIN: 1
FEVER: 0
WEAKNESS: 0
SPUTUM PRODUCTION: 0
DIZZINESS: 0
DOUBLE VISION: 0
BRUISES/BLEEDS EASILY: 0
SHORTNESS OF BREATH: 0
PALPITATIONS: 0
TINGLING: 0

## 2017-12-19 ASSESSMENT — PATIENT HEALTH QUESTIONNAIRE - PHQ9
4. FEELING TIRED OR HAVING LITTLE ENERGY: NOT AT ALL
7. TROUBLE CONCENTRATING ON THINGS, SUCH AS READING THE NEWSPAPER OR WATCHING TELEVISION: NOT AT ALL
SUM OF ALL RESPONSES TO PHQ QUESTIONS 1-9: 3
6. FEELING BAD ABOUT YOURSELF - OR THAT YOU ARE A FAILURE OR HAVE LET YOURSELF OR YOUR FAMILY DOWN: SEVERAL DAYS
SUM OF ALL RESPONSES TO PHQ9 QUESTIONS 1 AND 2: 1
8. MOVING OR SPEAKING SO SLOWLY THAT OTHER PEOPLE COULD HAVE NOTICED. OR THE OPPOSITE, BEING SO FIGETY OR RESTLESS THAT YOU HAVE BEEN MOVING AROUND A LOT MORE THAN USUAL: NOT AT ALL
2. FEELING DOWN, DEPRESSED, IRRITABLE, OR HOPELESS: NOT AT ALL
1. LITTLE INTEREST OR PLEASURE IN DOING THINGS: SEVERAL DAYS
5. POOR APPETITE OR OVEREATING: SEVERAL DAYS
3. TROUBLE FALLING OR STAYING ASLEEP OR SLEEPING TOO MUCH: NOT AT ALL
9. THOUGHTS THAT YOU WOULD BE BETTER OFF DEAD, OR OF HURTING YOURSELF: NOT AT ALL

## 2017-12-19 NOTE — CONSULTS
DATE OF SERVICE:  12/19/2017    REASON FOR CONSULTATION:  Diarrhea and black stools.    HISTORY OF PRESENT ILLNESS:  Patient is a 77-year-old male who presents to the   emergency department 12/18/2017 with dark stools from his colostomy.  Patient   reports diarrhea over the past week.  Incidentally, patient was treated for a   periappendiceal abscess and was in the hospital for approximately 1 month.    Patient ended up getting a colostomy bag with plans to reverse in the future.    Patient has no history of GI bleed.  He was recently in the hospital 1 month   ago for a back procedure.  Patient denies chest pain or shortness of breath.    Patient denies nausea, vomiting.  Patient denies any current abdominal pain,   hematemesis, hematochezia.  Patient has no rectal output other than small   amount of mucus intermittently; otherwise, all of his stool goes through his   colostomy bag.    PAST MEDICAL HISTORY:  BPH, diabetes, depression, GERD, chronic neck and back   pain, status post appendectomy with intraabdominal abscess.    MEDICATIONS:  Ultram, Prilosec, Mobic, Neurontin, Lamictal, Glucophage,   Flomax.    ALLERGIES:  No known medical allergies.    SOCIAL HISTORY:  Patient admits to history of occasional alcohol with a   significant history of tobacco use, 2 packs daily.    FAMILY HISTORY:  Noncontributory.    PHYSICAL EXAMINATION:  GENERAL:  No acute distress, alert, awake, and oriented x3.  VITAL SIGNS:  Heart rate 97, respiratory rate 16, blood pressure 110/56, pulse   ox 92% on room air.  HEENT:  PERRLA.  Extraocular movements intact.  Sclerae are anicteric.  HEART:  Regular rate and rhythm.  S1, S2.  No murmurs auscultated.  LUNGS:  Clear to auscultation bilaterally.  ABDOMEN:  Soft, nontender, no guarding or rebound.  Positive colostomy bag in   place, clean, dry and intact.  Dark output in the bag.  EXTREMITIES:  No edema noted bilateral lower extremities.  NEUROLOGIC:  Grossly intact.  PSYCHIATRIC:   Affect is normal.    LABORATORY DATA:  White blood cell count 15.1, hemoglobin 12.4.  Sodium 135,   potassium 3.9, chloride 105, CO2 of 23, BUN 18, creatinine 0.74.  INR 1.06.    C. diff positive.    ASSESSMENT AND PLAN:  1.  Diarrhea, acute, likely secondary to acute C. diff.  Recommend treatment   initially with metronidazole 500 mg t.i.d.  This is the patient's first   occurrence of C. diff.  Per patient, he has never had a history of this.    Avoid any broad spectrum antibiotics, PPI.  2.  Leukocytosis, likely secondary to #1.  3.  Black stools, no obvious significant anemia.  Patient's blood count is   higher than it was with any check in the last 4 months.  We will follow along   for now, although was heme positive, patient does have positive for C. diff,   recommend treating for C. diff and then consideration of possible scope if   this does not resolve.  4.  Colostomy.  Plan is to reverse.  Patient will need to be treated for C.   diff at this time and follow up with surgeon.  Please call with any questions   or concerns.       ____________________________________     Colby Cordova DO    RS / CITLALY    DD:  12/19/2017 06:33:21  DT:  12/19/2017 07:31:07    D#:  [id number]  Job#:  [id number]

## 2017-12-19 NOTE — ED NOTES
Med rec updated and complete.  Allergies reviewed.  Dicussed current medications with friend-caregiver and pt at bedside.  No antibiotic use in last 30 days.  Friend/caregiver arrives with a list of medications but no strengths   Verified strengths with friend at bedside.

## 2017-12-19 NOTE — PROGRESS NOTES
[facility]    Date of Service: 2017    Chief Complaint  77 y.o. male admitted 2017 with dark stool coming out of colostomy    Interval Problem Update  Was feeling nauseated, medicated for that just now; no other complaints.    Consultants/Specialty  Dr. Cordova, GI    Disposition  Still awaiting floor bed, needs isolation  D/W Dr. Chandler    FULL CODE      Review of Systems   Constitutional: Negative for chills and fever.   Respiratory: Negative for cough and shortness of breath.    Cardiovascular: Negative for chest pain and palpitations.   Gastrointestinal: Positive for abdominal pain (\"sore\"), blood in stool and diarrhea. Negative for nausea and vomiting.   Genitourinary: Negative for dysuria.   Musculoskeletal: Negative for myalgias.   Neurological: Negative for dizziness and headaches.      Physical Exam  Laboratory/Imaging   Hemodynamics  Temp (24hrs), Av.1 °C (100.5 °F), Min:37.6 °C (99.7 °F), Max:38.6 °C (101.5 °F)   Temperature: 37.6 °C (99.7 °F)  Pulse  Av.9  Min: 65  Max: 106 Heart Rate (Monitored): (!) 106  Blood Pressure : 114/47, NIBP: 111/53      Respiratory      Respiration: 16, Pulse Oximetry: 91 %, O2 Daily Delivery Respiratory : Silicone Nasal Cannula     Work Of Breathing / Effort: Mild  RUL Breath Sounds: Clear, RML Breath Sounds: Diminished, RLL Breath Sounds: Diminished, TOSHIA Breath Sounds: Clear, LLL Breath Sounds: Diminished    Fluids  No intake or output data in the 24 hours ending 17 1342    Nutrition  Orders Placed This Encounter   Procedures   • Diet Order     Standing Status:   Standing     Number of Occurrences:   1     Order Specific Question:   Diet:     Answer:   Clear Liquid [10]     Physical Exam   Constitutional: He is oriented to person, place, and time. He appears well-developed and well-nourished.   Very thin   HENT:   Head: Normocephalic and atraumatic.   Cardiovascular: Regular rhythm and normal heart sounds.    No murmur " heard.  90s-100s   Pulmonary/Chest: Effort normal and breath sounds normal. No respiratory distress. He has no wheezes. He has no rales.   Abdominal: Soft. Bowel sounds are normal. He exhibits no distension. There is tenderness (mild ttp).   Colostomy draining brown liquid stool, no obvious blood   Musculoskeletal: Normal range of motion. He exhibits no edema.   Neurological: He is alert and oriented to person, place, and time.   Skin: Skin is warm and dry. No erythema.   Nursing note and vitals reviewed.      Recent Labs      12/18/17   1635  12/19/17   0553  12/19/17   1000   WBC  15.1*   --    --    RBC  4.58*   --    --    HEMOGLOBIN  12.4*  9.8*  10.6*   HEMATOCRIT  38.2*   --    --    MCV  83.4   --    --    MCH  27.1   --    --    MCHC  32.5*   --    --    RDW  49.6   --    --    PLATELETCT  267   --    --    MPV  8.3*   --    --      Recent Labs      12/18/17   1635  12/19/17   0553   SODIUM  131*  135   POTASSIUM  4.4  3.9   CHLORIDE  95*  105   CO2  27  23   GLUCOSE  128*  109*   BUN  21  18   CREATININE  1.01  0.74   CALCIUM  9.6  7.9*             Recent Labs      12/19/17   0553   TRIGLYCERIDE  77   HDL  16*   LDL  54          Assessment/Plan     * Sepsis(995.91)- (present on admission)   Assessment & Plan    This is sepsis (without associated acute organ dysfunction).   -2/2 C Diff; first episode; on po flagyl tid  -ordered sepsis protocol, vitals are stable, mild tachycardia, normotensive; will recheck lactate, cbc, bmp        GIB (gastrointestinal bleeding)- (present on admission)   Assessment & Plan    -GI following, on protonix IV-->will discontinue and continue po omeprazole from home  -Hgb stable, following q8        Hypomagnesemia- (present on admission)   Assessment & Plan    -will give 4g IV mag and recheck in am        Type 2 diabetes mellitus (CMS-HCC)- (present on admission)   Assessment & Plan    -Holding his po meds, continue SSI        C. difficile diarrhea- (present on admission)    Assessment & Plan    -PO flagyl, first episode        BPH (benign prostatic hypertrophy)- (present on admission)   Assessment & Plan    continue flomax            Reviewed items::  Medications reviewed and Labs reviewed  Ruff catheter::  No Ruff  DVT prophylaxis pharmacological::  Contraindicated - High bleeding risk  DVT prophylaxis - mechanical:  SCDs  Ulcer Prophylaxis::  Yes  Antibiotics:  Treating active infection/contamination beyond 24 hours perioperative coverage

## 2017-12-19 NOTE — ED PROVIDER NOTES
ED Provider Note    HPI: Patient is a 77-year-old male who presented to the emergency department December 18, 2017 at 4:20 PM with a chief complaint of dark stool from his colostomy.    Patient had black stool in his colostomy bag S7 to 10 days. He was seen by home health care and told to come to the emergency department. The patient has not had fever chills or cough. He has had no nausea vomiting. No chest pain or shortness of breath. No other somatic complaints patient is a cervical collar for chronic neck issues and there is no acute injury    Review of Systems: Positive for copious black stool from colostomy bag negative for chest pain shortness with abdominal pain fever chills cough. Review of systems reviewed with patient, all other systems negative    Past medical/surgical history: BPH, diabetes depression, reflux disease, chronic back pain/neck pain and cervical arthritis/vascular issues  Status post appendectomy with intra-abdominal abscess.    Medications: Ultram and Prilosec Mobic Neurontin and Lamictal Glucophage Flomax    Allergies: None    Social History: Patient smokes 2 pack of cigarettes per day and occasional use of alcohol      Physical exam: Constitutional: Slender male awake alert appears uncomfortable  Vital signs: Temperature 100.4 pulse 95 respirations 18 blood pressure 114/47 pulse oximetry 100%  EYES: PERRL, EOMI, Conjunctivae and sclera normal, eyelids normal bilaterally.  Neck: Trachea midline. No cervical masses seen or palpated. Patient and cervical collar and this was not removed.  Cardiac: Regular rate and rhythm. S1-S2 present. No S3 or S4 present. No murmurs, rubs, or gallops heard. No edema or varicosities were seen.   Lungs: Clear to auscultation with good aeration throughout. No wheezes, rales, or rhonchi heard. Patient's chest wall moved symmetrically with each respiratory effort. Patient was not making use of accessory muscles of respiration in breathing.  Abdomen: Soft  nontender to palpation. Somewhat distended. No rebound or guarding elicited. No organomegaly identified. No pulsatile abdominal masses identified.   Musculoskeletal:  no  pain with palpitation or movement of muscle, bone or joint , no obvious musculoskeletal deformities identified.  Neurologic: alert and awake answers questions appropriately. Moves all four extremities independently, no gross focal abnormalities identified. Normal strength and motor.  Skin: no rash or lesion seen, no palpable dermatologic lesions identified. Colostomy site looks okay  Psychiatric: not anxious, delusional, or hallucinating.    Medical decision making:  Stool from colostomy was jet black heme positive    Laboratory studies obtained (please see lab sheet for all results) significant findings included significant for white count of 15.1 mild anemia hemoglobin 12.4 mild hyponatremia sodium 131 lactic acid is elevated at 2.9.    Patient given a fluid bolus. I suspect his leukocytosis is secondary to his GI bleed and stress. He has no abdominal pain at this time although it certainly possibly he has some chronic low-grade infectious problem secondary to his previous hospitalization. This will be evaluated while he is in house. GI has been consulted. After discussion with GI, the patient is started on Protonix Patient does not appear to be septic at this time. Further care and hospital course Protonix position summary    Addendum: Patient is C. diff positive. He'll be placed in appropriate location Hospital for this condition. Treatment be managed by hospitalist     Impression 1)GI bleed  2 lactic acidosis

## 2017-12-19 NOTE — ED NOTES
New bag protonix infusing, blood for am draw collected and sent, pt given apple juice and is resting comfortably.

## 2017-12-19 NOTE — H&P
Hospital Medicine History and Physical    Date of Service  12/18/2017    Chief Complaint  Chief Complaint   Patient presents with   • GI Problem     dark stool from colostomy        History of Presenting Illness  77 y.o. male who presented 12/18/2017 with BPH, recent complicated intraabdominal infection with abscess s/p colostomy, is coming today c/o diarrhea with melena patient started having diarrhea around 10 days ago, patient is not on A/C or steroids, patient denies N/V, no fever or chills, mild abdominal pain, no ill contact, no recent travel, no focal weakness, no tingling, he has cervical c collar due to chronic pain, patient in the ER he is hemodynamically stable, hb is 12, he is alert and oriented, follows commands no rash no h/o trauma, patient had leukocytosis of 15,000, Na  131, and lactic acid 2.9 c diff is pending at the time of interview. ERP contact GI for consult. Patient will be admitted, possible EGD/colonoscopy. Continue trending h/h, telemetry, close monitoring.     Primary Care Physician  Irvin Ferreira M.D.    Consultants  GI    Code Status  Full code    Review of Systems  Review of Systems   Constitutional: Negative for chills.   HENT: Negative for hearing loss and tinnitus.    Eyes: Negative for blurred vision and double vision.   Respiratory: Negative for sputum production and shortness of breath.    Cardiovascular: Negative for claudication and PND.   Gastrointestinal: Negative for heartburn.        As per HPI.    Genitourinary: Negative for dysuria and urgency.   Musculoskeletal: Negative for myalgias and neck pain.   Skin: Negative for itching.   Neurological: Negative for tingling, weakness and headaches.   Endo/Heme/Allergies: Does not bruise/bleed easily.   Psychiatric/Behavioral: Negative for depression.          Past Medical History  Past Medical History:   Diagnosis Date   • Depression    • Enlarged prostate    • GERD (gastroesophageal reflux disease)    • Neck pain         Surgical History  Past Surgical History:   Procedure Laterality Date   • EXPLORATORY LAPAROTOMY  2017    Procedure: EXPLORATORY LAPAROTOMY- ABDOMINAL WASH OUT;  Surgeon: Jorge Rodriguez M.D.;  Location: SURGERY [facility];  Service:    • SIGMOID COLON RESECTION  2017    Procedure: SIGMOID COLON RESECTION;  Surgeon: Jorge Rodriguez M.D.;  Location: SURGERY [facility];  Service:    • COLOSTOMY  2017    Procedure: COLOSTOMY- FOR OSTOMY PLACEMENT AND OTHER PROCEDURES AS INDICATED;  Surgeon: Jorge Rodriguez M.D.;  Location: SURGERY [facility];  Service:    • APPENDECTOMY  2017    Procedure: APPENDECTOMY;  Surgeon: Jorge Rodriguez M.D.;  Location: SURGERY [facility];  Service:        Medications  No current facility-administered medications on file prior to encounter.      Current Outpatient Prescriptions on File Prior to Encounter   Medication Sig Dispense Refill   • metformin (GLUCOPHAGE) 500 MG Tab Take 500 mg by mouth every day.     • tamsulosin (FLOMAX) 0.4 MG capsule Take 1 Cap by mouth ONE-HALF HOUR AFTER BREAKFAST. 30 Cap    • lamotrigine (LAMICTAL) 150 MG tablet Take 150 mg by mouth every day.     • gabapentin (NEURONTIN) 100 MG Cap Take 100 mg by mouth 3 times a day.     • meloxicam (MOBIC) 15 MG tablet Take 15 mg by mouth every day.     • omeprazole (PRILOSEC) 20 MG delayed-release capsule Take 20 mg by mouth every day.     • tramadol (ULTRAM) 50 MG TABS Take 100 mg by mouth every four hours as needed for Mild Pain.         Family History  History reviewed. No pertinent family history.   No h/o DM or CAD.     Social History  Social History   Substance Use Topics   • Smoking status: Former Smoker     Packs/day: 2.00     Years: 14.00   • Smokeless tobacco: Never Used   • Alcohol use 0.0 oz/week       Allergies  No Known Allergies     Physical Exam  Laboratory   Hemodynamics  Temp (24hrs), Av °C (100.4 °F), Min:38 °C (100.4 °F), Max:38 °C (100.4  °F)   Temperature: 38 °C (100.4 °F)  Pulse  Av.1  Min: 81  Max: 102 Heart Rate (Monitored): 91  Blood Pressure : 114/47, NIBP: 109/52      Respiratory      Respiration: 20, Pulse Oximetry: 94 %, O2 Daily Delivery Respiratory : Silicone Nasal Cannula     Work Of Breathing / Effort: Mild  RUL Breath Sounds: Clear, RML Breath Sounds: Diminished, RLL Breath Sounds: Diminished, TOSHIA Breath Sounds: Clear, LLL Breath Sounds: Diminished    Physical Exam   Constitutional: He is oriented to person, place, and time. He appears well-developed. No distress.   HENT:   Head: Normocephalic.   Mouth/Throat: No oropharyngeal exudate.   Eyes: Conjunctivae are normal. No scleral icterus.   Neck: Normal range of motion. Neck supple. No JVD present.   Cardiovascular: Normal rate, regular rhythm and normal heart sounds.    Pulmonary/Chest: Effort normal and breath sounds normal. No respiratory distress. He has no wheezes. He has no rales.   Abdominal: Soft. Bowel sounds are normal. He exhibits no distension. There is no tenderness. There is no rebound.   Musculoskeletal: Normal range of motion. He exhibits no tenderness.   Lymphadenopathy:     He has no cervical adenopathy.   Neurological: He is alert and oriented to person, place, and time. He exhibits normal muscle tone.   Skin: No erythema.   Psychiatric: He has a normal mood and affect.   Nursing note and vitals reviewed.      Recent Labs      17   1635   WBC  15.1*   RBC  4.58*   HEMOGLOBIN  12.4*   HEMATOCRIT  38.2*   MCV  83.4   MCH  27.1   MCHC  32.5*   RDW  49.6   PLATELETCT  267   MPV  8.3*     Recent Labs      17   1635   SODIUM  131*   POTASSIUM  4.4   CHLORIDE  95*   CO2  27   GLUCOSE  128*   BUN  21   CREATININE  1.01   CALCIUM  9.6     Recent Labs      17   1635   ALTSGPT  6   ASTSGOT  9*   ALKPHOSPHAT  98   TBILIRUBIN  1.2   GLUCOSE  128*                 No results found for: TROPONINI    Imaging  none   Assessment/Plan     I anticipate this patient  will require at least two midnights for appropriate medical management, necessitating inpatient admission.    Type 2 diabetes mellitus (CMS-HCC)- (present on admission)   Assessment & Plan    Holding his po meds on SSI        C. difficile diarrhea- (present on admission)   Assessment & Plan    Has diarrhea and +c diff started on iv flagyl for now.         GIB (gastrointestinal bleeding)- (present on admission)   Assessment & Plan    Hb stable, gi consulted, no need for blood transfusion at this time, continue telemetry. H/h q8hrs. On iv protonix.         BPH (benign prostatic hypertrophy)- (present on admission)   Assessment & Plan    Stable. Keep monitoring            VTE prophylaxis: scds.

## 2017-12-19 NOTE — ED NOTES
Pt stated he was feeling nauseous. Anti-emetic given. Clear liquid lunch tray provided. Accu-check completed, bg 137, held insulin per sliding scale ordered. Pt has friend visiting, at bedside. Repositioned in bed for comfort. Emptied ostomy bag, 50mL of liquid brown stool, bag full of gas. Sulfurous odor noted. Pt has no further needs at this time.

## 2017-12-19 NOTE — ED NOTES
Pt repositioned in bed. Pt finished lunch, resting comfortably. Updated on POC. Spoke with pharmacist, she is working on obtaining mag IVF

## 2017-12-19 NOTE — ASSESSMENT & PLAN NOTE
This is sepsis (without associated acute organ dysfunction).   -2/2 C Diff; first episode; on po flagyl tid  -onsepsis protocol, vitals are stable, mild tachycardia, normotensive  - lactate decreased

## 2017-12-19 NOTE — ASSESSMENT & PLAN NOTE
Pt says he doesn't take metformin. Glucose is normal.  ISS stopped per patient request, will monitor glucose daily  A1c 6.2%, well controlled.

## 2017-12-19 NOTE — ED NOTES
Ostomy leaking, dressing changed and pt cleaned up, fresh gown and sheets with warm blankets provided. 2nd PIV established for abx.

## 2017-12-19 NOTE — ED NOTES
Jareth from Lab called with critical result of + C-diff at 2140. Critical lab result read back to Jareth.   Dr. Koch notified of critical lab result at 2141.  Critical lab result read back by Dr. Mcallister.

## 2017-12-19 NOTE — ED NOTES
BIBA for eval of colostomy which has been leaking black stool x 7-10 days. Denies pain. Colon resection Aug 2017 d/t infection. Pt endorses CDC and ID was involved in determining infection. A&OX4. In c-collar d/t cervical surgery Oct 2017.

## 2017-12-19 NOTE — ED NOTES
Pt resting comfortably in hospital bed. Colostomy bag visualized, small amount of liquid dark brown black stool noted. Protonix infusing as ordered. Pt stated 0/10 pain. No further needs at this time.

## 2017-12-19 NOTE — ED NOTES
250ml liquid brown stool emptied from colostomy bag. 300mL antoinette urine. Pt stated 5/10 pain in hands. Fresh water provided.

## 2017-12-19 NOTE — ED NOTES
Med rec complete and updated per [facility] for clarification on doses  Allergies reviewed    After med rec was complete 12-18-17, patient had stated 2 of his medication strengths were wrong, called pharmacy to verify and reported changes to [facility]

## 2017-12-20 ENCOUNTER — PATIENT OUTREACH (OUTPATIENT)
Dept: HEALTH INFORMATION MANAGEMENT | Facility: OTHER | Age: 77
End: 2017-12-20

## 2017-12-20 LAB
ANION GAP SERPL CALC-SCNC: 8 MMOL/L (ref 0–11.9)
BUN SERPL-MCNC: 12 MG/DL (ref 8–22)
CALCIUM SERPL-MCNC: 7.4 MG/DL (ref 8.5–10.5)
CHLORIDE SERPL-SCNC: 107 MMOL/L (ref 96–112)
CO2 SERPL-SCNC: 20 MMOL/L (ref 20–33)
CREAT SERPL-MCNC: 0.76 MG/DL (ref 0.5–1.4)
ERYTHROCYTE [DISTWIDTH] IN BLOOD BY AUTOMATED COUNT: 51.2 FL (ref 35.9–50)
GFR SERPL CREATININE-BSD FRML MDRD: >60 ML/MIN/1.73 M 2
GLUCOSE SERPL-MCNC: 120 MG/DL (ref 65–99)
HCT VFR BLD AUTO: 31.7 % (ref 42–52)
HGB BLD-MCNC: 10.4 G/DL (ref 14–18)
HGB BLD-MCNC: 10.5 G/DL (ref 14–18)
MAGNESIUM SERPL-MCNC: 2.1 MG/DL (ref 1.5–2.5)
MCH RBC QN AUTO: 27.4 PG (ref 27–33)
MCHC RBC AUTO-ENTMCNC: 32.8 G/DL (ref 33.7–35.3)
MCV RBC AUTO: 83.6 FL (ref 81.4–97.8)
PLATELET # BLD AUTO: 274 K/UL (ref 164–446)
PMV BLD AUTO: 8.4 FL (ref 9–12.9)
POTASSIUM SERPL-SCNC: 3.8 MMOL/L (ref 3.6–5.5)
RBC # BLD AUTO: 3.79 M/UL (ref 4.7–6.1)
SODIUM SERPL-SCNC: 135 MMOL/L (ref 135–145)
WBC # BLD AUTO: 11.1 K/UL (ref 4.8–10.8)

## 2017-12-20 PROCEDURE — 770020 HCHG ROOM/CARE - TELE (206)

## 2017-12-20 PROCEDURE — 700102 HCHG RX REV CODE 250 W/ 637 OVERRIDE(OP): Performed by: HOSPITALIST

## 2017-12-20 PROCEDURE — G8979 MOBILITY GOAL STATUS: HCPCS | Mod: CI

## 2017-12-20 PROCEDURE — A9270 NON-COVERED ITEM OR SERVICE: HCPCS | Performed by: INTERNAL MEDICINE

## 2017-12-20 PROCEDURE — G8978 MOBILITY CURRENT STATUS: HCPCS | Mod: CJ

## 2017-12-20 PROCEDURE — 80048 BASIC METABOLIC PNL TOTAL CA: CPT

## 2017-12-20 PROCEDURE — 700105 HCHG RX REV CODE 258: Performed by: HOSPITALIST

## 2017-12-20 PROCEDURE — A9270 NON-COVERED ITEM OR SERVICE: HCPCS | Performed by: HOSPITALIST

## 2017-12-20 PROCEDURE — 700102 HCHG RX REV CODE 250 W/ 637 OVERRIDE(OP): Performed by: INTERNAL MEDICINE

## 2017-12-20 PROCEDURE — 99233 SBSQ HOSP IP/OBS HIGH 50: CPT | Performed by: INTERNAL MEDICINE

## 2017-12-20 PROCEDURE — 36415 COLL VENOUS BLD VENIPUNCTURE: CPT

## 2017-12-20 PROCEDURE — 85018 HEMOGLOBIN: CPT

## 2017-12-20 PROCEDURE — 83735 ASSAY OF MAGNESIUM: CPT

## 2017-12-20 PROCEDURE — 85027 COMPLETE CBC AUTOMATED: CPT

## 2017-12-20 PROCEDURE — 97161 PT EVAL LOW COMPLEX 20 MIN: CPT

## 2017-12-20 RX ORDER — OXYCODONE HYDROCHLORIDE 5 MG/1
5 TABLET ORAL EVERY 6 HOURS PRN
Status: DISCONTINUED | OUTPATIENT
Start: 2017-12-20 | End: 2017-12-26 | Stop reason: HOSPADM

## 2017-12-20 RX ORDER — TRAMADOL HYDROCHLORIDE 50 MG/1
50 TABLET ORAL EVERY 6 HOURS PRN
Status: DISCONTINUED | OUTPATIENT
Start: 2017-12-20 | End: 2017-12-26 | Stop reason: HOSPADM

## 2017-12-20 RX ADMIN — GABAPENTIN 300 MG: 300 CAPSULE ORAL at 09:15

## 2017-12-20 RX ADMIN — LAMOTRIGINE 200 MG: 100 TABLET ORAL at 09:15

## 2017-12-20 RX ADMIN — OXYCODONE HYDROCHLORIDE 5 MG: 5 TABLET ORAL at 09:28

## 2017-12-20 RX ADMIN — METRONIDAZOLE 500 MG: 500 TABLET ORAL at 05:03

## 2017-12-20 RX ADMIN — GABAPENTIN 300 MG: 300 CAPSULE ORAL at 21:28

## 2017-12-20 RX ADMIN — SODIUM CHLORIDE: 9 INJECTION, SOLUTION INTRAVENOUS at 14:46

## 2017-12-20 RX ADMIN — GABAPENTIN 300 MG: 300 CAPSULE ORAL at 14:59

## 2017-12-20 RX ADMIN — OMEPRAZOLE 20 MG: 20 CAPSULE, DELAYED RELEASE ORAL at 09:15

## 2017-12-20 RX ADMIN — METRONIDAZOLE 500 MG: 500 TABLET ORAL at 14:59

## 2017-12-20 RX ADMIN — METRONIDAZOLE 500 MG: 500 TABLET ORAL at 21:28

## 2017-12-20 ASSESSMENT — COGNITIVE AND FUNCTIONAL STATUS - GENERAL
CLIMB 3 TO 5 STEPS WITH RAILING: A LITTLE
SUGGESTED CMS G CODE MODIFIER MOBILITY: CJ
MOBILITY SCORE: 21
CLIMB 3 TO 5 STEPS WITH RAILING: A LITTLE
DRESSING REGULAR LOWER BODY CLOTHING: A LITTLE
HELP NEEDED FOR BATHING: A LITTLE
WALKING IN HOSPITAL ROOM: A LITTLE
SUGGESTED CMS G CODE MODIFIER MOBILITY: CJ
SUGGESTED CMS G CODE MODIFIER DAILY ACTIVITY: CK
STANDING UP FROM CHAIR USING ARMS: A LITTLE
DAILY ACTIVITIY SCORE: 19
STANDING UP FROM CHAIR USING ARMS: A LITTLE
WALKING IN HOSPITAL ROOM: A LITTLE
DRESSING REGULAR UPPER BODY CLOTHING: A LITTLE
PERSONAL GROOMING: A LITTLE
TOILETING: A LITTLE
MOBILITY SCORE: 21

## 2017-12-20 ASSESSMENT — ENCOUNTER SYMPTOMS
GASTROINTESTINAL NEGATIVE: 1
FEVER: 0
VOMITING: 0
NAUSEA: 0
ABDOMINAL PAIN: 0
FOCAL WEAKNESS: 0
CHILLS: 0
SHORTNESS OF BREATH: 0
HEADACHES: 0
DIARRHEA: 1
NECK PAIN: 1
DIZZINESS: 0
COUGH: 0

## 2017-12-20 ASSESSMENT — GAIT ASSESSMENTS
GAIT LEVEL OF ASSIST: STAND BY ASSIST
DISTANCE (FEET): 40
ASSISTIVE DEVICE: FRONT WHEEL WALKER

## 2017-12-20 ASSESSMENT — PAIN SCALES - GENERAL
PAINLEVEL_OUTOF10: 5
PAINLEVEL_OUTOF10: 0

## 2017-12-20 NOTE — PROGRESS NOTES
[facility] Hospitalist Progress Note    Date of Service: 2017    Chief Complaint  77 y.o. male admitted 2017 with choronic neck pain, depression, recent intrabd infection s/p colostomy who presented with melena and diarrhea. Treated for C Diff    Interval Problem Update  Afebrile >24h  Leukocytosis downtrending. Hgb stable, anemic.  Denies abd pain. Seen by GI, plan for 2 weeks of flagyl.   Neck pain, uses tramadol and oxy at baseline, has f/u at [place]  Not on meds for Dm, stopped, ISS. Check A1c    Consultants/Specialty  GI    Disposition  Home pending diarrhea improves        Review of Systems   Constitutional: Negative for chills and fever.   Respiratory: Negative for cough and shortness of breath.    Cardiovascular: Negative for chest pain and leg swelling.   Gastrointestinal: Positive for diarrhea. Negative for abdominal pain, nausea and vomiting.   Genitourinary: Negative for dysuria.   Musculoskeletal: Positive for neck pain.   Skin: Negative for rash.   Neurological: Negative for dizziness, focal weakness and headaches.      Physical Exam  Laboratory/Imaging   Hemodynamics  Temp (24hrs), Av °C (98.6 °F), Min:36.7 °C (98.1 °F), Max:37.3 °C (99.1 °F)   Temperature: 36.7 °C (98.1 °F)  Pulse  Av.8  Min: 65  Max: 109    Blood Pressure : 122/59      Respiratory      Respiration: 16, Pulse Oximetry: 94 %     Work Of Breathing / Effort: Mild  RUL Breath Sounds: Clear, RML Breath Sounds: Diminished, RLL Breath Sounds: Diminished, TOSHIA Breath Sounds: Clear, LLL Breath Sounds: Diminished    Fluids    Intake/Output Summary (Last 24 hours) at 17  Last data filed at 17 1300   Gross per 24 hour   Intake              360 ml   Output              875 ml   Net             -515 ml       Nutrition  Orders Placed This Encounter   Procedures   • DIET ORDER     Standing Status:   Standing     Number of Occurrences:   1     Order Specific Question:   Diet:     Answer:   Full Liquid [11]      Physical Exam   Constitutional: He is oriented to person, place, and time. He appears well-developed and well-nourished. He is cooperative.   HENT:   Head: Normocephalic and atraumatic.   Eyes: Conjunctivae and EOM are normal.   Neck:   Wearing neck brace   Cardiovascular: Normal rate and regular rhythm.  Exam reveals no gallop and no friction rub.    No murmur heard.  Pulmonary/Chest: Effort normal and breath sounds normal. He has no wheezes. He has no rales.   Abdominal: Soft. Bowel sounds are normal. There is tenderness. There is rebound. There is no guarding.   Colostomy in place   Musculoskeletal: He exhibits no edema.   Neurological: He is alert and oriented to person, place, and time.   Skin: Skin is warm and dry.       Recent Labs      12/18/17   1635   12/19/17   1420  12/20/17   0316  12/20/17   1147   WBC  15.1*   --   12.9*  11.1*   --    RBC  4.58*   --   3.61*  3.79*   --    HEMOGLOBIN  12.4*   < >  9.9*  10.4*  10.5*   HEMATOCRIT  38.2*   --   30.1*  31.7*   --    MCV  83.4   --   83.4  83.6   --    MCH  27.1   --   27.4  27.4   --    MCHC  32.5*   --   32.9*  32.8*   --    RDW  49.6   --   51.6*  51.2*   --    PLATELETCT  267   --   251  274   --    MPV  8.3*   --   8.3*  8.4*   --     < > = values in this interval not displayed.     Recent Labs      12/18/17   1635  12/19/17   0553  12/20/17   0316   SODIUM  131*  135  135   POTASSIUM  4.4  3.9  3.8   CHLORIDE  95*  105  107   CO2  27  23  20   GLUCOSE  128*  109*  120*   BUN  21  18  12   CREATININE  1.01  0.74  0.76   CALCIUM  9.6  7.9*  7.4*     Recent Labs      12/19/17   1420   APTT  35.4   INR  1.33*         Recent Labs      12/19/17   0553   TRIGLYCERIDE  77   HDL  16*   LDL  54          Assessment/Plan     * Sepsis(995.91)- (present on admission)   Assessment & Plan    This is sepsis (without associated acute organ dysfunction).   -2/2 C Diff; first episode; on po flagyl tid  -onsepsis protocol, vitals are stable, mild tachycardia,  normotensive  - lactate decreased        GIB (gastrointestinal bleeding)- (present on admission)   Assessment & Plan    -GI following  - continue omeprazole  -Hgb stable, continue to monitor daily        Hypomagnesemia- (present on admission)   Assessment & Plan    Normalized with repletion        Type 2 diabetes mellitus (CMS-HCC)- (present on admission)   Assessment & Plan    Pt says he doesn't take metformin. Glucose is normal.  ISS stopped per patient request, will monitor glucose daily  Check A1c        C. difficile diarrhea- (present on admission)   Assessment & Plan    -PO flagyl for 2 weeks per GI  GI f/u as outpt for colonoscopy        BPH (benign prostatic hypertrophy)- (present on admission)   Assessment & Plan    continue flomax            Reviewed items::  EKG reviewed, Labs reviewed, Medications reviewed and Radiology images reviewed  Ruff catheter::  No Ruff  DVT prophylaxis - mechanical:  SCDs

## 2017-12-20 NOTE — DIETARY
Nutrition: Day 2 of admit.  76 yo male admitted with GIB, diarrhea and is found to be c-diff positive.  He is noted on admitting screen to have had weight loss PTA.  currentl weight 57 kg is increased 2 kg from August 2017 admit.  He is currently on a full liquid diet and wound benefit from diet advancement.    Recommendations/Plan:  1) advance diet as appropriate  2) encourage intake  3) document intake of all meals and supplements as % taken in ADL's to provide interdisciplinary communication across all shifts

## 2017-12-20 NOTE — THERAPY
"Physical Therapy Evaluation completed.   Bed Mobility:  Supine to Sit: Stand by Assist  Transfers: Sit to Stand: Stand by Assist  Gait: Level Of Assist: Stand by Assist with Front-Wheel Walker       Plan of Care: Will benefit from Physical Therapy 3 times per week  Discharge Recommendations: Equipment: No Equipment Needed. Post-acute therapy Discharge to home with outpatient or home health for additional skilled therapy services.    Pt presents with decreased functional mobiltiy most likely due to ease of fatigue and instabiltiy during gait. He requires use of FWW and is able to perform ~40' of gait before fatiguing. He will benefit from further acute skilled PT services to improve functional mobiltiy while here in-house.     See \"Rehab Therapy-Acute\" Patient Summary Report for complete documentation.     "

## 2017-12-20 NOTE — PROGRESS NOTES
Sarah Clemons Fall Risk Assessment:     Last Known Fall: Within the last month  Mobility: Use of assistive device/requires assist of two people  Medications: Cardiovascular or central nervous system meds  Mental Status/LOC/Awareness: Awake, alert, and oriented to date, place, and person  Toileting Needs: Use of assistive device (Bedside commode, bedpan, urinal), Diarrhea/frequency/urgency  Volume/Electrolyte Status: Use of IV fluids/tube feeds  Communication/Sensory: Visual (Glasses)/hearing deficit  Behavior: Appropriate behavior  Sarah Clemons Fall Risk Total: 19  Fall Risk Level: HIGH RISK    Universal Fall Precautions:  call light/belongings in reach, bed in low position and locked, use non-slip footwear, adequate lighting, clutter free and spill free environment, educate on level of risk, educate to call for assistance, wheelchairs and assistive devices out of sight    Fall Risk Level Interventions:     TRIAL (TELE 8, NEURO, MED ERNESTO 5) High Fall Risk Interventions  Place yellow fall risk ID band on patient: verified  Provide patient/family education based on risk assessment: completed  Educate patient/family to call staff for assistance when getting out of bed: completed  Place fall precaution signage outside patient door: verified  Place patient in room close to nursing station: completed  Utilize bed/chair fall alarm: refused  Notify charge of high risk for huddle: completed    Patient Specific Interventions:     Medication: review medications with patient and family  Mental Status/LOC/Awareness: reinforce falls education, check on patient hourly and reinforce the use of call light  Toileting: provide frquent toileting  Volume/Electrolyte Status: ensure patient remains hydrated  Communication/Sensory: update plan of care on whiteboard  Behavioral: encourage patient to voice feelings  Mobility: ensure bed is locked and in lowest position

## 2017-12-20 NOTE — PROGRESS NOTES
Gastroenterology Progress Note     Author: Faisal Chandler   Date & Time Created: 2017 9:04 AM    Interval History:  C. Diff positive  No acute c/o.  On clear liquid diet  Review of Systems:  Review of Systems   Gastrointestinal: Negative.        Physical Exam:  Physical Exam   Pulmonary/Chest: Effort normal.   Abdominal: Soft.       Labs:        Invalid input(s): [id number]      Recent Labs      17   1635  17   0553  17   0316   SODIUM  131*  135  135   POTASSIUM  4.4  3.9  3.8   CHLORIDE  95*  105  107   CO2  27  23  20   BUN  21  18  12   CREATININE  1.01  0.74  0.76   MAGNESIUM   --   1.6  2.1   CALCIUM  9.6  7.9*  7.4*     Recent Labs      17   16317   0553  17   0316   ALTSGPT  6   --    --    ASTSGOT  9*   --    --    ALKPHOSPHAT  98   --    --    TBILIRUBIN  1.2   --    --    GLUCOSE  128*  109*  120*     Recent Labs      17   1000  17   1420  17   0316   RBC  4.58*   --    --   3.61*  3.79*   HEMOGLOBIN  12.4*   < >  10.6*  9.9*  10.4*   HEMATOCRIT  38.2*   --    --   30.1*  31.7*   PLATELETCT  267   --    --   251  274   PROTHROMBTM   --    --    --   16.2*   --    APTT   --    --    --   35.4   --    INR   --    --    --   1.33*   --     < > = values in this interval not displayed.     Recent Labs      17   16317   1420  17   0316   WBC  15.1*  12.9*  11.1*   NEUTSPOLYS  87.50*  76.10*   --    LYMPHOCYTES  4.20*  6.20*   --    MONOCYTES  7.40  8.00   --    EOSINOPHILS  0.00  0.00   --    BASOPHILS  0.30  0.00   --    ASTSGOT  9*   --    --    ALTSGPT  6   --    --    ALKPHOSPHAT  98   --    --    TBILIRUBIN  1.2   --    --      Hemodynamics:  Temp (24hrs), Av.2 °C (99 °F), Min:37 °C (98.6 °F), Max:37.6 °C (99.7 °F)  Temperature: 37.3 °C (99.1 °F)  Pulse  Av.1  Min: 65  Max: 109Heart Rate (Monitored): 97  Blood Pressure : 110/57, NIBP: 123/52     Respiratory:    Respiration: 16, Pulse Oximetry:  91 %     Work Of Breathing / Effort: Mild  RUL Breath Sounds: Clear, RML Breath Sounds: Diminished, RLL Breath Sounds: Diminished, TOSHIA Breath Sounds: Clear, LLL Breath Sounds: Diminished  Fluids:    Intake/Output Summary (Last 24 hours) at 12/20/17 0904  Last data filed at 12/20/17 0600   Gross per 24 hour   Intake              855 ml   Output             1125 ml   Net             -270 ml     Weight: 57 kg (125 lb 10.6 oz)  GI/Nutrition:  Orders Placed This Encounter   Procedures   • DIET ORDER     Standing Status:   Standing     Number of Occurrences:   1     Order Specific Question:   Diet:     Answer:   Full Liquid [11]     Medical Decision Making, by Problem:  Active Hospital Problems    Diagnosis   • *Sepsis(995.91) [A41.9]   • GIB (gastrointestinal bleeding) [K92.2]   • Type 2 diabetes mellitus (CMS-HCC) [E11.9]   • Hypomagnesemia [E83.42]   • BPH (benign prostatic hypertrophy) [N40.0]   • C. difficile diarrhea [A04.72]       Plan:  Metronidazole 500 mg tid X 2 weeks  Advance to full liquids, and thereafter as tolerated.  Will arrange outpatient GI f/u with Dr. Cordova for colonoscopy, to be scheduled prior to anticipated colostomy take-down next year.  Will s/o - please call if needed.      Quality-Core Measures

## 2017-12-20 NOTE — PROGRESS NOTES
Report received. Patient A&O x 4. VS'S. RA. No complaints of pain or SOB at this time. Patient placed on telemetry monitor NSR in the 90's. LLQ colostomy with dark watery output. Patient is wearing a cervical C collar, skin underneath is intact. POC discussed with patient. Pt verbalizes understanding. Call light and belongings with in reach. Bed locked and in lowest position, alarm and fall precautions in place.

## 2017-12-20 NOTE — ED NOTES
Bed assigned to T817.  Report was given to Ryan GARCIA  Pt to the floor via hospital bed with tele RN.  VSS at transfer.

## 2017-12-21 ENCOUNTER — PATIENT OUTREACH (OUTPATIENT)
Dept: HEALTH INFORMATION MANAGEMENT | Facility: OTHER | Age: 77
End: 2017-12-21

## 2017-12-21 LAB
ANION GAP SERPL CALC-SCNC: 5 MMOL/L (ref 0–11.9)
APPEARANCE UR: CLEAR
BACTERIA #/AREA URNS HPF: NEGATIVE /HPF
BASOPHILS # BLD AUTO: 0 % (ref 0–1.8)
BASOPHILS # BLD: 0 K/UL (ref 0–0.12)
BILIRUB UR QL STRIP.AUTO: NEGATIVE
BUN SERPL-MCNC: 10 MG/DL (ref 8–22)
CALCIUM SERPL-MCNC: 8.3 MG/DL (ref 8.5–10.5)
CHLORIDE SERPL-SCNC: 108 MMOL/L (ref 96–112)
CO2 SERPL-SCNC: 24 MMOL/L (ref 20–33)
COLOR UR: ABNORMAL
CREAT SERPL-MCNC: 0.69 MG/DL (ref 0.5–1.4)
EOSINOPHIL # BLD AUTO: 0 K/UL (ref 0–0.51)
EOSINOPHIL NFR BLD: 0 % (ref 0–6.9)
EPI CELLS #/AREA URNS HPF: NEGATIVE /HPF
ERYTHROCYTE [DISTWIDTH] IN BLOOD BY AUTOMATED COUNT: 52.5 FL (ref 35.9–50)
EST. AVERAGE GLUCOSE BLD GHB EST-MCNC: 131 MG/DL
GFR SERPL CREATININE-BSD FRML MDRD: >60 ML/MIN/1.73 M 2
GLUCOSE SERPL-MCNC: 112 MG/DL (ref 65–99)
GLUCOSE UR STRIP.AUTO-MCNC: NEGATIVE MG/DL
HBA1C MFR BLD: 6.2 % (ref 0–5.6)
HCT VFR BLD AUTO: 33.2 % (ref 42–52)
HGB BLD-MCNC: 10.6 G/DL (ref 14–18)
HYALINE CASTS #/AREA URNS LPF: ABNORMAL /LPF
KETONES UR STRIP.AUTO-MCNC: ABNORMAL MG/DL
LEUKOCYTE ESTERASE UR QL STRIP.AUTO: ABNORMAL
LYMPHOCYTES # BLD AUTO: 0.32 K/UL (ref 1–4.8)
LYMPHOCYTES NFR BLD: 3.6 % (ref 22–41)
MAGNESIUM SERPL-MCNC: 1.8 MG/DL (ref 1.5–2.5)
MANUAL DIFF BLD: NORMAL
MCH RBC QN AUTO: 26.6 PG (ref 27–33)
MCHC RBC AUTO-ENTMCNC: 31.9 G/DL (ref 33.7–35.3)
MCV RBC AUTO: 83.2 FL (ref 81.4–97.8)
MICRO URNS: ABNORMAL
MONOCYTES # BLD AUTO: 0.58 K/UL (ref 0–0.85)
MONOCYTES NFR BLD AUTO: 6.4 % (ref 0–13.4)
MORPHOLOGY BLD-IMP: NORMAL
NEUTROPHILS # BLD AUTO: 8.1 K/UL (ref 1.82–7.42)
NEUTROPHILS NFR BLD: 87.3 % (ref 44–72)
NEUTS BAND NFR BLD MANUAL: 2.7 % (ref 0–10)
NITRITE UR QL STRIP.AUTO: POSITIVE
NRBC # BLD AUTO: 0 K/UL
NRBC BLD-RTO: 0 /100 WBC
OVALOCYTES BLD QL SMEAR: NORMAL
PH UR STRIP.AUTO: 5 [PH]
PLATELET # BLD AUTO: 314 K/UL (ref 164–446)
PLATELET BLD QL SMEAR: NORMAL
PMV BLD AUTO: 8.3 FL (ref 9–12.9)
POIKILOCYTOSIS BLD QL SMEAR: NORMAL
POTASSIUM SERPL-SCNC: 3.9 MMOL/L (ref 3.6–5.5)
PROT UR QL STRIP: NEGATIVE MG/DL
RBC # BLD AUTO: 3.99 M/UL (ref 4.7–6.1)
RBC # URNS HPF: ABNORMAL /HPF
RBC BLD AUTO: PRESENT
RBC UR QL AUTO: NEGATIVE
SODIUM SERPL-SCNC: 137 MMOL/L (ref 135–145)
SP GR UR STRIP.AUTO: 1.02
UROBILINOGEN UR STRIP.AUTO-MCNC: 0.2 MG/DL
WBC # BLD AUTO: 9 K/UL (ref 4.8–10.8)
WBC #/AREA URNS HPF: ABNORMAL /HPF

## 2017-12-21 PROCEDURE — 99232 SBSQ HOSP IP/OBS MODERATE 35: CPT | Performed by: INTERNAL MEDICINE

## 2017-12-21 PROCEDURE — 81001 URINALYSIS AUTO W/SCOPE: CPT

## 2017-12-21 PROCEDURE — 83735 ASSAY OF MAGNESIUM: CPT

## 2017-12-21 PROCEDURE — A9270 NON-COVERED ITEM OR SERVICE: HCPCS | Performed by: INTERNAL MEDICINE

## 2017-12-21 PROCEDURE — A9270 NON-COVERED ITEM OR SERVICE: HCPCS | Performed by: HOSPITALIST

## 2017-12-21 PROCEDURE — 36415 COLL VENOUS BLD VENIPUNCTURE: CPT

## 2017-12-21 PROCEDURE — 700102 HCHG RX REV CODE 250 W/ 637 OVERRIDE(OP): Performed by: HOSPITALIST

## 2017-12-21 PROCEDURE — 85007 BL SMEAR W/DIFF WBC COUNT: CPT

## 2017-12-21 PROCEDURE — 770020 HCHG ROOM/CARE - TELE (206)

## 2017-12-21 PROCEDURE — 83036 HEMOGLOBIN GLYCOSYLATED A1C: CPT

## 2017-12-21 PROCEDURE — 700105 HCHG RX REV CODE 258: Performed by: HOSPITALIST

## 2017-12-21 PROCEDURE — 700102 HCHG RX REV CODE 250 W/ 637 OVERRIDE(OP): Performed by: INTERNAL MEDICINE

## 2017-12-21 PROCEDURE — 87086 URINE CULTURE/COLONY COUNT: CPT

## 2017-12-21 PROCEDURE — 85027 COMPLETE CBC AUTOMATED: CPT

## 2017-12-21 PROCEDURE — 80048 BASIC METABOLIC PNL TOTAL CA: CPT

## 2017-12-21 RX ADMIN — GABAPENTIN 300 MG: 300 CAPSULE ORAL at 09:28

## 2017-12-21 RX ADMIN — OXYCODONE HYDROCHLORIDE 5 MG: 5 TABLET ORAL at 09:35

## 2017-12-21 RX ADMIN — TAMSULOSIN HYDROCHLORIDE 0.4 MG: 0.4 CAPSULE ORAL at 09:28

## 2017-12-21 RX ADMIN — METRONIDAZOLE 500 MG: 500 TABLET ORAL at 05:34

## 2017-12-21 RX ADMIN — OMEPRAZOLE 20 MG: 20 CAPSULE, DELAYED RELEASE ORAL at 09:28

## 2017-12-21 RX ADMIN — METRONIDAZOLE 500 MG: 500 TABLET ORAL at 14:39

## 2017-12-21 RX ADMIN — LAMOTRIGINE 200 MG: 100 TABLET ORAL at 09:28

## 2017-12-21 RX ADMIN — GABAPENTIN 300 MG: 300 CAPSULE ORAL at 21:30

## 2017-12-21 RX ADMIN — SODIUM CHLORIDE: 9 INJECTION, SOLUTION INTRAVENOUS at 18:11

## 2017-12-21 RX ADMIN — GABAPENTIN 300 MG: 300 CAPSULE ORAL at 14:39

## 2017-12-21 RX ADMIN — METRONIDAZOLE 500 MG: 500 TABLET ORAL at 21:30

## 2017-12-21 RX ADMIN — SODIUM CHLORIDE: 9 INJECTION, SOLUTION INTRAVENOUS at 08:48

## 2017-12-21 ASSESSMENT — ENCOUNTER SYMPTOMS
DIZZINESS: 0
CHILLS: 0
COUGH: 0
HEADACHES: 0
NAUSEA: 0
SHORTNESS OF BREATH: 0
DIARRHEA: 1
NECK PAIN: 1
FEVER: 0
FOCAL WEAKNESS: 0
VOMITING: 0
ABDOMINAL PAIN: 0

## 2017-12-21 ASSESSMENT — COGNITIVE AND FUNCTIONAL STATUS - GENERAL
PERSONAL GROOMING: A LITTLE
WALKING IN HOSPITAL ROOM: A LITTLE
MOBILITY SCORE: 21
STANDING UP FROM CHAIR USING ARMS: A LITTLE
DRESSING REGULAR LOWER BODY CLOTHING: A LITTLE
DAILY ACTIVITIY SCORE: 19
CLIMB 3 TO 5 STEPS WITH RAILING: A LITTLE
SUGGESTED CMS G CODE MODIFIER MOBILITY: CJ
TOILETING: A LITTLE
DRESSING REGULAR UPPER BODY CLOTHING: A LITTLE
HELP NEEDED FOR BATHING: A LITTLE
SUGGESTED CMS G CODE MODIFIER DAILY ACTIVITY: CK

## 2017-12-21 ASSESSMENT — PAIN SCALES - GENERAL
PAINLEVEL_OUTOF10: 2
PAINLEVEL_OUTOF10: 2
PAINLEVEL_OUTOF10: 0
PAINLEVEL_OUTOF10: 5
PAINLEVEL_OUTOF10: 5
PAINLEVEL_OUTOF10: 0
PAINLEVEL_OUTOF10: 2

## 2017-12-21 NOTE — CARE PLAN
Problem: Safety  Goal: Will remain free from falls    Intervention: Assess risk factors for falls  Sarah Clemons Fall Risk Assessment:     Last Known Fall: Within the last month  Mobility: Use of assistive device/requires assist of two people  Medications: Cardiovascular or central nervous system meds  Mental Status/LOC/Awareness: Awake, alert, and oriented to date, place, and person  Toileting Needs: Use of assistive device (Bedside commode, bedpan, urinal)  Volume/Electrolyte Status: No problems  Communication/Sensory: No deficits  Behavior: Appropriate behavior  Sarah Clemons Fall Risk Total: 12  Fall Risk Level: MODERATE RISK    Universal Fall Precautions:  call light/belongings in reach, bed in low position and locked, siderails up x 2, use non-slip footwear, adequate lighting, clutter free and spill free environment, educate on level of risk, educate to call for assistance    Fall Risk Level Interventions:    TRIAL (TELE 8, NEURO, MED ERNESTO 5) Moderate Fall Risk Interventions  Place yellow fall risk ID band on patient: verified  Provide patient/family education based on risk assessment : completed  Educate patient/family to call staff for assistance when getting out of bed: completed  Place fall precaution signage outside patient door: completed  Utilize bed/chair fall alarm: completedTRIAL (TELE 8, NEURO, MED ERNESTO 5) High Fall Risk Interventions  Place yellow fall risk ID band on patient: verified  Provide patient/family education based on risk assessment: completed  Educate patient/family to call staff for assistance when getting out of bed: completed  Place fall precaution signage outside patient door: verified  Place patient in room close to nursing station: completed  Utilize bed/chair fall alarm: refused  Notify charge of high risk for huddle: completed    Patient Specific Interventions:     Medication: review medications with patient and family, assess for medications that can be discontinued or dosage  decreased and limit combination of prn medications  Mental Status/LOC/Awareness: reinforce falls education and check on patient hourly  Toileting: monitor intake and output/use of appropriate interventions  Volume/Electrolyte Status: ensure patient remains hydrated, monitor abnormal lab values and ensure IV fluids are appropriate  Communication/Sensory: update plan of care on whiteboard and ensure proper positioning when transferrng/ambulating  Behavioral: administer medication as ordered  Mobility: utilize bed/chair fall alarm and ensure bed is locked and in lowest position      Problem: Knowledge Deficit  Goal: Knowledge of disease process/condition, treatment plan, diagnostic tests, and medications will improve  Outcome: PROGRESSING AS EXPECTED  Knowledge of disease process, current condition, plan of care, medications, and diagnostics will improve.

## 2017-12-21 NOTE — PROGRESS NOTES
[facility] Hospitalist Progress Note    Date of Service: 2017    Chief Complaint  77 y.o. male admitted 2017 with choronic neck pain, depression, recent intrabd infection s/p colostomy who presented with melena and diarrhea. Treated for C Diff    Interval Problem Update  : Afebrile >24h  Leukocytosis downtrending. Hgb stable, anemic.  Denies abd pain. Seen by GI, plan for 2 weeks of flagyl.   Neck pain, uses tramadol and oxy at baseline, has f/u at [place]  Not on meds for Dm, stopped, ISS. Check A1c    : No acute overnight events.  AF >48h  Leukocytosis resolved. Hgb remains stable  Still having frequent diarrhea, larger colostomy bag attached.    Consultants/Specialty  GI    Disposition  Home pending diarrhea improves  PT - outpatient PT        Review of Systems   Constitutional: Negative for chills and fever.   Respiratory: Negative for cough and shortness of breath.    Cardiovascular: Negative for chest pain and leg swelling.   Gastrointestinal: Positive for diarrhea. Negative for abdominal pain, nausea and vomiting.   Genitourinary: Negative for dysuria.   Musculoskeletal: Positive for neck pain.   Skin: Negative for rash.   Neurological: Negative for dizziness, focal weakness and headaches.      Physical Exam  Laboratory/Imaging   Hemodynamics  Temp (24hrs), Av.1 °C (98.7 °F), Min:36.6 °C (97.9 °F), Max:37.6 °C (99.7 °F)   Temperature: 37.6 °C (99.7 °F)  Pulse  Av.1  Min: 65  Max: 109    Blood Pressure : 112/44      Respiratory      Respiration: 16, Pulse Oximetry: 94 %     Work Of Breathing / Effort: Mild  RUL Breath Sounds: Clear, RML Breath Sounds: Diminished, RLL Breath Sounds: Diminished, TOSHIA Breath Sounds: Clear, LLL Breath Sounds: Diminished    Fluids    Intake/Output Summary (Last 24 hours) at 17 1741  Last data filed at 17 0900   Gross per 24 hour   Intake                0 ml   Output              480 ml   Net             -480 ml       Nutrition  Orders Placed  This Encounter   Procedures   • DIET ORDER     Standing Status:   Standing     Number of Occurrences:   1     Order Specific Question:   Diet:     Answer:   Full Liquid [11]     Physical Exam   Constitutional: He is oriented to person, place, and time. He appears well-developed and well-nourished. He is cooperative.   HENT:   Head: Normocephalic and atraumatic.   Eyes: Conjunctivae and EOM are normal.   Neck:   Wearing neck brace   Cardiovascular: Normal rate and regular rhythm.  Exam reveals no gallop and no friction rub.    No murmur heard.  Pulmonary/Chest: Effort normal and breath sounds normal. He has no wheezes. He has no rales.   Abdominal: Soft. Bowel sounds are normal. There is tenderness. There is no rebound and no guarding.   Colostomy in place   Musculoskeletal: He exhibits no edema.   Neurological: He is alert and oriented to person, place, and time.   Skin: Skin is warm and dry.       Recent Labs      12/19/17   1420  12/20/17   0316  12/20/17   1147  12/21/17   0249   WBC  12.9*  11.1*   --   9.0   RBC  3.61*  3.79*   --   3.99*   HEMOGLOBIN  9.9*  10.4*  10.5*  10.6*   HEMATOCRIT  30.1*  31.7*   --   33.2*   MCV  83.4  83.6   --   83.2   MCH  27.4  27.4   --   26.6*   MCHC  32.9*  32.8*   --   31.9*   RDW  51.6*  51.2*   --   52.5*   PLATELETCT  251  274   --   314   MPV  8.3*  8.4*   --   8.3*     Recent Labs      12/19/17   0553  12/20/17   0316  12/21/17   0249   SODIUM  135  135  137   POTASSIUM  3.9  3.8  3.9   CHLORIDE  105  107  108   CO2  23  20  24   GLUCOSE  109*  120*  112*   BUN  18  12  10   CREATININE  0.74  0.76  0.69   CALCIUM  7.9*  7.4*  8.3*     Recent Labs      12/19/17   1420   APTT  35.4   INR  1.33*         Recent Labs      12/19/17   0553   TRIGLYCERIDE  77   HDL  16*   LDL  54          Assessment/Plan     * Sepsis(995.91)- (present on admission)   Assessment & Plan    This is sepsis (without associated acute organ dysfunction).   -2/2 C Diff; first episode; on po flagyl  tid  -onsepsis protocol, vitals are stable, mild tachycardia, normotensive  - lactate decreased        GIB (gastrointestinal bleeding)- (present on admission)   Assessment & Plan    -GI consulted  - continue omeprazole  -Hgb stable, continue to monitor daily        Hypomagnesemia- (present on admission)   Assessment & Plan    Normalized with repletion        Type 2 diabetes mellitus (CMS-HCC)- (present on admission)   Assessment & Plan    Pt says he doesn't take metformin. Glucose is normal.  ISS stopped per patient request, will monitor glucose daily  A1c 6.2%, well controlled.        C. difficile diarrhea- (present on admission)   Assessment & Plan    -PO flagyl for 2 weeks per GI  GI f/u as outpt for colonoscopy        BPH (benign prostatic hypertrophy)- (present on admission)   Assessment & Plan    continue flomax            Reviewed items::  EKG reviewed, Labs reviewed, Medications reviewed and Radiology images reviewed  Ruff catheter::  No Ruff  DVT prophylaxis - mechanical:  SCDs

## 2017-12-21 NOTE — PROGRESS NOTES
Sarah Clemons Fall Risk Assessment:     Last Known Fall: Within the last month  Mobility: Use of assistive device/requires assist of two people  Medications: Cardiovascular or central nervous system meds  Mental Status/LOC/Awareness: Awake, alert, and oriented to date, place, and person  Toileting Needs: Use of assistive device (Bedside commode, bedpan, urinal)  Volume/Electrolyte Status: No problems  Communication/Sensory: No deficits  Behavior: Appropriate behavior  Sarah Clemons Fall Risk Total: 12  Fall Risk Level: MODERATE RISK    Universal Fall Precautions:  call light/belongings in reach, bed in low position and locked, siderails up x 2, use non-slip footwear, adequate lighting, clutter free and spill free environment, educate on level of risk, educate to call for assistance    Fall Risk Level Interventions:    TRIAL ([employer] 8, NEURO, MED ERNESTO 5) Moderate Fall Risk Interventions  Place yellow fall risk ID band on patient: verified  Provide patient/family education based on risk assessment : completed  Educate patient/family to call staff for assistance when getting out of bed: completed  Place fall precaution signage outside patient door: completed  Utilize bed/chair fall alarm: completedTRIAL (TELE 8, NEURO, [employer] ERNESTO 5) High Fall Risk Interventions  Place yellow fall risk ID band on patient: verified  Provide patient/family education based on risk assessment: completed  Educate patient/family to call staff for assistance when getting out of bed: completed  Place fall precaution signage outside patient door: verified  Place patient in room close to nursing station: completed  Utilize bed/chair fall alarm: refused  Notify charge of high risk for huddle: completed    Patient Specific Interventions:     Medication: review medications with patient and family  Mental Status/LOC/Awareness: reinforce falls education, check on patient hourly, utilize bed/chair fall alarm and reinforce the use of call  light  Toileting: provide frquent toileting  Volume/Electrolyte Status: ensure patient remains hydrated  Communication/Sensory: update plan of care on whiteboard  Behavioral: encourage patient to voice feelings  Mobility: dangle prior to standing, utilize bed/chair fall alarm and ensure bed is locked and in lowest position

## 2017-12-21 NOTE — WOUND TEAM
"Ostomy Nurse in to see patient for colostomy supplies. Patient stated that tomorrow or Friday is when appliance needs to be changed, and requested specifically for Ostomy Nurse to return tomorrow, 12/21/2017 for ostomy care. Patient states that he often has problems with his appliance leaking, so ostomy RN will assess ostomy and determine if a different appliance may work better. Patient also stated that due to cdiff the output from colostomy is large and nursing is having difficulty keeping up with the output, so a 2.75\" high output pouch placed over existing barrier, connected to down drain in garcia bag. Placed M9 deodorizer drops in pouch.   "

## 2017-12-21 NOTE — PROGRESS NOTES
Received report and care assumed. Patient A&Ox 4. Pt. Reports no pain. Work of breathing even and unlabored. Discussed POC. Call light within reach and pt. instructed to call when in need of assistance.  Denies any other needs at this time.

## 2017-12-21 NOTE — PROGRESS NOTES
Monitor Strip Summary: SR/ST with BBB, frequent PVCs and PACs noted, UT: 0.18, QRS: 0.12, QT: 0.34

## 2017-12-22 LAB
ANION GAP SERPL CALC-SCNC: 7 MMOL/L (ref 0–11.9)
BASOPHILS # BLD AUTO: 0.1 % (ref 0–1.8)
BASOPHILS # BLD: 0.01 K/UL (ref 0–0.12)
BUN SERPL-MCNC: 7 MG/DL (ref 8–22)
CALCIUM SERPL-MCNC: 7.8 MG/DL (ref 8.5–10.5)
CHLORIDE SERPL-SCNC: 108 MMOL/L (ref 96–112)
CO2 SERPL-SCNC: 23 MMOL/L (ref 20–33)
CREAT SERPL-MCNC: 0.58 MG/DL (ref 0.5–1.4)
EOSINOPHIL # BLD AUTO: 0.07 K/UL (ref 0–0.51)
EOSINOPHIL NFR BLD: 1 % (ref 0–6.9)
ERYTHROCYTE [DISTWIDTH] IN BLOOD BY AUTOMATED COUNT: 53.5 FL (ref 35.9–50)
GFR SERPL CREATININE-BSD FRML MDRD: >60 ML/MIN/1.73 M 2
GLUCOSE SERPL-MCNC: 111 MG/DL (ref 65–99)
HCT VFR BLD AUTO: 30 % (ref 42–52)
HGB BLD-MCNC: 9.8 G/DL (ref 14–18)
IMM GRANULOCYTES # BLD AUTO: 0.08 K/UL (ref 0–0.11)
IMM GRANULOCYTES NFR BLD AUTO: 1.2 % (ref 0–0.9)
LYMPHOCYTES # BLD AUTO: 0.93 K/UL (ref 1–4.8)
LYMPHOCYTES NFR BLD: 13.5 % (ref 22–41)
MCH RBC QN AUTO: 27.5 PG (ref 27–33)
MCHC RBC AUTO-ENTMCNC: 32.7 G/DL (ref 33.7–35.3)
MCV RBC AUTO: 84.3 FL (ref 81.4–97.8)
MONOCYTES # BLD AUTO: 0.84 K/UL (ref 0–0.85)
MONOCYTES NFR BLD AUTO: 12.2 % (ref 0–13.4)
NEUTROPHILS # BLD AUTO: 4.98 K/UL (ref 1.82–7.42)
NEUTROPHILS NFR BLD: 72 % (ref 44–72)
NRBC # BLD AUTO: 0 K/UL
NRBC BLD-RTO: 0 /100 WBC
PLATELET # BLD AUTO: 279 K/UL (ref 164–446)
PMV BLD AUTO: 8.4 FL (ref 9–12.9)
POTASSIUM SERPL-SCNC: 3.8 MMOL/L (ref 3.6–5.5)
RBC # BLD AUTO: 3.56 M/UL (ref 4.7–6.1)
SODIUM SERPL-SCNC: 138 MMOL/L (ref 135–145)
WBC # BLD AUTO: 6.9 K/UL (ref 4.8–10.8)

## 2017-12-22 PROCEDURE — A9270 NON-COVERED ITEM OR SERVICE: HCPCS | Performed by: HOSPITALIST

## 2017-12-22 PROCEDURE — A9270 NON-COVERED ITEM OR SERVICE: HCPCS | Performed by: INTERNAL MEDICINE

## 2017-12-22 PROCEDURE — 700102 HCHG RX REV CODE 250 W/ 637 OVERRIDE(OP): Performed by: HOSPITALIST

## 2017-12-22 PROCEDURE — 80048 BASIC METABOLIC PNL TOTAL CA: CPT

## 2017-12-22 PROCEDURE — 99232 SBSQ HOSP IP/OBS MODERATE 35: CPT | Performed by: INTERNAL MEDICINE

## 2017-12-22 PROCEDURE — 700102 HCHG RX REV CODE 250 W/ 637 OVERRIDE(OP): Performed by: INTERNAL MEDICINE

## 2017-12-22 PROCEDURE — 700105 HCHG RX REV CODE 258: Performed by: INTERNAL MEDICINE

## 2017-12-22 PROCEDURE — A9270 NON-COVERED ITEM OR SERVICE: HCPCS | Performed by: FAMILY MEDICINE

## 2017-12-22 PROCEDURE — 97530 THERAPEUTIC ACTIVITIES: CPT

## 2017-12-22 PROCEDURE — 85025 COMPLETE CBC W/AUTO DIFF WBC: CPT

## 2017-12-22 PROCEDURE — 770020 HCHG ROOM/CARE - TELE (206)

## 2017-12-22 PROCEDURE — 36415 COLL VENOUS BLD VENIPUNCTURE: CPT

## 2017-12-22 PROCEDURE — 700102 HCHG RX REV CODE 250 W/ 637 OVERRIDE(OP): Performed by: FAMILY MEDICINE

## 2017-12-22 RX ORDER — CALCIUM CARBONATE 500 MG/1
500 TABLET, CHEWABLE ORAL EVERY 6 HOURS PRN
Status: DISCONTINUED | OUTPATIENT
Start: 2017-12-22 | End: 2017-12-26 | Stop reason: HOSPADM

## 2017-12-22 RX ADMIN — SODIUM CHLORIDE: 9 INJECTION, SOLUTION INTRAVENOUS at 11:32

## 2017-12-22 RX ADMIN — LAMOTRIGINE 200 MG: 100 TABLET ORAL at 10:14

## 2017-12-22 RX ADMIN — ANTACID TABLETS 500 MG: 500 TABLET, CHEWABLE ORAL at 22:35

## 2017-12-22 RX ADMIN — SODIUM CHLORIDE: 9 INJECTION, SOLUTION INTRAVENOUS at 03:09

## 2017-12-22 RX ADMIN — SODIUM CHLORIDE: 9 INJECTION, SOLUTION INTRAVENOUS at 18:59

## 2017-12-22 RX ADMIN — METRONIDAZOLE 500 MG: 500 TABLET ORAL at 14:09

## 2017-12-22 RX ADMIN — OXYCODONE HYDROCHLORIDE 5 MG: 5 TABLET ORAL at 20:44

## 2017-12-22 RX ADMIN — OMEPRAZOLE 20 MG: 20 CAPSULE, DELAYED RELEASE ORAL at 10:14

## 2017-12-22 RX ADMIN — GABAPENTIN 300 MG: 300 CAPSULE ORAL at 10:14

## 2017-12-22 RX ADMIN — OXYCODONE HYDROCHLORIDE 5 MG: 5 TABLET ORAL at 10:18

## 2017-12-22 RX ADMIN — METRONIDAZOLE 500 MG: 500 TABLET ORAL at 20:39

## 2017-12-22 RX ADMIN — METRONIDAZOLE 500 MG: 500 TABLET ORAL at 05:52

## 2017-12-22 RX ADMIN — GABAPENTIN 300 MG: 300 CAPSULE ORAL at 14:09

## 2017-12-22 RX ADMIN — GABAPENTIN 300 MG: 300 CAPSULE ORAL at 20:39

## 2017-12-22 RX ADMIN — TAMSULOSIN HYDROCHLORIDE 0.4 MG: 0.4 CAPSULE ORAL at 10:14

## 2017-12-22 ASSESSMENT — PAIN SCALES - GENERAL
PAINLEVEL_OUTOF10: 7
PAINLEVEL_OUTOF10: 3
PAINLEVEL_OUTOF10: 3
PAINLEVEL_OUTOF10: 6
PAINLEVEL_OUTOF10: 0

## 2017-12-22 ASSESSMENT — GAIT ASSESSMENTS
GAIT LEVEL OF ASSIST: STAND BY ASSIST
DISTANCE (FEET): 25
ASSISTIVE DEVICE: FRONT WHEEL WALKER

## 2017-12-22 ASSESSMENT — COGNITIVE AND FUNCTIONAL STATUS - GENERAL
MOBILITY SCORE: 20
SUGGESTED CMS G CODE MODIFIER MOBILITY: CJ
STANDING UP FROM CHAIR USING ARMS: A LITTLE
WALKING IN HOSPITAL ROOM: A LITTLE
CLIMB 3 TO 5 STEPS WITH RAILING: A LOT

## 2017-12-22 ASSESSMENT — ENCOUNTER SYMPTOMS
NAUSEA: 0
SHORTNESS OF BREATH: 0
ABDOMINAL PAIN: 0
DIARRHEA: 1
FOCAL WEAKNESS: 0
HEADACHES: 0
FEVER: 0
VOMITING: 0
NECK PAIN: 0
CHILLS: 0
COUGH: 0
DIZZINESS: 0

## 2017-12-22 NOTE — PROGRESS NOTES
Sarah Clemons Fall Risk Assessment:     Last Known Fall: Within the last month  Mobility: Dizziness/generalized weakness, Use of assistive device/requires assist of two people  Medications: Cardiovascular or central nervous system meds  Mental Status/LOC/Awareness: Awake, alert, and oriented to date, place, and person  Toileting Needs: Use of assistive device (Bedside commode, bedpan, urinal)  Volume/Electrolyte Status: Use of IV fluids/tube feeds  Communication/Sensory: Visual (Glasses)/hearing deficit, Neuropathy  Behavior: Appropriate behavior  Sarah Clemons Fall Risk Total: 19  Fall Risk Level: HIGH RISK    Universal Fall Precautions:  call light/belongings in reach, bed in low position and locked, wheelchairs and assistive devices out of sight, siderails up x 2, use non-slip footwear, adequate lighting, clutter free and spill free environment, educate to call for assistance    Fall Risk Level Interventions:    TRIAL (TELE 8, NEURO, MED ERNESTO 5) Moderate Fall Risk Interventions  Place yellow fall risk ID band on patient: verified  Provide patient/family education based on risk assessment : completed  Educate patient/family to call staff for assistance when getting out of bed: completed  Place fall precaution signage outside patient door: completed  Utilize bed/chair fall alarm: refusedTRIAL (TELE 8, NEURO, MED ERNESTO 5) High Fall Risk Interventions  Place yellow fall risk ID band on patient: verified  Provide patient/family education based on risk assessment: completed  Educate patient/family to call staff for assistance when getting out of bed: completed  Place fall precaution signage outside patient door: completed  Place patient in room close to nursing station: completed  Utilize bed/chair fall alarm: completed  Notify charge of high risk for huddle: completed    Patient Specific Interventions:     Medication: review medications with patient and family and limit combination of prn medications  Mental  Status/LOC/Awareness: reinforce falls education, utilize bed/chair fall alarm and reinforce the use of call light  Toileting: monitor intake and output/use of appropriate interventions  Volume/Electrolyte Status: ensure patient remains hydrated, administer medications as ordered for nausea and vomiting and monitor abnormal lab values  Communication/Sensory: update plan of care on whiteboard and ensure proper positioning when transferrng/ambulating  Behavioral: administer medication as ordered and instruct/reinforce fall program rationale  Mobility: provide comfort measures during transport, utilize bed/chair fall alarm and ensure bed is locked and in lowest position

## 2017-12-22 NOTE — DIETARY
NUTRITION SERVICES - Alert received for newly identified wound. Wound team consult pending, will await wound staging to make recommendations if appropriate. RD will monitor per dept policy. Otherwise, pt on regular diet w/ fluctuating PO of 25-75% intake.

## 2017-12-22 NOTE — PROGRESS NOTES
Received report from night staff. Assumed pt care with BART Wise . Pain level 6/10 in hands; medicated appropriately. AOX4. POC discussed. Call light within reach, bed in lowest position, and personal items accessible.

## 2017-12-22 NOTE — PROGRESS NOTES
Sarah Clemons Fall Risk Assessment:     Last Known Fall: Within the last month  Mobility: Immobilized/requires assist of one person, Dizziness/generalized weakness, Use of assistive device/requires assist of two people  Medications: Cardiovascular or central nervous system meds  Mental Status/LOC/Awareness: Awake, alert, and oriented to date, place, and person  Toileting Needs: Use of catheters or diversion devices, Diarrhea/frequency/urgency  Volume/Electrolyte Status: Use of IV fluids/tube feeds  Communication/Sensory: Visual (Glasses)/hearing deficit, Neuropathy  Behavior: Appropriate behavior  Sarah Clemons Fall Risk Total: 24  Fall Risk Level: HIGH RISK    Universal Fall Precautions:  call light/belongings in reach, bed in low position and locked, siderails up x 2, use non-slip footwear, adequate lighting, clutter free and spill free environment, educate on level of risk, educate to call for assistance, wheelchairs and assistive devices out of sight    Fall Risk Level Interventions:    TRIAL (TELE 8, NEURO, MED ERNESTO 5) Moderate Fall Risk Interventions  Place yellow fall risk ID band on patient: verified  Provide patient/family education based on risk assessment : completed  Educate patient/family to call staff for assistance when getting out of bed: completed  Place fall precaution signage outside patient door: completed  Utilize bed/chair fall alarm: refusedTRIAL (TELE 8, NEURO, MED ERNESTO 5) High Fall Risk Interventions  Place yellow fall risk ID band on patient: verified  Provide patient/family education based on risk assessment: completed  Educate patient/family to call staff for assistance when getting out of bed: completed  Place fall precaution signage outside patient door: completed  Place patient in room close to nursing station: completed  Utilize bed/chair fall alarm: completed  Notify charge of high risk for huddle: completed    Patient Specific Interventions:     Medication: review medications with  patient and family  Mental Status/LOC/Awareness: reinforce falls education, check on patient hourly, utilize bed/chair fall alarm and reinforce the use of call light  Toileting: monitor intake and output/use of appropriate interventions  Volume/Electrolyte Status: ensure patient remains hydrated, monitor abnormal lab values and ensure IV fluids are appropriate  Communication/Sensory: update plan of care on whiteboard  Behavioral: engage patient in daily activities  Mobility: schedule physical activity throughout the day, utilize bed/chair fall alarm and collaborate with doctor for possible PT/OT consult

## 2017-12-22 NOTE — CARE PLAN
Problem: Skin Integrity  Goal: Risk for impaired skin integrity will decrease    Intervention: Assess risk factors for impaired skin integrity and/or pressure ulcers   Rohith score completed every shift, pt turned every 2 hours, skin assessment complete, adequate nutrition encouraged.

## 2017-12-22 NOTE — THERAPY
"Physical Therapy Treatment completed.   Bed Mobility:  Supine to Sit: Stand by Assist  Transfers: Sit to Stand: Stand by Assist  Gait: Level Of Assist: Stand by Assist with Front-Wheel Walker       Plan of Care: Will benefit from Physical Therapy 3 times per week  Discharge Recommendations: Equipment: Front-Wheel Walker. Post-acute therapy Discharge to home with outpatient or home health for additional skilled therapy services.    Pt today was unable to perform a greater distance than eval, but he reported he felt more fatigued and \"stiff\". He did not have LOB with FWW and was able to manage without physical assist. While here, he will benefit from continued acute skilled PT services to progress mobility.     See \"Rehab Therapy-Acute\" Patient Summary Report for complete documentation.       "

## 2017-12-22 NOTE — PROGRESS NOTES
Patient has an open area to his right sacrum. Pictures taken, LDA opened, Wound and dietary consult ordered.  Verified with 2nd RN Mercedes.

## 2017-12-22 NOTE — PROGRESS NOTES
[facility] Hospitalist Progress Note    Date of Service: 2017    Chief Complaint  77 y.o. male admitted 2017 with choronic neck pain, depression, recent intrabd infection s/p colostomy who presented with melena and diarrhea. Treated for C Diff    Interval Problem Update  : Afebrile >24h  Leukocytosis downtrending. Hgb stable, anemic.  Denies abd pain. Seen by GI, plan for 2 weeks of flagyl.   Neck pain, uses tramadol and oxy at baseline, has f/u at [place]  Not on meds for Dm, stopped, ISS. Check A1c    : No acute overnight events.  AF >48h  Leukocytosis resolved. Hgb remains stable  Still having frequent diarrhea, larger colostomy bag attached.    : Sacral ulcer noted, wound care consulted  UA shows concentration, continue IVF.  Continues to have frequent diarrhea.    Consultants/Specialty  GI    Disposition  Home pending diarrhea improves  PT - outpatient PT        Review of Systems   Constitutional: Negative for chills and fever.   Respiratory: Negative for cough and shortness of breath.    Cardiovascular: Negative for chest pain and leg swelling.   Gastrointestinal: Positive for diarrhea. Negative for abdominal pain, nausea and vomiting.   Genitourinary: Negative for dysuria.   Musculoskeletal: Negative for neck pain.   Skin: Negative for rash.   Neurological: Negative for dizziness, focal weakness and headaches.      Physical Exam  Laboratory/Imaging   Hemodynamics  Temp (24hrs), Av.1 °C (98.8 °F), Min:36.7 °C (98 °F), Max:37.6 °C (99.7 °F)   Temperature: 36.8 °C (98.3 °F)  Pulse  Av.6  Min: 65  Max: 109    Blood Pressure : 114/61      Respiratory      Respiration: 18, Pulse Oximetry: 94 %     Work Of Breathing / Effort: Mild  RUL Breath Sounds: Clear, RML Breath Sounds: Diminished, RLL Breath Sounds: Diminished, TOSHIA Breath Sounds: Clear, LLL Breath Sounds: Diminished    Fluids    Intake/Output Summary (Last 24 hours) at 17 6916  Last data filed at 17 8436   Gross  per 24 hour   Intake             1550 ml   Output             1250 ml   Net              300 ml       Nutrition  Orders Placed This Encounter   Procedures   • DIET ORDER     Standing Status:   Standing     Number of Occurrences:   1     Order Specific Question:   Diet:     Answer:   Regular [1]     Physical Exam   Constitutional: He is oriented to person, place, and time. He appears well-developed and well-nourished. He is cooperative.   HENT:   Head: Normocephalic and atraumatic.   Eyes: Conjunctivae and EOM are normal.   Neck:   Wearing neck brace   Cardiovascular: Normal rate and regular rhythm.  Exam reveals no gallop and no friction rub.    No murmur heard.  Pulmonary/Chest: Effort normal and breath sounds normal. He has no wheezes. He has no rales.   Abdominal: Soft. Bowel sounds are normal. He exhibits distension. There is no tenderness. There is no rebound and no guarding.   Colostomy in place   Musculoskeletal: He exhibits no edema.   Neurological: He is alert and oriented to person, place, and time.   Skin: Skin is warm and dry.       Recent Labs      12/20/17   0316  12/20/17   1147  12/21/17   0249  12/22/17   0327   WBC  11.1*   --   9.0  6.9   RBC  3.79*   --   3.99*  3.56*   HEMOGLOBIN  10.4*  10.5*  10.6*  9.8*   HEMATOCRIT  31.7*   --   33.2*  30.0*   MCV  83.6   --   83.2  84.3   MCH  27.4   --   26.6*  27.5   MCHC  32.8*   --   31.9*  32.7*   RDW  51.2*   --   52.5*  53.5*   PLATELETCT  274   --   314  279   MPV  8.4*   --   8.3*  8.4*     Recent Labs      12/20/17   0316  12/21/17   0249  12/22/17   0327   SODIUM  135  137  138   POTASSIUM  3.8  3.9  3.8   CHLORIDE  107  108  108   CO2  20  24  23   GLUCOSE  120*  112*  111*   BUN  12  10  7*   CREATININE  0.76  0.69  0.58   CALCIUM  7.4*  8.3*  7.8*                      Assessment/Plan     * Sepsis(995.91)- (present on admission)   Assessment & Plan    This is sepsis (without associated acute organ dysfunction).   -2/2 C Diff; first episode; on  po flagyl tid  -onsepsis protocol, vitals are stable, mild tachycardia, normotensive  - lactate decreased        GIB (gastrointestinal bleeding)- (present on admission)   Assessment & Plan    -GI consulted  - continue omeprazole  -Hgb stable, continue to monitor daily        Hypomagnesemia- (present on admission)   Assessment & Plan    Normalized with repletion        Type 2 diabetes mellitus (CMS-HCC)- (present on admission)   Assessment & Plan    Pt says he doesn't take metformin. Glucose is normal.  ISS stopped per patient request, will monitor glucose daily  A1c 6.2%, well controlled.        C. difficile diarrhea- (present on admission)   Assessment & Plan    -PO flagyl for 2 weeks per GI  GI f/u as outpt for colonoscopy  - Continue IVF for fluid loss        BPH (benign prostatic hypertrophy)- (present on admission)   Assessment & Plan    continue flomax            Reviewed items::  EKG reviewed, Labs reviewed, Medications reviewed and Radiology images reviewed  Ruff catheter::  No Ruff  DVT prophylaxis - mechanical:  SCDs

## 2017-12-23 ENCOUNTER — APPOINTMENT (OUTPATIENT)
Dept: RADIOLOGY | Facility: MEDICAL CENTER | Age: 77
DRG: 872 | End: 2017-12-23
Attending: INTERNAL MEDICINE
Payer: MEDICARE

## 2017-12-23 PROBLEM — R50.9 FEVER: Status: ACTIVE | Noted: 2017-12-23

## 2017-12-23 LAB
ANION GAP SERPL CALC-SCNC: 5 MMOL/L (ref 0–11.9)
BACTERIA UR CULT: NORMAL
BASOPHILS # BLD AUTO: 0.6 % (ref 0–1.8)
BASOPHILS # BLD: 0.04 K/UL (ref 0–0.12)
BUN SERPL-MCNC: 7 MG/DL (ref 8–22)
CALCIUM SERPL-MCNC: 7.6 MG/DL (ref 8.5–10.5)
CHLORIDE SERPL-SCNC: 108 MMOL/L (ref 96–112)
CO2 SERPL-SCNC: 23 MMOL/L (ref 20–33)
CREAT SERPL-MCNC: 0.57 MG/DL (ref 0.5–1.4)
EOSINOPHIL # BLD AUTO: 0.08 K/UL (ref 0–0.51)
EOSINOPHIL NFR BLD: 1.2 % (ref 0–6.9)
ERYTHROCYTE [DISTWIDTH] IN BLOOD BY AUTOMATED COUNT: 53.1 FL (ref 35.9–50)
GFR SERPL CREATININE-BSD FRML MDRD: >60 ML/MIN/1.73 M 2
GLUCOSE SERPL-MCNC: 111 MG/DL (ref 65–99)
HCT VFR BLD AUTO: 30.3 % (ref 42–52)
HGB BLD-MCNC: 9.8 G/DL (ref 14–18)
IMM GRANULOCYTES # BLD AUTO: 0.06 K/UL (ref 0–0.11)
IMM GRANULOCYTES NFR BLD AUTO: 0.9 % (ref 0–0.9)
LYMPHOCYTES # BLD AUTO: 0.94 K/UL (ref 1–4.8)
LYMPHOCYTES NFR BLD: 13.6 % (ref 22–41)
MCH RBC QN AUTO: 26.8 PG (ref 27–33)
MCHC RBC AUTO-ENTMCNC: 32.3 G/DL (ref 33.7–35.3)
MCV RBC AUTO: 82.8 FL (ref 81.4–97.8)
MONOCYTES # BLD AUTO: 0.95 K/UL (ref 0–0.85)
MONOCYTES NFR BLD AUTO: 13.7 % (ref 0–13.4)
NEUTROPHILS # BLD AUTO: 4.84 K/UL (ref 1.82–7.42)
NEUTROPHILS NFR BLD: 70 % (ref 44–72)
NRBC # BLD AUTO: 0 K/UL
NRBC BLD-RTO: 0 /100 WBC
PLATELET # BLD AUTO: 288 K/UL (ref 164–446)
PMV BLD AUTO: 8.1 FL (ref 9–12.9)
POTASSIUM SERPL-SCNC: 3.8 MMOL/L (ref 3.6–5.5)
RBC # BLD AUTO: 3.66 M/UL (ref 4.7–6.1)
SIGNIFICANT IND 70042: NORMAL
SITE SITE: NORMAL
SODIUM SERPL-SCNC: 136 MMOL/L (ref 135–145)
SOURCE SOURCE: NORMAL
WBC # BLD AUTO: 6.9 K/UL (ref 4.8–10.8)

## 2017-12-23 PROCEDURE — 85025 COMPLETE CBC W/AUTO DIFF WBC: CPT

## 2017-12-23 PROCEDURE — A9270 NON-COVERED ITEM OR SERVICE: HCPCS | Performed by: HOSPITALIST

## 2017-12-23 PROCEDURE — 87040 BLOOD CULTURE FOR BACTERIA: CPT | Mod: 91

## 2017-12-23 PROCEDURE — 700105 HCHG RX REV CODE 258: Performed by: INTERNAL MEDICINE

## 2017-12-23 PROCEDURE — 700102 HCHG RX REV CODE 250 W/ 637 OVERRIDE(OP): Performed by: HOSPITALIST

## 2017-12-23 PROCEDURE — 71010 DX-CHEST-PORTABLE (1 VIEW): CPT

## 2017-12-23 PROCEDURE — 80048 BASIC METABOLIC PNL TOTAL CA: CPT

## 2017-12-23 PROCEDURE — 700102 HCHG RX REV CODE 250 W/ 637 OVERRIDE(OP): Performed by: INTERNAL MEDICINE

## 2017-12-23 PROCEDURE — A9270 NON-COVERED ITEM OR SERVICE: HCPCS | Performed by: INTERNAL MEDICINE

## 2017-12-23 PROCEDURE — 99233 SBSQ HOSP IP/OBS HIGH 50: CPT | Performed by: INTERNAL MEDICINE

## 2017-12-23 PROCEDURE — 770020 HCHG ROOM/CARE - TELE (206)

## 2017-12-23 PROCEDURE — 36415 COLL VENOUS BLD VENIPUNCTURE: CPT

## 2017-12-23 RX ORDER — POTASSIUM CHLORIDE 20 MEQ/1
20 TABLET, EXTENDED RELEASE ORAL ONCE
Status: COMPLETED | OUTPATIENT
Start: 2017-12-23 | End: 2017-12-23

## 2017-12-23 RX ADMIN — GABAPENTIN 300 MG: 300 CAPSULE ORAL at 10:16

## 2017-12-23 RX ADMIN — METRONIDAZOLE 500 MG: 500 TABLET ORAL at 15:28

## 2017-12-23 RX ADMIN — GABAPENTIN 300 MG: 300 CAPSULE ORAL at 15:28

## 2017-12-23 RX ADMIN — TAMSULOSIN HYDROCHLORIDE 0.4 MG: 0.4 CAPSULE ORAL at 10:16

## 2017-12-23 RX ADMIN — METRONIDAZOLE 500 MG: 500 TABLET ORAL at 05:36

## 2017-12-23 RX ADMIN — POTASSIUM CHLORIDE 20 MEQ: 1500 TABLET, EXTENDED RELEASE ORAL at 10:16

## 2017-12-23 RX ADMIN — METRONIDAZOLE 500 MG: 500 TABLET ORAL at 20:25

## 2017-12-23 RX ADMIN — GABAPENTIN 300 MG: 300 CAPSULE ORAL at 20:25

## 2017-12-23 RX ADMIN — OXYCODONE HYDROCHLORIDE 5 MG: 5 TABLET ORAL at 05:38

## 2017-12-23 RX ADMIN — OMEPRAZOLE 20 MG: 20 CAPSULE, DELAYED RELEASE ORAL at 10:16

## 2017-12-23 RX ADMIN — LAMOTRIGINE 200 MG: 100 TABLET ORAL at 10:16

## 2017-12-23 RX ADMIN — SODIUM CHLORIDE: 9 INJECTION, SOLUTION INTRAVENOUS at 02:45

## 2017-12-23 ASSESSMENT — ENCOUNTER SYMPTOMS
SHORTNESS OF BREATH: 0
DIARRHEA: 1
CHILLS: 0
ABDOMINAL PAIN: 0
VOMITING: 0
HEADACHES: 0
NECK PAIN: 0
NAUSEA: 0
DIZZINESS: 0
FEVER: 0
COUGH: 0
FOCAL WEAKNESS: 0

## 2017-12-23 ASSESSMENT — PAIN SCALES - GENERAL
PAINLEVEL_OUTOF10: 7
PAINLEVEL_OUTOF10: 0
PAINLEVEL_OUTOF10: 2
PAINLEVEL_OUTOF10: 0
PAINLEVEL_OUTOF10: 2
PAINLEVEL_OUTOF10: 0

## 2017-12-23 NOTE — PROGRESS NOTES
[facility] Hospitalist Progress Note    Date of Service: 2017    Chief Complaint  77 y.o. male admitted 2017 with choronic neck pain, depression, recent intrabd infection s/p colostomy who presented with melena and diarrhea. Treated for C Diff    Interval Problem Update  : Afebrile >24h  Leukocytosis downtrending. Hgb stable, anemic.  Denies abd pain. Seen by GI, plan for 2 weeks of flagyl.   Neck pain, uses tramadol and oxy at baseline, has f/u at [facility]  Not on meds for Dm, stopped, ISS. Check A1c    : No acute overnight events.  AF >48h  Leukocytosis resolved. Hgb remains stable  Still having frequent diarrhea, larger colostomy bag attached.    : Sacral ulcer noted, wound care consulted  UA shows concentration, continue IVF.  Continues to have frequent diarrhea.    : fever 100.9. Patient asymptomatic. WBC normal. Urine cx pending. CXR and blood cx ordered.     Consultants/Specialty  GI    Disposition  Home pending diarrhea improves  PT - outpatient PT        Review of Systems   Constitutional: Negative for chills and fever.   Respiratory: Negative for cough and shortness of breath.    Cardiovascular: Negative for chest pain and leg swelling.   Gastrointestinal: Positive for diarrhea. Negative for abdominal pain, nausea and vomiting.   Genitourinary: Negative for dysuria.   Musculoskeletal: Negative for neck pain.   Skin: Negative for itching and rash.   Neurological: Negative for dizziness, focal weakness and headaches.      Physical Exam  Laboratory/Imaging   Hemodynamics  Temp (24hrs), Av.6 °C (99.7 °F), Min:36.8 °C (98.3 °F), Max:38.3 °C (100.9 °F)   Temperature: 37.8 °C (100.1 °F)  Pulse  Av.3  Min: 65  Max: 109    Blood Pressure : 108/51      Respiratory      Respiration: 16, Pulse Oximetry: 95 %     Work Of Breathing / Effort: Mild  RUL Breath Sounds: Clear, RML Breath Sounds: Diminished, RLL Breath Sounds: Diminished, TOSHIA Breath Sounds: Clear, LLL Breath Sounds:  Diminished    Fluids    Intake/Output Summary (Last 24 hours) at 12/23/17 1449  Last data filed at 12/23/17 0600   Gross per 24 hour   Intake             1550 ml   Output              200 ml   Net             1350 ml       Nutrition  Orders Placed This Encounter   Procedures   • DIET ORDER     Standing Status:   Standing     Number of Occurrences:   1     Order Specific Question:   Diet:     Answer:   Regular [1]     Physical Exam   Constitutional: He is oriented to person, place, and time. He appears well-developed and well-nourished. He is cooperative.   HENT:   Head: Normocephalic and atraumatic.   Eyes: Conjunctivae and EOM are normal.   Neck:   Wearing neck brace   Cardiovascular: Normal rate and regular rhythm.  Exam reveals no gallop and no friction rub.    No murmur heard.  Pulmonary/Chest: Effort normal and breath sounds normal. He has no wheezes. He has no rales.   Abdominal: Soft. Bowel sounds are normal. He exhibits no distension. There is no tenderness. There is no rebound and no guarding.   Colostomy in place   Musculoskeletal: He exhibits no edema.   Negative Hoffmans   Neurological: He is alert and oriented to person, place, and time.   Skin: Skin is warm and dry.       Recent Labs      12/21/17   0249 12/22/17   0327  12/23/17   0236   WBC  9.0  6.9  6.9   RBC  3.99*  3.56*  3.66*   HEMOGLOBIN  10.6*  9.8*  9.8*   HEMATOCRIT  33.2*  30.0*  30.3*   MCV  83.2  84.3  82.8   MCH  26.6*  27.5  26.8*   MCHC  31.9*  32.7*  32.3*   RDW  52.5*  53.5*  53.1*   PLATELETCT  314  279  288   MPV  8.3*  8.4*  8.1*     Recent Labs      12/21/17   0249  12/22/17   0327  12/23/17   0236   SODIUM  137  138  136   POTASSIUM  3.9  3.8  3.8   CHLORIDE  108  108  108   CO2  24  23  23   GLUCOSE  112*  111*  111*   BUN  10  7*  7*   CREATININE  0.69  0.58  0.57   CALCIUM  8.3*  7.8*  7.6*                      Assessment/Plan     * C. difficile diarrhea- (present on admission)   Assessment & Plan    -PO flagyl for 2 weeks  per GI  GI f/u as outpt for colonoscopy  - diarrhea improving        Fever   Assessment & Plan    New fever. Patient asymptomatic. Hemodynamically stable. No signs of DVT on exam. Possible atelectasis.   - urine cx pending  - CXR ordered  - blood cultures ordered        Hypomagnesemia- (present on admission)   Assessment & Plan    Normalized with repletion        Type 2 diabetes mellitus (CMS-HCC)- (present on admission)   Assessment & Plan    Pt says he doesn't take metformin. Glucose is normal.  ISS stopped per patient request, will monitor glucose daily  A1c 6.2%, well controlled.        GIB (gastrointestinal bleeding)- (present on admission)   Assessment & Plan    -GI consulted  - continue omeprazole  -Hgb stable, continue to monitor daily        Sepsis(995.91)- (present on admission)   Assessment & Plan    This is sepsis (without associated acute organ dysfunction).   -2/2 C Diff; first episode; on po flagyl tid  -onsepsis protocol, vitals are stable, mild tachycardia, normotensive  - lactate decreased        BPH (benign prostatic hypertrophy)- (present on admission)   Assessment & Plan    continue flomax            Reviewed items::  EKG reviewed, Labs reviewed, Medications reviewed and Radiology images reviewed  Ruff catheter::  No Ruff  DVT prophylaxis - mechanical:  SCDs

## 2017-12-23 NOTE — ASSESSMENT & PLAN NOTE
New fever. Patient asymptomatic. Hemodynamically stable. No signs of DVT on exam. Possible atelectasis on CXR. Urine and blood cultures with no growth.  - may be from C Diff, monitor

## 2017-12-23 NOTE — CARE PLAN
Problem: Pain Management  Goal: Pain level will decrease to patient's comfort goal    Intervention: Follow pain managment plan developed in collaboration with patient and Interdisciplinary Team  Pt assessed for pain q4h and medicated PRN. Pt instructed to notify RN of any new or increasing pain to prevent it from becoming intolerable. Verbalized understanding.

## 2017-12-23 NOTE — PROGRESS NOTES
Sarah Clemons Fall Risk Assessment:     Last Known Fall: Within the last month  Mobility: Dizziness/generalized weakness, Immobilized/requires assist of one person  Medications: Cardiovascular or central nervous system meds  Mental Status/LOC/Awareness: Awake, alert, and oriented to date, place, and person  Toileting Needs: Use of catheters or diversion devices  Volume/Electrolyte Status: Use of IV fluids/tube feeds  Communication/Sensory: Visual (Glasses)/hearing deficit, Neuropathy  Behavior: Depression/anxiety  Sarah Clemons Fall Risk Total: 18  Fall Risk Level: HIGH RISK    Universal Fall Precautions:  call light/belongings in reach, bed in low position and locked, wheelchairs and assistive devices out of sight, siderails up x 2, use non-slip footwear, adequate lighting, clutter free and spill free environment, educate to call for assistance    Fall Risk Level Interventions:    TRIAL (TELE 8, NEURO, MED ERNESTO 5) Moderate Fall Risk Interventions  Place yellow fall risk ID band on patient: verified  Provide patient/family education based on risk assessment : completed  Educate patient/family to call staff for assistance when getting out of bed: completed  Place fall precaution signage outside patient door: completed  Utilize bed/chair fall alarm: refusedTRIAL (TELE 8, NEURO, MED ERNESTO 5) High Fall Risk Interventions  Place yellow fall risk ID band on patient: verified  Provide patient/family education based on risk assessment: completed  Educate patient/family to call staff for assistance when getting out of bed: completed  Place fall precaution signage outside patient door: verified  Place patient in room close to nursing station: completed  Utilize bed/chair fall alarm: completed  Notify charge of high risk for huddle: completed    Patient Specific Interventions:     Medication: review medications with patient and family  Mental Status/LOC/Awareness: reinforce the use of call light  Toileting: instruct  patient/family on the need to call for assistance when toileting  Volume/Electrolyte Status: monitor abnormal lab values  Communication/Sensory: update plan of care on whiteboard  Behavioral: instruct/reinforce fall program rationale  Mobility: ensure bed is locked and in lowest position

## 2017-12-23 NOTE — PROGRESS NOTES
Sarah Clemons Fall Risk Assessment:     Last Known Fall: Within the last month  Mobility: Dizziness/generalized weakness, Immobilized/requires assist of one person  Medications: Cardiovascular or central nervous system meds  Mental Status/LOC/Awareness: Awake, alert, and oriented to date, place, and person  Toileting Needs: Use of catheters or diversion devices  Volume/Electrolyte Status: Use of IV fluids/tube feeds  Communication/Sensory: Visual (Glasses)/hearing deficit, Neuropathy  Behavior: Depression/anxiety  Sarah Clemons Fall Risk Total: 18  Fall Risk Level: HIGH RISK    Universal Fall Precautions:  call light/belongings in reach, bed in low position and locked, wheelchairs and assistive devices out of sight, siderails up x 2, use non-slip footwear, adequate lighting, clutter free and spill free environment, educate to call for assistance    Fall Risk Level Interventions:    TRIAL (TELE 8, NEURO, MED ERNESTO 5) Moderate Fall Risk Interventions  Place yellow fall risk ID band on patient: verified  Provide patient/family education based on risk assessment : completed  Educate patient/family to call staff for assistance when getting out of bed: completed  Place fall precaution signage outside patient door: completed  Utilize bed/chair fall alarm: refusedTRIAL (TELE 8, NEURO, MED ERNESTO 5) High Fall Risk Interventions  Place yellow fall risk ID band on patient: verified  Provide patient/family education based on risk assessment: completed  Educate patient/family to call staff for assistance when getting out of bed: completed  Place fall precaution signage outside patient door: verified  Place patient in room close to nursing station: completed  Utilize bed/chair fall alarm: completed  Notify charge of high risk for huddle: completed    Patient Specific Interventions:     Medication: limit combination of prn medications  Mental Status/LOC/Awareness: reinforce falls education, check on patient hourly and utilize  bed/chair fall alarm  Toileting: monitor intake and output/use of appropriate interventions  Volume/Electrolyte Status: ensure patient remains hydrated, monitor abnormal lab values and ensure IV fluids are appropriate  Communication/Sensory: update plan of care on whiteboard and ensure patient has glasses/contacts and hearing aids/dentures  Behavioral: engage patient in daily activities and administer medication as ordered  Mobility: dangle prior to standing, utilize bed/chair fall alarm, ensure bed is locked and in lowest position, provide appropriate assistive device and collaborate with doctor for possible PT/OT consult

## 2017-12-24 PROBLEM — G89.29 CHRONIC NECK PAIN: Status: ACTIVE | Noted: 2017-12-24

## 2017-12-24 PROBLEM — M54.2 CHRONIC NECK PAIN: Status: ACTIVE | Noted: 2017-12-24

## 2017-12-24 LAB
ALBUMIN SERPL BCP-MCNC: 2 G/DL (ref 3.2–4.9)
ALBUMIN/GLOB SERPL: 1 G/DL
ALP SERPL-CCNC: 44 U/L (ref 30–99)
ALT SERPL-CCNC: 7 U/L (ref 2–50)
ANION GAP SERPL CALC-SCNC: 5 MMOL/L (ref 0–11.9)
AST SERPL-CCNC: 16 U/L (ref 12–45)
BASOPHILS # BLD AUTO: 0.2 % (ref 0–1.8)
BASOPHILS # BLD: 0.01 K/UL (ref 0–0.12)
BILIRUB SERPL-MCNC: 0.3 MG/DL (ref 0.1–1.5)
BUN SERPL-MCNC: 7 MG/DL (ref 8–22)
CALCIUM SERPL-MCNC: 8.2 MG/DL (ref 8.5–10.5)
CHLORIDE SERPL-SCNC: 105 MMOL/L (ref 96–112)
CO2 SERPL-SCNC: 27 MMOL/L (ref 20–33)
CREAT SERPL-MCNC: 0.68 MG/DL (ref 0.5–1.4)
EOSINOPHIL # BLD AUTO: 0.05 K/UL (ref 0–0.51)
EOSINOPHIL NFR BLD: 0.8 % (ref 0–6.9)
ERYTHROCYTE [DISTWIDTH] IN BLOOD BY AUTOMATED COUNT: 53.1 FL (ref 35.9–50)
GFR SERPL CREATININE-BSD FRML MDRD: >60 ML/MIN/1.73 M 2
GLOBULIN SER CALC-MCNC: 2.1 G/DL (ref 1.9–3.5)
GLUCOSE SERPL-MCNC: 110 MG/DL (ref 65–99)
HCT VFR BLD AUTO: 29.9 % (ref 42–52)
HGB BLD-MCNC: 9.7 G/DL (ref 14–18)
IMM GRANULOCYTES # BLD AUTO: 0.09 K/UL (ref 0–0.11)
IMM GRANULOCYTES NFR BLD AUTO: 1.4 % (ref 0–0.9)
LYMPHOCYTES # BLD AUTO: 0.87 K/UL (ref 1–4.8)
LYMPHOCYTES NFR BLD: 13.1 % (ref 22–41)
MCH RBC QN AUTO: 26.9 PG (ref 27–33)
MCHC RBC AUTO-ENTMCNC: 32.4 G/DL (ref 33.7–35.3)
MCV RBC AUTO: 83.1 FL (ref 81.4–97.8)
MONOCYTES # BLD AUTO: 0.9 K/UL (ref 0–0.85)
MONOCYTES NFR BLD AUTO: 13.5 % (ref 0–13.4)
NEUTROPHILS # BLD AUTO: 4.74 K/UL (ref 1.82–7.42)
NEUTROPHILS NFR BLD: 71 % (ref 44–72)
NRBC # BLD AUTO: 0 K/UL
NRBC BLD-RTO: 0 /100 WBC
PLATELET # BLD AUTO: 281 K/UL (ref 164–446)
PMV BLD AUTO: 8.2 FL (ref 9–12.9)
POTASSIUM SERPL-SCNC: 4.1 MMOL/L (ref 3.6–5.5)
PROT SERPL-MCNC: 4.1 G/DL (ref 6–8.2)
RBC # BLD AUTO: 3.6 M/UL (ref 4.7–6.1)
SODIUM SERPL-SCNC: 137 MMOL/L (ref 135–145)
WBC # BLD AUTO: 6.7 K/UL (ref 4.8–10.8)

## 2017-12-24 PROCEDURE — 80053 COMPREHEN METABOLIC PANEL: CPT

## 2017-12-24 PROCEDURE — 700102 HCHG RX REV CODE 250 W/ 637 OVERRIDE(OP): Performed by: INTERNAL MEDICINE

## 2017-12-24 PROCEDURE — A9270 NON-COVERED ITEM OR SERVICE: HCPCS | Performed by: INTERNAL MEDICINE

## 2017-12-24 PROCEDURE — 700111 HCHG RX REV CODE 636 W/ 250 OVERRIDE (IP): Performed by: HOSPITALIST

## 2017-12-24 PROCEDURE — 770020 HCHG ROOM/CARE - TELE (206)

## 2017-12-24 PROCEDURE — A9270 NON-COVERED ITEM OR SERVICE: HCPCS | Performed by: HOSPITALIST

## 2017-12-24 PROCEDURE — 99232 SBSQ HOSP IP/OBS MODERATE 35: CPT | Performed by: INTERNAL MEDICINE

## 2017-12-24 PROCEDURE — 85025 COMPLETE CBC W/AUTO DIFF WBC: CPT

## 2017-12-24 PROCEDURE — 36415 COLL VENOUS BLD VENIPUNCTURE: CPT

## 2017-12-24 PROCEDURE — 700102 HCHG RX REV CODE 250 W/ 637 OVERRIDE(OP): Performed by: HOSPITALIST

## 2017-12-24 RX ADMIN — METRONIDAZOLE 500 MG: 500 TABLET ORAL at 12:48

## 2017-12-24 RX ADMIN — METRONIDAZOLE 500 MG: 500 TABLET ORAL at 21:08

## 2017-12-24 RX ADMIN — METRONIDAZOLE 500 MG: 500 TABLET ORAL at 06:01

## 2017-12-24 RX ADMIN — ONDANSETRON 4 MG: 2 INJECTION INTRAMUSCULAR; INTRAVENOUS at 08:32

## 2017-12-24 RX ADMIN — GABAPENTIN 300 MG: 300 CAPSULE ORAL at 12:48

## 2017-12-24 RX ADMIN — GABAPENTIN 300 MG: 300 CAPSULE ORAL at 21:08

## 2017-12-24 RX ADMIN — OXYCODONE HYDROCHLORIDE 5 MG: 5 TABLET ORAL at 12:48

## 2017-12-24 RX ADMIN — LAMOTRIGINE 200 MG: 100 TABLET ORAL at 08:33

## 2017-12-24 RX ADMIN — GABAPENTIN 300 MG: 300 CAPSULE ORAL at 08:33

## 2017-12-24 RX ADMIN — OXYCODONE HYDROCHLORIDE 5 MG: 5 TABLET ORAL at 21:08

## 2017-12-24 RX ADMIN — TAMSULOSIN HYDROCHLORIDE 0.4 MG: 0.4 CAPSULE ORAL at 08:33

## 2017-12-24 RX ADMIN — TRAMADOL HYDROCHLORIDE 50 MG: 50 TABLET, COATED ORAL at 08:32

## 2017-12-24 RX ADMIN — OMEPRAZOLE 20 MG: 20 CAPSULE, DELAYED RELEASE ORAL at 08:33

## 2017-12-24 ASSESSMENT — ENCOUNTER SYMPTOMS
FEVER: 0
DIZZINESS: 0
CHILLS: 0
NAUSEA: 0
SHORTNESS OF BREATH: 0
VOMITING: 0
DIARRHEA: 1
HEADACHES: 0
FOCAL WEAKNESS: 0
ABDOMINAL PAIN: 0
NECK PAIN: 0
COUGH: 0

## 2017-12-24 ASSESSMENT — COGNITIVE AND FUNCTIONAL STATUS - GENERAL
SUGGESTED CMS G CODE MODIFIER MOBILITY: CK
CLIMB 3 TO 5 STEPS WITH RAILING: A LITTLE
MOVING TO AND FROM BED TO CHAIR: A LITTLE
DAILY ACTIVITIY SCORE: 19
MOBILITY SCORE: 18
MOVING FROM LYING ON BACK TO SITTING ON SIDE OF FLAT BED: A LITTLE
SUGGESTED CMS G CODE MODIFIER DAILY ACTIVITY: CK
PERSONAL GROOMING: A LITTLE
TURNING FROM BACK TO SIDE WHILE IN FLAT BAD: A LITTLE
DRESSING REGULAR LOWER BODY CLOTHING: A LITTLE
DRESSING REGULAR UPPER BODY CLOTHING: A LITTLE
WALKING IN HOSPITAL ROOM: A LITTLE
HELP NEEDED FOR BATHING: A LITTLE
STANDING UP FROM CHAIR USING ARMS: A LITTLE
TOILETING: A LITTLE

## 2017-12-24 ASSESSMENT — PAIN SCALES - GENERAL
PAINLEVEL_OUTOF10: 0
PAINLEVEL_OUTOF10: 0
PAINLEVEL_OUTOF10: 2
PAINLEVEL_OUTOF10: 3
PAINLEVEL_OUTOF10: 1
PAINLEVEL_OUTOF10: 7
PAINLEVEL_OUTOF10: 7
PAINLEVEL_OUTOF10: 6
PAINLEVEL_OUTOF10: 0
PAINLEVEL_OUTOF10: 0

## 2017-12-24 ASSESSMENT — LIFESTYLE VARIABLES: DO YOU DRINK ALCOHOL: NO

## 2017-12-24 NOTE — WOUND TEAM
"[facility]   Inpatient Services   Established Ostomy Management/ troubleshooting  HPI: Reviewed  PMH: Reviewed   SH: Reviewed   Reason for Ostomy nurse consult:  Evaluation of colostomy due to patient stating that he has issues leaking.   Subjective: \"I realized that I was putting on the wafer with wrinkled skin, and that was causing it to leak. It's good now.\"  Objective: Pleasant.  Ostomy type: Colostomy.  Stoma location: LLQ.  Stoma assessment:    Appearance:  Red.   Size: 1.25\"   Protrusion: Budded less than 1\".   MC jxn: Resolved.   Peristomal skin: Intact.    Ostomy Appliance (type and size): 2.25\" 2 piece.  Assessment: Patient has trouble due to limited dexterity in hands and c-collar in place.   Interventions:  Removed appliance. Cleansed peristomal skin with warm water and washcloth. Patted dry. Cut barrier at 1.25\" line per patient, that fits perfectly. Placed barrier around stoma, rubbed to adhere, removed paper tabs and smoothed down edges. Snapped on pouch and secured the bottom. Placed patient in a 2.75\" appliance because stoma looked that large due to collection of mucous, after cleaning stoma it was apparent that patient can easily fit into a 2.25\"   Pt education: Questions and concerns addressed.  Plan: Nursing to assist patient.   Anticipated discharge needs: Established ostomy. Just need to confirm that patient has supplies.    "

## 2017-12-24 NOTE — PROGRESS NOTES
Assumed care of Pt. PT sleeping comfortably in bed. Neck brace in place. Call bell in reach, white board updated.

## 2017-12-24 NOTE — PROGRESS NOTES
[facility]ist Progress Note    Date of Service: 2017    Chief Complaint  77 y.o. male admitted 2017 with chronic neck pain, depression, recent intrabd infection s/p colostomy who presented with melena and diarrhea. Treated for C Diff    Interval Problem Update  : Afebrile >24h  Leukocytosis downtrending. Hgb stable, anemic.  Denies abd pain. Seen by GI, plan for 2 weeks of flagyl.   Neck pain, uses tramadol and oxy at baseline, has f/u at [facility]  Not on meds for DM, stopped, ISS. Check A1c    : No acute overnight events.  AF >48h  Leukocytosis resolved. Hgb remains stable  Still having frequent diarrhea, larger colostomy bag attached.    : Sacral ulcer noted, wound care consulted  UA shows concentration, continue IVF.  Continues to have frequent diarrhea.    : fever 100.9. Patient asymptomatic. WBC normal. Urine cx pending. CXR and blood cx ordered.     : remains AF, normal WBC. Blood and urine cx normal. CXR showed left basilar change, likely atelectasis. Diarrhea improving    Consultants/Specialty  GI    Disposition  Home pending diarrhea improves  PT - outpatient PT        Review of Systems   Constitutional: Negative for chills and fever.   Respiratory: Negative for cough and shortness of breath.    Cardiovascular: Negative for chest pain and leg swelling.   Gastrointestinal: Positive for diarrhea. Negative for abdominal pain, nausea and vomiting.   Genitourinary: Negative for dysuria.   Musculoskeletal: Negative for neck pain.   Neurological: Negative for dizziness, focal weakness and headaches.      Physical Exam  Laboratory/Imaging   Hemodynamics  Temp (24hrs), Av.3 °C (99.2 °F), Min:36.8 °C (98.2 °F), Max:37.8 °C (100 °F)   Temperature: 36.9 °C (98.5 °F)  Pulse  Av.9  Min: 65  Max: 109    Blood Pressure : 112/57      Respiratory      Respiration: 16, Pulse Oximetry: 90 %     Work Of Breathing / Effort: Mild  RUL Breath Sounds: Clear, RML Breath Sounds:  Diminished, RLL Breath Sounds: Diminished, TOSHIA Breath Sounds: Clear, LLL Breath Sounds: Diminished    Fluids    Intake/Output Summary (Last 24 hours) at 12/24/17 1627  Last data filed at 12/24/17 0400   Gross per 24 hour   Intake                0 ml   Output              400 ml   Net             -400 ml       Nutrition  Orders Placed This Encounter   Procedures   • DIET ORDER     Standing Status:   Standing     Number of Occurrences:   1     Order Specific Question:   Diet:     Answer:   Regular [1]     Physical Exam   Constitutional: He is oriented to person, place, and time. He appears well-developed and well-nourished. He is cooperative.   HENT:   Head: Normocephalic and atraumatic.   Eyes: Conjunctivae and EOM are normal.   Neck:   Wearing neck brace   Cardiovascular: Normal rate and regular rhythm.  Exam reveals no gallop and no friction rub.    No murmur heard.  Pulmonary/Chest: Effort normal and breath sounds normal. He has no wheezes. He has no rales.   Abdominal: Soft. Bowel sounds are normal. He exhibits no distension. There is no tenderness. There is no rebound and no guarding.   Colostomy in place   Musculoskeletal: He exhibits no edema.   Neurological: He is alert and oriented to person, place, and time.   Skin: Skin is warm and dry.       Recent Labs      12/22/17 0327 12/23/17   0236  12/24/17   0430   WBC  6.9  6.9  6.7   RBC  3.56*  3.66*  3.60*   HEMOGLOBIN  9.8*  9.8*  9.7*   HEMATOCRIT  30.0*  30.3*  29.9*   MCV  84.3  82.8  83.1   MCH  27.5  26.8*  26.9*   MCHC  32.7*  32.3*  32.4*   RDW  53.5*  53.1*  53.1*   PLATELETCT  279  288  281   MPV  8.4*  8.1*  8.2*     Recent Labs      12/22/17 0327 12/23/17   0236  12/24/17   0430   SODIUM  138  136  137   POTASSIUM  3.8  3.8  4.1   CHLORIDE  108  108  105   CO2  23  23  27   GLUCOSE  111*  111*  110*   BUN  7*  7*  7*   CREATININE  0.58  0.57  0.68   CALCIUM  7.8*  7.6*  8.2*                      Assessment/Plan     * C. difficile diarrhea-  (present on admission)   Assessment & Plan    -PO flagyl for 2 weeks per GI  GI f/u as outpt for colonoscopy  - diarrhea improving        Fever   Assessment & Plan    New fever. Patient asymptomatic. Hemodynamically stable. No signs of DVT on exam. Possible atelectasis on CXR. Urine and blood cultures with no growth.  - may be from C Diff, monitor        Hypomagnesemia- (present on admission)   Assessment & Plan    Normalized with repletion        Type 2 diabetes mellitus (CMS-HCC)- (present on admission)   Assessment & Plan    Pt says he doesn't take metformin. Glucose is normal.  ISS stopped per patient request, will monitor glucose daily  A1c 6.2%, well controlled.        GIB (gastrointestinal bleeding)- (present on admission)   Assessment & Plan    -GI consulted  - continue omeprazole  -Hgb stable, continue to monitor daily        Sepsis(995.91)- (present on admission)   Assessment & Plan    This is sepsis (without associated acute organ dysfunction).   -2/2 C Diff; first episode; on po flagyl tid  -onsepsis protocol, vitals are stable, mild tachycardia, normotensive  - lactate decreased        Chronic neck pain   Assessment & Plan    Recently had surgery with [facility], has f/u scheduled.  Uses tramadol and oxy at baseline.  Wears neck brace.        BPH (benign prostatic hypertrophy)- (present on admission)   Assessment & Plan    continue flomax            Reviewed items::  EKG reviewed, Labs reviewed, Medications reviewed and Radiology images reviewed  Ruff catheter::  No Ruff  DVT prophylaxis - mechanical:  SCDs

## 2017-12-24 NOTE — PROGRESS NOTES
Received bedside shift report from night RN, Zena.  Pt is AOx4.  Discussed POC and goal for the day with patient and he verbalized understanding.  Eduated pt on white board and call light.  Bed locked and in lowest position with bilateral bedside rails up.  Will resume care and continue to monitor.

## 2017-12-25 PROBLEM — E83.42 HYPOMAGNESEMIA: Status: RESOLVED | Noted: 2017-12-19 | Resolved: 2017-12-25

## 2017-12-25 PROBLEM — A41.9 SEPSIS (HCC): Status: RESOLVED | Noted: 2017-07-14 | Resolved: 2017-12-25

## 2017-12-25 PROBLEM — A04.72 C. DIFFICILE COLITIS: Status: ACTIVE | Noted: 2017-12-25

## 2017-12-25 PROBLEM — R50.9 FEVER: Status: RESOLVED | Noted: 2017-12-23 | Resolved: 2017-12-25

## 2017-12-25 PROBLEM — K92.2 GIB (GASTROINTESTINAL BLEEDING): Status: RESOLVED | Noted: 2017-12-18 | Resolved: 2017-12-25

## 2017-12-25 PROBLEM — K92.2 GIB (GASTROINTESTINAL BLEEDING): Status: ACTIVE | Noted: 2017-12-25

## 2017-12-25 LAB
ANION GAP SERPL CALC-SCNC: 4 MMOL/L (ref 0–11.9)
BASOPHILS # BLD AUTO: 0.7 % (ref 0–1.8)
BASOPHILS # BLD: 0.05 K/UL (ref 0–0.12)
BUN SERPL-MCNC: 9 MG/DL (ref 8–22)
CALCIUM SERPL-MCNC: 8 MG/DL (ref 8.5–10.5)
CHLORIDE SERPL-SCNC: 102 MMOL/L (ref 96–112)
CO2 SERPL-SCNC: 28 MMOL/L (ref 20–33)
CREAT SERPL-MCNC: 0.78 MG/DL (ref 0.5–1.4)
EOSINOPHIL # BLD AUTO: 0.07 K/UL (ref 0–0.51)
EOSINOPHIL NFR BLD: 1 % (ref 0–6.9)
ERYTHROCYTE [DISTWIDTH] IN BLOOD BY AUTOMATED COUNT: 52.6 FL (ref 35.9–50)
GFR SERPL CREATININE-BSD FRML MDRD: >60 ML/MIN/1.73 M 2
GLUCOSE SERPL-MCNC: 114 MG/DL (ref 65–99)
HCT VFR BLD AUTO: 29.6 % (ref 42–52)
HGB BLD-MCNC: 9.6 G/DL (ref 14–18)
IMM GRANULOCYTES # BLD AUTO: 0.09 K/UL (ref 0–0.11)
IMM GRANULOCYTES NFR BLD AUTO: 1.3 % (ref 0–0.9)
LYMPHOCYTES # BLD AUTO: 0.89 K/UL (ref 1–4.8)
LYMPHOCYTES NFR BLD: 13 % (ref 22–41)
MCH RBC QN AUTO: 26.8 PG (ref 27–33)
MCHC RBC AUTO-ENTMCNC: 32.4 G/DL (ref 33.7–35.3)
MCV RBC AUTO: 82.7 FL (ref 81.4–97.8)
MONOCYTES # BLD AUTO: 0.8 K/UL (ref 0–0.85)
MONOCYTES NFR BLD AUTO: 11.7 % (ref 0–13.4)
NEUTROPHILS # BLD AUTO: 4.94 K/UL (ref 1.82–7.42)
NEUTROPHILS NFR BLD: 72.3 % (ref 44–72)
NRBC # BLD AUTO: 0 K/UL
NRBC BLD-RTO: 0 /100 WBC
PLATELET # BLD AUTO: 292 K/UL (ref 164–446)
PMV BLD AUTO: 8.2 FL (ref 9–12.9)
POTASSIUM SERPL-SCNC: 3.9 MMOL/L (ref 3.6–5.5)
RBC # BLD AUTO: 3.58 M/UL (ref 4.7–6.1)
SODIUM SERPL-SCNC: 134 MMOL/L (ref 135–145)
WBC # BLD AUTO: 6.8 K/UL (ref 4.8–10.8)

## 2017-12-25 PROCEDURE — A9270 NON-COVERED ITEM OR SERVICE: HCPCS | Performed by: HOSPITALIST

## 2017-12-25 PROCEDURE — 36415 COLL VENOUS BLD VENIPUNCTURE: CPT

## 2017-12-25 PROCEDURE — 700102 HCHG RX REV CODE 250 W/ 637 OVERRIDE(OP): Performed by: HOSPITALIST

## 2017-12-25 PROCEDURE — 770006 HCHG ROOM/CARE - MED/SURG/GYN SEMI*

## 2017-12-25 PROCEDURE — A9270 NON-COVERED ITEM OR SERVICE: HCPCS | Performed by: INTERNAL MEDICINE

## 2017-12-25 PROCEDURE — 85025 COMPLETE CBC W/AUTO DIFF WBC: CPT

## 2017-12-25 PROCEDURE — 700102 HCHG RX REV CODE 250 W/ 637 OVERRIDE(OP): Performed by: INTERNAL MEDICINE

## 2017-12-25 PROCEDURE — 99232 SBSQ HOSP IP/OBS MODERATE 35: CPT | Performed by: INTERNAL MEDICINE

## 2017-12-25 PROCEDURE — 80048 BASIC METABOLIC PNL TOTAL CA: CPT

## 2017-12-25 RX ORDER — METRONIDAZOLE 500 MG/1
500 TABLET ORAL EVERY 8 HOURS
Qty: 24 TAB | Refills: 0 | Status: SHIPPED | OUTPATIENT
Start: 2017-12-25 | End: 2018-01-02

## 2017-12-25 RX ADMIN — ACETAMINOPHEN 650 MG: 325 TABLET, FILM COATED ORAL at 17:54

## 2017-12-25 RX ADMIN — GABAPENTIN 300 MG: 300 CAPSULE ORAL at 20:20

## 2017-12-25 RX ADMIN — METRONIDAZOLE 500 MG: 500 TABLET ORAL at 14:38

## 2017-12-25 RX ADMIN — TRAMADOL HYDROCHLORIDE 50 MG: 50 TABLET, COATED ORAL at 21:47

## 2017-12-25 RX ADMIN — LAMOTRIGINE 200 MG: 100 TABLET ORAL at 08:57

## 2017-12-25 RX ADMIN — TAMSULOSIN HYDROCHLORIDE 0.4 MG: 0.4 CAPSULE ORAL at 08:57

## 2017-12-25 RX ADMIN — OMEPRAZOLE 20 MG: 20 CAPSULE, DELAYED RELEASE ORAL at 08:57

## 2017-12-25 RX ADMIN — GABAPENTIN 300 MG: 300 CAPSULE ORAL at 08:57

## 2017-12-25 RX ADMIN — GABAPENTIN 300 MG: 300 CAPSULE ORAL at 14:38

## 2017-12-25 RX ADMIN — OXYCODONE HYDROCHLORIDE 5 MG: 5 TABLET ORAL at 17:54

## 2017-12-25 RX ADMIN — METRONIDAZOLE 500 MG: 500 TABLET ORAL at 05:18

## 2017-12-25 RX ADMIN — OXYCODONE HYDROCHLORIDE 5 MG: 5 TABLET ORAL at 08:57

## 2017-12-25 RX ADMIN — METRONIDAZOLE 500 MG: 500 TABLET ORAL at 20:20

## 2017-12-25 ASSESSMENT — LIFESTYLE VARIABLES: DO YOU DRINK ALCOHOL: NO

## 2017-12-25 ASSESSMENT — ENCOUNTER SYMPTOMS
VOMITING: 0
ABDOMINAL PAIN: 1
CHILLS: 0
COUGH: 0
FEVER: 0
NAUSEA: 0
DIZZINESS: 0
HEADACHES: 0
NECK PAIN: 0
DIARRHEA: 1
SHORTNESS OF BREATH: 0
FOCAL WEAKNESS: 0

## 2017-12-25 ASSESSMENT — COGNITIVE AND FUNCTIONAL STATUS - GENERAL
DAILY ACTIVITIY SCORE: 19
DRESSING REGULAR LOWER BODY CLOTHING: A LITTLE
STANDING UP FROM CHAIR USING ARMS: A LITTLE
CLIMB 3 TO 5 STEPS WITH RAILING: A LITTLE
MOBILITY SCORE: 18
DRESSING REGULAR UPPER BODY CLOTHING: A LITTLE
MOVING FROM LYING ON BACK TO SITTING ON SIDE OF FLAT BED: A LITTLE
TURNING FROM BACK TO SIDE WHILE IN FLAT BAD: A LITTLE
WALKING IN HOSPITAL ROOM: A LITTLE
SUGGESTED CMS G CODE MODIFIER MOBILITY: CK
HELP NEEDED FOR BATHING: A LITTLE
MOVING TO AND FROM BED TO CHAIR: A LITTLE
PERSONAL GROOMING: A LITTLE
SUGGESTED CMS G CODE MODIFIER DAILY ACTIVITY: CK
TOILETING: A LITTLE

## 2017-12-25 ASSESSMENT — PAIN SCALES - GENERAL
PAINLEVEL_OUTOF10: 8
PAINLEVEL_OUTOF10: 7
PAINLEVEL_OUTOF10: 2
PAINLEVEL_OUTOF10: 0
PAINLEVEL_OUTOF10: 4
PAINLEVEL_OUTOF10: 0
PAINLEVEL_OUTOF10: 5

## 2017-12-25 NOTE — PROGRESS NOTES
Sarah Clemons Fall Risk Assessment:     Last Known Fall: Within the last month  Mobility: Immobilized/requires assist of one person  Medications: Cardiovascular or central nervous system meds  Mental Status/LOC/Awareness: Awake, alert, and oriented to date, place, and person  Toileting Needs: Use of catheters or diversion devices  Volume/Electrolyte Status: No problems  Communication/Sensory: Neuropathy  Behavior: Appropriate behavior  Sarah Clemons Fall Risk Total: 13  Fall Risk Level: MODERATE RISK    Universal Fall Precautions:  call light/belongings in reach, bed in low position and locked, wheelchairs and assistive devices out of sight, siderails up x 2, use non-slip footwear, adequate lighting, clutter free and spill free environment, educate on level of risk, educate to call for assistance    Fall Risk Level Interventions:    TRIAL (Luminate, NEURO, MED ERNESTO 5) Moderate Fall Risk Interventions  Place yellow fall risk ID band on patient: verified  Provide patient/family education based on risk assessment : completed  Educate patient/family to call staff for assistance when getting out of bed: completed  Place fall precaution signage outside patient door: completed  Utilize bed/chair fall alarm: verifiedTRIAL (emaze 8, NEURO, MED ERNESTO 5) High Fall Risk Interventions  Place yellow fall risk ID band on patient: verified  Provide patient/family education based on risk assessment: completed  Educate patient/family to call staff for assistance when getting out of bed: completed  Place fall precaution signage outside patient door: verified  Place patient in room close to nursing station: verified  Utilize bed/chair fall alarm: verified  Notify charge of high risk for huddle: completed    Patient Specific Interventions:     Medication: review medications with patient and family  Mental Status/LOC/Awareness: reinforce falls education  Toileting: provide frquent toileting  Volume/Electrolyte Status: ensure patient  remains hydrated  Communication/Sensory: update plan of care on whiteboard  Behavioral: encourage patient to voice feelings  Mobility: utilize bed/chair fall alarm

## 2017-12-25 NOTE — DISCHARGE SUMMARY
CHIEF COMPLAINT ON ADMISSION  Chief Complaint   Patient presents with   • GI Problem     dark stool from colostomy        CODE STATUS  Full Code    HPI & HOSPITAL COURSE  This is a 77 y.o. male here with melena and C Diff colitis.    C Diff Colitis  New occurrence, no prior antibiotics. Started on course of PO flagyl with improved diarrhea. Stopped home omeprazole.    Melena  One month hospitalization 7/2017 for intraabdominal abscess now with colostomy. Patient reported melena. Hgb remained stable at his baseline. Stool occult positive, but with C Diff infection. GI consulted and considered scope as outpatient after C Diff treatment.  Home health ordered for daily colostomy care, which patient received prior to this admission.    ADDENDUM: patient remained in the hospital overnight for observation; no change in condition or change to discharge plan occurred.    Therefore, he is discharged in good and stable condition with close outpatient follow-up.    SPECIFIC OUTPATIENT FOLLOW-UP  Scope after C Diff infection    DISCHARGE PROBLEM LIST  Principal Problem:    C. difficile diarrhea POA: Yes  Active Problems:    Type 2 diabetes mellitus (CMS-HCC) POA: Yes    BPH (benign prostatic hypertrophy) POA: Yes    Chronic neck pain POA: Yes  Resolved Problems:    Fever POA: Unknown    Sepsis(995.91) POA: Yes    GIB (gastrointestinal bleeding) POA: Yes    Hypomagnesemia POA: Yes      FOLLOW UP  No future appointments.  Irvin Ferreira M.D.  [address]  [address]  [place] [facility] [address]  [phone]    On 1/2/2018  please check in at 1pm for your appointment. thank you       MEDICATIONS ON DISCHARGE   Marcelo Colon   Home Medication Instructions FLORENCIO:[id number]    Printed on:12/25/17 1534   Medication Information                      gabapentin (NEURONTIN) 300 MG Cap  Take 300 mg by mouth 3 times a day.             lamotrigine (LAMICTAL) 200 MG tablet  Take 200 mg by mouth every day.             metformin (GLUCOPHAGE) 500 MG  Tab  Take 500 mg by mouth every day.             metronidazole (FLAGYL) 500 MG Tab  Take 1 Tab by mouth every 8 hours for 8 days.             potassium chloride SA (KDUR) 20 MEQ Tab CR  Take 20 mEq by mouth every day.             tamsulosin (FLOMAX) 0.4 MG capsule  Take 1 Cap by mouth ONE-HALF HOUR AFTER BREAKFAST.             tramadol (ULTRAM) 50 MG TABS  Take 100 mg by mouth every four hours as needed for Mild Pain.                 DIET  Orders Placed This Encounter   Procedures   • DIET ORDER     Standing Status:   Standing     Number of Occurrences:   1     Order Specific Question:   Diet:     Answer:   Regular [1]       ACTIVITY  As tolerated.  Weight bearing as tolerated      CONSULTATIONS  GI    PROCEDURES  None    LABORATORY  Lab Results   Component Value Date/Time    SODIUM 134 (L) 12/25/2017 02:19 AM    POTASSIUM 3.9 12/25/2017 02:19 AM    CHLORIDE 102 12/25/2017 02:19 AM    CO2 28 12/25/2017 02:19 AM    GLUCOSE 114 (H) 12/25/2017 02:19 AM    BUN 9 12/25/2017 02:19 AM    CREATININE 0.78 12/25/2017 02:19 AM        Lab Results   Component Value Date/Time    WBC 6.8 12/25/2017 02:19 AM    HEMOGLOBIN 9.6 (L) 12/25/2017 02:19 AM    HEMATOCRIT 29.6 (L) 12/25/2017 02:19 AM    PLATELETCT 292 12/25/2017 02:19 AM        Total time of the discharge process exceeds 45 minutes

## 2017-12-25 NOTE — PROGRESS NOTES
Completed bedside report with Giuliana. PT resting comfortably in bed, personal items in reach, repositioned Pt. Call light in reach and whiteboard updated.

## 2017-12-25 NOTE — CARE PLAN
Problem: Venous Thromboembolism (VTW)/Deep Vein Thrombosis (DVT) Prevention:  Goal: Patient will participate in Venous Thrombosis (VTE)/Deep Vein Thrombosis (DVT)Prevention Measures  PT uses SCDs while in bed.

## 2017-12-25 NOTE — FACE TO FACE
Face to Face Supporting Documentation - Home Health    The encounter with this patient was in whole or in part the primary reason for home health admission.    Date of encounter:   Patient:                    MRN:                       YOB: 2016  Marcelo Colon  [id number]  1940     Home health to see patient for:  Skilled Nursing care for assessment, interventions & education and Wound Care    Skilled need for:  Surgical Aftercare ostomy care, wears neck brace    Skilled nursing interventions to include:  Wound Care    Homebound status evidenced by:  Need the aid of supportive devices such as crutches, canes, wheelchairs or walkers or Needs the assistance of another person in order to leave the home. Leaving home requires a considerable and taxing effort. There is a normal inability to leave the home.    Community Physician to provide follow up care: Irvin Ferreira M.D.     Optional Interventions? Yes, Details: Ostomy ware, wears neck brace      I certify the face to face encounter for this home health care referral meets the CMS requirements and the encounter/clinical assessment with the patient was, in whole, or in part, for the medical condition(s) listed above, which is the primary reason for home health care. Based on my clinical findings: the service(s) are medically necessary, support the need for home health care, and the homebound criteria are met.  I certify that this patient has had a face to face encounter by myself.  Jay Jo M.D. - NPI: [id number]

## 2017-12-25 NOTE — PROGRESS NOTES
[facility] Hospitalist Progress Note    Date of Service: 2017    Chief Complaint  77 y.o. male admitted 2017 with chronic neck pain, depression, recent intrabd infection s/p colostomy who presented with melena and diarrhea. Treated for C Diff    Interval Problem Update  : Afebrile >24h  Leukocytosis downtrending. Hgb stable, anemic.  Denies abd pain. Seen by GI, plan for 2 weeks of flagyl.   Neck pain, uses tramadol and oxy at baseline, has f/u at [place]  Not on meds for DM, stopped, ISS. Check A1c    : No acute overnight events.  AF >48h  Leukocytosis resolved. Hgb remains stable  Still having frequent diarrhea, larger colostomy bag attached.    : Sacral ulcer noted, wound care consulted  UA shows concentration, continue IVF.  Continues to have frequent diarrhea.    : fever 100.9. Patient asymptomatic. WBC normal. Urine cx pending. CXR and blood cx ordered.     : remains AF, normal WBC. Blood and urine cx normal. CXR showed left basilar change, likely atelectasis. Diarrhea improving    : remains AF and normal WBC. Cultures with no growth. Abd pain last night, but now gone. Patient ok with d/c friend available to pick him up in the morning.    Consultants/Specialty  GI    Disposition  Home tomorrow morning  PT - outpatient PT        Review of Systems   Constitutional: Negative for chills and fever.   Respiratory: Negative for cough and shortness of breath.    Cardiovascular: Negative for chest pain and leg swelling.   Gastrointestinal: Positive for abdominal pain and diarrhea. Negative for nausea and vomiting.   Genitourinary: Negative for dysuria.   Musculoskeletal: Negative for neck pain.   Neurological: Negative for dizziness, focal weakness and headaches.      Physical Exam  Laboratory/Imaging   Hemodynamics  Temp (24hrs), Av.4 °C (99.4 °F), Min:36.9 °C (98.4 °F), Max:37.8 °C (100 °F)   Temperature: 37.6 °C (99.6 °F)  Pulse  Av.3  Min: 65  Max: 109    Blood  Pressure : 118/55      Respiratory      Respiration: 16, Pulse Oximetry: 90 %     Work Of Breathing / Effort: Mild  RUL Breath Sounds: Clear, RML Breath Sounds: Diminished, RLL Breath Sounds: Diminished, TOSHIA Breath Sounds: Clear, LLL Breath Sounds: Diminished    Fluids    Intake/Output Summary (Last 24 hours) at 12/25/17 1458  Last data filed at 12/25/17 1156   Gross per 24 hour   Intake                0 ml   Output              950 ml   Net             -950 ml       Nutrition  Orders Placed This Encounter   Procedures   • DIET ORDER     Standing Status:   Standing     Number of Occurrences:   1     Order Specific Question:   Diet:     Answer:   Regular [1]     Physical Exam   Constitutional: He is oriented to person, place, and time. He appears well-developed and well-nourished. He is cooperative.   HENT:   Head: Normocephalic and atraumatic.   Eyes: Conjunctivae and EOM are normal.   Neck:   Wearing neck brace   Cardiovascular: Normal rate and regular rhythm.  Exam reveals no gallop and no friction rub.    No murmur heard.  Pulmonary/Chest: Effort normal and breath sounds normal. He has no wheezes. He has no rales.   Abdominal: Soft. Bowel sounds are normal. He exhibits no distension. There is no tenderness. There is no rebound and no guarding.   Colostomy in place   Musculoskeletal: He exhibits no edema.   Neurological: He is alert and oriented to person, place, and time.   Skin: Skin is warm and dry.       Recent Labs      12/23/17 0236 12/24/17 0430 12/25/17 0219   WBC  6.9  6.7  6.8   RBC  3.66*  3.60*  3.58*   HEMOGLOBIN  9.8*  9.7*  9.6*   HEMATOCRIT  30.3*  29.9*  29.6*   MCV  82.8  83.1  82.7   MCH  26.8*  26.9*  26.8*   MCHC  32.3*  32.4*  32.4*   RDW  53.1*  53.1*  52.6*   PLATELETCT  288  281  292   MPV  8.1*  8.2*  8.2*     Recent Labs      12/23/17   0236  12/24/17   0430  12/25/17   0219   SODIUM  136  137  134*   POTASSIUM  3.8  4.1  3.9   CHLORIDE  108  105  102   CO2  23  27  28   GLUCOSE   111*  110*  114*   BUN  7*  7*  9   CREATININE  0.57  0.68  0.78   CALCIUM  7.6*  8.2*  8.0*                      Assessment/Plan     * C. difficile diarrhea- (present on admission)   Assessment & Plan    -PO flagyl for 2 weeks per GI  GI f/u as outpt for colonoscopy  - diarrhea improving        Fever   Assessment & Plan    New fever. Patient asymptomatic. Hemodynamically stable. No signs of DVT on exam. Possible atelectasis on CXR. Urine and blood cultures with no growth.  - may be from C Diff, monitor        Hypomagnesemia- (present on admission)   Assessment & Plan    Normalized with repletion        Type 2 diabetes mellitus (CMS-HCC)- (present on admission)   Assessment & Plan    Pt says he doesn't take metformin. Glucose is normal.  ISS stopped per patient request, will monitor glucose daily  A1c 6.2%, well controlled.        GIB (gastrointestinal bleeding)- (present on admission)   Assessment & Plan    -GI consulted  - continue omeprazole  -Hgb stable, continue to monitor daily        Sepsis(995.91)- (present on admission)   Assessment & Plan    This is sepsis (without associated acute organ dysfunction).   -2/2 C Diff; first episode; on po flagyl tid  -onsepsis protocol, vitals are stable, mild tachycardia, normotensive  - lactate decreased        Chronic neck pain   Assessment & Plan    Recently had surgery with [facility], has f/u scheduled.  Uses tramadol and oxy at baseline.  Wears neck brace.        BPH (benign prostatic hypertrophy)- (present on admission)   Assessment & Plan    continue flomax            Reviewed items::  EKG reviewed, Labs reviewed, Medications reviewed and Radiology images reviewed  Ruff catheter::  No Ruff  DVT prophylaxis - mechanical:  SCDs

## 2017-12-25 NOTE — DISCHARGE PLANNING
SW spoke with pt over the phone regarding HH choice. Per pt, wants to resume [facility]. SW faxed choice form to [facility]. Per Maximiliano, [facility] not open today.

## 2017-12-25 NOTE — ASSESSMENT & PLAN NOTE
Recently had surgery with [place], has f/u scheduled.  Uses tramadol and oxy at baseline.  Wears neck brace.

## 2017-12-26 VITALS
BODY MASS INDEX: 21.32 KG/M2 | TEMPERATURE: 99.1 F | WEIGHT: 140.65 LBS | HEART RATE: 64 BPM | DIASTOLIC BLOOD PRESSURE: 61 MMHG | SYSTOLIC BLOOD PRESSURE: 92 MMHG | HEIGHT: 68 IN | RESPIRATION RATE: 17 BRPM | OXYGEN SATURATION: 91 %

## 2017-12-26 PROCEDURE — 700102 HCHG RX REV CODE 250 W/ 637 OVERRIDE(OP): Performed by: INTERNAL MEDICINE

## 2017-12-26 PROCEDURE — A9270 NON-COVERED ITEM OR SERVICE: HCPCS | Performed by: HOSPITALIST

## 2017-12-26 PROCEDURE — 302111 WAFER OST 2.25IN N IMG RD 2 PC (BARRIER): Performed by: HOSPITALIST

## 2017-12-26 PROCEDURE — 302102 BAG OST N IMG 2.25IN 2PC (FECAL): Performed by: HOSPITALIST

## 2017-12-26 PROCEDURE — 700102 HCHG RX REV CODE 250 W/ 637 OVERRIDE(OP): Performed by: HOSPITALIST

## 2017-12-26 PROCEDURE — 99239 HOSP IP/OBS DSCHRG MGMT >30: CPT | Performed by: HOSPITALIST

## 2017-12-26 PROCEDURE — A9270 NON-COVERED ITEM OR SERVICE: HCPCS | Performed by: INTERNAL MEDICINE

## 2017-12-26 RX ADMIN — OXYCODONE HYDROCHLORIDE 5 MG: 5 TABLET ORAL at 09:56

## 2017-12-26 RX ADMIN — LAMOTRIGINE 200 MG: 100 TABLET ORAL at 09:52

## 2017-12-26 RX ADMIN — TAMSULOSIN HYDROCHLORIDE 0.4 MG: 0.4 CAPSULE ORAL at 09:52

## 2017-12-26 RX ADMIN — METRONIDAZOLE 500 MG: 500 TABLET ORAL at 14:21

## 2017-12-26 RX ADMIN — GABAPENTIN 300 MG: 300 CAPSULE ORAL at 09:52

## 2017-12-26 RX ADMIN — GABAPENTIN 300 MG: 300 CAPSULE ORAL at 14:21

## 2017-12-26 RX ADMIN — METRONIDAZOLE 500 MG: 500 TABLET ORAL at 05:50

## 2017-12-26 ASSESSMENT — PAIN SCALES - GENERAL: PAINLEVEL_OUTOF10: 7

## 2017-12-26 ASSESSMENT — LIFESTYLE VARIABLES: EVER_SMOKED: YES

## 2017-12-26 NOTE — PROGRESS NOTES
Received report from previous nurse regarding prior 12 hours.  POC reviewed with pt, white board updated, pt verbalized understanding, call light within reach.  Pt encouraged to call before getting up.  Bed in lowest position. Contact precautions (C-diff) maintained per orders.

## 2017-12-26 NOTE — PROGRESS NOTES
Discharge instructions reviewed with patient. IV removed. P  All questions answered. All belongings with patient. Patient verbalizes understanding of instructions given.

## 2017-12-26 NOTE — DISCHARGE PLANNING
Received call back from Ana with [facility] they are accepting patient back. Notified ANA Jerry.

## 2017-12-26 NOTE — CARE PLAN
Problem: Infection  Goal: Will remain free from infection  Outcome: PROGRESSING AS EXPECTED  Hand hygiene and infection prevention education

## 2017-12-28 LAB
BACTERIA BLD CULT: NORMAL
BACTERIA BLD CULT: NORMAL
SIGNIFICANT IND 70042: NORMAL
SIGNIFICANT IND 70042: NORMAL
SITE SITE: NORMAL
SITE SITE: NORMAL
SOURCE SOURCE: NORMAL
SOURCE SOURCE: NORMAL

## 2018-01-23 ENCOUNTER — APPOINTMENT (OUTPATIENT)
Dept: RADIOLOGY | Facility: MEDICAL CENTER | Age: 78
DRG: 372 | End: 2018-01-23
Attending: EMERGENCY MEDICINE
Payer: MEDICARE

## 2018-01-23 ENCOUNTER — HOSPITAL ENCOUNTER (INPATIENT)
Facility: MEDICAL CENTER | Age: 78
LOS: 12 days | DRG: 372 | End: 2018-02-05
Attending: EMERGENCY MEDICINE | Admitting: HOSPITALIST
Payer: MEDICARE

## 2018-01-23 DIAGNOSIS — A04.72 C. DIFFICILE COLITIS: ICD-10-CM

## 2018-01-23 LAB
ALBUMIN SERPL BCP-MCNC: 2.7 G/DL (ref 3.2–4.9)
ALBUMIN/GLOB SERPL: 0.9 G/DL
ALP SERPL-CCNC: 97 U/L (ref 30–99)
ALT SERPL-CCNC: 8 U/L (ref 2–50)
ANION GAP SERPL CALC-SCNC: 12 MMOL/L (ref 0–11.9)
AST SERPL-CCNC: 11 U/L (ref 12–45)
BASOPHILS # BLD AUTO: 0.3 % (ref 0–1.8)
BASOPHILS # BLD: 0.06 K/UL (ref 0–0.12)
BILIRUB SERPL-MCNC: 0.9 MG/DL (ref 0.1–1.5)
BUN SERPL-MCNC: 20 MG/DL (ref 8–22)
CALCIUM SERPL-MCNC: 9.4 MG/DL (ref 8.5–10.5)
CHLORIDE SERPL-SCNC: 97 MMOL/L (ref 96–112)
CO2 SERPL-SCNC: 26 MMOL/L (ref 20–33)
CREAT SERPL-MCNC: 1.27 MG/DL (ref 0.5–1.4)
EOSINOPHIL # BLD AUTO: 0 K/UL (ref 0–0.51)
EOSINOPHIL NFR BLD: 0 % (ref 0–6.9)
ERYTHROCYTE [DISTWIDTH] IN BLOOD BY AUTOMATED COUNT: 53.8 FL (ref 35.9–50)
GLOBULIN SER CALC-MCNC: 3.1 G/DL (ref 1.9–3.5)
GLUCOSE SERPL-MCNC: 193 MG/DL (ref 65–99)
HCT VFR BLD AUTO: 35.2 % (ref 42–52)
HGB BLD-MCNC: 11.4 G/DL (ref 14–18)
IMM GRANULOCYTES # BLD AUTO: 0.42 K/UL (ref 0–0.11)
IMM GRANULOCYTES NFR BLD AUTO: 1.8 % (ref 0–0.9)
LACTATE BLD-SCNC: 1.7 MMOL/L (ref 0.5–2)
LIPASE SERPL-CCNC: <3 U/L (ref 11–82)
LYMPHOCYTES # BLD AUTO: 1.11 K/UL (ref 1–4.8)
LYMPHOCYTES NFR BLD: 4.7 % (ref 22–41)
MCH RBC QN AUTO: 27.3 PG (ref 27–33)
MCHC RBC AUTO-ENTMCNC: 32.4 G/DL (ref 33.7–35.3)
MCV RBC AUTO: 84.4 FL (ref 81.4–97.8)
MONOCYTES # BLD AUTO: 1.29 K/UL (ref 0–0.85)
MONOCYTES NFR BLD AUTO: 5.5 % (ref 0–13.4)
NEUTROPHILS # BLD AUTO: 20.52 K/UL (ref 1.82–7.42)
NEUTROPHILS NFR BLD: 87.7 % (ref 44–72)
NRBC # BLD AUTO: 0 K/UL
NRBC BLD-RTO: 0 /100 WBC
PLATELET # BLD AUTO: 241 K/UL (ref 164–446)
PMV BLD AUTO: 8.7 FL (ref 9–12.9)
POTASSIUM SERPL-SCNC: 3.9 MMOL/L (ref 3.6–5.5)
PROT SERPL-MCNC: 5.8 G/DL (ref 6–8.2)
RBC # BLD AUTO: 4.17 M/UL (ref 4.7–6.1)
SODIUM SERPL-SCNC: 135 MMOL/L (ref 135–145)
TROPONIN I SERPL-MCNC: 0.02 NG/ML (ref 0–0.04)
WBC # BLD AUTO: 23.4 K/UL (ref 4.8–10.8)

## 2018-01-23 PROCEDURE — 83690 ASSAY OF LIPASE: CPT

## 2018-01-23 PROCEDURE — 71045 X-RAY EXAM CHEST 1 VIEW: CPT

## 2018-01-23 PROCEDURE — 87324 CLOSTRIDIUM AG IA: CPT

## 2018-01-23 PROCEDURE — 85025 COMPLETE CBC W/AUTO DIFF WBC: CPT

## 2018-01-23 PROCEDURE — 87493 C DIFF AMPLIFIED PROBE: CPT

## 2018-01-23 PROCEDURE — 99285 EMERGENCY DEPT VISIT HI MDM: CPT

## 2018-01-23 PROCEDURE — 83605 ASSAY OF LACTIC ACID: CPT

## 2018-01-23 PROCEDURE — 700105 HCHG RX REV CODE 258: Performed by: EMERGENCY MEDICINE

## 2018-01-23 PROCEDURE — 51798 US URINE CAPACITY MEASURE: CPT

## 2018-01-23 PROCEDURE — 87086 URINE CULTURE/COLONY COUNT: CPT

## 2018-01-23 PROCEDURE — 84484 ASSAY OF TROPONIN QUANT: CPT

## 2018-01-23 PROCEDURE — 80053 COMPREHEN METABOLIC PANEL: CPT

## 2018-01-23 PROCEDURE — 83036 HEMOGLOBIN GLYCOSYLATED A1C: CPT

## 2018-01-23 RX ORDER — SODIUM CHLORIDE 9 MG/ML
1000 INJECTION, SOLUTION INTRAVENOUS ONCE
Status: COMPLETED | OUTPATIENT
Start: 2018-01-23 | End: 2018-01-23

## 2018-01-23 RX ADMIN — SODIUM CHLORIDE 1000 ML: 9 INJECTION, SOLUTION INTRAVENOUS at 19:41

## 2018-01-23 ASSESSMENT — LIFESTYLE VARIABLES: DO YOU DRINK ALCOHOL: NO

## 2018-01-23 ASSESSMENT — PAIN SCALES - GENERAL
PAINLEVEL_OUTOF10: 0
PAINLEVEL_OUTOF10: 0

## 2018-01-24 ENCOUNTER — RESOLUTE PROFESSIONAL BILLING HOSPITAL PROF FEE (OUTPATIENT)
Dept: HOSPITALIST | Facility: MEDICAL CENTER | Age: 78
End: 2018-01-24
Payer: MEDICARE

## 2018-01-24 PROBLEM — A04.72 COLITIS DUE TO CLOSTRIDIUM DIFFICILE: Status: ACTIVE | Noted: 2018-01-24

## 2018-01-24 LAB
APPEARANCE UR: ABNORMAL
BACTERIA #/AREA URNS HPF: NEGATIVE /HPF
BILIRUB UR QL STRIP.AUTO: ABNORMAL
C DIFF DNA SPEC QL NAA+PROBE: POSITIVE
C DIFF TOX A+B STL QL IA: POSITIVE
C DIFF TOX GENS STL QL NAA+PROBE: NORMAL
COLOR UR: ABNORMAL
CULTURE IF INDICATED INDCX: YES UA CULTURE
EPI CELLS #/AREA URNS HPF: ABNORMAL /HPF
EST. AVERAGE GLUCOSE BLD GHB EST-MCNC: 114 MG/DL
GLUCOSE UR STRIP.AUTO-MCNC: NEGATIVE MG/DL
HBA1C MFR BLD: 5.6 % (ref 0–5.6)
HYALINE CASTS #/AREA URNS LPF: ABNORMAL /LPF
KETONES UR STRIP.AUTO-MCNC: 15 MG/DL
LEUKOCYTE ESTERASE UR QL STRIP.AUTO: ABNORMAL
MICRO URNS: ABNORMAL
NITRITE UR QL STRIP.AUTO: NEGATIVE
PH UR STRIP.AUTO: 5 [PH]
PROT UR QL STRIP: 30 MG/DL
RBC # URNS HPF: ABNORMAL /HPF
RBC UR QL AUTO: ABNORMAL
RENAL EPI CELLS #/AREA URNS HPF: ABNORMAL /HPF
SP GR UR STRIP.AUTO: 1.03
UROBILINOGEN UR STRIP.AUTO-MCNC: 1 MG/DL
WBC #/AREA URNS HPF: ABNORMAL /HPF

## 2018-01-24 PROCEDURE — 81001 URINALYSIS AUTO W/SCOPE: CPT

## 2018-01-24 PROCEDURE — 700102 HCHG RX REV CODE 250 W/ 637 OVERRIDE(OP): Performed by: HOSPITALIST

## 2018-01-24 PROCEDURE — 770021 HCHG ROOM/CARE - ISO PRIVATE

## 2018-01-24 PROCEDURE — 302109 OSTOMY WAFER 4X4: Performed by: HOSPITALIST

## 2018-01-24 PROCEDURE — 700101 HCHG RX REV CODE 250: Performed by: HOSPITALIST

## 2018-01-24 PROCEDURE — 302102 BAG OST N IMG 2.25IN 2PC (FECAL): Performed by: INTERNAL MEDICINE

## 2018-01-24 PROCEDURE — 700105 HCHG RX REV CODE 258: Performed by: EMERGENCY MEDICINE

## 2018-01-24 PROCEDURE — 96365 THER/PROPH/DIAG IV INF INIT: CPT

## 2018-01-24 PROCEDURE — 99223 1ST HOSP IP/OBS HIGH 75: CPT | Performed by: HOSPITALIST

## 2018-01-24 PROCEDURE — A9270 NON-COVERED ITEM OR SERVICE: HCPCS | Performed by: HOSPITALIST

## 2018-01-24 PROCEDURE — 700105 HCHG RX REV CODE 258: Performed by: HOSPITALIST

## 2018-01-24 PROCEDURE — 96367 TX/PROPH/DG ADDL SEQ IV INF: CPT

## 2018-01-24 PROCEDURE — 700111 HCHG RX REV CODE 636 W/ 250 OVERRIDE (IP): Performed by: HOSPITALIST

## 2018-01-24 RX ORDER — CIPROFLOXACIN 2 MG/ML
400 INJECTION, SOLUTION INTRAVENOUS EVERY 12 HOURS
Status: DISCONTINUED | OUTPATIENT
Start: 2018-01-24 | End: 2018-01-25

## 2018-01-24 RX ORDER — POLYETHYLENE GLYCOL 3350 17 G/17G
1 POWDER, FOR SOLUTION ORAL
Status: DISCONTINUED | OUTPATIENT
Start: 2018-01-24 | End: 2018-01-24

## 2018-01-24 RX ORDER — SODIUM CHLORIDE 9 MG/ML
1000 INJECTION, SOLUTION INTRAVENOUS ONCE
Status: COMPLETED | OUTPATIENT
Start: 2018-01-24 | End: 2018-01-24

## 2018-01-24 RX ORDER — MORPHINE SULFATE 4 MG/ML
2 INJECTION, SOLUTION INTRAMUSCULAR; INTRAVENOUS
Status: DISCONTINUED | OUTPATIENT
Start: 2018-01-24 | End: 2018-02-02

## 2018-01-24 RX ORDER — ALFUZOSIN HYDROCHLORIDE 10 MG/1
10 TABLET, EXTENDED RELEASE ORAL DAILY
Status: ON HOLD | COMMUNITY
End: 2018-03-21

## 2018-01-24 RX ORDER — BISACODYL 10 MG
10 SUPPOSITORY, RECTAL RECTAL
Status: DISCONTINUED | OUTPATIENT
Start: 2018-01-24 | End: 2018-01-24

## 2018-01-24 RX ORDER — GABAPENTIN 300 MG/1
300 CAPSULE ORAL 3 TIMES DAILY
Status: DISCONTINUED | OUTPATIENT
Start: 2018-01-24 | End: 2018-02-04

## 2018-01-24 RX ORDER — SODIUM CHLORIDE 9 MG/ML
INJECTION, SOLUTION INTRAVENOUS CONTINUOUS
Status: DISCONTINUED | OUTPATIENT
Start: 2018-01-24 | End: 2018-01-28

## 2018-01-24 RX ORDER — LAMOTRIGINE 100 MG/1
200 TABLET ORAL DAILY
Status: DISCONTINUED | OUTPATIENT
Start: 2018-01-24 | End: 2018-02-05 | Stop reason: HOSPADM

## 2018-01-24 RX ORDER — ACETAMINOPHEN 325 MG/1
650 TABLET ORAL EVERY 6 HOURS PRN
Status: DISCONTINUED | OUTPATIENT
Start: 2018-01-24 | End: 2018-02-05 | Stop reason: HOSPADM

## 2018-01-24 RX ORDER — AMOXICILLIN 250 MG
2 CAPSULE ORAL 2 TIMES DAILY
Status: DISCONTINUED | OUTPATIENT
Start: 2018-01-24 | End: 2018-01-24

## 2018-01-24 RX ORDER — OXYCODONE HYDROCHLORIDE 5 MG/1
2.5 TABLET ORAL
Status: DISCONTINUED | OUTPATIENT
Start: 2018-01-24 | End: 2018-02-04

## 2018-01-24 RX ORDER — ONDANSETRON 2 MG/ML
4 INJECTION INTRAMUSCULAR; INTRAVENOUS EVERY 4 HOURS PRN
Status: DISCONTINUED | OUTPATIENT
Start: 2018-01-24 | End: 2018-02-05 | Stop reason: HOSPADM

## 2018-01-24 RX ORDER — HALOPERIDOL 5 MG/ML
5 INJECTION INTRAMUSCULAR EVERY 4 HOURS PRN
Status: DISCONTINUED | OUTPATIENT
Start: 2018-01-24 | End: 2018-02-05 | Stop reason: HOSPADM

## 2018-01-24 RX ORDER — TAMSULOSIN HYDROCHLORIDE 0.4 MG/1
0.4 CAPSULE ORAL
Status: DISCONTINUED | OUTPATIENT
Start: 2018-01-24 | End: 2018-02-05 | Stop reason: HOSPADM

## 2018-01-24 RX ORDER — ONDANSETRON 4 MG/1
4 TABLET, ORALLY DISINTEGRATING ORAL EVERY 4 HOURS PRN
Status: DISCONTINUED | OUTPATIENT
Start: 2018-01-24 | End: 2018-02-05 | Stop reason: HOSPADM

## 2018-01-24 RX ORDER — OXYCODONE HYDROCHLORIDE 5 MG/1
5 TABLET ORAL
Status: DISCONTINUED | OUTPATIENT
Start: 2018-01-24 | End: 2018-02-04

## 2018-01-24 RX ADMIN — TAMSULOSIN HYDROCHLORIDE 0.4 MG: 0.4 CAPSULE ORAL at 09:05

## 2018-01-24 RX ADMIN — METRONIDAZOLE 500 MG: 500 INJECTION, SOLUTION INTRAVENOUS at 14:21

## 2018-01-24 RX ADMIN — OXYCODONE HYDROCHLORIDE 5 MG: 5 TABLET ORAL at 10:23

## 2018-01-24 RX ADMIN — METRONIDAZOLE 500 MG: 500 INJECTION, SOLUTION INTRAVENOUS at 21:43

## 2018-01-24 RX ADMIN — GABAPENTIN 300 MG: 300 CAPSULE ORAL at 20:33

## 2018-01-24 RX ADMIN — CIPROFLOXACIN 400 MG: 2 INJECTION INTRAVENOUS at 20:34

## 2018-01-24 RX ADMIN — SODIUM CHLORIDE: 9 INJECTION, SOLUTION INTRAVENOUS at 04:15

## 2018-01-24 RX ADMIN — GABAPENTIN 300 MG: 300 CAPSULE ORAL at 14:21

## 2018-01-24 RX ADMIN — OXYCODONE HYDROCHLORIDE 5 MG: 5 TABLET ORAL at 20:34

## 2018-01-24 RX ADMIN — LAMOTRIGINE 200 MG: 100 TABLET ORAL at 09:05

## 2018-01-24 RX ADMIN — SODIUM CHLORIDE: 9 INJECTION, SOLUTION INTRAVENOUS at 18:27

## 2018-01-24 RX ADMIN — CIPROFLOXACIN 400 MG: 2 INJECTION INTRAVENOUS at 09:05

## 2018-01-24 RX ADMIN — SODIUM CHLORIDE 1000 ML: 9 INJECTION, SOLUTION INTRAVENOUS at 00:30

## 2018-01-24 RX ADMIN — GABAPENTIN 300 MG: 300 CAPSULE ORAL at 09:05

## 2018-01-24 RX ADMIN — METRONIDAZOLE 500 MG: 500 INJECTION, SOLUTION INTRAVENOUS at 06:01

## 2018-01-24 ASSESSMENT — ENCOUNTER SYMPTOMS
VOMITING: 0
WEAKNESS: 1
COUGH: 0
FEVER: 0
PALPITATIONS: 0
CHILLS: 0
HEADACHES: 0
ABDOMINAL PAIN: 0
CONSTIPATION: 0
SHORTNESS OF BREATH: 0
DOUBLE VISION: 0
BLURRED VISION: 0
SPUTUM PRODUCTION: 0
DIARRHEA: 1
NAUSEA: 0

## 2018-01-24 ASSESSMENT — PAIN SCALES - GENERAL
PAINLEVEL_OUTOF10: 3
PAINLEVEL_OUTOF10: 2
PAINLEVEL_OUTOF10: 4
PAINLEVEL_OUTOF10: 0
PAINLEVEL_OUTOF10: 7

## 2018-01-24 ASSESSMENT — LIFESTYLE VARIABLES
AUDITORY DISTURBANCES: NOT PRESENT
HEADACHE, FULLNESS IN HEAD: NOT PRESENT
TREMOR: NO TREMOR
HAVE PEOPLE ANNOYED YOU BY CRITICIZING YOUR DRINKING: NO
ON A TYPICAL DAY WHEN YOU DRINK ALCOHOL HOW MANY DRINKS DO YOU HAVE: 3
TOTAL SCORE: 0
AVERAGE NUMBER OF DAYS PER WEEK YOU HAVE A DRINK CONTAINING ALCOHOL: 7
ANXIETY: NO ANXIETY (AT EASE)
AGITATION: NORMAL ACTIVITY
VISUAL DISTURBANCES: NOT PRESENT
NAUSEA AND VOMITING: NO NAUSEA AND NO VOMITING
EVER FELT BAD OR GUILTY ABOUT YOUR DRINKING: NO
HOW MANY TIMES IN THE PAST YEAR HAVE YOU HAD 5 OR MORE DRINKS IN A DAY: 100
EVER HAD A DRINK FIRST THING IN THE MORNING TO STEADY YOUR NERVES TO GET RID OF A HANGOVER: NO
PAROXYSMAL SWEATS: NO SWEAT VISIBLE
TOTAL SCORE: 0
HAVE YOU EVER FELT YOU SHOULD CUT DOWN ON YOUR DRINKING: NO
ORIENTATION AND CLOUDING OF SENSORIUM: ORIENTED AND CAN DO SERIAL ADDITIONS
TOTAL SCORE: 0
ALCOHOL_USE: YES
CONSUMPTION TOTAL: POSITIVE
TOTAL SCORE: 0
EVER_SMOKED: NEVER

## 2018-01-24 ASSESSMENT — PAIN SCALES - WONG BAKER: WONGBAKER_NUMERICALRESPONSE: DOESN'T HURT AT ALL

## 2018-01-24 NOTE — ED NOTES
Med Rec completed per patient  Allergies reviewed    Completed a Flagyl 500 mg every 8 hours for 8 day course on 1-2-18    Patient stated he took all his medications last at 0000 1-22-18

## 2018-01-24 NOTE — ED PROVIDER NOTES
"CHIEF COMPLAINT  Chief Complaint   Patient presents with   • Weight Loss     Pt states he has lost 16lbs since august 2017 without trying   • Diarrhea     x1wk, treated in Dec for c-diff   • Sent by MD     R/o dehydration       HPI  Marcelo Colon is a 77 y.o. Male who presents after being sent from his primary care physician out of concern for possible \"severe dehydration.\"  Patient states he's been losing weight for the past 2 years and notes he is 16 pounds since early 2017.  He has been treated recently for C. diff colitis and also has a chronic colostomy, which has very watery, greenish output. He denies any abdominal or pelvic pain and also denies blood in his stool or urine.    REVIEW OF SYSTEMS  Constitutional: No fevers. +weight loss 2 \"years\"  Skin: No rashes, abrasions, lacerations, or pruritus  HEENT: No diplopia or blurred vision, no eye pain, no discharge. No ear pain, ringing in ears, or decreased hearing. No sore throat, runny nose, sores, trouble swallowing, trouble speaking.  Neck: No neck pain, stiffness, or masses.  Chest: No pain or rashes  Pulm: No shortness of breath, cough, wheezing, stridor, or pain with inspiration/expiration  Gastrointestinal: No nausea, vomiting, constipation, bloating, melena, hematochezia or pain.  Genitourinary: No pain, urgency, frequency, dysuria, hematuria, or polyuria. No scrotal or testicular pain  Musculoskeletal: No recent trauma, pain, swelling, weakness  Neurologic: No sensory or motor changes. No confusion or disorientation.  Immuno: No hx of recurrent infections      PAST MEDICAL HISTORY   has a past medical history of Depression; Enlarged prostate; GERD (gastroesophageal reflux disease); and Neck pain.    SOCIAL HISTORY  Social History     Social History Main Topics   • Smoking status: Former Smoker     Packs/day: 2.00     Years: 14.00   • Smokeless tobacco: Never Used   • Alcohol use 0.0 oz/week   • Drug use: Yes      Comment: history of cocaine and " "marijuana use still about 10 years ago. Denies any IV drug use.   • Sexual activity: Not on file       SURGICAL HISTORY   has a past surgical history that includes exploratory laparotomy (7/25/2017); sigmoid colon resection (7/25/2017); colostomy (7/25/2017); and appendectomy (7/25/2017).    CURRENT MEDICATIONS  Home Medications     Reviewed by Nuris Valadez (Pharmacy Tech) on 01/24/18 at 0733  Med List Status: Complete   Medication Last Dose Status   alfuzosin (UROXATRAL) 10 MG SR tablet 1/22/2018 Active   gabapentin (NEURONTIN) 300 MG Cap 1/22/2018 Active   lamotrigine (LAMICTAL) 200 MG tablet 1/22/2018 Active   potassium chloride SA (KDUR) 20 MEQ Tab CR 1/22/2018 Active   tramadol (ULTRAM) 50 MG TABS 1/22/2018 Active                ALLERGIES  No Known Allergies    PHYSICAL EXAM  VITAL SIGNS: /58   Pulse (!) 110   Temp 37.7 °C (99.8 °F)   Resp 16   Ht 1.727 m (5' 8\")   Wt 54 kg (119 lb)   SpO2 94%   BMI 18.09 kg/m²    Pulse ox interpretation: I interpret this pulse ox as normal.  Gen: Alert in no apparent distress.  Appears underweight  HEENT: No signs of trauma, Bilateral external ears normal, Nose normal. Conjunctiva normal, Non-icteric.   Neck:  No tenderness, Supple, No masses  Lymphatic: No cervical lymphadenopathy noted.   Cardiovascular: Regular rate and rhythm, no murmurs.   Thorax & Lungs: Normal breath sounds, No respiratory distress, No wheezing bilateral chest rise  Abdomen: Bowel sounds normal, Soft, No tenderness, No masses, No pulsatile masses. No Guarding or rebound.  Lower left quadrant colostomy noted.  Watery, greenish yellow output noted.  Midline abdominal incision, well-healed.  Skin: Warm, Dry, No erythema, No rash.   Extremities: Intact distal pulses, No edema, No tenderness, range of motion grossly normal all ext. No tenderness to palpation or major deformities noted.   Neurologic: Alert , no facial droop, grossly normal coordination and strength  Psychiatric: Affect " normal, Judgment normal, Mood normal.       DIAGNOSTIC STUDIES / PROCEDURES    EKG  LABS  Results for orders placed or performed during the hospital encounter of 01/23/18   CBC WITH DIFFERENTIAL   Result Value Ref Range    WBC 23.4 (H) 4.8 - 10.8 K/uL    RBC 4.17 (L) 4.70 - 6.10 M/uL    Hemoglobin 11.4 (L) 14.0 - 18.0 g/dL    Hematocrit 35.2 (L) 42.0 - 52.0 %    MCV 84.4 81.4 - 97.8 fL    MCH 27.3 27.0 - 33.0 pg    MCHC 32.4 (L) 33.7 - 35.3 g/dL    RDW 53.8 (H) 35.9 - 50.0 fL    Platelet Count 241 164 - 446 K/uL    MPV 8.7 (L) 9.0 - 12.9 fL    Neutrophils-Polys 87.70 (H) 44.00 - 72.00 %    Lymphocytes 4.70 (L) 22.00 - 41.00 %    Monocytes 5.50 0.00 - 13.40 %    Eosinophils 0.00 0.00 - 6.90 %    Basophils 0.30 0.00 - 1.80 %    Immature Granulocytes 1.80 (H) 0.00 - 0.90 %    Nucleated RBC 0.00 /100 WBC    Neutrophils (Absolute) 20.52 (H) 1.82 - 7.42 K/uL    Lymphs (Absolute) 1.11 1.00 - 4.80 K/uL    Monos (Absolute) 1.29 (H) 0.00 - 0.85 K/uL    Eos (Absolute) 0.00 0.00 - 0.51 K/uL    Baso (Absolute) 0.06 0.00 - 0.12 K/uL    Immature Granulocytes (abs) 0.42 (H) 0.00 - 0.11 K/uL    NRBC (Absolute) 0.00 K/uL   COMP METABOLIC PANEL   Result Value Ref Range    Sodium 135 135 - 145 mmol/L    Potassium 3.9 3.6 - 5.5 mmol/L    Chloride 97 96 - 112 mmol/L    Co2 26 20 - 33 mmol/L    Anion Gap 12.0 (H) 0.0 - 11.9    Glucose 193 (H) 65 - 99 mg/dL    Bun 20 8 - 22 mg/dL    Creatinine 1.27 0.50 - 1.40 mg/dL    Calcium 9.4 8.5 - 10.5 mg/dL    AST(SGOT) 11 (L) 12 - 45 U/L    ALT(SGPT) 8 2 - 50 U/L    Alkaline Phosphatase 97 30 - 99 U/L    Total Bilirubin 0.9 0.1 - 1.5 mg/dL    Albumin 2.7 (L) 3.2 - 4.9 g/dL    Total Protein 5.8 (L) 6.0 - 8.2 g/dL    Globulin 3.1 1.9 - 3.5 g/dL    A-G Ratio 0.9 g/dL   LIPASE   Result Value Ref Range    Lipase <3 (L) 11 - 82 U/L   TROPONIN   Result Value Ref Range    Troponin I 0.02 0.00 - 0.04 ng/mL   LACTIC ACID   Result Value Ref Range    Lactic Acid 1.7 0.5 - 2.0 mmol/L   URINALYSIS CULTURE, IF  "INDICATED   Result Value Ref Range    Color DK Yellow     Character Cloudy (A)     Specific Gravity 1.029 <1.035    Ph 5.0 5.0 - 8.0    Glucose Negative Negative mg/dL    Ketones 15 (A) Negative mg/dL    Protein 30 (A) Negative mg/dL    Bilirubin Moderate (A) Negative    Urobilinogen, Urine 1.0 Negative    Nitrite Negative Negative    Leukocyte Esterase Trace (A) Negative    Occult Blood Trace (A) Negative    Micro Urine Req Microscopic     Culture Indicated Yes UA Culture   CDIFF BY PCR WITH TOXIN   Result Value Ref Range    C Diff by PCR See Toxin Negative    [id number] Presumptive POSITIVE Negative   ESTIMATED GFR   Result Value Ref Range    GFR If African American >60 >60 mL/min/1.73 m 2    GFR If Non African American 55 (A) >60 mL/min/1.73 m 2   URINE MICROSCOPIC (W/UA)   Result Value Ref Range    WBC 10-20 (A) /hpf    RBC 20-50 (A) /hpf    Bacteria Negative None /hpf    Epithelial Cells Many (A) /hpf    Epithelial Cells Renal Few /hpf    Hyaline Cast 11-20 (A) /lpf   Hemoglobin A1c   Result Value Ref Range    Glycohemoglobin 5.6 0.0 - 5.6 %    Est Avg Glucose 114 mg/dL   C DIFF TOXIN   Result Value Ref Range    C.Diff Toxin A&B Positive (A)        RADIOLOGY  DX-CHEST-PORTABLE (1 VIEW)   Final Result      No acute cardiopulmonary process is identified.      Hiatal hernia.      Healing left-sided rib fractures.          Reevaluation   Time:0200  Vital signs: /58   Pulse (!) 110   Temp 37.7 °C (99.8 °F)   Resp 16   Ht 1.727 m (5' 8\")   Wt 54 kg (119 lb)   SpO2 94%   BMI 18.09 kg/m²   Assessment: Patient still unable to produce urine. No abdominal pain, does not appear distressed.  COURSE & MEDICAL DECISION MAKING  Pertinent Labs & Imaging studies reviewed. (See chart for details)  Patient's symptoms are suggestive of a repeat episode of C. diff colitis however he does not have any abdominal pain or cramping. Unfortunately he was unable to produce a urine sample and would not tolerate a " catheterization to determine whether he had an urinary tract infection. Is also notable C. diff PCR has not returned. Due to the patient's extremely elevated white blood cell count and the possibility for complication, he'll be admitted to the hospitalist service for further evaluation and treatment.     FINAL IMPRESSION  1. Clostridium difficile colitis  2. Leukocytosis  3.         Electronically signed by: Shad Perez, 1/23/2018 5:55 PM

## 2018-01-24 NOTE — ASSESSMENT & PLAN NOTE
Transfuse if needed for hemoglobin less than 7 gm/dL  2/3:  Hg 6.8 requiring 1 unit PRBCs transfused, slow drop in Hg over the week.

## 2018-01-24 NOTE — ED NOTES
Pt to triage in personal WC.  Chief Complaint   Patient presents with   • Weight Loss     Pt states he has lost 16lbs since august 2017 without trying   • Diarrhea     x1wk, treated in Dec for c-diff   • Sent by MD     R/o dehydration

## 2018-01-24 NOTE — ED NOTES
Report rec'd from B[facility] Guidry  No needs voiced at this time.  Call light in reach.  Awaiting room assignment.

## 2018-01-24 NOTE — ASSESSMENT & PLAN NOTE
Recurrent of the same.    Cdiff positive  On Flagyl, Day 4/10, dc 1/27    oral vanco, slow taper, start 1/27  14-21 day course with slow taper  Per ID  Contact isolation   lactobacillus    Pt/ot with CGA/FWW, baseline  Home health ordered  1/31  Dehydration 2/2 high output from colostomy despite adequate intake po.  Hypotension 80/50s.  Started NS 75/hr.  2/3 resolved hypotension, dc'd IVfs.  Taking po well.  2/4:  Remains with decreased colostomy fluid, BP improved off IVFs.

## 2018-01-24 NOTE — CARE PLAN
Problem: Communication  Goal: The ability to communicate needs accurately and effectively will improve    Intervention: Educate patient and significant other/support system about the plan of care, procedures, treatments, medications and allow for questions  POC discussed with pt along with medications, unit routine, and call light.       Problem: Infection  Goal: Will remain free from infection    Intervention: Implement standard precautions and perform hand washing before and after patient contact  Special contact precautions.

## 2018-01-24 NOTE — ED NOTES
Pt was given a snack.   RN WAS GIVEN REPORT, PT IS IN NAD, PT IS WAITING FOR A BED. PT IS ON ISO.

## 2018-01-24 NOTE — ED NOTES
Pt resting in gurney with eyes closed.  No distress noted.  Call light in reach.  Awaiting bed assignment.

## 2018-01-24 NOTE — ASSESSMENT & PLAN NOTE
History of the same status post resection of sigmoid colon as a result. 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

## 2018-01-24 NOTE — PROGRESS NOTES
2 RN skin assessment completed with BART Stewart. Slow, blanchable, redness to sacrum found. Blanchable redness with flaky skin found to back of left ankle as well.

## 2018-01-24 NOTE — H&P
" Hospital Medicine History and Physical    Date of Service  1/24/2018    Chief Complaint  Chief Complaint   Patient presents with   • Weight Loss     Pt states he has lost 16lbs since august 2017 without trying   • Diarrhea     x1wk, treated in Dec for c-diff   • Sent by MD     R/o dehydration       History of Presenting Illness  77 y.o. male with history of pelvic abscess due to colitis, status post sigmoid resection and creation of colostomy, as well as history of Clostridium difficile infection, was in his usual state of health until approximately 2 weeks prior to admission. He noted a gradual change in the character of his stool from a peanut butter consistency to a liquid consistency. On the day prior to admission, he presented to his primary care physician, who noted him to be \"severely dehydrated\", and likely with a recurrence of Clostridium. He was subsequently sent to this facility for further evaluation.  He reports that this continued over the next 2 weeks until the time of admission. He denies any fever or chills, he denies any abdominal pain, nausea vomiting, chest pain or shortness of breath. He has no other complaints.      Primary Care Physician  Sixto Braun D.O.    Consultants  None    Code Status  Full code    Review of Systems  Review of Systems   Constitutional: Negative for chills and fever.   Eyes: Negative for blurred vision and double vision.   Respiratory: Negative for cough, sputum production and shortness of breath.    Cardiovascular: Negative for chest pain and palpitations.   Gastrointestinal: Positive for diarrhea. Negative for abdominal pain, constipation, nausea and vomiting.   Genitourinary: Negative for dysuria.   Neurological: Positive for weakness. Negative for headaches.        Past Medical History  Past Medical History:   Diagnosis Date   • Depression    • Enlarged prostate    • GERD (gastroesophageal reflux disease)    • Neck pain        Surgical History  Past Surgical " History:   Procedure Laterality Date   • EXPLORATORY LAPAROTOMY  2017    Procedure: EXPLORATORY LAPAROTOMY- ABDOMINAL WASH OUT;  Surgeon: Jorge Rodriguez M.D.;  Location: SURGERY [facility];  Service:    • SIGMOID COLON RESECTION  2017    Procedure: SIGMOID COLON RESECTION;  Surgeon: Jorge Rodriguez M.D.;  Location: SURGERY [facility];  Service:    • COLOSTOMY  2017    Procedure: COLOSTOMY- FOR OSTOMY PLACEMENT AND OTHER PROCEDURES AS INDICATED;  Surgeon: Jorge Rodriguez M.D.;  Location: SURGERY [facility];  Service:    • APPENDECTOMY  2017    Procedure: APPENDECTOMY;  Surgeon: Jorge Rodriguez M.D.;  Location: SURGERY [facility];  Service:        Medications  No current facility-administered medications on file prior to encounter.      Current Outpatient Prescriptions on File Prior to Encounter   Medication Sig Dispense Refill   • gabapentin (NEURONTIN) 300 MG Cap Take 300 mg by mouth 3 times a day.     • lamotrigine (LAMICTAL) 200 MG tablet Take 200 mg by mouth every day.     • potassium chloride SA (KDUR) 20 MEQ Tab CR Take 20 mEq by mouth every day.     • metformin (GLUCOPHAGE) 500 MG Tab Take 500 mg by mouth every day.     • tamsulosin (FLOMAX) 0.4 MG capsule Take 1 Cap by mouth ONE-HALF HOUR AFTER BREAKFAST. 30 Cap    • tramadol (ULTRAM) 50 MG TABS Take 100 mg by mouth every four hours as needed for Mild Pain.         Family History  Family history reviewed with patient and noncontributory    Social History  Social History   Substance Use Topics   • Smoking status: Former Smoker     Packs/day: 2.00     Years: 14.00   • Smokeless tobacco: Never Used   • Alcohol use 0.0 oz/week       Allergies  No Known Allergies     Physical Exam  Laboratory   Hemodynamics  Temp (24hrs), Av °C (98.6 °F), Min:36.6 °C (97.9 °F), Max:37.3 °C (99.2 °F)   Temperature: 36.6 °C (97.9 °F)  Pulse  Av.5  Min: 78  Max: 101    Blood Pressure : 100/63, NIBP: (!) 91/48       Respiratory      Respiration: 18, Pulse Oximetry: 99 %             Physical Exam   Constitutional: He is oriented to person, place, and time. He appears well-developed and well-nourished. No distress.   HENT:   Head: Normocephalic.   Eyes: Pupils are equal, round, and reactive to light.   Neck: Normal range of motion. Neck supple.   Cardiovascular: Normal rate, regular rhythm and normal heart sounds.  Exam reveals no gallop and no friction rub.    No murmur heard.  Pulmonary/Chest: Effort normal and breath sounds normal. No respiratory distress. He has no wheezes.   Abdominal: Soft. Bowel sounds are normal. He exhibits no distension and no mass. There is no tenderness. There is no rebound and no guarding.   Left lower quadrant colostomy in place   Musculoskeletal: Normal range of motion. He exhibits no edema.   Neurological: He is alert and oriented to person, place, and time. No cranial nerve deficit.   Skin: He is not diaphoretic.   Psychiatric: He has a normal mood and affect.   Nursing note and vitals reviewed.      Recent Labs      01/23/18   1820   WBC  23.4*   RBC  4.17*   HEMOGLOBIN  11.4*   HEMATOCRIT  35.2*   MCV  84.4   MCH  27.3   MCHC  32.4*   RDW  53.8*   PLATELETCT  241   MPV  8.7*     Recent Labs      01/23/18   1820   SODIUM  135   POTASSIUM  3.9   CHLORIDE  97   CO2  26   GLUCOSE  193*   BUN  20   CREATININE  1.27   CALCIUM  9.4     Recent Labs      01/23/18   1820   ALTSGPT  8   ASTSGOT  11*   ALKPHOSPHAT  97   TBILIRUBIN  0.9   LIPASE  <3*   GLUCOSE  193*                 Lab Results   Component Value Date    TROPONINI 0.02 01/23/2018     Urinalysis:    Lab Results  Component Value Date/Time   SPECGRAVITY 1.029 01/24/2018 0315   GLUCOSEUR Negative 01/24/2018 0315   KETONES 15 (A) 01/24/2018 0315   NITRITE Negative 01/24/2018 0315   WBCURINE 10-20 (A) 01/24/2018 0315   RBCURINE 20-50 (A) 01/24/2018 0315   BACTERIA Negative 01/24/2018 0315   EPITHELCELL Many (A) 01/24/2018 0315         Imaging  Essentially normal chest radiograph with the exception of left-sided rib fractures which appear to be healing    Assessment/Plan     I anticipate this patient will require at least two midnights for appropriate medical management, necessitating inpatient admission.    * Colitis due to Clostridium difficile   Assessment & Plan    Likely recurrence of the same.  Confirmatory testing pending, versus other colitis.  Will start antibiotic therapy, follow up on C. Diff testing.          Pelvic abscess in male (CMS-HCC)- (present on admission)   Assessment & Plan    History of the same status post resection of sigmoid colon as a result. 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          Type 2 diabetes mellitus (CMS-HCC)- (present on admission)   Assessment & Plan    Controlled with hgba1c 6.2%.  Monitor with current regimen.  Insulin correction dose as needed.         Neuropathy (CMS-HCC)- (present on admission)   Assessment & Plan    Controlled with current medication regimen.         Normocytic anemia- (present on admission)   Assessment & Plan    Stable without evidence of bleed.  Transfuse if needed for hemoglobin less than 7 gm/dL          BPH (benign prostatic hypertrophy)- (present on admission)   Assessment & Plan    Continue current medication regimen.             VTE prophylaxis: lovenox, SCDS.

## 2018-01-25 PROBLEM — N30.00 ACUTE CYSTITIS: Status: ACTIVE | Noted: 2018-01-25

## 2018-01-25 LAB
ANION GAP SERPL CALC-SCNC: 6 MMOL/L (ref 0–11.9)
ANISOCYTOSIS BLD QL SMEAR: ABNORMAL
BASOPHILS # BLD AUTO: 0 % (ref 0–1.8)
BASOPHILS # BLD: 0 K/UL (ref 0–0.12)
BUN SERPL-MCNC: 13 MG/DL (ref 8–22)
CALCIUM SERPL-MCNC: 8.1 MG/DL (ref 8.4–10.2)
CHLORIDE SERPL-SCNC: 107 MMOL/L (ref 96–112)
CO2 SERPL-SCNC: 23 MMOL/L (ref 20–33)
CREAT SERPL-MCNC: 0.8 MG/DL (ref 0.5–1.4)
EOSINOPHIL # BLD AUTO: 0.25 K/UL (ref 0–0.51)
EOSINOPHIL NFR BLD: 2.6 % (ref 0–6.9)
ERYTHROCYTE [DISTWIDTH] IN BLOOD BY AUTOMATED COUNT: 55.7 FL (ref 35.9–50)
GLUCOSE SERPL-MCNC: 89 MG/DL (ref 65–99)
HCT VFR BLD AUTO: 28.6 % (ref 42–52)
HGB BLD-MCNC: 9.1 G/DL (ref 14–18)
HYPOCHROMIA BLD QL SMEAR: ABNORMAL
LYMPHOCYTES # BLD AUTO: 3.46 K/UL (ref 1–4.8)
LYMPHOCYTES NFR BLD: 35.7 % (ref 22–41)
MACROCYTES BLD QL SMEAR: ABNORMAL
MANUAL DIFF BLD: NORMAL
MCH RBC QN AUTO: 26.9 PG (ref 27–33)
MCHC RBC AUTO-ENTMCNC: 31.6 G/DL (ref 33.7–35.3)
MCV RBC AUTO: 85.3 FL (ref 81.4–97.8)
MICROCYTES BLD QL SMEAR: ABNORMAL
MONOCYTES # BLD AUTO: 0.59 K/UL (ref 0–0.85)
MONOCYTES NFR BLD AUTO: 6.1 % (ref 0–13.4)
MORPHOLOGY BLD-IMP: NORMAL
NEUTROPHILS # BLD AUTO: 5.39 K/UL (ref 1.82–7.42)
NEUTROPHILS NFR BLD: 53 % (ref 44–72)
NEUTS BAND NFR BLD MANUAL: 2.6 % (ref 0–10)
NRBC # BLD AUTO: 0 K/UL
NRBC BLD-RTO: 0 /100 WBC
PLATELET # BLD AUTO: 248 K/UL (ref 164–446)
PLATELET BLD QL SMEAR: NORMAL
PMV BLD AUTO: 8.7 FL (ref 9–12.9)
POLYCHROMASIA BLD QL SMEAR: NORMAL
POTASSIUM SERPL-SCNC: 3.4 MMOL/L (ref 3.6–5.5)
RBC # BLD AUTO: 3.34 M/UL (ref 4.7–6.1)
RBC BLD AUTO: PRESENT
SMUDGE CELLS BLD QL SMEAR: NORMAL
SODIUM SERPL-SCNC: 136 MMOL/L (ref 135–145)
WBC # BLD AUTO: 9.7 K/UL (ref 4.8–10.8)

## 2018-01-25 PROCEDURE — 700105 HCHG RX REV CODE 258: Performed by: HOSPITALIST

## 2018-01-25 PROCEDURE — 85027 COMPLETE CBC AUTOMATED: CPT

## 2018-01-25 PROCEDURE — 700102 HCHG RX REV CODE 250 W/ 637 OVERRIDE(OP): Performed by: HOSPITALIST

## 2018-01-25 PROCEDURE — 700111 HCHG RX REV CODE 636 W/ 250 OVERRIDE (IP): Performed by: INTERNAL MEDICINE

## 2018-01-25 PROCEDURE — 770021 HCHG ROOM/CARE - ISO PRIVATE

## 2018-01-25 PROCEDURE — A9270 NON-COVERED ITEM OR SERVICE: HCPCS | Performed by: HOSPITALIST

## 2018-01-25 PROCEDURE — 85007 BL SMEAR W/DIFF WBC COUNT: CPT

## 2018-01-25 PROCEDURE — 700101 HCHG RX REV CODE 250: Performed by: HOSPITALIST

## 2018-01-25 PROCEDURE — 36415 COLL VENOUS BLD VENIPUNCTURE: CPT

## 2018-01-25 PROCEDURE — 80048 BASIC METABOLIC PNL TOTAL CA: CPT

## 2018-01-25 PROCEDURE — 99233 SBSQ HOSP IP/OBS HIGH 50: CPT | Performed by: INTERNAL MEDICINE

## 2018-01-25 RX ADMIN — GABAPENTIN 300 MG: 300 CAPSULE ORAL at 08:20

## 2018-01-25 RX ADMIN — SODIUM CHLORIDE: 9 INJECTION, SOLUTION INTRAVENOUS at 04:51

## 2018-01-25 RX ADMIN — SODIUM CHLORIDE: 9 INJECTION, SOLUTION INTRAVENOUS at 14:04

## 2018-01-25 RX ADMIN — OXYCODONE HYDROCHLORIDE 5 MG: 5 TABLET ORAL at 21:13

## 2018-01-25 RX ADMIN — GABAPENTIN 300 MG: 300 CAPSULE ORAL at 21:13

## 2018-01-25 RX ADMIN — OXYCODONE HYDROCHLORIDE 5 MG: 5 TABLET ORAL at 08:25

## 2018-01-25 RX ADMIN — OXYCODONE HYDROCHLORIDE 5 MG: 5 TABLET ORAL at 14:02

## 2018-01-25 RX ADMIN — METRONIDAZOLE 500 MG: 500 INJECTION, SOLUTION INTRAVENOUS at 21:15

## 2018-01-25 RX ADMIN — METRONIDAZOLE 500 MG: 500 INJECTION, SOLUTION INTRAVENOUS at 13:57

## 2018-01-25 RX ADMIN — TAMSULOSIN HYDROCHLORIDE 0.4 MG: 0.4 CAPSULE ORAL at 08:20

## 2018-01-25 RX ADMIN — CEFTRIAXONE 2 G: 2 INJECTION, POWDER, FOR SOLUTION INTRAMUSCULAR; INTRAVENOUS at 08:20

## 2018-01-25 RX ADMIN — SODIUM CHLORIDE: 9 INJECTION, SOLUTION INTRAVENOUS at 21:31

## 2018-01-25 RX ADMIN — LAMOTRIGINE 200 MG: 100 TABLET ORAL at 08:20

## 2018-01-25 RX ADMIN — GABAPENTIN 300 MG: 300 CAPSULE ORAL at 14:02

## 2018-01-25 RX ADMIN — METRONIDAZOLE 500 MG: 500 INJECTION, SOLUTION INTRAVENOUS at 04:51

## 2018-01-25 ASSESSMENT — ENCOUNTER SYMPTOMS
MYALGIAS: 0
BACK PAIN: 0
CHILLS: 0
WEAKNESS: 1
FLANK PAIN: 0
PALPITATIONS: 0
MEMORY LOSS: 0
FEVER: 0
DIAPHORESIS: 0
FOCAL WEAKNESS: 0
SHORTNESS OF BREATH: 0
VOMITING: 0
DIZZINESS: 0
HEADACHES: 0
ABDOMINAL PAIN: 0
COUGH: 0
DIARRHEA: 1
SPEECH CHANGE: 0
SENSORY CHANGE: 0
DEPRESSION: 0
NAUSEA: 0
NERVOUS/ANXIOUS: 0

## 2018-01-25 ASSESSMENT — PAIN SCALES - GENERAL
PAINLEVEL_OUTOF10: 7
PAINLEVEL_OUTOF10: 3
PAINLEVEL_OUTOF10: 7
PAINLEVEL_OUTOF10: 4
PAINLEVEL_OUTOF10: 2
PAINLEVEL_OUTOF10: 4

## 2018-01-25 NOTE — DIETARY
"Nutrition Services: Pt seen for poor PO intake and unplanned wt loss per nutrition admit screen.    Pt is a 76 yo M admitted for Colitis due to Clostridium difficile  Past Medical History:   Diagnosis Date   • Depression    • Enlarged prostate    • GERD (gastroesophageal reflux disease)    • Neck pain      Spoke with pt at bedside, pt appears extremely thin with pronounced clavicle bones, sunken temporal lobes, and squared shoulders. Pt reports that he has struggled with a reduced appetite for \"some time now\". Asked pt to be more specific, however he proceeded to say \"months\" with no further specifics. Pt then became much more concerned with his meal services provided by Nutrition Representatives, and he would not stay on topic for diet and wt hx.     Per chart review pt weighed 125# on 12/19 during last admit. No measured wt recorded at this time, pt states that he is down to 119# currently. Per pt report this is ~5% wt loss in ~1 month - which is significant. Spoke with RN about obtaining a measured wt, which she states they will work on. RD will monitor PO intake and wt trends.     Diet: Regular; consumed 25-50% x 1 meal  Wt: no measured wt at this time  BMI: unable to determine without wt  Labs: K 3.4  Meds: rocephin, flagyl  Fluids: NS @ 125 ml/hr  GI: + BM today  Skin: intact    Plan/Recommend:  1. Encourage PO intake of meals   2. Nutrition Representative to see daily for meal preferences  3. Please record intake as percentage of meals consumed  4. Please obtain a measured wt as able  5. RD to monitor PO intake, wt trends, and nutrition labs/meds    "

## 2018-01-25 NOTE — ASSESSMENT & PLAN NOTE
Urine culture negative 1/23  Recent ciprofloxacin and rocephin use on admission for presumed UTI.

## 2018-01-25 NOTE — PROGRESS NOTES
[facility] Hospitalist Progress Note    Date of Service: 2018    Chief Complaint  77 y.o. male admitted 2018 with recurrent cdiff and ostomy. He also has  UTI, now on Rocephin and Flagyl.    Interval Problem Update  Ongoing watery output  Denies pain    Consultants/Specialty  none    Disposition  tbd        Review of Systems   Constitutional: Negative for chills, diaphoresis, fever and malaise/fatigue.   HENT: Negative for congestion and hearing loss.    Respiratory: Negative for cough and shortness of breath.    Cardiovascular: Negative for chest pain, palpitations and leg swelling.   Gastrointestinal: Positive for diarrhea. Negative for abdominal pain, nausea and vomiting.   Genitourinary: Negative for dysuria, flank pain and urgency.   Musculoskeletal: Negative for back pain, joint pain and myalgias.   Neurological: Positive for weakness. Negative for dizziness, sensory change, speech change, focal weakness and headaches.   Psychiatric/Behavioral: Negative for depression and memory loss. The patient is not nervous/anxious.       Physical Exam  Laboratory/Imaging   Hemodynamics  Temp (24hrs), Av °C (98.6 °F), Min:36.7 °C (98 °F), Max:37.7 °C (99.8 °F)   Temperature: 36.7 °C (98.1 °F)  Pulse  Av.1  Min: 70  Max: 110    Blood Pressure : (!) 95/58      Respiratory      Respiration: 16, Pulse Oximetry: 96 %             Fluids    Intake/Output Summary (Last 24 hours) at 18 1236  Last data filed at 18 0500   Gross per 24 hour   Intake              390 ml   Output              400 ml   Net              -10 ml       Nutrition  Orders Placed This Encounter   Procedures   • Diet Order     Standing Status:   Standing     Number of Occurrences:   1     Order Specific Question:   Diet:     Answer:   Regular [1]     Physical Exam   Constitutional: He is oriented to person, place, and time. He appears well-nourished. No distress.   thin   HENT:   Head: Normocephalic and atraumatic.   Nose: Nose normal.    Eyes: EOM are normal. Pupils are equal, round, and reactive to light. Right eye exhibits no discharge. Left eye exhibits no discharge.   Neck: Neck supple. No thyromegaly present.   Cardiovascular: Normal rate and intact distal pulses.    No murmur heard.  Pulmonary/Chest: Effort normal and breath sounds normal. No respiratory distress. He has no wheezes.   Abdominal: Soft. Bowel sounds are normal. He exhibits no distension. There is no tenderness.   ostomy   Musculoskeletal: He exhibits tenderness. He exhibits no edema.   Neurological: He is alert and oriented to person, place, and time. No cranial nerve deficit. Coordination normal.   Skin: Skin is warm and dry. No rash noted. He is not diaphoretic. No erythema.   Psychiatric: He has a normal mood and affect. His behavior is normal. Judgment and thought content normal.   Nursing note and vitals reviewed.      Recent Labs      01/23/18   1820  01/25/18   0149   WBC  23.4*  9.7   RBC  4.17*  3.34*   HEMOGLOBIN  11.4*  9.1*   HEMATOCRIT  35.2*  28.6*   MCV  84.4  85.3   MCH  27.3  26.9*   MCHC  32.4*  31.6*   RDW  53.8*  55.7*   PLATELETCT  241  248   MPV  8.7*  8.7*     Recent Labs      01/23/18   1820  01/25/18   0149   SODIUM  135  136   POTASSIUM  3.9  3.4*   CHLORIDE  97  107   CO2  26  23   GLUCOSE  193*  89   BUN  20  13   CREATININE  1.27  0.80   CALCIUM  9.4  8.1*                      Assessment/Plan     * Colitis due to Clostridium difficile   Assessment & Plan    Recurrent of the same.    Cdiff positive  On Flagyl  Contact isolation        Pelvic abscess in male (CMS-HCC)- (present on admission)   Assessment & Plan    History of the same status post resection of sigmoid colon as a result. 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          Type 2 diabetes mellitus (CMS-[facility])- (present on admission)   Assessment & Plan    Controlled with hgba1c 6.2%.  Monitor with current regimen.  Insulin correction dose as needed.         Acute cystitis   Assessment & Plan     f/u culture  We'll DC ciprofloxacin  Change to Rocephin        Neuropathy (CMS-HCC)- (present on admission)   Assessment & Plan    Controlled with current medication regimen.         Normocytic anemia- (present on admission)   Assessment & Plan    Stable without evidence of bleed.  Transfuse if needed for hemoglobin less than 7 gm/dL          BPH (benign prostatic hypertrophy)- (present on admission)   Assessment & Plan    Continue current medication regimen.             Reviewed items::  Labs reviewed, Medications reviewed and Radiology images reviewed  DVT prophylaxis pharmacological::  Enoxaparin (Lovenox)  Ulcer Prophylaxis::  Not indicated  Antibiotics:  Treating active infection/contamination beyond 24 hours perioperative coverage

## 2018-01-25 NOTE — CARE PLAN
Problem: Venous Thromboembolism (VTW)/Deep Vein Thrombosis (DVT) Prevention:  Goal: Patient will participate in Venous Thrombosis (VTE)/Deep Vein Thrombosis (DVT)Prevention Measures  Pt refusing lovenox shot this AM despite extensive education. Pt encouraged to stay mobile.     Problem: Skin Integrity  Goal: Risk for impaired skin integrity will decrease  Pt encouraged to turn frequently as he is not getting OOB. Will attempt to get pt into wheelchair for lunch and dinner.

## 2018-01-25 NOTE — CARE PLAN
Problem: Infection  Goal: Will remain free from infection  Outcome: PROGRESSING AS EXPECTED  Special contact precautions implemented    Problem: Pain Management  Goal: Pain level will decrease to patient's comfort goal  Outcome: PROGRESSING AS EXPECTED  Pain medications given per MAR. Other non-pharmacologic measures for pain utilized.

## 2018-01-25 NOTE — PROGRESS NOTES
Pt is AAO x4.  Pt reports a 6/10 bilat hand pain level.  Medicated per MAR.  VS WNL.  LLQ colostomy in place. Emptied.   R PIV patent, saline locked.   Special contact precautions in place.   POC discussed.  All needs met at this time.  Bed in low position.  Call light within reach.  Rounding in place.

## 2018-01-26 PROBLEM — R53.81 DEBILITY: Status: ACTIVE | Noted: 2018-01-26

## 2018-01-26 LAB
BACTERIA UR CULT: NORMAL
SIGNIFICANT IND 70042: NORMAL
SITE SITE: NORMAL
SOURCE SOURCE: NORMAL

## 2018-01-26 PROCEDURE — 700102 HCHG RX REV CODE 250 W/ 637 OVERRIDE(OP): Performed by: HOSPITALIST

## 2018-01-26 PROCEDURE — A9270 NON-COVERED ITEM OR SERVICE: HCPCS | Performed by: HOSPITALIST

## 2018-01-26 PROCEDURE — 700105 HCHG RX REV CODE 258: Performed by: HOSPITALIST

## 2018-01-26 PROCEDURE — 99233 SBSQ HOSP IP/OBS HIGH 50: CPT | Performed by: INTERNAL MEDICINE

## 2018-01-26 PROCEDURE — 700101 HCHG RX REV CODE 250: Performed by: HOSPITALIST

## 2018-01-26 PROCEDURE — 700105 HCHG RX REV CODE 258: Performed by: INTERNAL MEDICINE

## 2018-01-26 PROCEDURE — 700111 HCHG RX REV CODE 636 W/ 250 OVERRIDE (IP): Performed by: INTERNAL MEDICINE

## 2018-01-26 PROCEDURE — 770021 HCHG ROOM/CARE - ISO PRIVATE

## 2018-01-26 RX ADMIN — GABAPENTIN 300 MG: 300 CAPSULE ORAL at 22:40

## 2018-01-26 RX ADMIN — METRONIDAZOLE 500 MG: 500 INJECTION, SOLUTION INTRAVENOUS at 22:40

## 2018-01-26 RX ADMIN — OXYCODONE HYDROCHLORIDE 5 MG: 5 TABLET ORAL at 09:54

## 2018-01-26 RX ADMIN — SODIUM CHLORIDE: 9 INJECTION, SOLUTION INTRAVENOUS at 22:40

## 2018-01-26 RX ADMIN — CEFTRIAXONE 2 G: 2 INJECTION, POWDER, FOR SOLUTION INTRAMUSCULAR; INTRAVENOUS at 09:58

## 2018-01-26 RX ADMIN — OXYCODONE HYDROCHLORIDE 5 MG: 5 TABLET ORAL at 14:53

## 2018-01-26 RX ADMIN — TAMSULOSIN HYDROCHLORIDE 0.4 MG: 0.4 CAPSULE ORAL at 09:54

## 2018-01-26 RX ADMIN — OXYCODONE HYDROCHLORIDE 5 MG: 5 TABLET ORAL at 22:40

## 2018-01-26 RX ADMIN — METRONIDAZOLE 500 MG: 500 INJECTION, SOLUTION INTRAVENOUS at 05:37

## 2018-01-26 RX ADMIN — SODIUM CHLORIDE: 9 INJECTION, SOLUTION INTRAVENOUS at 09:58

## 2018-01-26 RX ADMIN — METRONIDAZOLE 500 MG: 500 INJECTION, SOLUTION INTRAVENOUS at 14:49

## 2018-01-26 RX ADMIN — GABAPENTIN 300 MG: 300 CAPSULE ORAL at 14:49

## 2018-01-26 RX ADMIN — GABAPENTIN 300 MG: 300 CAPSULE ORAL at 09:54

## 2018-01-26 RX ADMIN — LAMOTRIGINE 200 MG: 100 TABLET ORAL at 09:54

## 2018-01-26 ASSESSMENT — ENCOUNTER SYMPTOMS
PALPITATIONS: 0
NAUSEA: 0
FEVER: 0
MYALGIAS: 0
BACK PAIN: 0
SHORTNESS OF BREATH: 0
FLANK PAIN: 0
WEAKNESS: 1
FOCAL WEAKNESS: 0
SPEECH CHANGE: 0
CHILLS: 0
NERVOUS/ANXIOUS: 0
VOMITING: 0
MEMORY LOSS: 0
DIARRHEA: 1

## 2018-01-26 ASSESSMENT — PAIN SCALES - GENERAL
PAINLEVEL_OUTOF10: 4
PAINLEVEL_OUTOF10: 7
PAINLEVEL_OUTOF10: 0
PAINLEVEL_OUTOF10: 4

## 2018-01-26 NOTE — PROGRESS NOTES
[facility] Hospitalist Progress Note    Date of Service: 2018    Chief Complaint  77 y.o. male admitted 2018 with recurrent cdiff and ostomy. He also has  UTI, now on Rocephin and Flagyl.    Interval Problem Update  Denies abdominal pain  Anxious to work on transfers  Encouraged out of bed  Wheelchair-bound at this baseline with 1 person assist    Consultants/Specialty  none    Disposition  tbd        Review of Systems   Constitutional: Negative for chills and fever.   HENT: Negative for congestion.    Respiratory: Negative for shortness of breath.    Cardiovascular: Negative for palpitations and leg swelling.   Gastrointestinal: Positive for diarrhea. Negative for nausea and vomiting.   Genitourinary: Negative for flank pain and urgency.   Musculoskeletal: Negative for back pain, joint pain and myalgias.   Neurological: Positive for weakness. Negative for speech change and focal weakness.   Psychiatric/Behavioral: Negative for memory loss. The patient is not nervous/anxious.       Physical Exam  Laboratory/Imaging   Hemodynamics  Temp (24hrs), Av.6 °C (97.9 °F), Min:36.3 °C (97.4 °F), Max:36.9 °C (98.5 °F)   Temperature: 36.3 °C (97.4 °F)  Pulse  Av.2  Min: 70  Max: 110    Blood Pressure : (!) 92/53      Respiratory      Respiration: 16, Pulse Oximetry: 95 %             Fluids    Intake/Output Summary (Last 24 hours) at 18 1353  Last data filed at 18 0950   Gross per 24 hour   Intake              360 ml   Output              600 ml   Net             -240 ml       Nutrition  Orders Placed This Encounter   Procedures   • Diet Order     Standing Status:   Standing     Number of Occurrences:   1     Order Specific Question:   Diet:     Answer:   Regular [1]     Physical Exam   Constitutional: He is oriented to person, place, and time. He appears well-nourished. No distress.   thin   HENT:   Head: Normocephalic and atraumatic.   Mouth/Throat: No oropharyngeal exudate.   Eyes: EOM are normal.  Pupils are equal, round, and reactive to light.   Neck: Neck supple. No thyromegaly present.   Cardiovascular: Normal rate and intact distal pulses.    Pulmonary/Chest: Effort normal and breath sounds normal. No respiratory distress.   Abdominal: Soft. Bowel sounds are normal. He exhibits no distension. There is no tenderness. There is no guarding.   ostomy   Musculoskeletal: He exhibits tenderness. He exhibits no edema.   Neurological: He is alert and oriented to person, place, and time. No cranial nerve deficit. He exhibits normal muscle tone.   Skin: Skin is warm and dry. No pallor.   Psychiatric: He has a normal mood and affect. His behavior is normal. Judgment and thought content normal.   Nursing note and vitals reviewed.      Recent Labs      01/23/18   1820  01/25/18   0149   WBC  23.4*  9.7   RBC  4.17*  3.34*   HEMOGLOBIN  11.4*  9.1*   HEMATOCRIT  35.2*  28.6*   MCV  84.4  85.3   MCH  27.3  26.9*   MCHC  32.4*  31.6*   RDW  53.8*  55.7*   PLATELETCT  241  248   MPV  8.7*  8.7*     Recent Labs      01/23/18   1820  01/25/18   0149   SODIUM  135  136   POTASSIUM  3.9  3.4*   CHLORIDE  97  107   CO2  26  23   GLUCOSE  193*  89   BUN  20  13   CREATININE  1.27  0.80   CALCIUM  9.4  8.1*                      Assessment/Plan     * Colitis due to Clostridium difficile   Assessment & Plan    Recurrent of the same.    Cdiff positive  On Flagyl  Contact isolation        Pelvic abscess in male (CMS-HCC)- (present on admission)   Assessment & Plan    History of the same status post resection of sigmoid colon as a result. 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          Type 2 diabetes mellitus (CMS-HCC)- (present on admission)   Assessment & Plan    Controlled with hgba1c 6.2%.  Monitor with current regimen.  Insulin correction dose as needed.         Debility   Assessment & Plan    Wheelchair-bound at baseline  Requires 1 person assist for transfer  PT/OT        Acute cystitis   Assessment & Plan    Urine culture  negative  Off ciprofloxacin  On Rocephin, will need a 3 day course        Neuropathy (CMS-[facility])- (present on admission)   Assessment & Plan    Controlled with current medication regimen.         Bipolar 1 disorder, depressed (CMS-[facility])- (present on admission)   Assessment & Plan    .        Normocytic anemia- (present on admission)   Assessment & Plan    Stable without evidence of bleed.  Transfuse if needed for hemoglobin less than 7 gm/dL          BPH (benign prostatic hypertrophy)- (present on admission)   Assessment & Plan    Continue current medication regimen.             Reviewed items::  Labs reviewed, Medications reviewed and Radiology images reviewed  DVT prophylaxis pharmacological::  Enoxaparin (Lovenox)  Ulcer Prophylaxis::  Not indicated  Antibiotics:  Treating active infection/contamination beyond 24 hours perioperative coverage

## 2018-01-26 NOTE — CARE PLAN
Problem: Pain Management  Goal: Pain level will decrease to patient's comfort goal  Outcome: PROGRESSING AS EXPECTED  Pain medications given per MAR. Other non-pharmacologic measures for pain utilized.    Problem: Skin Integrity  Goal: Risk for impaired skin integrity will decrease  Outcome: PROGRESSING AS EXPECTED  Assessed for signs of skin breakdown. Encouraged frequent turns and repositioning to prevent development of pressure ulcers.

## 2018-01-27 PROCEDURE — 700102 HCHG RX REV CODE 250 W/ 637 OVERRIDE(OP): Performed by: INTERNAL MEDICINE

## 2018-01-27 PROCEDURE — 700105 HCHG RX REV CODE 258: Performed by: INTERNAL MEDICINE

## 2018-01-27 PROCEDURE — G8987 SELF CARE CURRENT STATUS: HCPCS | Mod: CK

## 2018-01-27 PROCEDURE — 97161 PT EVAL LOW COMPLEX 20 MIN: CPT | Performed by: PHYSICAL THERAPIST

## 2018-01-27 PROCEDURE — A9270 NON-COVERED ITEM OR SERVICE: HCPCS | Performed by: HOSPITALIST

## 2018-01-27 PROCEDURE — G8988 SELF CARE GOAL STATUS: HCPCS | Mod: CJ

## 2018-01-27 PROCEDURE — 700111 HCHG RX REV CODE 636 W/ 250 OVERRIDE (IP): Performed by: INTERNAL MEDICINE

## 2018-01-27 PROCEDURE — G8979 MOBILITY GOAL STATUS: HCPCS | Mod: CJ | Performed by: PHYSICAL THERAPIST

## 2018-01-27 PROCEDURE — 700101 HCHG RX REV CODE 250: Performed by: HOSPITALIST

## 2018-01-27 PROCEDURE — 99233 SBSQ HOSP IP/OBS HIGH 50: CPT | Performed by: INTERNAL MEDICINE

## 2018-01-27 PROCEDURE — G8978 MOBILITY CURRENT STATUS: HCPCS | Mod: CK | Performed by: PHYSICAL THERAPIST

## 2018-01-27 PROCEDURE — 770021 HCHG ROOM/CARE - ISO PRIVATE

## 2018-01-27 PROCEDURE — A9270 NON-COVERED ITEM OR SERVICE: HCPCS | Performed by: INTERNAL MEDICINE

## 2018-01-27 PROCEDURE — 700102 HCHG RX REV CODE 250 W/ 637 OVERRIDE(OP): Performed by: HOSPITALIST

## 2018-01-27 PROCEDURE — 97165 OT EVAL LOW COMPLEX 30 MIN: CPT

## 2018-01-27 RX ORDER — L. ACIDOPHILUS/L.BULGARICUS 100MM CELL
1 GRANULES IN PACKET (EA) ORAL
Status: DISCONTINUED | OUTPATIENT
Start: 2018-01-27 | End: 2018-02-03

## 2018-01-27 RX ADMIN — CEFTRIAXONE 2 G: 2 INJECTION, POWDER, FOR SOLUTION INTRAMUSCULAR; INTRAVENOUS at 10:04

## 2018-01-27 RX ADMIN — METRONIDAZOLE 500 MG: 500 INJECTION, SOLUTION INTRAVENOUS at 06:50

## 2018-01-27 RX ADMIN — GABAPENTIN 300 MG: 300 CAPSULE ORAL at 21:30

## 2018-01-27 RX ADMIN — GABAPENTIN 300 MG: 300 CAPSULE ORAL at 14:36

## 2018-01-27 RX ADMIN — METRONIDAZOLE 500 MG: 500 INJECTION, SOLUTION INTRAVENOUS at 14:36

## 2018-01-27 RX ADMIN — VANCOMYCIN HYDROCHLORIDE 125 MG: 10 INJECTION, POWDER, LYOPHILIZED, FOR SOLUTION INTRAVENOUS at 17:30

## 2018-01-27 RX ADMIN — LACTOBACILLUS ACIDOPHILUS / LACTOBACILLUS BULGARICUS 1 PACKET: 100 MILLION CFU STRENGTH GRANULES at 17:30

## 2018-01-27 RX ADMIN — TAMSULOSIN HYDROCHLORIDE 0.4 MG: 0.4 CAPSULE ORAL at 10:03

## 2018-01-27 RX ADMIN — OXYCODONE HYDROCHLORIDE 5 MG: 5 TABLET ORAL at 10:03

## 2018-01-27 RX ADMIN — SODIUM CHLORIDE: 9 INJECTION, SOLUTION INTRAVENOUS at 13:30

## 2018-01-27 RX ADMIN — LAMOTRIGINE 200 MG: 100 TABLET ORAL at 10:03

## 2018-01-27 RX ADMIN — GABAPENTIN 300 MG: 300 CAPSULE ORAL at 10:03

## 2018-01-27 ASSESSMENT — ENCOUNTER SYMPTOMS
WEAKNESS: 1
COUGH: 0
ABDOMINAL PAIN: 0
MEMORY LOSS: 0
BACK PAIN: 0
PALPITATIONS: 0
SPEECH CHANGE: 0
FEVER: 0
SHORTNESS OF BREATH: 0
VOMITING: 0
CHILLS: 0
NAUSEA: 0
FOCAL WEAKNESS: 0
FLANK PAIN: 0
DIARRHEA: 1
DIZZINESS: 0
NERVOUS/ANXIOUS: 0
MYALGIAS: 0

## 2018-01-27 ASSESSMENT — COGNITIVE AND FUNCTIONAL STATUS - GENERAL
DAILY ACTIVITIY SCORE: 16
MOBILITY SCORE: 17
SUGGESTED CMS G CODE MODIFIER MOBILITY: CK
HELP NEEDED FOR BATHING: A LOT
MOVING FROM LYING ON BACK TO SITTING ON SIDE OF FLAT BED: A LITTLE
WALKING IN HOSPITAL ROOM: A LITTLE
TOILETING: A LITTLE
DRESSING REGULAR LOWER BODY CLOTHING: A LOT
SUGGESTED CMS G CODE MODIFIER DAILY ACTIVITY: CK
STANDING UP FROM CHAIR USING ARMS: A LITTLE
TURNING FROM BACK TO SIDE WHILE IN FLAT BAD: A LITTLE
CLIMB 3 TO 5 STEPS WITH RAILING: A LOT
EATING MEALS: A LITTLE
MOVING TO AND FROM BED TO CHAIR: A LITTLE
PERSONAL GROOMING: A LITTLE
DRESSING REGULAR UPPER BODY CLOTHING: A LITTLE

## 2018-01-27 ASSESSMENT — GAIT ASSESSMENTS
DEVIATION: STEP TO;DECREASED HEEL STRIKE;DECREASED TOE OFF;SHUFFLED GAIT
DISTANCE (FEET): 10
GAIT LEVEL OF ASSIST: CONTACT GUARD ASSIST
ASSISTIVE DEVICE: FRONT WHEEL WALKER

## 2018-01-27 ASSESSMENT — PAIN SCALES - GENERAL
PAINLEVEL_OUTOF10: 5
PAINLEVEL_OUTOF10: 7

## 2018-01-27 ASSESSMENT — ACTIVITIES OF DAILY LIVING (ADL): TOILETING: INDEPENDENT

## 2018-01-27 ASSESSMENT — PAIN SCALES - WONG BAKER: WONGBAKER_NUMERICALRESPONSE: DOESN'T HURT AT ALL

## 2018-01-27 NOTE — THERAPY
"Occupational Therapy Evaluation completed.   Functional Status:  Min - mod assist for self-care ; CGA for transfers w/ FWW;   Plan of Care: Will benefit from Occupational Therapy 3 times per week  Discharge Recommendations:  Equipment: Will Continue to Assess for Equipment Needs. Post-acute therapy Discharge to a transitional care facility for continued skilled therapy services. and Discharge to home with outpatient or home health for additional skilled therapy services.    See \"Rehab Therapy-Acute\" Patient Summary Report for complete documentation.    "

## 2018-01-27 NOTE — PROGRESS NOTES
[facility] Hospitalist Progress Note    Date of Service: 2018    Chief Complaint  77 y.o. male admitted 2018 with recurrent cdiff and ostomy. He also has  UTI, now on Rocephin and Flagyl.    Interval Problem Update  Up in chair  Denies abd pain  Reports decreased oral intake 2nd to food consistency    Consultants/Specialty  none    Disposition  tbd        Review of Systems   Constitutional: Negative for chills, fever and malaise/fatigue.   HENT: Negative for congestion and hearing loss.    Respiratory: Negative for cough and shortness of breath.    Cardiovascular: Negative for chest pain, palpitations and leg swelling.   Gastrointestinal: Positive for diarrhea. Negative for abdominal pain, nausea and vomiting.   Genitourinary: Negative for flank pain and urgency.   Musculoskeletal: Negative for back pain, joint pain and myalgias.   Neurological: Positive for weakness. Negative for dizziness, speech change and focal weakness.   Psychiatric/Behavioral: Negative for memory loss. The patient is not nervous/anxious.       Physical Exam  Laboratory/Imaging   Hemodynamics  Temp (24hrs), Av.4 °C (97.6 °F), Min:36.1 °C (97 °F), Max:36.8 °C (98.3 °F)   Temperature: 36.3 °C (97.3 °F)  Pulse  Av  Min: 70  Max: 110    Blood Pressure : 102/61      Respiratory      Respiration: 18, Pulse Oximetry: 95 %             Fluids    Intake/Output Summary (Last 24 hours) at 18 1527  Last data filed at 18 1200   Gross per 24 hour   Intake                0 ml   Output              950 ml   Net             -950 ml       Nutrition  Orders Placed This Encounter   Procedures   • Diet Order     Standing Status:   Standing     Number of Occurrences:   1     Order Specific Question:   Diet:     Answer:   Regular [1]     Order Specific Question:   Texture/Fiber modifications:     Answer:   Dysphagia 3(Mechanical Soft)specify fluid consistency(question 6) [3]     Physical Exam   Constitutional: He is oriented to person,  place, and time. No distress.   thin   HENT:   Head: Normocephalic and atraumatic.   Eyes: EOM are normal. Pupils are equal, round, and reactive to light. No scleral icterus.   Neck: Neck supple.   Cardiovascular: Normal rate and intact distal pulses.    Pulmonary/Chest: Effort normal and breath sounds normal. No respiratory distress.   Abdominal: Soft. Bowel sounds are normal. He exhibits no distension. There is no tenderness.   ostomy   Musculoskeletal: He exhibits tenderness. He exhibits no edema.   Neurological: He is alert and oriented to person, place, and time. No cranial nerve deficit. Coordination normal.   Skin: Skin is warm and dry. He is not diaphoretic. No erythema. No pallor.   Psychiatric: He has a normal mood and affect. His behavior is normal. Judgment and thought content normal.   Nursing note and vitals reviewed.      Recent Labs      01/25/18   0149   WBC  9.7   RBC  3.34*   HEMOGLOBIN  9.1*   HEMATOCRIT  28.6*   MCV  85.3   MCH  26.9*   MCHC  31.6*   RDW  55.7*   PLATELETCT  248   MPV  8.7*     Recent Labs      01/25/18   0149   SODIUM  136   POTASSIUM  3.4*   CHLORIDE  107   CO2  23   GLUCOSE  89   BUN  13   CREATININE  0.80   CALCIUM  8.1*                      Assessment/Plan     * Colitis due to Clostridium difficile   Assessment & Plan    Recurrent of the same.    Cdiff positive  On Flagyl, Day 4/10, dc 1/27  Ongoing watery stool, change to oral vanco, slow taper, start 1/27  Contact isolation  Cont ivf  F/u am labs  Change to mechanical soft diet  Add lactobacillus        Pelvic abscess in male (CMS-HCC)- (present on admission)   Assessment & Plan    History of the same status post resection of sigmoid colon as a result. 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          Type 2 diabetes mellitus (CMS-HCC)- (present on admission)   Assessment & Plan    Controlled with hgba1c 6.2%.  Monitor with current regimen.  Insulin correction dose as needed.         Debility   Assessment & Plan     Wheelchair-bound at baseline  Requires 1 person assist for transfer  PT/OT        Acute cystitis   Assessment & Plan    Urine culture negative  Off ciprofloxacin  On Rocephin, will need a 3 day course, to complete on 1/27        Neuropathy (CMS-HCC)- (present on admission)   Assessment & Plan    Controlled with current medication regimen.         Bipolar 1 disorder, depressed (CMS-HCC)- (present on admission)   Assessment & Plan    .        Normocytic anemia- (present on admission)   Assessment & Plan    Stable without evidence of bleed.  Transfuse if needed for hemoglobin less than 7 gm/dL          BPH (benign prostatic hypertrophy)- (present on admission)   Assessment & Plan    Continue current medication regimen.             Reviewed items::  Labs reviewed, Medications reviewed and Radiology images reviewed  DVT prophylaxis pharmacological::  Enoxaparin (Lovenox)  Ulcer Prophylaxis::  Not indicated  Antibiotics:  Treating active infection/contamination beyond 24 hours perioperative coverage

## 2018-01-27 NOTE — THERAPY
"Physical Therapy Evaluation completed.   Bed Mobility:  Supine to Sit:  (not observed; pt up at EOB and returned to bedside chair )  Transfers: Sit to Stand: Contact Guard Assist  Gait: Level Of Assist: Contact Guard Assist with Front-Wheel Walker       Plan of Care: Will benefit from Physical Therapy 3 times per week  Discharge Recommendations: Equipment: No Equipment Needed. Post-acute therapy Discharge to a transitional care facility for continued skilled therapy services.    See \"Rehab Therapy-Acute\" Patient Summary Report for complete documentation.     "

## 2018-01-28 LAB
ANION GAP SERPL CALC-SCNC: 3 MMOL/L (ref 0–11.9)
ANISOCYTOSIS BLD QL SMEAR: ABNORMAL
BASOPHILS # BLD AUTO: 0 % (ref 0–1.8)
BASOPHILS # BLD: 0 K/UL (ref 0–0.12)
BUN SERPL-MCNC: 9 MG/DL (ref 8–22)
CALCIUM SERPL-MCNC: 7.3 MG/DL (ref 8.5–10.5)
CHLORIDE SERPL-SCNC: 109 MMOL/L (ref 96–112)
CO2 SERPL-SCNC: 25 MMOL/L (ref 20–33)
CREAT SERPL-MCNC: 0.62 MG/DL (ref 0.5–1.4)
EOSINOPHIL # BLD AUTO: 0.18 K/UL (ref 0–0.51)
EOSINOPHIL NFR BLD: 3.5 % (ref 0–6.9)
ERYTHROCYTE [DISTWIDTH] IN BLOOD BY AUTOMATED COUNT: 56.9 FL (ref 35.9–50)
GLUCOSE SERPL-MCNC: 116 MG/DL (ref 65–99)
HCT VFR BLD AUTO: 28.6 % (ref 42–52)
HGB BLD-MCNC: 8.7 G/DL (ref 14–18)
LYMPHOCYTES # BLD AUTO: 1.5 K/UL (ref 1–4.8)
LYMPHOCYTES NFR BLD: 29.5 % (ref 22–41)
MACROCYTES BLD QL SMEAR: ABNORMAL
MANUAL DIFF BLD: NORMAL
MCH RBC QN AUTO: 26.3 PG (ref 27–33)
MCHC RBC AUTO-ENTMCNC: 30.4 G/DL (ref 33.7–35.3)
MCV RBC AUTO: 86.4 FL (ref 81.4–97.8)
METAMYELOCYTES NFR BLD MANUAL: 0.9 %
MICROCYTES BLD QL SMEAR: ABNORMAL
MONOCYTES # BLD AUTO: 0.44 K/UL (ref 0–0.85)
MONOCYTES NFR BLD AUTO: 8.7 % (ref 0–13.4)
MORPHOLOGY BLD-IMP: NORMAL
MYELOCYTES NFR BLD MANUAL: 2.6 %
NEUTROPHILS # BLD AUTO: 2.79 K/UL (ref 1.82–7.42)
NEUTROPHILS NFR BLD: 54.8 % (ref 44–72)
NRBC # BLD AUTO: 0 K/UL
NRBC BLD-RTO: 0 /100 WBC
PLATELET # BLD AUTO: 264 K/UL (ref 164–446)
PLATELET BLD QL SMEAR: NORMAL
PMV BLD AUTO: 8.1 FL (ref 9–12.9)
POTASSIUM SERPL-SCNC: 3.6 MMOL/L (ref 3.6–5.5)
RBC # BLD AUTO: 3.31 M/UL (ref 4.7–6.1)
RBC BLD AUTO: PRESENT
SMUDGE CELLS BLD QL SMEAR: NORMAL
SODIUM SERPL-SCNC: 137 MMOL/L (ref 135–145)
WBC # BLD AUTO: 5.1 K/UL (ref 4.8–10.8)

## 2018-01-28 PROCEDURE — 85007 BL SMEAR W/DIFF WBC COUNT: CPT

## 2018-01-28 PROCEDURE — 700102 HCHG RX REV CODE 250 W/ 637 OVERRIDE(OP): Performed by: INTERNAL MEDICINE

## 2018-01-28 PROCEDURE — 700102 HCHG RX REV CODE 250 W/ 637 OVERRIDE(OP): Performed by: HOSPITALIST

## 2018-01-28 PROCEDURE — 99232 SBSQ HOSP IP/OBS MODERATE 35: CPT | Performed by: INTERNAL MEDICINE

## 2018-01-28 PROCEDURE — 700111 HCHG RX REV CODE 636 W/ 250 OVERRIDE (IP): Performed by: HOSPITALIST

## 2018-01-28 PROCEDURE — A9270 NON-COVERED ITEM OR SERVICE: HCPCS | Performed by: INTERNAL MEDICINE

## 2018-01-28 PROCEDURE — A9270 NON-COVERED ITEM OR SERVICE: HCPCS | Performed by: HOSPITALIST

## 2018-01-28 PROCEDURE — 770021 HCHG ROOM/CARE - ISO PRIVATE

## 2018-01-28 PROCEDURE — 36415 COLL VENOUS BLD VENIPUNCTURE: CPT

## 2018-01-28 PROCEDURE — 700105 HCHG RX REV CODE 258: Performed by: INTERNAL MEDICINE

## 2018-01-28 PROCEDURE — 85027 COMPLETE CBC AUTOMATED: CPT

## 2018-01-28 PROCEDURE — 80048 BASIC METABOLIC PNL TOTAL CA: CPT

## 2018-01-28 RX ADMIN — VANCOMYCIN HYDROCHLORIDE 125 MG: 10 INJECTION, POWDER, LYOPHILIZED, FOR SOLUTION INTRAVENOUS at 06:12

## 2018-01-28 RX ADMIN — GABAPENTIN 300 MG: 300 CAPSULE ORAL at 09:07

## 2018-01-28 RX ADMIN — LACTOBACILLUS ACIDOPHILUS / LACTOBACILLUS BULGARICUS 1 PACKET: 100 MILLION CFU STRENGTH GRANULES at 09:07

## 2018-01-28 RX ADMIN — OXYCODONE HYDROCHLORIDE 5 MG: 5 TABLET ORAL at 21:18

## 2018-01-28 RX ADMIN — GABAPENTIN 300 MG: 300 CAPSULE ORAL at 15:56

## 2018-01-28 RX ADMIN — GABAPENTIN 300 MG: 300 CAPSULE ORAL at 21:18

## 2018-01-28 RX ADMIN — VANCOMYCIN HYDROCHLORIDE 125 MG: 10 INJECTION, POWDER, LYOPHILIZED, FOR SOLUTION INTRAVENOUS at 12:10

## 2018-01-28 RX ADMIN — VANCOMYCIN HYDROCHLORIDE 125 MG: 10 INJECTION, POWDER, LYOPHILIZED, FOR SOLUTION INTRAVENOUS at 00:00

## 2018-01-28 RX ADMIN — SODIUM CHLORIDE: 9 INJECTION, SOLUTION INTRAVENOUS at 05:15

## 2018-01-28 RX ADMIN — TAMSULOSIN HYDROCHLORIDE 0.4 MG: 0.4 CAPSULE ORAL at 09:07

## 2018-01-28 RX ADMIN — OXYCODONE HYDROCHLORIDE 5 MG: 5 TABLET ORAL at 15:55

## 2018-01-28 RX ADMIN — VANCOMYCIN HYDROCHLORIDE 125 MG: 10 INJECTION, POWDER, LYOPHILIZED, FOR SOLUTION INTRAVENOUS at 17:24

## 2018-01-28 RX ADMIN — OXYCODONE HYDROCHLORIDE 5 MG: 5 TABLET ORAL at 09:18

## 2018-01-28 RX ADMIN — LAMOTRIGINE 200 MG: 100 TABLET ORAL at 09:14

## 2018-01-28 ASSESSMENT — ENCOUNTER SYMPTOMS
SHORTNESS OF BREATH: 0
FEVER: 0
PALPITATIONS: 0
LOSS OF CONSCIOUSNESS: 0
NERVOUS/ANXIOUS: 0
CHILLS: 0
WEAKNESS: 1
BACK PAIN: 0
NAUSEA: 0
MEMORY LOSS: 0
MYALGIAS: 0
VOMITING: 0
DIARRHEA: 1

## 2018-01-28 ASSESSMENT — PAIN SCALES - GENERAL
PAINLEVEL_OUTOF10: 2
PAINLEVEL_OUTOF10: 6
PAINLEVEL_OUTOF10: 3

## 2018-01-28 ASSESSMENT — PAIN SCALES - WONG BAKER: WONGBAKER_NUMERICALRESPONSE: DOESN'T HURT AT ALL

## 2018-01-28 NOTE — CONSULTS
ADULT INFECTIOUS DISEASE CONSULT     Date of Service: 1/28/18    Consult Requested By: Ashley Rehman D.O.    Reason for Consultation: Recurrent C. difficile colitis    History of Present Illness:   Marcelo Colon is a 77 y.o. who had a prolonged hospitalization in July 2017. He had developed a pelvic abscess as well as bowel perforation. On 7/25/2017 he underwent exploratory laparotomy, abdominal washout sigmoid colon resection colostomy placement and appendectomy. .he had a postop abscess. Peritoneal fluid on 7/25 +VRE & Candida albicans.  Repeat IR drainage on 8/5 with Dr. Miranda.  Final repeat CT abd pelvis on 8/17 showed pelvic abscess down to 1.0 x 1.7 cm.  Pt was being treated with PO Zyvox, which was stopped and changed to IV Vanco d/t leukopenia and thrombocytopenia.  Pt discharged to [facility] IV Vancomycin through 8/25/17. He was readmitted on 12/18/2017 with stool from his colostomy. He was diagnosed with C. difficile colitis. He was discharged on 12/26/2017. He was readmitted on 1/24/2018 with liquid stools, severely dehydrated. On admission he started to the cecum first 23,000 and creatinine was 1.27. The C. difficile was again positive. In view of that infectious disease consult has been called.    Review Of Systems:  Gen.-Denies any fevers. Denies any chills.  HEENT- denies any sore throat, headache or vision changes  Pulmonary- denies any cough, shortness of breath  Cardiovascular- denies any chest pain, leg swelling.    GI- plains of nausea, gas and abdominal pain and liquid stool in the ostomy  Musculoskeletal- denies any joint pains or swelling  Neuro- denies any weakness or sensory change  Psych- denies any depression or suicidal ideation  Genitourinary- denies any frequency or dysuria        PMH:   Past Medical History:   Diagnosis Date   • Depression    • Enlarged prostate    • GERD (gastroesophageal reflux disease)    • Neck pain          PSH:  Past Surgical History:    Procedure Laterality Date   • EXPLORATORY LAPAROTOMY  7/25/2017    Procedure: EXPLORATORY LAPAROTOMY- ABDOMINAL WASH OUT;  Surgeon: Jorge Rodriguez M.D.;  Location: SURGERY [facility];  Service:    • SIGMOID COLON RESECTION  7/25/2017    Procedure: SIGMOID COLON RESECTION;  Surgeon: Jorge Rodriguez M.D.;  Location: SURGERY [facility];  Service:    • COLOSTOMY  7/25/2017    Procedure: COLOSTOMY- FOR OSTOMY PLACEMENT AND OTHER PROCEDURES AS INDICATED;  Surgeon: Jorge Rodriguez M.D.;  Location: SURGERY [facility];  Service:    • APPENDECTOMY  7/25/2017    Procedure: APPENDECTOMY;  Surgeon: Jorge Rodriguez M.D.;  Location: SURGERY [facility];  Service:        FAMILY HX:  No family history on file.    SOCIAL HX:  Social History     Social History   • Marital status: Single     Spouse name: N/A   • Number of children: N/A   • Years of education: N/A     Occupational History   • Not on file.     Social History Main Topics   • Smoking status: Former Smoker     Packs/day: 2.00     Years: 14.00   • Smokeless tobacco: Never Used   • Alcohol use 0.0 oz/week   • Drug use: Yes      Comment: history of cocaine and marijuana use still about 10 years ago. Denies any IV drug use.   • Sexual activity: Not on file     Other Topics Concern   • Not on file     Social History Narrative   • No narrative on file     History   Smoking Status   • Former Smoker   • Packs/day: 2.00   • Years: 14.00   Smokeless Tobacco   • Never Used     History   Alcohol Use   • 0.0 oz/week       Allergies/Intolerances:  No Known Allergies    History reviewed with the patient    Other Current Medications:    Current Facility-Administered Medications:   •  lactobacillus granules (LACTINEX/FLORANEX) packet 1 Packet, 1 Packet, Oral, TID WITH MEALS, Ashley Rehman DMayoOMayo, 1 Packet at 01/28/18 0907  •  vancomycin 50 mg/mL oral soln 125 mg, 125 mg, Oral, Q6HRS, Ashley Rehman D.O., 125 mg at 01/28/18 1210  •  gabapentin  "(NEURONTIN) capsule 300 mg, 300 mg, Oral, TID, Neal Lawrence M.D., 300 mg at 18  •  lamotrigine (LAMICTAL) tablet 200 mg, 200 mg, Oral, DAILY, Neal Lawrence M.D., 200 mg at 18  •  tamsulosin (FLOMAX) capsule 0.4 mg, 0.4 mg, Oral, AFTER BREAKFAST, Neal Lawrence M.D., 0.4 mg at 18  •  enoxaparin (LOVENOX) inj 40 mg, 40 mg, Subcutaneous, DAILY, Neal Lawrence M.D.  •  ondansetron (ZOFRAN) syringe/vial injection 4 mg, 4 mg, Intravenous, Q4HRS PRN, Neal Lawrence M.D.  •  ondansetron (ZOFRAN ODT) dispertab 4 mg, 4 mg, Oral, Q4HRS PRN, Neal Lawrence M.D.  •  haloperidol lactate (HALDOL) injection 5 mg, 5 mg, Intravenous, Q4HRS PRN, Neal Lawrence M.D.  •  acetaminophen (TYLENOL) tablet 650 mg, 650 mg, Oral, Q6HRS PRN, Neal Lawrence M.D.  •  Notify provider if pain remains uncontrolled, , , CONTINUOUS **AND** Use the numeric rating scale (NRS-11) on regular floors and Critical-Care Pain Observation Tool (CPOT) on ICUs/Trauma to assess pain, , , CONTINUOUS **AND** Pulse Ox (Oximetry), , , CONTINUOUS **AND** Pharmacy Consult Request ...Pain Management Review, , Other, PRN **AND** If patient difficult to arouse and/or has respiratory depression, stop any opiates that are currently infusing and call a Rapid Response., , , CONTINUOUS **AND** oxycodone immediate-release (ROXICODONE) tablet 2.5 mg, 2.5 mg, Oral, Q3HRS PRN **AND** oxycodone immediate-release (ROXICODONE) tablet 5 mg, 5 mg, Oral, Q3HRS PRN, 5 mg at 18 0918 **AND** morphine (pf) 4 mg/ml injection 2 mg, 2 mg, Intravenous, Q3HRS PRN, Neal Lawrence M.D.  [unfilled]    Most Recent Vital Signs:  /60   Pulse 73   Temp 36.4 °C (97.6 °F)   Resp 16   Ht 1.727 m (5' 8\")   Wt 54 kg (119 lb)   SpO2 94%   BMI 18.09 kg/m²   Temp  Av.7 °C (98.1 °F)  Min: 36.1 °C (97 °F)  Max: 37.7 °C (99.8 °F)    Physical Exam:  General: Nontoxic, no acute distress. Looks  chronically ill  HEENT: sclera anicteric, " PERRL, EOMI, MMM, no oral lesions  Neck: supple, no lymphadenopathy  Chest: CTAB, no r/r/w, normal work of breathing.  Cardiac: Regular, no murmurs no gallops heard  Abdomen:distended, plus ostomy. Some tenderness   Extremities: No edema. No joint swelling.  Skin: no rashes or erythema  Neuro: Alert and oriented times 3, non-focal exam    Pertinent Lab Results:  Recent Labs      01/28/18   0232   WBC  5.1      Recent Labs      01/28/18 0232   HEMOGLOBIN  8.7*   HEMATOCRIT  28.6*   MCV  86.4   MCH  26.3*   MACROCYTOSIS  1+   ANISOCYTOSIS  1+   PLATELETCT  264         Recent Labs      01/28/18 0232   SODIUM  137   POTASSIUM  3.6   CHLORIDE  109   CO2  25   CREATININE  0.62        No results for input(s): ALBUMIN in the last 72 hours.    Invalid input(s): AST, ALT, ALKPHOS, BILITOT, TOTALBILIRUB, BILIRUBINTOT, BILIRUBINDIR, BILIRUBININD, ALKALINEPHOS     Pertinent Micro:  Results     Procedure Component Value Units Date/Time    URINE CULTURE(NEW) [[id number]] Collected:  01/23/18 0315    Order Status:  Completed Specimen:  Urine Updated:  01/26/18 1628     Significant Indicator NEG     Source UR     Site --     Urine Culture Mixed enteric miranda 10-50,000 cfu/mL    C DIFF TOXIN [[id number]]  (Abnormal) Collected:  01/23/18 1942    Order Status:  Completed Updated:  01/24/18 1120     C.Diff Toxin A&B Positive (A)     Comment: TOXIN POSITIVE  Toxin detected by EIA; C. difficile detected by PCR.  If clinically correlated, treatment indicated per guidelines.  Test of cure is not recommended.         Narrative:       Does this patient have risk factors for C-diff?->Yes  ** recent tx for same  C-Diff Risk Factors->immunocompromising conditions  Has patient taken stool softeners or laxatives in the last 5  days?->No  Indication for CDiff by PCR?->Patient remains symptomatic  after full course of therapy    CDIFF BY PCR WITH TOXIN [[id number]] Collected:  01/23/18 1942    Order Status:  Completed Specimen:  Stool from Stool  Updated:  01/24/18 1113     C Diff by PCR See Toxin     027-NAP1-BI Presumptive POSITIVE     Comment: Presumptive 027/NAP1/BI target DNA sequences are DETECTED.       Narrative:       Does this patient have risk factors for C-diff?->Yes  ** recent tx for same  C-Diff Risk Factors->immunocompromising conditions  Has patient taken stool softeners or laxatives in the last 5  days?->No  Indication for CDiff by PCR?->Patient remains symptomatic  after full course of therapy    URINALYSIS CULTURE, IF INDICATED [[id number]]  (Abnormal) Collected:  01/24/18 0315    Order Status:  Completed Specimen:  Urine from Urine, Clean Catch Updated:  01/24/18 0326     Color DK Yellow     Character Cloudy (A)     Specific Gravity 1.029     Ph 5.0     Glucose Negative mg/dL      Ketones 15 (A) mg/dL      Protein 30 (A) mg/dL      Bilirubin Moderate (A)     Urobilinogen, Urine 1.0     Nitrite Negative     Leukocyte Esterase Trace (A)     Occult Blood Trace (A)     Micro Urine Req Microscopic     Culture Indicated Yes UA Culture         Blood Culture   Date Value Ref Range Status   12/23/2017 No growth after 5 days of incubation.  Final   12/23/2017 No growth after 5 days of incubation.  Final     Blood Culture Hold   Date Value Ref Range Status   12/18/2017 Collected  Final        Studies:  Dx-chest-portable (1 View)    Result Date: 1/23/2018 1/23/2018 6:17 PM HISTORY/REASON FOR EXAM:  Cough. Diarrhea for 7 days TECHNIQUE/EXAM DESCRIPTION AND NUMBER OF VIEWS: Single portable view of the chest. COMPARISON: 12/23/2017 FINDINGS: The cardiomediastinal silhouette is stable. No focal consolidation, pleural effusion or pneumothorax is identified.  Costophrenic angles are sharp. Skin folds are noted on the right. There is a hiatal hernia. Post surgical changes are seen in the cervical spine. There are left-sided rib fractures which appear in advanced stages of healing.     No acute cardiopulmonary process is identified. Hiatal hernia. Healing  left-sided rib fractures.  IMPRESSION:     Recurrent c diff colitis  Failure to thrive  History of abdominal abscess      PLAN:   Marcelo Colon is a 77 y.o. male with recent intra-abdominal abscess and status post surgery  Now with recurrent C. Difficile  Continue by mouth Vanco  Aim for at least 14-21 days  Continue isolation  I have reviewed all the records         Discussed with IM. Will continue to follow    Mis Herbert M.D.

## 2018-01-28 NOTE — CARE PLAN
Problem: Bowel/Gastric:  Goal: Will not experience complications related to bowel motility    Intervention: Assess baseline bowel pattern  Pt has colostomy, bowel movements are very dark brown and watery most of shift and last BM was starting to become more formed and is a jelly consistency now.       Problem: Mobility  Goal: Risk for activity intolerance will decrease    Intervention: Encourage patient to increase activity level in collaboration with Interdisciplinary Team  PT/OT worked with pt today. Ambulated in room.

## 2018-01-28 NOTE — PROGRESS NOTES
[facility] Hospitalist Progress Note    Date of Service: 2018    Chief Complaint  77 y.o. male admitted 2018 with recurrent cdiff and ostomy. He also has  UTI, now on Rocephin and Flagyl.    Interval Problem Update  No new complaints  Tolerating diet  Watery stool    Consultants/Specialty  none    Disposition  tbd        Review of Systems   Constitutional: Negative for chills and fever.   HENT: Negative for congestion.    Respiratory: Negative for shortness of breath.    Cardiovascular: Negative for chest pain, palpitations and leg swelling.   Gastrointestinal: Positive for diarrhea. Negative for nausea and vomiting.   Genitourinary: Negative for urgency.   Musculoskeletal: Negative for back pain, joint pain and myalgias.   Neurological: Positive for weakness. Negative for loss of consciousness.   Psychiatric/Behavioral: Negative for memory loss. The patient is not nervous/anxious.       Physical Exam  Laboratory/Imaging   Hemodynamics  Temp (24hrs), Av.8 °C (98.2 °F), Min:36.4 °C (97.6 °F), Max:37.3 °C (99.2 °F)   Temperature: 36.4 °C (97.6 °F)  Pulse  Av  Min: 70  Max: 110    Blood Pressure : 107/60      Respiratory      Respiration: 16, Pulse Oximetry: 94 %             Fluids    Intake/Output Summary (Last 24 hours) at 18 1234  Last data filed at 18 0931   Gross per 24 hour   Intake              240 ml   Output              850 ml   Net             -610 ml       Nutrition  Orders Placed This Encounter   Procedures   • Diet Order     Standing Status:   Standing     Number of Occurrences:   1     Order Specific Question:   Diet:     Answer:   Regular [1]     Order Specific Question:   Texture/Fiber modifications:     Answer:   Dysphagia 3(Mechanical Soft)specify fluid consistency(question 6) [3]     Physical Exam   Constitutional: He is oriented to person, place, and time. No distress.   thin   HENT:   Head: Normocephalic and atraumatic.   Mouth/Throat: No oropharyngeal exudate.   Eyes:  EOM are normal. Pupils are equal, round, and reactive to light.   Neck: Neck supple.   Cardiovascular: Normal rate and intact distal pulses.    Pulmonary/Chest: Effort normal. No respiratory distress.   Abdominal: Soft. Bowel sounds are normal. He exhibits no distension.   ostomy   Musculoskeletal: He exhibits tenderness. He exhibits no edema.   Neurological: He is alert and oriented to person, place, and time. No cranial nerve deficit.   Skin: Skin is warm and dry.   Psychiatric: He has a normal mood and affect. His behavior is normal. Judgment and thought content normal.   Nursing note and vitals reviewed.      Recent Labs      01/28/18   0232   WBC  5.1   RBC  3.31*   HEMOGLOBIN  8.7*   HEMATOCRIT  28.6*   MCV  86.4   MCH  26.3*   MCHC  30.4*   RDW  56.9*   PLATELETCT  264   MPV  8.1*     Recent Labs      01/28/18   0232   SODIUM  137   POTASSIUM  3.6   CHLORIDE  109   CO2  25   GLUCOSE  116*   BUN  9   CREATININE  0.62   CALCIUM  7.3*                      Assessment/Plan     * Colitis due to Clostridium difficile   Assessment & Plan    Recurrent of the same.    Cdiff positive  On Flagyl, Day 4/10, dc 1/27    oral vanco, slow taper, start 1/27  Contact isolation  Dc ivf     mechanical soft diet   lactobacillus    Pt/ot with CGA/FWW, baseline        Pelvic abscess in male (CMS-HCC)- (present on admission)   Assessment & Plan    History of the same status post resection of sigmoid colon as a result. 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          Type 2 diabetes mellitus (CMS-HCC)- (present on admission)   Assessment & Plan    Controlled with hgba1c 6.2%.  Monitor with current regimen.  Insulin correction dose as needed.         Debility   Assessment & Plan    Wheelchair-bound at baseline  Requires 1 person assist for transfer  PT/OT        Acute cystitis   Assessment & Plan    Urine culture negative  Off ciprofloxacin  On Rocephin, will need a 3 day course, to complete on 1/27        Neuropathy (CMS-HCC)- (present on  admission)   Assessment & Plan    Controlled with current medication regimen.         Bipolar 1 disorder, depressed (CMS-HCC)- (present on admission)   Assessment & Plan    .        Normocytic anemia- (present on admission)   Assessment & Plan    Stable without evidence of bleed.  Transfuse if needed for hemoglobin less than 7 gm/dL          BPH (benign prostatic hypertrophy)- (present on admission)   Assessment & Plan    Continue current medication regimen.             Reviewed items::  Labs reviewed, Medications reviewed and Radiology images reviewed  DVT prophylaxis pharmacological::  Enoxaparin (Lovenox)  Ulcer Prophylaxis::  Not indicated  Antibiotics:  Treating active infection/contamination beyond 24 hours perioperative coverage

## 2018-01-29 PROCEDURE — A9270 NON-COVERED ITEM OR SERVICE: HCPCS | Performed by: HOSPITALIST

## 2018-01-29 PROCEDURE — 700102 HCHG RX REV CODE 250 W/ 637 OVERRIDE(OP): Performed by: HOSPITALIST

## 2018-01-29 PROCEDURE — 700102 HCHG RX REV CODE 250 W/ 637 OVERRIDE(OP): Performed by: INTERNAL MEDICINE

## 2018-01-29 PROCEDURE — 99233 SBSQ HOSP IP/OBS HIGH 50: CPT | Performed by: INTERNAL MEDICINE

## 2018-01-29 PROCEDURE — A9270 NON-COVERED ITEM OR SERVICE: HCPCS | Performed by: INTERNAL MEDICINE

## 2018-01-29 PROCEDURE — 770021 HCHG ROOM/CARE - ISO PRIVATE

## 2018-01-29 RX ADMIN — VANCOMYCIN HYDROCHLORIDE 125 MG: 10 INJECTION, POWDER, LYOPHILIZED, FOR SOLUTION INTRAVENOUS at 06:46

## 2018-01-29 RX ADMIN — GABAPENTIN 300 MG: 300 CAPSULE ORAL at 14:53

## 2018-01-29 RX ADMIN — OXYCODONE HYDROCHLORIDE 5 MG: 5 TABLET ORAL at 14:53

## 2018-01-29 RX ADMIN — OXYCODONE HYDROCHLORIDE 5 MG: 5 TABLET ORAL at 20:23

## 2018-01-29 RX ADMIN — LAMOTRIGINE 200 MG: 100 TABLET ORAL at 07:40

## 2018-01-29 RX ADMIN — VANCOMYCIN HYDROCHLORIDE 125 MG: 10 INJECTION, POWDER, LYOPHILIZED, FOR SOLUTION INTRAVENOUS at 00:01

## 2018-01-29 RX ADMIN — GABAPENTIN 300 MG: 300 CAPSULE ORAL at 19:54

## 2018-01-29 RX ADMIN — VANCOMYCIN HYDROCHLORIDE 125 MG: 10 INJECTION, POWDER, LYOPHILIZED, FOR SOLUTION INTRAVENOUS at 11:40

## 2018-01-29 RX ADMIN — GABAPENTIN 300 MG: 300 CAPSULE ORAL at 07:40

## 2018-01-29 RX ADMIN — TAMSULOSIN HYDROCHLORIDE 0.4 MG: 0.4 CAPSULE ORAL at 07:40

## 2018-01-29 RX ADMIN — VANCOMYCIN HYDROCHLORIDE 125 MG: 10 INJECTION, POWDER, LYOPHILIZED, FOR SOLUTION INTRAVENOUS at 17:24

## 2018-01-29 ASSESSMENT — PAIN SCALES - GENERAL
PAINLEVEL_OUTOF10: 8
PAINLEVEL_OUTOF10: 8
PAINLEVEL_OUTOF10: 0

## 2018-01-29 ASSESSMENT — ENCOUNTER SYMPTOMS
BACK PAIN: 0
VOMITING: 0
FEVER: 0
LOSS OF CONSCIOUSNESS: 0
WEAKNESS: 1
MEMORY LOSS: 0
SHORTNESS OF BREATH: 0
NERVOUS/ANXIOUS: 0
NAUSEA: 0
CHILLS: 0
COUGH: 0
ABDOMINAL PAIN: 1
DIARRHEA: 1
PALPITATIONS: 0
DIAPHORESIS: 0

## 2018-01-29 NOTE — PROGRESS NOTES
"Pt denies nausea. Tolerating diet. Still having very loose dark stool from the ostomy. ABD  and sore. Pt declined offer to get up to chair for meals. Denies chest pain or SOB. Denies any light headedness or dizziness.   ./60   Pulse 73   Temp 36.4 °C (97.6 °F)   Resp 16   Ht 1.727 m (5' 8\")   Wt 54 kg (119 lb)   SpO2 94%   BMI 18.09 kg/m²     "

## 2018-01-29 NOTE — PROGRESS NOTES
Assumed pt care at 0715.  Received report from night RN.  Assessment completed.  Pt AAOx4.  Pt has no comlaints of pain at this time.  No other s/s of discomfort or distress. Bed in lowest position, locked, and bed alarm is on.  Special contact precautions in place.  Pt calls appropriately.  Treaded socks in place, call light within reach and staff numbers provided.  Pt needs met at this time.

## 2018-01-29 NOTE — CARE PLAN
Problem: Safety  Goal: Will remain free from injury    Intervention: Provide assistance with mobility  Call light within reach, treaded socks in place, bed in lowest position and locked.  Hourly rounding in progress.       Problem: Infection  Goal: Will remain free from infection    Intervention: Assess signs and symptoms of infection  Hand hygiene performed

## 2018-01-29 NOTE — FACE TO FACE
Face to Face Supporting Documentation - Home Health    The encounter with this patient was in whole or in part the primary reason for home health admission.    Date of encounter:   Patient:                    MRN:                       YOB: 2017  Marcelo Colon  [id number]  1940     Home health to see patient for:  Skilled Nursing care for assessment, interventions & education, Physical Therapy evaluation and treatment and Occupational therapy evaluation and treatment    Skilled need for:  New Onset Medical Diagnosis cdiff recurrence    Skilled nursing interventions to include:  Comment: pt/ot    Homebound status evidenced by:  Need the aid of supportive devices such as crutches, canes, wheelchairs or walkers or Needs the assistance of another person in order to leave the home. Leaving home requires a considerable and taxing effort. There is a normal inability to leave the home.    Community Physician to provide follow up care: Sixto Braun D.O.     Optional Interventions? No      I certify the face to face encounter for this home health care referral meets the CMS requirements and the encounter/clinical assessment with the patient was, in whole, or in part, for the medical condition(s) listed above, which is the primary reason for home health care. Based on my clinical findings: the service(s) are medically necessary, support the need for home health care, and the homebound criteria are met.  I certify that this patient has had a face to face encounter by myself.  Ashley Rehman D.O. - NPI: [id number]
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
## 2018-01-29 NOTE — DISCHARGE PLANNING
Transitional Care Navigator:    Per chart review patient has been on service with [facility] in the past. Patient has been identified as appropriate for resumption of HH. Pending DC of home, consider order and F2F to resume HH.

## 2018-01-29 NOTE — PROGRESS NOTES
[facility] Hospitalist Progress Note    Date of Service: 2018    Chief Complaint  77 y.o. male admitted 2018 with recurrent cdiff and ostomy. He also has  UTI, now on Rocephin and Flagyl.    Interval Problem Update  Ongoing liquid stool  Min abd cramping today    Consultants/Specialty  none    Disposition  Home with home health in  3-4 days with clinical improvement  SW to assist  Encourage OOB        Review of Systems   Constitutional: Negative for chills, diaphoresis, fever and malaise/fatigue.   HENT: Negative for congestion.    Respiratory: Negative for cough and shortness of breath.    Cardiovascular: Negative for palpitations and leg swelling.   Gastrointestinal: Positive for abdominal pain and diarrhea. Negative for nausea and vomiting.   Genitourinary: Negative for dysuria and urgency.   Musculoskeletal: Negative for back pain and joint pain.   Neurological: Positive for weakness. Negative for loss of consciousness.   Psychiatric/Behavioral: Negative for memory loss. The patient is not nervous/anxious.       Physical Exam  Laboratory/Imaging   Hemodynamics  Temp (24hrs), Av.6 °C (97.9 °F), Min:36.4 °C (97.5 °F), Max:36.9 °C (98.4 °F)   Temperature: 36.9 °C (98.4 °F)  Pulse  Av.4  Min: 70  Max: 110    Blood Pressure : (!) 95/50      Respiratory      Respiration: 16, Pulse Oximetry: 94 %             Fluids    Intake/Output Summary (Last 24 hours) at 18 1130  Last data filed at 18 1810   Gross per 24 hour   Intake              480 ml   Output              450 ml   Net               30 ml       Nutrition  Orders Placed This Encounter   Procedures   • Diet Order     Standing Status:   Standing     Number of Occurrences:   1     Order Specific Question:   Diet:     Answer:   Regular [1]     Order Specific Question:   Texture/Fiber modifications:     Answer:   Dysphagia 3(Mechanical Soft)specify fluid consistency(question 6) [3]     Physical Exam   Constitutional: He is oriented to  person, place, and time. No distress.   thin   HENT:   Head: Normocephalic and atraumatic.   Mouth/Throat: Oropharynx is clear and moist. No oropharyngeal exudate.   Eyes: EOM are normal. Pupils are equal, round, and reactive to light. No scleral icterus.   Neck: Neck supple. No JVD present.   Cardiovascular: Normal rate and intact distal pulses.    Pulmonary/Chest: Effort normal. No respiratory distress. He has no wheezes. He has no rales.   Abdominal: Soft. Bowel sounds are normal. He exhibits no distension.   ostomy   Musculoskeletal: He exhibits no edema or tenderness.   Neurological: He is alert and oriented to person, place, and time. No cranial nerve deficit. Coordination normal.   Skin: Skin is warm and dry. No rash noted. He is not diaphoretic. No erythema.   Psychiatric: He has a normal mood and affect. His behavior is normal. Judgment and thought content normal.   Nursing note and vitals reviewed.      Recent Labs      01/28/18   0232   WBC  5.1   RBC  3.31*   HEMOGLOBIN  8.7*   HEMATOCRIT  28.6*   MCV  86.4   MCH  26.3*   MCHC  30.4*   RDW  56.9*   PLATELETCT  264   MPV  8.1*     Recent Labs      01/28/18   0232   SODIUM  137   POTASSIUM  3.6   CHLORIDE  109   CO2  25   GLUCOSE  116*   BUN  9   CREATININE  0.62   CALCIUM  7.3*                      Assessment/Plan     * Colitis due to Clostridium difficile   Assessment & Plan    Recurrent of the same.    Cdiff positive  On Flagyl, Day 4/10, dc 1/27    oral vanco, slow taper, start 1/27  14-21 day course with slow taper  Per ID  Contact isolation  Dc ivf     mechanical soft diet   lactobacillus    Pt/ot with CGA/FWW, baseline  Home health ordered        Pelvic abscess in male (CMS-HCC)- (present on admission)   Assessment & Plan    History of the same status post resection of sigmoid colon as a result. 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          Type 2 diabetes mellitus (CMS-HCC)- (present on admission)   Assessment & Plan    Controlled with hgba1c 6.2%.   Monitor with current regimen.  Insulin correction dose as needed.         Debility   Assessment & Plan    Wheelchair-bound at baseline  Requires 1 person assist for transfer  PT/OT        Acute cystitis   Assessment & Plan    Urine culture negative  Off ciprofloxacin  On Rocephin, will need a 3 day course, to complete on 1/27        Neuropathy (CMS-HCC)- (present on admission)   Assessment & Plan    Controlled with current medication regimen.         Bipolar 1 disorder, depressed (CMS-HCC)- (present on admission)   Assessment & Plan    .        Normocytic anemia- (present on admission)   Assessment & Plan    Stable without evidence of bleed.  Transfuse if needed for hemoglobin less than 7 gm/dL          BPH (benign prostatic hypertrophy)- (present on admission)   Assessment & Plan    Continue current medication regimen.             Reviewed items::  Labs reviewed, Medications reviewed and Radiology images reviewed  DVT prophylaxis pharmacological::  Enoxaparin (Lovenox)  Ulcer Prophylaxis::  Not indicated  Antibiotics:  Treating active infection/contamination beyond 24 hours perioperative coverage

## 2018-01-29 NOTE — DISCHARGE PLANNING
TCN met with patient at bedside to discuss his transitional care options of SNF vs HH. Patient states he has been to SNF before in [place] and at [facility] and he does not want to return there. Patient states he lives with other people who can assist him as needed and he prefers to DC home with HH through [facility] as he has done in the past. Choice for HH faxed to [facility]. TCN to follow as needed. SW aware of discussion.

## 2018-01-29 NOTE — DISCHARGE PLANNING
Received Home Health choice from [facility]) at 1057.  Home Health referral sent to [facility] at 1143.

## 2018-01-29 NOTE — PROGRESS NOTES
Infectious Disease Progress Note    Author: Latonia Cadet M.D. Date & Time of service: 2018  12:42 PM    Chief Complaint:  FU recurrent C diff     Interval History:   AF, no WBC, ongoing diarrhea, some abd pain, but no nausea or vomiting  Labs Reviewed, Medications Reviewed, Radiology Reviewed and Wound Reviewed.    Review of Systems:  Review of Systems   Constitutional: Negative for chills and fever.   Respiratory: Negative for cough and shortness of breath.    Gastrointestinal: Positive for abdominal pain and diarrhea. Negative for nausea and vomiting.       Hemodynamics:  Temp (24hrs), Av.6 °C (97.9 °F), Min:36.4 °C (97.5 °F), Max:36.9 °C (98.4 °F)  Temperature: 36.9 °C (98.4 °F)  Pulse  Av.4  Min: 70  Max: 110   Blood Pressure : (!) 95/50       Physical Exam:  Physical Exam   Constitutional: He is oriented to person, place, and time. He appears well-developed.   Ellderly   HENT:   Head: Normocephalic and atraumatic.   Eyes: EOM are normal.   Neck: Neck supple.   Cardiovascular: Normal rate and regular rhythm.    Pulmonary/Chest: Effort normal. He has no wheezes. He has no rales.   Abdominal: Soft. There is no tenderness.   Ostomy with dark liquid stool   Neurological: He is alert and oriented to person, place, and time.       Meds:    Current Facility-Administered Medications:   •  lactobacillus granules  •  vancomycin 50 mg/mL  •  gabapentin  •  lamotrigine  •  tamsulosin  •  enoxaparin  •  ondansetron  •  ondansetron  •  haloperidol lactate  •  acetaminophen  •  Notify provider if pain remains uncontrolled **AND** Use the numeric rating scale (NRS-11) on regular floors and Critical-Care Pain Observation Tool (CPOT) on ICUs/Trauma to assess pain **AND** Pulse Ox (Oximetry) **AND** Pharmacy Consult Request **AND** If patient difficult to arouse and/or has respiratory depression, stop any opiates that are currently infusing and call a Rapid Response. **AND** oxycodone immediate-release  **AND** oxycodone immediate-release **AND** morphine injection    Labs:  Recent Labs      01/28/18   0232   WBC  5.1   RBC  3.31*   HEMOGLOBIN  8.7*   HEMATOCRIT  28.6*   MCV  86.4   MCH  26.3*   RDW  56.9*   PLATELETCT  264   MPV  8.1*   NEUTSPOLYS  54.80   LYMPHOCYTES  29.50   MONOCYTES  8.70   EOSINOPHILS  3.50   BASOPHILS  0.00   RBCMORPHOLO  Present     Recent Labs      01/28/18   0232   SODIUM  137   POTASSIUM  3.6   CHLORIDE  109   CO2  25   GLUCOSE  116*   BUN  9     Recent Labs      01/28/18   0232   CREATININE  0.62       Imaging:  Dx-chest-portable (1 View)    Result Date: 1/23/2018 1/23/2018 6:17 PM HISTORY/REASON FOR EXAM:  Cough. Diarrhea for 7 days TECHNIQUE/EXAM DESCRIPTION AND NUMBER OF VIEWS: Single portable view of the chest. COMPARISON: 12/23/2017 FINDINGS: The cardiomediastinal silhouette is stable. No focal consolidation, pleural effusion or pneumothorax is identified.  Costophrenic angles are sharp. Skin folds are noted on the right. There is a hiatal hernia. Post surgical changes are seen in the cervical spine. There are left-sided rib fractures which appear in advanced stages of healing.     No acute cardiopulmonary process is identified. Hiatal hernia. Healing left-sided rib fractures.      Micro:  Results     Procedure Component Value Units Date/Time    URINE CULTURE(NEW) [[id number]] Collected:  01/23/18 0315    Order Status:  Completed Specimen:  Urine Updated:  01/26/18 1628     Significant Indicator NEG     Source UR     Site --     Urine Culture Mixed enteric miranda 10-50,000 cfu/mL    C DIFF TOXIN [[id number]]  (Abnormal) Collected:  01/23/18 1942    Order Status:  Completed Updated:  01/24/18 1120     C.Diff Toxin A&B Positive (A)     Comment: TOXIN POSITIVE  Toxin detected by EIA; C. difficile detected by PCR.  If clinically correlated, treatment indicated per guidelines.  Test of cure is not recommended.         Narrative:       Does this patient have risk factors for  C-diff?->Yes  ** recent tx for same  C-Diff Risk Factors->immunocompromising conditions  Has patient taken stool softeners or laxatives in the last 5  days?->No  Indication for CDiff by PCR?->Patient remains symptomatic  after full course of therapy    CDIFF BY PCR WITH TOXIN [[id number]] Collected:  01/23/18 1942    Order Status:  Completed Specimen:  Stool from Stool Updated:  01/24/18 1113     C Diff by PCR See Toxin     027-NAP1-BI Presumptive POSITIVE     Comment: Presumptive 027/NAP1/BI target DNA sequences are DETECTED.       Narrative:       Does this patient have risk factors for C-diff?->Yes  ** recent tx for same  C-Diff Risk Factors->immunocompromising conditions  Has patient taken stool softeners or laxatives in the last 5  days?->No  Indication for CDiff by PCR?->Patient remains symptomatic  after full course of therapy    URINALYSIS CULTURE, IF INDICATED [[id number]]  (Abnormal) Collected:  01/24/18 0315    Order Status:  Completed Specimen:  Urine from Urine, Clean Catch Updated:  01/24/18 0326     Color DK Yellow     Character Cloudy (A)     Specific Gravity 1.029     Ph 5.0     Glucose Negative mg/dL      Ketones 15 (A) mg/dL      Protein 30 (A) mg/dL      Bilirubin Moderate (A)     Urobilinogen, Urine 1.0     Nitrite Negative     Leukocyte Esterase Trace (A)     Occult Blood Trace (A)     Micro Urine Req Microscopic     Culture Indicated Yes UA Culture           Assessment:  Active Hospital Problems    Diagnosis   • *Colitis due to Clostridium difficile [A04.72]   • Pelvic abscess in male (CMS-HCC) [K65.1]   • Type 2 diabetes mellitus (CMS-HCC) [E11.9]   • BPH (benign prostatic hypertrophy) [N40.0]   • Debility [R53.81]   • Acute cystitis [N30.00]   • Neuropathy (CMS-HCC) [G62.9]   • Bipolar 1 disorder, depressed (CMS-HCC) [F31.9]   • Normocytic anemia [D64.9]       Plan:  Recurrent clostridium difficile colitis  Afebrile  Leukocytosis resolved  Continue oral vanco QID x 14 days followed by vanco  taper  Special isolation    Recent intra-abdominal abscess  S/p surgery    Discussed with internal medicine.

## 2018-01-30 PROBLEM — D50.0 IRON DEFICIENCY ANEMIA DUE TO CHRONIC BLOOD LOSS: Status: ACTIVE | Noted: 2018-01-30

## 2018-01-30 LAB
BASOPHILS # BLD AUTO: 0.3 % (ref 0–1.8)
BASOPHILS # BLD: 0.02 K/UL (ref 0–0.12)
EOSINOPHIL # BLD AUTO: 0.08 K/UL (ref 0–0.51)
EOSINOPHIL NFR BLD: 1.3 % (ref 0–6.9)
ERYTHROCYTE [DISTWIDTH] IN BLOOD BY AUTOMATED COUNT: 54.9 FL (ref 35.9–50)
HCT VFR BLD AUTO: 25.1 % (ref 42–52)
HGB BLD-MCNC: 7.7 G/DL (ref 14–18)
IMM GRANULOCYTES # BLD AUTO: 0.19 K/UL (ref 0–0.11)
IMM GRANULOCYTES NFR BLD AUTO: 3.1 % (ref 0–0.9)
IRON SATN MFR SERPL: 8 % (ref 15–55)
IRON SERPL-MCNC: 18 UG/DL (ref 50–180)
LYMPHOCYTES # BLD AUTO: 1.5 K/UL (ref 1–4.8)
LYMPHOCYTES NFR BLD: 24.5 % (ref 22–41)
MCH RBC QN AUTO: 26.2 PG (ref 27–33)
MCHC RBC AUTO-ENTMCNC: 30.7 G/DL (ref 33.7–35.3)
MCV RBC AUTO: 85.4 FL (ref 81.4–97.8)
MONOCYTES # BLD AUTO: 0.48 K/UL (ref 0–0.85)
MONOCYTES NFR BLD AUTO: 7.9 % (ref 0–13.4)
NEUTROPHILS # BLD AUTO: 3.84 K/UL (ref 1.82–7.42)
NEUTROPHILS NFR BLD: 62.9 % (ref 44–72)
NRBC # BLD AUTO: 0 K/UL
NRBC BLD-RTO: 0 /100 WBC
PLATELET # BLD AUTO: 214 K/UL (ref 164–446)
PMV BLD AUTO: 7.8 FL (ref 9–12.9)
RBC # BLD AUTO: 2.94 M/UL (ref 4.7–6.1)
TIBC SERPL-MCNC: 223 UG/DL (ref 250–450)
VIT B12 SERPL-MCNC: 205 PG/ML (ref 211–911)
WBC # BLD AUTO: 6.1 K/UL (ref 4.8–10.8)

## 2018-01-30 PROCEDURE — 82607 VITAMIN B-12: CPT

## 2018-01-30 PROCEDURE — 99233 SBSQ HOSP IP/OBS HIGH 50: CPT | Performed by: HOSPITALIST

## 2018-01-30 PROCEDURE — A9270 NON-COVERED ITEM OR SERVICE: HCPCS | Performed by: HOSPITALIST

## 2018-01-30 PROCEDURE — 36415 COLL VENOUS BLD VENIPUNCTURE: CPT

## 2018-01-30 PROCEDURE — 85025 COMPLETE CBC W/AUTO DIFF WBC: CPT

## 2018-01-30 PROCEDURE — 770021 HCHG ROOM/CARE - ISO PRIVATE

## 2018-01-30 PROCEDURE — 83540 ASSAY OF IRON: CPT

## 2018-01-30 PROCEDURE — 700102 HCHG RX REV CODE 250 W/ 637 OVERRIDE(OP): Performed by: HOSPITALIST

## 2018-01-30 PROCEDURE — 83550 IRON BINDING TEST: CPT

## 2018-01-30 PROCEDURE — 700102 HCHG RX REV CODE 250 W/ 637 OVERRIDE(OP): Performed by: INTERNAL MEDICINE

## 2018-01-30 PROCEDURE — A9270 NON-COVERED ITEM OR SERVICE: HCPCS | Performed by: INTERNAL MEDICINE

## 2018-01-30 RX ORDER — ASCORBIC ACID 500 MG
500 TABLET ORAL 2 TIMES DAILY
Status: DISCONTINUED | OUTPATIENT
Start: 2018-01-30 | End: 2018-02-05 | Stop reason: HOSPADM

## 2018-01-30 RX ORDER — FERROUS SULFATE 325(65) MG
325 TABLET ORAL
Status: DISCONTINUED | OUTPATIENT
Start: 2018-01-30 | End: 2018-02-05 | Stop reason: HOSPADM

## 2018-01-30 RX ADMIN — OXYCODONE HYDROCHLORIDE AND ACETAMINOPHEN 500 MG: 500 TABLET ORAL at 23:11

## 2018-01-30 RX ADMIN — VANCOMYCIN HYDROCHLORIDE 125 MG: 10 INJECTION, POWDER, LYOPHILIZED, FOR SOLUTION INTRAVENOUS at 12:16

## 2018-01-30 RX ADMIN — VANCOMYCIN HYDROCHLORIDE 125 MG: 10 INJECTION, POWDER, LYOPHILIZED, FOR SOLUTION INTRAVENOUS at 00:58

## 2018-01-30 RX ADMIN — GABAPENTIN 300 MG: 300 CAPSULE ORAL at 21:04

## 2018-01-30 RX ADMIN — VANCOMYCIN HYDROCHLORIDE 125 MG: 10 INJECTION, POWDER, LYOPHILIZED, FOR SOLUTION INTRAVENOUS at 06:36

## 2018-01-30 RX ADMIN — OXYCODONE HYDROCHLORIDE 5 MG: 5 TABLET ORAL at 21:08

## 2018-01-30 RX ADMIN — OXYCODONE HYDROCHLORIDE 5 MG: 5 TABLET ORAL at 14:50

## 2018-01-30 RX ADMIN — LAMOTRIGINE 200 MG: 100 TABLET ORAL at 09:14

## 2018-01-30 RX ADMIN — Medication 325 MG: at 23:11

## 2018-01-30 RX ADMIN — OXYCODONE HYDROCHLORIDE 5 MG: 5 TABLET ORAL at 09:48

## 2018-01-30 RX ADMIN — GABAPENTIN 300 MG: 300 CAPSULE ORAL at 14:47

## 2018-01-30 RX ADMIN — TAMSULOSIN HYDROCHLORIDE 0.4 MG: 0.4 CAPSULE ORAL at 09:14

## 2018-01-30 RX ADMIN — GABAPENTIN 300 MG: 300 CAPSULE ORAL at 09:14

## 2018-01-30 RX ADMIN — VANCOMYCIN HYDROCHLORIDE 125 MG: 10 INJECTION, POWDER, LYOPHILIZED, FOR SOLUTION INTRAVENOUS at 18:00

## 2018-01-30 ASSESSMENT — ENCOUNTER SYMPTOMS
NAUSEA: 0
SHORTNESS OF BREATH: 0
LOSS OF CONSCIOUSNESS: 0
WEAKNESS: 1
DIARRHEA: 1
NERVOUS/ANXIOUS: 0
VOMITING: 0
CHILLS: 0
DIAPHORESIS: 0
MEMORY LOSS: 0
FEVER: 0
ABDOMINAL PAIN: 1
BACK PAIN: 0
PALPITATIONS: 0
COUGH: 0

## 2018-01-30 ASSESSMENT — PATIENT HEALTH QUESTIONNAIRE - PHQ9
SUM OF ALL RESPONSES TO PHQ9 QUESTIONS 1 AND 2: 0
SUM OF ALL RESPONSES TO PHQ QUESTIONS 1-9: 0
SUM OF ALL RESPONSES TO PHQ QUESTIONS 1-9: 0
1. LITTLE INTEREST OR PLEASURE IN DOING THINGS: NOT AT ALL
2. FEELING DOWN, DEPRESSED, IRRITABLE, OR HOPELESS: NOT AT ALL
1. LITTLE INTEREST OR PLEASURE IN DOING THINGS: NOT AT ALL
SUM OF ALL RESPONSES TO PHQ9 QUESTIONS 1 AND 2: 0
2. FEELING DOWN, DEPRESSED, IRRITABLE, OR HOPELESS: NOT AT ALL

## 2018-01-30 ASSESSMENT — PAIN SCALES - GENERAL
PAINLEVEL_OUTOF10: 7
PAINLEVEL_OUTOF10: 3
PAINLEVEL_OUTOF10: 7
PAINLEVEL_OUTOF10: 8
PAINLEVEL_OUTOF10: 2
PAINLEVEL_OUTOF10: 6

## 2018-01-30 NOTE — CARE PLAN
Problem: Safety  Goal: Will remain free from injury  Outcome: PROGRESSING AS EXPECTED  Fall precautions maintained. Patient will call for assistance when OOB and does not want bed alarm. Call light, urinal, and bedside table within reach.

## 2018-01-30 NOTE — PROGRESS NOTES
Infectious Disease Progress Note    Author: Latonia Cadet M.D. Date & Time of service: 2018  11:09 AM    Chief Complaint:  FU recurrent C diff     Interval History:   AF, no WBC, ongoing diarrhea, some abd pain, but no nausea or vomiting   AF, WBC 6.1, states kitchen keeps messing up with his meals, ongoing watery diarrhea, no nausea or vomiting  Labs Reviewed, Medications Reviewed, Radiology Reviewed and Wound Reviewed.    Review of Systems:  Review of Systems   Constitutional: Negative for chills and fever.   Respiratory: Negative for cough and shortness of breath.    Gastrointestinal: Positive for abdominal pain and diarrhea. Negative for nausea and vomiting.       Hemodynamics:  Temp (24hrs), Av.6 °C (97.8 °F), Min:36.1 °C (97 °F), Max:36.8 °C (98.3 °F)  Temperature: 36.1 °C (97 °F)  Pulse  Av.9  Min: 70  Max: 110   Blood Pressure : (!) 95/45       Physical Exam:  Physical Exam   Constitutional: He is oriented to person, place, and time. He appears well-developed.   Elderly    Thin   HENT:   Head: Normocephalic and atraumatic.   Eyes: EOM are normal.   Neck: Neck supple.   Cardiovascular: Normal rate and regular rhythm.    Pulmonary/Chest: Effort normal. He has no wheezes. He has no rales.   Abdominal: Soft. There is no tenderness.   Ostomy with dark liquid stool   Neurological: He is alert and oriented to person, place, and time.       Meds:    Current Facility-Administered Medications:   •  lactobacillus granules  •  vancomycin 50 mg/mL  •  gabapentin  •  lamotrigine  •  tamsulosin  •  enoxaparin  •  ondansetron  •  ondansetron  •  haloperidol lactate  •  acetaminophen  •  Notify provider if pain remains uncontrolled **AND** Use the numeric rating scale (NRS-11) on regular floors and Critical-Care Pain Observation Tool (CPOT) on ICUs/Trauma to assess pain **AND** Pulse Ox (Oximetry) **AND** Pharmacy Consult Request **AND** If patient difficult to arouse and/or has respiratory  depression, stop any opiates that are currently infusing and call a Rapid Response. **AND** oxycodone immediate-release **AND** oxycodone immediate-release **AND** morphine injection    Labs:  Recent Labs      01/28/18   0232  01/30/18   0834   WBC  5.1  6.1   RBC  3.31*  2.94*   HEMOGLOBIN  8.7*  7.7*   HEMATOCRIT  28.6*  25.1*   MCV  86.4  85.4   MCH  26.3*  26.2*   RDW  56.9*  54.9*   PLATELETCT  264  214   MPV  8.1*  7.8*   NEUTSPOLYS  54.80  62.90   LYMPHOCYTES  29.50  24.50   MONOCYTES  8.70  7.90   EOSINOPHILS  3.50  1.30   BASOPHILS  0.00  0.30   RBCMORPHOLO  Present   --      Recent Labs      01/28/18   0232   SODIUM  137   POTASSIUM  3.6   CHLORIDE  109   CO2  25   GLUCOSE  116*   BUN  9     Recent Labs      01/28/18 0232   CREATININE  0.62       Imaging:  Dx-chest-portable (1 View)    Result Date: 1/23/2018 1/23/2018 6:17 PM HISTORY/REASON FOR EXAM:  Cough. Diarrhea for 7 days TECHNIQUE/EXAM DESCRIPTION AND NUMBER OF VIEWS: Single portable view of the chest. COMPARISON: 12/23/2017 FINDINGS: The cardiomediastinal silhouette is stable. No focal consolidation, pleural effusion or pneumothorax is identified.  Costophrenic angles are sharp. Skin folds are noted on the right. There is a hiatal hernia. Post surgical changes are seen in the cervical spine. There are left-sided rib fractures which appear in advanced stages of healing.     No acute cardiopulmonary process is identified. Hiatal hernia. Healing left-sided rib fractures.      Micro:  Results     Procedure Component Value Units Date/Time    URINE CULTURE(NEW) [[id number]] Collected:  01/23/18 0315    Order Status:  Completed Specimen:  Urine Updated:  01/26/18 1628     Significant Indicator NEG     Source UR     Site --     Urine Culture Mixed enteric miranda 10-50,000 cfu/mL    C DIFF TOXIN [[id number]]  (Abnormal) Collected:  01/23/18 1942    Order Status:  Completed Updated:  01/24/18 1120     C.Diff Toxin A&B Positive (A)     Comment: TOXIN  POSITIVE  Toxin detected by EIA; C. difficile detected by PCR.  If clinically correlated, treatment indicated per guidelines.  Test of cure is not recommended.         Narrative:       Does this patient have risk factors for C-diff?->Yes  ** recent tx for same  C-Diff Risk Factors->immunocompromising conditions  Has patient taken stool softeners or laxatives in the last 5  days?->No  Indication for CDiff by PCR?->Patient remains symptomatic  after full course of therapy    CDIFF BY PCR WITH TOXIN [[id number]] Collected:  01/23/18 1942    Order Status:  Completed Specimen:  Stool from Stool Updated:  01/24/18 1113     C Diff by PCR See Toxin     027-NAP1-BI Presumptive POSITIVE     Comment: Presumptive 027/NAP1/BI target DNA sequences are DETECTED.       Narrative:       Does this patient have risk factors for C-diff?->Yes  ** recent tx for same  C-Diff Risk Factors->immunocompromising conditions  Has patient taken stool softeners or laxatives in the last 5  days?->No  Indication for CDiff by PCR?->Patient remains symptomatic  after full course of therapy    URINALYSIS CULTURE, IF INDICATED [[id number]]  (Abnormal) Collected:  01/24/18 0315    Order Status:  Completed Specimen:  Urine from Urine, Clean Catch Updated:  01/24/18 0326     Color DK Yellow     Character Cloudy (A)     Specific Gravity 1.029     Ph 5.0     Glucose Negative mg/dL      Ketones 15 (A) mg/dL      Protein 30 (A) mg/dL      Bilirubin Moderate (A)     Urobilinogen, Urine 1.0     Nitrite Negative     Leukocyte Esterase Trace (A)     Occult Blood Trace (A)     Micro Urine Req Microscopic     Culture Indicated Yes UA Culture           Assessment:  Active Hospital Problems    Diagnosis   • *Colitis due to Clostridium difficile [A04.72]   • Pelvic abscess in male (CMS-HCC) [K65.1]   • Type 2 diabetes mellitus (CMS-HCC) [E11.9]   • BPH (benign prostatic hypertrophy) [N40.0]   • Debility [R53.81]   • Acute cystitis [N30.00]   • Neuropathy (CMS-HCC)  [G62.9]   • Bipolar 1 disorder, depressed (CMS-HCC) [F31.9]   • Normocytic anemia [D64.9]       Plan:  Recurrent clostridium difficile colitis  Afebrile  Leukocytosis resolved  Continue oral vanco QID x 14 days followed by vanco taper  Special isolation    Recent intra-abdominal abscess  S/p surgery    Discussed with internal medicine/ Dr Mccormack.

## 2018-01-30 NOTE — DISCHARGE PLANNING
Care Transition Team Discharge Planning    Anticipated Discharge Information  Anticipated discharge disposition: Home, [facility]              Discharge Plan:  Pt discussed in IDT rounds. Pt is currently being treated for c-diff and not able to discharge until stools are formed. Expected discharge is in several days with an Rx for Vancomycin.

## 2018-01-30 NOTE — CARE PLAN
Problem: Pain Management  Goal: Pain level will decrease to patient's comfort goal  Outcome: PROGRESSING AS EXPECTED  Patient c/o pain to hands- Oxycodone 5 mg PRN Q3 given and pain moderately relieved.

## 2018-01-30 NOTE — CARE PLAN
Problem: Nutritional:  Goal: Achieve adequate nutritional intake  Patient will consume >50% of meals   Outcome: PROGRESSING AS EXPECTED  Per ADL, pt eating >50% of five out of six past documented meals. Provide measured wt for patient (only stated wt is available).  RD to monitor for consistently adequate intake.

## 2018-01-31 LAB
ANION GAP SERPL CALC-SCNC: 4 MMOL/L (ref 0–11.9)
ANISOCYTOSIS BLD QL SMEAR: ABNORMAL
BASOPHILS # BLD AUTO: 0.9 % (ref 0–1.8)
BASOPHILS # BLD: 0.06 K/UL (ref 0–0.12)
BUN SERPL-MCNC: 8 MG/DL (ref 8–22)
CALCIUM SERPL-MCNC: 7.1 MG/DL (ref 8.5–10.5)
CHLORIDE SERPL-SCNC: 103 MMOL/L (ref 96–112)
CO2 SERPL-SCNC: 26 MMOL/L (ref 20–33)
CREAT SERPL-MCNC: 0.59 MG/DL (ref 0.5–1.4)
EOSINOPHIL # BLD AUTO: 0 K/UL (ref 0–0.51)
EOSINOPHIL NFR BLD: 0 % (ref 0–6.9)
ERYTHROCYTE [DISTWIDTH] IN BLOOD BY AUTOMATED COUNT: 54.4 FL (ref 35.9–50)
GLUCOSE SERPL-MCNC: 117 MG/DL (ref 65–99)
HCT VFR BLD AUTO: 22.6 % (ref 42–52)
HGB BLD-MCNC: 7.2 G/DL (ref 14–18)
LYMPHOCYTES # BLD AUTO: 0.92 K/UL (ref 1–4.8)
LYMPHOCYTES NFR BLD: 14.9 % (ref 22–41)
MACROCYTES BLD QL SMEAR: ABNORMAL
MANUAL DIFF BLD: NORMAL
MCH RBC QN AUTO: 27.3 PG (ref 27–33)
MCHC RBC AUTO-ENTMCNC: 31.9 G/DL (ref 33.7–35.3)
MCV RBC AUTO: 85.6 FL (ref 81.4–97.8)
METAMYELOCYTES NFR BLD MANUAL: 0.9 %
MICROCYTES BLD QL SMEAR: ABNORMAL
MONOCYTES # BLD AUTO: 0.11 K/UL (ref 0–0.85)
MONOCYTES NFR BLD AUTO: 1.7 % (ref 0–13.4)
MORPHOLOGY BLD-IMP: NORMAL
MYELOCYTES NFR BLD MANUAL: 1.8 %
NEUTROPHILS # BLD AUTO: 4.95 K/UL (ref 1.82–7.42)
NEUTROPHILS NFR BLD: 79.8 % (ref 44–72)
NRBC # BLD AUTO: 0 K/UL
NRBC BLD-RTO: 0 /100 WBC
PLATELET # BLD AUTO: 198 K/UL (ref 164–446)
PLATELET BLD QL SMEAR: NORMAL
PMV BLD AUTO: 8.4 FL (ref 9–12.9)
POTASSIUM SERPL-SCNC: 3.4 MMOL/L (ref 3.6–5.5)
RBC # BLD AUTO: 2.64 M/UL (ref 4.7–6.1)
RBC BLD AUTO: PRESENT
SMUDGE CELLS BLD QL SMEAR: NORMAL
SODIUM SERPL-SCNC: 133 MMOL/L (ref 135–145)
WBC # BLD AUTO: 6.2 K/UL (ref 4.8–10.8)

## 2018-01-31 PROCEDURE — 85027 COMPLETE CBC AUTOMATED: CPT

## 2018-01-31 PROCEDURE — 80048 BASIC METABOLIC PNL TOTAL CA: CPT

## 2018-01-31 PROCEDURE — A9270 NON-COVERED ITEM OR SERVICE: HCPCS | Performed by: HOSPITALIST

## 2018-01-31 PROCEDURE — 99232 SBSQ HOSP IP/OBS MODERATE 35: CPT | Performed by: HOSPITALIST

## 2018-01-31 PROCEDURE — 700102 HCHG RX REV CODE 250 W/ 637 OVERRIDE(OP): Performed by: HOSPITALIST

## 2018-01-31 PROCEDURE — 700102 HCHG RX REV CODE 250 W/ 637 OVERRIDE(OP): Performed by: INTERNAL MEDICINE

## 2018-01-31 PROCEDURE — 97116 GAIT TRAINING THERAPY: CPT

## 2018-01-31 PROCEDURE — 700105 HCHG RX REV CODE 258: Performed by: HOSPITALIST

## 2018-01-31 PROCEDURE — 770021 HCHG ROOM/CARE - ISO PRIVATE

## 2018-01-31 PROCEDURE — 85007 BL SMEAR W/DIFF WBC COUNT: CPT

## 2018-01-31 PROCEDURE — 36415 COLL VENOUS BLD VENIPUNCTURE: CPT

## 2018-01-31 PROCEDURE — 97530 THERAPEUTIC ACTIVITIES: CPT

## 2018-01-31 PROCEDURE — A9270 NON-COVERED ITEM OR SERVICE: HCPCS | Performed by: INTERNAL MEDICINE

## 2018-01-31 RX ORDER — SODIUM CHLORIDE 9 MG/ML
INJECTION, SOLUTION INTRAVENOUS CONTINUOUS
Status: DISCONTINUED | OUTPATIENT
Start: 2018-01-31 | End: 2018-02-01

## 2018-01-31 RX ADMIN — Medication 325 MG: at 12:06

## 2018-01-31 RX ADMIN — GABAPENTIN 300 MG: 300 CAPSULE ORAL at 14:40

## 2018-01-31 RX ADMIN — Medication 325 MG: at 09:36

## 2018-01-31 RX ADMIN — OXYCODONE HYDROCHLORIDE 5 MG: 5 TABLET ORAL at 12:09

## 2018-01-31 RX ADMIN — TAMSULOSIN HYDROCHLORIDE 0.4 MG: 0.4 CAPSULE ORAL at 09:36

## 2018-01-31 RX ADMIN — VANCOMYCIN HYDROCHLORIDE 125 MG: 10 INJECTION, POWDER, LYOPHILIZED, FOR SOLUTION INTRAVENOUS at 07:33

## 2018-01-31 RX ADMIN — VANCOMYCIN HYDROCHLORIDE 125 MG: 10 INJECTION, POWDER, LYOPHILIZED, FOR SOLUTION INTRAVENOUS at 01:43

## 2018-01-31 RX ADMIN — GABAPENTIN 300 MG: 300 CAPSULE ORAL at 09:36

## 2018-01-31 RX ADMIN — VANCOMYCIN HYDROCHLORIDE 125 MG: 10 INJECTION, POWDER, LYOPHILIZED, FOR SOLUTION INTRAVENOUS at 23:52

## 2018-01-31 RX ADMIN — Medication 325 MG: at 17:17

## 2018-01-31 RX ADMIN — SODIUM CHLORIDE: 9 INJECTION, SOLUTION INTRAVENOUS at 14:40

## 2018-01-31 RX ADMIN — VANCOMYCIN HYDROCHLORIDE 125 MG: 10 INJECTION, POWDER, LYOPHILIZED, FOR SOLUTION INTRAVENOUS at 17:17

## 2018-01-31 RX ADMIN — OXYCODONE HYDROCHLORIDE AND ACETAMINOPHEN 500 MG: 500 TABLET ORAL at 20:52

## 2018-01-31 RX ADMIN — VANCOMYCIN HYDROCHLORIDE 125 MG: 10 INJECTION, POWDER, LYOPHILIZED, FOR SOLUTION INTRAVENOUS at 12:06

## 2018-01-31 RX ADMIN — OXYCODONE HYDROCHLORIDE AND ACETAMINOPHEN 500 MG: 500 TABLET ORAL at 09:36

## 2018-01-31 RX ADMIN — GABAPENTIN 300 MG: 300 CAPSULE ORAL at 20:52

## 2018-01-31 RX ADMIN — OXYCODONE HYDROCHLORIDE 5 MG: 5 TABLET ORAL at 20:52

## 2018-01-31 RX ADMIN — OXYCODONE HYDROCHLORIDE 5 MG: 5 TABLET ORAL at 01:47

## 2018-01-31 RX ADMIN — LAMOTRIGINE 200 MG: 100 TABLET ORAL at 09:36

## 2018-01-31 ASSESSMENT — COGNITIVE AND FUNCTIONAL STATUS - GENERAL
WALKING IN HOSPITAL ROOM: A LITTLE
TURNING FROM BACK TO SIDE WHILE IN FLAT BAD: A LITTLE
SUGGESTED CMS G CODE MODIFIER MOBILITY: CK
MOVING TO AND FROM BED TO CHAIR: A LITTLE
MOBILITY SCORE: 17
STANDING UP FROM CHAIR USING ARMS: A LITTLE
MOVING FROM LYING ON BACK TO SITTING ON SIDE OF FLAT BED: A LITTLE
CLIMB 3 TO 5 STEPS WITH RAILING: A LOT

## 2018-01-31 ASSESSMENT — ENCOUNTER SYMPTOMS
FEVER: 0
MEMORY LOSS: 0
ABDOMINAL PAIN: 0
BACK PAIN: 0
PALPITATIONS: 0
NERVOUS/ANXIOUS: 0
DIAPHORESIS: 0
WEAKNESS: 1
DIARRHEA: 1
SHORTNESS OF BREATH: 0
VOMITING: 0
ABDOMINAL PAIN: 1
LOSS OF CONSCIOUSNESS: 0
NAUSEA: 0
CHILLS: 0
COUGH: 0

## 2018-01-31 ASSESSMENT — PAIN SCALES - GENERAL
PAINLEVEL_OUTOF10: 6
PAINLEVEL_OUTOF10: 6
PAINLEVEL_OUTOF10: 2
PAINLEVEL_OUTOF10: 6
PAINLEVEL_OUTOF10: 7
PAINLEVEL_OUTOF10: 2
PAINLEVEL_OUTOF10: 6
PAINLEVEL_OUTOF10: 3

## 2018-01-31 ASSESSMENT — GAIT ASSESSMENTS
ASSISTIVE DEVICE: FRONT WHEEL WALKER
GAIT LEVEL OF ASSIST: CONTACT GUARD ASSIST
DISTANCE (FEET): 30
DEVIATION: BRADYKINETIC;SHUFFLED GAIT

## 2018-01-31 NOTE — CARE PLAN
Problem: Safety  Goal: Will remain free from injury  Outcome: PROGRESSING AS EXPECTED  Safety education provided. Call light in reach, low light on, non slip socks.    Intervention: Provide assistance with mobility  Call light within reach, treaded socks in place, bed in lowest position and locked.  Hourly rounding in progress.         Problem: Infection  Goal: Will remain free from infection     Intervention: Assess signs and symptoms of infection  Hand hygiene performed

## 2018-01-31 NOTE — PROGRESS NOTES
Patient resting in bed. A/O X4. Patient needing colostomy emptied every 2-3 hours. High volume of output. Patient assessed and medicated for pain per MAR. Personal belongings and call light within reach. Fall risk and safety precautions in place per protocol. Hourly rounding in place.

## 2018-01-31 NOTE — THERAPY
"Physical Therapy Treatment completed.   Bed Mobility:  Supine to Sit: Supervised  Transfers: Sit to Stand: Stand by Assist  Gait: Level Of Assist: Contact Guard Assist with Front-Wheel Walker       Plan of Care: Will benefit from Physical Therapy 3 times per week  Discharge Recommendations: Equipment: No Equipment Needed. Post-acute therapy Discharge to home with outpatient or home health for additional skilled therapy services.    Pt very motivated to ambulate. Initial gait SBA. Pt demonstrates poor standing tolerance evidenced by buckling of R knee with prolonged time in standing position. By final gait performance pt required CGA to maintain stability due to buckling of R knee. STS transfer training from multiple surfaces including bed and wheelchair. Pt able to consistently stand without physical assist and acutally without use of FWW. He reports wheelchair use at home as well. He did propel around room in his wheelchair using his B feet. He is functioning well at a wheelchair level of function. His primary limitation with gait is decreased endurance and activity tolerance for standing tasks. Encouraged to sit up in wheelchair and participate in out of bed activities to improve endurance. He would continue to benefit from physical therapy services to improve his stability and endurance with gait.      See \"Rehab Therapy-Acute\" Patient Summary Report for complete documentation.       "

## 2018-01-31 NOTE — CARE PLAN
Problem: Safety  Goal: Will remain free from falls    Intervention: Implement fall precautions   01/30/18 1850   OTHER   Environmental Precautions Treaded Slipper Socks on Patient;Personal Belongings, Wastebasket, Call Bell etc. in Easy Reach;Report Given to Other Health Care Providers Regarding Fall Risk;Transferred to Stronger Side;Communication Sign for Patients & Families;Mobility Assessed & Appropriate Sign Placed   IV Pole on Same Side of Bed as Bathroom Yes   Bedrails Bedrails Closest to Bathroom Down   Chair/Bed Strip Alarm Yes - Alarm On   Safety measures in place. Call light in reach, skid proof socks on, bed and strip alarm activated and hourly rounding completed for pt. Needs and safety.      Problem: Knowledge Deficit  Goal: Knowledge of disease process/condition, treatment plan, diagnostic tests, and medications will improve  Outcome: PROGRESSING AS EXPECTED  Plan of care discussed and questions answered.

## 2018-01-31 NOTE — CARE PLAN
Problem: Venous Thromboembolism (VTW)/Deep Vein Thrombosis (DVT) Prevention:  Goal: Patient will participate in Venous Thrombosis (VTE)/Deep Vein Thrombosis (DVT)Prevention Measures  Pt refusing Lovenox SC for DVT prophylaxis despite education.  SCDs on while in bed.     Problem: Skin Integrity  Goal: Risk for impaired skin integrity will decrease    Intervention: Assess risk factors for impaired skin integrity and/or pressure ulcers  Encouraged frequent repositioning and turning.  Mepilex to sacrum.

## 2018-01-31 NOTE — PROGRESS NOTES
[facility] Hospitalist Progress Note    Date of Service: 2018    Chief Complaint  77 y.o. male admitted 2018 with recurrent cdiff and ostomy from abdominal abscess not related to Cdif. Presumed UTI, treated initially with abx.    Interval Problem Update  :  Ongoing liquid stool  :  Large amount of diarrhea volume through colostomy.  Iron level checked and low:  Started iron tid with vit C, repeat CBC in a.m.    Consultants/Specialty  none    Disposition  Home with home health in  3-4 days with clinical improvement.  WC at baseline.  :  Unable to handle large volume of ostomy diarrhea at home, states had leakage today and needed clean up due to excessive volume of diarrhea.  Encourage OOB        Review of Systems   Constitutional: Negative for chills, diaphoresis, fever and malaise/fatigue.   HENT: Negative for congestion.    Respiratory: Negative for cough and shortness of breath.    Cardiovascular: Negative for palpitations and leg swelling.   Gastrointestinal: Positive for abdominal pain and diarrhea. Negative for nausea and vomiting.   Genitourinary: Negative for dysuria and urgency.   Musculoskeletal: Negative for back pain and joint pain.   Neurological: Positive for weakness. Negative for loss of consciousness.   Psychiatric/Behavioral: Negative for memory loss. The patient is not nervous/anxious.       Physical Exam  Laboratory/Imaging   Hemodynamics  Temp (24hrs), Av.4 °C (97.6 °F), Min:36.1 °C (97 °F), Max:36.8 °C (98.3 °F)   Temperature: 36.4 °C (97.5 °F)  Pulse  Av.2  Min: 70  Max: 110    Blood Pressure : (!) 92/55      Respiratory      Respiration: 16, Pulse Oximetry: 93 %             Fluids    Intake/Output Summary (Last 24 hours) at 18 2100  Last data filed at 18 1800   Gross per 24 hour   Intake             1000 ml   Output             2400 ml   Net            -1400 ml       Nutrition  Orders Placed This Encounter   Procedures   • Diet Order     Standing Status:    Standing     Number of Occurrences:   1     Order Specific Question:   Diet:     Answer:   Regular [1]     Physical Exam   Constitutional: He is oriented to person, place, and time. No distress.   thin   HENT:   Head: Normocephalic and atraumatic.   Mouth/Throat: Oropharynx is clear and moist. No oropharyngeal exudate.   Eyes: EOM are normal. Pupils are equal, round, and reactive to light. No scleral icterus.   Neck: Neck supple. No JVD present.   Cardiovascular: Normal rate and intact distal pulses.    Pulmonary/Chest: Effort normal. No respiratory distress. He has no wheezes. He has no rales.   Abdominal: Soft. Bowel sounds are normal. He exhibits no distension.   ostomy   Musculoskeletal: He exhibits no edema or tenderness.   Neurological: He is alert and oriented to person, place, and time. No cranial nerve deficit. Coordination normal.   Skin: Skin is warm and dry. No rash noted. He is not diaphoretic. No erythema.   Psychiatric: He has a normal mood and affect. His behavior is normal. Judgment and thought content normal.   Nursing note and vitals reviewed.      Recent Labs      01/28/18   0232  01/30/18   0834   WBC  5.1  6.1   RBC  3.31*  2.94*   HEMOGLOBIN  8.7*  7.7*   HEMATOCRIT  28.6*  25.1*   MCV  86.4  85.4   MCH  26.3*  26.2*   MCHC  30.4*  30.7*   RDW  56.9*  54.9*   PLATELETCT  264  214   MPV  8.1*  7.8*     Recent Labs      01/28/18   0232   SODIUM  137   POTASSIUM  3.6   CHLORIDE  109   CO2  25   GLUCOSE  116*   BUN  9   CREATININE  0.62   CALCIUM  7.3*                      Assessment/Plan     * Colitis due to Clostridium difficile   Assessment & Plan    Recurrent of the same.    Cdiff positive  On Flagyl, Day 4/10, dc 1/27    oral vanco, slow taper, start 1/27  14-21 day course with slow taper  Per ID  Contact isolation  Dc ivf     mechanical soft diet   lactobacillus    Pt/ot with CGA/FWW, baseline  Home health ordered        Pelvic abscess in male (CMS-HCC)- (present on admission)   Assessment  & Plan    History of the same status post resection of sigmoid colon as a result. 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          Type 2 diabetes mellitus (CMS-HCC)- (present on admission)   Assessment & Plan    Controlled with hgba1c 6.2%.  Monitor with current regimen.  Insulin correction dose as needed.         Iron deficiency anemia due to chronic blood loss   Assessment & Plan    1/30 iron levels low at 17, started iron tid with vit C bid  B12 pending.  No obvious blood loss, but recent colostomy creation        Debility   Assessment & Plan    Wheelchair-bound at baseline  Requires 1 person assist for transfer  PT/OT        Acute cystitis   Assessment & Plan    Urine culture negative 1/23  Recent ciprofloxacin and rocephin use on admission for presumed UTI.          Neuropathy (CMS-HCC)- (present on admission)   Assessment & Plan    Controlled with current medication regimen.         Bipolar 1 disorder, depressed (CMS-HCC)- (present on admission)   Assessment & Plan    .no current depression or arpita.        Normocytic anemia- (present on admission)   Assessment & Plan    Stable without evidence of bleed.  Transfuse if needed for hemoglobin less than 7 gm/dL          BPH (benign prostatic hypertrophy)- (present on admission)   Assessment & Plan    Continue current medication regimen.             Reviewed items::  Labs reviewed, Medications reviewed and Radiology images reviewed  DVT prophylaxis pharmacological::  Enoxaparin (Lovenox)  Ulcer Prophylaxis::  Not indicated  Antibiotics:  Treating active infection/contamination beyond 24 hours perioperative coverage

## 2018-01-31 NOTE — PROGRESS NOTES
Latest BP 85/44, pt has been running hypotensive. Dr. Mccormack made aware. Awaiting IV maintenance orders.

## 2018-01-31 NOTE — PROGRESS NOTES
Infectious Disease Progress Note    Author: Latonia Cadet M.D. Date & Time of service: 2018  10:52 AM    Chief Complaint:  FU recurrent C diff     Interval History:   AF, no WBC, ongoing diarrhea, some abd pain, but no nausea or vomiting   AF, WBC 6.1, states kitchen keeps messing up with his meals, ongoing watery diarrhea, no nausea or vomiting   Tmax 99.8, WBC 6.2, no changes in consistency of stools, no new symptoms to report, denies abd pain  Labs Reviewed, Medications Reviewed, Radiology Reviewed and Wound Reviewed.    Review of Systems:  Review of Systems   Constitutional: Negative for chills and fever.   Respiratory: Negative for cough and shortness of breath.    Gastrointestinal: Positive for diarrhea. Negative for abdominal pain, nausea and vomiting.       Hemodynamics:  Temp (24hrs), Av.1 °C (98.7 °F), Min:36.4 °C (97.5 °F), Max:37.7 °C (99.8 °F)  Temperature: 36.5 °C (97.7 °F)  Pulse  Av.8  Min: 70  Max: 110   Blood Pressure : (!) 95/81       Physical Exam:  Physical Exam   Constitutional: He is oriented to person, place, and time. He appears well-developed.   Elderly    Thin   HENT:   Head: Normocephalic and atraumatic.   Eyes: EOM are normal.   Neck: Neck supple.   Cardiovascular: Normal rate and regular rhythm.    Pulmonary/Chest: Effort normal. He has no wheezes. He has no rales.   Abdominal: Soft. There is no tenderness.   Ostomy with dark liquid stool   Neurological: He is alert and oriented to person, place, and time.       Meds:    Current Facility-Administered Medications:   •  ferrous sulfate  •  ascorbic acid  •  lactobacillus granules  •  vancomycin 50 mg/mL  •  gabapentin  •  lamotrigine  •  tamsulosin  •  enoxaparin  •  ondansetron  •  ondansetron  •  haloperidol lactate  •  acetaminophen  •  Notify provider if pain remains uncontrolled **AND** Use the numeric rating scale (NRS-11) on regular floors and Critical-Care Pain Observation Tool (CPOT) on ICUs/Trauma  to assess pain **AND** Pulse Ox (Oximetry) **AND** Pharmacy Consult Request **AND** If patient difficult to arouse and/or has respiratory depression, stop any opiates that are currently infusing and call a Rapid Response. **AND** oxycodone immediate-release **AND** oxycodone immediate-release **AND** morphine injection    Labs:  Recent Labs      01/30/18   0834  01/31/18   0157   WBC  6.1  6.2   RBC  2.94*  2.64*   HEMOGLOBIN  7.7*  7.2*   HEMATOCRIT  25.1*  22.6*   MCV  85.4  85.6   MCH  26.2*  27.3   RDW  54.9*  54.4*   PLATELETCT  214  198   MPV  7.8*  8.4*   NEUTSPOLYS  62.90  79.80*   LYMPHOCYTES  24.50  14.90*   MONOCYTES  7.90  1.70   EOSINOPHILS  1.30  0.00   BASOPHILS  0.30  0.90   RBCMORPHOLO   --   Present     Recent Labs      01/31/18   0157   SODIUM  133*   POTASSIUM  3.4*   CHLORIDE  103   CO2  26   GLUCOSE  117*   BUN  8     Recent Labs      01/31/18   0157   CREATININE  0.59       Imaging:  Dx-chest-portable (1 View)    Result Date: 1/23/2018 1/23/2018 6:17 PM HISTORY/REASON FOR EXAM:  Cough. Diarrhea for 7 days TECHNIQUE/EXAM DESCRIPTION AND NUMBER OF VIEWS: Single portable view of the chest. COMPARISON: 12/23/2017 FINDINGS: The cardiomediastinal silhouette is stable. No focal consolidation, pleural effusion or pneumothorax is identified.  Costophrenic angles are sharp. Skin folds are noted on the right. There is a hiatal hernia. Post surgical changes are seen in the cervical spine. There are left-sided rib fractures which appear in advanced stages of healing.     No acute cardiopulmonary process is identified. Hiatal hernia. Healing left-sided rib fractures.      Micro:  Results     Procedure Component Value Units Date/Time    URINE CULTURE(NEW) [[id number]] Collected:  01/23/18 0315    Order Status:  Completed Specimen:  Urine Updated:  01/26/18 3692     Significant Indicator NEG     Source UR     Site --     Urine Culture Mixed enteric miranda 10-50,000 cfu/mL    C DIFF TOXIN [[id number]]   (Abnormal) Collected:  01/23/18 1942    Order Status:  Completed Updated:  01/24/18 1120     C.Diff Toxin A&B Positive (A)     Comment: TOXIN POSITIVE  Toxin detected by EIA; C. difficile detected by PCR.  If clinically correlated, treatment indicated per guidelines.  Test of cure is not recommended.         Narrative:       Does this patient have risk factors for C-diff?->Yes  ** recent tx for same  C-Diff Risk Factors->immunocompromising conditions  Has patient taken stool softeners or laxatives in the last 5  days?->No  Indication for CDiff by PCR?->Patient remains symptomatic  after full course of therapy    CDIFF BY PCR WITH TOXIN [[id number]] Collected:  01/23/18 1942    Order Status:  Completed Specimen:  Stool from Stool Updated:  01/24/18 1113     C Diff by PCR See Toxin     027-NAP1-BI Presumptive POSITIVE     Comment: Presumptive 027/NAP1/BI target DNA sequences are DETECTED.       Narrative:       Does this patient have risk factors for C-diff?->Yes  ** recent tx for same  C-Diff Risk Factors->immunocompromising conditions  Has patient taken stool softeners or laxatives in the last 5  days?->No  Indication for CDiff by PCR?->Patient remains symptomatic  after full course of therapy          Assessment:  Active Hospital Problems    Diagnosis   • *Colitis due to Clostridium difficile [A04.72]   • Pelvic abscess in male (CMS-HCC) [K65.1]   • Type 2 diabetes mellitus (CMS-HCC) [E11.9]   • BPH (benign prostatic hypertrophy) [N40.0]   • Debility [R53.81]   • Acute cystitis [N30.00]   • Neuropathy (CMS-HCC) [G62.9]   • Bipolar 1 disorder, depressed (CMS-HCC) [F31.9]   • Normocytic anemia [D64.9]       Plan:  Recurrent clostridium difficile colitis  Afebrile  Leukocytosis resolved  Stool still watery  Continue oral vanco QID x 14 days with stop date of 02/10/18 followed by vanco taper  Special isolation    Recent intra-abdominal abscess  S/p surgery    Discussed with internal medicine/ Dr Mccormack.

## 2018-01-31 NOTE — ASSESSMENT & PLAN NOTE
1/30 iron levels low at 17, started iron tid with vit C bid  B12 pending.  No obvious blood loss, but recent colostomy creation  2/3:  Hg 6.8 ordered 1 unit PRBCs.  Slow drop in Hg, had been hovering in 7s.  2/4:  Repeat Hg  8.4.

## 2018-01-31 NOTE — PROGRESS NOTES
"Received pt awake in bed. Alert and oriented x 3.   BP low 95/81, has been trending hypotensive. Pt asymptomatic. Will continue to monitor.    Lungs clear bilaterally. No SOB or cough.   Abdomen soft and slightly distended. BS present x 4. Pt passing flatus through ostomy and denies nausea.   Colostomy to LLQ with appliance intact, draining moderate amount of brown, loose stool. Stoma warm, pink and moist. Encouraged independence with ostomy care, since this is not a new ostomy and pt has had since August. Pt states \"You guys do it for me\".   Pt denies any pain at this time.  Morning medication administered as per MAR. No voiced concerns at this time. Call bell in reach.   "

## 2018-02-01 PROBLEM — E87.6 HYPOKALEMIA, GASTROINTESTINAL LOSSES: Status: ACTIVE | Noted: 2018-02-01

## 2018-02-01 PROBLEM — E86.0 DEHYDRATION: Status: ACTIVE | Noted: 2018-02-01

## 2018-02-01 LAB
ANION GAP SERPL CALC-SCNC: 7 MMOL/L (ref 0–11.9)
BUN SERPL-MCNC: 10 MG/DL (ref 8–22)
CALCIUM SERPL-MCNC: 7 MG/DL (ref 8.5–10.5)
CHLORIDE SERPL-SCNC: 104 MMOL/L (ref 96–112)
CO2 SERPL-SCNC: 24 MMOL/L (ref 20–33)
CREAT SERPL-MCNC: 0.61 MG/DL (ref 0.5–1.4)
GLUCOSE SERPL-MCNC: 142 MG/DL (ref 65–99)
MAGNESIUM SERPL-MCNC: 1.8 MG/DL (ref 1.5–2.5)
POTASSIUM SERPL-SCNC: 3.2 MMOL/L (ref 3.6–5.5)
SODIUM SERPL-SCNC: 135 MMOL/L (ref 135–145)

## 2018-02-01 PROCEDURE — 700111 HCHG RX REV CODE 636 W/ 250 OVERRIDE (IP): Performed by: HOSPITALIST

## 2018-02-01 PROCEDURE — 700102 HCHG RX REV CODE 250 W/ 637 OVERRIDE(OP): Performed by: HOSPITALIST

## 2018-02-01 PROCEDURE — 83735 ASSAY OF MAGNESIUM: CPT

## 2018-02-01 PROCEDURE — A9270 NON-COVERED ITEM OR SERVICE: HCPCS | Performed by: HOSPITALIST

## 2018-02-01 PROCEDURE — A9270 NON-COVERED ITEM OR SERVICE: HCPCS | Performed by: INTERNAL MEDICINE

## 2018-02-01 PROCEDURE — 700105 HCHG RX REV CODE 258: Performed by: HOSPITALIST

## 2018-02-01 PROCEDURE — 99232 SBSQ HOSP IP/OBS MODERATE 35: CPT | Performed by: HOSPITALIST

## 2018-02-01 PROCEDURE — 770021 HCHG ROOM/CARE - ISO PRIVATE

## 2018-02-01 PROCEDURE — 80048 BASIC METABOLIC PNL TOTAL CA: CPT

## 2018-02-01 PROCEDURE — 36415 COLL VENOUS BLD VENIPUNCTURE: CPT

## 2018-02-01 PROCEDURE — 700102 HCHG RX REV CODE 250 W/ 637 OVERRIDE(OP): Performed by: INTERNAL MEDICINE

## 2018-02-01 RX ORDER — POTASSIUM CHLORIDE 20 MEQ/1
40 TABLET, EXTENDED RELEASE ORAL DAILY
Status: DISCONTINUED | OUTPATIENT
Start: 2018-02-01 | End: 2018-02-01

## 2018-02-01 RX ADMIN — VANCOMYCIN HYDROCHLORIDE 125 MG: 10 INJECTION, POWDER, LYOPHILIZED, FOR SOLUTION INTRAVENOUS at 23:01

## 2018-02-01 RX ADMIN — Medication 325 MG: at 12:22

## 2018-02-01 RX ADMIN — OXYCODONE HYDROCHLORIDE AND ACETAMINOPHEN 500 MG: 500 TABLET ORAL at 09:46

## 2018-02-01 RX ADMIN — SODIUM CHLORIDE: 9 INJECTION, SOLUTION INTRAVENOUS at 04:13

## 2018-02-01 RX ADMIN — Medication 325 MG: at 09:46

## 2018-02-01 RX ADMIN — VANCOMYCIN HYDROCHLORIDE 125 MG: 10 INJECTION, POWDER, LYOPHILIZED, FOR SOLUTION INTRAVENOUS at 05:26

## 2018-02-01 RX ADMIN — OXYCODONE HYDROCHLORIDE 5 MG: 5 TABLET ORAL at 09:50

## 2018-02-01 RX ADMIN — LAMOTRIGINE 200 MG: 100 TABLET ORAL at 09:46

## 2018-02-01 RX ADMIN — POTASSIUM CHLORIDE 40 MEQ: 1500 TABLET, EXTENDED RELEASE ORAL at 09:46

## 2018-02-01 RX ADMIN — OXYCODONE HYDROCHLORIDE 5 MG: 5 TABLET ORAL at 21:36

## 2018-02-01 RX ADMIN — VANCOMYCIN HYDROCHLORIDE 125 MG: 10 INJECTION, POWDER, LYOPHILIZED, FOR SOLUTION INTRAVENOUS at 12:22

## 2018-02-01 RX ADMIN — OXYCODONE HYDROCHLORIDE AND ACETAMINOPHEN 500 MG: 500 TABLET ORAL at 21:33

## 2018-02-01 RX ADMIN — TAMSULOSIN HYDROCHLORIDE 0.4 MG: 0.4 CAPSULE ORAL at 09:46

## 2018-02-01 RX ADMIN — OXYCODONE HYDROCHLORIDE 5 MG: 5 TABLET ORAL at 15:29

## 2018-02-01 RX ADMIN — GABAPENTIN 300 MG: 300 CAPSULE ORAL at 15:29

## 2018-02-01 RX ADMIN — SODIUM CHLORIDE: 9 INJECTION, SOLUTION INTRAVENOUS at 17:33

## 2018-02-01 RX ADMIN — GABAPENTIN 300 MG: 300 CAPSULE ORAL at 09:46

## 2018-02-01 RX ADMIN — VANCOMYCIN HYDROCHLORIDE 125 MG: 10 INJECTION, POWDER, LYOPHILIZED, FOR SOLUTION INTRAVENOUS at 17:33

## 2018-02-01 RX ADMIN — GABAPENTIN 300 MG: 300 CAPSULE ORAL at 21:33

## 2018-02-01 RX ADMIN — POTASSIUM CHLORIDE: 2 INJECTION, SOLUTION, CONCENTRATE INTRAVENOUS at 18:30

## 2018-02-01 RX ADMIN — Medication 325 MG: at 17:33

## 2018-02-01 ASSESSMENT — PATIENT HEALTH QUESTIONNAIRE - PHQ9
SUM OF ALL RESPONSES TO PHQ QUESTIONS 1-9: 0
1. LITTLE INTEREST OR PLEASURE IN DOING THINGS: NOT AT ALL
2. FEELING DOWN, DEPRESSED, IRRITABLE, OR HOPELESS: NOT AT ALL
SUM OF ALL RESPONSES TO PHQ9 QUESTIONS 1 AND 2: 0

## 2018-02-01 ASSESSMENT — PAIN SCALES - GENERAL
PAINLEVEL_OUTOF10: 2
PAINLEVEL_OUTOF10: 6
PAINLEVEL_OUTOF10: 0
PAINLEVEL_OUTOF10: 7
PAINLEVEL_OUTOF10: 7

## 2018-02-01 ASSESSMENT — ENCOUNTER SYMPTOMS
NERVOUS/ANXIOUS: 0
LOSS OF CONSCIOUSNESS: 0
ABDOMINAL PAIN: 0
WEAKNESS: 1
FEVER: 0
DIARRHEA: 1
MEMORY LOSS: 0
CHILLS: 0
PALPITATIONS: 0
DIAPHORESIS: 0
SHORTNESS OF BREATH: 0
ABDOMINAL PAIN: 1
BACK PAIN: 0
VOMITING: 0
COUGH: 0
NAUSEA: 0

## 2018-02-01 ASSESSMENT — PAIN SCALES - WONG BAKER: WONGBAKER_NUMERICALRESPONSE: DOESN'T HURT AT ALL

## 2018-02-01 NOTE — CARE PLAN
Problem: Safety  Goal: Will remain free from falls  Outcome: PROGRESSING AS EXPECTED  No fall has occurred. Pt educated on need for call light. Pt does not get up alone. Pt slept for most of the night.    Problem: Bowel/Gastric:  Goal: Normal bowel function is maintained or improved  Outcome: PROGRESSING SLOWER THAN EXPECTED  Pt still has a colostomy. He came in with it. Pt bowel movement is thickening up some,

## 2018-02-01 NOTE — PROGRESS NOTES
[facility] Hospitalist Progress Note    Date of Service: 2018    Chief Complaint  77 y.o. male admitted 2018 with recurrent cdiff and ostomy from abdominal abscess not related to Cdif. Presumed UTI, treated initially with abx.    Interval Problem Update  :  Ongoing liquid stool  :  Large amount of diarrhea volume through colostomy.  Iron level checked and low:  Started iron tid with vit C, repeat CBC in a.m.  :  Hypotensive this afternoon, appears dry, states drinking lots of liquids, but heavy output from colostomy.  Started IVFs NS 75/hr today.    Consultants/Specialty  ID/Helio    Disposition  Home with home health in  3-4 days with clinical improvement.  WC at baseline.  :  Unable to handle large volume of ostomy diarrhea at home, states had leakage today and needed clean up due to excessive volume of diarrhea.  Encourage OOB        Review of Systems   Constitutional: Negative for chills, diaphoresis, fever and malaise/fatigue.   HENT: Negative for congestion.    Respiratory: Negative for cough and shortness of breath.    Cardiovascular: Negative for palpitations and leg swelling.   Gastrointestinal: Positive for abdominal pain and diarrhea. Negative for nausea and vomiting.   Genitourinary: Negative for dysuria and urgency.   Musculoskeletal: Negative for back pain and joint pain.   Neurological: Positive for weakness. Negative for loss of consciousness.   Psychiatric/Behavioral: Negative for memory loss. The patient is not nervous/anxious.       Physical Exam  Laboratory/Imaging   Hemodynamics  Temp (24hrs), Av.9 °C (98.5 °F), Min:36.4 °C (97.5 °F), Max:37.7 °C (99.8 °F)   Temperature: 36.6 °C (97.9 °F)  Pulse  Av  Min: 60  Max: 110    Blood Pressure : (!) 80/51      Respiratory      Respiration: 16, Pulse Oximetry: 96 %             Fluids    Intake/Output Summary (Last 24 hours) at 18 191  Last data filed at 18 1600   Gross per 24 hour   Intake              300 ml    Output             1860 ml   Net            -1560 ml       Nutrition  Orders Placed This Encounter   Procedures   • Diet Order     Standing Status:   Standing     Number of Occurrences:   1     Order Specific Question:   Diet:     Answer:   Regular [1]     Comments:   banana all meals     Physical Exam   Constitutional: He is oriented to person, place, and time. No distress.   thin   HENT:   Head: Normocephalic and atraumatic.   Mouth/Throat: Oropharynx is clear and moist. No oropharyngeal exudate.   Eyes: EOM are normal. Pupils are equal, round, and reactive to light. No scleral icterus.   Neck: Neck supple. No JVD present.   Cardiovascular: Normal rate and intact distal pulses.    Pulmonary/Chest: Effort normal. No respiratory distress. He has no wheezes. He has no rales.   Abdominal: Soft. Bowel sounds are normal. He exhibits no distension.   ostomy   Musculoskeletal: He exhibits no edema or tenderness.   Neurological: He is alert and oriented to person, place, and time. No cranial nerve deficit. Coordination normal.   Skin: Skin is warm and dry. No rash noted. He is not diaphoretic. No erythema.   Psychiatric: He has a normal mood and affect. His behavior is normal. Judgment and thought content normal.   Nursing note and vitals reviewed.      Recent Labs      01/30/18   0834  01/31/18   0157   WBC  6.1  6.2   RBC  2.94*  2.64*   HEMOGLOBIN  7.7*  7.2*   HEMATOCRIT  25.1*  22.6*   MCV  85.4  85.6   MCH  26.2*  27.3   MCHC  30.7*  31.9*   RDW  54.9*  54.4*   PLATELETCT  214  198   MPV  7.8*  8.4*     Recent Labs      01/31/18   0157   SODIUM  133*   POTASSIUM  3.4*   CHLORIDE  103   CO2  26   GLUCOSE  117*   BUN  8   CREATININE  0.59   CALCIUM  7.1*                      Assessment/Plan     * Colitis due to Clostridium difficile   Assessment & Plan    Recurrent of the same.    Cdiff positive  On Flagyl, Day 4/10, dc 1/27    oral vanco, slow taper, start 1/27  14-21 day course with slow taper  Per ID  Contact  isolation   lactobacillus    Pt/ot with CGA/FWW, baseline  Home health ordered  1/31  Dehydration 2/2 high output from colostomy despite adequate intake po.  Hypotension 80/50s.  Started NS 75/hr.        Pelvic abscess in male (CMS-HCC)- (present on admission)   Assessment & Plan    History of the same status post resection of sigmoid colon as a result. 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          Type 2 diabetes mellitus (CMS-HCC)- (present on admission)   Assessment & Plan    Controlled with hgba1c 6.2%.  Monitor with current regimen.  Insulin correction dose as needed.         Iron deficiency anemia due to chronic blood loss   Assessment & Plan    1/30 iron levels low at 17, started iron tid with vit C bid  B12 pending.  No obvious blood loss, but recent colostomy creation        Debility   Assessment & Plan    Wheelchair-bound at baseline  Requires 1 person assist for transfer  PT/OT        Acute cystitis   Assessment & Plan    Urine culture negative 1/23  Recent ciprofloxacin and rocephin use on admission for presumed UTI.          Neuropathy (CMS-HCC)- (present on admission)   Assessment & Plan    Controlled with current medication regimen.         Bipolar 1 disorder, depressed (CMS-HCC)- (present on admission)   Assessment & Plan    .no current depression or arpita.        Normocytic anemia- (present on admission)   Assessment & Plan    Stable without evidence of bleed.  Transfuse if needed for hemoglobin less than 7 gm/dL          BPH (benign prostatic hypertrophy)- (present on admission)   Assessment & Plan    Continue current medication regimen.             Reviewed items::  Labs reviewed, Medications reviewed and Radiology images reviewed  DVT prophylaxis pharmacological::  Enoxaparin (Lovenox)  Ulcer Prophylaxis::  Not indicated  Antibiotics:  Treating active infection/contamination beyond 24 hours perioperative coverage

## 2018-02-01 NOTE — PROGRESS NOTES
Infectious Disease Progress Note    Author: Latonia Cadet M.D. Date & Time of service: 2018  11:43 AM    Chief Complaint:  FU recurrent C diff     Interval History:   AF, no WBC, ongoing diarrhea, some abd pain, but no nausea or vomiting   AF, WBC 6.1, states kitchen keeps messing up with his meals, ongoing watery diarrhea, no nausea or vomiting   Tmax 99.8, WBC 6.2, no changes in consistency of stools, no new symptoms to report, denies abd pain   AF, no CBC, states his ostomy bag was not changed last night so filled up with diarrhea and air which caused a leak, has ostomy reversion surgery planned on   Labs Reviewed, Medications Reviewed, Radiology Reviewed and Wound Reviewed.    Review of Systems:  Review of Systems   Constitutional: Negative for chills and fever.   Respiratory: Negative for cough and shortness of breath.    Cardiovascular: Negative for chest pain.   Gastrointestinal: Positive for diarrhea. Negative for abdominal pain, nausea and vomiting.       Hemodynamics:  Temp (24hrs), Av.4 °C (97.6 °F), Min:36.4 °C (97.5 °F), Max:36.6 °C (97.9 °F)  Temperature: 36.4 °C (97.5 °F)  Pulse  Av.5  Min: 60  Max: 110   Blood Pressure : (!) 98/48       Physical Exam:  Physical Exam   Constitutional: He is oriented to person, place, and time. He appears well-developed.   Elderly    Thin   HENT:   Head: Normocephalic and atraumatic.   Eyes: EOM are normal.   Neck: Neck supple.   Cardiovascular: Normal rate and regular rhythm.    Pulmonary/Chest: Effort normal. He has no wheezes. He has no rales.   Abdominal: Soft. There is no tenderness.   Ostomy with dark liquid stool   Neurological: He is alert and oriented to person, place, and time.       Meds:    Current Facility-Administered Medications:   •  potassium chloride SA  •  NS  •  ferrous sulfate  •  ascorbic acid  •  lactobacillus granules  •  vancomycin 50 mg/mL  •  gabapentin  •  lamotrigine  •  tamsulosin  •  enoxaparin  •   ondansetron  •  ondansetron  •  haloperidol lactate  •  acetaminophen  •  Notify provider if pain remains uncontrolled **AND** Use the numeric rating scale (NRS-11) on regular floors and Critical-Care Pain Observation Tool (CPOT) on ICUs/Trauma to assess pain **AND** Pulse Ox (Oximetry) **AND** Pharmacy Consult Request **AND** If patient difficult to arouse and/or has respiratory depression, stop any opiates that are currently infusing and call a Rapid Response. **AND** oxyCODONE immediate-release **AND** oxyCODONE immediate-release **AND** morphine injection    Labs:  Recent Labs      01/30/18   0834  01/31/18 0157   WBC  6.1  6.2   RBC  2.94*  2.64*   HEMOGLOBIN  7.7*  7.2*   HEMATOCRIT  25.1*  22.6*   MCV  85.4  85.6   MCH  26.2*  27.3   RDW  54.9*  54.4*   PLATELETCT  214  198   MPV  7.8*  8.4*   NEUTSPOLYS  62.90  79.80*   LYMPHOCYTES  24.50  14.90*   MONOCYTES  7.90  1.70   EOSINOPHILS  1.30  0.00   BASOPHILS  0.30  0.90   RBCMORPHOLO   --   Present     Recent Labs      01/31/18   0157  02/01/18   0216   SODIUM  133*  135   POTASSIUM  3.4*  3.2*   CHLORIDE  103  104   CO2  26  24   GLUCOSE  117*  142*   BUN  8  10     Recent Labs      01/31/18   0157  02/01/18   0216   CREATININE  0.59  0.61       Imaging:  Dx-chest-portable (1 View)    Result Date: 1/23/2018 1/23/2018 6:17 PM HISTORY/REASON FOR EXAM:  Cough. Diarrhea for 7 days TECHNIQUE/EXAM DESCRIPTION AND NUMBER OF VIEWS: Single portable view of the chest. COMPARISON: 12/23/2017 FINDINGS: The cardiomediastinal silhouette is stable. No focal consolidation, pleural effusion or pneumothorax is identified.  Costophrenic angles are sharp. Skin folds are noted on the right. There is a hiatal hernia. Post surgical changes are seen in the cervical spine. There are left-sided rib fractures which appear in advanced stages of healing.     No acute cardiopulmonary process is identified. Hiatal hernia. Healing left-sided rib fractures.      Micro:  Results      Procedure Component Value Units Date/Time    URINE CULTURE(NEW) [[id number]] Collected:  01/23/18 0315    Order Status:  Completed Specimen:  Urine Updated:  01/26/18 1628     Significant Indicator NEG     Source UR     Site --     Urine Culture Mixed enteric miranda 10-50,000 cfu/mL          Assessment:  Active Hospital Problems    Diagnosis   • *Colitis due to Clostridium difficile [A04.72]   • Pelvic abscess in male (CMS-HCC) [K65.1]   • Type 2 diabetes mellitus (CMS-HCC) [E11.9]   • BPH (benign prostatic hypertrophy) [N40.0]   • Debility [R53.81]   • Acute cystitis [N30.00]   • Neuropathy (CMS-HCC) [G62.9]   • Bipolar 1 disorder, depressed (CMS-HCC) [F31.9]   • Normocytic anemia [D64.9]       Plan:  Recurrent clostridium difficile colitis  Afebrile  Leukocytosis resolved  Stool still watery  Continue oral vanco QID x 14 days with stop date of 02/10/18 followed by vanco taper  Special isolation    Recent intra-abdominal abscess  S/p surgery    Discussed with internal medicine/ Dr Mccormack.

## 2018-02-01 NOTE — PROGRESS NOTES
Pt had uneventful day.  Tolerating meals well, no nausea.   IVF infusing well.   BP remains low, MD aware - no concerns.   Output to colostomy remains watery brown, emptying frequently.   Pt denies any pain.  No voiced concerns at this time. Call bell in reach.

## 2018-02-01 NOTE — DISCHARGE PLANNING
Medical Social Work    Pt discussed in IDT rounds. Pt has been accepted by [facility] but is not medically clear to dc today. Expected dc date is pending the slow down of pt's heavy output from colostomy.

## 2018-02-02 ENCOUNTER — PATIENT OUTREACH (OUTPATIENT)
Dept: HEALTH INFORMATION MANAGEMENT | Facility: OTHER | Age: 78
End: 2018-02-02

## 2018-02-02 LAB
ANION GAP SERPL CALC-SCNC: 4 MMOL/L (ref 0–11.9)
ANISOCYTOSIS BLD QL SMEAR: ABNORMAL
BASOPHILS # BLD AUTO: 0 % (ref 0–1.8)
BASOPHILS # BLD: 0 K/UL (ref 0–0.12)
BUN SERPL-MCNC: 8 MG/DL (ref 8–22)
CALCIUM SERPL-MCNC: 7.4 MG/DL (ref 8.5–10.5)
CHLORIDE SERPL-SCNC: 107 MMOL/L (ref 96–112)
CO2 SERPL-SCNC: 25 MMOL/L (ref 20–33)
CREAT SERPL-MCNC: 0.62 MG/DL (ref 0.5–1.4)
EOSINOPHIL # BLD AUTO: 0.06 K/UL (ref 0–0.51)
EOSINOPHIL NFR BLD: 0.9 % (ref 0–6.9)
ERYTHROCYTE [DISTWIDTH] IN BLOOD BY AUTOMATED COUNT: 58.3 FL (ref 35.9–50)
GLUCOSE SERPL-MCNC: 100 MG/DL (ref 65–99)
HCT VFR BLD AUTO: 22.7 % (ref 42–52)
HGB BLD-MCNC: 7.1 G/DL (ref 14–18)
LYMPHOCYTES # BLD AUTO: 1.12 K/UL (ref 1–4.8)
LYMPHOCYTES NFR BLD: 17.8 % (ref 22–41)
MANUAL DIFF BLD: NORMAL
MCH RBC QN AUTO: 27.4 PG (ref 27–33)
MCHC RBC AUTO-ENTMCNC: 31.3 G/DL (ref 33.7–35.3)
MCV RBC AUTO: 87.6 FL (ref 81.4–97.8)
METAMYELOCYTES NFR BLD MANUAL: 0.9 %
MONOCYTES # BLD AUTO: 0.28 K/UL (ref 0–0.85)
MONOCYTES NFR BLD AUTO: 4.5 % (ref 0–13.4)
MORPHOLOGY BLD-IMP: NORMAL
MYELOCYTES NFR BLD MANUAL: 0.9 %
NEUTROPHILS # BLD AUTO: 4.67 K/UL (ref 1.82–7.42)
NEUTROPHILS NFR BLD: 73.2 % (ref 44–72)
NEUTS BAND NFR BLD MANUAL: 0.9 % (ref 0–10)
NRBC # BLD AUTO: 0 K/UL
NRBC BLD-RTO: 0 /100 WBC
PLATELET # BLD AUTO: 231 K/UL (ref 164–446)
PLATELET BLD QL SMEAR: NORMAL
PMV BLD AUTO: 8.5 FL (ref 9–12.9)
POLYCHROMASIA BLD QL SMEAR: NORMAL
POTASSIUM SERPL-SCNC: 3.7 MMOL/L (ref 3.6–5.5)
PROMYELOCYTES NFR BLD MANUAL: 0.9 %
RBC # BLD AUTO: 2.59 M/UL (ref 4.7–6.1)
RBC BLD AUTO: PRESENT
SODIUM SERPL-SCNC: 136 MMOL/L (ref 135–145)
WBC # BLD AUTO: 6.3 K/UL (ref 4.8–10.8)

## 2018-02-02 PROCEDURE — 97530 THERAPEUTIC ACTIVITIES: CPT

## 2018-02-02 PROCEDURE — 700111 HCHG RX REV CODE 636 W/ 250 OVERRIDE (IP): Performed by: HOSPITALIST

## 2018-02-02 PROCEDURE — 85027 COMPLETE CBC AUTOMATED: CPT

## 2018-02-02 PROCEDURE — 700102 HCHG RX REV CODE 250 W/ 637 OVERRIDE(OP): Performed by: INTERNAL MEDICINE

## 2018-02-02 PROCEDURE — A9270 NON-COVERED ITEM OR SERVICE: HCPCS | Performed by: INTERNAL MEDICINE

## 2018-02-02 PROCEDURE — 770021 HCHG ROOM/CARE - ISO PRIVATE

## 2018-02-02 PROCEDURE — 700102 HCHG RX REV CODE 250 W/ 637 OVERRIDE(OP): Performed by: HOSPITALIST

## 2018-02-02 PROCEDURE — A9270 NON-COVERED ITEM OR SERVICE: HCPCS | Performed by: HOSPITALIST

## 2018-02-02 PROCEDURE — 80048 BASIC METABOLIC PNL TOTAL CA: CPT

## 2018-02-02 PROCEDURE — 85007 BL SMEAR W/DIFF WBC COUNT: CPT

## 2018-02-02 PROCEDURE — 700105 HCHG RX REV CODE 258: Performed by: HOSPITALIST

## 2018-02-02 PROCEDURE — 99232 SBSQ HOSP IP/OBS MODERATE 35: CPT | Performed by: HOSPITALIST

## 2018-02-02 PROCEDURE — 36415 COLL VENOUS BLD VENIPUNCTURE: CPT

## 2018-02-02 PROCEDURE — 97110 THERAPEUTIC EXERCISES: CPT

## 2018-02-02 PROCEDURE — 97116 GAIT TRAINING THERAPY: CPT

## 2018-02-02 RX ADMIN — OXYCODONE HYDROCHLORIDE 5 MG: 5 TABLET ORAL at 14:48

## 2018-02-02 RX ADMIN — OXYCODONE HYDROCHLORIDE 5 MG: 5 TABLET ORAL at 23:53

## 2018-02-02 RX ADMIN — LACTOBACILLUS ACIDOPHILUS / LACTOBACILLUS BULGARICUS 1 PACKET: 100 MILLION CFU STRENGTH GRANULES at 07:46

## 2018-02-02 RX ADMIN — POTASSIUM CHLORIDE: 2 INJECTION, SOLUTION, CONCENTRATE INTRAVENOUS at 14:34

## 2018-02-02 RX ADMIN — GABAPENTIN 300 MG: 300 CAPSULE ORAL at 14:34

## 2018-02-02 RX ADMIN — GABAPENTIN 300 MG: 300 CAPSULE ORAL at 07:46

## 2018-02-02 RX ADMIN — TAMSULOSIN HYDROCHLORIDE 0.4 MG: 0.4 CAPSULE ORAL at 07:46

## 2018-02-02 RX ADMIN — Medication 325 MG: at 17:21

## 2018-02-02 RX ADMIN — VANCOMYCIN HYDROCHLORIDE 125 MG: 10 INJECTION, POWDER, LYOPHILIZED, FOR SOLUTION INTRAVENOUS at 17:21

## 2018-02-02 RX ADMIN — VANCOMYCIN HYDROCHLORIDE 125 MG: 10 INJECTION, POWDER, LYOPHILIZED, FOR SOLUTION INTRAVENOUS at 12:18

## 2018-02-02 RX ADMIN — OXYCODONE HYDROCHLORIDE 5 MG: 5 TABLET ORAL at 07:53

## 2018-02-02 RX ADMIN — Medication 325 MG: at 12:18

## 2018-02-02 RX ADMIN — VANCOMYCIN HYDROCHLORIDE 125 MG: 10 INJECTION, POWDER, LYOPHILIZED, FOR SOLUTION INTRAVENOUS at 23:53

## 2018-02-02 RX ADMIN — OXYCODONE HYDROCHLORIDE AND ACETAMINOPHEN 500 MG: 500 TABLET ORAL at 20:25

## 2018-02-02 RX ADMIN — OXYCODONE HYDROCHLORIDE AND ACETAMINOPHEN 500 MG: 500 TABLET ORAL at 07:46

## 2018-02-02 RX ADMIN — POTASSIUM CHLORIDE: 2 INJECTION, SOLUTION, CONCENTRATE INTRAVENOUS at 03:45

## 2018-02-02 RX ADMIN — OXYCODONE HYDROCHLORIDE 5 MG: 5 TABLET ORAL at 20:27

## 2018-02-02 RX ADMIN — VANCOMYCIN HYDROCHLORIDE 125 MG: 10 INJECTION, POWDER, LYOPHILIZED, FOR SOLUTION INTRAVENOUS at 05:07

## 2018-02-02 RX ADMIN — Medication 325 MG: at 07:46

## 2018-02-02 RX ADMIN — GABAPENTIN 300 MG: 300 CAPSULE ORAL at 20:26

## 2018-02-02 RX ADMIN — LAMOTRIGINE 200 MG: 100 TABLET ORAL at 07:46

## 2018-02-02 ASSESSMENT — COGNITIVE AND FUNCTIONAL STATUS - GENERAL
CLIMB 3 TO 5 STEPS WITH RAILING: A LITTLE
MOVING FROM LYING ON BACK TO SITTING ON SIDE OF FLAT BED: A LITTLE
STANDING UP FROM CHAIR USING ARMS: A LITTLE
MOBILITY SCORE: 18
TURNING FROM BACK TO SIDE WHILE IN FLAT BAD: A LITTLE
WALKING IN HOSPITAL ROOM: A LITTLE
MOVING TO AND FROM BED TO CHAIR: A LITTLE
SUGGESTED CMS G CODE MODIFIER MOBILITY: CK

## 2018-02-02 ASSESSMENT — GAIT ASSESSMENTS
ASSISTIVE DEVICE: FRONT WHEEL WALKER
DEVIATION: BRADYKINETIC;SHUFFLED GAIT
DISTANCE (FEET): 30
GAIT LEVEL OF ASSIST: SUPERVISED

## 2018-02-02 ASSESSMENT — ENCOUNTER SYMPTOMS
LOSS OF CONSCIOUSNESS: 0
PALPITATIONS: 0
BACK PAIN: 0
CHILLS: 0
DIARRHEA: 1
VOMITING: 0
COUGH: 0
SHORTNESS OF BREATH: 0
NERVOUS/ANXIOUS: 0
ABDOMINAL PAIN: 1
MEMORY LOSS: 0
NAUSEA: 0
DIAPHORESIS: 0
WEAKNESS: 1
FEVER: 0
ABDOMINAL PAIN: 0

## 2018-02-02 ASSESSMENT — PAIN SCALES - GENERAL
PAINLEVEL_OUTOF10: 7
PAINLEVEL_OUTOF10: 7
PAINLEVEL_OUTOF10: 6
PAINLEVEL_OUTOF10: 4
PAINLEVEL_OUTOF10: 3

## 2018-02-02 ASSESSMENT — PAIN SCALES - WONG BAKER: WONGBAKER_NUMERICALRESPONSE: DOESN'T HURT AT ALL

## 2018-02-02 NOTE — CARE PLAN
Problem: Pain Management  Goal: Pain level will decrease to patient's comfort goal  Outcome: PROGRESSING AS EXPECTED  Patient is taking the pain medication as needed. The pain medication is effective.

## 2018-02-02 NOTE — PROGRESS NOTES
Infectious Disease Progress Note    Author: Latonia Cadet M.D. Date & Time of service: 2018  10:25 AM    Chief Complaint:  FU recurrent C diff     Interval History:   AF, no WBC, ongoing diarrhea, some abd pain, but no nausea or vomiting   AF, WBC 6.1, states kitchen keeps messing up with his meals, ongoing watery diarrhea, no nausea or vomiting   Tmax 99.8, WBC 6.2, no changes in consistency of stools, no new symptoms to report, denies abd pain   AF, no CBC, states his ostomy bag was not changed last night so filled up with diarrhea and air which caused a leak, has ostomy reversion surgery planned on  Tmax 99.5, WBC 6.3, no new clinical changes, no abd pain, nausea or vomiting  Labs Reviewed, Medications Reviewed, Radiology Reviewed and Wound Reviewed.    Review of Systems:  Review of Systems   Constitutional: Negative for chills and fever.   Respiratory: Negative for cough and shortness of breath.    Cardiovascular: Negative for chest pain.   Gastrointestinal: Positive for diarrhea. Negative for abdominal pain, nausea and vomiting.       Hemodynamics:  Temp (24hrs), Av.7 °C (98.1 °F), Min:36.2 °C (97.2 °F), Max:37.5 °C (99.5 °F)  Temperature: 36.2 °C (97.2 °F)  Pulse  Av.7  Min: 60  Max: 110   Blood Pressure : 107/52       Physical Exam:  Physical Exam   Constitutional: He is oriented to person, place, and time. He appears well-developed.   Elderly    Thin   HENT:   Head: Normocephalic and atraumatic.   Eyes: EOM are normal.   Neck: Neck supple.   Cardiovascular: Normal rate and regular rhythm.    Pulmonary/Chest: Effort normal. He has no wheezes. He has no rales.   Abdominal: Soft. There is no tenderness.   Ostomy with dark liquid stool   Neurological: He is alert and oriented to person, place, and time.       Meds:    Current Facility-Administered Medications:   •  lactated ringers with KCL infusion  •  ferrous sulfate  •  ascorbic acid  •  lactobacillus granules  •   vancomycin 50 mg/mL  •  gabapentin  •  lamotrigine  •  tamsulosin  •  enoxaparin  •  ondansetron  •  ondansetron  •  haloperidol lactate  •  acetaminophen  •  Notify provider if pain remains uncontrolled **AND** Use the numeric rating scale (NRS-11) on regular floors and Critical-Care Pain Observation Tool (CPOT) on ICUs/Trauma to assess pain **AND** Pulse Ox (Oximetry) **AND** Pharmacy Consult Request **AND** If patient difficult to arouse and/or has respiratory depression, stop any opiates that are currently infusing and call a Rapid Response. **AND** oxyCODONE immediate-release **AND** oxyCODONE immediate-release **AND** [DISCONTINUED] morphine injection    Labs:  Recent Labs      01/31/18 0157 02/02/18   0300   WBC  6.2  6.3   RBC  2.64*  2.59*   HEMOGLOBIN  7.2*  7.1*   HEMATOCRIT  22.6*  22.7*   MCV  85.6  87.6   MCH  27.3  27.4   RDW  54.4*  58.3*   PLATELETCT  198  231   MPV  8.4*  8.5*   NEUTSPOLYS  79.80*  73.20*   LYMPHOCYTES  14.90*  17.80*   MONOCYTES  1.70  4.50   EOSINOPHILS  0.00  0.90   BASOPHILS  0.90  0.00   RBCMORPHOLO  Present  Present     Recent Labs      01/31/18 0157 02/01/18   0216  02/02/18   0300   SODIUM  133*  135  136   POTASSIUM  3.4*  3.2*  3.7   CHLORIDE  103  104  107   CO2  26  24  25   GLUCOSE  117*  142*  100*   BUN  8  10  8     Recent Labs      01/31/18 0157 02/01/18   0216  02/02/18   0300   CREATININE  0.59  0.61  0.62       Imaging:  Dx-chest-portable (1 View)    Result Date: 1/23/2018 1/23/2018 6:17 PM HISTORY/REASON FOR EXAM:  Cough. Diarrhea for 7 days TECHNIQUE/EXAM DESCRIPTION AND NUMBER OF VIEWS: Single portable view of the chest. COMPARISON: 12/23/2017 FINDINGS: The cardiomediastinal silhouette is stable. No focal consolidation, pleural effusion or pneumothorax is identified.  Costophrenic angles are sharp. Skin folds are noted on the right. There is a hiatal hernia. Post surgical changes are seen in the cervical spine. There are left-sided rib fractures  which appear in advanced stages of healing.     No acute cardiopulmonary process is identified. Hiatal hernia. Healing left-sided rib fractures.      Micro:  Results     Procedure Component Value Units Date/Time    URINE CULTURE(NEW) [[id number]] Collected:  01/23/18 0315    Order Status:  Completed Specimen:  Urine Updated:  01/26/18 1628     Significant Indicator NEG     Source UR     Site --     Urine Culture Mixed enteric miranda 10-50,000 cfu/mL          Assessment:  Active Hospital Problems    Diagnosis   • *Colitis due to Clostridium difficile [A04.72]   • Pelvic abscess in male (CMS-HCC) [K65.1]   • Type 2 diabetes mellitus (CMS-HCC) [E11.9]   • BPH (benign prostatic hypertrophy) [N40.0]   • Debility [R53.81]   • Acute cystitis [N30.00]   • Neuropathy (CMS-HCC) [G62.9]   • Bipolar 1 disorder, depressed (CMS-HCC) [F31.9]   • Normocytic anemia [D64.9]       Plan:  Recurrent clostridium difficile colitis  Afebrile  Leukocytosis resolved  Stool still watery  Continue oral vanco QID x 14 days with stop date of 02/10/18 followed by vanco taper  Special isolation    Recent intra-abdominal abscess  S/p surgery    Discussed with internal medicine/ Dr Mccormack. ID signing off. Please reconsult if needed.

## 2018-02-02 NOTE — ASSESSMENT & PLAN NOTE
2/2 excessive GI losses from Cdif.  1/31:  Hypotension, clinically dehydrated despite drinking lots po fluids, NS started at 75/hr.  2/1:  LR +20K.  2/3:  Improved, dc'd IVFs.

## 2018-02-02 NOTE — DIETARY
Nutrition Services: Update  Pt is on a regular diet and receiving HS snack. Per chart pt PO %. Pt has been eating well. Wt on 2/1 - 49.2 kg via bed scale, only measured wt. Meds, labs, and ADLs reviewed.     PLAN/RECOMMEND -   1) Nutrition rep to see patient daily for meal and snack preferences.  2) Encourage PO  3) Weekly weights to monitor fluid and nutrition status  4) Obtain supplement order per RD as needed.    RD available prn

## 2018-02-02 NOTE — CARE PLAN
Problem: Safety  Goal: Will remain free from injury  Outcome: PROGRESSING AS EXPECTED  Patient will remain free from injury this shift. Fall precautions are in place.  Intervention: Provide assistance with mobility  Patient calls for assistance with mobility.

## 2018-02-02 NOTE — PROGRESS NOTES
[facility] Hospitalist Progress Note    Date of Service: 2018    Chief Complaint  77 y.o. male admitted 2018 with recurrent cdiff and ostomy from abdominal abscess not related to Cdif. Presumed UTI, treated initially with abx.    Interval Problem Update  :  Ongoing liquid stool  :  Large amount of diarrhea volume through colostomy.  Iron level checked and low:  Started iron tid with vit C, repeat CBC in a.m.  :  Hypotensive this afternoon, appears dry, states drinking lots of liquids, but heavy output from colostomy.  Started IVFs NS 75/hr today.  :  K low at 3.2, BP low, but improved on IVFs.  Secondary to excessive GI losses. Will change to LR + 20K at 100/hr.    Consultants/Specialty  ID/Helio    Disposition  Home with home health once GI fluid losses slow.  WC at baseline.  :  Unable to handle large volume of ostomy diarrhea at home, states had leakage today and needed clean up due to excessive volume of diarrhea.  Encourage OOB        Review of Systems   Constitutional: Negative for chills, diaphoresis, fever and malaise/fatigue.   HENT: Negative for congestion.    Respiratory: Negative for cough and shortness of breath.    Cardiovascular: Negative for palpitations and leg swelling.   Gastrointestinal: Positive for abdominal pain and diarrhea. Negative for nausea and vomiting.   Genitourinary: Negative for dysuria and urgency.   Musculoskeletal: Negative for back pain and joint pain.   Neurological: Positive for weakness. Negative for loss of consciousness.   Psychiatric/Behavioral: Negative for memory loss. The patient is not nervous/anxious.       Physical Exam  Laboratory/Imaging   Hemodynamics  Temp (24hrs), Av.4 °C (97.6 °F), Min:36.4 °C (97.5 °F), Max:36.7 °C (98 °F)   Temperature: 36.7 °C (98 °F)  Pulse  Av.7  Min: 60  Max: 110    Blood Pressure : (!) 89/52      Respiratory      Respiration: 18, Pulse Oximetry: 94 %             Fluids    Intake/Output Summary (Last 24  hours) at 02/01/18 1803  Last data filed at 02/01/18 1500   Gross per 24 hour   Intake                0 ml   Output              800 ml   Net             -800 ml       Nutrition  Orders Placed This Encounter   Procedures   • Diet Order     Standing Status:   Standing     Number of Occurrences:   1     Order Specific Question:   Diet:     Answer:   Regular [1]     Comments:   banana all meals     Physical Exam   Constitutional: He is oriented to person, place, and time. No distress.   thin   HENT:   Head: Normocephalic and atraumatic.   Mouth/Throat: Oropharynx is clear and moist. No oropharyngeal exudate.   Eyes: EOM are normal. Pupils are equal, round, and reactive to light. No scleral icterus.   Neck: Neck supple. No JVD present.   Cardiovascular: Normal rate and intact distal pulses.    Pulmonary/Chest: Effort normal. No respiratory distress. He has no wheezes. He has no rales.   Abdominal: Soft. Bowel sounds are normal. He exhibits no distension.   ostomy   Musculoskeletal: He exhibits no edema or tenderness.   Neurological: He is alert and oriented to person, place, and time. No cranial nerve deficit. Coordination normal.   Skin: Skin is warm and dry. No rash noted. He is not diaphoretic. No erythema.   Psychiatric: He has a normal mood and affect. His behavior is normal. Judgment and thought content normal.   Nursing note and vitals reviewed.      Recent Labs      01/30/18   0834  01/31/18   0157   WBC  6.1  6.2   RBC  2.94*  2.64*   HEMOGLOBIN  7.7*  7.2*   HEMATOCRIT  25.1*  22.6*   MCV  85.4  85.6   MCH  26.2*  27.3   MCHC  30.7*  31.9*   RDW  54.9*  54.4*   PLATELETCT  214  198   MPV  7.8*  8.4*     Recent Labs      01/31/18   0157  02/01/18   0216   SODIUM  133*  135   POTASSIUM  3.4*  3.2*   CHLORIDE  103  104   CO2  26  24   GLUCOSE  117*  142*   BUN  8  10   CREATININE  0.59  0.61   CALCIUM  7.1*  7.0*                      Assessment/Plan     * Colitis due to Clostridium difficile   Assessment & Plan     Recurrent of the same.    Cdiff positive  On Flagyl, Day 4/10, dc 1/27    oral vanco, slow taper, start 1/27  14-21 day course with slow taper  Per ID  Contact isolation   lactobacillus    Pt/ot with CGA/FWW, baseline  Home health ordered  1/31  Dehydration 2/2 high output from colostomy despite adequate intake po.  Hypotension 80/50s.  Started NS 75/hr.        Pelvic abscess in male (CMS-HCC)- (present on admission)   Assessment & Plan    History of the same status post resection of sigmoid colon as a result. 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          Type 2 diabetes mellitus (CMS-HCC)- (present on admission)   Assessment & Plan    Controlled with hgba1c 6.2%.  Monitor with current regimen.  Insulin correction dose as needed.         Dehydration   Assessment & Plan    2/2 excessive GI losses from Cdif.  1/31:  Hypotension, clinically dehydrated despite drinking lots po fluids, NS started at 75/hr.  2/1:  LR +20K.        Hypokalemia, gastrointestinal losses- (present on admission)   Assessment & Plan    Replacement with LR +20K at 100/hr.        Iron deficiency anemia due to chronic blood loss   Assessment & Plan    1/30 iron levels low at 17, started iron tid with vit C bid  B12 pending.  No obvious blood loss, but recent colostomy creation        Debility   Assessment & Plan    Wheelchair-bound at baseline  Requires 1 person assist for transfer  PT/OT        Acute cystitis   Assessment & Plan    Urine culture negative 1/23  Recent ciprofloxacin and rocephin use on admission for presumed UTI.          Neuropathy (CMS-HCC)- (present on admission)   Assessment & Plan    Controlled with current medication regimen.         Bipolar 1 disorder, depressed (CMS-HCC)- (present on admission)   Assessment & Plan    .no current depression or arpita.        Normocytic anemia- (present on admission)   Assessment & Plan    Stable without evidence of bleed.  Transfuse if needed for hemoglobin less than 7 gm/dL          BPH  (benign prostatic hypertrophy)- (present on admission)   Assessment & Plan    Continue current medication regimen.             Reviewed items::  Labs reviewed, Medications reviewed and Radiology images reviewed  DVT prophylaxis pharmacological::  Enoxaparin (Lovenox)  Ulcer Prophylaxis::  Not indicated  Antibiotics:  Treating active infection/contamination beyond 24 hours perioperative coverage

## 2018-02-02 NOTE — CARE PLAN
Problem: Nutritional:  Goal: Achieve adequate nutritional intake  Patient will consume >50% of meals   Outcome: MET Date Met: 02/02/18

## 2018-02-02 NOTE — CARE PLAN
Problem: Safety  Goal: Will remain free from injury  Outcome: PROGRESSING AS EXPECTED  Precautions in place, pt calls for help.     Problem: Bowel/Gastric:  Goal: Normal bowel function is maintained or improved  Outcome: PROGRESSING SLOWER THAN EXPECTED  On PO vanco, monitoring output.

## 2018-02-03 PROBLEM — R64 CACHEXIA (HCC): Status: ACTIVE | Noted: 2018-02-03

## 2018-02-03 LAB
ABO GROUP BLD: NORMAL
ABO GROUP BLD: NORMAL
ANION GAP SERPL CALC-SCNC: 5 MMOL/L (ref 0–11.9)
ANISOCYTOSIS BLD QL SMEAR: ABNORMAL
BARCODED ABORH UBTYP: 5100
BARCODED PRD CODE UBPRD: NORMAL
BARCODED UNIT NUM UBUNT: NORMAL
BASO STIPL BLD QL SMEAR: NORMAL
BASOPHILS # BLD AUTO: 0 % (ref 0–1.8)
BASOPHILS # BLD: 0 K/UL (ref 0–0.12)
BLD GP AB SCN SERPL QL: NORMAL
BUN SERPL-MCNC: 9 MG/DL (ref 8–22)
CALCIUM SERPL-MCNC: 7.3 MG/DL (ref 8.5–10.5)
CHLORIDE SERPL-SCNC: 109 MMOL/L (ref 96–112)
CO2 SERPL-SCNC: 24 MMOL/L (ref 20–33)
COMPONENT R 8504R: NORMAL
CREAT SERPL-MCNC: 0.56 MG/DL (ref 0.5–1.4)
EOSINOPHIL # BLD AUTO: 0.08 K/UL (ref 0–0.51)
EOSINOPHIL NFR BLD: 1.5 % (ref 0–6.9)
ERYTHROCYTE [DISTWIDTH] IN BLOOD BY AUTOMATED COUNT: 61.9 FL (ref 35.9–50)
GLUCOSE SERPL-MCNC: 105 MG/DL (ref 65–99)
HCT VFR BLD AUTO: 22 % (ref 42–52)
HGB BLD-MCNC: 6.8 G/DL (ref 14–18)
LYMPHOCYTES # BLD AUTO: 1.11 K/UL (ref 1–4.8)
LYMPHOCYTES NFR BLD: 22.2 % (ref 22–41)
MANUAL DIFF BLD: NORMAL
MCH RBC QN AUTO: 27.5 PG (ref 27–33)
MCHC RBC AUTO-ENTMCNC: 30.9 G/DL (ref 33.7–35.3)
MCV RBC AUTO: 89.1 FL (ref 81.4–97.8)
MICROCYTES BLD QL SMEAR: ABNORMAL
MONOCYTES # BLD AUTO: 0.19 K/UL (ref 0–0.85)
MONOCYTES NFR BLD AUTO: 3.7 % (ref 0–13.4)
MORPHOLOGY BLD-IMP: NORMAL
MYELOCYTES NFR BLD MANUAL: 1.5 %
NEUTROPHILS # BLD AUTO: 3.56 K/UL (ref 1.82–7.42)
NEUTROPHILS NFR BLD: 67.4 % (ref 44–72)
NEUTS BAND NFR BLD MANUAL: 3.7 % (ref 0–10)
NRBC # BLD AUTO: 0 K/UL
NRBC BLD-RTO: 0 /100 WBC
OVALOCYTES BLD QL SMEAR: NORMAL
PLATELET # BLD AUTO: 231 K/UL (ref 164–446)
PLATELET BLD QL SMEAR: NORMAL
PMV BLD AUTO: 8.4 FL (ref 9–12.9)
POIKILOCYTOSIS BLD QL SMEAR: NORMAL
POLYCHROMASIA BLD QL SMEAR: NORMAL
POTASSIUM SERPL-SCNC: 3.9 MMOL/L (ref 3.6–5.5)
PRODUCT TYPE UPROD: NORMAL
RBC # BLD AUTO: 2.47 M/UL (ref 4.7–6.1)
RBC BLD AUTO: PRESENT
RH BLD: NORMAL
RH BLD: NORMAL
SODIUM SERPL-SCNC: 138 MMOL/L (ref 135–145)
UNIT STATUS USTAT: NORMAL
WBC # BLD AUTO: 5 K/UL (ref 4.8–10.8)

## 2018-02-03 PROCEDURE — 700105 HCHG RX REV CODE 258: Performed by: HOSPITALIST

## 2018-02-03 PROCEDURE — 99233 SBSQ HOSP IP/OBS HIGH 50: CPT | Performed by: HOSPITALIST

## 2018-02-03 PROCEDURE — 700102 HCHG RX REV CODE 250 W/ 637 OVERRIDE(OP): Performed by: INTERNAL MEDICINE

## 2018-02-03 PROCEDURE — 86850 RBC ANTIBODY SCREEN: CPT

## 2018-02-03 PROCEDURE — 86900 BLOOD TYPING SEROLOGIC ABO: CPT

## 2018-02-03 PROCEDURE — 700102 HCHG RX REV CODE 250 W/ 637 OVERRIDE(OP): Performed by: HOSPITALIST

## 2018-02-03 PROCEDURE — 770021 HCHG ROOM/CARE - ISO PRIVATE

## 2018-02-03 PROCEDURE — P9016 RBC LEUKOCYTES REDUCED: HCPCS

## 2018-02-03 PROCEDURE — 86901 BLOOD TYPING SEROLOGIC RH(D): CPT

## 2018-02-03 PROCEDURE — A9270 NON-COVERED ITEM OR SERVICE: HCPCS | Performed by: HOSPITALIST

## 2018-02-03 PROCEDURE — A9270 NON-COVERED ITEM OR SERVICE: HCPCS | Performed by: INTERNAL MEDICINE

## 2018-02-03 PROCEDURE — 700111 HCHG RX REV CODE 636 W/ 250 OVERRIDE (IP): Performed by: HOSPITALIST

## 2018-02-03 PROCEDURE — 85007 BL SMEAR W/DIFF WBC COUNT: CPT

## 2018-02-03 PROCEDURE — 36430 TRANSFUSION BLD/BLD COMPNT: CPT

## 2018-02-03 PROCEDURE — 80048 BASIC METABOLIC PNL TOTAL CA: CPT

## 2018-02-03 PROCEDURE — 700105 HCHG RX REV CODE 258

## 2018-02-03 PROCEDURE — 36415 COLL VENOUS BLD VENIPUNCTURE: CPT

## 2018-02-03 PROCEDURE — 85027 COMPLETE CBC AUTOMATED: CPT

## 2018-02-03 PROCEDURE — 86923 COMPATIBILITY TEST ELECTRIC: CPT

## 2018-02-03 RX ORDER — SODIUM CHLORIDE 9 MG/ML
INJECTION, SOLUTION INTRAVENOUS
Status: COMPLETED
Start: 2018-02-03 | End: 2018-02-03

## 2018-02-03 RX ADMIN — GABAPENTIN 300 MG: 300 CAPSULE ORAL at 20:56

## 2018-02-03 RX ADMIN — OXYCODONE HYDROCHLORIDE AND ACETAMINOPHEN 500 MG: 500 TABLET ORAL at 20:56

## 2018-02-03 RX ADMIN — POTASSIUM CHLORIDE: 2 INJECTION, SOLUTION, CONCENTRATE INTRAVENOUS at 09:00

## 2018-02-03 RX ADMIN — OXYCODONE HYDROCHLORIDE 5 MG: 5 TABLET ORAL at 13:05

## 2018-02-03 RX ADMIN — OXYCODONE HYDROCHLORIDE 5 MG: 5 TABLET ORAL at 18:31

## 2018-02-03 RX ADMIN — VANCOMYCIN HYDROCHLORIDE 125 MG: 10 INJECTION, POWDER, LYOPHILIZED, FOR SOLUTION INTRAVENOUS at 13:02

## 2018-02-03 RX ADMIN — OXYCODONE HYDROCHLORIDE 5 MG: 5 TABLET ORAL at 09:22

## 2018-02-03 RX ADMIN — VANCOMYCIN HYDROCHLORIDE 125 MG: 10 INJECTION, POWDER, LYOPHILIZED, FOR SOLUTION INTRAVENOUS at 18:22

## 2018-02-03 RX ADMIN — GABAPENTIN 300 MG: 300 CAPSULE ORAL at 13:04

## 2018-02-03 RX ADMIN — SODIUM CHLORIDE 500 ML: 9 INJECTION, SOLUTION INTRAVENOUS at 18:23

## 2018-02-03 RX ADMIN — VANCOMYCIN HYDROCHLORIDE 125 MG: 10 INJECTION, POWDER, LYOPHILIZED, FOR SOLUTION INTRAVENOUS at 05:37

## 2018-02-03 RX ADMIN — Medication 325 MG: at 18:22

## 2018-02-03 RX ADMIN — OXYCODONE HYDROCHLORIDE AND ACETAMINOPHEN 500 MG: 500 TABLET ORAL at 09:22

## 2018-02-03 RX ADMIN — Medication 325 MG: at 13:04

## 2018-02-03 RX ADMIN — Medication 325 MG: at 09:22

## 2018-02-03 RX ADMIN — GABAPENTIN 300 MG: 300 CAPSULE ORAL at 09:22

## 2018-02-03 RX ADMIN — TAMSULOSIN HYDROCHLORIDE 0.4 MG: 0.4 CAPSULE ORAL at 09:22

## 2018-02-03 RX ADMIN — LAMOTRIGINE 200 MG: 100 TABLET ORAL at 09:22

## 2018-02-03 ASSESSMENT — ENCOUNTER SYMPTOMS
BACK PAIN: 0
SHORTNESS OF BREATH: 0
NERVOUS/ANXIOUS: 0
DIARRHEA: 1
NAUSEA: 0
CHILLS: 0
LOSS OF CONSCIOUSNESS: 0
COUGH: 0
FEVER: 0
DIAPHORESIS: 0
WEAKNESS: 1
ABDOMINAL PAIN: 1
VOMITING: 0
MEMORY LOSS: 0
PALPITATIONS: 0

## 2018-02-03 ASSESSMENT — PAIN SCALES - GENERAL
PAINLEVEL_OUTOF10: 4
PAINLEVEL_OUTOF10: 3
PAINLEVEL_OUTOF10: 7
PAINLEVEL_OUTOF10: 5

## 2018-02-03 ASSESSMENT — PAIN SCALES - WONG BAKER: WONGBAKER_NUMERICALRESPONSE: DOESN'T HURT AT ALL

## 2018-02-03 NOTE — PROGRESS NOTES
[facility] Hospitalist Progress Note    Date of Service: 2018    Chief Complaint  77 y.o. male admitted 2018 with recurrent cdiff and ostomy from abdominal abscess not related to Cdif. Presumed UTI, treated initially with abx.    Interval Problem Update  :  Ongoing liquid stool  :  Large amount of diarrhea volume through colostomy.  Iron level checked and low:  Started iron tid with vit C, repeat CBC in a.m.  :  Hypotensive this afternoon, appears dry, states drinking lots of liquids, but heavy output from colostomy.  Started IVFs NS 75/hr today.  :  K low at 3.2, BP low, but improved on IVFs.  Secondary to excessive GI losses. Will change to LR + 20K at 100/hr.  :  Continued need for IVFs to keep blood pressure up 2/2 excessive GI losses.      Consultants/Specialty  ID/Helio:  ID signed off :  Stop vanco qid 2/10 followed by vanco taper.    Disposition  Home with home health once GI fluid losses slow.  WC at baseline.  :  Unable to handle large volume of ostomy diarrhea at home, states had leakage today and needed clean up due to excessive volume of diarrhea.  Encourage OOB        Review of Systems   Constitutional: Negative for chills, diaphoresis, fever and malaise/fatigue.   HENT: Negative for congestion.    Respiratory: Negative for cough and shortness of breath.    Cardiovascular: Negative for palpitations and leg swelling.   Gastrointestinal: Positive for abdominal pain and diarrhea. Negative for nausea and vomiting.   Genitourinary: Negative for dysuria and urgency.   Musculoskeletal: Negative for back pain and joint pain.   Neurological: Positive for weakness. Negative for loss of consciousness.   Psychiatric/Behavioral: Negative for memory loss. The patient is not nervous/anxious.       Physical Exam  Laboratory/Imaging   Hemodynamics  Temp (24hrs), Av.8 °C (98.2 °F), Min:36.2 °C (97.2 °F), Max:37.5 °C (99.5 °F)   Temperature: 36.8 °C (98.2 °F)  Pulse  Av.5  Min: 60   Max: 110    Blood Pressure : 120/66      Respiratory      Respiration: 16, Pulse Oximetry: 91 %             Fluids    Intake/Output Summary (Last 24 hours) at 02/02/18 1737  Last data filed at 02/02/18 1700   Gross per 24 hour   Intake              990 ml   Output             3075 ml   Net            -2085 ml       Nutrition  Orders Placed This Encounter   Procedures   • Diet Order     Standing Status:   Standing     Number of Occurrences:   1     Order Specific Question:   Diet:     Answer:   Regular [1]     Comments:   banana all meals     Physical Exam   Constitutional: He is oriented to person, place, and time. No distress.   thin   HENT:   Head: Normocephalic and atraumatic.   Mouth/Throat: Oropharynx is clear and moist. No oropharyngeal exudate.   Eyes: EOM are normal. Pupils are equal, round, and reactive to light. No scleral icterus.   Neck: Neck supple. No JVD present.   Cardiovascular: Normal rate and intact distal pulses.    Pulmonary/Chest: Effort normal. No respiratory distress. He has no wheezes. He has no rales.   Abdominal: Soft. Bowel sounds are normal. He exhibits no distension.   Ostomy with liquid dark brown stool.   Musculoskeletal: He exhibits no edema or tenderness.   Neurological: He is alert and oriented to person, place, and time. No cranial nerve deficit. Coordination normal.   Skin: Skin is warm and dry. No rash noted. He is not diaphoretic. No erythema.   Psychiatric: He has a normal mood and affect. His behavior is normal. Judgment and thought content normal.   Nursing note and vitals reviewed.      Recent Labs      01/31/18   0157  02/02/18   0300   WBC  6.2  6.3   RBC  2.64*  2.59*   HEMOGLOBIN  7.2*  7.1*   HEMATOCRIT  22.6*  22.7*   MCV  85.6  87.6   MCH  27.3  27.4   MCHC  31.9*  31.3*   RDW  54.4*  58.3*   PLATELETCT  198  231   MPV  8.4*  8.5*     Recent Labs      01/31/18   0157  02/01/18   0216  02/02/18   0300   SODIUM  133*  135  136   POTASSIUM  3.4*  3.2*  3.7   CHLORIDE   103  104  107   CO2  26  24  25   GLUCOSE  117*  142*  100*   BUN  8  10  8   CREATININE  0.59  0.61  0.62   CALCIUM  7.1*  7.0*  7.4*                      Assessment/Plan     * Colitis due to Clostridium difficile   Assessment & Plan    Recurrent of the same.    Cdiff positive  On Flagyl, Day 4/10, dc 1/27    oral vanco, slow taper, start 1/27  14-21 day course with slow taper  Per ID  Contact isolation   lactobacillus    Pt/ot with CGA/FWW, baseline  Home health ordered  1/31  Dehydration 2/2 high output from colostomy despite adequate intake po.  Hypotension 80/50s.  Started NS 75/hr.        Pelvic abscess in male (CMS-HCC)- (present on admission)   Assessment & Plan    History of the same status post resection of sigmoid colon as a result. 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          Type 2 diabetes mellitus (CMS-HCC)- (present on admission)   Assessment & Plan    Controlled with hgba1c 6.2%.  Monitor with current regimen.  Insulin correction dose as needed.         Dehydration   Assessment & Plan    2/2 excessive GI losses from Cdif.  1/31:  Hypotension, clinically dehydrated despite drinking lots po fluids, NS started at 75/hr.  2/1:  LR +20K.        Hypokalemia, gastrointestinal losses- (present on admission)   Assessment & Plan    Replacement with LR +20K at 100/hr.        Iron deficiency anemia due to chronic blood loss   Assessment & Plan    1/30 iron levels low at 17, started iron tid with vit C bid  B12 pending.  No obvious blood loss, but recent colostomy creation        Debility   Assessment & Plan    Wheelchair-bound at baseline  Requires 1 person assist for transfer  PT/OT        Acute cystitis   Assessment & Plan    Urine culture negative 1/23  Recent ciprofloxacin and rocephin use on admission for presumed UTI.          Neuropathy (CMS-HCC)- (present on admission)   Assessment & Plan    Controlled with current medication regimen.         Bipolar 1 disorder, depressed (CMS-HCC)- (present on  admission)   Assessment & Plan    .no current depression or arpita.        Normocytic anemia- (present on admission)   Assessment & Plan    Stable without evidence of bleed.  Transfuse if needed for hemoglobin less than 7 gm/dL          BPH (benign prostatic hypertrophy)- (present on admission)   Assessment & Plan    Continue current medication regimen.             Reviewed items::  Labs reviewed, Medications reviewed and Radiology images reviewed  DVT prophylaxis pharmacological::  Enoxaparin (Lovenox)  Ulcer Prophylaxis::  Not indicated  Antibiotics:  Treating active infection/contamination beyond 24 hours perioperative coverage

## 2018-02-03 NOTE — THERAPY
"Attempted to see pt for Occupational Therapy treatment X3 today. First attempt, pt refused stating, \" You woke me up'; 'I don't do therapy in the morning'; \"Can you come back after lunch?\"  Informed pt OT would attempt in PM,. Second attempt, pt in with PT for tx; Third attempt,  pt was just served dinner and stated' I'm sorry I'm eating now\". Informed RN of 3 OT attempts today. Will attempt to see pt next scheduled OT visit. RN informed.   "

## 2018-02-03 NOTE — PROGRESS NOTES
Received report, patient alert and oriented. Pt reports pain of 6/10, medicated per MAR. Colostomy bag emptied, loose watery stool. Safety precautions in place. Instructed patient to use call light. Will continue to monitor.

## 2018-02-03 NOTE — THERAPY
"Physical Therapy Treatment completed.   Bed Mobility:  Supine to Sit: Supervised  Transfers: Sit to Stand: Stand by Assist  Gait: Level Of Assist: Supervised with Front-Wheel Walker  x30 ft     Plan of Care: Will benefit from Physical Therapy 3 times per week  Discharge Recommendations: Equipment: Will Continue to Assess for Equipment Needs. Post-acute therapy Discharge to home with outpatient or home health for additional skilled therapy services.    Patient continues to be highly motivated for PT. He was able to amb 30 ft x3 reps w/FWW in room with SBA, and perform seated and standing LE exercises. Patient is primarily limited by generalized weakness and fatigue. Will continue to follow for acute LOS. Recommend patient amb several times/day in room with nsg SBA and use of FWW. D/c pending progress, patient states he wants to d/c to home and already has HH arranged.      See \"Rehab Therapy-Acute\" Patient Summary Report for complete documentation.       "

## 2018-02-04 LAB
ANION GAP SERPL CALC-SCNC: 6 MMOL/L (ref 0–11.9)
ANISOCYTOSIS BLD QL SMEAR: ABNORMAL
BASOPHILS # BLD AUTO: 0 % (ref 0–1.8)
BASOPHILS # BLD: 0 K/UL (ref 0–0.12)
BUN SERPL-MCNC: 8 MG/DL (ref 8–22)
CALCIUM SERPL-MCNC: 7.4 MG/DL (ref 8.5–10.5)
CHLORIDE SERPL-SCNC: 108 MMOL/L (ref 96–112)
CO2 SERPL-SCNC: 24 MMOL/L (ref 20–33)
CREAT SERPL-MCNC: 0.61 MG/DL (ref 0.5–1.4)
EOSINOPHIL # BLD AUTO: 0.05 K/UL (ref 0–0.51)
EOSINOPHIL NFR BLD: 0.9 % (ref 0–6.9)
ERYTHROCYTE [DISTWIDTH] IN BLOOD BY AUTOMATED COUNT: 59.3 FL (ref 35.9–50)
GLUCOSE SERPL-MCNC: 107 MG/DL (ref 65–99)
HCT VFR BLD AUTO: 26.9 % (ref 42–52)
HGB BLD-MCNC: 8.2 G/DL (ref 14–18)
LYMPHOCYTES # BLD AUTO: 2 K/UL (ref 1–4.8)
LYMPHOCYTES NFR BLD: 37.7 % (ref 22–41)
MACROCYTES BLD QL SMEAR: ABNORMAL
MANUAL DIFF BLD: NORMAL
MCH RBC QN AUTO: 26.5 PG (ref 27–33)
MCHC RBC AUTO-ENTMCNC: 30.5 G/DL (ref 33.7–35.3)
MCV RBC AUTO: 87.1 FL (ref 81.4–97.8)
METAMYELOCYTES NFR BLD MANUAL: 2.6 %
MONOCYTES # BLD AUTO: 0.32 K/UL (ref 0–0.85)
MONOCYTES NFR BLD AUTO: 6.1 % (ref 0–13.4)
MORPHOLOGY BLD-IMP: NORMAL
NEUTROPHILS # BLD AUTO: 2.75 K/UL (ref 1.82–7.42)
NEUTROPHILS NFR BLD: 48.3 % (ref 44–72)
NEUTS BAND NFR BLD MANUAL: 3.5 % (ref 0–10)
NRBC # BLD AUTO: 0 K/UL
NRBC BLD-RTO: 0 /100 WBC
PLATELET # BLD AUTO: 231 K/UL (ref 164–446)
PLATELET BLD QL SMEAR: NORMAL
PMV BLD AUTO: 8.1 FL (ref 9–12.9)
POLYCHROMASIA BLD QL SMEAR: NORMAL
POTASSIUM SERPL-SCNC: 3.5 MMOL/L (ref 3.6–5.5)
PROMYELOCYTES NFR BLD MANUAL: 0.9 %
RBC # BLD AUTO: 3.09 M/UL (ref 4.7–6.1)
RBC BLD AUTO: PRESENT
SMUDGE CELLS BLD QL SMEAR: NORMAL
SODIUM SERPL-SCNC: 138 MMOL/L (ref 135–145)
VARIANT LYMPHS BLD QL SMEAR: NORMAL
WBC # BLD AUTO: 5.3 K/UL (ref 4.8–10.8)

## 2018-02-04 PROCEDURE — 30233N1 TRANSFUSION OF NONAUTOLOGOUS RED BLOOD CELLS INTO PERIPHERAL VEIN, PERCUTANEOUS APPROACH: ICD-10-PCS | Performed by: HOSPITALIST

## 2018-02-04 PROCEDURE — 700102 HCHG RX REV CODE 250 W/ 637 OVERRIDE(OP): Performed by: HOSPITALIST

## 2018-02-04 PROCEDURE — 36415 COLL VENOUS BLD VENIPUNCTURE: CPT

## 2018-02-04 PROCEDURE — 700102 HCHG RX REV CODE 250 W/ 637 OVERRIDE(OP): Performed by: INTERNAL MEDICINE

## 2018-02-04 PROCEDURE — A9270 NON-COVERED ITEM OR SERVICE: HCPCS | Performed by: INTERNAL MEDICINE

## 2018-02-04 PROCEDURE — 80048 BASIC METABOLIC PNL TOTAL CA: CPT

## 2018-02-04 PROCEDURE — A9270 NON-COVERED ITEM OR SERVICE: HCPCS | Performed by: HOSPITALIST

## 2018-02-04 PROCEDURE — 770021 HCHG ROOM/CARE - ISO PRIVATE

## 2018-02-04 PROCEDURE — 85027 COMPLETE CBC AUTOMATED: CPT

## 2018-02-04 PROCEDURE — 85007 BL SMEAR W/DIFF WBC COUNT: CPT

## 2018-02-04 PROCEDURE — 99232 SBSQ HOSP IP/OBS MODERATE 35: CPT | Performed by: HOSPITALIST

## 2018-02-04 RX ORDER — OXYCODONE HYDROCHLORIDE 5 MG/1
5 TABLET ORAL ONCE
Status: COMPLETED | OUTPATIENT
Start: 2018-02-04 | End: 2018-02-05

## 2018-02-04 RX ORDER — GABAPENTIN 300 MG/1
600 CAPSULE ORAL 3 TIMES DAILY
Status: DISCONTINUED | OUTPATIENT
Start: 2018-02-04 | End: 2018-02-05 | Stop reason: HOSPADM

## 2018-02-04 RX ORDER — POTASSIUM CHLORIDE 20 MEQ/1
20 TABLET, EXTENDED RELEASE ORAL DAILY
Status: DISCONTINUED | OUTPATIENT
Start: 2018-02-04 | End: 2018-02-05 | Stop reason: HOSPADM

## 2018-02-04 RX ADMIN — OXYCODONE HYDROCHLORIDE 5 MG: 5 TABLET ORAL at 08:56

## 2018-02-04 RX ADMIN — ACETAMINOPHEN 650 MG: 325 TABLET, FILM COATED ORAL at 17:53

## 2018-02-04 RX ADMIN — Medication 325 MG: at 16:11

## 2018-02-04 RX ADMIN — Medication 325 MG: at 11:30

## 2018-02-04 RX ADMIN — VANCOMYCIN HYDROCHLORIDE 125 MG: 10 INJECTION, POWDER, LYOPHILIZED, FOR SOLUTION INTRAVENOUS at 11:47

## 2018-02-04 RX ADMIN — VANCOMYCIN HYDROCHLORIDE 125 MG: 10 INJECTION, POWDER, LYOPHILIZED, FOR SOLUTION INTRAVENOUS at 01:29

## 2018-02-04 RX ADMIN — VANCOMYCIN HYDROCHLORIDE 125 MG: 10 INJECTION, POWDER, LYOPHILIZED, FOR SOLUTION INTRAVENOUS at 06:12

## 2018-02-04 RX ADMIN — OXYCODONE HYDROCHLORIDE AND ACETAMINOPHEN 500 MG: 500 TABLET ORAL at 20:53

## 2018-02-04 RX ADMIN — Medication 325 MG: at 08:52

## 2018-02-04 RX ADMIN — POTASSIUM CHLORIDE 20 MEQ: 1500 TABLET, EXTENDED RELEASE ORAL at 11:47

## 2018-02-04 RX ADMIN — VANCOMYCIN HYDROCHLORIDE 125 MG: 10 INJECTION, POWDER, LYOPHILIZED, FOR SOLUTION INTRAVENOUS at 16:12

## 2018-02-04 RX ADMIN — GABAPENTIN 600 MG: 300 CAPSULE ORAL at 20:52

## 2018-02-04 RX ADMIN — TAMSULOSIN HYDROCHLORIDE 0.4 MG: 0.4 CAPSULE ORAL at 08:51

## 2018-02-04 RX ADMIN — GABAPENTIN 600 MG: 300 CAPSULE ORAL at 16:12

## 2018-02-04 RX ADMIN — GABAPENTIN 300 MG: 300 CAPSULE ORAL at 08:52

## 2018-02-04 RX ADMIN — OXYCODONE HYDROCHLORIDE AND ACETAMINOPHEN 500 MG: 500 TABLET ORAL at 08:51

## 2018-02-04 RX ADMIN — LAMOTRIGINE 200 MG: 100 TABLET ORAL at 08:52

## 2018-02-04 ASSESSMENT — ENCOUNTER SYMPTOMS
MEMORY LOSS: 0
PALPITATIONS: 0
CHILLS: 0
NERVOUS/ANXIOUS: 0
COUGH: 0
LOSS OF CONSCIOUSNESS: 0
BACK PAIN: 0
DIAPHORESIS: 0
VOMITING: 0
FEVER: 0
SHORTNESS OF BREATH: 0
ABDOMINAL PAIN: 1
DIARRHEA: 1
NAUSEA: 0
WEAKNESS: 1

## 2018-02-04 ASSESSMENT — PAIN SCALES - GENERAL
PAINLEVEL_OUTOF10: 3
PAINLEVEL_OUTOF10: 2
PAINLEVEL_OUTOF10: 4

## 2018-02-04 NOTE — PROGRESS NOTES
[facility] Hospitalist Progress Note    Date of Service: 2/3/2018    Chief Complaint  77 y.o. male admitted 2018 with recurrent cdiff and ostomy from abdominal abscess not related to Cdif. Presumed UTI, treated initially with abx.    Interval Problem Update  :  Ongoing liquid stool  :  Large amount of diarrhea volume through colostomy.  Iron level checked and low:  Started iron tid with vit C, repeat CBC in a.m.  :  Hypotensive this afternoon, appears dry, states drinking lots of liquids, but heavy output from colostomy.  Started IVFs NS 75/hr today.  :  K low at 3.2, BP low, but improved on IVFs.  Secondary to excessive GI losses. Will change to LR + 20K at 100/hr.  :  Continued need for IVFs to keep blood pressure up 2/2 excessive GI losses.   2/3:  More formed stool seen today, greenish discoloration, no liquid.  Patient sitting up in w/c.  Hg dropped to 6.8 form 7.0.  Appears to be slow drop, patient agreeable to 1 unit PRBC transfusion.  Boost supplements tid ordered.  Dc IVFs.     Consultants/Specialty  ID/Helio:  ID signed off :  Stop vanco qid 2/10 followed by vanco taper.    Disposition  Home with home health once GI fluid losses slow.  WC at baseline.  Encourage OOB        Review of Systems   Constitutional: Negative for chills, diaphoresis, fever and malaise/fatigue.   HENT: Negative for congestion.    Respiratory: Negative for cough and shortness of breath.    Cardiovascular: Negative for palpitations and leg swelling.   Gastrointestinal: Positive for abdominal pain and diarrhea. Negative for nausea and vomiting.   Genitourinary: Negative for dysuria and urgency.   Musculoskeletal: Negative for back pain and joint pain.   Neurological: Positive for weakness. Negative for loss of consciousness.   Psychiatric/Behavioral: Negative for memory loss. The patient is not nervous/anxious.       Physical Exam  Laboratory/Imaging   Hemodynamics  Temp (24hrs), Av.8 °C (98.2 °F), Min:36.3  °C (97.4 °F), Max:37 °C (98.6 °F)   Temperature: 36.9 °C (98.5 °F)  Pulse  Av.1  Min: 60  Max: 110    Blood Pressure : 112/57      Respiratory      Respiration: 16, Pulse Oximetry: 98 %             Fluids    Intake/Output Summary (Last 24 hours) at 18 1620  Last data filed at 18 1100   Gross per 24 hour   Intake             1440 ml   Output             2525 ml   Net            -1085 ml       Nutrition  Orders Placed This Encounter   Procedures   • Diet Order     Standing Status:   Standing     Number of Occurrences:   1     Order Specific Question:   Diet:     Answer:   Regular [1]     Comments:   banana all meals     Physical Exam   Constitutional: He is oriented to person, place, and time. No distress.   thin   HENT:   Head: Normocephalic and atraumatic.   Mouth/Throat: Oropharynx is clear and moist. No oropharyngeal exudate.   Eyes: EOM are normal. Pupils are equal, round, and reactive to light. No scleral icterus.   Neck: Neck supple. No JVD present.   Cardiovascular: Normal rate and intact distal pulses.    Pulmonary/Chest: Effort normal. No respiratory distress. He has no wheezes. He has no rales.   Abdominal: Soft. Bowel sounds are normal. He exhibits no distension.   Ostomy with some formed greenish color stool, no further liquid seen.   Musculoskeletal: He exhibits no edema or tenderness.   Neurological: He is alert and oriented to person, place, and time. No cranial nerve deficit. Coordination normal.   Skin: Skin is warm and dry. No rash noted. He is not diaphoretic. No erythema.   Psychiatric: He has a normal mood and affect. His behavior is normal. Judgment and thought content normal.   Nursing note and vitals reviewed.      Recent Labs      18   0300  18   0238   WBC  6.3  5.0   RBC  2.59*  2.47*   HEMOGLOBIN  7.1*  6.8*   HEMATOCRIT  22.7*  22.0*   MCV  87.6  89.1   MCH  27.4  27.5   MCHC  31.3*  30.9*   RDW  58.3*  61.9*   PLATELETCT  231  231   MPV  8.5*  8.4*     Recent  Labs      02/01/18   0216  02/02/18   0300  02/03/18   0238   SODIUM  135  136  138   POTASSIUM  3.2*  3.7  3.9   CHLORIDE  104  107  109   CO2  24  25  24   GLUCOSE  142*  100*  105*   BUN  10  8  9   CREATININE  0.61  0.62  0.56   CALCIUM  7.0*  7.4*  7.3*                      Assessment/Plan     * Colitis due to Clostridium difficile   Assessment & Plan    Recurrent of the same.    Cdiff positive  On Flagyl, Day 4/10, dc 1/27    oral vanco, slow taper, start 1/27  14-21 day course with slow taper  Per ID  Contact isolation   lactobacillus    Pt/ot with CGA/FWW, baseline  Home health ordered  1/31  Dehydration 2/2 high output from colostomy despite adequate intake po.  Hypotension 80/50s.  Started NS 75/hr.  2/3 resolved hypotension, dc'd IVfs.  Taking po well.        Pelvic abscess in male (CMS-HCC)- (present on admission)   Assessment & Plan    History of the same status post resection of sigmoid colon as a result. 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          Type 2 diabetes mellitus (CMS-HCC)- (present on admission)   Assessment & Plan    Controlled with hgba1c 6.2%.  Monitor with current regimen.  Insulin correction dose as needed.         Cachexia (CMS-HCC)- (present on admission)   Assessment & Plan    Cachexia on exam, rib protrusions.    Ordered boost plus tid with meals.        Dehydration- (present on admission)   Assessment & Plan    2/2 excessive GI losses from Cdif.  1/31:  Hypotension, clinically dehydrated despite drinking lots po fluids, NS started at 75/hr.  2/1:  LR +20K.  2/3:  Improved, dc'd IVFs.        Hypokalemia, gastrointestinal losses- (present on admission)   Assessment & Plan    Replaced.        Iron deficiency anemia due to chronic blood loss- (present on admission)   Assessment & Plan    1/30 iron levels low at 17, started iron tid with vit C bid  B12 pending.  No obvious blood loss, but recent colostomy creation  2/3:  Hg 6.8 ordered 1 unit PRBCs.  Slow drop in Hg, had been hovering  in 7s.  Repeat Hg in a.m.        Debility   Assessment & Plan    Wheelchair-bound at baseline  Requires 1 person assist for transfer  PT/OT        Acute cystitis- (present on admission)   Assessment & Plan    Urine culture negative 1/23  Recent ciprofloxacin and rocephin use on admission for presumed UTI.          Neuropathy (CMS-HCC)- (present on admission)   Assessment & Plan    Controlled with current medication regimen.         Bipolar 1 disorder, depressed (CMS-HCC)- (present on admission)   Assessment & Plan    .no current depression or arpita.        Normocytic anemia- (present on admission)   Assessment & Plan    Transfuse if needed for hemoglobin less than 7 gm/dL  2/3:  Hg 6.8 requiring 1 unit PRBCs transfused, slow drop in Hg over the week.          BPH (benign prostatic hypertrophy)- (present on admission)   Assessment & Plan    Continue current medication regimen.             Reviewed items::  Labs reviewed, Medications reviewed and Radiology images reviewed  DVT prophylaxis pharmacological::  Enoxaparin (Lovenox)  Ulcer Prophylaxis::  Not indicated  Antibiotics:  Treating active infection/contamination beyond 24 hours perioperative coverage

## 2018-02-04 NOTE — PROGRESS NOTES
[facility] Hospitalist Progress Note    Date of Service: 2/4/2018    Chief Complaint  77 y.o. male admitted 1/23/2018 with recurrent cdiff and ostomy from abdominal abscess not related to Cdif. Presumed UTI, treated initially with abx.    Interval Problem Update  1/29:  Ongoing liquid stool  1/30:  Large amount of diarrhea volume through colostomy.  Iron level checked and low:  Started iron tid with vit C, repeat CBC in a.m.  1/31:  Hypotensive this afternoon, appears dry, states drinking lots of liquids, but heavy output from colostomy.  Started IVFs NS 75/hr today.  2/1:  K low at 3.2, BP low, but improved on IVFs.  Secondary to excessive GI losses. Will change to LR + 20K at 100/hr.  2/2:  Continued need for IVFs to keep blood pressure up 2/2 excessive GI losses.   2/3:  More formed stool seen today, greenish discoloration, no liquid.  Patient sitting up in w/c.  Hg dropped to 6.8 form 7.0.  Appears to be slow drop, patient agreeable to 1 unit PRBC transfusion.  Boost supplements tid ordered.  Dc IVFs.   2/4:  Hg increased to 8.4, sitting up, colostomy with more formed stool with some liquid.  BP improved off IVFs.  Explained that patient should be able to be dc'd home once able to get compounding pharmacy for vancomycin.  dc'd oxycodone, increased neurontin, started po K daily.    Consultants/Specialty  ID/Helio:  ID signed off 2/2:  Stop vanco qid 2/10 followed by vanco taper.    Disposition  Home with home health once GI fluid losses slow.  WC at baseline.  2/4:  SW working on compounding pharmacy for vancomycin po upon dc.  Plan for dc 2/5.        Review of Systems   Constitutional: Negative for chills, diaphoresis, fever and malaise/fatigue.   HENT: Negative for congestion.    Respiratory: Negative for cough and shortness of breath.    Cardiovascular: Negative for palpitations and leg swelling.   Gastrointestinal: Positive for abdominal pain and diarrhea. Negative for nausea and vomiting.   Genitourinary:  Negative for dysuria and urgency.   Musculoskeletal: Negative for back pain and joint pain.   Neurological: Positive for weakness. Negative for loss of consciousness.   Psychiatric/Behavioral: Negative for memory loss. The patient is not nervous/anxious.       Physical Exam  Laboratory/Imaging   Hemodynamics  Temp (24hrs), Av.7 °C (98.1 °F), Min:36.3 °C (97.4 °F), Max:37 °C (98.6 °F)   Temperature:  (Q8 Vitals per RN)  Pulse  Av.3  Min: 60  Max: 110    Blood Pressure : 116/61      Respiratory      Respiration: 14, Pulse Oximetry: 95 %             Fluids    Intake/Output Summary (Last 24 hours) at 18 1511  Last data filed at 18 1255   Gross per 24 hour   Intake          2735.42 ml   Output             2525 ml   Net           210.42 ml       Nutrition  Orders Placed This Encounter   Procedures   • Diet Order     Standing Status:   Standing     Number of Occurrences:   1     Order Specific Question:   Diet:     Answer:   Regular [1]     Comments:   banana all meals     Physical Exam   Constitutional: He is oriented to person, place, and time. No distress.   thin   HENT:   Head: Normocephalic and atraumatic.   Mouth/Throat: Oropharynx is clear and moist. No oropharyngeal exudate.   Eyes: EOM are normal. Pupils are equal, round, and reactive to light. No scleral icterus.   Neck: Neck supple. No JVD present.   Cardiovascular: Normal rate and intact distal pulses.    Pulmonary/Chest: Effort normal. No respiratory distress. He has no wheezes. He has no rales.   Abdominal: Soft. Bowel sounds are normal. He exhibits no distension.   Ostomy with some formed greenish color stool, no further liquid seen.   Musculoskeletal: He exhibits no edema or tenderness.   Neurological: He is alert and oriented to person, place, and time. No cranial nerve deficit. Coordination normal.   Skin: Skin is warm and dry. No rash noted. He is not diaphoretic. No erythema.   Psychiatric: He has a normal mood and affect. His  behavior is normal. Judgment and thought content normal.   Nursing note and vitals reviewed.      Recent Labs      02/02/18   0300  02/03/18   0238  02/04/18   0331   WBC  6.3  5.0  5.3   RBC  2.59*  2.47*  3.09*   HEMOGLOBIN  7.1*  6.8*  8.2*   HEMATOCRIT  22.7*  22.0*  26.9*   MCV  87.6  89.1  87.1   MCH  27.4  27.5  26.5*   MCHC  31.3*  30.9*  30.5*   RDW  58.3*  61.9*  59.3*   PLATELETCT  231  231  231   MPV  8.5*  8.4*  8.1*     Recent Labs      02/02/18   0300  02/03/18   0238  02/04/18   0331   SODIUM  136  138  138   POTASSIUM  3.7  3.9  3.5*   CHLORIDE  107  109  108   CO2  25  24  24   GLUCOSE  100*  105*  107*   BUN  8  9  8   CREATININE  0.62  0.56  0.61   CALCIUM  7.4*  7.3*  7.4*                      Assessment/Plan     * Colitis due to Clostridium difficile   Assessment & Plan    Recurrent of the same.    Cdiff positive  On Flagyl, Day 4/10, dc 1/27    oral vanco, slow taper, start 1/27  14-21 day course with slow taper  Per ID  Contact isolation   lactobacillus    Pt/ot with CGA/FWW, baseline  Home health ordered  1/31  Dehydration 2/2 high output from colostomy despite adequate intake po.  Hypotension 80/50s.  Started NS 75/hr.  2/3 resolved hypotension, dc'd IVfs.  Taking po well.  2/4:  Remains with decreased colostomy fluid, BP improved off IVFs.          Pelvic abscess in male (CMS-[facility])- (present on admission)   Assessment & Plan    History of the same status post resection of sigmoid colon as a result. 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          Type 2 diabetes mellitus (CMS-[facility])- (present on admission)   Assessment & Plan    Controlled with hgba1c 6.2%.  Monitor with current regimen.  Insulin correction dose as needed.         Cachexia (CMS-[facility])- (present on admission)   Assessment & Plan    Cachexia on exam, rib protrusions.    Ordered boost plus tid with meals.        Dehydration- (present on admission)   Assessment & Plan    2/2 excessive GI losses from Cdif.  1/31:  Hypotension, clinically  dehydrated despite drinking lots po fluids, NS started at 75/hr.  2/1:  LR +20K.  2/3:  Improved, dc'd IVFs.        Hypokalemia, gastrointestinal losses- (present on admission)   Assessment & Plan    Replacement po.        Iron deficiency anemia due to chronic blood loss- (present on admission)   Assessment & Plan    1/30 iron levels low at 17, started iron tid with vit C bid  B12 pending.  No obvious blood loss, but recent colostomy creation  2/3:  Hg 6.8 ordered 1 unit PRBCs.  Slow drop in Hg, had been hovering in 7s.  2/4:  Repeat Hg  8.4.        Debility   Assessment & Plan    Wheelchair-bound at baseline  Requires 1 person assist for transfer  PT/OT   ordered for PT/OT.        Acute cystitis- (present on admission)   Assessment & Plan    Urine culture negative 1/23  Recent ciprofloxacin and rocephin use on admission for presumed UTI.          Neuropathy (CMS-HCC)- (present on admission)   Assessment & Plan    Controlled with current medication regimen.         Bipolar 1 disorder, depressed (CMS-HCC)- (present on admission)   Assessment & Plan    .no current depression or arpita.        Normocytic anemia- (present on admission)   Assessment & Plan    Transfuse if needed for hemoglobin less than 7 gm/dL  2/3:  Hg 6.8 requiring 1 unit PRBCs transfused, slow drop in Hg over the week.          BPH (benign prostatic hypertrophy)- (present on admission)   Assessment & Plan    Continue current medication regimen.             Reviewed items::  Labs reviewed, Medications reviewed and Radiology images reviewed  DVT prophylaxis pharmacological::  Enoxaparin (Lovenox)  Ulcer Prophylaxis::  Not indicated  Antibiotics:  Treating active infection/contamination beyond 24 hours perioperative coverage

## 2018-02-05 VITALS
RESPIRATION RATE: 16 BRPM | HEIGHT: 68 IN | WEIGHT: 108.47 LBS | OXYGEN SATURATION: 96 % | HEART RATE: 90 BPM | SYSTOLIC BLOOD PRESSURE: 117 MMHG | TEMPERATURE: 98.3 F | DIASTOLIC BLOOD PRESSURE: 62 MMHG | BODY MASS INDEX: 16.44 KG/M2

## 2018-02-05 PROBLEM — E86.0 DEHYDRATION: Status: RESOLVED | Noted: 2018-02-01 | Resolved: 2018-02-05

## 2018-02-05 PROBLEM — N30.00 ACUTE CYSTITIS: Status: RESOLVED | Noted: 2018-01-25 | Resolved: 2018-02-05

## 2018-02-05 LAB
ANION GAP SERPL CALC-SCNC: 5 MMOL/L (ref 0–11.9)
ANISOCYTOSIS BLD QL SMEAR: ABNORMAL
BASOPHILS # BLD AUTO: 0.9 % (ref 0–1.8)
BASOPHILS # BLD: 0.05 K/UL (ref 0–0.12)
BUN SERPL-MCNC: 9 MG/DL (ref 8–22)
CALCIUM SERPL-MCNC: 7.6 MG/DL (ref 8.5–10.5)
CHLORIDE SERPL-SCNC: 108 MMOL/L (ref 96–112)
CO2 SERPL-SCNC: 29 MMOL/L (ref 20–33)
CREAT SERPL-MCNC: 0.63 MG/DL (ref 0.5–1.4)
EOSINOPHIL # BLD AUTO: 0.19 K/UL (ref 0–0.51)
EOSINOPHIL NFR BLD: 3.5 % (ref 0–6.9)
ERYTHROCYTE [DISTWIDTH] IN BLOOD BY AUTOMATED COUNT: 61.1 FL (ref 35.9–50)
GLUCOSE SERPL-MCNC: 103 MG/DL (ref 65–99)
HCT VFR BLD AUTO: 28.9 % (ref 42–52)
HGB BLD-MCNC: 8.6 G/DL (ref 14–18)
LYMPHOCYTES # BLD AUTO: 2.21 K/UL (ref 1–4.8)
LYMPHOCYTES NFR BLD: 41.7 % (ref 22–41)
MANUAL DIFF BLD: NORMAL
MCH RBC QN AUTO: 26.4 PG (ref 27–33)
MCHC RBC AUTO-ENTMCNC: 29.8 G/DL (ref 33.7–35.3)
MCV RBC AUTO: 88.7 FL (ref 81.4–97.8)
MICROCYTES BLD QL SMEAR: ABNORMAL
MONOCYTES # BLD AUTO: 0.32 K/UL (ref 0–0.85)
MONOCYTES NFR BLD AUTO: 6.1 % (ref 0–13.4)
MORPHOLOGY BLD-IMP: NORMAL
NEUTROPHILS # BLD AUTO: 2.53 K/UL (ref 1.82–7.42)
NEUTROPHILS NFR BLD: 47.8 % (ref 44–72)
NRBC # BLD AUTO: 0 K/UL
NRBC BLD-RTO: 0 /100 WBC
PLATELET # BLD AUTO: 220 K/UL (ref 164–446)
PLATELET BLD QL SMEAR: NORMAL
PMV BLD AUTO: 8.1 FL (ref 9–12.9)
POLYCHROMASIA BLD QL SMEAR: NORMAL
POTASSIUM SERPL-SCNC: 3.7 MMOL/L (ref 3.6–5.5)
RBC # BLD AUTO: 3.26 M/UL (ref 4.7–6.1)
RBC BLD AUTO: PRESENT
SMUDGE CELLS BLD QL SMEAR: NORMAL
SODIUM SERPL-SCNC: 142 MMOL/L (ref 135–145)
WBC # BLD AUTO: 5.3 K/UL (ref 4.8–10.8)

## 2018-02-05 PROCEDURE — 99239 HOSP IP/OBS DSCHRG MGMT >30: CPT | Performed by: HOSPITALIST

## 2018-02-05 PROCEDURE — 85027 COMPLETE CBC AUTOMATED: CPT

## 2018-02-05 PROCEDURE — A9270 NON-COVERED ITEM OR SERVICE: HCPCS | Performed by: HOSPITALIST

## 2018-02-05 PROCEDURE — 80048 BASIC METABOLIC PNL TOTAL CA: CPT

## 2018-02-05 PROCEDURE — 700102 HCHG RX REV CODE 250 W/ 637 OVERRIDE(OP): Performed by: HOSPITALIST

## 2018-02-05 PROCEDURE — 36415 COLL VENOUS BLD VENIPUNCTURE: CPT

## 2018-02-05 PROCEDURE — A9270 NON-COVERED ITEM OR SERVICE: HCPCS | Performed by: STUDENT IN AN ORGANIZED HEALTH CARE EDUCATION/TRAINING PROGRAM

## 2018-02-05 PROCEDURE — 85007 BL SMEAR W/DIFF WBC COUNT: CPT

## 2018-02-05 PROCEDURE — 700102 HCHG RX REV CODE 250 W/ 637 OVERRIDE(OP): Performed by: INTERNAL MEDICINE

## 2018-02-05 PROCEDURE — 700102 HCHG RX REV CODE 250 W/ 637 OVERRIDE(OP): Performed by: STUDENT IN AN ORGANIZED HEALTH CARE EDUCATION/TRAINING PROGRAM

## 2018-02-05 PROCEDURE — A9270 NON-COVERED ITEM OR SERVICE: HCPCS | Performed by: INTERNAL MEDICINE

## 2018-02-05 PROCEDURE — 302102 BAG OST N IMG 2.25IN 2PC (FECAL): Performed by: HOSPITALIST

## 2018-02-05 RX ORDER — FERROUS SULFATE 325(65) MG
325 TABLET ORAL
Qty: 90 TAB | Refills: 3 | Status: ON HOLD | OUTPATIENT
Start: 2018-02-05 | End: 2018-03-21

## 2018-02-05 RX ORDER — ASCORBIC ACID 500 MG
500 TABLET ORAL 2 TIMES DAILY
Qty: 30 TAB | Refills: 3 | Status: ON HOLD | OUTPATIENT
Start: 2018-02-05 | End: 2018-03-21

## 2018-02-05 RX ADMIN — ACETAMINOPHEN 650 MG: 325 TABLET, FILM COATED ORAL at 08:37

## 2018-02-05 RX ADMIN — TAMSULOSIN HYDROCHLORIDE 0.4 MG: 0.4 CAPSULE ORAL at 08:37

## 2018-02-05 RX ADMIN — VANCOMYCIN HYDROCHLORIDE 125 MG: 10 INJECTION, POWDER, LYOPHILIZED, FOR SOLUTION INTRAVENOUS at 12:07

## 2018-02-05 RX ADMIN — VANCOMYCIN HYDROCHLORIDE 125 MG: 10 INJECTION, POWDER, LYOPHILIZED, FOR SOLUTION INTRAVENOUS at 16:42

## 2018-02-05 RX ADMIN — Medication 325 MG: at 08:37

## 2018-02-05 RX ADMIN — VANCOMYCIN HYDROCHLORIDE 125 MG: 10 INJECTION, POWDER, LYOPHILIZED, FOR SOLUTION INTRAVENOUS at 06:19

## 2018-02-05 RX ADMIN — Medication 325 MG: at 16:42

## 2018-02-05 RX ADMIN — OXYCODONE HYDROCHLORIDE AND ACETAMINOPHEN 500 MG: 500 TABLET ORAL at 08:37

## 2018-02-05 RX ADMIN — Medication 325 MG: at 12:07

## 2018-02-05 RX ADMIN — OXYCODONE HYDROCHLORIDE 5 MG: 5 TABLET ORAL at 06:19

## 2018-02-05 RX ADMIN — GABAPENTIN 600 MG: 300 CAPSULE ORAL at 16:41

## 2018-02-05 RX ADMIN — GABAPENTIN 600 MG: 300 CAPSULE ORAL at 08:37

## 2018-02-05 RX ADMIN — LAMOTRIGINE 200 MG: 100 TABLET ORAL at 08:37

## 2018-02-05 RX ADMIN — POTASSIUM CHLORIDE 20 MEQ: 1500 TABLET, EXTENDED RELEASE ORAL at 08:37

## 2018-02-05 RX ADMIN — VANCOMYCIN HYDROCHLORIDE 125 MG: 10 INJECTION, POWDER, LYOPHILIZED, FOR SOLUTION INTRAVENOUS at 00:48

## 2018-02-05 RX ADMIN — ACETAMINOPHEN 650 MG: 325 TABLET, FILM COATED ORAL at 16:41

## 2018-02-05 ASSESSMENT — PAIN SCALES - GENERAL: PAINLEVEL_OUTOF10: 4

## 2018-02-05 NOTE — PROGRESS NOTES
Hourly rounding, call bell within reach. Patient educated about plan of care, pain management, call bell use, and mobility. Patient get up to wheelchair with stand by assist.  Also independent with bed mobility, scd's in place. Ostomy in place to left abdomen, continues to have loose to formed stool. Patient potentially ready for discharge but is anxious about getting ostomy supplies, have a ride home, and getting prescriptions filled. SW and hospitalist notified. Will monitor.

## 2018-02-05 NOTE — DISCHARGE PLANNING
"Pt reports that his wallet is at home so he needs to go home before going to the pharmacy to  his medication. Pt's home is a few blocks from the hospital, however the compounding pharmacy is about 8 miles away in [facility]. The bus goes within about .5 miles of pt's home and the pharmacy; however pt reports that will be hard on him and he's unsure if he would even go to the pharmacy, even though he needs his medication every 6 hours. When asked how he gets around usually, pt reported that he very rarely leaves his home and when he needs to he schedules rides with friends. SW again asked if pt had anyone who could pick him up; pt reports that he has a friend who would maybe be able to in the morning. ANA explained that pt is medically cleared and ready for DC so Medicare wouldn't cover him staying another night. Pt expressed understanding and stated he would call his friend \"Tim\" again to see if he could pick him up this afternoon.   ANA followed up with pt who stated that he left a message for Tim and is waiting to hear back.   ANA notified evening ANA Noe that pt will likely need follow up. RN to notify evening SW if pt hears back from his friend.   "

## 2018-02-05 NOTE — DISCHARGE PLANNING
Obtained RX for Vancomycin liquid. Called [facility] . They have it in stock. Pt will need to bring RX to have it filled. Lesli GOEL has accepted pt. Pt will discharge to home with MANASA today.

## 2018-02-05 NOTE — DISCHARGE INSTRUCTIONS
Discharge Instructions    Discharged to home by taxi with self. Discharged via wheelchair, hospital escort: Yes.  Special equipment needed: Wheelchair    Be sure to schedule a follow-up appointment with your primary care doctor or any specialists as instructed.     Discharge Plan:   Influenza Vaccine Indication: Not indicated: Previously immunized this influenza season and > 8 years of age    I understand that a diet low in cholesterol, fat, and sodium is recommended for good health. Unless I have been given specific instructions below for another diet, I accept this instruction as my diet prescription.   Other diet: Regular    Special Instructions: None    · Is patient discharged on Warfarin / Coumadin?   No     Depression / Suicide Risk    As you are discharged from this [facility] facility, it is important to learn how to keep safe from harming yourself.    Recognize the warning signs:  · Abrupt changes in personality, positive or negative- including increase in energy   · Giving away possessions  · Change in eating patterns- significant weight changes-  positive or negative  · Change in sleeping patterns- unable to sleep or sleeping all the time   · Unwillingness or inability to communicate  · Depression  · Unusual sadness, discouragement and loneliness  · Talk of wanting to die  · Neglect of personal appearance   · Rebelliousness- reckless behavior  · Withdrawal from people/activities they love  · Confusion- inability to concentrate     If you or a loved one observes any of these behaviors or has concerns about self-harm, here's what you can do:  · Talk about it- your feelings and reasons for harming yourself  · Remove any means that you might use to hurt yourself (examples: pills, rope, extension cords, firearm)  · Get professional help from the community (Mental Health, Substance Abuse, psychological counseling)  · Do not be alone:Call your Safe Contact- someone whom you trust who will be there for  you.  · Call your local CRISIS HOTLINE 510-6396 or 054-695-0559  · Call your local Children's Mobile Crisis Response Team [place] (800) 681-6983 or www.LGL/LatinMedios  · Call the toll free National Suicide Prevention Hotlines   · National Suicide Prevention Lifeline 082-658-RPFJ (0977)  · National [employer] Line Network 800-SUICIDE (996-2027)

## 2018-02-05 NOTE — DISCHARGE PLANNING
Pt requesting resources for home-maker services and states he needs ride home. Is w/c bound and uses standard w/c. SW to provide pt with Home-maker resource list and help with arranging for ride. Vanco RX has been faxed to [facility] at .

## 2018-02-05 NOTE — DISCHARGE SUMMARY
CHIEF COMPLAINT ON ADMISSION  Chief Complaint   Patient presents with   • Weight Loss     Pt states he has lost 16lbs since august 2017 without trying   • Diarrhea     x1wk, treated in Dec for c-diff   • Sent by MD     R/o dehydration       CODE STATUS  Full Code    HPI & HOSPITAL COURSE  This is a 77 y.o. male admitted 1/23/2018 with recurrent cdiff and ostomy from abdominal abscess not related to Cdif. Presumed UTI, treated initially with abx.  He had a prolonged hospitalization in July 2017. He had developed a pelvic abscess as well as bowel perforation. On 7/25/2017 he underwent exploratory laparotomy, abdominal washout sigmoid colon resection colostomy placement and appendectomy. .he had a postop abscess. Peritoneal fluid on 7/25 +VRE & Candida albicans.  Repeat IR drainage on 8/5 with Dr. Miranda.  Final repeat CT abd pelvis on 8/17 showed pelvic abscess down to 1.0 x 1.7 cm.  Pt was being treated with PO Zyvox, which was stopped and changed to IV Vanco d/t leukopenia and thrombocytopenia.  Pt discharged to [facility] IV Vancomycin through 8/25/17. He was readmitted on 12/18/2017 with stool from his colostomy. He was diagnosed with C. difficile colitis. He was discharged on 12/26/2017. He was readmitted on 1/24/2018 with liquid stools, severely dehydrated. On admission he started to the cecum first 23,000 and creatinine was 1.27. The C. difficile was again positive. In view of that infectious disease consult has been called.  He had a prolonged course since his ostomy output was so high, he required IVFs to maintain his blood pressure and elecrolytes, i.e. Potassium despite eating and drinking large amounts of liquids.  Eventually on 2/4 he had more formed stool, after start of iron for iron deficiency anemia.  He was transfused 1 unit PRBCs on 2/4 for slow drop in Hg from 7 to 6.8.  Post transfusion his hg remains in the 8s over 24 hours.  He has no obvious bleeding source, his ostomy stool was  greenish in color.  He was started on boost tid for cachexia, transitioned pain medications to neurontin.  PT/OT cleared patient for HH PT/OT/RN colostomy care.  He uses a wheelchair for past year for prior back surgery pain, but was able to ambulate with a FWW with PT in room.  He was provided a compounding pharmacy to obtain his po vancomycin course end date 2/10 with taper starting on 2/10 for second episode of C dificil per ID consult.    The patient met 2-midnight criteria for an inpatient stay at the time of discharge.    Therefore, he is discharged in good and stable condition with close outpatient follow-up.    SPECIFIC OUTPATIENT FOLLOW-UP  Follow up with general surgeon as scheduled for colostomy reversal once completed with total vancomycin tapering dose.     DISCHARGE PROBLEM LIST  Principal Problem:    Colitis due to Clostridium difficile POA: Yes  Active Problems:    Pelvic abscess in male (CMS-[facility]) POA: Yes    Type 2 diabetes mellitus (CMS-HCC) POA: Yes    BPH (benign prostatic hypertrophy) POA: Yes    Normocytic anemia POA: Yes    Bipolar 1 disorder, depressed (CMS-HCC) (Chronic) POA: Yes    Neuropathy (CMS-HCC) POA: Yes    Debility POA: Yes    Iron deficiency anemia due to chronic blood loss POA: Yes    Hypokalemia, gastrointestinal losses POA: Yes    Cachexia (CMS-[facility]) POA: Yes  Resolved Problems:    Acute cystitis POA: Yes    Dehydration POA: Yes      FOLLOW UP  No future appointments.  [facility]  [address]  [place] [facility] [address]  [phone]        Jorge Rodriguez M.D.  [address]  [facility] [address]  [phone]    Call in 1 week  To determine appropriateness of colostomy take down surgery    Sixto Braun D.O.  [address]  [address]  [facility] [address]  [phone]      [facility]  LEFT A MESSAGE FOR YOUR PHYSICAN REGARDING NEED FOR A FOLLOW UP APPOINTMENT. PLEASE CALL THEIR OFFICE DIRECTLY IF YOU DO NOT HEAR FROM THEM WITH IN 2 DAYS. THANK YOU      MEDICATIONS  ON DISCHARGE   Marcelo Colon   Home Medication Instructions FLORENCIO:[id number]    Printed on:02/05/18 1242   Medication Information                      alfuzosin (UROXATRAL) 10 MG SR tablet  Take 10 mg by mouth every day.             ascorbic acid (VITAMIN C) 500 MG tablet  Take 1 Tab by mouth 2 Times a Day.             ferrous sulfate 325 (65 Fe) MG tablet  Take 1 Tab by mouth 3 times a day, with meals.             gabapentin (NEURONTIN) 300 MG Cap  Take 300 mg by mouth 3 times a day.             lamotrigine (LAMICTAL) 200 MG tablet  Take 200 mg by mouth every day.             potassium chloride SA (KDUR) 20 MEQ Tab CR  Take 20 mEq by mouth every day.             tramadol (ULTRAM) 50 MG TABS  Take 100 mg by mouth every four hours as needed for Mild Pain.             Vancomycin HCl (VANCOMYCIN 50 MG/ML) 50 mg/mL Solution  Take 2.5 mL by mouth every 6 hours for 5 days.             Vancomycin HCl (VANCOMYCIN 50 MG/ML) 50 mg/mL Solution  Tapering instructions:   125 mg (or 2.5 mL) orally four times daily for 10 days; then   125 mg (or 2.5 mL) orally twice daily for 7 days; then   125 mg (or 2.5 mL) orally once daily for 7 days; then   125 mg (or 2.5 mL) orally every other day for 7 days; then   125 mg (or 2.5 mL) orally every 3 days                 DIET  Orders Placed This Encounter   Procedures   • Diet Order     Standing Status:   Standing     Number of Occurrences:   1     Order Specific Question:   Diet:     Answer:   Regular [1]     Comments:   banana all meals       ACTIVITY  As tolerated.  Weight bearing as tolerated      CONSULTATIONS  1/28:  Dr. Herbert/ID.    PROCEDURES  none    LABORATORY  Lab Results   Component Value Date/Time    SODIUM 142 02/05/2018 01:46 AM    POTASSIUM 3.7 02/05/2018 01:46 AM    CHLORIDE 108 02/05/2018 01:46 AM    CO2 29 02/05/2018 01:46 AM    GLUCOSE 103 (H) 02/05/2018 01:46 AM    BUN 9 02/05/2018 01:46 AM    CREATININE 0.63 02/05/2018 01:46 AM        Lab Results   Component Value  Date/Time    WBC 5.3 02/05/2018 01:46 AM    HEMOGLOBIN 8.6 (L) 02/05/2018 01:46 AM    HEMATOCRIT 28.9 (L) 02/05/2018 01:46 AM    PLATELETCT 220 02/05/2018 01:46 AM        Total time of the discharge process exceeds 45 minutes

## 2018-02-05 NOTE — DISCHARGE PLANNING
SW provided cab vouchers (2) one to [facility]; and one to pt's home. ANA also provided attendant care list to pt. SW faxed referral for [facility] to [facility].

## 2018-02-05 NOTE — DISCHARGE PLANNING
ANA called [facility]; pt's Vanco was approved by insurance, copay is $87.20. ANA notified bedside RN.

## 2018-02-06 NOTE — PROGRESS NOTES
Hourly rounding, call bell within reach. Patient educated about plan of care, pain management, call bell use, and mobility. Patient demonstrates good mobility getting up to his wheelchair independently. Ostomy in place, bag replaced today. Patient to resume home healthcare. Patient antibiotic available at pharmacy. Ready for d/c per Md. Patient continues to be frustrated with lack of notice and difficulties with insurance but is ready to go home. Patient was able to find a ride this afternoon with friend. IV removed. Discharge instructions reviewed with patient, questions answered, verbalized understanding. Discharged home via wheelchair to friends car and assisted with belongings.

## 2018-03-19 ENCOUNTER — HOSPITAL ENCOUNTER (OUTPATIENT)
Dept: RADIOLOGY | Facility: MEDICAL CENTER | Age: 78
End: 2018-03-19
Attending: SURGERY
Payer: MEDICARE

## 2018-03-19 DIAGNOSIS — Z43.3 ATTENTION TO COLOSTOMY (HCC): ICD-10-CM

## 2018-03-19 PROCEDURE — 74270 X-RAY XM COLON 1CNTRST STD: CPT

## 2018-03-19 PROCEDURE — 700117 HCHG RX CONTRAST REV CODE 255: Performed by: SURGERY

## 2018-03-19 RX ADMIN — IOHEXOL 200 ML: 240 INJECTION, SOLUTION INTRATHECAL; INTRAVASCULAR; INTRAVENOUS; ORAL at 14:30

## 2018-03-21 ENCOUNTER — HOSPITAL ENCOUNTER (INPATIENT)
Facility: MEDICAL CENTER | Age: 78
LOS: 5 days | DRG: 330 | End: 2018-03-26
Attending: SURGERY | Admitting: SURGERY
Payer: MEDICARE

## 2018-03-21 DIAGNOSIS — K65.1 PELVIC ABSCESS IN MALE (HCC): Primary | ICD-10-CM

## 2018-03-21 LAB
ANION GAP SERPL CALC-SCNC: 10 MMOL/L (ref 0–11.9)
BASOPHILS # BLD AUTO: 0.2 % (ref 0–1.8)
BASOPHILS # BLD: 0.01 K/UL (ref 0–0.12)
BUN SERPL-MCNC: 10 MG/DL (ref 8–22)
CALCIUM SERPL-MCNC: 10.2 MG/DL (ref 8.5–10.5)
CHLORIDE SERPL-SCNC: 104 MMOL/L (ref 96–112)
CO2 SERPL-SCNC: 24 MMOL/L (ref 20–33)
CREAT SERPL-MCNC: 1.16 MG/DL (ref 0.5–1.4)
EKG IMPRESSION: NORMAL
EOSINOPHIL # BLD AUTO: 0.1 K/UL (ref 0–0.51)
EOSINOPHIL NFR BLD: 1.8 % (ref 0–6.9)
ERYTHROCYTE [DISTWIDTH] IN BLOOD BY AUTOMATED COUNT: 55.6 FL (ref 35.9–50)
GLUCOSE SERPL-MCNC: 121 MG/DL (ref 65–99)
HCT VFR BLD AUTO: 41.9 % (ref 42–52)
HGB BLD-MCNC: 13 G/DL (ref 14–18)
IMM GRANULOCYTES # BLD AUTO: 0.01 K/UL (ref 0–0.11)
IMM GRANULOCYTES NFR BLD AUTO: 0.2 % (ref 0–0.9)
LYMPHOCYTES # BLD AUTO: 2.53 K/UL (ref 1–4.8)
LYMPHOCYTES NFR BLD: 45 % (ref 22–41)
MCH RBC QN AUTO: 27.6 PG (ref 27–33)
MCHC RBC AUTO-ENTMCNC: 31 G/DL (ref 33.7–35.3)
MCV RBC AUTO: 89 FL (ref 81.4–97.8)
MONOCYTES # BLD AUTO: 0.57 K/UL (ref 0–0.85)
MONOCYTES NFR BLD AUTO: 10.1 % (ref 0–13.4)
NEUTROPHILS # BLD AUTO: 2.4 K/UL (ref 1.82–7.42)
NEUTROPHILS NFR BLD: 42.7 % (ref 44–72)
NRBC # BLD AUTO: 0 K/UL
NRBC BLD-RTO: 0 /100 WBC
PLATELET # BLD AUTO: 177 K/UL (ref 164–446)
PMV BLD AUTO: 8.9 FL (ref 9–12.9)
POTASSIUM SERPL-SCNC: 3.5 MMOL/L (ref 3.6–5.5)
RBC # BLD AUTO: 4.71 M/UL (ref 4.7–6.1)
SODIUM SERPL-SCNC: 138 MMOL/L (ref 135–145)
WBC # BLD AUTO: 5.6 K/UL (ref 4.8–10.8)

## 2018-03-21 PROCEDURE — 160002 HCHG RECOVERY MINUTES (STAT): Performed by: SURGERY

## 2018-03-21 PROCEDURE — 93010 ELECTROCARDIOGRAM REPORT: CPT | Performed by: INTERNAL MEDICINE

## 2018-03-21 PROCEDURE — 700111 HCHG RX REV CODE 636 W/ 250 OVERRIDE (IP): Performed by: SURGERY

## 2018-03-21 PROCEDURE — 501445 HCHG STAPLER, SKIN DISP: Performed by: SURGERY

## 2018-03-21 PROCEDURE — 93005 ELECTROCARDIOGRAM TRACING: CPT | Performed by: SURGERY

## 2018-03-21 PROCEDURE — 160009 HCHG ANES TIME/MIN: Performed by: SURGERY

## 2018-03-21 PROCEDURE — 160035 HCHG PACU - 1ST 60 MINS PHASE I: Performed by: SURGERY

## 2018-03-21 PROCEDURE — 501433 HCHG STAPLER, GIA MULTIFIRE 60/80: Performed by: SURGERY

## 2018-03-21 PROCEDURE — 160036 HCHG PACU - EA ADDL 30 MINS PHASE I: Performed by: SURGERY

## 2018-03-21 PROCEDURE — 700101 HCHG RX REV CODE 250: Performed by: SURGERY

## 2018-03-21 PROCEDURE — 700102 HCHG RX REV CODE 250 W/ 637 OVERRIDE(OP)

## 2018-03-21 PROCEDURE — 160041 HCHG SURGERY MINUTES - EA ADDL 1 MIN LEVEL 4: Performed by: SURGERY

## 2018-03-21 PROCEDURE — 500424 HCHG DRESSING, AIRSTRIP: Performed by: SURGERY

## 2018-03-21 PROCEDURE — 501838 HCHG SUTURE GENERAL: Performed by: SURGERY

## 2018-03-21 PROCEDURE — A9270 NON-COVERED ITEM OR SERVICE: HCPCS

## 2018-03-21 PROCEDURE — 85025 COMPLETE CBC W/AUTO DIFF WBC: CPT

## 2018-03-21 PROCEDURE — 160029 HCHG SURGERY MINUTES - 1ST 30 MINS LEVEL 4: Performed by: SURGERY

## 2018-03-21 PROCEDURE — 80048 BASIC METABOLIC PNL TOTAL CA: CPT

## 2018-03-21 PROCEDURE — 700101 HCHG RX REV CODE 250

## 2018-03-21 PROCEDURE — 700111 HCHG RX REV CODE 636 W/ 250 OVERRIDE (IP)

## 2018-03-21 PROCEDURE — 88304 TISSUE EXAM BY PATHOLOGIST: CPT | Mod: 59

## 2018-03-21 PROCEDURE — 501452 HCHG STAPLES, GIA MULTIFIRE 60/80: Performed by: SURGERY

## 2018-03-21 PROCEDURE — 0DBN0ZZ EXCISION OF SIGMOID COLON, OPEN APPROACH: ICD-10-PCS | Performed by: SURGERY

## 2018-03-21 PROCEDURE — 770006 HCHG ROOM/CARE - MED/SURG/GYN SEMI*

## 2018-03-21 PROCEDURE — 501428 HCHG STAPLER, CURVED: Performed by: SURGERY

## 2018-03-21 PROCEDURE — 0DNW0ZZ RELEASE PERITONEUM, OPEN APPROACH: ICD-10-PCS | Performed by: SURGERY

## 2018-03-21 PROCEDURE — 500122 HCHG BOVIE, BLADE: Performed by: SURGERY

## 2018-03-21 PROCEDURE — 160048 HCHG OR STATISTICAL LEVEL 1-5: Performed by: SURGERY

## 2018-03-21 RX ORDER — ONDANSETRON 2 MG/ML
4 INJECTION INTRAMUSCULAR; INTRAVENOUS EVERY 4 HOURS PRN
Status: DISCONTINUED | OUTPATIENT
Start: 2018-03-21 | End: 2018-03-26 | Stop reason: HOSPADM

## 2018-03-21 RX ORDER — SODIUM CHLORIDE, SODIUM LACTATE, POTASSIUM CHLORIDE, CALCIUM CHLORIDE 600; 310; 30; 20 MG/100ML; MG/100ML; MG/100ML; MG/100ML
INJECTION, SOLUTION INTRAVENOUS CONTINUOUS
Status: DISCONTINUED | OUTPATIENT
Start: 2018-03-21 | End: 2018-03-26 | Stop reason: HOSPADM

## 2018-03-21 RX ORDER — MORPHINE SULFATE 10 MG/ML
2 INJECTION, SOLUTION INTRAMUSCULAR; INTRAVENOUS ONCE
Status: ACTIVE | OUTPATIENT
Start: 2018-03-21 | End: 2018-03-22

## 2018-03-21 RX ORDER — LIDOCAINE HYDROCHLORIDE 10 MG/ML
INJECTION, SOLUTION INFILTRATION; PERINEURAL
Status: COMPLETED
Start: 2018-03-21 | End: 2018-03-21

## 2018-03-21 RX ORDER — LAMOTRIGINE 100 MG/1
100 TABLET ORAL DAILY
Status: DISCONTINUED | OUTPATIENT
Start: 2018-03-22 | End: 2018-03-26 | Stop reason: HOSPADM

## 2018-03-21 RX ORDER — OXYCODONE HYDROCHLORIDE 5 MG/1
5 TABLET ORAL EVERY 8 HOURS PRN
Status: ON HOLD | COMMUNITY
End: 2018-03-26

## 2018-03-21 RX ORDER — ACETAMINOPHEN 500 MG
TABLET ORAL
Status: DISPENSED
Start: 2018-03-21 | End: 2018-03-21

## 2018-03-21 RX ORDER — FERROUS SULFATE 325(65) MG
325 TABLET ORAL DAILY
Status: ON HOLD | COMMUNITY
End: 2018-07-18

## 2018-03-21 RX ORDER — DEXAMETHASONE SODIUM PHOSPHATE 4 MG/ML
4 INJECTION, SOLUTION INTRA-ARTICULAR; INTRALESIONAL; INTRAMUSCULAR; INTRAVENOUS; SOFT TISSUE
Status: DISCONTINUED | OUTPATIENT
Start: 2018-03-21 | End: 2018-03-26 | Stop reason: HOSPADM

## 2018-03-21 RX ORDER — SODIUM CHLORIDE AND POTASSIUM CHLORIDE 150; 450 MG/100ML; MG/100ML
INJECTION, SOLUTION INTRAVENOUS CONTINUOUS
Status: DISCONTINUED | OUTPATIENT
Start: 2018-03-21 | End: 2018-03-26 | Stop reason: HOSPADM

## 2018-03-21 RX ORDER — LIDOCAINE HYDROCHLORIDE 10 MG/ML
0.5 INJECTION, SOLUTION INFILTRATION; PERINEURAL
Status: ACTIVE | OUTPATIENT
Start: 2018-03-21 | End: 2018-03-22

## 2018-03-21 RX ORDER — OXYCODONE HCL 5 MG/5 ML
SOLUTION, ORAL ORAL
Status: COMPLETED
Start: 2018-03-21 | End: 2018-03-21

## 2018-03-21 RX ORDER — LIDOCAINE AND PRILOCAINE 25; 25 MG/G; MG/G
1 CREAM TOPICAL
Status: ACTIVE | OUTPATIENT
Start: 2018-03-21 | End: 2018-03-22

## 2018-03-21 RX ORDER — BUPIVACAINE HYDROCHLORIDE AND EPINEPHRINE 5; 5 MG/ML; UG/ML
INJECTION, SOLUTION EPIDURAL; INTRACAUDAL; PERINEURAL
Status: DISCONTINUED | OUTPATIENT
Start: 2018-03-21 | End: 2018-03-21 | Stop reason: HOSPADM

## 2018-03-21 RX ADMIN — SODIUM CHLORIDE, POTASSIUM CHLORIDE, SODIUM LACTATE AND CALCIUM CHLORIDE: 600; 310; 30; 20 INJECTION, SOLUTION INTRAVENOUS at 07:36

## 2018-03-21 RX ADMIN — LIDOCAINE HYDROCHLORIDE 0.5 ML: 10 INJECTION, SOLUTION INFILTRATION; PERINEURAL at 07:36

## 2018-03-21 RX ADMIN — FENTANYL CITRATE 25 MCG: 50 INJECTION, SOLUTION INTRAMUSCULAR; INTRAVENOUS at 13:45

## 2018-03-21 RX ADMIN — MORPHINE SULFATE: 50 INJECTION, SOLUTION, CONCENTRATE INTRAVENOUS at 14:39

## 2018-03-21 RX ADMIN — FAMOTIDINE 20 MG: 10 INJECTION INTRAVENOUS at 17:04

## 2018-03-21 RX ADMIN — FENTANYL CITRATE 25 MCG: 50 INJECTION, SOLUTION INTRAMUSCULAR; INTRAVENOUS at 14:28

## 2018-03-21 RX ADMIN — POTASSIUM CHLORIDE AND SODIUM CHLORIDE: 450; 150 INJECTION, SOLUTION INTRAVENOUS at 18:35

## 2018-03-21 RX ADMIN — FENTANYL CITRATE 25 MCG: 50 INJECTION, SOLUTION INTRAMUSCULAR; INTRAVENOUS at 14:00

## 2018-03-21 RX ADMIN — OXYCODONE HYDROCHLORIDE 5 MG: 5 SOLUTION ORAL at 13:41

## 2018-03-21 ASSESSMENT — PAIN SCALES - GENERAL
PAINLEVEL_OUTOF10: 4
PAINLEVEL_OUTOF10: 5
PAINLEVEL_OUTOF10: 0
PAINLEVEL_OUTOF10: 6
PAINLEVEL_OUTOF10: 6
PAINLEVEL_OUTOF10: 4
PAINLEVEL_OUTOF10: 4
PAINLEVEL_OUTOF10: 5

## 2018-03-21 ASSESSMENT — PATIENT HEALTH QUESTIONNAIRE - PHQ9
2. FEELING DOWN, DEPRESSED, IRRITABLE, OR HOPELESS: NOT AT ALL
SUM OF ALL RESPONSES TO PHQ9 QUESTIONS 1 AND 2: 0
2. FEELING DOWN, DEPRESSED, IRRITABLE, OR HOPELESS: NOT AT ALL
1. LITTLE INTEREST OR PLEASURE IN DOING THINGS: NOT AT ALL
SUM OF ALL RESPONSES TO PHQ9 QUESTIONS 1 AND 2: 0
1. LITTLE INTEREST OR PLEASURE IN DOING THINGS: NOT AT ALL
1. LITTLE INTEREST OR PLEASURE IN DOING THINGS: NOT AT ALL
SUM OF ALL RESPONSES TO PHQ9 QUESTIONS 1 AND 2: 0
2. FEELING DOWN, DEPRESSED, IRRITABLE, OR HOPELESS: NOT AT ALL

## 2018-03-21 ASSESSMENT — LIFESTYLE VARIABLES
ALCOHOL_USE: NO
EVER_SMOKED: NEVER

## 2018-03-21 NOTE — PROGRESS NOTES
The Medication Reconciliation process has been PARTIALLY completed by interviewing the patient who reports to me that he finished the vancomycin well over 3 weeks ago which is when he was discharged from the hospital, I will call his pharmacy for clarification.  The pharmacy did fill the vancomycin but the patient states that he has not been taking it, unsure as to why.     Allergies have been reviewed  Antibiotic use in 30 days - Two pre-op ABX unknown, Vancomycin, unknown course.     Home Pharmacy:  [facility]

## 2018-03-21 NOTE — OP REPORT
DATE OF SERVICE:  03/21/2018    PREOPERATIVE DIAGNOSIS:  Colostomy status.    POSTOPERATIVE DIAGNOSES:  Colostomy status.    INDICATION FOR SURGERY:  This is a 77-year-old male who underwent an   exploratory laparotomy, abdominal washout, sigmoid colon resection and   colostomy placement as well as an incidental appendectomy on 07/25/2017 for   complicated diverticulitis.  He has had a somewhat complicated postoperative   course and required back surgery. He did suffer from 2 episodes of Clostridium   difficile colitis.  Those issues have been resolved and he recently underwent   preoperative studies to include C. diff testing, which was negative as well   as contrast studies of his Eliezer's pouch and his proximal colon.  These   showed no evidence of stricture or week and he was brought to the operating   room today for planned colostomy takedown.    PROCEDURE:  1.  Colostomy takedown.  2.  Extensive lysis of adhesions.    SURGEON:  Jorge Rodriguez MD    ASSISTANT:  Hernandez Nolan MD    ANESTHESIOLOGIST:  Destiney Downing MD    ANESTHESIA:  General endotracheal anesthesia.    FINDINGS:  Extensive adhesions throughout the abdominal cavity, especially in   the pelvis and around the Eliezer's pouch.  Long Eliezer's pouch.    PROCEDURE NARRATIVE:  Signed informed consent was on the chart at the time of   the procedure.  The patient was brought to the operating room and was placed   in the supine position.  General endotracheal anesthesia was induced.  The   patient's position was modified to a low lithotomy position.  A Ruff catheter   was placed.  A time-out was performed.  The colostomy was sutured shut by me   with a 2-0 silk suture. The patient was administered 2 grams of Rocephin IV   and the skin over the abdomen and pelvis and perianal region was prepped and   draped in a sterile fashion.  Using a 10-blade scalpel, the midline incision   was excised.  The underlying soft tissue was dissected through  using careful   Bovie cautery down to the level of fascia.  The fascia was divided for a short   distance using careful Bovie cautery and then for the length of the skin   incision, which was approximately 30 cm in length.  Using careful Bovie   cautery over a surgeon's fingers, there were noted to be adhesions of small   bowel up against the anterior abdominal wall and these were taken down sharply   prior to opening the midline incision.  This was performed in a stepwise   fashion.  Eventually, we were able to get into the peritoneal space.  The   fascia on the patient's right was grasped with 2 separate Kochers and the   adhesions to the anterior abdominal wall were taken down using mostly sharp   dissection and occasional judicious Bovie cauterization.  Once the right side   of the abdominal wall was cleared, this was repeated on the patient's left,   being careful to leave the colostomy in place.  The adhesions of small bowel   to the anterior pelvic abdominal wall were also taken down again using mostly   sharp dissection.  Once we were able to make our way back to the colostomy, we   then addressed taking down the colostomy.  Using a 10-blade scalpel, an   elliptical incision was made around the colostomy on the anterior abdominal   wall. The underlying soft tissue was dissected through using careful Bovie   cautery down to the capsule around the colostomy.  This was taken down by   inserting a hemostat under the capsule and then taking down the capsule   circumferentially.  Using the Bovie cautery, we were then able to take down   the adhesions to the colostomy from the surrounding soft tissue using almost   exclusively a sharp dissection until we reached the fascia.  The fascial   connections to the colostomy were taken down sharply and we were able to   reduce the colostomy into the peritoneal space.  Additional adhesions to the   anterior abdominal wall to the distal descending colon were taken down again    using the mostly sharp dissection.  The small bowel was noted to be adhesed   into the abdominal cavity and obstructing our planned colostomy takedown.  The   adhesions between the small bowel and the posterior abdominal wall were taken   down.  This took approximately 45 minutes to complete and was addressed using   almost exclusively sharp dissection at 2 different areas in  the small bowel.    There did occur deserosalization injuries and these were both recognized and   addressed immediately using 3-0 Vicryl interrupted sutures to reapproximate   the serosa over the defect in the serosa. Eventually, I was able to dissect   the small bowel free of itself and the posterior abdominal wall.  The small   bowel was run and was noted to be freed from ligament of Treitz to the   ileocecal junction.  The small bowel was then packed into the upper abdomen   using moist laps and the pelvis was explored.  The Eliezer's pouch was noted   to be very densely adhered into the posterior pelvis and this dissection was   also somewhat tedious.  This did require sharp dissection and blunt   dissection, but eventually we were able to dissect free the staple line at the   proximal end of the Eliezer's pouch.  We were irrigated by the Prolene   sutures, which had been placed at the corners of the staple line.  We did   encounter 1 small arterial bleeding on the posterior surface of the Eliezer's   pouch, which was addressed with a 3-0 Vicryl interrupted suture. Eliezer's   pouch was then released from its side wall and the posterior adhesions.  At   that point, we then addressed the descending colon.  This was noted to stretch   almost to the Eliezer's pouch without any tension, but did require   additional takedown of the white line of Toldt.  This was accomplished using   gentle tension and careful Bovie cautery, taking down the white line of Toldt   resulted in excellent length of descending colon and it was noted that this    end of the descending colon even at a place where we would be stapling the   colon to get rid of the colostomy laid easily against the Eliezer's pouch in   the pelvis.  The section of the colon that was passed through the anterior   abdominal wall was then freed of the mesentery and amputated using a SUSAN   stapler.  This was sent off the table as a colostomy.  The staple line was   then grasped and the descending colon was obstructed with a bowel clamp.  The   colon was excluded from the abdominal cavity with laps and the staple line was   removed.  The descending colon was incised and this was noted to be   appropriate for a 29 EEA stapler.  The anvil was placed into the colon and   secured in place with a pursestring Prolene suture.  Again, the descending   colon was noted to have excellent length and went into the pelvis without any   tension.  Dr. Nolan moved to the perianal region and attempted to place a   sizer into the Eliezer's pouch.  There it was noted that further dissection   would be needed in order to be able to bring the EEA anastomotic stapler   through Eliezer's pouch.  This dissection was continued once Dr. Nolan had   scrubbed back into the case and we were able to free up the adhesions to the   Eliezer's pouch laterally and anteriorly.  I then moved down to the perianal   region and placed a dilator into the Eliezer's pouch.  This did pass to the   end of the Eliezer's pouch, but did result in stretching and possible injury   to the proximal Eliezer's pouch.  The Eliezer's pouch was noted to be quite   long and this area of possible injury was removed with a contour stapler by   Dr. Nolan.  This was also sent to pathology.  The remaining Eliezer's pouch   appeared healthy with good blood supply.  I passed EEA stapler into the   Eliezer's pouch through the anus and under Dr. Nolan's direction and advance   the spike through the wall of the rectum just anterior to the staple line.     The anvil was then attached to the spike.  Dr. Nolan checked the colon to   make sure no twists were in the colon, which there were not.  We then   tightened and fired the EEA anastomotic stapler and then removed it.    Examination of the EEA stapler revealed 2 complete intact anastomotic donuts,   which were also sent to pathology.  Dr. Nolan then occluded the colon above   the anastomosis and using a sigmoidoscopy, I infused air into the colon at the   anastomosis.  While the   anastomosis was underwater in the pelvis, no   evidence of an air leak was appreciated.  The bowel clamp was removed and the   air was evacuated out of the colon.  I then scrubbed back in.  The peritoneal   cavity was irrigated with 2 liters of warm normal saline, the irrigant   returned clear. The remaining omentum was brought down over the gut.  The   fascia on the patient's left was lifted and the posterior fascia at the site   of the colostomy was closed with 3 separately placed 0 Ethibond sutures.  The   anterior fascia was then closed with #5 separately placed 0 Ethibond sutures,   10 mL of 0.5% Marcaine with epinephrine was infused in the muscle layers   around the colostomy closure.  The lap, needle, and instrument counts were   noted to be correct.  The fascia was then closed with a series of #1 Vicryl   interrupted sutures.  The subcutaneous tissue was irrigated and dried both at   the midline incision and at the colostomy closure site, and the skin at both   incisions was closed with series of skin staples.  The midline incision was   dressed with an island dressing, and the left lower quadrant incision was   dressed with a 4x4 held in place with tape.    FLUIDS:  1050 mL of crystalloid.    ESTIMATED BLOOD LOSS:  200 mL.    URINE OUTPUT:  160 mL.    SPECIMENS:  1.  Colostomy.  2.  Anastomotic donuts.  3.  Proximal Eliezer's pouch.    COMPLICATIONS:  None.    DISPOSITION:  Patient was in stable condition to postanesthesia  care unit.       ____________________________________     MD GILES Russo / CITLALY    DD:  03/21/2018 12:40:02  DT:  03/21/2018 13:22:33    D#:  [id number]  Job#:  [id number]

## 2018-03-21 NOTE — OR NURSING
Pt to recovery, sleeping, resps unlabored. Slow to wake from anesthesia- per MD this is normal for pt. Once awake airway dc'd and resps unlabored. Pt remains quite sleepy but is otherwise appropriate. Reports moderate pain in abdominal surgical site. Medicated with low-dose analgesics secondary to sedation. Denies nausea, tolerating sips of clears. Dressing to abdomen CDI. VSS

## 2018-03-21 NOTE — OR SURGEON
Immediate Post OP Note    PreOp Diagnosis: Colostomy status    PostOp Diagnosis: Colostomy status    Procedure(s):  1.  COLOSTOMY TAKEDOWN - Wound Class: Clean Contaminated  2.  Extensive lysis of adhesions    Surgeon(s):  MONIQUE Pena M.D.    Anesthesiologist/Type of Anesthesia:  Anesthesiologist: Destiney Downing M.D./General    Surgical Staff:  Circulator: Henok Dunn R.N.  Relief Circulator: Ethan Watson  Scrub Person: Clifton Pearson R.N.  Count Leon: Deisy Medina R.N.    Specimens removed if any:  1.  Colostomy  2.  Anastomotic doughnuts    Estimated Blood Loss: 200 ml    Findings: Extensive adhesions throughout abdominal cavity, especially in pelvis around Garcias's pouch.    Complications: None        3/21/2018 12:23 PM Jorge Rodriguez M.D.

## 2018-03-21 NOTE — OR NURSING
At 0750, enema given via colostomy per Dr. Rodriguez's pre op orders.  Pt tolerated well.  Clear yellow returns with flecks of stool noted.

## 2018-03-22 LAB
ALBUMIN SERPL BCP-MCNC: 3.2 G/DL (ref 3.2–4.9)
ALBUMIN/GLOB SERPL: 1.3 G/DL
ALP SERPL-CCNC: 66 U/L (ref 30–99)
ALT SERPL-CCNC: 6 U/L (ref 2–50)
ANION GAP SERPL CALC-SCNC: 8 MMOL/L (ref 0–11.9)
AST SERPL-CCNC: 10 U/L (ref 12–45)
BASOPHILS # BLD AUTO: 0 % (ref 0–1.8)
BASOPHILS # BLD: 0 K/UL (ref 0–0.12)
BILIRUB SERPL-MCNC: 0.6 MG/DL (ref 0.1–1.5)
BUN SERPL-MCNC: 13 MG/DL (ref 8–22)
CALCIUM SERPL-MCNC: 9 MG/DL (ref 8.5–10.5)
CHLORIDE SERPL-SCNC: 106 MMOL/L (ref 96–112)
CO2 SERPL-SCNC: 23 MMOL/L (ref 20–33)
CREAT SERPL-MCNC: 0.9 MG/DL (ref 0.5–1.4)
EOSINOPHIL # BLD AUTO: 0 K/UL (ref 0–0.51)
EOSINOPHIL NFR BLD: 0 % (ref 0–6.9)
ERYTHROCYTE [DISTWIDTH] IN BLOOD BY AUTOMATED COUNT: 56.1 FL (ref 35.9–50)
GLOBULIN SER CALC-MCNC: 2.5 G/DL (ref 1.9–3.5)
GLUCOSE BLD-MCNC: 120 MG/DL (ref 65–99)
GLUCOSE BLD-MCNC: 129 MG/DL (ref 65–99)
GLUCOSE SERPL-MCNC: 173 MG/DL (ref 65–99)
HCT VFR BLD AUTO: 33.7 % (ref 42–52)
HGB BLD-MCNC: 10.6 G/DL (ref 14–18)
IMM GRANULOCYTES # BLD AUTO: 0.03 K/UL (ref 0–0.11)
IMM GRANULOCYTES NFR BLD AUTO: 0.4 % (ref 0–0.9)
LYMPHOCYTES # BLD AUTO: 0.57 K/UL (ref 1–4.8)
LYMPHOCYTES NFR BLD: 7.7 % (ref 22–41)
MCH RBC QN AUTO: 28.3 PG (ref 27–33)
MCHC RBC AUTO-ENTMCNC: 31 G/DL (ref 33.7–35.3)
MCV RBC AUTO: 91 FL (ref 81.4–97.8)
MONOCYTES # BLD AUTO: 0.42 K/UL (ref 0–0.85)
MONOCYTES NFR BLD AUTO: 5.7 % (ref 0–13.4)
NEUTROPHILS # BLD AUTO: 6.35 K/UL (ref 1.82–7.42)
NEUTROPHILS NFR BLD: 86.2 % (ref 44–72)
NRBC # BLD AUTO: 0 K/UL
NRBC BLD-RTO: 0 /100 WBC
PLATELET # BLD AUTO: 140 K/UL (ref 164–446)
PMV BLD AUTO: 9.6 FL (ref 9–12.9)
POTASSIUM SERPL-SCNC: 4.8 MMOL/L (ref 3.6–5.5)
PROT SERPL-MCNC: 5.7 G/DL (ref 6–8.2)
RBC # BLD AUTO: 3.68 M/UL (ref 4.7–6.1)
SODIUM SERPL-SCNC: 137 MMOL/L (ref 135–145)
WBC # BLD AUTO: 7.4 K/UL (ref 4.8–10.8)

## 2018-03-22 PROCEDURE — 770006 HCHG ROOM/CARE - MED/SURG/GYN SEMI*

## 2018-03-22 PROCEDURE — 80053 COMPREHEN METABOLIC PANEL: CPT

## 2018-03-22 PROCEDURE — A9270 NON-COVERED ITEM OR SERVICE: HCPCS | Performed by: SURGERY

## 2018-03-22 PROCEDURE — 700101 HCHG RX REV CODE 250: Performed by: SURGERY

## 2018-03-22 PROCEDURE — 700102 HCHG RX REV CODE 250 W/ 637 OVERRIDE(OP): Performed by: SURGERY

## 2018-03-22 PROCEDURE — 85025 COMPLETE CBC W/AUTO DIFF WBC: CPT

## 2018-03-22 PROCEDURE — 700105 HCHG RX REV CODE 258

## 2018-03-22 PROCEDURE — 36415 COLL VENOUS BLD VENIPUNCTURE: CPT

## 2018-03-22 PROCEDURE — 82962 GLUCOSE BLOOD TEST: CPT | Mod: 91

## 2018-03-22 PROCEDURE — 700111 HCHG RX REV CODE 636 W/ 250 OVERRIDE (IP): Performed by: SURGERY

## 2018-03-22 RX ORDER — SODIUM CHLORIDE 9 MG/ML
INJECTION, SOLUTION INTRAVENOUS
Status: COMPLETED
Start: 2018-03-22 | End: 2018-03-22

## 2018-03-22 RX ADMIN — POTASSIUM CHLORIDE AND SODIUM CHLORIDE: 450; 150 INJECTION, SOLUTION INTRAVENOUS at 03:32

## 2018-03-22 RX ADMIN — LAMOTRIGINE 100 MG: 100 TABLET ORAL at 08:57

## 2018-03-22 RX ADMIN — SODIUM CHLORIDE: 9 INJECTION, SOLUTION INTRAVENOUS at 17:45

## 2018-03-22 RX ADMIN — FAMOTIDINE 20 MG: 10 INJECTION INTRAVENOUS at 08:57

## 2018-03-22 ASSESSMENT — ENCOUNTER SYMPTOMS
VOMITING: 0
CHILLS: 0
FEVER: 0
ABDOMINAL PAIN: 1
NAUSEA: 0
SHORTNESS OF BREATH: 0

## 2018-03-22 ASSESSMENT — PAIN SCALES - GENERAL
PAINLEVEL_OUTOF10: 5
PAINLEVEL_OUTOF10: 3

## 2018-03-22 NOTE — CARE PLAN
Problem: Pain Management  Goal: Pain level will decrease to patient's comfort goal  PCA in use. Patient using minimally.     Problem: Urinary Elimination:  Goal: Ability to reestablish a normal urinary elimination pattern will improve  Ruff in place until tomorrow.

## 2018-03-22 NOTE — PROGRESS NOTES
Assumed care of patient. Patient stating pain is well controlled with minimal usage of Morphine PCA. Midline incision with island dressing. Dressing is clean, dry and intact. Old ostomy site dressed. Patient with hypoactive bowel sounds. Patient refusing Lovenox shot despite education. No other needs at this time. Call light within reach.

## 2018-03-22 NOTE — PROGRESS NOTES
Surgical Progress Note    Author: Jorge Rodriguez Date & Time created: 3/22/2018   7:45 AM     Interval Events:  PAin well controlled.  Not out of bed yet.  Denies nausea.  Denies flatus.    Review of Systems   Constitutional: Negative for chills and fever.   Respiratory: Negative for shortness of breath.    Cardiovascular: Negative for chest pain.   Gastrointestinal: Positive for abdominal pain. Negative for nausea and vomiting.     Hemodynamics:  Temp (24hrs), Av.3 °C (97.3 °F), Min:36 °C (96.8 °F), Max:36.4 °C (97.6 °F)  Temperature: 36.4 °C (97.6 °F)  Pulse  Av.8  Min: 59  Max: 90Heart Rate (Monitored): 70  Blood Pressure : 104/53, NIBP: (!) 99/45     Respiratory:    Respiration: 17, Pulse Oximetry: 95 %           Neuro:  GCS = 15       Fluids:    Intake/Output Summary (Last 24 hours) at 18 0745  Last data filed at 18 0631   Gross per 24 hour   Intake           2602.1 ml   Output              760 ml   Net           1842.1 ml     Weight: 58.1 kg (128 lb 1.4 oz)  Current Diet Order   Procedures   • DIET NPO     Physical Exam   Constitutional: He is oriented to person, place, and time. He appears well-developed and well-nourished. No distress.   HENT:   Head: Normocephalic.   Eyes: Pupils are equal, round, and reactive to light. No scleral icterus.   Cardiovascular: Normal rate and regular rhythm.    Pulmonary/Chest: Effort normal. No respiratory distress.   Abdominal: Soft. He exhibits no distension. There is tenderness (atincisions.). There is no rebound and no guarding.   Bowel sounds absent.  Dressings clean and dry.   Genitourinary:   Genitourinary Comments: 400 ml recorded urine output in last 12 hours, urine clear yellow.   Neurological: He is alert and oriented to person, place, and time.   Skin: Skin is warm and dry.   Psychiatric: He has a normal mood and affect. His behavior is normal. Judgment and thought content normal.     Labs:  Recent Results (from the past 24 hour(s))   CBC  WITH DIFFERENTIAL    Collection Time: 18  7:46 AM   Result Value Ref Range    WBC 5.6 4.8 - 10.8 K/uL    RBC 4.71 4.70 - 6.10 M/uL    Hemoglobin 13.0 (L) 14.0 - 18.0 g/dL    Hematocrit 41.9 (L) 42.0 - 52.0 %    MCV 89.0 81.4 - 97.8 fL    MCH 27.6 27.0 - 33.0 pg    MCHC 31.0 (L) 33.7 - 35.3 g/dL    RDW 55.6 (H) 35.9 - 50.0 fL    Platelet Count 177 164 - 446 K/uL    MPV 8.9 (L) 9.0 - 12.9 fL    Neutrophils-Polys 42.70 (L) 44.00 - 72.00 %    Lymphocytes 45.00 (H) 22.00 - 41.00 %    Monocytes 10.10 0.00 - 13.40 %    Eosinophils 1.80 0.00 - 6.90 %    Basophils 0.20 0.00 - 1.80 %    Immature Granulocytes 0.20 0.00 - 0.90 %    Nucleated RBC 0.00 /100 WBC    Neutrophils (Absolute) 2.40 1.82 - 7.42 K/uL    Lymphs (Absolute) 2.53 1.00 - 4.80 K/uL    Monos (Absolute) 0.57 0.00 - 0.85 K/uL    Eos (Absolute) 0.10 0.00 - 0.51 K/uL    Baso (Absolute) 0.01 0.00 - 0.12 K/uL    Immature Granulocytes (abs) 0.01 0.00 - 0.11 K/uL    NRBC (Absolute) 0.00 K/uL   BASIC METABOLIC PANEL    Collection Time: 18  7:46 AM   Result Value Ref Range    Sodium 138 135 - 145 mmol/L    Potassium 3.5 (L) 3.6 - 5.5 mmol/L    Chloride 104 96 - 112 mmol/L    Co2 24 20 - 33 mmol/L    Glucose 121 (H) 65 - 99 mg/dL    Bun 10 8 - 22 mg/dL    Creatinine 1.16 0.50 - 1.40 mg/dL    Calcium 10.2 8.5 - 10.5 mg/dL    Anion Gap 10.0 0.0 - 11.9   ESTIMATED GFR    Collection Time: 18  7:46 AM   Result Value Ref Range    GFR If African American >60 >60 mL/min/1.73 m 2    GFR If Non African American >60 >60 mL/min/1.73 m 2   EKG    Collection Time: 18  7:56 AM   Result Value Ref Range    Report       [facility]    Test Date:  2018  Pt Name:    NANCIE EARL                  Department: [facility]  MRN:        [id number]                      Room:       [facility]  Gender:     Male                         Technician: [facility]  :        1940                   Requested By:EWA CARLOS  Order #:    [id number]                    Farooq MD:  Neal Swenson MD    Measurements  Intervals                                Axis  Rate:       64                           P:          17  AL:         148                          QRS:        -78  QRSD:       166                          T:          -23  QT:         476  QTc:        491    Interpretive Statements  SINUS ARRHYTHMIA, RATE  56- 77  RBBB AND LEFT ANTERIOR FASCICULAR BLOCK WITH REPOLARIZATION ABNORMALITY  Compared to ECG 07/25/2017 12:39:45  Sinus rhythm no longer present    Electronically Signed On 3- 9:04:11 PDT by Neal Swenson MD     CBC with Differential    Collection Time: 03/22/18  3:30 AM   Result Value Ref Range    WBC 7.4 4.8 - 10.8 K/uL    RBC 3.68 (L) 4.70 - 6.10 M/uL    Hemoglobin 10.6 (L) 14.0 - 18.0 g/dL    Hematocrit 33.7 (L) 42.0 - 52.0 %    MCV 91.0 81.4 - 97.8 fL    MCH 28.3 27.0 - 33.0 pg    MCHC 31.0 (L) 33.7 - 35.3 g/dL    RDW 56.1 (H) 35.9 - 50.0 fL    Platelet Count 140 (L) 164 - 446 K/uL    MPV 9.6 9.0 - 12.9 fL    Neutrophils-Polys 86.20 (H) 44.00 - 72.00 %    Lymphocytes 7.70 (L) 22.00 - 41.00 %    Monocytes 5.70 0.00 - 13.40 %    Eosinophils 0.00 0.00 - 6.90 %    Basophils 0.00 0.00 - 1.80 %    Immature Granulocytes 0.40 0.00 - 0.90 %    Nucleated RBC 0.00 /100 WBC    Neutrophils (Absolute) 6.35 1.82 - 7.42 K/uL    Lymphs (Absolute) 0.57 (L) 1.00 - 4.80 K/uL    Monos (Absolute) 0.42 0.00 - 0.85 K/uL    Eos (Absolute) 0.00 0.00 - 0.51 K/uL    Baso (Absolute) 0.00 0.00 - 0.12 K/uL    Immature Granulocytes (abs) 0.03 0.00 - 0.11 K/uL    NRBC (Absolute) 0.00 K/uL   Comp Metabolic Panel (CMP)    Collection Time: 03/22/18  3:30 AM   Result Value Ref Range    Sodium 137 135 - 145 mmol/L    Potassium 4.8 3.6 - 5.5 mmol/L    Chloride 106 96 - 112 mmol/L    Co2 23 20 - 33 mmol/L    Anion Gap 8.0 0.0 - 11.9    Glucose 173 (H) 65 - 99 mg/dL    Bun 13 8 - 22 mg/dL    Creatinine 0.90 0.50 - 1.40 mg/dL    Calcium 9.0 8.5 - 10.5 mg/dL    AST(SGOT) 10 (L) 12 - 45 U/L    ALT(SGPT) 6 2 -  50 U/L    Alkaline Phosphatase 66 30 - 99 U/L    Total Bilirubin 0.6 0.1 - 1.5 mg/dL    Albumin 3.2 3.2 - 4.9 g/dL    Total Protein 5.7 (L) 6.0 - 8.2 g/dL    Globulin 2.5 1.9 - 3.5 g/dL    A-G Ratio 1.3 g/dL   ESTIMATED GFR    Collection Time: 03/22/18  3:30 AM   Result Value Ref Range    GFR If African American >60 >60 mL/min/1.73 m 2    GFR If Non African American >60 >60 mL/min/1.73 m 2     Medical Decision Making, by Problem:  Colostomy status, POD #1 - Colostomy takedown, extensive lysis of adhesions - doing well  Bipolar disorder - well controlled.  Benign prostatic hyperplasia   Possible diabetes    Plan:  Will start insulin sliding scale today.  Advance to clear liquid diet today.  Decrease IVF rate to 80 ml/hour.  AM labs tomorrow to include BMP, CBC with dif.  Out of bed to chair for meals. Ambulate if possible.  Lovenox ordered to start this morning.  Ruff out tomorrow.    Quality Measures:  Quality-Core Measures   Reviewed items::  Labs reviewed and Medications reviewed  Ruff catheter::  One or Two Days Post Surgery (Day of Surgery being Day 0)  DVT prophylaxis pharmacological::  Enoxaparin (Lovenox)  DVT prophylaxis - mechanical:  SCDs  Ulcer Prophylaxis::  Yes      Discussed patient condition with Patient

## 2018-03-22 NOTE — PROGRESS NOTES
Pt arrived to T431. Pt is very drowsy, but awakens to voice. Head to toe assessment completed and charted. Vital signs stable, Ruff in place. 2 RN skin assessment completed. Pt does not have any open areas or redness. Pt has a midline incision that is covered with an island dressing. Dressing is cdi. Bed in low position, call light within reach. Hourly rounding in place.

## 2018-03-23 LAB
ANION GAP SERPL CALC-SCNC: 10 MMOL/L (ref 0–11.9)
BASOPHILS # BLD AUTO: 0.1 % (ref 0–1.8)
BASOPHILS # BLD: 0.01 K/UL (ref 0–0.12)
BUN SERPL-MCNC: 14 MG/DL (ref 8–22)
CALCIUM SERPL-MCNC: 9.8 MG/DL (ref 8.5–10.5)
CHLORIDE SERPL-SCNC: 105 MMOL/L (ref 96–112)
CO2 SERPL-SCNC: 22 MMOL/L (ref 20–33)
CREAT SERPL-MCNC: 0.76 MG/DL (ref 0.5–1.4)
EOSINOPHIL # BLD AUTO: 0 K/UL (ref 0–0.51)
EOSINOPHIL NFR BLD: 0 % (ref 0–6.9)
ERYTHROCYTE [DISTWIDTH] IN BLOOD BY AUTOMATED COUNT: 56.5 FL (ref 35.9–50)
GLUCOSE BLD-MCNC: 112 MG/DL (ref 65–99)
GLUCOSE BLD-MCNC: 168 MG/DL (ref 65–99)
GLUCOSE BLD-MCNC: 93 MG/DL (ref 65–99)
GLUCOSE SERPL-MCNC: 121 MG/DL (ref 65–99)
HCT VFR BLD AUTO: 32.1 % (ref 42–52)
HGB BLD-MCNC: 10 G/DL (ref 14–18)
IMM GRANULOCYTES # BLD AUTO: 0.02 K/UL (ref 0–0.11)
IMM GRANULOCYTES NFR BLD AUTO: 0.3 % (ref 0–0.9)
LYMPHOCYTES # BLD AUTO: 1 K/UL (ref 1–4.8)
LYMPHOCYTES NFR BLD: 14.2 % (ref 22–41)
MCH RBC QN AUTO: 27.9 PG (ref 27–33)
MCHC RBC AUTO-ENTMCNC: 31.2 G/DL (ref 33.7–35.3)
MCV RBC AUTO: 89.7 FL (ref 81.4–97.8)
MONOCYTES # BLD AUTO: 0.52 K/UL (ref 0–0.85)
MONOCYTES NFR BLD AUTO: 7.4 % (ref 0–13.4)
NEUTROPHILS # BLD AUTO: 5.47 K/UL (ref 1.82–7.42)
NEUTROPHILS NFR BLD: 78 % (ref 44–72)
NRBC # BLD AUTO: 0 K/UL
NRBC BLD-RTO: 0 /100 WBC
PLATELET # BLD AUTO: 136 K/UL (ref 164–446)
PMV BLD AUTO: 9.4 FL (ref 9–12.9)
POTASSIUM SERPL-SCNC: 4.2 MMOL/L (ref 3.6–5.5)
RBC # BLD AUTO: 3.58 M/UL (ref 4.7–6.1)
SODIUM SERPL-SCNC: 137 MMOL/L (ref 135–145)
WBC # BLD AUTO: 7 K/UL (ref 4.8–10.8)

## 2018-03-23 PROCEDURE — A9270 NON-COVERED ITEM OR SERVICE: HCPCS | Performed by: SURGERY

## 2018-03-23 PROCEDURE — 85025 COMPLETE CBC W/AUTO DIFF WBC: CPT

## 2018-03-23 PROCEDURE — 82962 GLUCOSE BLOOD TEST: CPT | Mod: 91

## 2018-03-23 PROCEDURE — 80048 BASIC METABOLIC PNL TOTAL CA: CPT

## 2018-03-23 PROCEDURE — 700102 HCHG RX REV CODE 250 W/ 637 OVERRIDE(OP): Performed by: SURGERY

## 2018-03-23 PROCEDURE — 770006 HCHG ROOM/CARE - MED/SURG/GYN SEMI*

## 2018-03-23 PROCEDURE — 700111 HCHG RX REV CODE 636 W/ 250 OVERRIDE (IP): Performed by: SURGERY

## 2018-03-23 PROCEDURE — 36415 COLL VENOUS BLD VENIPUNCTURE: CPT

## 2018-03-23 RX ORDER — TAMSULOSIN HYDROCHLORIDE 0.4 MG/1
0.4 CAPSULE ORAL
Status: DISCONTINUED | OUTPATIENT
Start: 2018-03-23 | End: 2018-03-26 | Stop reason: HOSPADM

## 2018-03-23 RX ORDER — GABAPENTIN 100 MG/1
100 CAPSULE ORAL 3 TIMES DAILY
Status: DISCONTINUED | OUTPATIENT
Start: 2018-03-23 | End: 2018-03-26 | Stop reason: HOSPADM

## 2018-03-23 RX ADMIN — GABAPENTIN 100 MG: 100 CAPSULE ORAL at 09:50

## 2018-03-23 RX ADMIN — FAMOTIDINE 20 MG: 10 INJECTION INTRAVENOUS at 07:56

## 2018-03-23 RX ADMIN — ENOXAPARIN SODIUM 30 MG: 100 INJECTION SUBCUTANEOUS at 15:55

## 2018-03-23 RX ADMIN — GABAPENTIN 100 MG: 100 CAPSULE ORAL at 16:40

## 2018-03-23 RX ADMIN — ENOXAPARIN SODIUM 30 MG: 100 INJECTION SUBCUTANEOUS at 21:59

## 2018-03-23 RX ADMIN — TAMSULOSIN HYDROCHLORIDE 0.4 MG: 0.4 CAPSULE ORAL at 09:50

## 2018-03-23 RX ADMIN — GABAPENTIN 100 MG: 100 CAPSULE ORAL at 21:59

## 2018-03-23 RX ADMIN — LAMOTRIGINE 100 MG: 100 TABLET ORAL at 07:56

## 2018-03-23 ASSESSMENT — COPD QUESTIONNAIRES
HAVE YOU SMOKED AT LEAST 100 CIGARETTES IN YOUR ENTIRE LIFE: YES
DURING THE PAST 4 WEEKS HOW MUCH DID YOU FEEL SHORT OF BREATH: NONE/LITTLE OF THE TIME
DO YOU EVER COUGH UP ANY MUCUS OR PHLEGM?: NO/ONLY WITH OCCASIONAL COLDS OR INFECTIONS
COPD SCREENING SCORE: 4

## 2018-03-23 ASSESSMENT — ENCOUNTER SYMPTOMS
NAUSEA: 1
ABDOMINAL PAIN: 1
VOMITING: 0

## 2018-03-23 ASSESSMENT — PAIN SCALES - GENERAL
PAINLEVEL_OUTOF10: 3
PAINLEVEL_OUTOF10: 5
PAINLEVEL_OUTOF10: 10
PAINLEVEL_OUTOF10: 7
PAINLEVEL_OUTOF10: 8
PAINLEVEL_OUTOF10: 3

## 2018-03-23 NOTE — DISCHARGE PLANNING
Transitional Care Navigator:    Per chart review patient has been to [facility] and been on service with [facility] in the past. Due to patient medical history, LACE+ of 51 and limited mobility per RN patient would like benefit from transitional care services  (SNF vs HH) following his hospitalization. Recommend referral to therapy be placed to assist with DC recommendations. TCN following as needed.

## 2018-03-23 NOTE — PROGRESS NOTES
Surgical Progress Note    Author: Jorge Rodriguez Date & Time created: 3/23/2018   11:41 AM     Interval Events:  Tolerating clear liquid diet.  Refusing Lovenox.  Refusing sliding scale insulin.  Refusing to get oput of bed.    Review of Systems   Gastrointestinal: Positive for abdominal pain and nausea. Negative for vomiting.     Hemodynamics:  Temp (24hrs), Av.7 °C (98 °F), Min:36.4 °C (97.5 °F), Max:36.9 °C (98.5 °F)  Temperature: 36.8 °C (98.3 °F)  Pulse  Av  Min: 55  Max: 90   Blood Pressure : 114/64     Respiratory:    Respiration: 18, Pulse Oximetry: 93 %           Neuro:  GCS = 15       Fluids:    Intake/Output Summary (Last 24 hours) at 18 1141  Last data filed at 18 0722   Gross per 24 hour   Intake             1245 ml   Output             2825 ml   Net            -1580 ml        Current Diet Order   Procedures   • DIET ORDER     Physical Exam   Constitutional: He is oriented to person, place, and time. He appears well-developed and well-nourished. No distress.   HENT:   Head: Normocephalic.   Eyes: Pupils are equal, round, and reactive to light. No scleral icterus.   Cardiovascular: Normal rate, regular rhythm and normal heart sounds.    Pulmonary/Chest: Effort normal and breath sounds normal. No respiratory distress.   Abdominal: Soft. He exhibits no distension. There is tenderness. There is no rebound and no guarding.   Decreased bowel sounds present.  Dressing clean, dry.   Neurological: He is alert and oriented to person, place, and time.   Skin: Skin is warm and dry.   Psychiatric: He has a normal mood and affect.     Labs:  Recent Results (from the past 24 hour(s))   ACCU-CHEK GLUCOSE    Collection Time: 18  5:13 PM   Result Value Ref Range    Glucose - Accu-Ck 129 (H) 65 - 99 mg/dL   ACCU-CHEK GLUCOSE    Collection Time: 18  9:41 PM   Result Value Ref Range    Glucose - Accu-Ck 120 (H) 65 - 99 mg/dL   CBC WITH DIFFERENTIAL    Collection Time: 18  2:45 AM    Result Value Ref Range    WBC 7.0 4.8 - 10.8 K/uL    RBC 3.58 (L) 4.70 - 6.10 M/uL    Hemoglobin 10.0 (L) 14.0 - 18.0 g/dL    Hematocrit 32.1 (L) 42.0 - 52.0 %    MCV 89.7 81.4 - 97.8 fL    MCH 27.9 27.0 - 33.0 pg    MCHC 31.2 (L) 33.7 - 35.3 g/dL    RDW 56.5 (H) 35.9 - 50.0 fL    Platelet Count 136 (L) 164 - 446 K/uL    MPV 9.4 9.0 - 12.9 fL    Neutrophils-Polys 78.00 (H) 44.00 - 72.00 %    Lymphocytes 14.20 (L) 22.00 - 41.00 %    Monocytes 7.40 0.00 - 13.40 %    Eosinophils 0.00 0.00 - 6.90 %    Basophils 0.10 0.00 - 1.80 %    Immature Granulocytes 0.30 0.00 - 0.90 %    Nucleated RBC 0.00 /100 WBC    Neutrophils (Absolute) 5.47 1.82 - 7.42 K/uL    Lymphs (Absolute) 1.00 1.00 - 4.80 K/uL    Monos (Absolute) 0.52 0.00 - 0.85 K/uL    Eos (Absolute) 0.00 0.00 - 0.51 K/uL    Baso (Absolute) 0.01 0.00 - 0.12 K/uL    Immature Granulocytes (abs) 0.02 0.00 - 0.11 K/uL    NRBC (Absolute) 0.00 K/uL   BASIC METABOLIC PANEL    Collection Time: 03/23/18  2:45 AM   Result Value Ref Range    Sodium 137 135 - 145 mmol/L    Potassium 4.2 3.6 - 5.5 mmol/L    Chloride 105 96 - 112 mmol/L    Co2 22 20 - 33 mmol/L    Glucose 121 (H) 65 - 99 mg/dL    Bun 14 8 - 22 mg/dL    Creatinine 0.76 0.50 - 1.40 mg/dL    Calcium 9.8 8.5 - 10.5 mg/dL    Anion Gap 10.0 0.0 - 11.9   ESTIMATED GFR    Collection Time: 03/23/18  2:45 AM   Result Value Ref Range    GFR If African American >60 >60 mL/min/1.73 m 2    GFR If Non African American >60 >60 mL/min/1.73 m 2   ACCU-CHEK GLUCOSE    Collection Time: 03/23/18  6:38 AM   Result Value Ref Range    Glucose - Accu-Ck 168 (H) 65 - 99 mg/dL     Medical Decision Making, by Problem:  BPH  POD #2, colostomy takedown - doing well  Bipolar disorder.    Patient is noncompliant with administration of Lovenox, Insulin.  Also not getting out of bed.       I had a singh conversation with him today, detailing the increased risks of DVT, PE, and post operative infection if he continues to be noncompliant.  He  assures me that he will comply with administration of Lovenox, insulin, and will get out of bed today    Plan:  Advance diet to full liquid diet.  Decrease IVF to 75 ml/hour.  Out of bed.  Meds as ordered.    Quality Measures:  Quality-Core Measures   Reviewed items::  Labs reviewed and Medications reviewed  Ruff catheter::  No Ruff (Removed this morning.)  DVT prophylaxis pharmacological::  Enoxaparin (Lovenox) (Patient has declined administration since surgery)  DVT prophylaxis - mechanical:  SCDs  Ulcer Prophylaxis::  Yes      Discussed patient condition with RN and Patient

## 2018-03-23 NOTE — PROGRESS NOTES
Assumed care at 1845. Pt resting in bed. A&ox 4  abd dressing CDI.   No flatus yet. Tolerating clears  Garcia in place. Will be dcd in AM  O2 on RA  Pain controlled with morphine PCA  Pt adamantly refused to ambulate or sit up in the chair tonight despite education. Pt also refused lovenox. slightly agitated.  Pt states it even hurts him to turn in bed. He will attempt to ambulate madelaine once garcia is removed.  Call light within reach. Hourly rounding in place

## 2018-03-23 NOTE — PROGRESS NOTES
Assumed care of patient. Patient stating pain is controlled with PCA. Patient non compliant with subque shots (Lovenox and Insulin) and ambulation.  Patient re-educated this morning regarding all of the previous. Patient agreeable to getting up for meals. Midline incision to abdomen with dressing. Dressing is clean, dry and intact. Old ostomy site dressed. Patient aware he needs to void by 1245. Urinal at bedside. No other needs at this time. Call light within reach.

## 2018-03-23 NOTE — PROGRESS NOTES
Ruff dcd this AM. Pt tolerated procedure well. Mucous discharge in rectum. No flatus yet. Pt refused insulin this morning. BS was 168. Pt educated but still refused.

## 2018-03-23 NOTE — CARE PLAN
Problem: Safety  Goal: Will remain free from injury  Updated about POC. Reinforce call light use. Pt acknowledged understanding    Problem: Venous Thromboembolism (VTW)/Deep Vein Thrombosis (DVT) Prevention:  Goal: Patient will participate in Venous Thrombosis (VTE)/Deep Vein Thrombosis (DVT)Prevention Measures  SCDs in place. Refused lovenox and ambulation tonight    Problem: Pain Management  Goal: Pain level will decrease to patient's comfort goal  Pain controlled with morphine PCA for now

## 2018-03-24 LAB
GLUCOSE BLD-MCNC: 108 MG/DL (ref 65–99)
GLUCOSE BLD-MCNC: 111 MG/DL (ref 65–99)
GLUCOSE BLD-MCNC: 91 MG/DL (ref 65–99)
GLUCOSE BLD-MCNC: 93 MG/DL (ref 65–99)

## 2018-03-24 PROCEDURE — A9270 NON-COVERED ITEM OR SERVICE: HCPCS | Performed by: SURGERY

## 2018-03-24 PROCEDURE — 700105 HCHG RX REV CODE 258: Performed by: SURGERY

## 2018-03-24 PROCEDURE — 700111 HCHG RX REV CODE 636 W/ 250 OVERRIDE (IP): Performed by: SURGERY

## 2018-03-24 PROCEDURE — 700102 HCHG RX REV CODE 250 W/ 637 OVERRIDE(OP): Performed by: SURGERY

## 2018-03-24 PROCEDURE — 770006 HCHG ROOM/CARE - MED/SURG/GYN SEMI*

## 2018-03-24 PROCEDURE — 82962 GLUCOSE BLOOD TEST: CPT | Mod: 91

## 2018-03-24 RX ADMIN — ENOXAPARIN SODIUM 30 MG: 100 INJECTION SUBCUTANEOUS at 09:11

## 2018-03-24 RX ADMIN — GABAPENTIN 100 MG: 100 CAPSULE ORAL at 09:11

## 2018-03-24 RX ADMIN — GABAPENTIN 100 MG: 100 CAPSULE ORAL at 20:58

## 2018-03-24 RX ADMIN — MORPHINE SULFATE: 50 INJECTION, SOLUTION, CONCENTRATE INTRAVENOUS at 16:23

## 2018-03-24 RX ADMIN — GABAPENTIN 100 MG: 100 CAPSULE ORAL at 15:29

## 2018-03-24 RX ADMIN — SODIUM CHLORIDE, POTASSIUM CHLORIDE, SODIUM LACTATE AND CALCIUM CHLORIDE: 600; 310; 30; 20 INJECTION, SOLUTION INTRAVENOUS at 21:04

## 2018-03-24 RX ADMIN — ENOXAPARIN SODIUM 30 MG: 100 INJECTION SUBCUTANEOUS at 20:58

## 2018-03-24 RX ADMIN — LAMOTRIGINE 100 MG: 100 TABLET ORAL at 09:11

## 2018-03-24 RX ADMIN — FAMOTIDINE 20 MG: 10 INJECTION INTRAVENOUS at 09:11

## 2018-03-24 RX ADMIN — TAMSULOSIN HYDROCHLORIDE 0.4 MG: 0.4 CAPSULE ORAL at 09:11

## 2018-03-24 ASSESSMENT — PAIN SCALES - GENERAL
PAINLEVEL_OUTOF10: 5
PAINLEVEL_OUTOF10: 6
PAINLEVEL_OUTOF10: 6
PAINLEVEL_OUTOF10: 8
PAINLEVEL_OUTOF10: 6
PAINLEVEL_OUTOF10: 7
PAINLEVEL_OUTOF10: 8
PAINLEVEL_OUTOF10: 0

## 2018-03-24 NOTE — PROGRESS NOTES
Date & Time:   3/24/2018   9:27 AM        Patient ID:             Name:             Marcelo Colon   YOB: 1940  Age:                 77 y.o.  male   MRN:               [id number]    ______________________________________________________________________    Patient up to chair  Tolerating full liquids  +BMs    Appears very comfortably, c/o abdominal pain  Per nursing staff, refusing to ambulate       Interval Exam:       Vitals:    03/23/18 1935 03/23/18 2340 03/24/18 0345 03/24/18 0734   BP: 115/62 (!) 99/54 100/51 105/48   Pulse: 61 60 60 (!) 55   Resp: 16 16 16 16   Temp: 36.4 °C (97.5 °F) 36.3 °C (97.4 °F) 36.2 °C (97.2 °F) 36.2 °C (97.1 °F)   SpO2: 95% 95% 96% 96%   Weight:       Height:         Weight/BMI: Body mass index is 19.48 kg/m².  Pulse Oximetry: 96 %, O2 (LPM): 0, O2 Delivery: None (Room Air)    Intake/Output Summary (Last 24 hours) at 03/24/18 0927  Last data filed at 03/24/18 0734   Gross per 24 hour   Intake               59 ml   Output             1100 ml   Net            -1041 ml         Physical Exam  GENERAL:  Alert and oriented x 3. NAD, normal respiratory effort  CV: Regular rate and rhythm, no murmurs. Extremities warm no edema.   Lungs: Clear to auscultation bilaterally.    Abd: Soft, non-distended, appropriately tender  Incision c/d/i    Recent Labs      03/22/18   0330  03/23/18   0245   SODIUM  137  137   POTASSIUM  4.8  4.2   CHLORIDE  106  105   CO2  23  22   BUN  13  14   CREATININE  0.90  0.76   CALCIUM  9.0  9.8       Recent Labs      03/22/18   0330  03/23/18   0245   ALTSGPT  6   --    ASTSGOT  10*   --    ALKPHOSPHAT  66   --    TBILIRUBIN  0.6   --    GLUCOSE  173*  121*       Recent Labs      03/22/18   0330  03/23/18   0245   RBC  3.68*  3.58*   HEMOGLOBIN  10.6*  10.0*   HEMATOCRIT  33.7*  32.1*   PLATELETCT  140*  136*       Recent Labs      03/22/18   0330  03/23/18   0245   WBC  7.4  7.0   NEUTSPOLYS  86.20*  78.00*   LYMPHOCYTES  7.70*  14.20*    MONOCYTES  5.70  7.40   EOSINOPHILS  0.00  0.00   BASOPHILS  0.00  0.10   ASTSGOT  10*   --    ALTSGPT  6   --    ALKPHOSPHAT  66   --    TBILIRUBIN  0.6   --          ________________________________________________________________________     Current Assessment and Plan:   Advance diet to GI soft  Wean pain medication  PT/OT    SCDs.

## 2018-03-24 NOTE — CARE PLAN
Problem: Communication  Goal: The ability to communicate needs accurately and effectively will improve  Outcome: PROGRESSING AS EXPECTED  Communicated plan of care in extreme detail, along with any other interventions that were necessary.  Once I explained the purpose of every intervention the pt agreed to allow them to occur.    Problem: Bowel/Gastric:  Goal: Normal bowel function is maintained or improved  Outcome: PROGRESSING AS EXPECTED  Pt has had one incontinent BM with me this morning.  The pt was educated that it will most likely take a while for him to become continent of his bowels, however after he would be able to purchase briefs in order to manage the incontinence until it is resolved.    Problem: Pain Management  Goal: Pain level will decrease to patient's comfort goal  Outcome: PROGRESSING AS EXPECTED  Pt educated that the plan of care was to eventually take away the PCA, and replace it with oral pain medication for him to go home.  Pt was agreeable.

## 2018-03-24 NOTE — PROGRESS NOTES
Pt is A&O x4.  Pain is well controlled through a PCA  Denies any nausea  Tolerating GI soft diet  Able to void urine in a urinal  Had one incontinent  BM Void this morning  Up Standby Assist  Bed in lowest position and locked.  Bed alarm and chair alarm in use.  Pt resting comfortably now.  Review plan of care with patient  Call light within reach  Hourly rounds in place  All needs met at this time

## 2018-03-24 NOTE — PROGRESS NOTES
A/Ox 4, VSs.  Reported severe pain at the start of shift, further education provided regarding PCA, with use severity lessened.  Pt now reports moderate abdominal pain 7 /10.  Abdominal dressings x2, old serosanguinous drainage present.  Abdomen soft, tender, no discoloration apparent.  + REESE, +baseline numbness in bilateral hands, generalized weakness.  RA, satting >90%, denies SOB or difficulty breathing, IS used appropriately.  + hypoactive BSx4, + flatus, + BM this shift, denies n/v.  Tolerated full liquid diet well  + void, QS, PO intake encouraged.  Up with SBA, tolerates activity well.  Back to bed from chair, repositions self frequently.  PIV TKO per order to S/L for adequate PO, PO intake encouraged.  Pt compliant with subQ shots and FSBG checks.  Call light within reach, calls appropriately.  Bed in lowest locked position, bed alarm in place.    FSBG within parameters all shift.  13mg of morphine delivered from PCA this shift.

## 2018-03-25 LAB
GLUCOSE BLD-MCNC: 114 MG/DL (ref 65–99)
GLUCOSE BLD-MCNC: 125 MG/DL (ref 65–99)
GLUCOSE BLD-MCNC: 129 MG/DL (ref 65–99)
GLUCOSE BLD-MCNC: 166 MG/DL (ref 65–99)
GLUCOSE BLD-MCNC: 97 MG/DL (ref 65–99)

## 2018-03-25 PROCEDURE — 700111 HCHG RX REV CODE 636 W/ 250 OVERRIDE (IP): Performed by: SURGERY

## 2018-03-25 PROCEDURE — A9270 NON-COVERED ITEM OR SERVICE: HCPCS | Performed by: SURGERY

## 2018-03-25 PROCEDURE — 82962 GLUCOSE BLOOD TEST: CPT

## 2018-03-25 PROCEDURE — 770006 HCHG ROOM/CARE - MED/SURG/GYN SEMI*

## 2018-03-25 PROCEDURE — 700102 HCHG RX REV CODE 250 W/ 637 OVERRIDE(OP): Performed by: SURGERY

## 2018-03-25 RX ORDER — OXYCODONE HYDROCHLORIDE 10 MG/1
10 TABLET ORAL EVERY 6 HOURS PRN
Status: DISCONTINUED | OUTPATIENT
Start: 2018-03-25 | End: 2018-03-26 | Stop reason: HOSPADM

## 2018-03-25 RX ORDER — MORPHINE SULFATE 4 MG/ML
2 INJECTION, SOLUTION INTRAMUSCULAR; INTRAVENOUS EVERY 4 HOURS PRN
Status: DISCONTINUED | OUTPATIENT
Start: 2018-03-25 | End: 2018-03-26 | Stop reason: HOSPADM

## 2018-03-25 RX ADMIN — OXYCODONE HYDROCHLORIDE 10 MG: 10 TABLET ORAL at 11:30

## 2018-03-25 RX ADMIN — GABAPENTIN 100 MG: 100 CAPSULE ORAL at 09:10

## 2018-03-25 RX ADMIN — ENOXAPARIN SODIUM 30 MG: 100 INJECTION SUBCUTANEOUS at 09:10

## 2018-03-25 RX ADMIN — MORPHINE SULFATE 2 MG: 4 INJECTION INTRAVENOUS at 14:31

## 2018-03-25 RX ADMIN — GABAPENTIN 100 MG: 100 CAPSULE ORAL at 14:31

## 2018-03-25 RX ADMIN — ENOXAPARIN SODIUM 30 MG: 100 INJECTION SUBCUTANEOUS at 19:30

## 2018-03-25 RX ADMIN — MORPHINE SULFATE 2 MG: 4 INJECTION INTRAVENOUS at 19:36

## 2018-03-25 RX ADMIN — OXYCODONE HYDROCHLORIDE 10 MG: 10 TABLET ORAL at 17:47

## 2018-03-25 RX ADMIN — GABAPENTIN 100 MG: 100 CAPSULE ORAL at 19:30

## 2018-03-25 RX ADMIN — TAMSULOSIN HYDROCHLORIDE 0.4 MG: 0.4 CAPSULE ORAL at 09:10

## 2018-03-25 RX ADMIN — LAMOTRIGINE 100 MG: 100 TABLET ORAL at 09:10

## 2018-03-25 RX ADMIN — FAMOTIDINE 20 MG: 10 INJECTION INTRAVENOUS at 09:10

## 2018-03-25 ASSESSMENT — PAIN SCALES - GENERAL
PAINLEVEL_OUTOF10: 0
PAINLEVEL_OUTOF10: 0
PAINLEVEL_OUTOF10: 9
PAINLEVEL_OUTOF10: 8
PAINLEVEL_OUTOF10: 0
PAINLEVEL_OUTOF10: 6
PAINLEVEL_OUTOF10: 9
PAINLEVEL_OUTOF10: 5

## 2018-03-25 NOTE — PROGRESS NOTES
A/Ox 4, VSS.  Reported moderate abdominal pain 6/10 with movement, and reports no pain at rest with PCA morphine.  Abdominal dressings x2, old serosanguinous drainage present.  Abdomen soft, tender, no discoloration apparent.  + REESE, +baseline numbness in bilateral hands, generalized weakness.  RA while awake, satting >90%, denies SOB or difficulty breathing, pt refusing to use IS.  + hypoactive BSx4, + flatus, + BM this shift, denies n/v.  Tolerated GI soft diet well.  + void, QS, PO intake encouraged.  Up with SBA and single point cane, tolerates activity well.  Up to BR x2 with SBA, repositions self frequently.  No coverage for FSBG has been needed.  PIV TKO per order to S/L for adequate PO, PO intake encouraged.  Call light within reach, calls appropriately.  Bed in lowest locked position.    Needed 1L NC while sleeping, satting >90%.

## 2018-03-25 NOTE — PROGRESS NOTES
Date & Time:   3/25/2018   10:10 AM        Patient ID:             Name:             Marcelo Colon   YOB: 1940  Age:                 77 y.o.  male   MRN:               [id number]    _______________________________________________________________________    Patient c/o severe pain but appears quite comfortable talking on his phone.  Denies n/v. Tolerating GI soft diet    +flatus, +bms    Interval Exam:       Vitals:    03/24/18 1830 03/24/18 2305 03/25/18 0230 03/25/18 0750   BP: 108/52 110/51 105/50 121/51   Pulse: 65 67 60 (!) 54   Resp: 18 17 17 16   Temp: 36.1 °C (97 °F) 36.7 °C (98 °F) 36.1 °C (97 °F) 36.7 °C (98 °F)   SpO2: 91% 91% 93% 95%   Weight:       Height:         Weight/BMI: Body mass index is 19.48 kg/m².  Pulse Oximetry: 95 %, O2 (LPM): 2, O2 Delivery: Nasal Cannula    Intake/Output Summary (Last 24 hours) at 03/25/18 1010  Last data filed at 03/25/18 0750   Gross per 24 hour   Intake             1190 ml   Output              725 ml   Net              465 ml         Physical Exam  GENERAL:  Alert and oriented x 3. NAD, normal respiratory effort  CV: Regular rate and rhythm, no murmurs. Extremities warm no edema.   Lungs: Clear to auscultation bilaterally.    Abd: Soft, mildly distended. Non-tender. Incisions c/d/i no erythema     Recent Labs      03/23/18   0245   SODIUM  137   POTASSIUM  4.2   CHLORIDE  105   CO2  22   BUN  14   CREATININE  0.76   CALCIUM  9.8       Recent Labs      03/23/18   0245   GLUCOSE  121*       Recent Labs      03/23/18   0245   RBC  3.58*   HEMOGLOBIN  10.0*   HEMATOCRIT  32.1*   PLATELETCT  136*       Recent Labs      03/23/18   0245   WBC  7.0   NEUTSPOLYS  78.00*   LYMPHOCYTES  14.20*   MONOCYTES  7.40   EOSINOPHILS  0.00   BASOPHILS  0.10         ________________________________________________________________________     Current Assessment and Plan:   D/c PCA, change to PO oxycodone  Ambulate  Likely home tomorrow

## 2018-03-26 VITALS
TEMPERATURE: 97.3 F | SYSTOLIC BLOOD PRESSURE: 96 MMHG | HEIGHT: 68 IN | DIASTOLIC BLOOD PRESSURE: 51 MMHG | RESPIRATION RATE: 17 BRPM | HEART RATE: 69 BPM | WEIGHT: 128.09 LBS | OXYGEN SATURATION: 93 % | BODY MASS INDEX: 19.41 KG/M2

## 2018-03-26 PROBLEM — A04.72 COLITIS DUE TO CLOSTRIDIUM DIFFICILE: Status: RESOLVED | Noted: 2018-01-24 | Resolved: 2018-03-26

## 2018-03-26 PROBLEM — K92.2 GIB (GASTROINTESTINAL BLEEDING): Status: RESOLVED | Noted: 2017-12-25 | Resolved: 2018-03-26

## 2018-03-26 PROBLEM — A04.72 C. DIFFICILE DIARRHEA: Status: RESOLVED | Noted: 2017-12-19 | Resolved: 2018-03-26

## 2018-03-26 PROBLEM — A04.72 C. DIFFICILE COLITIS: Status: RESOLVED | Noted: 2017-12-25 | Resolved: 2018-03-26

## 2018-03-26 LAB — GLUCOSE BLD-MCNC: 101 MG/DL (ref 65–99)

## 2018-03-26 PROCEDURE — A9270 NON-COVERED ITEM OR SERVICE: HCPCS | Performed by: SURGERY

## 2018-03-26 PROCEDURE — 700102 HCHG RX REV CODE 250 W/ 637 OVERRIDE(OP): Performed by: SURGERY

## 2018-03-26 PROCEDURE — 700111 HCHG RX REV CODE 636 W/ 250 OVERRIDE (IP): Performed by: SURGERY

## 2018-03-26 PROCEDURE — 82962 GLUCOSE BLOOD TEST: CPT

## 2018-03-26 RX ORDER — OXYCODONE HYDROCHLORIDE 5 MG/1
10 TABLET ORAL EVERY 6 HOURS PRN
Qty: 40 TAB | Refills: 0 | Status: SHIPPED | OUTPATIENT
Start: 2018-03-26 | End: 2018-04-05

## 2018-03-26 RX ADMIN — OXYCODONE HYDROCHLORIDE 10 MG: 10 TABLET ORAL at 00:02

## 2018-03-26 RX ADMIN — ENOXAPARIN SODIUM 30 MG: 100 INJECTION SUBCUTANEOUS at 09:54

## 2018-03-26 RX ADMIN — FAMOTIDINE 20 MG: 10 INJECTION INTRAVENOUS at 09:57

## 2018-03-26 RX ADMIN — LAMOTRIGINE 100 MG: 100 TABLET ORAL at 09:54

## 2018-03-26 RX ADMIN — GABAPENTIN 100 MG: 100 CAPSULE ORAL at 09:54

## 2018-03-26 RX ADMIN — OXYCODONE HYDROCHLORIDE 10 MG: 10 TABLET ORAL at 06:25

## 2018-03-26 RX ADMIN — TAMSULOSIN HYDROCHLORIDE 0.4 MG: 0.4 CAPSULE ORAL at 09:54

## 2018-03-26 RX ADMIN — GABAPENTIN 100 MG: 100 CAPSULE ORAL at 14:16

## 2018-03-26 RX ADMIN — MORPHINE SULFATE 2 MG: 4 INJECTION INTRAVENOUS at 09:58

## 2018-03-26 RX ADMIN — OXYCODONE HYDROCHLORIDE 10 MG: 10 TABLET ORAL at 14:16

## 2018-03-26 ASSESSMENT — PAIN SCALES - GENERAL
PAINLEVEL_OUTOF10: 5
PAINLEVEL_OUTOF10: 7

## 2018-03-26 NOTE — DISCHARGE INSTRUCTIONS
Discharge Instructions    Regular Diet     Ok To Shower, keep incisions as dry as possible, do not submerge.       Take over the counter stool softeners as needed for constipation     No strenuous activity of lifting >20 lbs for three weeks.     Follow up with Dr. Rodriguez in office in 10-14 days        Discharged to home by car with friend. Discharged via wheelchair, hospital escort: Refused.  Special equipment needed: Not Applicable    Be sure to schedule a follow-up appointment with your primary care doctor or any specialists as instructed.     Discharge Plan:   Influenza Vaccine Indication: Patient Refuses    I understand that a diet low in cholesterol, fat, and sodium is recommended for good health. Unless I have been given specific instructions below for another diet, I accept this instruction as my diet prescription.   Other diet: Regular      Special Instructions: None    · Is patient discharged on Warfarin / Coumadin?   No     Depression / Suicide Risk    As you are discharged from this [facility] facility, it is important to learn how to keep safe from harming yourself.    Recognize the warning signs:  · Abrupt changes in personality, positive or negative- including increase in energy   · Giving away possessions  · Change in eating patterns- significant weight changes-  positive or negative  · Change in sleeping patterns- unable to sleep or sleeping all the time   · Unwillingness or inability to communicate  · Depression  · Unusual sadness, discouragement and loneliness  · Talk of wanting to die  · Neglect of personal appearance   · Rebelliousness- reckless behavior  · Withdrawal from people/activities they love  · Confusion- inability to concentrate     If you or a loved one observes any of these behaviors or has concerns about self-harm, here's what you can do:  · Talk about it- your feelings and reasons for harming yourself  · Remove any means that you might use to hurt yourself (examples: pills,  rope, extension cords, firearm)  · Get professional help from the community (Mental Health, Substance Abuse, psychological counseling)  · Do not be alone:Call your Safe Contact- someone whom you trust who will be there for you.  · Call your local CRISIS HOTLINE 791-4252 or 544-439-6925  · Call your local Children's Mobile Crisis Response Team [place] (238) 322-8673 or www.Padinmotion  · Call the toll free National Suicide Prevention Hotlines   · National Suicide Prevention Lifeline 916-042-KCYG (1500)  · National Hope Line Network 800-SUICIDE (940-4468)          Loop Colostomy Reversal, Care After  Refer to this sheet in the next few weeks. These instructions provide you with information on caring for yourself after your procedure. Your health care provider may also give you more specific instructions. Your treatment has been planned according to current medical practices, but problems sometimes occur. Call your health care provider if you have any problems or questions after your procedure.  HOME CARE INSTRUCTIONS  · Change your bandages (dressings) as directed by your health care provider.  · Keep the wound clean and dry. The wound may be washed gently with soap and water. Do not rub the wound. Gently pat the wound dry with a clean towel.  · Do not take baths, swim, or use hot tubs for 10 days, or as directed by your health care provider.  · Only take over-the-counter or prescription medicines for pain, discomfort, or fever as directed by your health care provider.  · You may resume a normal diet as directed by your health care provider.  · Do not lift more than 10 pounds (4.5 kg) or play contact sports for 4 weeks, or as directed by your health care provider.  · Use a stool softener if recommended by your health care provider, especially with pain medicine.  SEEK MEDICAL CARE IF:  · You have redness, swelling, or increasing pain in the wound.  · You notice pus coming from the wound.  · You have  drainage from the wound lasting longer than 1 day.  · You notice a bad smell coming from the wound or dressing.  · Your wound breaks open.  · You have persistent nausea or vomiting.  · You cannot have a bowel movement.  · You have pain that is not controlled with medicine.  SEEK IMMEDIATE MEDICAL CARE IF:  · You have a fever.  · You have a rash.  · You have difficulty breathing.  · You have any reaction or side effects to your medicines.     This information is not intended to replace advice given to you by your health care provider. Make sure you discuss any questions you have with your health care provider.     Document Released: 11/03/2005 Document Revised: 05/03/2016 Document Reviewed: 11/28/2012  [place] Interactive Patient Education ©2016 [employer]vier Inc.

## 2018-03-26 NOTE — PROGRESS NOTES
"Pt A&O x 4. No family present.     Vitals: /59   Pulse 83   Temp 37.3 °C (99.1 °F)   Resp 17   Ht 1.727 m (5' 8\")   Wt 58.1 kg (128 lb 1.4 oz)   SpO2 95%   BMI 19.48 kg/m²      Pt rates pain 6 out of 10. Bolus button in use.     Neuro: REESE. Denies new onset of numbness/ tingling.     Cardiac: Denies new onset of chest pain.     Vascular: Pulses 2+ BUE, BLE. No edema noted.     Respiratory: Lungs sound clear in bilateral upper lobes and diminished in bilateral lower lobes to auscultation. Pulling 1500 on IS, effective, strong effort. On room air during the day.  on, satting in 90's. Denies SOB.     GI: Abdomen rounded and tender. Hypoactive bowel sounds, + flatus, - BM, last BM 3/24/2018. Denies nausea/ vomiting.     : Pt voiding adequately.      MSK: Pt up to bathroom with one assist, tolerating well.     Integumentary: Midline abdominal incision and LLQ colostomy takedown. Generalized bruising around abdomen.     Labs noted.     Fall precautions in place: Bed locked in lowest position, Upper bed rails up, treaded socks in place, personal belongings within reach, call light within reach, and appropriate mobility signs in place. Pt calls appropriately.      Pt updated on POC.    "

## 2018-03-26 NOTE — CARE PLAN
Problem: Safety  Goal: Will remain free from falls  Outcome: PROGRESSING AS EXPECTED  Patient uses call light appropriately when needing to get out of bed.

## 2018-03-26 NOTE — CARE PLAN
Problem: Safety  Goal: Will remain free from falls  Outcome: PROGRESSING AS EXPECTED  Call light is within reach, pt ambulates with a stand by assist and cane with a steady gait, treaded socks on, bed in lowest position, all needs met at this time     Problem: Pain Management  Goal: Pain level will decrease to patient's comfort goal  Outcome: PROGRESSING AS EXPECTED  Pt medicated per MAR for pain with oral and IV pain medication, pt resting comfortably

## 2018-03-26 NOTE — CARE PLAN
Problem: Safety  Goal: Will remain free from injury  Outcome: PROGRESSING AS EXPECTED  Patient calls appropriately and waits for assistance before mobilizing or ambulating. Bed in lowest position and call light within reach at all times.     Problem: Knowledge Deficit  Goal: Knowledge of disease process/condition, treatment plan, diagnostic tests, and medications will improve  Outcome: PROGRESSING AS EXPECTED  Patient verbalized understanding as to why PCA was discontinued and oral medications were started.

## 2018-03-26 NOTE — PROGRESS NOTES
Patient is AOX4. Patient able to REESE with a generalized weakness noted. Patient is to be discharged this afternoon. Incision is well approximated with staples. No acute distresses noted at this time. Bed in lowest and locked position. Call light within reach. Will continue to monitor.

## 2018-03-26 NOTE — PROGRESS NOTES
Report received from RN, assumed care at 1900.   Patient is AOx4, responds appropriately.    Pt declines any SOB, chest pain, new onset of numbness/ tingling  Pt rates pain at a 8/10, on a scale of 1-10, pt medicated per MAR  Pt is voiding adequatly and without hesitancy  Pt has + flatus, + bowel sounds, + BM on 3/24/2018  Pt has a midline incision that is covered with a clean, dry, and intact dressing, old drainage noted on dressing  Pt has a LLQ old colostomy site, dressing in place, dressing is clean, dry, and intact   Pt is tolerating a low fiber GI soft diet, pt declines any nausea/vomtiting  Pt ambulates with a x1 assist   Plan of care discussed, all questions answered.    Explained importance of calling before getting OOB and pt verbalizes understanding.  Call light and belongings within reach, treaded slipper socks on, SCD in use,bed alarm is in place, bed in lowest locked position. Hourly rounding in place, all needs met at this time

## 2018-03-27 ENCOUNTER — PATIENT OUTREACH (OUTPATIENT)
Dept: HEALTH INFORMATION MANAGEMENT | Facility: OTHER | Age: 78
End: 2018-03-27

## 2018-03-27 NOTE — DISCHARGE SUMMARY
DATE OF ADMISSION:  03/21/2018    DATE OF DISCHARGE:  03/26/2018    ADMISSION DIAGNOSES:  Colostomy, history of diverticulitis.    DISCHARGE DIAGNOSES:  Colostomy, history of diverticulitis.    PROCEDURES:  Colostomy takedown, extensive lysis of adhesions.    SURGEON:  Jorge Rodriguez MD    HISTORY AND HOSPITAL COURSE:  The patient is a 77-year-old male who was   admitted on March 21st after colostomy takedown, extensive lysis of adhesions   by Dr. Rodriguez.  The patient had surgery for diverticulitis complicated   with rupture and abscess formation.  He underwent colostomy placement,   incidental appendectomy on 07/25/2017.  His postoperative course status post   colostomy takedown was uneventful.  Patient was started on a liquid diet   postop day #3 and it was advanced with return of bowel function.  Patient is   currently postop day #5.  He is tolerating low residual diet.  He is having   daily regular bowel movements.  Afebrile.  White count has been normal and his   pain is controlled with oral pain medication.  On exam, patient's abdomen is   soft, mildly distended.  His incisions are healing well without sign of   infection.  Plan is to discharge the patient home today on low residual diet.    He will be given a prescription for Lortab 7.5 mg one to two every 6 hours,   dispensed 40.  Also, instructed to take stool softeners as needed for   constipation.  He was cleared to shower.  No lifting over 20 pounds for the   next 6 weeks and to follow up in the office in 10-14 days for staple removal.       ____________________________________     MD MARIELA Conrad / CITLALY    DD:  03/26/2018 08:26:30  DT:  03/26/2018 17:22:38    D#:  [id number]  Job#:  [id number]

## 2018-04-04 ENCOUNTER — APPOINTMENT (OUTPATIENT)
Dept: RADIOLOGY | Facility: MEDICAL CENTER | Age: 78
End: 2018-04-04
Attending: EMERGENCY MEDICINE
Payer: MEDICARE

## 2018-04-04 ENCOUNTER — HOSPITAL ENCOUNTER (EMERGENCY)
Facility: MEDICAL CENTER | Age: 78
End: 2018-04-04
Attending: EMERGENCY MEDICINE
Payer: MEDICARE

## 2018-04-04 VITALS
HEART RATE: 63 BPM | DIASTOLIC BLOOD PRESSURE: 59 MMHG | OXYGEN SATURATION: 95 % | RESPIRATION RATE: 18 BRPM | HEIGHT: 68 IN | TEMPERATURE: 98.3 F | WEIGHT: 125 LBS | SYSTOLIC BLOOD PRESSURE: 114 MMHG | BODY MASS INDEX: 18.94 KG/M2

## 2018-04-04 DIAGNOSIS — R64 CACHEXIA (HCC): ICD-10-CM

## 2018-04-04 DIAGNOSIS — M19.90 OSTEOARTHRITIS, UNSPECIFIED OSTEOARTHRITIS TYPE, UNSPECIFIED SITE: ICD-10-CM

## 2018-04-04 DIAGNOSIS — K59.1 FUNCTIONAL DIARRHEA: ICD-10-CM

## 2018-04-04 DIAGNOSIS — R53.81 DEBILITY: ICD-10-CM

## 2018-04-04 DIAGNOSIS — N30.01 ACUTE CYSTITIS WITH HEMATURIA: ICD-10-CM

## 2018-04-04 DIAGNOSIS — E87.6 HYPOKALEMIA, GASTROINTESTINAL LOSSES: ICD-10-CM

## 2018-04-04 DIAGNOSIS — Z98.890 POST-OPERATIVE STATE: ICD-10-CM

## 2018-04-04 DIAGNOSIS — M19.91 PRIMARY OSTEOARTHRITIS, UNSPECIFIED SITE: ICD-10-CM

## 2018-04-04 LAB
ANION GAP SERPL CALC-SCNC: 7 MMOL/L (ref 0–11.9)
APPEARANCE UR: ABNORMAL
BACTERIA #/AREA URNS HPF: NEGATIVE /HPF
BASOPHILS # BLD AUTO: 0.4 % (ref 0–1.8)
BASOPHILS # BLD: 0.03 K/UL (ref 0–0.12)
BILIRUB UR QL STRIP.AUTO: NEGATIVE
BUN SERPL-MCNC: 10 MG/DL (ref 8–22)
CALCIUM SERPL-MCNC: 9.5 MG/DL (ref 8.5–10.5)
CHLORIDE SERPL-SCNC: 103 MMOL/L (ref 96–112)
CO2 SERPL-SCNC: 27 MMOL/L (ref 20–33)
COLOR UR: YELLOW
CREAT SERPL-MCNC: 0.86 MG/DL (ref 0.5–1.4)
CULTURE IF INDICATED INDCX: YES UA CULTURE
EOSINOPHIL # BLD AUTO: 0.11 K/UL (ref 0–0.51)
EOSINOPHIL NFR BLD: 1.4 % (ref 0–6.9)
EPI CELLS #/AREA URNS HPF: ABNORMAL /HPF
ERYTHROCYTE [DISTWIDTH] IN BLOOD BY AUTOMATED COUNT: 48.7 FL (ref 35.9–50)
GLUCOSE SERPL-MCNC: 96 MG/DL (ref 65–99)
GLUCOSE UR STRIP.AUTO-MCNC: NEGATIVE MG/DL
HCT VFR BLD AUTO: 35.3 % (ref 42–52)
HGB BLD-MCNC: 11.2 G/DL (ref 14–18)
HYALINE CASTS #/AREA URNS LPF: ABNORMAL /LPF
IMM GRANULOCYTES # BLD AUTO: 0.03 K/UL (ref 0–0.11)
IMM GRANULOCYTES NFR BLD AUTO: 0.4 % (ref 0–0.9)
KETONES UR STRIP.AUTO-MCNC: NEGATIVE MG/DL
LEUKOCYTE ESTERASE UR QL STRIP.AUTO: ABNORMAL
LYMPHOCYTES # BLD AUTO: 1.21 K/UL (ref 1–4.8)
LYMPHOCYTES NFR BLD: 15.6 % (ref 22–41)
MCH RBC QN AUTO: 27.7 PG (ref 27–33)
MCHC RBC AUTO-ENTMCNC: 31.7 G/DL (ref 33.7–35.3)
MCV RBC AUTO: 87.4 FL (ref 81.4–97.8)
MICRO URNS: ABNORMAL
MONOCYTES # BLD AUTO: 0.57 K/UL (ref 0–0.85)
MONOCYTES NFR BLD AUTO: 7.3 % (ref 0–13.4)
MUCOUS THREADS #/AREA URNS HPF: ABNORMAL /HPF
NEUTROPHILS # BLD AUTO: 5.81 K/UL (ref 1.82–7.42)
NEUTROPHILS NFR BLD: 74.9 % (ref 44–72)
NITRITE UR QL STRIP.AUTO: NEGATIVE
NRBC # BLD AUTO: 0 K/UL
NRBC BLD-RTO: 0 /100 WBC
PH UR STRIP.AUTO: 5.5 [PH]
PLATELET # BLD AUTO: 281 K/UL (ref 164–446)
PMV BLD AUTO: 8.2 FL (ref 9–12.9)
POTASSIUM SERPL-SCNC: 3.6 MMOL/L (ref 3.6–5.5)
PROT UR QL STRIP: NEGATIVE MG/DL
RBC # BLD AUTO: 4.04 M/UL (ref 4.7–6.1)
RBC # URNS HPF: ABNORMAL /HPF
RBC UR QL AUTO: NEGATIVE
SODIUM SERPL-SCNC: 137 MMOL/L (ref 135–145)
SP GR UR STRIP.AUTO: 1.02
UROBILINOGEN UR STRIP.AUTO-MCNC: 0.2 MG/DL
WBC # BLD AUTO: 7.8 K/UL (ref 4.8–10.8)
WBC #/AREA URNS HPF: ABNORMAL /HPF

## 2018-04-04 PROCEDURE — 36415 COLL VENOUS BLD VENIPUNCTURE: CPT

## 2018-04-04 PROCEDURE — 80048 BASIC METABOLIC PNL TOTAL CA: CPT

## 2018-04-04 PROCEDURE — 85025 COMPLETE CBC W/AUTO DIFF WBC: CPT

## 2018-04-04 PROCEDURE — 87086 URINE CULTURE/COLONY COUNT: CPT

## 2018-04-04 PROCEDURE — 74022 RADEX COMPL AQT ABD SERIES: CPT

## 2018-04-04 PROCEDURE — 99284 EMERGENCY DEPT VISIT MOD MDM: CPT

## 2018-04-04 PROCEDURE — 81001 URINALYSIS AUTO W/SCOPE: CPT

## 2018-04-04 RX ORDER — ZINC OXIDE
OINTMENT (GRAM) TOPICAL
Qty: 1 TUBE | Refills: 3 | Status: SHIPPED | OUTPATIENT
Start: 2018-04-04 | End: 2018-04-10

## 2018-04-04 RX ORDER — CEPHALEXIN 500 MG/1
500 CAPSULE ORAL 2 TIMES DAILY
Qty: 14 CAP | Refills: 0 | Status: ON HOLD | OUTPATIENT
Start: 2018-04-04 | End: 2018-04-11

## 2018-04-04 ASSESSMENT — LIFESTYLE VARIABLES: DO YOU DRINK ALCOHOL: NO

## 2018-04-04 NOTE — DISCHARGE PLANNING
Medical Social Work    MSW received ERP. Per ERP, pt has home health before he was admitted at the [facility] last time. Pt stated he Lesli but because there was no renewal order upon d/c for home health. MSW spoke to Josephine at [facility] who stated pt is on services until 4/13 unless they get a new order. Josephine stated they just need a new referral to continue HH. MSW updated ERP.    MSW faxed order, facesheet and ERP and d/c summary note to [facility] ([phone]). Fax confirmation received.

## 2018-04-04 NOTE — ED TRIAGE NOTES
"Chief Complaint   Patient presents with   • Post-Op Complications     Pt had his colostomy removed and colon reattached about 3 weeks ago by Dr. Rodriguez with [facility]. Pt has been having \"pancake like\" stool, loose and mucusy for about 10 days. Reports excessive gas and abd cramping. Denies any blood in stool.      Pt states his follow up apt was changed on him and he called to go and they told him to follow up with the ER with his sx.  /67   Pulse 70   Temp 36.8 °C (98.3 °F)   Resp 16   Ht 1.727 m (5' 8\")   Wt 56.7 kg (125 lb)   SpO2 90%   BMI 19.01 kg/m²   Pt placed back in lobby, educated on triage process, and told to inform staff of any change in condition.     "

## 2018-04-04 NOTE — ED NOTES
Discharge instructions provided to pt. Copy of instructions and rx for desitin provided to pt. Verbalized understanding. Instructed to follow up with PCP or return to ed with . Educated on worsening symptoms. Educated on diet and fluid intake. Educated on pain management. Pt discharged to home. PT in WC out of ED. Pt has no questions or concerns.

## 2018-04-04 NOTE — DISCHARGE INSTRUCTIONS
Your laboratory tests and x-ray series were very reassuring. It can take a few weeks after your colostomy reversal to normalize your stools. Please read the information below, continue any prescribed home medications, and schedule a follow-up visit with your surgery office to have your staples removed. It was recommended By Dr. Oquendo that they be removed either late this week or early next week.     Food Choices to Help Relieve Diarrhea, Adult  When you have diarrhea, the foods you eat and your eating habits are very important. Choosing the right foods and drinks can help relieve diarrhea. Also, because diarrhea can last up to 7 days, you need to replace lost fluids and electrolytes (such as sodium, potassium, and chloride) in order to help prevent dehydration.   WHAT GENERAL GUIDELINES DO I NEED TO FOLLOW?  · Slowly drink 1 cup (8 oz) of fluid for each episode of diarrhea. If you are getting enough fluid, your urine will be clear or pale yellow.  · Eat starchy foods. Some good choices include white rice, white toast, pasta, low-fiber cereal, baked potatoes (without the skin), saltine crackers, and bagels.  · Avoid large servings of any cooked vegetables.  · Limit fruit to two servings per day. A serving is ½ cup or 1 small piece.  · Choose foods with less than 2 g of fiber per serving.  · Limit fats to less than 8 tsp (38 g) per day.  · Avoid fried foods.  · Eat foods that have probiotics in them. Probiotics can be found in certain dairy products.  · Avoid foods and beverages that may increase the speed at which food moves through the stomach and intestines (gastrointestinal tract). Things to avoid include:  ¨ High-fiber foods, such as dried fruit, raw fruits and vegetables, nuts, seeds, and whole grain foods.  ¨ Spicy foods and high-fat foods.  ¨ Foods and beverages sweetened with high-fructose corn syrup, honey, or sugar alcohols such as xylitol, sorbitol, and mannitol.  WHAT FOODS ARE RECOMMENDED?  Grains  White  rice. White, French, or bryan breads (fresh or toasted), including plain rolls, buns, or bagels. White pasta. Saltine, soda, or maryam crackers. Pretzels. Low-fiber cereal. Cooked cereals made with water (such as cornmeal, farina, or cream cereals). Plain muffins. Matzo. [place] toast. Zwieback.   Vegetables  Potatoes (without the skin). Strained tomato and vegetable juices. Most well-cooked and canned vegetables without seeds. Tender lettuce.  Fruits  Cooked or canned applesauce, apricots, cherries, fruit cocktail, grapefruit, peaches, pears, or plums. Fresh bananas, apples without skin, cherries, grapes, cantaloupe, grapefruit, peaches, oranges, or plums.   Meat and Other Protein Products  Baked or boiled chicken. Eggs. Tofu. Fish. Seafood. Smooth peanut butter. Ground or well-cooked tender beef, ham, veal, lamb, pork, or poultry.   Dairy  Plain yogurt, kefir, and unsweetened liquid yogurt. Lactose-free milk, buttermilk, or soy milk. Plain hard cheese.  Beverages  Sport drinks. Clear broths. Diluted fruit juices (except prune). Regular, caffeine-free sodas such as ginger ale. Water. Decaffeinated teas. Oral rehydration solutions. Sugar-free beverages not sweetened with sugar alcohols.  Other  Bouillon, broth, or soups made from recommended foods.   The items listed above may not be a complete list of recommended foods or beverages. Contact your dietitian for more options.  WHAT FOODS ARE NOT RECOMMENDED?  Grains  Whole grain, whole wheat, bran, or rye breads, rolls, pastas, crackers, and cereals. Wild or brown rice. Cereals that contain more than 2 g of fiber per serving. Corn tortillas or taco shells. Cooked or dry oatmeal. Granola. Popcorn.  Vegetables  Raw vegetables. Cabbage, broccoli, [place] sprouts, artichokes, baked beans, beet greens, corn, kale, legumes, peas, sweet potatoes, and yams. Potato skins. Cooked spinach and cabbage.  Fruits  Dried fruit, including raisins and dates. Raw fruits. Stewed or dried  prunes. Fresh apples with skin, apricots, mangoes, pears, raspberries, and strawberries.   Meat and Other Protein Products  [place] peanut butter. Nuts and seeds. Beans and lentils. [place].   Dairy  High-fat cheeses. Milk, chocolate milk, and beverages made with milk, such as milk shakes. Cream. Ice cream.  Sweets and Desserts  Sweet rolls, doughnuts, and sweet breads. Pancakes and waffles.  Fats and Oils  Butter. Cream sauces. Margarine. Salad oils. Plain salad dressings. Olives. Avocados.   Beverages  Caffeinated beverages (such as coffee, tea, soda, or energy drinks). Alcoholic beverages. Fruit juices with pulp. Prune juice. Soft drinks sweetened with high-fructose corn syrup or sugar alcohols.  Other  Coconut. Hot sauce. Chili powder. Mayonnaise. Gravy. Cream-based or milk-based soups.   The items listed above may not be a complete list of foods and beverages to avoid. Contact your dietitian for more information.  WHAT SHOULD I DO IF I BECOME DEHYDRATED?  Diarrhea can sometimes lead to dehydration. Signs of dehydration include dark urine and dry mouth and skin. If you think you are dehydrated, you should rehydrate with an oral rehydration solution. These solutions can be purchased at pharmacies, retail stores, or online.   Drink ½-1 cup (120-240 mL) of oral rehydration solution each time you have an episode of diarrhea. If drinking this amount makes your diarrhea worse, try drinking smaller amounts more often. For example, drink 1-3 tsp (5-15 mL) every 5-10 minutes.   A general rule for staying hydrated is to drink 1½-2 L of fluid per day. Talk to your health care provider about the specific amount you should be drinking each day. Drink enough fluids to keep your urine clear or pale yellow.     This information is not intended to replace advice given to you by your health care provider. Make sure you discuss any questions you have with your health care provider.     Document Released: 03/09/2005 Document Revised:  01/08/2016 Document Reviewed: 11/10/2014  Elsevier Interactive Patient Education ©2016 Elsevier Inc.

## 2018-04-04 NOTE — FACE TO FACE
Face to Face Supporting Documentation - Home Health    The encounter with this patient was in whole or in part the primary reason for home health admission.    Date of encounter:   Patient:                    MRN:                       YOB: 2017  Marcelo Colon  [id number]  1940     Home health to see patient for:  Skilled Nursing care for assessment, interventions & education, Physical Therapy evaluation and treatment and Occupational therapy evaluation and treatment    Skilled need for:  Surgical Aftercare for colostomy takedown and related debility.    Skilled nursing interventions to include:  Wound Care    Homebound status evidenced by:  Require the use of special transportation. Leaving home requires a considerable and taxing effort. There is a normal inability to leave the home.    Community Physician to provide follow up care: Pcp Pt States None     Optional Interventions? No      I certify the face to face encounter for this home health care referral meets the CMS requirements and the encounter/clinical assessment with the patient was, in whole, or in part, for the medical condition(s) listed above, which is the primary reason for home health care. Based on my clinical findings: the service(s) are medically necessary, support the need for home health care, and the homebound criteria are met.  I certify that this patient has had a face to face encounter by myself.  Giovanny Mensah M.D. - NPI: [id number]

## 2018-04-04 NOTE — ED PROVIDER NOTES
"ED Provider Note    Scribed for Giovanny Mensah M.D. by Prince aPgan. 4/4/2018,  11:18 AM.    CHIEF COMPLAINT  Chief Complaint   Patient presents with   • Post-Op Complications     Pt had his colostomy removed and colon reattached about 3 weeks ago by Dr. Rodriguez with surgical associates. Pt has been having \"pancake like\" stool, loose and mucusy for about 10 days. Reports excessive gas and abd cramping. Denies any blood in stool.        HPI  Marcelo Colon is a 77 y.o. male who presents to the Emergency Department complaining of bowel incontinence, very loose stools, severe gas, and abdominal cramping, onset 10 days ago. Patient reports that he did experience a period of harder stools, but they shortly returned to the pancake-like, loose stools. He was admitted on March 21st after colostomy takedown, extensive lysis of adhesions by Dr. Rodriguez.  The patient had surgery for diverticulitis complicated with rupture and abscess formation.  He underwent colostomy placement, incidental appendectomy on 07/25/2017. He also received a lumbar decompression in 12/2017 followed by an episode of C. Difficile. Patient reportedly missed his appointment with Dr. Rodriguez, General Surgery, yesterday. He was called by Dr. Rodriguez's office today and instructed to present here. The patient reports associated urinary retention, dysuria, and a rash in his rectal and inguinal region. He denies any hematochezia, melena, fever, or chills. Patient is taking 5-325 mg Oxycodone for pain management as well a gabapentin. Patient was taking MiraLAX for a short period of time and he is no longer taking stool softeners.    REVIEW OF SYSTEMS  Pertinent positives include bowel incontinence, very loose stools, severe gas, abdominal cramping, urinary retention, dysuria, and a rash. Pertinent negatives include no hematochezia, melena, fever, or chills. See HPI for further details.   E    PAST MEDICAL HISTORY   has a past medical " history of Anesthesia; Anxiety disorder; Arthritis; Bipolar disorder (CMS-[facility]); Depression; Enlarged prostate; GERD (gastroesophageal reflux disease); Heart murmur; Indigestion; and Neck pain.C. Difficile.    SOCIAL HISTORY  Social History     Social History Main Topics   • Smoking status: Former Smoker     Packs/day: 2.00     Years: 14.00     Quit date: 3/21/1973   • Smokeless tobacco: Never Used   • Alcohol use 0.0 oz/week      Comment: 3 glasses wine/day   • Drug use: Yes      Comment: history of cocaine and marijuana use still about 10 years ago. Denies any IV drug use.          History   Drug Use     Comment: history of cocaine and marijuana use still about 10 years ago. Denies any IV drug use.       SURGICAL HISTORY   has a past surgical history that includes exploratory laparotomy (7/25/2017); sigmoid colon resection (7/25/2017); colostomy (7/25/2017); appendectomy (7/25/2017); lumbar decompression (12/2017); exc./biopsy mass back (1997); and colostomy takedown (3/21/2018).    CURRENT MEDICATIONS  No current facility-administered medications on file prior to encounter.      Current Outpatient Prescriptions on File Prior to Encounter   Medication Sig Dispense Refill   • oxyCODONE immediate-release (ROXICODONE) 5 MG Tab Take 2 Tabs by mouth every 6 hours as needed for Severe Pain for up to 10 days. 40 Tab 0   • oxyCODONE immediate-release (ROXICODONE) 5 MG Tab Take 2 Tabs by mouth every 6 hours as needed for Severe Pain for up to 10 days. 40 Tab 0   • ferrous sulfate 325 (65 Fe) MG tablet Take 325 mg by mouth every day.     • NON SPECIFIED 1400, 1500 & 2200 - unknown ABX's for pre-op     • NON SPECIFIED 1 Tab every day.     • Vancomycin HCl (VANCOMYCIN 50 MG/ML) 50 mg/mL Solution Tapering instructions:   125 mg (or 2.5 mL) orally four times daily for 10 days; then   125 mg (or 2.5 mL) orally twice daily for 7 days; then   125 mg (or 2.5 mL) orally once daily for 7 days; then   125 mg (or 2.5 mL) orally every  "other day for 7 days; then   125 mg (or 2.5 mL) orally every 3 days 1 Quantity Sufficient 0   • gabapentin (NEURONTIN) 300 MG Cap Take 300 mg by mouth 3 times a day.     • lamotrigine (LAMICTAL) 200 MG tablet Take 200 mg by mouth every day.     • potassium chloride SA (KDUR) 20 MEQ Tab CR Take 20 mEq by mouth every day.     • tramadol (ULTRAM) 50 MG TABS Take 100 mg by mouth every four hours as needed for Mild Pain.         ALLERGIES  Allergies   Allergen Reactions   • Tylenol Nausea     Very nauseated       PHYSICAL EXAM  VITAL SIGNS: /67   Pulse 70   Temp 36.8 °C (98.3 °F)   Resp 16   Ht 1.727 m (5' 8\")   Wt 56.7 kg (125 lb)   SpO2 90%   BMI 19.01 kg/m²     Pulse ox interpretation: I interpret this pulse ox as normal.  Constitutional: Alert in no apparent distress. Anxious  HENT: Normocephalic, Atraumatic, Bilateral external ears normal. Nose normal.   Eyes: Conjunctiva normal, non-icteric.   Heart: Regular rate and rythm, no murmurs.    Lungs: Clear to auscultation bilaterally.  Abdomen: Bowel sounds normal, Soft, No tenderness, Possible mild distension, No masses, No pulsatile masses. No peritoneal signs. Staple lines intact with no evidence of infection or significant tenderness  Skin: Warm, Dry, No erythema, No rash.   Neurologic: Alert, Grossly non-focal.   Psychiatric: Anxious affect, Judgment normal, Mood normal, Appears appropriate and not intoxicated.    DIAGNOSTIC STUDIES / PROCEDURES    LABS  Labs Reviewed   CBC WITH DIFFERENTIAL - Abnormal; Notable for the following:        Result Value    RBC 4.04 (*)     Hemoglobin 11.2 (*)     Hematocrit 35.3 (*)     MCHC 31.7 (*)     MPV 8.2 (*)     Neutrophils-Polys 74.90 (*)     Lymphocytes 15.60 (*)     All other components within normal limits   URINALYSIS,CULTURE IF INDICATED - Abnormal; Notable for the following:     Character Turbid (*)     Leukocyte Esterase Moderate (*)     All other components within normal limits   URINE MICROSCOPIC (W/UA) - " Abnormal; Notable for the following:     WBC 20-50 (*)     RBC 2-5 (*)     Hyaline Cast 11-20 (*)     All other components within normal limits   BASIC METABOLIC PANEL   ESTIMATED GFR   URINE CULTURE(NEW)     All labs reviewed by me.    RADIOLOGY  DX-ABDOMEN COMPLETE WITH AP OR PA CXR   Final Result         1. No specific finding to suggest small bowel obstruction.      2. Some air-fluid levels in the nondilated colon could relate to ileus.        The radiologist's interpretation of all radiological studies have been reviewed by me.    COURSE & MEDICAL DECISION MAKING  Nursing notes, VS, PMSFHx reviewed in chart.     11:16 AM Patient seen and examined at bedside. Differential diagnosis includes but is not limited to post operative ileus, less likely obstruction, infection, or electrolyte derangement. Symptoms not consistent with patient's previous episodes of C. Difficile. Ordered for DX abdomen complete with AP or PA CXR, estimated GFR, CBC with differential, and BMP to evaluate. We discussed the nature of bowel movements after receiving a colostomy takedown and proper nutrition. Patient verbalizes understanding and agreement.    12:45 PM - Re-examined; The patient is resting in bed comfortably. I discussed his above findings and plans for discharge with a referral to Dr. Rodriguez, General Surgery, and instructed to return to the ED if his symptoms worsen. The patient states that his [facility] Nurse cannot resume care because of his readmittance. I contacted scheduling regarding his concerns and they will consult. Patient understands and agrees.    12:57 PM Scheduling talked with [facility] and they will place him back on the nursing schedule.    3:09 PM this patient added additional complaint of dysuria, urine was sent, and there was evidence of a possible urinary tract infection, and I prescribed a course of Keflex, which is inked will be more tolerable in terms of GI side effects than some other  antibiotic options. He also raises the additional concern of his home care being accidentally discontinued as a result of his readmission and discharge. I spoke with social work, and they have confirmed with the patient's home care provider that with a new face-to-face document, and referral, both of which I have completed, they will put him back on their home care schedule.      The patient will return for new or worsening symptoms and is stable at the time of discharge.    DISPOSITION:  Patient will be discharged home in stable condition.    FOLLOW UP:  Jorge Rodriguez M.D.  [address]  [address]  [facility] [address]  [phone]    Schedule an appointment as soon as possible for a visit  For suture removal      FINAL IMPRESSION  1. Functional diarrhea    2. Post-operative state    3. Cachexia (CMS-HCC)    4. Hypokalemia, gastrointestinal losses    5. Debility    6. Primary osteoarthritis, unspecified site    7. Osteoarthritis, unspecified osteoarthritis type, unspecified site    8. Acute cystitis with hematuria         IPrince (Scribe), am scribing for, and in the presence of, Giovanny Mensah M.D..    Electronically signed by: Prince Pagan (Scribe), 4/4/2018    IGiovanny M.D. personally performed the services described in this documentation, as scribed by Prince Pagan in my presence, and it is both accurate and complete.      The note accurately reflects work and decisions made by me.  Giovanny Mensah  4/4/2018  3:10 PM

## 2018-04-06 LAB
BACTERIA UR CULT: NORMAL
SIGNIFICANT IND 70042: NORMAL
SITE SITE: NORMAL
SOURCE SOURCE: NORMAL

## 2018-04-10 ENCOUNTER — APPOINTMENT (OUTPATIENT)
Dept: RADIOLOGY | Facility: MEDICAL CENTER | Age: 78
End: 2018-04-10
Attending: EMERGENCY MEDICINE
Payer: MEDICARE

## 2018-04-10 ENCOUNTER — HOSPITAL ENCOUNTER (OUTPATIENT)
Facility: MEDICAL CENTER | Age: 78
End: 2018-04-11
Attending: EMERGENCY MEDICINE | Admitting: INTERNAL MEDICINE
Payer: MEDICARE

## 2018-04-10 ENCOUNTER — APPOINTMENT (OUTPATIENT)
Dept: RADIOLOGY | Facility: MEDICAL CENTER | Age: 78
End: 2018-04-10
Payer: MEDICARE

## 2018-04-10 ENCOUNTER — RESOLUTE PROFESSIONAL BILLING HOSPITAL PROF FEE (OUTPATIENT)
Dept: MEDSURG UNIT | Facility: MEDICAL CENTER | Age: 78
End: 2018-04-10
Payer: MEDICARE

## 2018-04-10 LAB
ABO GROUP BLD: NORMAL
ALBUMIN SERPL BCP-MCNC: 4.1 G/DL (ref 3.2–4.9)
ALBUMIN/GLOB SERPL: 1.9 G/DL
ALP SERPL-CCNC: 97 U/L (ref 30–99)
ALT SERPL-CCNC: <5 U/L (ref 2–50)
ANION GAP SERPL CALC-SCNC: 8 MMOL/L (ref 0–11.9)
APTT PPP: 32.2 SEC (ref 24.7–36)
AST SERPL-CCNC: 9 U/L (ref 12–45)
BASOPHILS # BLD AUTO: 0.3 % (ref 0–1.8)
BASOPHILS # BLD: 0.03 K/UL (ref 0–0.12)
BILIRUB SERPL-MCNC: 0.5 MG/DL (ref 0.1–1.5)
BLD GP AB SCN SERPL QL: NORMAL
BUN SERPL-MCNC: 9 MG/DL (ref 8–22)
CALCIUM SERPL-MCNC: 9.4 MG/DL (ref 8.5–10.5)
CHLORIDE SERPL-SCNC: 107 MMOL/L (ref 96–112)
CO2 SERPL-SCNC: 26 MMOL/L (ref 20–33)
CREAT SERPL-MCNC: 1.04 MG/DL (ref 0.5–1.4)
EOSINOPHIL # BLD AUTO: 0.04 K/UL (ref 0–0.51)
EOSINOPHIL NFR BLD: 0.4 % (ref 0–6.9)
ERYTHROCYTE [DISTWIDTH] IN BLOOD BY AUTOMATED COUNT: 47.8 FL (ref 35.9–50)
GLOBULIN SER CALC-MCNC: 2.2 G/DL (ref 1.9–3.5)
GLUCOSE SERPL-MCNC: 96 MG/DL (ref 65–99)
HCT VFR BLD AUTO: 39.8 % (ref 42–52)
HEMOCCULT STL QL: POSITIVE
HGB BLD-MCNC: 12.6 G/DL (ref 14–18)
IMM GRANULOCYTES # BLD AUTO: 0.03 K/UL (ref 0–0.11)
IMM GRANULOCYTES NFR BLD AUTO: 0.3 % (ref 0–0.9)
INR PPP: 1.08 (ref 0.87–1.13)
LIPASE SERPL-CCNC: 3 U/L (ref 11–82)
LYMPHOCYTES # BLD AUTO: 1.26 K/UL (ref 1–4.8)
LYMPHOCYTES NFR BLD: 12.3 % (ref 22–41)
MAGNESIUM SERPL-MCNC: 2.1 MG/DL (ref 1.5–2.5)
MCH RBC QN AUTO: 27.2 PG (ref 27–33)
MCHC RBC AUTO-ENTMCNC: 31.7 G/DL (ref 33.7–35.3)
MCV RBC AUTO: 85.8 FL (ref 81.4–97.8)
MONOCYTES # BLD AUTO: 0.73 K/UL (ref 0–0.85)
MONOCYTES NFR BLD AUTO: 7.1 % (ref 0–13.4)
NEUTROPHILS # BLD AUTO: 8.13 K/UL (ref 1.82–7.42)
NEUTROPHILS NFR BLD: 79.6 % (ref 44–72)
NRBC # BLD AUTO: 0 K/UL
NRBC BLD-RTO: 0 /100 WBC
PHOSPHATE SERPL-MCNC: 3.3 MG/DL (ref 2.5–4.5)
PLATELET # BLD AUTO: 338 K/UL (ref 164–446)
PMV BLD AUTO: 8.1 FL (ref 9–12.9)
POTASSIUM SERPL-SCNC: 3.7 MMOL/L (ref 3.6–5.5)
PROT SERPL-MCNC: 6.3 G/DL (ref 6–8.2)
PROTHROMBIN TIME: 13.7 SEC (ref 12–14.6)
RBC # BLD AUTO: 4.64 M/UL (ref 4.7–6.1)
RH BLD: NORMAL
SODIUM SERPL-SCNC: 141 MMOL/L (ref 135–145)
WBC # BLD AUTO: 10.2 K/UL (ref 4.8–10.8)

## 2018-04-10 PROCEDURE — 74177 CT ABD & PELVIS W/CONTRAST: CPT

## 2018-04-10 PROCEDURE — 84100 ASSAY OF PHOSPHORUS: CPT

## 2018-04-10 PROCEDURE — 86850 RBC ANTIBODY SCREEN: CPT

## 2018-04-10 PROCEDURE — A9270 NON-COVERED ITEM OR SERVICE: HCPCS | Performed by: EMERGENCY MEDICINE

## 2018-04-10 PROCEDURE — 71045 X-RAY EXAM CHEST 1 VIEW: CPT

## 2018-04-10 PROCEDURE — 85025 COMPLETE CBC W/AUTO DIFF WBC: CPT

## 2018-04-10 PROCEDURE — 700102 HCHG RX REV CODE 250 W/ 637 OVERRIDE(OP): Performed by: STUDENT IN AN ORGANIZED HEALTH CARE EDUCATION/TRAINING PROGRAM

## 2018-04-10 PROCEDURE — 86901 BLOOD TYPING SEROLOGIC RH(D): CPT

## 2018-04-10 PROCEDURE — 36415 COLL VENOUS BLD VENIPUNCTURE: CPT

## 2018-04-10 PROCEDURE — 82272 OCCULT BLD FECES 1-3 TESTS: CPT

## 2018-04-10 PROCEDURE — 87324 CLOSTRIDIUM AG IA: CPT

## 2018-04-10 PROCEDURE — 83735 ASSAY OF MAGNESIUM: CPT

## 2018-04-10 PROCEDURE — A9270 NON-COVERED ITEM OR SERVICE: HCPCS | Performed by: STUDENT IN AN ORGANIZED HEALTH CARE EDUCATION/TRAINING PROGRAM

## 2018-04-10 PROCEDURE — 700102 HCHG RX REV CODE 250 W/ 637 OVERRIDE(OP): Performed by: EMERGENCY MEDICINE

## 2018-04-10 PROCEDURE — 700117 HCHG RX CONTRAST REV CODE 255: Performed by: EMERGENCY MEDICINE

## 2018-04-10 PROCEDURE — G0378 HOSPITAL OBSERVATION PER HR: HCPCS

## 2018-04-10 PROCEDURE — 85610 PROTHROMBIN TIME: CPT

## 2018-04-10 PROCEDURE — 80053 COMPREHEN METABOLIC PANEL: CPT

## 2018-04-10 PROCEDURE — 87493 C DIFF AMPLIFIED PROBE: CPT

## 2018-04-10 PROCEDURE — 85730 THROMBOPLASTIN TIME PARTIAL: CPT

## 2018-04-10 PROCEDURE — 86900 BLOOD TYPING SEROLOGIC ABO: CPT

## 2018-04-10 PROCEDURE — 99285 EMERGENCY DEPT VISIT HI MDM: CPT

## 2018-04-10 PROCEDURE — 89055 LEUKOCYTE ASSESSMENT FECAL: CPT

## 2018-04-10 PROCEDURE — 83690 ASSAY OF LIPASE: CPT

## 2018-04-10 RX ORDER — POTASSIUM CHLORIDE 20 MEQ/1
40 TABLET, EXTENDED RELEASE ORAL ONCE
Status: COMPLETED | OUTPATIENT
Start: 2018-04-10 | End: 2018-04-10

## 2018-04-10 RX ORDER — TRAMADOL HYDROCHLORIDE 50 MG/1
100 TABLET ORAL EVERY 4 HOURS PRN
Status: DISCONTINUED | OUTPATIENT
Start: 2018-04-10 | End: 2018-04-11

## 2018-04-10 RX ORDER — POLYETHYLENE GLYCOL 3350 17 G/17G
1 POWDER, FOR SOLUTION ORAL
Status: DISCONTINUED | OUTPATIENT
Start: 2018-04-10 | End: 2018-04-10

## 2018-04-10 RX ORDER — GABAPENTIN 300 MG/1
300 CAPSULE ORAL 2 TIMES DAILY
Status: DISCONTINUED | OUTPATIENT
Start: 2018-04-11 | End: 2018-04-11 | Stop reason: HOSPADM

## 2018-04-10 RX ORDER — BISACODYL 10 MG
10 SUPPOSITORY, RECTAL RECTAL
Status: DISCONTINUED | OUTPATIENT
Start: 2018-04-10 | End: 2018-04-10

## 2018-04-10 RX ORDER — FERROUS SULFATE 325(65) MG
325 TABLET ORAL DAILY
Status: DISCONTINUED | OUTPATIENT
Start: 2018-04-11 | End: 2018-04-11 | Stop reason: HOSPADM

## 2018-04-10 RX ORDER — AMOXICILLIN 250 MG
2 CAPSULE ORAL 2 TIMES DAILY
Status: DISCONTINUED | OUTPATIENT
Start: 2018-04-10 | End: 2018-04-10

## 2018-04-10 RX ORDER — SODIUM CHLORIDE 9 MG/ML
INJECTION, SOLUTION INTRAVENOUS CONTINUOUS
Status: DISCONTINUED | OUTPATIENT
Start: 2018-04-10 | End: 2018-04-11 | Stop reason: HOSPADM

## 2018-04-10 RX ORDER — LAMOTRIGINE 100 MG/1
200 TABLET ORAL DAILY
Status: DISCONTINUED | OUTPATIENT
Start: 2018-04-11 | End: 2018-04-11 | Stop reason: HOSPADM

## 2018-04-10 RX ORDER — POTASSIUM CHLORIDE 20 MEQ/1
20 TABLET, EXTENDED RELEASE ORAL DAILY
Status: DISCONTINUED | OUTPATIENT
Start: 2018-04-11 | End: 2018-04-11

## 2018-04-10 RX ADMIN — VANCOMYCIN HYDROCHLORIDE 125 MG: 10 INJECTION, POWDER, LYOPHILIZED, FOR SOLUTION INTRAVENOUS at 22:06

## 2018-04-10 RX ADMIN — POTASSIUM CHLORIDE 40 MEQ: 1500 TABLET, EXTENDED RELEASE ORAL at 22:05

## 2018-04-10 RX ADMIN — IOHEXOL 80 ML: 350 INJECTION, SOLUTION INTRAVENOUS at 22:47

## 2018-04-10 ASSESSMENT — ENCOUNTER SYMPTOMS
DIARRHEA: 1
ABDOMINAL PAIN: 1
BLOOD IN STOOL: 1

## 2018-04-10 NOTE — ED TRIAGE NOTES
"To triage by wheelchair for rectal pain, blood on toilet paper while wiping and diarrhea described as \"pancake batter\". Seen for similar 4 days ago. Bleeding is new today. VSS. Explained triage process, to waiting room. Asked to inform RN if questions or concerns arise.   "

## 2018-04-11 ENCOUNTER — PATIENT OUTREACH (OUTPATIENT)
Dept: HEALTH INFORMATION MANAGEMENT | Facility: OTHER | Age: 78
End: 2018-04-11

## 2018-04-11 VITALS
OXYGEN SATURATION: 94 % | RESPIRATION RATE: 16 BRPM | HEIGHT: 68 IN | DIASTOLIC BLOOD PRESSURE: 54 MMHG | WEIGHT: 119.49 LBS | BODY MASS INDEX: 18.11 KG/M2 | TEMPERATURE: 97.6 F | HEART RATE: 83 BPM | SYSTOLIC BLOOD PRESSURE: 115 MMHG

## 2018-04-11 PROBLEM — D50.0 IRON DEFICIENCY ANEMIA DUE TO CHRONIC BLOOD LOSS: Chronic | Status: ACTIVE | Noted: 2018-01-30

## 2018-04-11 PROBLEM — D64.9 NORMOCYTIC ANEMIA: Chronic | Status: ACTIVE | Noted: 2017-07-27

## 2018-04-11 PROBLEM — R53.81 DEBILITY: Chronic | Status: ACTIVE | Noted: 2018-01-26

## 2018-04-11 PROBLEM — E11.9 TYPE 2 DIABETES MELLITUS (HCC): Chronic | Status: ACTIVE | Noted: 2017-12-19

## 2018-04-11 PROBLEM — N40.0 BENIGN PROSTATIC HYPERPLASIA: Chronic | Status: ACTIVE | Noted: 2017-07-14

## 2018-04-11 PROBLEM — R64 CACHEXIA (HCC): Chronic | Status: ACTIVE | Noted: 2018-02-03

## 2018-04-11 PROBLEM — R19.7 DIARRHEA: Chronic | Status: ACTIVE | Noted: 2018-04-11

## 2018-04-11 PROBLEM — N39.0 UTI (URINARY TRACT INFECTION): Chronic | Status: ACTIVE | Noted: 2018-04-11

## 2018-04-11 LAB
ALBUMIN SERPL BCP-MCNC: 3.5 G/DL (ref 3.2–4.9)
ALBUMIN/GLOB SERPL: 1.8 G/DL
ALP SERPL-CCNC: 82 U/L (ref 30–99)
ALT SERPL-CCNC: <5 U/L (ref 2–50)
ANION GAP SERPL CALC-SCNC: 8 MMOL/L (ref 0–11.9)
APPEARANCE UR: ABNORMAL
AST SERPL-CCNC: 9 U/L (ref 12–45)
BACTERIA #/AREA URNS HPF: NEGATIVE /HPF
BASOPHILS # BLD AUTO: 0.3 % (ref 0–1.8)
BASOPHILS # BLD: 0.03 K/UL (ref 0–0.12)
BILIRUB SERPL-MCNC: 0.5 MG/DL (ref 0.1–1.5)
BILIRUB UR QL STRIP.AUTO: NEGATIVE
BUN SERPL-MCNC: 8 MG/DL (ref 8–22)
C DIFF DNA SPEC QL NAA+PROBE: POSITIVE
C DIFF TOX A+B STL QL IA: POSITIVE
C DIFF TOX GENS STL QL NAA+PROBE: NORMAL
CALCIUM SERPL-MCNC: 9.1 MG/DL (ref 8.5–10.5)
CHLORIDE SERPL-SCNC: 107 MMOL/L (ref 96–112)
CO2 SERPL-SCNC: 24 MMOL/L (ref 20–33)
COLOR UR: YELLOW
CREAT SERPL-MCNC: 0.95 MG/DL (ref 0.5–1.4)
EOSINOPHIL # BLD AUTO: 0.06 K/UL (ref 0–0.51)
EOSINOPHIL NFR BLD: 0.6 % (ref 0–6.9)
EPI CELLS #/AREA URNS HPF: ABNORMAL /HPF
ERYTHROCYTE [DISTWIDTH] IN BLOOD BY AUTOMATED COUNT: 49.1 FL (ref 35.9–50)
FERRITIN SERPL-MCNC: 33 NG/ML (ref 22–322)
GLOBULIN SER CALC-MCNC: 2 G/DL (ref 1.9–3.5)
GLUCOSE SERPL-MCNC: 117 MG/DL (ref 65–99)
GLUCOSE UR STRIP.AUTO-MCNC: NEGATIVE MG/DL
HCT VFR BLD AUTO: 36.5 % (ref 42–52)
HGB BLD-MCNC: 11.5 G/DL (ref 14–18)
IMM GRANULOCYTES # BLD AUTO: 0.03 K/UL (ref 0–0.11)
IMM GRANULOCYTES NFR BLD AUTO: 0.3 % (ref 0–0.9)
IRON SATN MFR SERPL: 4 % (ref 15–55)
IRON SERPL-MCNC: 14 UG/DL (ref 50–180)
KETONES UR STRIP.AUTO-MCNC: ABNORMAL MG/DL
LEUKOCYTE ESTERASE UR QL STRIP.AUTO: NEGATIVE
LYMPHOCYTES # BLD AUTO: 2.05 K/UL (ref 1–4.8)
LYMPHOCYTES NFR BLD: 21.7 % (ref 22–41)
MCH RBC QN AUTO: 26.8 PG (ref 27–33)
MCHC RBC AUTO-ENTMCNC: 31.5 G/DL (ref 33.7–35.3)
MCV RBC AUTO: 85.1 FL (ref 81.4–97.8)
MICRO URNS: ABNORMAL
MONOCYTES # BLD AUTO: 0.9 K/UL (ref 0–0.85)
MONOCYTES NFR BLD AUTO: 9.5 % (ref 0–13.4)
MUCOUS THREADS #/AREA URNS HPF: ABNORMAL /HPF
NEUTROPHILS # BLD AUTO: 6.39 K/UL (ref 1.82–7.42)
NEUTROPHILS NFR BLD: 67.6 % (ref 44–72)
NITRITE UR QL STRIP.AUTO: NEGATIVE
NRBC # BLD AUTO: 0 K/UL
NRBC BLD-RTO: 0 /100 WBC
PH UR STRIP.AUTO: 5.5 [PH]
PLATELET # BLD AUTO: 318 K/UL (ref 164–446)
PMV BLD AUTO: 8.3 FL (ref 9–12.9)
POTASSIUM SERPL-SCNC: 3.5 MMOL/L (ref 3.6–5.5)
PREALB SERPL-MCNC: 12 MG/DL (ref 18–38)
PROT SERPL-MCNC: 5.5 G/DL (ref 6–8.2)
PROT UR QL STRIP: NEGATIVE MG/DL
RBC # BLD AUTO: 4.29 M/UL (ref 4.7–6.1)
RBC # URNS HPF: ABNORMAL /HPF
RBC UR QL AUTO: NEGATIVE
SODIUM SERPL-SCNC: 139 MMOL/L (ref 135–145)
SP GR UR REFRACTOMETRY: >1.045
STOOL OTHER ELEMENTS 1951: NORMAL
TIBC SERPL-MCNC: 314 UG/DL (ref 250–450)
UROBILINOGEN UR STRIP.AUTO-MCNC: 0.2 MG/DL
WBC # BLD AUTO: 9.5 K/UL (ref 4.8–10.8)
WBC #/AREA URNS HPF: ABNORMAL /HPF
WBC STL QL MICRO: NORMAL

## 2018-04-11 PROCEDURE — 82728 ASSAY OF FERRITIN: CPT

## 2018-04-11 PROCEDURE — 84134 ASSAY OF PREALBUMIN: CPT

## 2018-04-11 PROCEDURE — 302255 BARRIER CREAM MOISTURE BAZA PROTECT (ZINC) 5OZ: Performed by: INTERNAL MEDICINE

## 2018-04-11 PROCEDURE — 83550 IRON BINDING TEST: CPT

## 2018-04-11 PROCEDURE — 87086 URINE CULTURE/COLONY COUNT: CPT

## 2018-04-11 PROCEDURE — 700102 HCHG RX REV CODE 250 W/ 637 OVERRIDE(OP): Performed by: STUDENT IN AN ORGANIZED HEALTH CARE EDUCATION/TRAINING PROGRAM

## 2018-04-11 PROCEDURE — 80053 COMPREHEN METABOLIC PANEL: CPT

## 2018-04-11 PROCEDURE — 83540 ASSAY OF IRON: CPT

## 2018-04-11 PROCEDURE — 51798 US URINE CAPACITY MEASURE: CPT

## 2018-04-11 PROCEDURE — 700105 HCHG RX REV CODE 258: Performed by: STUDENT IN AN ORGANIZED HEALTH CARE EDUCATION/TRAINING PROGRAM

## 2018-04-11 PROCEDURE — 81001 URINALYSIS AUTO W/SCOPE: CPT

## 2018-04-11 PROCEDURE — 700102 HCHG RX REV CODE 250 W/ 637 OVERRIDE(OP): Performed by: EMERGENCY MEDICINE

## 2018-04-11 PROCEDURE — A9270 NON-COVERED ITEM OR SERVICE: HCPCS | Performed by: EMERGENCY MEDICINE

## 2018-04-11 PROCEDURE — 85025 COMPLETE CBC W/AUTO DIFF WBC: CPT

## 2018-04-11 PROCEDURE — 99217 PR OBSERVATION CARE DISCHARGE: CPT | Performed by: INTERNAL MEDICINE

## 2018-04-11 PROCEDURE — G0378 HOSPITAL OBSERVATION PER HR: HCPCS

## 2018-04-11 PROCEDURE — A9270 NON-COVERED ITEM OR SERVICE: HCPCS | Performed by: STUDENT IN AN ORGANIZED HEALTH CARE EDUCATION/TRAINING PROGRAM

## 2018-04-11 RX ORDER — L. ACIDOPHILUS/L.BULGARICUS 100MM CELL
1 GRANULES IN PACKET (EA) ORAL
Status: DISCONTINUED | OUTPATIENT
Start: 2018-04-11 | End: 2018-04-11 | Stop reason: HOSPADM

## 2018-04-11 RX ORDER — OXYCODONE HYDROCHLORIDE 5 MG/1
5 TABLET ORAL EVERY 6 HOURS PRN
Status: DISCONTINUED | OUTPATIENT
Start: 2018-04-11 | End: 2018-04-11 | Stop reason: HOSPADM

## 2018-04-11 RX ORDER — ALFUZOSIN HYDROCHLORIDE 10 MG/1
10 TABLET, EXTENDED RELEASE ORAL
Status: DISCONTINUED | OUTPATIENT
Start: 2018-04-11 | End: 2018-04-11

## 2018-04-11 RX ORDER — POTASSIUM CHLORIDE 20 MEQ/1
40 TABLET, EXTENDED RELEASE ORAL DAILY
Status: DISCONTINUED | OUTPATIENT
Start: 2018-04-11 | End: 2018-04-11

## 2018-04-11 RX ORDER — CALCIUM POLYCARBOPHIL 625 MG 625 MG/1
625 TABLET ORAL
Status: DISCONTINUED | OUTPATIENT
Start: 2018-04-11 | End: 2018-04-11 | Stop reason: HOSPADM

## 2018-04-11 RX ORDER — TAMSULOSIN HYDROCHLORIDE 0.4 MG/1
0.4 CAPSULE ORAL DAILY
Status: ON HOLD | COMMUNITY
Start: 2017-11-11 | End: 2018-07-18

## 2018-04-11 RX ORDER — M-VIT,TX,IRON,MINS/CALC/FOLIC 27MG-0.4MG
1 TABLET ORAL DAILY
Qty: 30 TAB | Refills: 11 | Status: ON HOLD
Start: 2018-04-12 | End: 2018-09-20 | Stop reason: CLARIF

## 2018-04-11 RX ORDER — L. ACIDOPHILUS/L.BULGARICUS 100MM CELL
1 GRANULES IN PACKET (EA) ORAL
Qty: 60 PACKET | Refills: 0 | Status: ON HOLD | OUTPATIENT
Start: 2018-04-11 | End: 2018-09-20 | Stop reason: CLARIF

## 2018-04-11 RX ORDER — VANCOMYCIN HYDROCHLORIDE 125 MG/1
125 CAPSULE ORAL SEE ADMIN INSTRUCTIONS
Qty: 90 CAP | Refills: 0 | Status: ON HOLD | OUTPATIENT
Start: 2018-04-11 | End: 2018-07-18

## 2018-04-11 RX ORDER — TRAMADOL HYDROCHLORIDE 50 MG/1
100 TABLET ORAL EVERY 4 HOURS PRN
Status: DISCONTINUED | OUTPATIENT
Start: 2018-04-11 | End: 2018-04-11 | Stop reason: HOSPADM

## 2018-04-11 RX ORDER — OXYCODONE HYDROCHLORIDE 5 MG/1
7.5 TABLET ORAL EVERY 6 HOURS PRN
Status: ON HOLD | COMMUNITY
Start: 2017-11-11 | End: 2018-09-20 | Stop reason: CLARIF

## 2018-04-11 RX ORDER — ALFUZOSIN HYDROCHLORIDE 10 MG/1
10 TABLET, EXTENDED RELEASE ORAL DAILY
COMMUNITY
Start: 2017-04-12

## 2018-04-11 RX ORDER — M-VIT,TX,IRON,MINS/CALC/FOLIC 27MG-0.4MG
1 TABLET ORAL DAILY
Status: DISCONTINUED | OUTPATIENT
Start: 2018-04-11 | End: 2018-04-11 | Stop reason: HOSPADM

## 2018-04-11 RX ORDER — POTASSIUM CHLORIDE 20 MEQ/1
40 TABLET, EXTENDED RELEASE ORAL ONCE
Status: COMPLETED | OUTPATIENT
Start: 2018-04-11 | End: 2018-04-11

## 2018-04-11 RX ORDER — CALCIUM POLYCARBOPHIL 625 MG
625 TABLET ORAL
Qty: 90 TAB | Refills: 0 | Status: ON HOLD | OUTPATIENT
Start: 2018-04-11 | End: 2018-09-20 | Stop reason: CLARIF

## 2018-04-11 RX ORDER — TAMSULOSIN HYDROCHLORIDE 0.4 MG/1
0.4 CAPSULE ORAL
Status: DISCONTINUED | OUTPATIENT
Start: 2018-04-11 | End: 2018-04-11 | Stop reason: HOSPADM

## 2018-04-11 RX ADMIN — OXYCODONE HYDROCHLORIDE 5 MG: 5 TABLET ORAL at 10:02

## 2018-04-11 RX ADMIN — POTASSIUM CHLORIDE 40 MEQ: 1500 TABLET, EXTENDED RELEASE ORAL at 05:40

## 2018-04-11 RX ADMIN — GABAPENTIN 300 MG: 300 CAPSULE ORAL at 10:03

## 2018-04-11 RX ADMIN — OXYCODONE HYDROCHLORIDE 5 MG: 5 TABLET ORAL at 03:17

## 2018-04-11 RX ADMIN — MULTIPLE VITAMINS W/ MINERALS TAB 1 TABLET: TAB at 10:03

## 2018-04-11 RX ADMIN — LAMOTRIGINE 200 MG: 100 TABLET ORAL at 10:04

## 2018-04-11 RX ADMIN — SODIUM CHLORIDE: 9 INJECTION, SOLUTION INTRAVENOUS at 00:36

## 2018-04-11 RX ADMIN — TAMSULOSIN HYDROCHLORIDE 0.4 MG: 0.4 CAPSULE ORAL at 10:05

## 2018-04-11 RX ADMIN — OXYCODONE HYDROCHLORIDE 5 MG: 5 TABLET ORAL at 16:00

## 2018-04-11 RX ADMIN — LACTOBACILLUS ACIDOPHILUS / LACTOBACILLUS BULGARICUS 1 PACKET: 100 MILLION CFU STRENGTH GRANULES at 10:06

## 2018-04-11 RX ADMIN — SODIUM CHLORIDE: 9 INJECTION, SOLUTION INTRAVENOUS at 10:01

## 2018-04-11 RX ADMIN — POTASSIUM CHLORIDE 40 MEQ: 1500 TABLET, EXTENDED RELEASE ORAL at 10:05

## 2018-04-11 RX ADMIN — VANCOMYCIN HYDROCHLORIDE 125 MG: 10 INJECTION, POWDER, LYOPHILIZED, FOR SOLUTION INTRAVENOUS at 05:39

## 2018-04-11 RX ADMIN — VANCOMYCIN HYDROCHLORIDE 125 MG: 10 INJECTION, POWDER, LYOPHILIZED, FOR SOLUTION INTRAVENOUS at 14:00

## 2018-04-11 RX ADMIN — Medication 325 MG: at 10:03

## 2018-04-11 ASSESSMENT — ENCOUNTER SYMPTOMS
MYALGIAS: 0
NERVOUS/ANXIOUS: 0
EYE DISCHARGE: 0
SHORTNESS OF BREATH: 0
FOCAL WEAKNESS: 0
FEVER: 0
VOMITING: 0
DEPRESSION: 0
CHILLS: 0
DIZZINESS: 0
COUGH: 0
BLOOD IN STOOL: 0
DIARRHEA: 1
FLANK PAIN: 0
SENSORY CHANGE: 1
NAUSEA: 0
BACK PAIN: 0
BLURRED VISION: 0
POLYDIPSIA: 1
ABDOMINAL PAIN: 0
BRUISES/BLEEDS EASILY: 0

## 2018-04-11 ASSESSMENT — PATIENT HEALTH QUESTIONNAIRE - PHQ9
1. LITTLE INTEREST OR PLEASURE IN DOING THINGS: NOT AT ALL
SUM OF ALL RESPONSES TO PHQ9 QUESTIONS 1 AND 2: 0
2. FEELING DOWN, DEPRESSED, IRRITABLE, OR HOPELESS: NOT AT ALL

## 2018-04-11 ASSESSMENT — PAIN SCALES - GENERAL
PAINLEVEL_OUTOF10: 4
PAINLEVEL_OUTOF10: 3
PAINLEVEL_OUTOF10: 7
PAINLEVEL_OUTOF10: 8

## 2018-04-11 ASSESSMENT — LIFESTYLE VARIABLES
TOTAL SCORE: 0
AVERAGE NUMBER OF DAYS PER WEEK YOU HAVE A DRINK CONTAINING ALCOHOL: 7
EVER HAD A DRINK FIRST THING IN THE MORNING TO STEADY YOUR NERVES TO GET RID OF A HANGOVER: NO
CONSUMPTION TOTAL: NEGATIVE
HAVE YOU EVER FELT YOU SHOULD CUT DOWN ON YOUR DRINKING: NO
EVER_SMOKED: NEVER
HOW MANY TIMES IN THE PAST YEAR HAVE YOU HAD 5 OR MORE DRINKS IN A DAY: 0
ALCOHOL_USE: YES
EVER FELT BAD OR GUILTY ABOUT YOUR DRINKING: NO
ON A TYPICAL DAY WHEN YOU DRINK ALCOHOL HOW MANY DRINKS DO YOU HAVE: 1
HAVE PEOPLE ANNOYED YOU BY CRITICIZING YOUR DRINKING: NO
TOTAL SCORE: 0
TOTAL SCORE: 0

## 2018-04-11 NOTE — DISCHARGE PLANNING
Transitional Care Navigator:     Per chart review patient has recently been in the hospital and discharged without services only to be back in the hospital. Patient has  been to [facility] and been on service with [facility] in the past. Due to patient medical history, LACE+ of 69 and limited mobility per RN patient would like benefit from transitional care services  (SNF vs HH) following his hospitalization. Recommend referral to therapy be placed to assist with DC recommendations. TCN following as needed.

## 2018-04-11 NOTE — ED NOTES
Called report to Elissa ARRIAGA RN. Pt is aware of POC. Pt belongings are on bed. Pt is ready for transport.   f

## 2018-04-11 NOTE — NON-PROVIDER
"        Internal Medicine Discharge Summary  Note Author: Pat Chew, Student       Admit Date:  4/10/2018       Discharge Date:   4/11/2018    Service:   R Internal Medicine Gray Team  Attending Physician(s):    Pipe Martin MD       Senior Resident(s):   Trisha Jay MD    Primary Diagnosis:   Diarrhea     Secondary Diagnoses:                Principal Problem:    Diarrhea (Chronic) POA: Yes  Active Problems:    Iron deficiency anemia due to chronic blood loss (Chronic) POA: Yes    Type 2 diabetes mellitus (CMS-HCC) (Chronic) POA: Yes    Debility (Chronic) POA: Yes    Cachexia (CMS-HCC) (Chronic) POA: Yes    UTI (urinary tract infection) (Chronic) POA: Unknown    Benign prostatic hyperplasia (Chronic) POA: Yes    Bipolar 1 disorder, depressed (CMS-HCC) (Chronic) POA: Yes  Resolved Problems:    * No resolved hospital problems. *      Hospital Summary (Brief Narrative):       Mr. Colon presented with a 1 week history of diarrhea with the consistency of \"pancake batter\". He is 3 weeks out from a reanastomosis/colostomy takedown after a sigmoid resection with primary colostomy done 7/2017 due to a pelvic abscess. He has a prior history of C. Diff colitis in 12/2017 and again in 1/2018. C. Diff in 1/2018 was 027-NAP1 +.  Mr. Colon symptomatically improved overnight with decreasing episodes of diarrhea on a liquid diet and with IV fluids. His surgeon, Dr. Rodriguez, saw him and did not suspect any acute process. The likelihood of C. Diff colitis was low. By the afternoon of his second hospital day he was feeling well enough to return home without any abdominal pain, nausea/vomiting and decreasing episodes of diarrhea. He was given a script for oral vancomycin will take it if notified that his test returns positive.     Patient /Hospital Summary (Details -- Problem Oriented) :          Iron deficiency anemia due to chronic blood loss   Assessment & Plan    - ADAMA per work up on 1/30/18   - continue iron supplement   "   - discontinue iron if active infxn         * Diarrhea   Assessment & Plan    - history of pelvic abscess associated bowel perforation s/p exploratory laparotomy, washout sigmoid colon resection colostomy placement and appendectomy on 7/25/2017, Two episodes of C. difficile colitis on 12/18/2017 & 1/24/2018 (NAP1 strain), colostomy takedown (3/21/2018)   - diagnosis with functional diarrhea in the past   - came in for increasing  'cake batter' stool, noticed fresh bright blood when he wiped himself  - 4/4/2018, pt came into ED for dysuria & discharged with Keflex   - stool WBC negative  - CT Abdomen & pelvis showed colonic wall thickening probably related involving rectosigmoid and ascending portions, most likely inflammatory. No bowel obstruction or evidence for bowel perforation.  - FOBT +ve  - likely functional diarrhea s/p colostomy take down v c diff colitis but less likely given presentation   Plan:   - c diff study pending  - IVF maintenance   - special contact precautions   - advance diet as tolerated  - hold Vanco PO pending C diff study   - lactobacillus daily   - general surgery consult, appreciate recommendations  - dietary restrictions including low fructose           UTI (urinary tract infection)   Assessment & Plan    - c/o dysuria & urinary retension   - s/p Keflex (patient previously was in ED for similar complaints)  - negative urine ctx in the past   - flomax daily, consider increasing as needed   - Bladder scan q6h with straight cath if >350 cc         Cachexia (CMS-HCC)   Assessment & Plan    - nutrition consult  - pre-albumin ordered   - case Mx in AM   - multi-vitamin started   -  pressure ulcer prevention protocol           Debility   Assessment & Plan    - baseline wheelchair bound   - PT / OT   - pressure ulcer prevention protocol         Type 2 diabetes mellitus (CMS-HCC)   Assessment & Plan    - hold home regimen  - Hb A1c 5.6 & low HDL 16  - continue gluc checks        Bipolar 1  disorder, depressed (CMS-HCC)   Assessment & Plan    - mood is good , denied SI/HI   - continue home lamotrigine         Benign prostatic hyperplasia   Assessment & Plan    - chronic   - on home flomax   - continue home flomax   - bladder scan daily   - UA & Urine Cx ordered               Consultants:     Dr. Martinez, General Surgery    Procedures:        Staple removal at bedside     Imaging/ Testing:      CT-ABDOMEN-PELVIS WITH   Final Result      1.  Colonic wall thickening probably related involving rectosigmoid and ascending portions, most likely inflammatory.   2.  No bowel obstruction or evidence for bowel perforation.   3.  Postoperative changes as described.      DX-CHEST-LIMITED (1 VIEW)   Final Result      1.  Worsening hypoinflation.   2.  No pneumonia or pneumothorax.        Labs Reviewed   CBC WITH DIFFERENTIAL - Abnormal; Notable for the following:        Result Value    RBC 4.64 (*)     Hemoglobin 12.6 (*)     Hematocrit 39.8 (*)     MCHC 31.7 (*)     MPV 8.1 (*)     Neutrophils-Polys 79.60 (*)     Lymphocytes 12.30 (*)     Neutrophils (Absolute) 8.13 (*)     All other components within normal limits    Narrative:     Indicate which anticoagulants the patient is on:->UNKNOWN   COMP METABOLIC PANEL - Abnormal; Notable for the following:     AST(SGOT) 9 (*)     All other components within normal limits    Narrative:     Indicate which anticoagulants the patient is on:->UNKNOWN   LIPASE - Abnormal; Notable for the following:     Lipase 3 (*)     All other components within normal limits    Narrative:     Indicate which anticoagulants the patient is on:->UNKNOWN   OCCULT BLOOD STOOL - Abnormal; Notable for the following:     Occult Blood Feces Positive (*)     All other components within normal limits   CBC WITH DIFFERENTIAL - Abnormal; Notable for the following:     RBC 4.29 (*)     Hemoglobin 11.5 (*)     Hematocrit 36.5 (*)     MCH 26.8 (*)     MCHC 31.5 (*)     MPV 8.3 (*)     Lymphocytes 21.70  (*)     Monos (Absolute) 0.90 (*)     All other components within normal limits   COMP METABOLIC PANEL - Abnormal; Notable for the following:     Potassium 3.5 (*)     Glucose 117 (*)     AST(SGOT) 9 (*)     Total Protein 5.5 (*)     All other components within normal limits   URINALYSIS - Abnormal; Notable for the following:     Character Cloudy (*)     Ketones Trace (*)     All other components within normal limits    Narrative:     Contact  Indication for culture:->Dysuria/Frequency/Burning   IRON/TOTAL IRON BIND - Abnormal; Notable for the following:     Iron 14 (*)     % Saturation 4 (*)     All other components within normal limits   PREALBUMIN - Abnormal; Notable for the following:     Pre-Albumin 12.0 (*)     All other components within normal limits   URINE MICROSCOPIC (W/UA) - Abnormal; Notable for the following:     WBC Rare (*)     All other components within normal limits    Narrative:     Contact  Indication for culture:->Dysuria/Frequency/Burning   COD (ADULT)   PROTHROMBIN TIME    Narrative:     Indicate which anticoagulants the patient is on:->UNKNOWN   APTT    Narrative:     Indicate which anticoagulants the patient is on:->UNKNOWN   ESTIMATED GFR    Narrative:     Indicate which anticoagulants the patient is on:->UNKNOWN   CDIFF BY PCR RFLX TOXIN    Narrative:     Does this patient have risk factors for C-diff?->Yes  C-Diff Risk Factors->antibiotic exposure  recent hospital stay, hx of c. diff   MAGNESIUM   STOOL WBC'S    Narrative:     Does this patient have risk factors for C-diff?->Yes  C-Diff Risk Factors->antibiotic exposure   recent hospital stay, hx of c. diff   PHOSPHORUS   URINE CULTURE(NEW)    Narrative:     Contact  Indication for culture:->Dysuria/Frequency/Burning   FERRITIN    Narrative:     Contact   ESTIMATED GFR   REFRACTOMETER SG    Narrative:     Contact  Indication for culture:->Dysuria/Frequency/Burning     Discharge Medications:         Medication Reconciliation:  Completed       Medication List      ASK your doctor about these medications      Instructions   alfuzosin 10 MG SR tablet  Commonly known as:  UROXATRAL   Take 10 mg by mouth.  Dose:  10 mg     cephALEXin 500 MG Caps  Commonly known as:  KEFLEX   Take 1 Cap by mouth 2 times a day for 7 days.  Dose:  500 mg     ferrous sulfate 325 (65 Fe) MG tablet   Take 325 mg by mouth every day.  Dose:  325 mg     gabapentin 300 MG Caps  Commonly known as:  NEURONTIN   Take 300 mg by mouth 3 times a day.  Dose:  300 mg     LAMICTAL 200 MG tablet  Generic drug:  lamotrigine   Take 200 mg by mouth every day.  Dose:  200 mg     oxyCODONE immediate-release 5 MG Tabs  Commonly known as:  ROXICODONE   Take 5 mg by mouth every 6 hours as needed.  Dose:  5 mg     potassium chloride SA 20 MEQ Tbcr  Commonly known as:  Kdur   Take 20 mEq by mouth every day.  Dose:  20 mEq     tamsulosin 0.4 MG capsule  Commonly known as:  FLOMAX   Take 0.4 mg by mouth every day.  Dose:  0.4 mg     tramadol 50 MG Tabs  Commonly known as:  ULTRAM   Take 100 mg by mouth every four hours as needed for Mild Pain.  Dose:  100 mg          Disposition:  Discharged home in stable condition    Diet:   Resume regular diet    Activity:   As tolerated. Patient currently working outpatient with PT/OT to regain strength after repeated hospitalizations over the past year. Currently using a wheelchair, but not wheelchair bound at baseline.     Instructions:      The patient is instructed to take fiber for 1 week. He will be notified if his C. Diff test returns positive and was given a prescription for oral Vancomycin to take if he is notified of a positive test. He will follow up with his PCP late this week.      The patient was instructed to return to the ER in the event of worsening symptoms. I have counseled the patient on the importance of compliance and the patient has agreed to proceed with all medical recommendations and follow up plan indicated above.   The patient  "understands that all medications come with benefits and risks. Risks may include permanent injury or death and these risks can be minimized with close reassessment and monitoring.        Primary Care Provider:    Dr. Humza Butler, [facility]  Discharge summary faxed to primary care provider:   Copy of discharge summary given to the patient:     Follow up appointment details :      Pending appointment with new PCP, Dr. Humza Butler, [facility]    Pending Studies:        C. Diff Toxin     Time spent on discharge day patient visit, preparing discharge paperwork and arranging for patient follow up.    Summary of follow up issues:   Diarrhea - Likely functional after reanastomosis. Patient will be notified of C. Diff results and has a script of Vancomycin to take just in case.     Discharge Time (Minutes) :  <30 minutes  Hospital Course Type: Observation      Condition on Discharge: Stable   ______________________________________________________________________    Interval history/exam for day of discharge:     The patient reports that he has improved overnight. He reports only a few episodes of diarrhea. He denies any nausea, vomiting or abdominal pain.     Vitals:    04/10/18 2356 04/11/18 0343 04/11/18 0827 04/11/18 1251   BP: 144/66 121/56 120/52 116/58   Pulse: 81 69 71 83   Resp: 16 18 16 16   Temp: 36.2 °C (97.2 °F) 37.5 °C (99.5 °F) 37.1 °C (98.7 °F) 37.9 °C (100.2 °F)   SpO2: 96% 96% 93% 95%   Weight: 54.2 kg (119 lb 7.8 oz)      Height: 1.727 m (5' 8\")        Weight/BMI: Body mass index is 18.17 kg/m².  Pulse Oximetry: 95 %, O2 (LPM): 0, O2 Delivery: None (Room Air)    General: Well developed, but thin appearing male in no acute distress  CVS: Regular rate and rhythm without murmurs  PULM: Clear to auscultation bilaterally  ABD: Soft and non-tender. Staples have been removed from abdomen without any excess erythema or edema. No dehiscence of the suture line.     Most Recent Labs:    Lab Results   Component " Value Date/Time    WBC 9.5 04/11/2018 01:47 AM    RBC 4.29 (L) 04/11/2018 01:47 AM    HEMOGLOBIN 11.5 (L) 04/11/2018 01:47 AM    HEMATOCRIT 36.5 (L) 04/11/2018 01:47 AM    MCV 85.1 04/11/2018 01:47 AM    MCH 26.8 (L) 04/11/2018 01:47 AM    MCHC 31.5 (L) 04/11/2018 01:47 AM    MPV 8.3 (L) 04/11/2018 01:47 AM    NEUTSPOLYS 67.60 04/11/2018 01:47 AM    LYMPHOCYTES 21.70 (L) 04/11/2018 01:47 AM    MONOCYTES 9.50 04/11/2018 01:47 AM    EOSINOPHILS 0.60 04/11/2018 01:47 AM    BASOPHILS 0.30 04/11/2018 01:47 AM    HYPOCHROMIA 1+ 01/25/2018 01:49 AM    ANISOCYTOSIS 1+ 02/05/2018 01:46 AM      Lab Results   Component Value Date/Time    SODIUM 139 04/11/2018 01:47 AM    POTASSIUM 3.5 (L) 04/11/2018 01:47 AM    CHLORIDE 107 04/11/2018 01:47 AM    CO2 24 04/11/2018 01:47 AM    GLUCOSE 117 (H) 04/11/2018 01:47 AM    BUN 8 04/11/2018 01:47 AM    CREATININE 0.95 04/11/2018 01:47 AM      Lab Results   Component Value Date/Time    ALTSGPT <5 04/11/2018 01:47 AM    ASTSGOT 9 (L) 04/11/2018 01:47 AM    ALKPHOSPHAT 82 04/11/2018 01:47 AM    TBILIRUBIN 0.5 04/11/2018 01:47 AM    LIPASE 3 (L) 04/10/2018 05:57 PM    ALBUMIN 3.5 04/11/2018 01:47 AM    GLOBULIN 2.0 04/11/2018 01:47 AM    PREALBUMIN 12.0 (L) 04/11/2018 01:47 AM    INR 1.08 04/10/2018 05:57 PM    MACROCYTOSIS 1+ 02/04/2018 03:31 AM     Lab Results   Component Value Date/Time    PROTHROMBTM 13.7 04/10/2018 05:57 PM    INR 1.08 04/10/2018 05:57 PM

## 2018-04-11 NOTE — PROGRESS NOTES
Surgical Progress Note    Author: Jorge Rodriguez Date & Time created: 2018   8:17 AM     Interval Events:  Having multiple BMs.  Required straight cath.  Patient presented to my office yesterday without appointment (after missing multiple appointments post operatively, prompting my office to track him down through emergency contact, and making multiple appointments to try and accommodate him), asking that we take him to the ER, which was done.  He also had an appointment this morning at 8:10, which he obviously did not make.  On determining that he had been admitted to the CDU it was also noted that, since he has not made any post op appointment, his staples remain in place 3 weeks after surgery.  CT scan, labs reviewed, no acute surgical intervention required, but patient would be best served by having staples removed and replaced with steri-strips.  ROS  Hemodynamics:  Temp (24hrs), Av.7 °C (98.1 °F), Min:36.2 °C (97.2 °F), Max:37.5 °C (99.5 °F)  Temperature: 37.5 °C (99.5 °F)  Pulse  Av  Min: 69  Max: 89   Blood Pressure : 121/56, NIBP: (!) 174/77     Respiratory:    Respiration: 18, Pulse Oximetry: 96 %        RUL Breath Sounds: Clear, RML Breath Sounds: Clear, RLL Breath Sounds: Clear, TOSHIA Breath Sounds: Clear, LLL Breath Sounds: Clear  Neuro:  GCS       Fluids:    Intake/Output Summary (Last 24 hours) at 18 0817  Last data filed at 18 0400   Gross per 24 hour   Intake               50 ml   Output              500 ml   Net             -450 ml     Weight: 54.2 kg (119 lb 7.8 oz)  Current Diet Order   Procedures   • DIET ORDER     Physical Exam  Labs:  Recent Results (from the past 24 hour(s))   COD (ADULT)    Collection Time: 04/10/18  5:57 PM   Result Value Ref Range    ABO Grouping Only O     Rh Grouping Only POS     Antibody Screen-Cod NEG    CBC WITH DIFFERENTIAL    Collection Time: 04/10/18  5:57 PM   Result Value Ref Range    WBC 10.2 4.8 - 10.8 K/uL    RBC 4.64 (L) 4.70 -  6.10 M/uL    Hemoglobin 12.6 (L) 14.0 - 18.0 g/dL    Hematocrit 39.8 (L) 42.0 - 52.0 %    MCV 85.8 81.4 - 97.8 fL    MCH 27.2 27.0 - 33.0 pg    MCHC 31.7 (L) 33.7 - 35.3 g/dL    RDW 47.8 35.9 - 50.0 fL    Platelet Count 338 164 - 446 K/uL    MPV 8.1 (L) 9.0 - 12.9 fL    Neutrophils-Polys 79.60 (H) 44.00 - 72.00 %    Lymphocytes 12.30 (L) 22.00 - 41.00 %    Monocytes 7.10 0.00 - 13.40 %    Eosinophils 0.40 0.00 - 6.90 %    Basophils 0.30 0.00 - 1.80 %    Immature Granulocytes 0.30 0.00 - 0.90 %    Nucleated RBC 0.00 /100 WBC    Neutrophils (Absolute) 8.13 (H) 1.82 - 7.42 K/uL    Lymphs (Absolute) 1.26 1.00 - 4.80 K/uL    Monos (Absolute) 0.73 0.00 - 0.85 K/uL    Eos (Absolute) 0.04 0.00 - 0.51 K/uL    Baso (Absolute) 0.03 0.00 - 0.12 K/uL    Immature Granulocytes (abs) 0.03 0.00 - 0.11 K/uL    NRBC (Absolute) 0.00 K/uL   COMP METABOLIC PANEL    Collection Time: 04/10/18  5:57 PM   Result Value Ref Range    Sodium 141 135 - 145 mmol/L    Potassium 3.7 3.6 - 5.5 mmol/L    Chloride 107 96 - 112 mmol/L    Co2 26 20 - 33 mmol/L    Anion Gap 8.0 0.0 - 11.9    Glucose 96 65 - 99 mg/dL    Bun 9 8 - 22 mg/dL    Creatinine 1.04 0.50 - 1.40 mg/dL    Calcium 9.4 8.5 - 10.5 mg/dL    AST(SGOT) 9 (L) 12 - 45 U/L    ALT(SGPT) <5 2 - 50 U/L    Alkaline Phosphatase 97 30 - 99 U/L    Total Bilirubin 0.5 0.1 - 1.5 mg/dL    Albumin 4.1 3.2 - 4.9 g/dL    Total Protein 6.3 6.0 - 8.2 g/dL    Globulin 2.2 1.9 - 3.5 g/dL    A-G Ratio 1.9 g/dL   LIPASE    Collection Time: 04/10/18  5:57 PM   Result Value Ref Range    Lipase 3 (L) 11 - 82 U/L   PROTHROMBIN TIME    Collection Time: 04/10/18  5:57 PM   Result Value Ref Range    PT 13.7 12.0 - 14.6 sec    INR 1.08 0.87 - 1.13   APTT    Collection Time: 04/10/18  5:57 PM   Result Value Ref Range    APTT 32.2 24.7 - 36.0 sec   ESTIMATED GFR    Collection Time: 04/10/18  5:57 PM   Result Value Ref Range    GFR If African American >60 >60 mL/min/1.73 m 2    GFR If Non  >60 >60  mL/min/1.73 m 2   MAGNESIUM    Collection Time: 04/10/18  5:57 PM   Result Value Ref Range    Magnesium 2.1 1.5 - 2.5 mg/dL   PHOSPHORUS    Collection Time: 04/10/18  5:57 PM   Result Value Ref Range    Phosphorus 3.3 2.5 - 4.5 mg/dL   STOOL WBC'S    Collection Time: 04/10/18 10:10 PM   Result Value Ref Range    Stool WBC's None seen None seen    Other Elements See comment    OCCULT BLOOD STOOL    Collection Time: 04/10/18 10:10 PM   Result Value Ref Range    Occult Blood Feces Positive (A) Negative   CBC with Differential    Collection Time: 04/11/18  1:47 AM   Result Value Ref Range    WBC 9.5 4.8 - 10.8 K/uL    RBC 4.29 (L) 4.70 - 6.10 M/uL    Hemoglobin 11.5 (L) 14.0 - 18.0 g/dL    Hematocrit 36.5 (L) 42.0 - 52.0 %    MCV 85.1 81.4 - 97.8 fL    MCH 26.8 (L) 27.0 - 33.0 pg    MCHC 31.5 (L) 33.7 - 35.3 g/dL    RDW 49.1 35.9 - 50.0 fL    Platelet Count 318 164 - 446 K/uL    MPV 8.3 (L) 9.0 - 12.9 fL    Neutrophils-Polys 67.60 44.00 - 72.00 %    Lymphocytes 21.70 (L) 22.00 - 41.00 %    Monocytes 9.50 0.00 - 13.40 %    Eosinophils 0.60 0.00 - 6.90 %    Basophils 0.30 0.00 - 1.80 %    Immature Granulocytes 0.30 0.00 - 0.90 %    Nucleated RBC 0.00 /100 WBC    Neutrophils (Absolute) 6.39 1.82 - 7.42 K/uL    Lymphs (Absolute) 2.05 1.00 - 4.80 K/uL    Monos (Absolute) 0.90 (H) 0.00 - 0.85 K/uL    Eos (Absolute) 0.06 0.00 - 0.51 K/uL    Baso (Absolute) 0.03 0.00 - 0.12 K/uL    Immature Granulocytes (abs) 0.03 0.00 - 0.11 K/uL    NRBC (Absolute) 0.00 K/uL   Comp Metabolic Panel (CMP)    Collection Time: 04/11/18  1:47 AM   Result Value Ref Range    Sodium 139 135 - 145 mmol/L    Potassium 3.5 (L) 3.6 - 5.5 mmol/L    Chloride 107 96 - 112 mmol/L    Co2 24 20 - 33 mmol/L    Anion Gap 8.0 0.0 - 11.9    Glucose 117 (H) 65 - 99 mg/dL    Bun 8 8 - 22 mg/dL    Creatinine 0.95 0.50 - 1.40 mg/dL    Calcium 9.1 8.5 - 10.5 mg/dL    AST(SGOT) 9 (L) 12 - 45 U/L    ALT(SGPT) <5 2 - 50 U/L    Alkaline Phosphatase 82 30 - 99 U/L    Total  Bilirubin 0.5 0.1 - 1.5 mg/dL    Albumin 3.5 3.2 - 4.9 g/dL    Total Protein 5.5 (L) 6.0 - 8.2 g/dL    Globulin 2.0 1.9 - 3.5 g/dL    A-G Ratio 1.8 g/dL   FERRITIN    Collection Time: 04/11/18  1:47 AM   Result Value Ref Range    Ferritin 33.0 22.0 - 322.0 ng/mL   ESTIMATED GFR    Collection Time: 04/11/18  1:47 AM   Result Value Ref Range    GFR If African American >60 >60 mL/min/1.73 m 2    GFR If Non African American >60 >60 mL/min/1.73 m 2   IRON/TOTAL IRON BIND    Collection Time: 04/11/18  1:47 AM   Result Value Ref Range    Iron 14 (L) 50 - 180 ug/dL    Total Iron Binding 314 250 - 450 ug/dL    % Saturation 4 (L) 15 - 55 %   PREALBUMIN    Collection Time: 04/11/18  1:47 AM   Result Value Ref Range    Pre-Albumin 12.0 (L) 18.0 - 38.0 mg/dL   URINALYSIS    Collection Time: 04/11/18  3:00 AM   Result Value Ref Range    Micro Urine Req Microscopic     Color Yellow     Character Cloudy (A)     Ph 5.5 5.0 - 8.0    Glucose Negative Negative mg/dL    Ketones Trace (A) Negative mg/dL    Protein Negative Negative mg/dL    Bilirubin Negative Negative    Urobilinogen, Urine 0.2 Negative    Nitrite Negative Negative    Leukocyte Esterase Negative Negative    Occult Blood Negative Negative   REFRACTOMETER SG    Collection Time: 04/11/18  3:00 AM   Result Value Ref Range    Specific Gravity >1.045    URINE MICROSCOPIC (W/UA)    Collection Time: 04/11/18  3:00 AM   Result Value Ref Range    WBC Rare (A) /hpf    RBC Rare /hpf    Bacteria Negative None /hpf    Epithelial Cells Few /hpf    Mucous Threads Moderate /hpf     Medical Decision Making, by Problem:  Active Hospital Problems    Diagnosis   • Diarrhea [R19.7]     Priority: High   • Iron deficiency anemia due to chronic blood loss [D50.0]     Priority: High   • UTI (urinary tract infection) [N39.0]     Priority: Medium   • Cachexia (CMS-HCC) [R64]     Priority: Medium   • Debility [R53.81]     Priority: Medium   • Type 2 diabetes mellitus (CMS-HCC) [E11.9]     Priority:  Medium   • Bipolar 1 disorder, depressed (CMS-HCC) [F31.9]     Priority: Low   • Benign prostatic hyperplasia [N40.0]     Priority: Low     Plan:  Recommend removal of staples, replacement by steri strips today.  Will order.  Will be happy to reconsult, if requested by admitting team.  Otherwise, I will not plan on seeing him while in house.    Quality Measures:  Quality-Core Measures    Discussed patient condition with RN and Patient

## 2018-04-11 NOTE — PROGRESS NOTES
Pt new admit from ER to T216 at 2356. Ambulated from gurney to bed with 2 person hand assist. Pt unsteady, generalized weakness. Has loose green stool, seedy, mucousy, foul smelling odor x2 within the hour. On ra. Complaints of abdominal tenderness. Has large abdominal incision and left transverse incision with staples suma, pink, well approximated, no oozing noted. Plan of care reviewed. Pt was upset that he had to take vancomycin. Explained about rule out cdiff but pt remains upset. IVF infusing as ordered. Plan of care reviewed including fall precautions and labs. Refusing bed alarm. Able to make needs known.

## 2018-04-11 NOTE — PROGRESS NOTES
"Noticed that pt has hypoinflation of lungs on chest xray. Pt on RA. I brought IS in the room and patient got mad. \"there's nothing wrong with my lungs\". Explained that IS will help expand lungs but patient still refused. Instructed to TCDB then to prevent further hypoinflation. Pt agrees.   "

## 2018-04-11 NOTE — ASSESSMENT & PLAN NOTE
- history of pelvic abscess associated bowel perforation s/p exploratory laparotomy, washout sigmoid colon resection colostomy placement and appendectomy on 7/25/2017, Two episodes of C. difficile colitis on 12/18/2017 & 1/24/2018 (NAP1 strain), colostomy takedown (3/21/2018)   - diagnosis with functional diarrhea in the past   - came in for increasing  'cake batter' stool, noticed fresh bright blood when he wiped himself  - 4/4/2018, pt came into ED for dysuria & discharged with Keflex   - stool WBC negative  - CT Abdomen & pelvis showed colonic wall thickening probably related involving rectosigmoid and ascending portions, most likely inflammatory. No bowel obstruction or evidence for bowel perforation.  - FOBT +ve  - likely functional diarrhea s/p colostomy take down v c diff colitis but less likely given presentation   Plan:   - c diff study pending  - patient to advance diet as tolerated and avoid high fructose/lactose items   - hold Vanco PO pending C diff study, given script if positive   - lactobacillus daily   - general surgery consult, appreciate recommendations. Patient to follow up outpatient   - dietary restrictions including low fructose

## 2018-04-11 NOTE — PROGRESS NOTES
Internal Medicine Interval Note  Note Author: Trisha Jay M.D.     Name Marcelo Colon     1940   Age/Sex 77 y.o. male   MRN [id number]   Code Status DNR      After 5PM or if no immediate response to page, please call for cross-coverage  Attending/Team: Veronica/Charli  See Patient List for primary contact information  Call [phone] to page    1st Call - Day Intern (R1):   Dr. Jay 2nd Call - Day Sr. Resident (R2/R3):   Dr. Orozco         Reason for interval visit  (Principal Problem)   Diarrhea    Interval Problem Daily Status Update  (24 hours)   Denies nausea, vomiting or abdominal pain. Has had non bloody 'pancake batter' diarrhea this morning. Per patient, symptoms started s/p colostomy take down. Denies fever, chills but is having some dysuria and urgency w/neg urine ctx in the past after course of keflex. Tolerating liquid diet this morning. C diff pending, does have NAP1 in past.     Review of Systems   Constitutional: Negative for chills and fever.   HENT: Negative for congestion and hearing loss.    Eyes: Negative for blurred vision and discharge.   Respiratory: Negative for cough and shortness of breath.    Cardiovascular: Negative for chest pain and leg swelling.   Gastrointestinal: Positive for diarrhea. Negative for abdominal pain, blood in stool, nausea and vomiting.   Genitourinary: Positive for dysuria and urgency. Negative for flank pain.   Musculoskeletal: Negative for back pain and myalgias.   Skin: Negative for itching and rash.   Neurological: Positive for sensory change. Negative for dizziness and focal weakness.   Endo/Heme/Allergies: Positive for polydipsia. Does not bruise/bleed easily.   Psychiatric/Behavioral: Negative for depression. The patient is not nervous/anxious.        Consultants/Specialty  General Surgery     Disposition  Inpatient, anticipate discharge home tomorrow with resumption of home PT     Quality Measures  Quality-Core Measures   Reviewed items::   "Labs reviewed and Medications reviewed  Ruff catheter::  No Ruff  DVT prophylaxis pharmacological::  Enoxaparin (Lovenox)          Physical Exam       Vitals:    04/10/18 2356 04/11/18 0343 04/11/18 0827 04/11/18 1251   BP: 144/66 121/56 120/52 116/58   Pulse: 81 69 71 83   Resp: 16 18 16 16   Temp: 36.2 °C (97.2 °F) 37.5 °C (99.5 °F) 37.1 °C (98.7 °F) 37.9 °C (100.2 °F)   SpO2: 96% 96% 93% 95%   Weight: 54.2 kg (119 lb 7.8 oz)      Height: 1.727 m (5' 8\")        Body mass index is 18.17 kg/m². Weight: 54.2 kg (119 lb 7.8 oz)  Oxygen Therapy:  Pulse Oximetry: 95 %, O2 (LPM): 0, O2 Delivery: None (Room Air)    Physical Exam   Constitutional: He is oriented to person, place, and time. No distress.   HENT:   Head: Normocephalic and atraumatic.   Eyes: EOM are normal. Left eye exhibits no discharge.   Neck: Normal range of motion. Neck supple.   Cardiovascular: Normal rate and regular rhythm.    No murmur heard.  Pulmonary/Chest: Effort normal. No respiratory distress.   Abdominal: Soft. Bowel sounds are normal. He exhibits no distension. There is no tenderness. There is no rebound and no guarding.   Genitourinary: Rectal exam shows guaiac positive stool.   Musculoskeletal: Normal range of motion. He exhibits no edema.   Neurological: He is alert and oriented to person, place, and time.   Skin: Skin is warm and dry. He is not diaphoretic.   Psychiatric: Affect normal.       Lab Data Review:     4/11/2018  1:55 PM    Recent Labs      04/10/18   1757  04/11/18   0147   SODIUM  141  139   POTASSIUM  3.7  3.5*   CHLORIDE  107  107   CO2  26  24   BUN  9  8   CREATININE  1.04  0.95   MAGNESIUM  2.1   --    PHOSPHORUS  3.3   --    CALCIUM  9.4  9.1       Recent Labs      04/10/18   1757  04/11/18   0147   ALTSGPT  <5  <5   ASTSGOT  9*  9*   ALKPHOSPHAT  97  82   TBILIRUBIN  0.5  0.5   LIPASE  3*   --    PREALBUMIN   --   12.0*   GLUCOSE  96  117*       Recent Labs      04/10/18   1757  04/11/18   0147   RBC  4.64*  4.29* "   HEMOGLOBIN  12.6*  11.5*   HEMATOCRIT  39.8*  36.5*   PLATELETCT  338  318   PROTHROMBTM  13.7   --    APTT  32.2   --    INR  1.08   --    IRON   --   14*   FERRITIN   --   33.0   [facility]   --   314       Recent Labs      04/10/18   1757  04/11/18   0147   WBC  10.2  9.5   NEUTSPOLYS  79.60*  67.60   LYMPHOCYTES  12.30*  21.70*   MONOCYTES  7.10  9.50   EOSINOPHILS  0.40  0.60   BASOPHILS  0.30  0.30   ASTSGOT  9*  9*   ALTSGPT  <5  <5   ALKPHOSPHAT  97  82   TBILIRUBIN  0.5  0.5         Assessment/Plan     * Diarrhea- (present on admission)   Assessment & Plan    - history of pelvic abscess associated bowel perforation s/p exploratory laparotomy, washout sigmoid colon resection colostomy placement and appendectomy on 7/25/2017, Two episodes of C. difficile colitis on 12/18/2017 & 1/24/2018 (NAP1 strain), colostomy takedown (3/21/2018)   - diagnosis with functional diarrhea in the past   - came in for increasing  'cake batter' stool, noticed fresh bright blood when he wiped himself  - 4/4/2018, pt came into ED for dysuria & discharged with Keflex   - stool WBC negative  - CT Abdomen & pelvis showed colonic wall thickening probably related involving rectosigmoid and ascending portions, most likely inflammatory. No bowel obstruction or evidence for bowel perforation.  - FOBT +ve  - likely functional diarrhea s/p colostomy take down v c diff colitis but less likely given presentation   Plan:   - c diff study pending  - IVF maintenance   - special contact precautions   - advance diet as tolerated  - hold Vanco PO pending C diff study   - lactobacillus daily   - general surgery consult, appreciate recommendations  - dietary restrictions including low fructose           Iron deficiency anemia due to chronic blood loss- (present on admission)   Assessment & Plan    - ADAMA per work up on 1/30/18   - continue iron supplement   - discontinue iron if active infxn         UTI (urinary tract infection)   Assessment & Plan     - c/o dysuria & urinary retension   - s/p Keflex (patient previously was in ED for similar complaints)  - negative urine ctx in the past   - flomax daily, consider increasing as needed   - Bladder scan q6h with straight cath if >350 cc         Cachexia (CMS-HCC)- (present on admission)   Assessment & Plan    - nutrition consult  - pre-albumin ordered   - case Mx in AM   - multi-vitamin started   -  pressure ulcer prevention protocol           Debility- (present on admission)   Assessment & Plan    - baseline wheelchair bound   - PT / OT   - pressure ulcer prevention protocol         Type 2 diabetes mellitus (CMS-HCC)- (present on admission)   Assessment & Plan    - hold home regimen  - Hb A1c 5.6 & low HDL 16  - continue gluc checks        Bipolar 1 disorder, depressed (CMS-HCC)- (present on admission)   Assessment & Plan    - mood is good , denied SI/HI   - continue home lamotrigine         Benign prostatic hyperplasia- (present on admission)   Assessment & Plan    - chronic   - on home flomax   - continue home flomax   - bladder scan daily   - UA & Urine Cx ordered

## 2018-04-11 NOTE — ASSESSMENT & PLAN NOTE
- ADAMA per work up on 1/30/18   - continue iron supplement   - discontinue iron if active infection

## 2018-04-11 NOTE — ED NOTES
Med rec updated and complete.  Allergies reviewed.  Pt is currently taking an antibiotic an   Have been taking it as prescribed.

## 2018-04-11 NOTE — SENIOR ADMIT NOTE
"HPI     78 yo man with complex PMHx including diverticulitis and bowel perforation due to constipation (s/p colostomy with subsequent revision) complicated by abscess formation (s/p IR drain, wound cx grew Enterococcus Faecium, viridans strep and Candida albicans in Aug 2017), with subsequent two episodes of clostridium difficile colitis, who presented to ED for diarrhea with \"specks of blood.\"     Reports onset of diarrhea one week ago. Poor historian but states that he has been going to the restroom 4-5 times daily. However, this has increased to all night long, every 20 min, with very loose diarrhea, occasionally watery with specks of blood. Recently took cephalexin prescribed to him at a urgent care center 2 weeks ago. Denies severe abdominal pain, cramping, nausea, vomiting or fevers.     Physical Exam:     Gen: elderly man, frail and cachectic, in mild distress   Abdomen: well healed surgical incision vertical and horizontal scars, staples in place. Soft, ND, NTTP, +BS, no guarding or rigidity or distention. Benign exam.   Rectal exam performed by Dr. Noguera, chaperoned by me. No gross blood in rectal vault. Hemoccult testing +blood. Decreased rectal sphincter tone.       Labs and Imaging:     CT abdomen: colonic wall thickening. No bowel obstruction or perforation.     A / P:     # Diarrhea   Concern for recurrent c dif colitis in this patient with risk factors including antibiotics (cephalexin) in the last 2 weeks. He had two prior episodes of c diff.   Pending c diff EIA and PCR   Prophylactic vancomycin po   IV fluids and NPO. Diet was advanced after CT imaging showed no severe colitis.       # Decreased rectal sphincter tone   No other red flag signs for acute spinal compression -- no sudden onset bl lower extremity, urinary incontinence or back pain. No bony spine tenderness. No erythema or effusion over lower back.   No indication for emergent back imaging or steroids overnight. If physical exam changes " for day team, consider imaging and steroids.       DVT ppx: enoxaparin

## 2018-04-11 NOTE — ED NOTES
"Pt brought back to room in personal wheelchair. Pt states that he is having \"jelly like\" rectal discharge. Pt changed into gown, given warm blankets. Pt ready for Eval.   "

## 2018-04-11 NOTE — ASSESSMENT & PLAN NOTE
- chronic  - continued on home flomax/flozasin  - status post keflex for culture negative urinary tract infection   - follow up with pcp for further management

## 2018-04-11 NOTE — PROGRESS NOTES
Straight cath performed per MD orders 400cc removed, pt tolerated well. Used aseptic technique to avoid infection

## 2018-04-11 NOTE — DISCHARGE SUMMARY
"          Internal Medicine Discharge Summary  Note Author: Trisha Jay M.D.       Admit Date:  4/10/2018       Discharge Date:   4/11/2018    Service:   [facility] Internal Medicine Gray Team  Attending Physician(s):   Dr. Martin        Senior Resident(s):   Dr. Orozco  Qasim Resident(s):   Dr. 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 He is 3 weeks out from a reanastomosis/colostomy takedown after a sigmoid resection with primary colostomy done 7/2017 due to an abscess. He has a prior history of C. Diff colitis in 12/2017 and again in 1/2018. C. Diff in 1/2018 was 027-NAP1 +. Mr. Colon symptomatically improved overnight with decreasing episodes of diarrhea and tolerating a liquid diet. He has an appointment to follow up with Dr. Rodriguez outpatient for post surgical follow up. C diff results pending, but likelihood of C. Diff colitis was low. Patient had improved significantly and feeling well enough to return home without any abdominal pain, nausea/vomiting and decreasing episodes of diarrhea. He was given a script for oral vancomycin will take it if notified that his test returns positive. He remained hemodynamically stable throughout admission and discharged home with home physical therapy.        Patient /Hospital Summary (Details -- Problem Oriented) :          Iron deficiency anemia due to chronic blood loss "   Assessment & Plan    - ADAMA per work up on 1/30/18   - continue iron supplement   - discontinue iron if active infection         * Diarrhea   Assessment & Plan    - history of pelvic abscess associated bowel perforation s/p exploratory laparotomy, washout sigmoid colon resection colostomy placement and appendectomy on 7/25/2017, Two episodes of C. difficile colitis on 12/18/2017 & 1/24/2018 (NAP1 strain), colostomy takedown (3/21/2018)   - diagnosis with functional diarrhea in the past   - came in for increasing  'cake batter' stool, noticed fresh bright blood when he wiped himself  - 4/4/2018, pt came into ED for dysuria & discharged with Keflex   - stool WBC negative  - CT Abdomen & pelvis showed colonic wall thickening probably related involving rectosigmoid and ascending portions, most likely inflammatory. No bowel obstruction or evidence for bowel perforation.  - FOBT +ve  - likely functional diarrhea s/p colostomy take down v c diff colitis but less likely given presentation   Plan:   - c diff study pending  - patient to advance diet as tolerated and avoid high fructose/lactose items   - hold Vanco PO pending C diff study, given script if positive   - lactobacillus daily   - general surgery consult, appreciate recommendations. Patient to follow up outpatient   - dietary restrictions including low fructose           UTI (urinary tract infection)   Assessment & Plan    - c/o dysuria & urinary retension   - s/p Keflex (patient previously was in ED for similar complaints)  - negative urine ctx in the past   - flomax daily, consider increasing as needed per pcp         Cachexia (CMS-HCC)   Assessment & Plan    - low pre albumin   - multi-vitamin started   - follow up with pcp for nutrition outpatient           Debility   Assessment & Plan    - baseline wheelchair bound, previously used cane prior to last hospitalization but now wheelchair bound again due to severe deconditioning   - PT outpatient established and  he will resume on discharge         Type 2 diabetes mellitus (CMS-HCC)   Assessment & Plan    - hold home regimen  - Hb A1c 5.6 & low HDL 16  - follow up outpatient         Bipolar 1 disorder, depressed (CMS-HCC)   Assessment & Plan    - stable, no SI/HI   - continued on home lamotrigine         Benign prostatic hyperplasia   Assessment & Plan    - chronic  - continued on home flomax/flozasin  - status post keflex for culture negative urinary tract infection   - follow up with pcp for further management               Consultants:     General Surgery     Procedures:        None     Imaging/ Testing:      None     Discharge Medications:         Medication Reconciliation: Completed       Medication List      START taking these medications      Instructions   Fiber 625 MG Tabs   Take 1 Tab by mouth 3 times a day, with meals.  Dose:  625 mg     lactobacillus granules Pack   Take 1 Packet by mouth 3 times a day, with meals.  Dose:  1 Packet     therapeutic multivitamin-minerals Tabs  Start taking on:  4/12/2018   Take 1 Tab by mouth every day.  Dose:  1 Tab     vancomycin 125 MG capsule  Commonly known as:  VANCOCIN   Doctor's comments:  Take 125 mg PO four times daily for 14 days. Then take 125 mg PO twice daily for 7 days. Then take 125 mg PO once daily for 7 days. Then take 125 mg ever two days for 14 days.  Take 1 Cap by mouth See Admin Instructions.  Dose:  125 mg        CONTINUE taking these medications      Instructions   alfuzosin 10 MG SR tablet  Commonly known as:  UROXATRAL   Take 10 mg by mouth.  Dose:  10 mg     ferrous sulfate 325 (65 Fe) MG tablet   Take 325 mg by mouth every day.  Dose:  325 mg     gabapentin 300 MG Caps  Commonly known as:  NEURONTIN   Take 300 mg by mouth 3 times a day.  Dose:  300 mg     LAMICTAL 200 MG tablet  Generic drug:  lamotrigine   Take 200 mg by mouth every day.  Dose:  200 mg     oxyCODONE immediate-release 5 MG Tabs  Commonly known as:  ROXICODONE   Take 5 mg by mouth every 6  hours as needed.  Dose:  5 mg     potassium chloride SA 20 MEQ Tbcr  Commonly known as:  Kdur   Take 20 mEq by mouth every day.  Dose:  20 mEq     tamsulosin 0.4 MG capsule  Commonly known as:  FLOMAX   Take 0.4 mg by mouth every day.  Dose:  0.4 mg     tramadol 50 MG Tabs  Commonly known as:  ULTRAM   Take 100 mg by mouth every four hours as needed for Mild Pain.  Dose:  100 mg        STOP taking these medications    cephALEXin 500 MG Caps  Commonly known as:  KEFLEX            Disposition:   Home     Diet:   Advance as tolerated. Low fructose/lactose/sorbitol diet, patient provided counseling. Fibricon and lactobacillus as needed.    Activity:  As tolerated. Patient currently working outpatient with PT/OT to regain strength after repeated hospitalizations over the past year resulting in deconditioning. Previously used cane for mobility but now wheelchair bound.     Instructions:      The patient is instructed to take fiber for 1 week. He will be notified if his C. Diff test returns positive and was given a prescription for oral Vancomycin to take if he is notified of a positive test. He will follow up with his PCP late this week. 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      The patient was instructed to return to the ER in the event of worsening symptoms. I have counseled the patient on the importance of compliance and the patient has agreed to proceed with all medical recommendations and follow up plan indicated above.   The patient understands that all medications come with benefits and risks. Risks may include permanent injury or death and these risks can be minimized with close reassessment and monitoring.        Primary Care Provider:    Pending appointment with new PCP, Dr. Humza Butler, later this week.   Discharge summary faxed to primary care provider:  Completed  Copy of discharge summary given to the patient:  "Completed      Follow up appointment details :      Pending appointment with new PCP, Dr. Humza Butler, later this week to establish care and post hospital follow up.   Pending appointment with general surgeon, Dr. Mills for post surgical follow up.     Pending Studies:        C diff toxin     Time spent on discharge day patient visit, preparing discharge paperwork and arranging for patient follow up.    Summary of follow up issues:   - nutrition outpatient per pcp for malnourishment   - diarrhea likely functional status post reanastomosis   - patient to be notified of C diff toxin results and if positive to start vancomycin script provided to him, avoid imodium, and follow up with gastroenterology for stool transplant     Discharge Time (Minutes) :    60  Hospital Course Type: Observation Stay      Condition on Discharge  Stable   ______________________________________________________________________    Interval history/exam for day of discharge:    The patient reports that he has improved overnight. He reports only a few episodes of thick consistency diarrhea which has significantly improved since admission. He denies any nausea, vomiting or abdominal pain.        Vitals:    04/10/18 2356 04/11/18 0343 04/11/18 0827 04/11/18 1251   BP: 144/66 121/56 120/52 116/58   Pulse: 81 69 71 83   Resp: 16 18 16 16   Temp: 36.2 °C (97.2 °F) 37.5 °C (99.5 °F) 37.1 °C (98.7 °F) 37.9 °C (100.2 °F)   SpO2: 96% 96% 93% 95%   Weight: 54.2 kg (119 lb 7.8 oz)      Height: 1.727 m (5' 8\")        Weight/BMI: Body mass index is 18.17 kg/m².  Pulse Oximetry: 95 %, O2 (LPM): 0, O2 Delivery: None (Room Air)    Physical Exam   Constitutional: He is oriented to person, place, and time. No distress.   Head: Normocephalic and atraumatic.   Eyes: EOM are normal. Left eye exhibits no discharge.   Neck: Normal range of motion. Neck supple.   Cardiovascular: Normal rate and regular rhythm.  No murmur heard.  Pulmonary/Chest: Effort normal. No " respiratory distress.   Abdominal: Soft. Bowel sounds are normal. He exhibits no distension. There is no tenderness. There is no rebound or guarding.  Musculoskeletal: Normal range of motion. He exhibits no edema.   Neurological: He is alert and oriented to person, place, and time.   Skin: Skin is warm and dry. He is not diaphoretic.   Psychiatric: Affect normal.     Most Recent Labs:    Lab Results   Component Value Date/Time    WBC 9.5 04/11/2018 01:47 AM    RBC 4.29 (L) 04/11/2018 01:47 AM    HEMOGLOBIN 11.5 (L) 04/11/2018 01:47 AM    HEMATOCRIT 36.5 (L) 04/11/2018 01:47 AM    MCV 85.1 04/11/2018 01:47 AM    MCH 26.8 (L) 04/11/2018 01:47 AM    MCHC 31.5 (L) 04/11/2018 01:47 AM    MPV 8.3 (L) 04/11/2018 01:47 AM    NEUTSPOLYS 67.60 04/11/2018 01:47 AM    LYMPHOCYTES 21.70 (L) 04/11/2018 01:47 AM    MONOCYTES 9.50 04/11/2018 01:47 AM    EOSINOPHILS 0.60 04/11/2018 01:47 AM    BASOPHILS 0.30 04/11/2018 01:47 AM    HYPOCHROMIA 1+ 01/25/2018 01:49 AM    ANISOCYTOSIS 1+ 02/05/2018 01:46 AM      Lab Results   Component Value Date/Time    SODIUM 139 04/11/2018 01:47 AM    POTASSIUM 3.5 (L) 04/11/2018 01:47 AM    CHLORIDE 107 04/11/2018 01:47 AM    CO2 24 04/11/2018 01:47 AM    GLUCOSE 117 (H) 04/11/2018 01:47 AM    BUN 8 04/11/2018 01:47 AM    CREATININE 0.95 04/11/2018 01:47 AM      Lab Results   Component Value Date/Time    ALTSGPT <5 04/11/2018 01:47 AM    ASTSGOT 9 (L) 04/11/2018 01:47 AM    ALKPHOSPHAT 82 04/11/2018 01:47 AM    TBILIRUBIN 0.5 04/11/2018 01:47 AM    LIPASE 3 (L) 04/10/2018 05:57 PM    ALBUMIN 3.5 04/11/2018 01:47 AM    GLOBULIN 2.0 04/11/2018 01:47 AM    PREALBUMIN 12.0 (L) 04/11/2018 01:47 AM    INR 1.08 04/10/2018 05:57 PM    MACROCYTOSIS 1+ 02/04/2018 03:31 AM     Lab Results   Component Value Date/Time    PROTHROMBTM 13.7 04/10/2018 05:57 PM    INR 1.08 04/10/2018 05:57 PM

## 2018-04-11 NOTE — NON-PROVIDER
"      Internal Medicine Medical Student Note  Note Author: Pat Chew, Student    Name Marcelo Colon     1940   Age/Sex 77 y.o. male   MRN [id number]   Code Status DNR     After 5PM or if no immediate response to page, please call for cross-coverage  Attending/Team: Dr. Martin - Gaurang Moe See Patient List for primary contact information  Call [phone] to page after hours   1st Call - Day Intern (R1):   Dr. Borges 2nd Call - Day Sr. Resident (R2/R3):   Dr. Harkins     Reason for interval visit  (Principal Problem)   Diarrhea    Interval Problem Daily Status Update  (24 hours)   The patient reports that his diarrhea persists. He describes it as \"pancake batter\" and it does have flecks of blood in it. He denies any nausea, vomiting or fevers.     Review of Systems   Constitutional: Negative for fever.   Gastrointestinal: Negative for nausea and vomiting.     Physical Exam     Vitals:    04/10/18 2032 04/10/18 2356 18 0343 18 0827   BP:  144/66 121/56 120/52   Pulse: 89 81 69 71   Resp:  16 18 16   Temp:  36.2 °C (97.2 °F) 37.5 °C (99.5 °F) 37.1 °C (98.7 °F)   SpO2: 95% 96% 96% 93%   Weight:  54.2 kg (119 lb 7.8 oz)     Height:  1.727 m (5' 8\")       Body mass index is 18.17 kg/m². Weight: 54.2 kg (119 lb 7.8 oz)  Oxygen Therapy:  Pulse Oximetry: 93 %, O2 (LPM): 0, O2 Delivery: None (Room Air)    Physical Exam   Constitutional: He is oriented to person, place, and time.   Well developed male. Appears thin.    HENT:   Head: Normocephalic.   Eyes:   Glasses   Neck: Neck supple.   Cardiovascular: Normal rate and regular rhythm.    No murmur heard.  Pulmonary/Chest: Effort normal. No respiratory distress. He has no wheezes.   Abdominal: Soft. He exhibits no distension. There is no tenderness.   Well healed midline and LLQ scar with staples still in place   Lymphadenopathy:     He has no cervical adenopathy.   Neurological: He is alert and oriented to person, place, and time.   Skin: Skin is " "warm and dry.   Psychiatric: Affect normal.       Assessment/Plan     #Diarrhea   -Patient has complicated GI history.    --Was diagnosed with a pelvic abscess in 7/2017 likely secondary to diverticulitis. Initially this was treated with drainage, but as symptoms progressed he underwent a sigmoid colectomy and a colostomy was left in. This was reversed just 3 weeks ago on 3/21/18. He has been unable to follow-up with his surgeon since his reanastamosis.    --He has had 2 episodes of C. Diff colitis that required hospitalization. First in 12/2017 and again in 1/2018.    --Last C. Diff was typed as 027-Nap1-B1 positive   -He presents today complaining of \"pancake batter\" like diarrhea for 1 week with specs of blood. He states this feels different than his previous episodes of C. Diff.    -Workup so far has shown negative stool WBCs, C. Diff PCR/Toxin is still pending    Plan:  -This likely represents functional diarrhea after his reanastomosis  -Advance diet  -Patient encouraged to ambulate  -C. Diff pending at this time     #Dysuria  #Urinary retention   -History of BPH, prostate enlarged on DANILO today, currently taking Alfuzosin 10mg outpatient    -Treated 1 week ago, 4/4, for UTI with Keflex. Urine culture was negative. Urinalysis today not consistent with infection.   -Bladder scan last night showed 440mL post void residual   -Patient reports improvement after straight cath   -This likely secondary to his known BPH, less likely infection     Plan:  -Continue alpha blocker for BPH, Alfuzosin is not on formulary so will be on Flomax 0.4mg while here. Will consider increasing.     #Iorn deficiency anemia   -Hgb low at 12.6 --> 11.5 today    -Ferritin low at 33, % saturation low and iron levels are low   -DANILO positive for occult blood, may be secondary to recent bowel surgery   -Consistent with iron deficiency anemia     Plan:  -Iron supplementation 325mg QD    #Debility  -Patient reports previously was able to walk " without issues, but has been weak every since first hospitalization 7/2017   -Was initially improving and went from a wheel chair to a cane, but went back to using a wheel chair after last hospitalization   -Patient has PT/OT that comes to his house to work with him    Plan:  -Recommend inpatient PT evaluation and continuation of services once discharged

## 2018-04-11 NOTE — PROGRESS NOTES
IV dc'd.  Discharge instructions given to patient; patient verbalizes understanding, all questions answered.  Copy of DC summary provided, signed copy in chart.  3 prescriptions provided to patient, copies in chart.  Pt states personal belongings are in possession.    Pt awaiting ride

## 2018-04-11 NOTE — DISCHARGE INSTRUCTIONS
Discharge Instructions    Discharged to home by car with friend. Discharged via wheelchair, hospital escort: Yes.  Special equipment needed: Not Applicable    Be sure to schedule a follow-up appointment with your primary care doctor or any specialists as instructed.     Discharge Plan:   Diet Plan: Discussed  Activity Level: Discussed  Confirmed Follow up Appointment: Patient to Call and Schedule Appointment  Confirmed Symptoms Management: Discussed  Medication Reconciliation Updated: Yes  Influenza Vaccine Indication: Not indicated: Previously immunized this influenza season and > 8 years of age    I understand that a diet low in cholesterol, fat, and sodium is recommended for good health. Unless I have been given specific instructions below for another diet, I accept this instruction as my diet prescription.   Other diet: Heart healthy     Special Instructions: None    · Is patient discharged on Warfarin / Coumadin?   No     Depression / Suicide Risk    As you are discharged from this [facility] facility, it is important to learn how to keep safe from harming yourself.    Recognize the warning signs:  · Abrupt changes in personality, positive or negative- including increase in energy   · Giving away possessions  · Change in eating patterns- significant weight changes-  positive or negative  · Change in sleeping patterns- unable to sleep or sleeping all the time   · Unwillingness or inability to communicate  · Depression  · Unusual sadness, discouragement and loneliness  · Talk of wanting to die  · Neglect of personal appearance   · Rebelliousness- reckless behavior  · Withdrawal from people/activities they love  · Confusion- inability to concentrate     If you or a loved one observes any of these behaviors or has concerns about self-harm, here's what you can do:  · Talk about it- your feelings and reasons for harming yourself  · Remove any means that you might use to hurt yourself (examples: pills, rope,  extension cords, firearm)  · Get professional help from the community (Mental Health, Substance Abuse, psychological counseling)  · Do not be alone:Call your Safe Contact- someone whom you trust who will be there for you.  · Call your local CRISIS HOTLINE 566-5965 or 038-715-4550  · Call your local Children's Mobile Crisis Response Team [place] (255) 688-0673 or www.[employer]  · Call the toll free National Suicide Prevention Hotlines   · National Suicide Prevention Lifeline 906-784-THIN (9146)  · National Hope Line Network 800-SUICIDE (858-9860)

## 2018-04-11 NOTE — ED PROVIDER NOTES
"ED Provider Note    Scribed for Willy Alejandre M.D. by Maida Licea. 4/10/2018  7:48 PM    Means of arrival: Walk in  History obtained by: Patient  Limitations: None       CHIEF COMPLAINT  Chief Complaint   Patient presents with   • Rectal Pain   • Diarrhea       HPI  Marcelo Colon is a 77 y.o. male who presents for evaluation of rectal discharge described as a \"yellow mucous with sterling of bloods\" onset a few days ago. The patient claims he became concerned today when he noticed an increasing amount of blood in the rectal discharge. He endorses associated diarrhea and abdominal cramping, claiming he has had numerous amounts of bowel movements. The patient additionally claims to be experiencing urinary urgency, but is only able to urinate small amounts at a time. He has history of C-Diff and recently had a colostomy take down in the middle of March. The patient denies numbness in his legs. He is currently wheel chair bound secondary to a previous back surgery. No alleviating or exacerbating factors are identified at this time    REVIEW OF SYSTEMS  Review of Systems   Gastrointestinal: Positive for abdominal pain (cramping), blood in stool and diarrhea.   Genitourinary: Positive for urgency.     See HPI for further details. As above, all other systems are negative.  C.     PAST MEDICAL HISTORY   has a past medical history of Anesthesia; Anxiety disorder; Arthritis; Bipolar disorder (CMS-HCC); Depression; Enlarged prostate; GERD (gastroesophageal reflux disease); Heart murmur; Indigestion; and Neck pain.    SOCIAL HISTORY  Social History     Social History Main Topics   • Smoking status: Former Smoker     Packs/day: 2.00     Years: 14.00     Quit date: 3/21/1973   • Smokeless tobacco: Never Used   • Alcohol use 0.0 oz/week      Comment: 3 glasses wine/day   • Drug use: Yes      Comment: history of cocaine and marijuana use still about 10 years ago. Denies any IV drug use.   • Sexual activity: None noted " "      SURGICAL HISTORY   has a past surgical history that includes exploratory laparotomy (7/25/2017); sigmoid colon resection (7/25/2017); colostomy (7/25/2017); appendectomy (7/25/2017); lumbar decompression (12/2017); exc./biopsy mass back (1997); and colostomy takedown (3/21/2018).    CURRENT MEDICATIONS  Home Medications    **Home medications have not yet been reviewed for this encounter**         ALLERGIES  Allergies   Allergen Reactions   • Tylenol Nausea     Very nauseated       PHYSICAL EXAM  /65   Pulse 73   Temp 36.4 °C (97.6 °F)   Resp 16   Ht 1.727 m (5' 8\")   Wt 57.2 kg (126 lb)   SpO2 95%   BMI 19.16 kg/m²   Constitutional: Well developed, Well nourished, No acute distress, Non-toxic appearance.   HENT: Normocephalic, Atraumatic,  Eyes: PERRL, EOM intact  Neck: Supple, no meningismus  Lymphatic: No lymphadenopathy noted.   Cardiovascular: Regular rate and rhythm  Lungs: Clear to auscultation bilaterally, easy unlabored respirations   Abdomen: Bowel sounds normal, Soft, No tenderness  Rectal: Fecal Occult positive. No mass.   Skin: Warm, Dry, no rash  Back: No tenderness, No CVA tenderness.   Extremities: No edema to lower extremities  Neurologic: Alert and oriented, appropriate, follows commands, moving all extremities, normal speech. Bilateral lower extremity 5/5 strength in distal and proximal muscle groups.   Psychiatric: Affect normal    DIAGNOSTIC STUDIES / PROCEDURES    LABS  Results for orders placed or performed during the hospital encounter of 04/10/18   COD (ADULT)   Result Value Ref Range    ABO Grouping Only O     Rh Grouping Only POS     Antibody Screen-Cod NEG    CBC WITH DIFFERENTIAL   Result Value Ref Range    WBC 10.2 4.8 - 10.8 K/uL    RBC 4.64 (L) 4.70 - 6.10 M/uL    Hemoglobin 12.6 (L) 14.0 - 18.0 g/dL    Hematocrit 39.8 (L) 42.0 - 52.0 %    MCV 85.8 81.4 - 97.8 fL    MCH 27.2 27.0 - 33.0 pg    MCHC 31.7 (L) 33.7 - 35.3 g/dL    RDW 47.8 35.9 - 50.0 fL    Platelet Count " 338 164 - 446 K/uL    MPV 8.1 (L) 9.0 - 12.9 fL    Neutrophils-Polys 79.60 (H) 44.00 - 72.00 %    Lymphocytes 12.30 (L) 22.00 - 41.00 %    Monocytes 7.10 0.00 - 13.40 %    Eosinophils 0.40 0.00 - 6.90 %    Basophils 0.30 0.00 - 1.80 %    Immature Granulocytes 0.30 0.00 - 0.90 %    Nucleated RBC 0.00 /100 WBC    Neutrophils (Absolute) 8.13 (H) 1.82 - 7.42 K/uL    Lymphs (Absolute) 1.26 1.00 - 4.80 K/uL    Monos (Absolute) 0.73 0.00 - 0.85 K/uL    Eos (Absolute) 0.04 0.00 - 0.51 K/uL    Baso (Absolute) 0.03 0.00 - 0.12 K/uL    Immature Granulocytes (abs) 0.03 0.00 - 0.11 K/uL    NRBC (Absolute) 0.00 K/uL   COMP METABOLIC PANEL   Result Value Ref Range    Sodium 141 135 - 145 mmol/L    Potassium 3.7 3.6 - 5.5 mmol/L    Chloride 107 96 - 112 mmol/L    Co2 26 20 - 33 mmol/L    Anion Gap 8.0 0.0 - 11.9    Glucose 96 65 - 99 mg/dL    Bun 9 8 - 22 mg/dL    Creatinine 1.04 0.50 - 1.40 mg/dL    Calcium 9.4 8.5 - 10.5 mg/dL    AST(SGOT) 9 (L) 12 - 45 U/L    ALT(SGPT) <5 2 - 50 U/L    Alkaline Phosphatase 97 30 - 99 U/L    Total Bilirubin 0.5 0.1 - 1.5 mg/dL    Albumin 4.1 3.2 - 4.9 g/dL    Total Protein 6.3 6.0 - 8.2 g/dL    Globulin 2.2 1.9 - 3.5 g/dL    A-G Ratio 1.9 g/dL   LIPASE   Result Value Ref Range    Lipase 3 (L) 11 - 82 U/L   PROTHROMBIN TIME   Result Value Ref Range    PT 13.7 12.0 - 14.6 sec    INR 1.08 0.87 - 1.13   APTT   Result Value Ref Range    APTT 32.2 24.7 - 36.0 sec   ESTIMATED GFR   Result Value Ref Range    GFR If African American >60 >60 mL/min/1.73 m 2    GFR If Non African American >60 >60 mL/min/1.73 m 2       RADIOLOGY  DX-CHEST-LIMITED (1 VIEW)   Final Result      1.  Worsening hypoinflation.   2.  No pneumonia or pneumothorax.      CT-ABDOMEN-PELVIS WITH    (Results Pending)     CT-ABDOMEN-PELVIS WITH   Final Result      1.  Colonic wall thickening probably related involving rectosigmoid and ascending portions, most likely inflammatory.   2.  No bowel obstruction or evidence for bowel  perforation.   3.  Postoperative changes as described.      DX-CHEST-LIMITED (1 VIEW)   Final Result      1.  Worsening hypoinflation.   2.  No pneumonia or pneumothorax.          COURSE & MEDICAL DECISION MAKING  Pertinent Labs & Imaging studies reviewed. (See chart for details)    7:43 PM - Obtained and reviewed past medical records which indicate the patient's hemoglobin is improving. He was recently discharged in March. Colostomy take down on March 21 st indicative for diverticulitis that was complicated by rupture and abscess formation.    7:48 PM Patient seen and examined at bedside. The patient presents with chief complaint of diarrhea and cramping abdominal pain. Given his long-standing history of Clostridium difficile in the past and recent surgery as well as recent antibiotic use I do believe that he is at very high risk for recurrent Clostridium difficile. Patient's been placed on isolation precautions and started on oral vancomycin.. Ordered for Cdiff, Estimated GFR, APTT, PTT, Lipase, CMP, CBC, COD CT-Abdomen, and DX-Chest to evaluate.   Will check a CT abdomen given patient's abdominal pain.  8:40 PM - Reviewed patient's lab and radiology results as shown above.     8:42 PM - Paged [facility].    8:54 PM - Consult with Dr. Hart, [facility], who agrees to admit the patient.    DISPOSITION:  Patient will be admitted to Dr. Hart, [facility], in guarded condition.    FINAL IMPRESSION  1. Enteritis,c .diff infection      Maida ABDULLAHI), am scribing for, and in the presence of, Willy Alejandre M.D..    Electronically signed by: Maida Noyola), 4/10/2018    Willy ABDULLAHI M.D. personally performed the services described in this documentation, as scribed by Maida Licea in my presence, and it is both accurate and complete.    The note accurately reflects work and decisions made by me.  Willy Alejandre  4/11/2018  1:56 AM

## 2018-04-11 NOTE — H&P
"      Internal Medicine Admitting History and Physical    Note Author: Barbara Noguera M.D.       Name Marcelo Colon     1940   Age/Sex 77 y.o. male   MRN [id number]   Code Status DNR      After 5PM or if no immediate response to page, please call for cross-coverage  Attending/Team: Dr. Martin / Charli  See Patient List for primary contact information  Call [phone] to page    1st Call - Day Intern (R1):     2nd Call - Day Sr. Resident (R2/R3):   Dr. Salazar        Chief Complaint:  Bloody Diarrhea & Bowel Incontinence     HPI:  A 78 yo man with PMHx of pelvic abscess associated bowel perforation s/p exploratory laparotomy, washout sigmoid colon resection colostomy placement and appendectomy on 2017, Two episodes of C. difficile colitis on 2017 & 2018 , lumbar decompression (2017) & colostomy takedown (3/21/2018) by Dr. Rodriguez with surgical associates came into ED with the chief complaints of bloody diarrhea & bowel incontinence. Per pt, since  when he has bowel perforation, his stool has been semisolid ( buttery in consistency) with occasional blood in stool. But for the last 2 weeks, he has more episodes of loose stool & for the last 2-3 days, he has BM every 20 minutes. Today, he noticed fresh bright blood when he wiped himself. So he decided to come into ED.   On 2018, pt came into ED for dysuria & discharged with Keflex. Pt also still complaints of dysuria, urinary pretension. He stated that he wants to urinate but nothing coming out. Pt denied numbness & tingling in his feet. He is currently wheel chair bound secondary to his back surgery.  Pt missed his appointments with his surgeon .     At ED, vitals: /66   Pulse 81   Temp 36.2 °C (97.2 °F)   Resp 16   Ht 1.727 m (5' 8\")   Wt 54.2 kg (119 lb 7.8 oz)   SpO2 96%   BMI 18.17 kg/m²    H/H 12/39.   K 3.7.   BUN 9 , Cr 1.04, eGFR >60.   Stool WBC negative.   CXR showed Worsening " "hypoinflation. No pneumonia or pneumothorax.  CT Abdomen & pelvis showed colonic wall thickening probably related involving rectosigmoid and ascending portions, most likely inflammatory. No bowel obstruction or evidence for bowel perforation.  Pt was admitted to medical floor for possible C Diff infection & colitis.       Review of Systems   Constitutional: Positive for malaise/fatigue. Negative for chills and fever.   HENT: Negative for congestion, sinus pain and sore throat.    Eyes: Negative for blurred vision, double vision, pain and discharge.   Respiratory: Negative for cough, sputum production and shortness of breath.    Cardiovascular: Negative for chest pain, palpitations, orthopnea, leg swelling and PND.   Gastrointestinal: Positive for abdominal pain, blood in stool, diarrhea and heartburn. Negative for melena, nausea and vomiting.   Genitourinary: Positive for dysuria. Negative for frequency and urgency.        Urinary Retention    Musculoskeletal: Negative for myalgias.   Neurological: Positive for weakness. Negative for dizziness, sensory change, focal weakness and headaches.   Psychiatric/Behavioral: Negative for depression. The patient is not nervous/anxious.              Past Medical History:   Past Medical History:   Diagnosis Date   • Anesthesia     \"takes very little and takes forever to come out of it\"   • Anxiety disorder    • Arthritis     some in my back   • Bipolar disorder (CMS-HCC)    • Depression    • Enlarged prostate    • GERD (gastroesophageal reflux disease)    • Heart murmur    • Indigestion    • Neck pain        Past Surgical History:  Past Surgical History:   Procedure Laterality Date   • COLOSTOMY TAKEDOWN  3/21/2018    Procedure: COLOSTOMY TAKEDOWN;  Surgeon: Jorge Rodriguez M.D.;  Location: SURGERY [facility];  Service: General   • LUMBAR DECOMPRESSION  12/2017   • EXPLORATORY LAPAROTOMY  7/25/2017    Procedure: EXPLORATORY LAPAROTOMY- ABDOMINAL WASH OUT;  Surgeon: " Jorge Rodriguez M.D.;  Location: SURGERY [facility];  Service:    • SIGMOID COLON RESECTION  7/25/2017    Procedure: SIGMOID COLON RESECTION;  Surgeon: Jorge Rodriguez M.D.;  Location: SURGERY [facility];  Service:    • COLOSTOMY  7/25/2017    Procedure: COLOSTOMY- FOR OSTOMY PLACEMENT AND OTHER PROCEDURES AS INDICATED;  Surgeon: Jorge Rodriguez M.D.;  Location: SURGERY [facility];  Service:    • APPENDECTOMY  7/25/2017    Procedure: APPENDECTOMY;  Surgeon: Jorge Rodriguez M.D.;  Location: SURGERY [facility];  Service:    • EXC./BIOPSY MASS BACK  1997       Current Outpatient Medications:  Home Medications     Reviewed by Meg Awad R.N. (Registered Nurse) on 04/11/18 at 0032  Med List Status: Complete   Medication Last Dose Status   alfuzosin (UROXATRAL) 10 MG SR tablet  Active   cephALEXin (KEFLEX) 500 MG Cap 4/10/2018 Active   ferrous sulfate 325 (65 Fe) MG tablet 4/10/2018 Active   gabapentin (NEURONTIN) 300 MG Cap 4/10/2018 Active   lamotrigine (LAMICTAL) 200 MG tablet 4/10/2018 Active   oxyCODONE immediate-release (ROXICODONE) 5 MG Tab  Active   potassium chloride SA (KDUR) 20 MEQ Tab CR 4/10/2018 Active   tamsulosin (FLOMAX) 0.4 MG capsule  Active   tramadol (ULTRAM) 50 MG TABS 4/10/2018 Active                Medication Allergy/Sensitivities:  Allergies   Allergen Reactions   • Acetaminophen Nausea     Very nauseated          Family History:  No family history on file.    Social History:  Social History     Social History   • Marital status: Single     Spouse name: N/A   • Number of children: N/A   • Years of education: N/A     Occupational History   • Not on file.     Social History Main Topics   • Smoking status: Former Smoker     Packs/day: 2.00     Years: 14.00     Quit date: 3/21/1973   • Smokeless tobacco: Never Used   • Alcohol use 0.0 oz/week      Comment: 3 glasses wine/day   • Drug use: Yes      Comment: history of cocaine and marijuana use still about 10  "years ago. Denies any IV drug use.   • Sexual activity: Not on file     Other Topics Concern   • Not on file     Social History Narrative   • No narrative on file     Living situation: Lives with a friend   PCP : Not on File       Physical Exam     Vitals:    04/10/18 1637 04/10/18 1750 04/10/18 2032 04/10/18 2356   BP:  119/65  144/66   Pulse:  73 89 81   Resp:  16  16   Temp:    36.2 °C (97.2 °F)   SpO2:  95% 95% 96%   Weight: 57.2 kg (126 lb)   54.2 kg (119 lb 7.8 oz)   Height:    1.727 m (5' 8\")     Body mass index is 18.17 kg/m².  /66   Pulse 81   Temp 36.2 °C (97.2 °F)   Resp 16   Ht 1.727 m (5' 8\")   Wt 54.2 kg (119 lb 7.8 oz)   SpO2 96%   BMI 18.17 kg/m²   O2 therapy: Pulse Oximetry: 96 %, O2 (LPM): 0, O2 Delivery: None (Room Air)    Physical Exam   Constitutional: He is oriented to person, place, and time and well-developed, well-nourished, and in no distress. No distress.   Pt is cachetic    HENT:   Head: Normocephalic and atraumatic.   Eyes: Conjunctivae and EOM are normal. Pupils are equal, round, and reactive to light.   Neck: Normal range of motion. Neck supple.   Cardiovascular: Normal rate, regular rhythm and normal heart sounds.    No murmur heard.  Pulmonary/Chest: Effort normal and breath sounds normal. No respiratory distress. He has no wheezes. He has no rales.   Abdominal: Soft. Bowel sounds are normal. He exhibits no distension. There is no tenderness. There is no rebound and no guarding.   Mid Line Scar & Left horizontal scar. Well healed with staples in placed.    Genitourinary: Prostate normal. Rectal exam shows guaiac positive stool.   Genitourinary Comments: Pt is having loose mucous stool mixed with bright red blood.   No rectal tone.   No enlarged prostate,  No fissure or hemorrhoids.    Musculoskeletal: Normal range of motion.   Neurological: He is alert and oriented to person, place, and time.   Skin: Skin is warm.   Psychiatric: Affect normal.             Data Review     "   Old Records Request:   Completed  Current Records review and summary: Completed    Lab Data Review:  Recent Results (from the past 24 hour(s))   COD (ADULT)    Collection Time: 04/10/18  5:57 PM   Result Value Ref Range    ABO Grouping Only O     Rh Grouping Only POS     Antibody Screen-Cod NEG    CBC WITH DIFFERENTIAL    Collection Time: 04/10/18  5:57 PM   Result Value Ref Range    WBC 10.2 4.8 - 10.8 K/uL    RBC 4.64 (L) 4.70 - 6.10 M/uL    Hemoglobin 12.6 (L) 14.0 - 18.0 g/dL    Hematocrit 39.8 (L) 42.0 - 52.0 %    MCV 85.8 81.4 - 97.8 fL    MCH 27.2 27.0 - 33.0 pg    MCHC 31.7 (L) 33.7 - 35.3 g/dL    RDW 47.8 35.9 - 50.0 fL    Platelet Count 338 164 - 446 K/uL    MPV 8.1 (L) 9.0 - 12.9 fL    Neutrophils-Polys 79.60 (H) 44.00 - 72.00 %    Lymphocytes 12.30 (L) 22.00 - 41.00 %    Monocytes 7.10 0.00 - 13.40 %    Eosinophils 0.40 0.00 - 6.90 %    Basophils 0.30 0.00 - 1.80 %    Immature Granulocytes 0.30 0.00 - 0.90 %    Nucleated RBC 0.00 /100 WBC    Neutrophils (Absolute) 8.13 (H) 1.82 - 7.42 K/uL    Lymphs (Absolute) 1.26 1.00 - 4.80 K/uL    Monos (Absolute) 0.73 0.00 - 0.85 K/uL    Eos (Absolute) 0.04 0.00 - 0.51 K/uL    Baso (Absolute) 0.03 0.00 - 0.12 K/uL    Immature Granulocytes (abs) 0.03 0.00 - 0.11 K/uL    NRBC (Absolute) 0.00 K/uL   COMP METABOLIC PANEL    Collection Time: 04/10/18  5:57 PM   Result Value Ref Range    Sodium 141 135 - 145 mmol/L    Potassium 3.7 3.6 - 5.5 mmol/L    Chloride 107 96 - 112 mmol/L    Co2 26 20 - 33 mmol/L    Anion Gap 8.0 0.0 - 11.9    Glucose 96 65 - 99 mg/dL    Bun 9 8 - 22 mg/dL    Creatinine 1.04 0.50 - 1.40 mg/dL    Calcium 9.4 8.5 - 10.5 mg/dL    AST(SGOT) 9 (L) 12 - 45 U/L    ALT(SGPT) <5 2 - 50 U/L    Alkaline Phosphatase 97 30 - 99 U/L    Total Bilirubin 0.5 0.1 - 1.5 mg/dL    Albumin 4.1 3.2 - 4.9 g/dL    Total Protein 6.3 6.0 - 8.2 g/dL    Globulin 2.2 1.9 - 3.5 g/dL    A-G Ratio 1.9 g/dL   LIPASE    Collection Time: 04/10/18  5:57 PM   Result Value Ref Range     Lipase 3 (L) 11 - 82 U/L   PROTHROMBIN TIME    Collection Time: 04/10/18  5:57 PM   Result Value Ref Range    PT 13.7 12.0 - 14.6 sec    INR 1.08 0.87 - 1.13   APTT    Collection Time: 04/10/18  5:57 PM   Result Value Ref Range    APTT 32.2 24.7 - 36.0 sec   ESTIMATED GFR    Collection Time: 04/10/18  5:57 PM   Result Value Ref Range    GFR If African American >60 >60 mL/min/1.73 m 2    GFR If Non African American >60 >60 mL/min/1.73 m 2   MAGNESIUM    Collection Time: 04/10/18  5:57 PM   Result Value Ref Range    Magnesium 2.1 1.5 - 2.5 mg/dL   PHOSPHORUS    Collection Time: 04/10/18  5:57 PM   Result Value Ref Range    Phosphorus 3.3 2.5 - 4.5 mg/dL   STOOL WBC'S    Collection Time: 04/10/18 10:10 PM   Result Value Ref Range    Stool WBC's None seen None seen    Other Elements See comment    OCCULT BLOOD STOOL    Collection Time: 04/10/18 10:10 PM   Result Value Ref Range    Occult Blood Feces Positive (A) Negative   CBC with Differential    Collection Time: 04/11/18  1:47 AM   Result Value Ref Range    WBC 9.5 4.8 - 10.8 K/uL    RBC 4.29 (L) 4.70 - 6.10 M/uL    Hemoglobin 11.5 (L) 14.0 - 18.0 g/dL    Hematocrit 36.5 (L) 42.0 - 52.0 %    MCV 85.1 81.4 - 97.8 fL    MCH 26.8 (L) 27.0 - 33.0 pg    MCHC 31.5 (L) 33.7 - 35.3 g/dL    RDW 49.1 35.9 - 50.0 fL    Platelet Count 318 164 - 446 K/uL    MPV 8.3 (L) 9.0 - 12.9 fL    Neutrophils-Polys 67.60 44.00 - 72.00 %    Lymphocytes 21.70 (L) 22.00 - 41.00 %    Monocytes 9.50 0.00 - 13.40 %    Eosinophils 0.60 0.00 - 6.90 %    Basophils 0.30 0.00 - 1.80 %    Immature Granulocytes 0.30 0.00 - 0.90 %    Nucleated RBC 0.00 /100 WBC    Neutrophils (Absolute) 6.39 1.82 - 7.42 K/uL    Lymphs (Absolute) 2.05 1.00 - 4.80 K/uL    Monos (Absolute) 0.90 (H) 0.00 - 0.85 K/uL    Eos (Absolute) 0.06 0.00 - 0.51 K/uL    Baso (Absolute) 0.03 0.00 - 0.12 K/uL    Immature Granulocytes (abs) 0.03 0.00 - 0.11 K/uL    NRBC (Absolute) 0.00 K/uL   Comp Metabolic Panel (CMP)    Collection Time:  04/11/18  1:47 AM   Result Value Ref Range    Sodium 139 135 - 145 mmol/L    Potassium 3.5 (L) 3.6 - 5.5 mmol/L    Chloride 107 96 - 112 mmol/L    Co2 24 20 - 33 mmol/L    Anion Gap 8.0 0.0 - 11.9    Glucose 117 (H) 65 - 99 mg/dL    Bun 8 8 - 22 mg/dL    Creatinine 0.95 0.50 - 1.40 mg/dL    Calcium 9.1 8.5 - 10.5 mg/dL    AST(SGOT) 9 (L) 12 - 45 U/L    ALT(SGPT) <5 2 - 50 U/L    Alkaline Phosphatase 82 30 - 99 U/L    Total Bilirubin 0.5 0.1 - 1.5 mg/dL    Albumin 3.5 3.2 - 4.9 g/dL    Total Protein 5.5 (L) 6.0 - 8.2 g/dL    Globulin 2.0 1.9 - 3.5 g/dL    A-G Ratio 1.8 g/dL   ESTIMATED GFR    Collection Time: 04/11/18  1:47 AM   Result Value Ref Range    GFR If African American >60 >60 mL/min/1.73 m 2    GFR If Non African American >60 >60 mL/min/1.73 m 2   IRON/TOTAL IRON BIND    Collection Time: 04/11/18  1:47 AM   Result Value Ref Range    Iron 14 (L) 50 - 180 ug/dL    Total Iron Binding 314 250 - 450 ug/dL    % Saturation 4 (L) 15 - 55 %   PREALBUMIN    Collection Time: 04/11/18  1:47 AM   Result Value Ref Range    Pre-Albumin 12.0 (L) 18.0 - 38.0 mg/dL       Imaging/Procedures Review:    ndependant Imaging Review: Completed  CT-ABDOMEN-PELVIS WITH   Final Result      1.  Colonic wall thickening probably related involving rectosigmoid and ascending portions, most likely inflammatory.   2.  No bowel obstruction or evidence for bowel perforation.   3.  Postoperative changes as described.      DX-CHEST-LIMITED (1 VIEW)   Final Result      1.  Worsening hypoinflation.   2.  No pneumonia or pneumothorax.                 EKG:   EKG Independant Review: Completed  Pending            Assessment/Plan     * Diarrhea   Assessment & Plan    - PMHx of pelvic abscess associated bowel perforation s/p exploratory laparotomy, washout sigmoid colon resection colostomy placement and appendectomy on 7/25/2017, Two episodes of C. difficile colitis on 12/18/2017 & 1/24/2018 , colostomy takedown (3/21/2018)   - dx with functional  diarrhea in the past   - came in for increasing  loose stool,  noticed fresh bright blood when he wiped himself  - 4/4/2018, pt came into ED for dysuria & discharged with Keflex   - Stool WBC negative  - CT Abdomen & pelvis showed colonic wall thickening probably related involving rectosigmoid and ascending portions, most likely inflammatory. No bowel obstruction or evidence for bowel perforation.    Plan:   - Admitted to Medical floor  - C Diff Study ordered   - FOBT +ve  - IVF  - contact isolation   - thin liquid diet   - on Vanco oral & lactobacillus   - will CTM         Iron deficiency anemia due to chronic blood loss- (present on admission)   Assessment & Plan    - ADAMA per work up on 1/30/18   - will recheck ferritin & iron panel   - continue iron supplement         UTI (urinary tract infection)   Assessment & Plan    - c/o dysuria & urinary retension   - s/p Keflex ( pt came into ED for the same complaints )  - order urinalysis & urine Cx   - hold antibiotic for now, will consider A/B after UA  - on Flomax for BPH   - Bladder Scan Daily         Cachexia (CMS-HCC)- (present on admission)   Assessment & Plan    - nutrition consult  - pre-albumin ordered   - case Mx in AM   - multi-vitamin started   -  pressure ulcer prevention protocol           Debility- (present on admission)   Assessment & Plan    - baseline wheelchair bound   - PT / OT   - pressure ulcer prevention protocol         Type 2 diabetes mellitus (CMS-HCC)- (present on admission)   Assessment & Plan    - not on home medications   - Glucose 96 on admit   - repeat Hb A 1c & lipid panel         Bipolar 1 disorder, depressed (CMS-HCC)- (present on admission)   Assessment & Plan    - mood is good , denied SI/HI   - continue home lamotrigine         Benign prostatic hyperplasia- (present on admission)   Assessment & Plan    - chronic   - on home flomax   - continue home flomax   - bladder scan daily   - UA & Urine Cx ordered               Anticipated  Hospital stay: Observation admit        Quality Measures  Quality-Core Measures   Reviewed items::  Labs reviewed, Medications reviewed and Radiology images reviewed  Ruff catheter::  No Ruff  DVT prophylaxis pharmacological::  Enoxaparin (Lovenox)  Antibiotics:  Treating active infection/contamination beyond 24 hours perioperative coverage  Assessed for rehabilitation services:  Patient was assess for and/or received rehabilitation services during this hospitalization

## 2018-04-11 NOTE — PROGRESS NOTES
Assessment completed.  Pt A&Ox4.  Respirations even, unlabored on room air.  Pt complains of 8/10 hand pain, medicated per MAR.  IVF infusing per MAR.    Bed in locked, lowest position.  Call light and belongings  within reach.  Pt's updated on POC, updated communication board.  Needs met, will continue to monitor.   Clear liquid meal provided

## 2018-04-11 NOTE — ASSESSMENT & PLAN NOTE
- c/o dysuria & urinary retension   - s/p Keflex (patient previously was in ED for similar complaints)  - negative urine ctx in the past   - flomax daily, consider increasing as needed per pcp

## 2018-04-11 NOTE — PROGRESS NOTES
2RN skin check. Buttocks and groin red, nonblanchable, skin  Excoriation noted. Appears to be a healed wound on sacrum as well.  Abdominal mid and transverse incision with staples suma, pink/red, no drainage noted, well approximated.

## 2018-04-11 NOTE — ED NOTES
Rectal exam performed by ROE BURNETTE, stool sample collected.  Pt cleaned up, repositioned, sample sent to lab

## 2018-04-11 NOTE — ASSESSMENT & PLAN NOTE
- baseline wheelchair bound, previously used cane prior to last hospitalization but now wheelchair bound again due to severe deconditioning   - PT outpatient established and he will resume on discharge

## 2018-04-12 ENCOUNTER — TELEPHONE (OUTPATIENT)
Dept: HOSPITALIST | Facility: MEDICAL CENTER | Age: 78
End: 2018-04-12

## 2018-04-12 ENCOUNTER — APPOINTMENT (OUTPATIENT)
Dept: MEDICAL GROUP | Facility: MEDICAL CENTER | Age: 78
End: 2018-04-12
Payer: MEDICARE

## 2018-04-12 DIAGNOSIS — A49.8 CLOSTRIDIUM DIFFICILE INFECTION: ICD-10-CM

## 2018-04-12 NOTE — PROGRESS NOTES
Lab called with critical result of positive for Cdiff toxin at 17. Critical lab result read back to 1715.   Dr. Jay notified of critical lab result at 1718.  Critical lab result read back by Dr. Jay.

## 2018-04-13 ENCOUNTER — PATIENT OUTREACH (OUTPATIENT)
Dept: HEALTH INFORMATION MANAGEMENT | Facility: OTHER | Age: 78
End: 2018-04-13

## 2018-04-13 LAB
BACTERIA UR CULT: ABNORMAL
BACTERIA UR CULT: ABNORMAL
SIGNIFICANT IND 70042: ABNORMAL
SITE SITE: ABNORMAL
SOURCE SOURCE: ABNORMAL

## 2018-04-13 NOTE — PROGRESS NOTES
Referral from Jess at [facility]. Patient is having difficulty affording vancomycin. Last Rx was $180 for the course. Outbound call to [facility] for price check. Technician states vancomycin 125mg #90 capsules will cost $3.35 as a 90 day supply.  Rx is for 45 days. Patient may need to refill and pay a second copay after 30 days. [facility] does not have Rx on file. Could not contact patient to determine whether he has a hard copy. LVM for patient to call [phone].

## 2018-04-14 NOTE — TELEPHONE ENCOUNTER
Have called patient on 4/11 with voicemail left and spoke to patient 4/12 regarding results of C diff + NAP1. Have asked patient to start his prescription of oral vancomycin with the understanding that he needs to return to the hospital with any new or worsening symptoms including abdominal pain, nausea/vomiting, diarrhea, fever/chills and low oral intake. Patient states that he is currently doing well without diarrhea, fever/chills or abdominal pain. Have sent referral to GI for stool transplant given this is the patient's third C diff infection. Have discussed this plan with patient and he is agreeable to follow up.

## 2018-04-25 ENCOUNTER — PATIENT OUTREACH (OUTPATIENT)
Dept: HEALTH INFORMATION MANAGEMENT | Facility: OTHER | Age: 78
End: 2018-04-25

## 2018-04-30 NOTE — PROGRESS NOTES
Marcelo Colon was an emergent admission who discharged on 3/26. Patient advocate assisted the patient by contacting the patient's surgeon when C did not received resumption order and requested a new one sent and obtaining a consult from Clinical Pharmacist Sofy. Patient advocate also spoke with the patient several time to confirm that the patient was follow up with providers and taking medications as directed. Patient kept appointments with [facility] services starting 4/8. Patient has a follow up scheduled for 4/13 with his PCP. PPS 70%

## 2018-06-11 ENCOUNTER — HOSPITAL ENCOUNTER (EMERGENCY)
Facility: MEDICAL CENTER | Age: 78
End: 2018-06-11
Attending: EMERGENCY MEDICINE
Payer: MEDICARE

## 2018-06-11 VITALS
DIASTOLIC BLOOD PRESSURE: 74 MMHG | WEIGHT: 124.34 LBS | BODY MASS INDEX: 18.91 KG/M2 | OXYGEN SATURATION: 94 % | RESPIRATION RATE: 17 BRPM | HEART RATE: 84 BPM | TEMPERATURE: 99.3 F | SYSTOLIC BLOOD PRESSURE: 118 MMHG

## 2018-06-11 DIAGNOSIS — R19.7 DIARRHEA, UNSPECIFIED TYPE: ICD-10-CM

## 2018-06-11 LAB
ALBUMIN SERPL BCP-MCNC: 4 G/DL (ref 3.2–4.9)
ALBUMIN/GLOB SERPL: 1.5 G/DL
ALP SERPL-CCNC: 71 U/L (ref 30–99)
ALT SERPL-CCNC: 10 U/L (ref 2–50)
ANION GAP SERPL CALC-SCNC: 8 MMOL/L (ref 0–11.9)
AST SERPL-CCNC: 15 U/L (ref 12–45)
BASOPHILS # BLD AUTO: 0.2 % (ref 0–1.8)
BASOPHILS # BLD: 0.01 K/UL (ref 0–0.12)
BILIRUB SERPL-MCNC: 0.9 MG/DL (ref 0.1–1.5)
BUN SERPL-MCNC: 22 MG/DL (ref 8–22)
CALCIUM SERPL-MCNC: 9.2 MG/DL (ref 8.4–10.2)
CHLORIDE SERPL-SCNC: 103 MMOL/L (ref 96–112)
CO2 SERPL-SCNC: 25 MMOL/L (ref 20–33)
CREAT SERPL-MCNC: 1.37 MG/DL (ref 0.5–1.4)
EOSINOPHIL # BLD AUTO: 0.1 K/UL (ref 0–0.51)
EOSINOPHIL NFR BLD: 1.8 % (ref 0–6.9)
ERYTHROCYTE [DISTWIDTH] IN BLOOD BY AUTOMATED COUNT: 46.4 FL (ref 35.9–50)
GLOBULIN SER CALC-MCNC: 2.7 G/DL (ref 1.9–3.5)
GLUCOSE SERPL-MCNC: 139 MG/DL (ref 65–99)
HCT VFR BLD AUTO: 36.6 % (ref 42–52)
HGB BLD-MCNC: 11.4 G/DL (ref 14–18)
IMM GRANULOCYTES # BLD AUTO: 0.01 K/UL (ref 0–0.11)
IMM GRANULOCYTES NFR BLD AUTO: 0.2 % (ref 0–0.9)
INR PPP: 1.07 (ref 0.87–1.13)
LIPASE SERPL-CCNC: 19 U/L (ref 7–58)
LYMPHOCYTES # BLD AUTO: 1.42 K/UL (ref 1–4.8)
LYMPHOCYTES NFR BLD: 25.4 % (ref 22–41)
MCH RBC QN AUTO: 25.4 PG (ref 27–33)
MCHC RBC AUTO-ENTMCNC: 31.1 G/DL (ref 33.7–35.3)
MCV RBC AUTO: 81.5 FL (ref 81.4–97.8)
MONOCYTES # BLD AUTO: 0.72 K/UL (ref 0–0.85)
MONOCYTES NFR BLD AUTO: 12.9 % (ref 0–13.4)
NEUTROPHILS # BLD AUTO: 3.32 K/UL (ref 1.82–7.42)
NEUTROPHILS NFR BLD: 59.5 % (ref 44–72)
NRBC # BLD AUTO: 0 K/UL
NRBC BLD-RTO: 0 /100 WBC
PLATELET # BLD AUTO: 192 K/UL (ref 164–446)
PMV BLD AUTO: 9.2 FL (ref 9–12.9)
POTASSIUM SERPL-SCNC: 4.3 MMOL/L (ref 3.6–5.5)
PROT SERPL-MCNC: 6.7 G/DL (ref 6–8.2)
PROTHROMBIN TIME: 13.8 SEC (ref 12–14.6)
RBC # BLD AUTO: 4.49 M/UL (ref 4.7–6.1)
SODIUM SERPL-SCNC: 136 MMOL/L (ref 135–145)
TROPONIN I SERPL-MCNC: <0.02 NG/ML (ref 0–0.04)
WBC # BLD AUTO: 5.6 K/UL (ref 4.8–10.8)

## 2018-06-11 PROCEDURE — 85610 PROTHROMBIN TIME: CPT

## 2018-06-11 PROCEDURE — 84484 ASSAY OF TROPONIN QUANT: CPT

## 2018-06-11 PROCEDURE — 700102 HCHG RX REV CODE 250 W/ 637 OVERRIDE(OP)

## 2018-06-11 PROCEDURE — A9270 NON-COVERED ITEM OR SERVICE: HCPCS

## 2018-06-11 PROCEDURE — 87493 C DIFF AMPLIFIED PROBE: CPT

## 2018-06-11 PROCEDURE — 94760 N-INVAS EAR/PLS OXIMETRY 1: CPT

## 2018-06-11 PROCEDURE — 99284 EMERGENCY DEPT VISIT MOD MDM: CPT

## 2018-06-11 PROCEDURE — 85025 COMPLETE CBC W/AUTO DIFF WBC: CPT

## 2018-06-11 PROCEDURE — 83690 ASSAY OF LIPASE: CPT

## 2018-06-11 PROCEDURE — 36415 COLL VENOUS BLD VENIPUNCTURE: CPT

## 2018-06-11 PROCEDURE — 87324 CLOSTRIDIUM AG IA: CPT | Mod: XU

## 2018-06-11 PROCEDURE — 80053 COMPREHEN METABOLIC PANEL: CPT

## 2018-06-11 RX ORDER — VANCOMYCIN HYDROCHLORIDE 125 MG/1
125 CAPSULE ORAL 4 TIMES DAILY
Qty: 40 CAP | Refills: 0 | Status: ON HOLD | OUTPATIENT
Start: 2018-06-11 | End: 2018-07-18

## 2018-06-11 RX ADMIN — VANCOMYCIN 125 MG: KIT at 20:51

## 2018-06-11 ASSESSMENT — PAIN SCALES - GENERAL
PAINLEVEL_OUTOF10: 0
PAINLEVEL_OUTOF10: 7

## 2018-06-11 NOTE — ED NOTES
Pt bib self with c/o loose stool since 6/8. Pt reports hx of c-diff and has been dx 4x this year. States last BM was PTA.

## 2018-06-12 ENCOUNTER — PATIENT OUTREACH (OUTPATIENT)
Dept: HEALTH INFORMATION MANAGEMENT | Facility: OTHER | Age: 78
End: 2018-06-12

## 2018-06-12 LAB
C DIFF DNA SPEC QL NAA+PROBE: NORMAL
C DIFF TOX A+B STL QL IA: POSITIVE
C DIFF TOX GENS STL QL NAA+PROBE: NORMAL

## 2018-06-12 NOTE — DISCHARGE INSTRUCTIONS
"Clostridium Difficile Toxin  This is a test which may be done when a patient has diarrhea that lasts for several days, or has abdominal pain, fever, and nausea after antibiotic therapy.   This test looks for the presence of Clostridium difficile (C.diff.) toxin in a stool sample. C.diff. is a germ (bacterium) that is one of the groups of bacteria that are usually in the colon, called \"normal miranda.\" If something upsets the growth of the other normal miranda, C.diff. may overgrow and disrupt the balance of bacteria in the colon. C. diff. may produce two toxins, A and B. The combination of overgrowth and toxins may cause prolonged diarrhea. The toxins may damage the lining of the colon and lead to colitis.   While some cases of C. diff. diarrhea and colitis do not require treatment, others require specific oral antibiotic therapy. Most patients improve as the normal miranda re-establishes itself, but about some may have one or more relapses, with symptoms and detectible toxin levels coming back.  PREPARATION FOR TEST  There is no special preparation for the test. A fresh stool sample is collected in a sterile container. The sample should not be mixed with urine or water. The stool should be taken to the lab within an hour. It may be refrigerated or frozen and taken to the lab as soon as possible. The container should be labeled with your name and the date and time of the stool collection.   NORMAL FINDINGS  Negative Tissue Culture (no toxin identified)  Ranges for normal findings may vary among different laboratories and hospitals. You should always check with your doctor after having lab work or other tests done to discuss the meaning of your test results and whether your values are considered within normal limits.  MEANING OF TEST   Your caregiver will go over the test results with you and discuss the importance and meaning of your results, as well as treatment options and the need for additional tests if " necessary.  OBTAINING THE TEST RESULTS  It is your responsibility to obtain your test results. Ask the lab or department performing the test when and how you will get your results.  Document Released: 01/10/2006 Document Revised: 03/11/2013 Document Reviewed: 11/26/2009  [employer]® Patient Information ©2014 [employer].

## 2018-06-12 NOTE — ED PROVIDER NOTES
ED Provider Note    CHIEF COMPLAINT  Chief Complaint   Patient presents with   • Loose Stools       HPI  Marcelo Colon is a 77 y.o. male here for evaluation of diarrhea.  The pt states that he has been having a smell to the stool, similar to his previous c diff that he had 6 weeks ago.  He states he has had this multiple times in the past, and usually takes po vanco.  He states this feels the same. He has no blood in the stool. No abdominal pain, no cp, and no sob.   He is scheduled to have a fecal transplant in the near future, with the consult on the 22 of this month.     PAST MEDICAL HISTORY   has a past medical history of Anesthesia; Anxiety disorder; Arthritis; Bipolar disorder (HCC); Clostridium difficile infection; Depression; Enlarged prostate; GERD (gastroesophageal reflux disease); Heart murmur; Indigestion; and Neck pain.    SOCIAL HISTORY  Social History     Social History Main Topics   • Smoking status: Former Smoker     Packs/day: 2.00     Years: 14.00     Quit date: 3/21/1973   • Smokeless tobacco: Never Used   • Alcohol use 0.0 oz/week      Comment: 3 glasses wine/day   • Drug use: Yes      Comment: history of cocaine and marijuana use still about 10 years ago. Denies any IV drug use.   • Sexual activity: Not on file       SURGICAL HISTORY   has a past surgical history that includes exploratory laparotomy (7/25/2017); sigmoid colon resection (7/25/2017); colostomy (7/25/2017); appendectomy (7/25/2017); lumbar decompression (12/2017); exc./biopsy mass back (1997); and colostomy takedown (3/21/2018).    CURRENT MEDICATIONS  Home Medications    **Home medications have not yet been reviewed for this encounter**         ALLERGIES  Allergies   Allergen Reactions   • Acetaminophen Nausea     Very nauseated        REVIEW OF SYSTEMS  See HPI for further details. Review of systems as above, otherwise all other systems are negative.     PHYSICAL EXAM  Constitutional: Well developed, well nourished. No acute  distress.  HEENT: Normocephalic, atraumatic. Posterior pharynx clear and moist.  Eyes:  EOMI. Normal sclera.  Neck: Supple, Full range of motion, nontender.  Chest/Pulmonary: clear to ausculation. Symmetrical expansion.   Cardio: Regular rate and rhythm with no murmur.   Abdomen: Soft, nontender. No peritoneal signs. No guarding. No palpable masses.  Back: No CVA tenderness, nontender midline, no step offs.  Musculoskeletal: No deformity, no edema, neurovascular intact.   Neuro: Clear speech, appropriate, cooperative, cranial nerves II-XII grossly intact.  Psych: Normal mood and affect    No orders to display     Results for orders placed or performed during the hospital encounter of 06/11/18   CBC WITH DIFFERENTIAL   Result Value Ref Range    WBC 5.6 4.8 - 10.8 K/uL    RBC 4.49 (L) 4.70 - 6.10 M/uL    Hemoglobin 11.4 (L) 14.0 - 18.0 g/dL    Hematocrit 36.6 (L) 42.0 - 52.0 %    MCV 81.5 81.4 - 97.8 fL    MCH 25.4 (L) 27.0 - 33.0 pg    MCHC 31.1 (L) 33.7 - 35.3 g/dL    RDW 46.4 35.9 - 50.0 fL    Platelet Count 192 164 - 446 K/uL    MPV 9.2 9.0 - 12.9 fL    Neutrophils-Polys 59.50 44.00 - 72.00 %    Lymphocytes 25.40 22.00 - 41.00 %    Monocytes 12.90 0.00 - 13.40 %    Eosinophils 1.80 0.00 - 6.90 %    Basophils 0.20 0.00 - 1.80 %    Immature Granulocytes 0.20 0.00 - 0.90 %    Nucleated RBC 0.00 /100 WBC    Neutrophils (Absolute) 3.32 1.82 - 7.42 K/uL    Lymphs (Absolute) 1.42 1.00 - 4.80 K/uL    Monos (Absolute) 0.72 0.00 - 0.85 K/uL    Eos (Absolute) 0.10 0.00 - 0.51 K/uL    Baso (Absolute) 0.01 0.00 - 0.12 K/uL    Immature Granulocytes (abs) 0.01 0.00 - 0.11 K/uL    NRBC (Absolute) 0.00 K/uL   COMP METABOLIC PANEL   Result Value Ref Range    Sodium 136 135 - 145 mmol/L    Potassium 4.3 3.6 - 5.5 mmol/L    Chloride 103 96 - 112 mmol/L    Co2 25 20 - 33 mmol/L    Anion Gap 8.0 0.0 - 11.9    Glucose 139 (H) 65 - 99 mg/dL    Bun 22 8 - 22 mg/dL    Creatinine 1.37 0.50 - 1.40 mg/dL    Calcium 9.2 8.4 - 10.2 mg/dL     AST(SGOT) 15 12 - 45 U/L    ALT(SGPT) 10 2 - 50 U/L    Alkaline Phosphatase 71 30 - 99 U/L    Total Bilirubin 0.9 0.1 - 1.5 mg/dL    Albumin 4.0 3.2 - 4.9 g/dL    Total Protein 6.7 6.0 - 8.2 g/dL    Globulin 2.7 1.9 - 3.5 g/dL    A-G Ratio 1.5 g/dL   LIPASE   Result Value Ref Range    Lipase 19 7 - 58 U/L   PROTHROMBIN TIME   Result Value Ref Range    PT 13.8 12.0 - 14.6 sec    INR 1.07 0.87 - 1.13   TROPONIN   Result Value Ref Range    Troponin I <0.02 0.00 - 0.04 ng/mL   ESTIMATED GFR   Result Value Ref Range    GFR If African American >60 >60 mL/min/1.73 m 2    GFR If Non African American 50 (A) >60 mL/min/1.73 m 2         PROCEDURES     MEDICAL RECORD  I have reviewed patient's medical record and pertinent results are listed above.    COURSE & MEDICAL DECISION MAKING  I have reviewed any medical record information, laboratory studies and radiographic results as noted above.    If you have had any blood pressure issues while here in the emergency department, please see your doctor for a further evaluation or work up.      8:06 PM  The pt is scheduled for a fecal transplant consult on June 22.   He is comfortable with the plan of PO vanco, and home.  He will provide us with a stool sample prior to discharge.     I spoke to Dr. Elise, the hospitalist.  He states that the pt can go home on vanco, as long as he has a gi and fecal transplant consult set up, which the pt does.      Differential diagnoses include but not limited to: infectious diarrhea, cdiff.     This patient presents with diarrhea .  At this time, I have counseled the patient/family regarding their medications, pain control, and follow up.  They will continue their medications, if any, as prescribed.  They will return immediately for any worsening symptoms and/or any other medical concerns.  They will see their doctor, or contact the doctor provided, in 1-2 days for follow up.       FINAL IMPRESSION  1. Diarrhea, unspecified type             Electronically signed by: Andrew Ceron, 6/11/2018 8:06 PM

## 2018-06-14 NOTE — ED NOTES
ED Positive Culture Follow-up/Notification Note:    Date: 06/14/2018     Patient seen in the ED on 6/11/2018 for evaluation of diarrhea. Patient has history of recurrent Clostridium difficile colitis.   1. Diarrhea, unspecified type       Discharge Medication List as of 6/11/2018 10:01 PM      START taking these medications    Details   !! vancomycin (VANCOCIN HCL) 125 MG capsule Take 1 Cap by mouth 4 times a day., Disp-40 Cap, R-0, Print Rx Paper       !! - Potential duplicate medications found. Please discuss with provider.          Allergies: Acetaminophen     Vitals:    06/11/18 1612 06/11/18 1811 06/11/18 1813 06/11/18 2043   BP: 123/55 111/64  118/74   Pulse: 95 93  84   Resp: 18 18 17   Temp: 37.4 °C (99.3 °F)      SpO2: 94% 93%  94%   Weight:   56.4 kg (124 lb 5.4 oz)        Final cultures:   Results     Procedure Component Value Units Date/Time    C DIFF TOXIN [[id number]]  (Abnormal) Collected:  06/11/18 2019    Order Status:  Completed Updated:  06/12/18 2210     C.Diff Toxin A&B Positive (A)     Comment: TOXIN POSITIVE  Toxin detected by EIA; C. difficile detected by PCR.  If clinically correlated, treatment indicated per guidelines.  Test of cure is not recommended.         Narrative:       Special Contact Isolation  Does this patient have risk factors for C-diff?->Yes    C Diff by PCR rflx Toxin [[id number]] Collected:  06/11/18 2019    Order Status:  Completed Specimen:  Stool from Stool Updated:  06/12/18 2208     C Diff by PCR See Toxin     027-NAP1-BI Presumptive See Toxin     Comment: Presumptive 027/NAP1/BI target DNA sequences are DETECTED.       Narrative:       Special Contact Isolation  Does this patient have risk factors for C-diff?->Yes          Plan:   Positive for Clostridium difficile by PCR, toxin detected by EIA.  This is the patient's third recurrence and is scheduled for fecal transplant on 6/22.  Patient was provided with PO vancomycin 125 mg QID x 10 days. Will have sufficient  quantity to last until the day of his stool transplant.  Appropriate therapy has been prescribed. No changes required based upon culture result.    Presley Gee, PharmD, BCPS

## 2018-06-20 ENCOUNTER — HOSPITAL ENCOUNTER (OUTPATIENT)
Facility: MEDICAL CENTER | Age: 78
End: 2018-06-20
Attending: SPECIALIST
Payer: MEDICARE

## 2018-06-20 LAB — PSA SERPL-MCNC: 4.49 NG/ML (ref 0–4)

## 2018-06-20 PROCEDURE — 84153 ASSAY OF PSA TOTAL: CPT

## 2018-07-05 ENCOUNTER — HOSPITAL ENCOUNTER (OUTPATIENT)
Dept: RADIOLOGY | Facility: MEDICAL CENTER | Age: 78
End: 2018-07-05
Attending: INTERNAL MEDICINE
Payer: MEDICARE

## 2018-07-05 DIAGNOSIS — K52.9 INFLAMMATORY BOWEL DISEASE: ICD-10-CM

## 2018-07-05 DIAGNOSIS — R15.9 FULL INCONTINENCE OF FECES: ICD-10-CM

## 2018-07-05 DIAGNOSIS — A04.72 INTESTINAL INFECTION DUE TO CLOSTRIDIUM DIFFICILE: ICD-10-CM

## 2018-07-05 DIAGNOSIS — R63.4 LOSS OF WEIGHT: ICD-10-CM

## 2018-07-05 PROCEDURE — 74018 RADEX ABDOMEN 1 VIEW: CPT

## 2018-07-12 ENCOUNTER — HOSPITAL ENCOUNTER (OUTPATIENT)
Facility: MEDICAL CENTER | Age: 78
DRG: 872 | End: 2018-07-12
Admitting: FAMILY MEDICINE
Payer: MEDICARE

## 2018-07-12 ENCOUNTER — HOSPITAL ENCOUNTER (INPATIENT)
Facility: MEDICAL CENTER | Age: 78
LOS: 6 days | DRG: 872 | End: 2018-07-18
Attending: FAMILY MEDICINE | Admitting: FAMILY MEDICINE
Payer: MEDICARE

## 2018-07-12 DIAGNOSIS — R64 CACHEXIA (HCC): Chronic | ICD-10-CM

## 2018-07-12 DIAGNOSIS — R53.81 DEBILITY: Chronic | ICD-10-CM

## 2018-07-12 DIAGNOSIS — A09 DIARRHEA OF INFECTIOUS ORIGIN: Chronic | ICD-10-CM

## 2018-07-12 LAB
ALBUMIN SERPL BCP-MCNC: 3.5 G/DL (ref 3.2–4.9)
ALBUMIN/GLOB SERPL: 1.5 G/DL
ALP SERPL-CCNC: 71 U/L (ref 30–99)
ALT SERPL-CCNC: <5 U/L (ref 2–50)
ANION GAP SERPL CALC-SCNC: 9 MMOL/L (ref 0–11.9)
APPEARANCE UR: CLEAR
AST SERPL-CCNC: 11 U/L (ref 12–45)
BASOPHILS # BLD AUTO: 0.2 % (ref 0–1.8)
BASOPHILS # BLD: 0.02 K/UL (ref 0–0.12)
BILIRUB SERPL-MCNC: 0.6 MG/DL (ref 0.1–1.5)
BILIRUB UR QL STRIP.AUTO: NEGATIVE
BUN SERPL-MCNC: 14 MG/DL (ref 8–22)
CALCIUM SERPL-MCNC: 8.8 MG/DL (ref 8.5–10.5)
CHLORIDE SERPL-SCNC: 104 MMOL/L (ref 96–112)
CO2 SERPL-SCNC: 25 MMOL/L (ref 20–33)
COLOR UR: ABNORMAL
CREAT SERPL-MCNC: 1.2 MG/DL (ref 0.5–1.4)
EOSINOPHIL # BLD AUTO: 0.06 K/UL (ref 0–0.51)
EOSINOPHIL NFR BLD: 0.5 % (ref 0–6.9)
ERYTHROCYTE [DISTWIDTH] IN BLOOD BY AUTOMATED COUNT: 56.7 FL (ref 35.9–50)
GLOBULIN SER CALC-MCNC: 2.3 G/DL (ref 1.9–3.5)
GLUCOSE SERPL-MCNC: 108 MG/DL (ref 65–99)
GLUCOSE UR STRIP.AUTO-MCNC: NEGATIVE MG/DL
HCT VFR BLD AUTO: 37.8 % (ref 42–52)
HGB BLD-MCNC: 12.1 G/DL (ref 14–18)
IMM GRANULOCYTES # BLD AUTO: 0.08 K/UL (ref 0–0.11)
IMM GRANULOCYTES NFR BLD AUTO: 0.6 % (ref 0–0.9)
KETONES UR STRIP.AUTO-MCNC: ABNORMAL MG/DL
LEUKOCYTE ESTERASE UR QL STRIP.AUTO: ABNORMAL
LYMPHOCYTES # BLD AUTO: 1.39 K/UL (ref 1–4.8)
LYMPHOCYTES NFR BLD: 11.2 % (ref 22–41)
MCH RBC QN AUTO: 26.4 PG (ref 27–33)
MCHC RBC AUTO-ENTMCNC: 32 G/DL (ref 33.7–35.3)
MCV RBC AUTO: 82.4 FL (ref 81.4–97.8)
MICRO URNS: ABNORMAL
MONOCYTES # BLD AUTO: 0.92 K/UL (ref 0–0.85)
MONOCYTES NFR BLD AUTO: 7.4 % (ref 0–13.4)
NEUTROPHILS # BLD AUTO: 9.94 K/UL (ref 1.82–7.42)
NEUTROPHILS NFR BLD: 80.1 % (ref 44–72)
NITRITE UR QL STRIP.AUTO: NEGATIVE
NRBC # BLD AUTO: 0 K/UL
NRBC BLD-RTO: 0 /100 WBC
PH UR STRIP.AUTO: 5.5 [PH]
PLATELET # BLD AUTO: 221 K/UL (ref 164–446)
PMV BLD AUTO: 9 FL (ref 9–12.9)
POTASSIUM SERPL-SCNC: 3.6 MMOL/L (ref 3.6–5.5)
PROT SERPL-MCNC: 5.8 G/DL (ref 6–8.2)
PROT UR QL STRIP: NEGATIVE MG/DL
RBC # BLD AUTO: 4.59 M/UL (ref 4.7–6.1)
RBC UR QL AUTO: ABNORMAL
SODIUM SERPL-SCNC: 138 MMOL/L (ref 135–145)
SP GR UR STRIP.AUTO: 1.03
UROBILINOGEN UR STRIP.AUTO-MCNC: 0.2 MG/DL
WBC # BLD AUTO: 12.4 K/UL (ref 4.8–10.8)

## 2018-07-12 PROCEDURE — 36415 COLL VENOUS BLD VENIPUNCTURE: CPT

## 2018-07-12 PROCEDURE — A9270 NON-COVERED ITEM OR SERVICE: HCPCS | Performed by: STUDENT IN AN ORGANIZED HEALTH CARE EDUCATION/TRAINING PROGRAM

## 2018-07-12 PROCEDURE — 85025 COMPLETE CBC W/AUTO DIFF WBC: CPT

## 2018-07-12 PROCEDURE — 700105 HCHG RX REV CODE 258: Performed by: FAMILY MEDICINE

## 2018-07-12 PROCEDURE — 80053 COMPREHEN METABOLIC PANEL: CPT

## 2018-07-12 PROCEDURE — 700105 HCHG RX REV CODE 258: Performed by: STUDENT IN AN ORGANIZED HEALTH CARE EDUCATION/TRAINING PROGRAM

## 2018-07-12 PROCEDURE — 700102 HCHG RX REV CODE 250 W/ 637 OVERRIDE(OP): Performed by: FAMILY MEDICINE

## 2018-07-12 PROCEDURE — 770006 HCHG ROOM/CARE - MED/SURG/GYN SEMI*

## 2018-07-12 PROCEDURE — A9270 NON-COVERED ITEM OR SERVICE: HCPCS | Performed by: FAMILY MEDICINE

## 2018-07-12 PROCEDURE — 700102 HCHG RX REV CODE 250 W/ 637 OVERRIDE(OP): Performed by: STUDENT IN AN ORGANIZED HEALTH CARE EDUCATION/TRAINING PROGRAM

## 2018-07-12 PROCEDURE — 81001 URINALYSIS AUTO W/SCOPE: CPT

## 2018-07-12 RX ORDER — POTASSIUM CHLORIDE 20 MEQ/1
20 TABLET, EXTENDED RELEASE ORAL DAILY
Status: DISCONTINUED | OUTPATIENT
Start: 2018-07-13 | End: 2018-07-18 | Stop reason: HOSPADM

## 2018-07-12 RX ORDER — LAMOTRIGINE 100 MG/1
200 TABLET ORAL DAILY
Status: DISCONTINUED | OUTPATIENT
Start: 2018-07-13 | End: 2018-07-18 | Stop reason: HOSPADM

## 2018-07-12 RX ORDER — CALCIUM POLYCARBOPHIL 625 MG 625 MG/1
625 TABLET ORAL
Status: DISCONTINUED | OUTPATIENT
Start: 2018-07-12 | End: 2018-07-12

## 2018-07-12 RX ORDER — FERROUS SULFATE 325(65) MG
325 TABLET ORAL DAILY
Status: DISCONTINUED | OUTPATIENT
Start: 2018-07-13 | End: 2018-07-18 | Stop reason: HOSPADM

## 2018-07-12 RX ORDER — BACITRACIN ZINC 500 [USP'U]/G
OINTMENT TOPICAL 2 TIMES DAILY
Status: DISCONTINUED | OUTPATIENT
Start: 2018-07-12 | End: 2018-07-18 | Stop reason: HOSPADM

## 2018-07-12 RX ORDER — BISACODYL 10 MG
10 SUPPOSITORY, RECTAL RECTAL
Status: DISCONTINUED | OUTPATIENT
Start: 2018-07-12 | End: 2018-07-12

## 2018-07-12 RX ORDER — AMOXICILLIN 250 MG
2 CAPSULE ORAL 2 TIMES DAILY
Status: DISCONTINUED | OUTPATIENT
Start: 2018-07-12 | End: 2018-07-12

## 2018-07-12 RX ORDER — GABAPENTIN 300 MG/1
300 CAPSULE ORAL 3 TIMES DAILY
Status: DISCONTINUED | OUTPATIENT
Start: 2018-07-12 | End: 2018-07-17

## 2018-07-12 RX ORDER — FAMOTIDINE 20 MG/1
20 TABLET, FILM COATED ORAL
Status: DISCONTINUED | OUTPATIENT
Start: 2018-07-12 | End: 2018-07-18 | Stop reason: HOSPADM

## 2018-07-12 RX ORDER — LABETALOL HYDROCHLORIDE 5 MG/ML
10 INJECTION, SOLUTION INTRAVENOUS EVERY 4 HOURS PRN
Status: DISCONTINUED | OUTPATIENT
Start: 2018-07-12 | End: 2018-07-18 | Stop reason: HOSPADM

## 2018-07-12 RX ORDER — SODIUM CHLORIDE 9 MG/ML
INJECTION, SOLUTION INTRAVENOUS CONTINUOUS
Status: DISCONTINUED | OUTPATIENT
Start: 2018-07-12 | End: 2018-07-15

## 2018-07-12 RX ORDER — OXYCODONE HYDROCHLORIDE 5 MG/1
5 TABLET ORAL EVERY 6 HOURS PRN
Status: DISCONTINUED | OUTPATIENT
Start: 2018-07-12 | End: 2018-07-17

## 2018-07-12 RX ORDER — L. ACIDOPHILUS/L.BULGARICUS 100MM CELL
1 GRANULES IN PACKET (EA) ORAL
Status: DISCONTINUED | OUTPATIENT
Start: 2018-07-12 | End: 2018-07-18 | Stop reason: HOSPADM

## 2018-07-12 RX ORDER — POLYETHYLENE GLYCOL 3350 17 G/17G
1 POWDER, FOR SOLUTION ORAL
Status: DISCONTINUED | OUTPATIENT
Start: 2018-07-12 | End: 2018-07-12

## 2018-07-12 RX ORDER — SODIUM CHLORIDE 9 MG/ML
1000 INJECTION, SOLUTION INTRAVENOUS ONCE
Status: COMPLETED | OUTPATIENT
Start: 2018-07-12 | End: 2018-07-12

## 2018-07-12 RX ORDER — ALFUZOSIN HYDROCHLORIDE 10 MG/1
10 TABLET, EXTENDED RELEASE ORAL
Status: DISCONTINUED | OUTPATIENT
Start: 2018-07-13 | End: 2018-07-12

## 2018-07-12 RX ORDER — TAMSULOSIN HYDROCHLORIDE 0.4 MG/1
0.4 CAPSULE ORAL DAILY
Status: DISCONTINUED | OUTPATIENT
Start: 2018-07-13 | End: 2018-07-18 | Stop reason: HOSPADM

## 2018-07-12 RX ADMIN — VANCOMYCIN HYDROCHLORIDE 125 MG: 10 INJECTION, POWDER, LYOPHILIZED, FOR SOLUTION INTRAVENOUS at 20:12

## 2018-07-12 RX ADMIN — SODIUM CHLORIDE 1000 ML: 9 INJECTION, SOLUTION INTRAVENOUS at 21:21

## 2018-07-12 RX ADMIN — CALCIUM POLYCARBOPHIL 625 MG: 625 TABLET, FILM COATED ORAL at 20:12

## 2018-07-12 RX ADMIN — SODIUM CHLORIDE: 9 INJECTION, SOLUTION INTRAVENOUS at 22:50

## 2018-07-12 RX ADMIN — VANCOMYCIN HYDROCHLORIDE 125 MG: 10 INJECTION, POWDER, LYOPHILIZED, FOR SOLUTION INTRAVENOUS at 23:28

## 2018-07-12 RX ADMIN — GABAPENTIN 300 MG: 300 CAPSULE ORAL at 20:12

## 2018-07-12 RX ADMIN — BACITRACIN ZINC: 500 OINTMENT TOPICAL at 23:05

## 2018-07-12 RX ADMIN — OXYCODONE HYDROCHLORIDE 5 MG: 5 TABLET ORAL at 22:51

## 2018-07-12 ASSESSMENT — COGNITIVE AND FUNCTIONAL STATUS - GENERAL
MOBILITY SCORE: 19
SUGGESTED CMS G CODE MODIFIER MOBILITY: CK
DRESSING REGULAR LOWER BODY CLOTHING: A LITTLE
MOVING TO AND FROM BED TO CHAIR: A LITTLE
STANDING UP FROM CHAIR USING ARMS: A LITTLE
MOVING FROM LYING ON BACK TO SITTING ON SIDE OF FLAT BED: A LITTLE
TOILETING: A LITTLE
DRESSING REGULAR UPPER BODY CLOTHING: A LITTLE
DAILY ACTIVITIY SCORE: 20
SUGGESTED CMS G CODE MODIFIER DAILY ACTIVITY: CJ
HELP NEEDED FOR BATHING: A LITTLE
WALKING IN HOSPITAL ROOM: A LITTLE
CLIMB 3 TO 5 STEPS WITH RAILING: A LITTLE

## 2018-07-12 ASSESSMENT — LIFESTYLE VARIABLES
TOTAL SCORE: 0
ON A TYPICAL DAY WHEN YOU DRINK ALCOHOL HOW MANY DRINKS DO YOU HAVE: 2
HAVE PEOPLE ANNOYED YOU BY CRITICIZING YOUR DRINKING: NO
HOW MANY TIMES IN THE PAST YEAR HAVE YOU HAD 5 OR MORE DRINKS IN A DAY: 0
TOTAL SCORE: 0
ALCOHOL_USE: YES
HAVE YOU EVER FELT YOU SHOULD CUT DOWN ON YOUR DRINKING: NO
EVER_SMOKED: YES
EVER HAD A DRINK FIRST THING IN THE MORNING TO STEADY YOUR NERVES TO GET RID OF A HANGOVER: NO
AVERAGE NUMBER OF DAYS PER WEEK YOU HAVE A DRINK CONTAINING ALCOHOL: 7
CONSUMPTION TOTAL: NEGATIVE
EVER FELT BAD OR GUILTY ABOUT YOUR DRINKING: NO
TOTAL SCORE: 0

## 2018-07-12 ASSESSMENT — PATIENT HEALTH QUESTIONNAIRE - PHQ9
1. LITTLE INTEREST OR PLEASURE IN DOING THINGS: SEVERAL DAYS
3. TROUBLE FALLING OR STAYING ASLEEP OR SLEEPING TOO MUCH: SEVERAL DAYS
6. FEELING BAD ABOUT YOURSELF - OR THAT YOU ARE A FAILURE OR HAVE LET YOURSELF OR YOUR FAMILY DOWN: NOT AL ALL
7. TROUBLE CONCENTRATING ON THINGS, SUCH AS READING THE NEWSPAPER OR WATCHING TELEVISION: NOT AT ALL
SUM OF ALL RESPONSES TO PHQ9 QUESTIONS 1 AND 2: 1
5. POOR APPETITE OR OVEREATING: SEVERAL DAYS
SUM OF ALL RESPONSES TO PHQ QUESTIONS 1-9: 5
9. THOUGHTS THAT YOU WOULD BE BETTER OFF DEAD, OR OF HURTING YOURSELF: NOT AT ALL
4. FEELING TIRED OR HAVING LITTLE ENERGY: MORE THAN HALF THE DAYS
8. MOVING OR SPEAKING SO SLOWLY THAT OTHER PEOPLE COULD HAVE NOTICED. OR THE OPPOSITE, BEING SO FIGETY OR RESTLESS THAT YOU HAVE BEEN MOVING AROUND A LOT MORE THAN USUAL: NOT AT ALL
2. FEELING DOWN, DEPRESSED, IRRITABLE, OR HOPELESS: NOT AT ALL

## 2018-07-12 ASSESSMENT — PAIN SCALES - GENERAL: PAINLEVEL_OUTOF10: 7

## 2018-07-12 NOTE — H&P
"[facility] MEDICINE HISTORY AND PHYSICAL     PATIENT ID:  NAME:  Marcelo Colon  MRN:               [id number]  YOB: 1940    Date of Admission: 7/12/2018    Attending:  Dr. Grace    Resident:  Dr. Chun    Primary Care Physician:  Dr. Butler    CC:  Diarrhea/abdominal pain    HPI: Marcelo Colon is a 77 y.o. male who was directed admitted from the [facility] clinic complaining of abdominal pain and diarrhea. He has been diagnosed with C. Diff three times previously and has been treated by GI consultants; he has a stool transplant scheduled for September. Approximately 1 month ago, patient's symptoms had improved and he had formed stool, was having approximately 1 bowel movement daily. Over the past month, the diarrhea has worsened and he is currently having 5-6 bowel movements a day. He reports increased weakness, difficulty with ambulation, and fatigue (sleeping approximately 10 hours a day). Abdominal pain is present with bowel movements and he describes it as a cramping; has a bowel movement when he attempts to walk or move extensively. Denies nausea/vomiting, dizziness, loss of consciousness, pain with urination. He presented to see his PCP today and was directly admitted as there was concern for his low blood pressure and increasing abdominal pain and distension.    Patient also had a carpal tunnel procedure recently with [facility]/Dr. Gonzalez. Has susanna in his L elbow. Reports some mild clear drainage. He was supposed to have the staples removed today but he could not make his appointment as he was admitted to the hospital. No pain, no foul smelling drainage, normal range of motion, no loss of sensation.    REVIEW OF SYSTEMS:   Ten systems reviewed and were negative except as noted in the HPI.                PAST MEDICAL HISTORY:  Past Medical History:   Diagnosis Date   • Anesthesia     \"takes very little and takes forever to come out of it\"   • Anxiety disorder    • Arthritis  "    some in my back   • Bipolar disorder (HCC)    • Clostridium difficile infection    • Depression    • Enlarged prostate    • GERD (gastroesophageal reflux disease)    • Heart murmur    • Indigestion    • Neck pain        PAST SURGICAL HISTORY:  Past Surgical History:   Procedure Laterality Date   • COLOSTOMY TAKEDOWN  3/21/2018    Procedure: COLOSTOMY TAKEDOWN;  Surgeon: Jorge Rodriguez M.D.;  Location: SURGERY [facility];  Service: General   • LUMBAR DECOMPRESSION  12/2017   • EXPLORATORY LAPAROTOMY  7/25/2017    Procedure: EXPLORATORY LAPAROTOMY- ABDOMINAL WASH OUT;  Surgeon: oJrge Rodriguez M.D.;  Location: SURGERY [facility];  Service:    • SIGMOID COLON RESECTION  7/25/2017    Procedure: SIGMOID COLON RESECTION;  Surgeon: Jorge Rodriguez M.D.;  Location: SURGERY [facility];  Service:    • COLOSTOMY  7/25/2017    Procedure: COLOSTOMY- FOR OSTOMY PLACEMENT AND OTHER PROCEDURES AS INDICATED;  Surgeon: Jorge Rodriguez M.D.;  Location: SURGERY [facility];  Service:    • APPENDECTOMY  7/25/2017    Procedure: APPENDECTOMY;  Surgeon: Jorge Rodriugez M.D.;  Location: SURGERY [facility];  Service:    • EXC./BIOPSY MASS BACK  1997       FAMILY HISTORY:  No family history on file.    SOCIAL HISTORY:   Pt lives in [place] by himself, is a retired contractor.  Smoking: denies.  EtOH use: approximately 2 glasses of wine daily (described as standard 8oz glass).  Drug use: denies.    DIET:   No orders of the defined types were placed in this encounter.      ALLERGIES:  Allergies   Allergen Reactions   • Acetaminophen Nausea     Very nauseated        OUTPATIENT MEDICATIONS:  No current facility-administered medications for this encounter.     Current Outpatient Prescriptions:   •  vancomycin (VANCOCIN HCL) 125 MG capsule, Take 1 Cap by mouth 4 times a day., Disp: 40 Cap, Rfl: 0  •  alfuzosin (UROXATRAL) 10 MG SR tablet, Take 10 mg by mouth., Disp: , Rfl:   •  oxyCODONE  "immediate-release (ROXICODONE) 5 MG Tab, Take 5 mg by mouth every 6 hours as needed., Disp: , Rfl:   •  tamsulosin (FLOMAX) 0.4 MG capsule, Take 0.4 mg by mouth every day., Disp: , Rfl:   •  therapeutic multivitamin-minerals (THERAGRAN-M) Tab, Take 1 Tab by mouth every day., Disp: 30 Tab, Rfl: 11  •  lactobacillus granules (LACTINEX/FLORANEX) Pack, Take 1 Packet by mouth 3 times a day, with meals., Disp: 60 Packet, Rfl: 0  •  calcium polycarbophil 625 MG Tab, Take 1 Tab by mouth 3 times a day, with meals., Disp: 90 Tab, Rfl: 0  •  vancomycin (VANCOCIN) 125 MG capsule, Take 1 Cap by mouth See Admin Instructions., Disp: 90 Cap, Rfl: 0  •  ferrous sulfate 325 (65 Fe) MG tablet, Take 325 mg by mouth every day., Disp: , Rfl:   •  gabapentin (NEURONTIN) 300 MG Cap, Take 300 mg by mouth 3 times a day., Disp: , Rfl:   •  lamotrigine (LAMICTAL) 200 MG tablet, Take 200 mg by mouth every day., Disp: , Rfl:   •  potassium chloride SA (KDUR) 20 MEQ Tab CR, Take 20 mEq by mouth every day., Disp: , Rfl:   •  tramadol (ULTRAM) 50 MG TABS, Take 100 mg by mouth every four hours as needed for Mild Pain., Disp: , Rfl:     PHYSICAL EXAM:  Vitals:    07/12/18 1634   Weight: 57.3 kg (126 lb 6 oz)   Height: 1.727 m (5' 7.99\")   , No data recorded.  ,      General: Pt resting in NAD, cooperative   Skin:  Pink, warm and dry.  No rashes  HEENT: NC/AT. PERRL. EOMI. MMM. No nasal discharge.  Neck:  Supple without lymphadenopathy or rigidity.  Lungs:  Symmetrical.  CTAB with no W/R/R.  Good air movement   Cardiovascular:  S1/S2 RRR without M/R/G.  Abdomen:  Abdomen has large midline scar, is soft, tender to palpation in B/L LQ. Normoactive bowel sounds. Slightly distended. No rebound tenderness No masses noted.   Extremities: Staples on L elbow with slight erythema, nontender to palpation, no purulent drainage. Full range of motion. No gross deformities noted. 2+ pulses in all extremities. No C/C/E.  Spine:  Straight without vertebral " anomalies.  CNS:  Muscle tone is normal. Cranial nerves II-XII grossly intact.      LAB TESTS:      Ref. Range 7/12/2018 21:09   WBC Latest Ref Range: 4.8 - 10.8 K/uL 12.4 (H)   RBC Latest Ref Range: 4.70 - 6.10 M/uL 4.59 (L)   Hemoglobin Latest Ref Range: 14.0 - 18.0 g/dL 12.1 (L)   Hematocrit Latest Ref Range: 42.0 - 52.0 % 37.8 (L)   MCV Latest Ref Range: 81.4 - 97.8 fL 82.4   MCH Latest Ref Range: 27.0 - 33.0 pg 26.4 (L)   MCHC Latest Ref Range: 33.7 - 35.3 g/dL 32.0 (L)   RDW Latest Ref Range: 35.9 - 50.0 fL 56.7 (H)   Platelet Count Latest Ref Range: 164 - 446 K/uL 221   MPV Latest Ref Range: 9.0 - 12.9 fL 9.0     Pending CMP    CULTURES:   Results     ** No results found for the last 168 hours. **        IMAGES:  None    CONSULTS:   Will discuss with GI Consultants in AM.    ASSESSMENT/PLAN: 77 y.o. male admitted for abdominal pain and diarrhea, recurrent C. diff.    #Recurrent C. Diff  #Abdominal Pain  #Diarrhea  - patient with complicated history of c. diff infections x3   - followed by GI Consultants   - has been on oral vancomycin   - stool transplant scheduled for September  - p/w increased bowel movements, up to 6x day  - repeat stool culture, will not delay treatment pending stool sample  - start PO vancomycin  - NS 1L bolus given on admission   - start NS @ 125mL/hr  - will discuss case with GI in AM    #Hypotension  - in outpatient clinic, systolic BP approximately 30 points lower than normal  - on admission, /49   - cont to closely monitor  - afebrile  - HR 60  - patient, is at this time, hemodynamically stable, less concern for sepsis  - already on appropriate antibiotics  - fluid bolus as above, then will start maintenanceIVF    #s/p Carpal Tunnel Release  - was performed by Dr. Gonzalez   - susanna to be removed today  - will remove staples in hospital  - some erythema on exam, nontender to palpation, no purulent drainage  - apply bacitracin and cont dressing changes  - cont to closely  monitor for changes and potential infection    #Dementia  - currently not on treatment  - continues to have capacity  - AOx4 on exam   - patient states he has difficulty with dates and times  - follow-up outpatient    #Chronic Conditions  BPD - cont lamictal  BPH - cont tamsulosin  GERD - famotidine while inpatient    Dispo: Admit to Med/Surg for antibiotics and close monitoring  PCP: Luke  Code Status: DNR/DNI    Core Measures:  Lines: PIV  Abx: PO Vancomycin  IVF: NS @ 125 cc/hr

## 2018-07-12 NOTE — PROGRESS NOTES
Patient is a direct admit from [facility] being directly admitted to [facility] for recurrent C-Diff diarrhea.  Dr. Butler is admitting the patient.  Telephone ADT order received and entered into epic on 7/12.  Held orders in epic to be released upon patients arrival to unit.

## 2018-07-13 LAB
ALBUMIN SERPL BCP-MCNC: 3 G/DL (ref 3.2–4.9)
ALBUMIN/GLOB SERPL: 1.4 G/DL
ALP SERPL-CCNC: 62 U/L (ref 30–99)
ALT SERPL-CCNC: <5 U/L (ref 2–50)
ANION GAP SERPL CALC-SCNC: 7 MMOL/L (ref 0–11.9)
AST SERPL-CCNC: 7 U/L (ref 12–45)
BACTERIA #/AREA URNS HPF: NEGATIVE /HPF
BASOPHILS # BLD AUTO: 0.2 % (ref 0–1.8)
BASOPHILS # BLD: 0.02 K/UL (ref 0–0.12)
BILIRUB SERPL-MCNC: 0.5 MG/DL (ref 0.1–1.5)
BUN SERPL-MCNC: 14 MG/DL (ref 8–22)
C DIFF DNA SPEC QL NAA+PROBE: POSITIVE
C DIFF TOX A+B STL QL IA: POSITIVE
C DIFF TOX GENS STL QL NAA+PROBE: NORMAL
CALCIUM SERPL-MCNC: 8 MG/DL (ref 8.5–10.5)
CHLORIDE SERPL-SCNC: 107 MMOL/L (ref 96–112)
CO2 SERPL-SCNC: 24 MMOL/L (ref 20–33)
CREAT SERPL-MCNC: 1.04 MG/DL (ref 0.5–1.4)
EOSINOPHIL # BLD AUTO: 0.1 K/UL (ref 0–0.51)
EOSINOPHIL NFR BLD: 0.8 % (ref 0–6.9)
EPI CELLS #/AREA URNS HPF: ABNORMAL /HPF
ERYTHROCYTE [DISTWIDTH] IN BLOOD BY AUTOMATED COUNT: 56.4 FL (ref 35.9–50)
GLOBULIN SER CALC-MCNC: 2.1 G/DL (ref 1.9–3.5)
GLUCOSE SERPL-MCNC: 121 MG/DL (ref 65–99)
HCT VFR BLD AUTO: 34.7 % (ref 42–52)
HGB BLD-MCNC: 11.1 G/DL (ref 14–18)
HYALINE CASTS #/AREA URNS LPF: ABNORMAL /LPF
IMM GRANULOCYTES # BLD AUTO: 0.07 K/UL (ref 0–0.11)
IMM GRANULOCYTES NFR BLD AUTO: 0.6 % (ref 0–0.9)
LYMPHOCYTES # BLD AUTO: 1.97 K/UL (ref 1–4.8)
LYMPHOCYTES NFR BLD: 16.5 % (ref 22–41)
MCH RBC QN AUTO: 26.4 PG (ref 27–33)
MCHC RBC AUTO-ENTMCNC: 32 G/DL (ref 33.7–35.3)
MCV RBC AUTO: 82.4 FL (ref 81.4–97.8)
MONOCYTES # BLD AUTO: 1.05 K/UL (ref 0–0.85)
MONOCYTES NFR BLD AUTO: 8.8 % (ref 0–13.4)
NEUTROPHILS # BLD AUTO: 8.71 K/UL (ref 1.82–7.42)
NEUTROPHILS NFR BLD: 73.1 % (ref 44–72)
NRBC # BLD AUTO: 0 K/UL
NRBC BLD-RTO: 0 /100 WBC
PLATELET # BLD AUTO: 189 K/UL (ref 164–446)
PMV BLD AUTO: 9.2 FL (ref 9–12.9)
POTASSIUM SERPL-SCNC: 3.5 MMOL/L (ref 3.6–5.5)
PROT SERPL-MCNC: 5.1 G/DL (ref 6–8.2)
RBC # BLD AUTO: 4.21 M/UL (ref 4.7–6.1)
RBC # URNS HPF: ABNORMAL /HPF
SODIUM SERPL-SCNC: 138 MMOL/L (ref 135–145)
WBC # BLD AUTO: 11.9 K/UL (ref 4.8–10.8)
WBC #/AREA URNS HPF: ABNORMAL /HPF

## 2018-07-13 PROCEDURE — G8979 MOBILITY GOAL STATUS: HCPCS | Mod: CI

## 2018-07-13 PROCEDURE — 700102 HCHG RX REV CODE 250 W/ 637 OVERRIDE(OP): Performed by: FAMILY MEDICINE

## 2018-07-13 PROCEDURE — 87493 C DIFF AMPLIFIED PROBE: CPT

## 2018-07-13 PROCEDURE — 80053 COMPREHEN METABOLIC PANEL: CPT

## 2018-07-13 PROCEDURE — 700102 HCHG RX REV CODE 250 W/ 637 OVERRIDE(OP): Performed by: STUDENT IN AN ORGANIZED HEALTH CARE EDUCATION/TRAINING PROGRAM

## 2018-07-13 PROCEDURE — 85025 COMPLETE CBC W/AUTO DIFF WBC: CPT

## 2018-07-13 PROCEDURE — A9270 NON-COVERED ITEM OR SERVICE: HCPCS | Performed by: STUDENT IN AN ORGANIZED HEALTH CARE EDUCATION/TRAINING PROGRAM

## 2018-07-13 PROCEDURE — G8978 MOBILITY CURRENT STATUS: HCPCS | Mod: CJ

## 2018-07-13 PROCEDURE — 97162 PT EVAL MOD COMPLEX 30 MIN: CPT

## 2018-07-13 PROCEDURE — 87324 CLOSTRIDIUM AG IA: CPT

## 2018-07-13 PROCEDURE — 700111 HCHG RX REV CODE 636 W/ 250 OVERRIDE (IP): Performed by: FAMILY MEDICINE

## 2018-07-13 PROCEDURE — A9270 NON-COVERED ITEM OR SERVICE: HCPCS | Performed by: FAMILY MEDICINE

## 2018-07-13 PROCEDURE — 36415 COLL VENOUS BLD VENIPUNCTURE: CPT

## 2018-07-13 PROCEDURE — 700105 HCHG RX REV CODE 258: Performed by: FAMILY MEDICINE

## 2018-07-13 PROCEDURE — 770021 HCHG ROOM/CARE - ISO PRIVATE

## 2018-07-13 RX ADMIN — NYSTATIN AND TRIAMCINOLONE ACETONIDE: 100000; 1 CREAM TOPICAL at 12:28

## 2018-07-13 RX ADMIN — VANCOMYCIN HYDROCHLORIDE 125 MG: 10 INJECTION, POWDER, LYOPHILIZED, FOR SOLUTION INTRAVENOUS at 05:39

## 2018-07-13 RX ADMIN — SODIUM CHLORIDE: 9 INJECTION, SOLUTION INTRAVENOUS at 05:40

## 2018-07-13 RX ADMIN — GABAPENTIN 300 MG: 300 CAPSULE ORAL at 11:42

## 2018-07-13 RX ADMIN — VANCOMYCIN HYDROCHLORIDE 125 MG: 10 INJECTION, POWDER, LYOPHILIZED, FOR SOLUTION INTRAVENOUS at 17:59

## 2018-07-13 RX ADMIN — SODIUM CHLORIDE: 9 INJECTION, SOLUTION INTRAVENOUS at 15:07

## 2018-07-13 RX ADMIN — BACITRACIN ZINC: 500 OINTMENT TOPICAL at 17:58

## 2018-07-13 RX ADMIN — VANCOMYCIN HYDROCHLORIDE 125 MG: 10 INJECTION, POWDER, LYOPHILIZED, FOR SOLUTION INTRAVENOUS at 11:42

## 2018-07-13 RX ADMIN — TAMSULOSIN HYDROCHLORIDE 0.4 MG: 0.4 CAPSULE ORAL at 05:39

## 2018-07-13 RX ADMIN — GABAPENTIN 300 MG: 300 CAPSULE ORAL at 05:39

## 2018-07-13 RX ADMIN — ENOXAPARIN SODIUM 40 MG: 100 INJECTION SUBCUTANEOUS at 05:40

## 2018-07-13 RX ADMIN — Medication 325 MG: at 05:38

## 2018-07-13 RX ADMIN — GABAPENTIN 300 MG: 300 CAPSULE ORAL at 17:58

## 2018-07-13 RX ADMIN — NYSTATIN AND TRIAMCINOLONE ACETONIDE: 100000; 1 CREAM TOPICAL at 18:01

## 2018-07-13 RX ADMIN — OXYCODONE HYDROCHLORIDE 5 MG: 5 TABLET ORAL at 18:04

## 2018-07-13 RX ADMIN — FAMOTIDINE 20 MG: 20 TABLET ORAL at 21:54

## 2018-07-13 RX ADMIN — BACITRACIN ZINC: 500 OINTMENT TOPICAL at 05:40

## 2018-07-13 RX ADMIN — POTASSIUM CHLORIDE 20 MEQ: 1500 TABLET, EXTENDED RELEASE ORAL at 05:39

## 2018-07-13 RX ADMIN — SODIUM CHLORIDE: 9 INJECTION, SOLUTION INTRAVENOUS at 21:53

## 2018-07-13 RX ADMIN — LAMOTRIGINE 200 MG: 100 TABLET ORAL at 05:39

## 2018-07-13 ASSESSMENT — COGNITIVE AND FUNCTIONAL STATUS - GENERAL
SUGGESTED CMS G CODE MODIFIER MOBILITY: CK
TURNING FROM BACK TO SIDE WHILE IN FLAT BAD: A LITTLE
STANDING UP FROM CHAIR USING ARMS: A LITTLE
MOBILITY SCORE: 19
WALKING IN HOSPITAL ROOM: A LITTLE
MOVING TO AND FROM BED TO CHAIR: A LITTLE
CLIMB 3 TO 5 STEPS WITH RAILING: A LITTLE

## 2018-07-13 ASSESSMENT — PAIN SCALES - GENERAL
PAINLEVEL_OUTOF10: 0
PAINLEVEL_OUTOF10: 0
PAINLEVEL_OUTOF10: 3
PAINLEVEL_OUTOF10: 3
PAINLEVEL_OUTOF10: 0

## 2018-07-13 ASSESSMENT — GAIT ASSESSMENTS
DEVIATION: ANTALGIC;DECREASED HEEL STRIKE;DECREASED TOE OFF;OTHER (COMMENT)
DISTANCE (FEET): 20
ASSISTIVE DEVICE: SINGLE POINT CANE
GAIT LEVEL OF ASSIST: STAND BY ASSIST

## 2018-07-13 NOTE — CARE PLAN
Problem: Safety  Goal: Will remain free from injury  Pt up with stand by assist form staff. Cane used. Pt tolerates well, generalized weakness present.     Problem: Knowledge Deficit  Goal: Knowledge of disease process/condition, treatment plan, diagnostic tests, and medications will improve  Pt updated on POC

## 2018-07-13 NOTE — PROGRESS NOTES
Assessment complete.  AA&Ox4. RA. SOB.  Medicated for 7/10 pain.  Regular diet. Denies N/V.  + void. + loose BMs.  Pt ambulates with SBA using single point cane.  Special contact isolation precautions in place for Cdiff.  Education provided regarding oral hygiene. Pt verbalized understanding.  Pt oriented to unit, room, and introduced to staff.  All needs met at this time. Call light within reach. Pt calls appropriately.

## 2018-07-13 NOTE — PROGRESS NOTES
Spoke with ortho [facility] on phone this p.m. They will stop by tonight and assess wound drainage, though informed them that fluid is serosanginous     2:33 PM  Dr. Demetrius Owen

## 2018-07-13 NOTE — PROGRESS NOTES
Spoke with MA for orthopedic surgery Dr. Head and she said that is was ok to remove staples in hospital given that patient was scheduled for removal in their office yesterday.

## 2018-07-13 NOTE — DISCHARGE PLANNING
Anticipated Discharge Disposition: Home with HH    Action: This RN CM paged Dr. Butler regarding HH order.    Barriers to Discharge: Pending HH order.    Plan: Waiting HH order from Dr. Butler.     1541:  MD returned this RN CM phone call, per MD he will place HH order.

## 2018-07-13 NOTE — CARE PLAN
Problem: Communication  Goal: The ability to communicate needs accurately and effectively will improve  Outcome: PROGRESSING AS EXPECTED  Education provided on importance of using call light to alert staff of patient needs. Pt demonstrates understanding by using call light appropriately.    Problem: Infection  Goal: Will remain free from infection  Outcome: PROGRESSING AS EXPECTED  Pt receiving oral abx.

## 2018-07-13 NOTE — NON-PROVIDER
FAMILY MEDICINE HISTORY AND PHYSICAL    Primary Care Physician: Dr. Butelr    Patient ID:  Name:             Marcelo Colon   YOB: 1940  Age:                 77 y.o.  male   MRN:               [id number]    Attending Physician:  Dr. Grace  Senior Resident:  Dr. Hillman  Medical Student:  Robinson [facility]    Chief Complaint:      Abdominal pain and diarrhea    History of Present Illness:   Marcelo Colon has a 6 week history of lower abdominal pain, diarrhea, and incontinence.  Yesterday he had an appointment with Dr. Butler in clinic and was directly admitted for hypotension and concern for C. Diff colitis.  He has had recurrent C. Diff infection x3.  Two months ago he had formed stools following treatment with oral vancomycin.    He has been sleeping 10-12 hours, an increase from his norm of 8-10 hours.  He complains of feeling weak and without energy along with trouble ambulating.  He also complains of incontinence and lower abdominal pain that occurs on standing.    In addition, Marcelo had a carpal tunnel surgery performed by Dr. Gonzalez recently.  He was supposed to have staples removed yesterday but missed his appointment 2/2 hospital admission.    Past Medical History:  - Anxiety d/o  - arthritis in his back and hands  - bipolar d/o  - dementia  - depression  - enlarged prostate  - GERD  - heart murmur  - indigestion  - neck pain  - history of recurrent C. Diff infection x3 that began last August     Past Surgical History:  - Colostomy takedown 3/18  - Lumbar decompression 12/17  - Sigmoid colon resection, colostomy, appendectomy, and exploratory laparotomy 7/17  - Excisional biopsy for a back mass 1997    Current Outpatient Medications:  Home Medications     Reviewed by Cristina Padron R.N. (Registered Nurse) on 07/12/18 at 2317  Med List Status: <None>   Medication Last Dose Status   alfuzosin (UROXATRAL) 10 MG SR tablet 7/12/2018 Active   calcium polycarbophil 625 MG Tab  Active  "  ferrous sulfate 325 (65 Fe) MG tablet 4/7/2018 Active   gabapentin (NEURONTIN) 300 MG Cap 7/12/2018 Active   lactobacillus granules (LACTINEX/FLORANEX) Pack 7/12/2018 Active   lamotrigine (LAMICTAL) 200 MG tablet 7/12/2018 Active   oxyCODONE immediate-release (ROXICODONE) 5 MG Tab 7/12/2018 Active   potassium chloride SA (KDUR) 20 MEQ Tab CR 4/10/2018 Active   tamsulosin (FLOMAX) 0.4 MG capsule  Active   therapeutic multivitamin-minerals (THERAGRAN-M) Tab  Active   tramadol (ULTRAM) 50 MG TABS  Active   vancomycin (VANCOCIN HCL) 125 MG capsule  Active   vancomycin (VANCOCIN) 125 MG capsule  Active                Medication Allergy:  Allergies   Allergen Reactions   • Acetaminophen Nausea     Very nauseated        Family History:  Family history is not known to the patient    Social History:  Smoking: denies   Alcohol: admits to 2 8oz glasses per night  Illicit drugs: denies any use in the past 10 years.  Prior to that he admits to cocaine, methamphetamine, and marijuana use.  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           Physical Exam:  BP (!) 99/50   Pulse 64   Temp 37.6 °C (99.7 °F)   Resp 18   Ht 1.727 m (5' 7.99\")   Wt 57.3 kg (126 lb 6 oz)   SpO2 92%   BMI 19.22 kg/m²   Vitals:    07/12/18 2000 07/12/18 2315 07/13/18 0400 07/13/18 0755   BP: 115/49 130/71 115/55 (!) 99/50   Pulse: 60 85 66 64   Resp: 18 17 18 18   Temp: 36.2 °C (97.1 °F) 36.7 °C (98 °F) 36.3 °C (97.4 °F) 37.6 °C (99.7 °F)   SpO2: 91% 92% 94% 92%   Weight:       Height:         Oxygen Therapy:  Pulse Oximetry: 92 %, O2 (LPM): 0, O2 Delivery: None (Room Air)    Constitutional:  No acute distress, but ill, cachectic, and disheveled appearance.   HENMT:  Normocephalic, Atraumatic, Bilateral external ears normal, Oropharynx moist mucous membranes, No oral exudates  Eyes:  PERRL, EOMI, Conjunctiva normal, No discharge.  Neck:  Normal range of motion, No stridor. No JVD  Cardiovascular:  Normal heart rate, Normal rhythm, No " murmurs, No rubs, No gallops. No cyanosis, clubbing or edema.  Lungs:  Respiratory effort is normal. Normal breath sounds, breath sounds clear to auscultation bilaterally, no rales, no rhonchi, no wheezing.   Abdomen: Soft, No tenderness, No guarding, No rebound. Normal bowel sounds in all 4 quadrants.  His abdomen was distended in bilateral lower quadrants.  He has a well healed scar on the midline.  Skin: Warm, Dry, No erythema, No evident rash. Healing wound on L elbow from carpal tunnel surgery.  Appears erythematous and is warm, non-tender.  Neurologic: Alert & oriented x 4, Normal motor function. Loss of fine touch sensation in the left hand.  Psychiatric: Affect normal, Judgment normal.       Lab Data Review:  Recent Results (from the past 24 hour(s))   CBC WITH DIFFERENTIAL    Collection Time: 07/12/18  9:09 PM   Result Value Ref Range    WBC 12.4 (H) 4.8 - 10.8 K/uL    RBC 4.59 (L) 4.70 - 6.10 M/uL    Hemoglobin 12.1 (L) 14.0 - 18.0 g/dL    Hematocrit 37.8 (L) 42.0 - 52.0 %    MCV 82.4 81.4 - 97.8 fL    MCH 26.4 (L) 27.0 - 33.0 pg    MCHC 32.0 (L) 33.7 - 35.3 g/dL    RDW 56.7 (H) 35.9 - 50.0 fL    Platelet Count 221 164 - 446 K/uL    MPV 9.0 9.0 - 12.9 fL    Neutrophils-Polys 80.10 (H) 44.00 - 72.00 %    Lymphocytes 11.20 (L) 22.00 - 41.00 %    Monocytes 7.40 0.00 - 13.40 %    Eosinophils 0.50 0.00 - 6.90 %    Basophils 0.20 0.00 - 1.80 %    Immature Granulocytes 0.60 0.00 - 0.90 %    Nucleated RBC 0.00 /100 WBC    Neutrophils (Absolute) 9.94 (H) 1.82 - 7.42 K/uL    Lymphs (Absolute) 1.39 1.00 - 4.80 K/uL    Monos (Absolute) 0.92 (H) 0.00 - 0.85 K/uL    Eos (Absolute) 0.06 0.00 - 0.51 K/uL    Baso (Absolute) 0.02 0.00 - 0.12 K/uL    Immature Granulocytes (abs) 0.08 0.00 - 0.11 K/uL    NRBC (Absolute) 0.00 K/uL   COMP METABOLIC PANEL    Collection Time: 07/12/18  9:09 PM   Result Value Ref Range    Sodium 138 135 - 145 mmol/L    Potassium 3.6 3.6 - 5.5 mmol/L    Chloride 104 96 - 112 mmol/L    Co2 25 20 - 33  mmol/L    Anion Gap 9.0 0.0 - 11.9    Glucose 108 (H) 65 - 99 mg/dL    Bun 14 8 - 22 mg/dL    Creatinine 1.20 0.50 - 1.40 mg/dL    Calcium 8.8 8.5 - 10.5 mg/dL    AST(SGOT) 11 (L) 12 - 45 U/L    ALT(SGPT) <5 2 - 50 U/L    Alkaline Phosphatase 71 30 - 99 U/L    Total Bilirubin 0.6 0.1 - 1.5 mg/dL    Albumin 3.5 3.2 - 4.9 g/dL    Total Protein 5.8 (L) 6.0 - 8.2 g/dL    Globulin 2.3 1.9 - 3.5 g/dL    A-G Ratio 1.5 g/dL   ESTIMATED GFR    Collection Time: 07/12/18  9:09 PM   Result Value Ref Range    GFR If African American >60 >60 mL/min/1.73 m 2    GFR If Non African American 59 (A) >60 mL/min/1.73 m 2   URINALYSIS    Collection Time: 07/12/18 11:00 PM   Result Value Ref Range    Color DK Yellow     Character Clear     Specific Gravity 1.031 <1.035    Ph 5.5 5.0 - 8.0    Glucose Negative Negative mg/dL    Ketones Trace (A) Negative mg/dL    Protein Negative Negative mg/dL    Bilirubin Negative Negative    Urobilinogen, Urine 0.2 Negative    Nitrite Negative Negative    Leukocyte Esterase Trace (A) Negative    Occult Blood Small (A) Negative    Micro Urine Req Microscopic    URINE MICROSCOPIC (W/UA)    Collection Time: 07/12/18 11:00 PM   Result Value Ref Range    WBC 10-20 (A) /hpf    RBC 5-10 (A) /hpf    Bacteria Negative None /hpf    Epithelial Cells Few /hpf    Hyaline Cast 3-5 (A) /lpf   CBC with Differential    Collection Time: 07/13/18  3:25 AM   Result Value Ref Range    WBC 11.9 (H) 4.8 - 10.8 K/uL    RBC 4.21 (L) 4.70 - 6.10 M/uL    Hemoglobin 11.1 (L) 14.0 - 18.0 g/dL    Hematocrit 34.7 (L) 42.0 - 52.0 %    MCV 82.4 81.4 - 97.8 fL    MCH 26.4 (L) 27.0 - 33.0 pg    MCHC 32.0 (L) 33.7 - 35.3 g/dL    RDW 56.4 (H) 35.9 - 50.0 fL    Platelet Count 189 164 - 446 K/uL    MPV 9.2 9.0 - 12.9 fL    Neutrophils-Polys 73.10 (H) 44.00 - 72.00 %    Lymphocytes 16.50 (L) 22.00 - 41.00 %    Monocytes 8.80 0.00 - 13.40 %    Eosinophils 0.80 0.00 - 6.90 %    Basophils 0.20 0.00 - 1.80 %    Immature Granulocytes 0.60 0.00 -  0.90 %    Nucleated RBC 0.00 /100 WBC    Neutrophils (Absolute) 8.71 (H) 1.82 - 7.42 K/uL    Lymphs (Absolute) 1.97 1.00 - 4.80 K/uL    Monos (Absolute) 1.05 (H) 0.00 - 0.85 K/uL    Eos (Absolute) 0.10 0.00 - 0.51 K/uL    Baso (Absolute) 0.02 0.00 - 0.12 K/uL    Immature Granulocytes (abs) 0.07 0.00 - 0.11 K/uL    NRBC (Absolute) 0.00 K/uL   Comp Metabolic Panel (CMP)    Collection Time: 07/13/18  3:25 AM   Result Value Ref Range    Sodium 138 135 - 145 mmol/L    Potassium 3.5 (L) 3.6 - 5.5 mmol/L    Chloride 107 96 - 112 mmol/L    Co2 24 20 - 33 mmol/L    Anion Gap 7.0 0.0 - 11.9    Glucose 121 (H) 65 - 99 mg/dL    Bun 14 8 - 22 mg/dL    Creatinine 1.04 0.50 - 1.40 mg/dL    Calcium 8.0 (L) 8.5 - 10.5 mg/dL    AST(SGOT) 7 (L) 12 - 45 U/L    ALT(SGPT) <5 2 - 50 U/L    Alkaline Phosphatase 62 30 - 99 U/L    Total Bilirubin 0.5 0.1 - 1.5 mg/dL    Albumin 3.0 (L) 3.2 - 4.9 g/dL    Total Protein 5.1 (L) 6.0 - 8.2 g/dL    Globulin 2.1 1.9 - 3.5 g/dL    A-G Ratio 1.4 g/dL   ESTIMATED GFR    Collection Time: 07/13/18  3:25 AM   Result Value Ref Range    GFR If African American >60 >60 mL/min/1.73 m 2    GFR If Non African American >60 >60 mL/min/1.73 m 2       Imaging:    n/a    EKG:   n/a    Assessment and Plan:     72yo M admitted for abdominal pain and diarrhea, hypotension, and concern for C. Diff colitis with a history of depression, bipolar disorder, dementia, and recurrent C diff infection x3.    #diarrhea, abdominal pain, and recurrent C. diff colitis  The patient has a 6 week history of lower abdominal pain and diarrhea with incontinence.  Prior to this the patient had formed stool following oral vancomycin treatment for a separate C diff colitis episode.  Overall, the patient has had 3 cases of C diff.  He has a stool transplant scheduled for September.    Plan  - Consulted GI earlier- They will call him early next week to F/U for stool transplant.  They would like for him to continue oral vancomycin 125mg q6hr  for the next 2 weeks  - NS infusion at 125 mL/hr  - C diff labs are pending    #Hypotension  In outpatient clinic, his systolic BP was 30 mmHg lower than normal.  Since admission he has ranged from 115-130 systolic.  He is afebrile and hemodynamically stable.    Plan  - Follow closely for possible sepsis - fever, HR, and BP  - Continue maintenance IV fluids as above  - Hold Labetalol until BP stabilizes    #Carpal Tunnel Release  Dr. Gonzalez performed the carpal tunnel surgery recently.  Also possibly performed a procedure to release an entrapped Ulnar nerve in the L elbow.  His L elbow wound is slightly erythematous and warm but is non-tender.  Wound may be irritated rather than infected.    Plan  - Alert ortho to wound situation  - If possible remove staples in L elbow today  - Continue with bacitracin ointment   - Monitor for worsening of possible infection.    #Hypokalemia  Most recent CMP showed a K level of 3.5 (normal lower limit = 3.6)    Plan  - Continue to monitor for length of hospital stay with serial CMPs  - Continue administration of KCl tablets 20mEq POqd    #Dementia  Currently has capacity.  Difficulty with time and dates.    Plan  - F/U outpatient    #BPD    Plan  - Continue lamotrigine    #BPH     Plan  - Continue tamsulosin    #GERD    Plan  - continue famotidine    Dispo: Hold to close monitoring  PCP: Luke  Code Status: DNR/DNI     Core Measures:  Lines: PIV  Abx: PO Vancomycin  IVF: NS @ 125 cc/hr

## 2018-07-13 NOTE — FACE TO FACE
Face to Face Supporting Documentation - Home Health    The encounter with this patient was in whole or in part the primary reason for home health admission.    Date of encounter:   Patient:                    MRN:                       YOB: 2017  Marcelo Colon  [id number]  1940     Home health to see patient for: inability to care for self and help with administration of daily medication. In addition patient has adult failure to thrive and remains a significant fall risk if leaving the home without assistance and has dementia significant enough that remembering to take home medication is problematic.       Skilled nursing interventions to include:  Comment: help with daily medication administration    Homebound status evidenced by:  Needs the assistance of another person in order to leave the home. Leaving home requires a considerable and taxing effort. There is a normal inability to leave the home.    Community Physician to provide follow up care: Asim Persaud M.D.     Optional Interventions? No      I certify the face to face encounter for this home health care referral meets the CMS requirements and the encounter/clinical assessment with the patient was, in whole, or in part, for the medical condition(s) listed above, which is the primary reason for home health care. Based on my clinical findings: the service(s) are medically necessary, support the need for home health care, and the homebound criteria are met.  I certify that this patient has had a face to face encounter by myself.  Zaheer Owen M.D. - NPI: [id number]

## 2018-07-13 NOTE — DISCHARGE PLANNING
Anticipated Discharge Disposition: Home    Action: This RN CM met with pt at bedside regarding HH.  Pt would like to continue HH with [facility].    Barriers to Discharge: [facility] acceptance    Plan: Faxed choice form to [facility]

## 2018-07-13 NOTE — DIETARY
Nutrition Services: 78y/o M admit w/ diarrhea and abdominal pain w/ known past medical hx significant for anxiety disorder, bipolar disorder, arthritis, GERD, enlarged prostate, indigestion, depression, heart murmur, and C-diff infection per H&P. Per nutrition admit screen- pt w/ inadequate PO intake PTA, most likely r/t ongoing abd pain and diarrhea r/t Cdiff infection. In which he is taking vanco for. Pt currently on liberalized diet w/ consistent good PO of >75% intake of meals and tolerating, therefore, nutrition intervention not warranted at this time. RD available PRN.

## 2018-07-13 NOTE — PROGRESS NOTES
[facility] [place]     Attending:   Monster     Resident:   Demetrius Owen M.D.    PATIENT:   Marcelo Colon; [id number]; 1940    ID:   77 y.o. male admitted for recurrent C. Diff and frequent loose stools with associated weakness in setting of prior bowel resection.     SUBJECTIVE:   No acute events overnight, pt doing much better tolerating diet well with no nausea, vomiting, and reduced abd pain. Pt reports fevers, shaking, or chills though continues to endorse frequent loose stools.     OBJECTIVE:  Vitals:    07/12/18 2000 07/12/18 2315 07/13/18 0400 07/13/18 0755   BP: 115/49 130/71 115/55 (!) 99/50   Pulse: 60 85 66 64   Resp: 18 17 18 18   Temp: 36.2 °C (97.1 °F) 36.7 °C (98 °F) 36.3 °C (97.4 °F) 37.6 °C (99.7 °F)   SpO2: 91% 92% 94% 92%   Weight:       Height:           Intake/Output Summary (Last 24 hours) at 07/13/18 0827  Last data filed at 07/13/18 0400   Gross per 24 hour   Intake             1985 ml   Output              300 ml   Net             1685 ml       PHYSICAL EXAM:  General: No acute distress, afebrile, resting comfortably, cachetic   HEENT: NC/AT. EOMI. MMM, neck supple without adenopathy  Cardiovascular: RRR, normal S1/S2 no murmurs rubs, or gallops, cap refill brisk.  Respiratory: CTAB, no tachypnea or retractions  Abdomen: soft, NT/ND, no masses  EXT:  No rashes or skin changes noted. Pulses 2+ DP and radial bilaterally, 1 cm erythema surrounding surgical sight on L elbow with some associated swelling and tender to touch.  Neuro: Non-focal, A&Ox4 but has trouble with dates    LABS:  Recent Labs      07/12/18 2109 07/13/18   0325   WBC  12.4*  11.9*   RBC  4.59*  4.21*   HEMOGLOBIN  12.1*  11.1*   HEMATOCRIT  37.8*  34.7*   MCV  82.4  82.4   MCH  26.4*  26.4*   RDW  56.7*  56.4*   PLATELETCT  221  189   MPV  9.0  9.2   NEUTSPOLYS  80.10*  73.10*   LYMPHOCYTES  11.20*  16.50*   MONOCYTES  7.40  8.80   EOSINOPHILS  0.50  0.80   BASOPHILS  0.20  0.20     Recent Labs       07/12/18 2109 07/13/18 0325   SODIUM  138  138   POTASSIUM  3.6  3.5*   CHLORIDE  104  107   CO2  25  24   BUN  14  14   CREATININE  1.20  1.04   CALCIUM  8.8  8.0*   ALBUMIN  3.5  3.0*     Estimated GFR/CRCL = Estimated Creatinine Clearance: 49 mL/min (by C-G formula based on SCr of 1.04 mg/dL).  Recent Labs      07/12/18 2109 07/13/18 0325   GLUCOSE  108*  121*     Recent Labs      07/12/18 2109 07/13/18 0325   ASTSGOT  11*  7*   ALTSGPT  <5  <5   TBILIRUBIN  0.6  0.5   ALKPHOSPHAT  71  62   GLOBULIN  2.3  2.1         IMAGING:  None new    MEDS:  Current Facility-Administered Medications   Medication Last Dose   • ferrous sulfate tablet 325 mg 325 mg at 07/13/18 0538   • gabapentin (NEURONTIN) capsule 300 mg 300 mg at 07/13/18 0539   • lactobacillus granules (LACTINEX/FLORANEX) packet 1 Packet 1 Packet at 07/13/18 0825   • lamoTRIgine (LAMICTAL) tablet 200 mg 200 mg at 07/13/18 0539   • oxyCODONE immediate-release (ROXICODONE) tablet 5 mg 5 mg at 07/12/18 2251   • potassium chloride SA (Kdur) tablet 20 mEq 20 mEq at 07/13/18 0539   • tamsulosin (FLOMAX) capsule 0.4 mg 0.4 mg at 07/13/18 0539   • NS infusion     • enoxaparin (LOVENOX) inj 40 mg 40 mg at 07/13/18 0540   • labetalol (NORMODYNE,TRANDATE) injection 10 mg     • vancomycin 50 mg/mL oral soln 125 mg 125 mg at 07/13/18 0539    Followed by   • [START ON 7/26/2018] vancomycin 50 mg/mL oral soln 125 mg      Followed by   • [START ON 8/3/2018] vancomycin 50 mg/mL oral soln 125 mg      Followed by   • [START ON 8/10/2018] vancomycin 50 mg/mL oral soln 125 mg      Followed by   • [START ON 8/18/2018] vancomycin 50 mg/mL oral soln 125 mg     • bacitracin ointment     • famotidine (PEPCID) tablet 20 mg         ASSESSMENT/PLAN: 76 yo male with hx of sigmoid bowel resection and recurrent C. Diff here for abd pain and frequent loose stools.     #Recurrent C. Diff  #Abdominal Pain  #Diarrhea  - patient with complicated history of c. diff infections x3               - followed by GI Consultants              - has been on oral vancomycin              - stool transplant scheduled for September  - p/w increased bowel movements, up to 6x day  - repeat stool culture pending  - start PO vancomycin and continue for 2 week course  - NS @ 125mL/hr  - disscussed with GI consultants this a.m. They are aware of patient and will call him early next week   -recommended 2 week course oral vancomycin   -will not take back now for fecal transplant as patient is non-toxic   -did not recommend adding rifaximin to PO vanco     #Hypotension  - in outpatient clinic, systolic BP approximately 30 points lower than normal  - on admission, /49              - cont to closely monitor  - afebrile  - HR 60  - patient, is at this time, hemodynamically stable, less concern for sepsis  - already on appropriate antibiotics     #s/p Carpal Tunnel Release  - was performed by Dr. Gonzalez              - staples to be removed yesterday  - will remove staples in hospital if ortho Ok with that plan  - some erythema on exam, mildly tender to palpation  -put a call in to orthopedic surgery this a.m. And awaiting call back concerning staple removal, erythema etc  - apply bacitracin and cont dressing changes  - cont to closely monitor for changes, will summer with pen if ortho ok with that     #Dementia  - currently not on treatment  - continues to have capacity  - AOx4 on exam              - patient states he has difficulty with dates and times  - follow-up outpatient     #Chronic Conditions  BPD - cont lamictal  BPH - cont tamsulosin  GERD - famotidine while inpatient     Dispo: Admit to Med/Surg for antibiotics and close monitoring  PCP: Luke  Code Status: DNR/DNI     Core Measures:  Lines: PIV  Abx: PO Vancomycin  IVF: NS @ 125 cc/hr

## 2018-07-13 NOTE — PROGRESS NOTES
2 RN skin assessment complete. Pt has staples to left elbow and sutures to left wrist from previous surgery. Redness noted to sacrum, skin blanching. No other redness or areas of skin breakdown noted.

## 2018-07-14 LAB
ALBUMIN SERPL BCP-MCNC: 2.7 G/DL (ref 3.2–4.9)
ALBUMIN/GLOB SERPL: 1.5 G/DL
ALP SERPL-CCNC: 46 U/L (ref 30–99)
ALT SERPL-CCNC: <5 U/L (ref 2–50)
ANION GAP SERPL CALC-SCNC: 5 MMOL/L (ref 0–11.9)
APPEARANCE UR: CLEAR
AST SERPL-CCNC: 6 U/L (ref 12–45)
BILIRUB SERPL-MCNC: 0.4 MG/DL (ref 0.1–1.5)
BILIRUB UR QL STRIP.AUTO: NEGATIVE
BUN SERPL-MCNC: 6 MG/DL (ref 8–22)
CALCIUM SERPL-MCNC: 7.6 MG/DL (ref 8.5–10.5)
CHLORIDE SERPL-SCNC: 113 MMOL/L (ref 96–112)
CO2 SERPL-SCNC: 21 MMOL/L (ref 20–33)
COLOR UR: YELLOW
CREAT SERPL-MCNC: 0.7 MG/DL (ref 0.5–1.4)
ERYTHROCYTE [DISTWIDTH] IN BLOOD BY AUTOMATED COUNT: 59.8 FL (ref 35.9–50)
GLOBULIN SER CALC-MCNC: 1.8 G/DL (ref 1.9–3.5)
GLUCOSE SERPL-MCNC: 105 MG/DL (ref 65–99)
GLUCOSE UR STRIP.AUTO-MCNC: NEGATIVE MG/DL
HCT VFR BLD AUTO: 33.9 % (ref 42–52)
HGB BLD-MCNC: 10.5 G/DL (ref 14–18)
KETONES UR STRIP.AUTO-MCNC: NEGATIVE MG/DL
LEUKOCYTE ESTERASE UR QL STRIP.AUTO: NEGATIVE
MCH RBC QN AUTO: 26 PG (ref 27–33)
MCHC RBC AUTO-ENTMCNC: 31 G/DL (ref 33.7–35.3)
MCV RBC AUTO: 83.9 FL (ref 81.4–97.8)
MICRO URNS: NORMAL
NITRITE UR QL STRIP.AUTO: NEGATIVE
PH UR STRIP.AUTO: 5.5 [PH]
PLATELET # BLD AUTO: 189 K/UL (ref 164–446)
PMV BLD AUTO: 8.8 FL (ref 9–12.9)
POTASSIUM SERPL-SCNC: 3.5 MMOL/L (ref 3.6–5.5)
PROT SERPL-MCNC: 4.5 G/DL (ref 6–8.2)
PROT UR QL STRIP: NEGATIVE MG/DL
RBC # BLD AUTO: 4.04 M/UL (ref 4.7–6.1)
RBC UR QL AUTO: NEGATIVE
SODIUM SERPL-SCNC: 139 MMOL/L (ref 135–145)
SP GR UR STRIP.AUTO: 1.01
UROBILINOGEN UR STRIP.AUTO-MCNC: 0.2 MG/DL
WBC # BLD AUTO: 9.1 K/UL (ref 4.8–10.8)

## 2018-07-14 PROCEDURE — 36415 COLL VENOUS BLD VENIPUNCTURE: CPT

## 2018-07-14 PROCEDURE — 87077 CULTURE AEROBIC IDENTIFY: CPT

## 2018-07-14 PROCEDURE — A9270 NON-COVERED ITEM OR SERVICE: HCPCS | Performed by: FAMILY MEDICINE

## 2018-07-14 PROCEDURE — 700102 HCHG RX REV CODE 250 W/ 637 OVERRIDE(OP): Performed by: FAMILY MEDICINE

## 2018-07-14 PROCEDURE — 85027 COMPLETE CBC AUTOMATED: CPT

## 2018-07-14 PROCEDURE — 700102 HCHG RX REV CODE 250 W/ 637 OVERRIDE(OP): Performed by: STUDENT IN AN ORGANIZED HEALTH CARE EDUCATION/TRAINING PROGRAM

## 2018-07-14 PROCEDURE — 87186 SC STD MICRODIL/AGAR DIL: CPT

## 2018-07-14 PROCEDURE — 700111 HCHG RX REV CODE 636 W/ 250 OVERRIDE (IP): Performed by: FAMILY MEDICINE

## 2018-07-14 PROCEDURE — 81003 URINALYSIS AUTO W/O SCOPE: CPT

## 2018-07-14 PROCEDURE — A9270 NON-COVERED ITEM OR SERVICE: HCPCS | Performed by: STUDENT IN AN ORGANIZED HEALTH CARE EDUCATION/TRAINING PROGRAM

## 2018-07-14 PROCEDURE — 700105 HCHG RX REV CODE 258: Performed by: FAMILY MEDICINE

## 2018-07-14 PROCEDURE — 80053 COMPREHEN METABOLIC PANEL: CPT

## 2018-07-14 PROCEDURE — 770021 HCHG ROOM/CARE - ISO PRIVATE

## 2018-07-14 PROCEDURE — 87086 URINE CULTURE/COLONY COUNT: CPT

## 2018-07-14 RX ADMIN — OXYCODONE HYDROCHLORIDE 5 MG: 5 TABLET ORAL at 09:32

## 2018-07-14 RX ADMIN — OXYCODONE HYDROCHLORIDE 5 MG: 5 TABLET ORAL at 00:25

## 2018-07-14 RX ADMIN — LACTOBACILLUS ACIDOPHILUS / LACTOBACILLUS BULGARICUS 1 PACKET: 100 MILLION CFU STRENGTH GRANULES at 12:44

## 2018-07-14 RX ADMIN — NYSTATIN AND TRIAMCINOLONE ACETONIDE: 100000; 1 CREAM TOPICAL at 05:59

## 2018-07-14 RX ADMIN — VANCOMYCIN HYDROCHLORIDE 125 MG: 10 INJECTION, POWDER, LYOPHILIZED, FOR SOLUTION INTRAVENOUS at 00:26

## 2018-07-14 RX ADMIN — FAMOTIDINE 20 MG: 20 TABLET ORAL at 21:20

## 2018-07-14 RX ADMIN — GABAPENTIN 300 MG: 300 CAPSULE ORAL at 12:43

## 2018-07-14 RX ADMIN — Medication 325 MG: at 05:58

## 2018-07-14 RX ADMIN — VANCOMYCIN HYDROCHLORIDE 125 MG: 10 INJECTION, POWDER, LYOPHILIZED, FOR SOLUTION INTRAVENOUS at 17:07

## 2018-07-14 RX ADMIN — POTASSIUM CHLORIDE 20 MEQ: 1500 TABLET, EXTENDED RELEASE ORAL at 05:58

## 2018-07-14 RX ADMIN — LAMOTRIGINE 200 MG: 100 TABLET ORAL at 05:58

## 2018-07-14 RX ADMIN — VANCOMYCIN HYDROCHLORIDE 125 MG: 10 INJECTION, POWDER, LYOPHILIZED, FOR SOLUTION INTRAVENOUS at 23:10

## 2018-07-14 RX ADMIN — VANCOMYCIN HYDROCHLORIDE 125 MG: 10 INJECTION, POWDER, LYOPHILIZED, FOR SOLUTION INTRAVENOUS at 12:43

## 2018-07-14 RX ADMIN — GABAPENTIN 300 MG: 300 CAPSULE ORAL at 05:59

## 2018-07-14 RX ADMIN — LACTOBACILLUS ACIDOPHILUS / LACTOBACILLUS BULGARICUS 1 PACKET: 100 MILLION CFU STRENGTH GRANULES at 09:24

## 2018-07-14 RX ADMIN — OXYCODONE HYDROCHLORIDE 5 MG: 5 TABLET ORAL at 23:10

## 2018-07-14 RX ADMIN — VANCOMYCIN HYDROCHLORIDE 125 MG: 10 INJECTION, POWDER, LYOPHILIZED, FOR SOLUTION INTRAVENOUS at 05:59

## 2018-07-14 RX ADMIN — BACITRACIN ZINC: 500 OINTMENT TOPICAL at 05:59

## 2018-07-14 RX ADMIN — SODIUM CHLORIDE: 9 INJECTION, SOLUTION INTRAVENOUS at 06:00

## 2018-07-14 RX ADMIN — SODIUM CHLORIDE: 9 INJECTION, SOLUTION INTRAVENOUS at 12:58

## 2018-07-14 RX ADMIN — GABAPENTIN 300 MG: 300 CAPSULE ORAL at 17:08

## 2018-07-14 RX ADMIN — OXYCODONE HYDROCHLORIDE 5 MG: 5 TABLET ORAL at 17:08

## 2018-07-14 RX ADMIN — BACITRACIN ZINC: 500 OINTMENT TOPICAL at 17:07

## 2018-07-14 RX ADMIN — SODIUM CHLORIDE: 9 INJECTION, SOLUTION INTRAVENOUS at 21:20

## 2018-07-14 RX ADMIN — TAMSULOSIN HYDROCHLORIDE 0.4 MG: 0.4 CAPSULE ORAL at 05:58

## 2018-07-14 RX ADMIN — NYSTATIN AND TRIAMCINOLONE ACETONIDE: 100000; 1 CREAM TOPICAL at 17:07

## 2018-07-14 RX ADMIN — LACTOBACILLUS ACIDOPHILUS / LACTOBACILLUS BULGARICUS 1 PACKET: 100 MILLION CFU STRENGTH GRANULES at 17:07

## 2018-07-14 ASSESSMENT — PAIN SCALES - GENERAL
PAINLEVEL_OUTOF10: 7
PAINLEVEL_OUTOF10: 0

## 2018-07-14 NOTE — CONSULTS
"Orthopaedic Surgery Consult Note:    Kj Gonzalez  Date & Time note created:    7/13/2018   7:24 PM     Referring MD:  Dr. Owen    Patient ID:   Name:             Marcelo Colon   YOB: 1940  Age:                 77 y.o.  male   MRN:               [id number]                                                             Reason for Consult:      Post op wound care    History of Present Illness:    Mr. Colon is a pleasant gentleman who presented to the hospital yesterday for a c-diff infection.  He is now currently admitted and being treated with PO vancomycin for this.  Two weeks ago he underwent a left endoscopic carpal tunnel release along with a cubital tunnel release.  He reports pain over the surgical sites is improving and has not noted substantial changes to his preoperative symptoms.      Review of Systems:      Constitutional: Denies fevers, Denies weight changes  Eyes: Denies changes in vision, no eye pain  Ears/Nose/Throat/Mouth: Denies nasal congestion or sore throat   Cardiovascular: Denies chest pain   Respiratory: Denies shortness of breath , Denies cough  Gastrointestinal/Hepatic: Positive for diarrhea and stomach cramping   Genitourinary: Denies dysuria or frequency  Musculoskeletal/Rheum: Left hand numbness and tingling, unchanged from preop  Skin: Denies rash  Neurological: Denies headache, confusion, memory loss or focal weakness/parasthesias  Psychiatric: denies mood disorder   Endocrine: Ambreen thyroid problems  Heme/Oncology/Lymph Nodes: Denies enlarged lymph nodes, denies brusing or known bleeding disorder  All other systems were reviewed and are negative (AMA/CMS criteria)                Past Medical History:   Past Medical History:   Diagnosis Date   • Anesthesia     \"takes very little and takes forever to come out of it\"   • Anxiety disorder    • Arthritis     some in my back   • Bipolar disorder (HCC)    • Clostridium difficile infection    • Depression    • " Enlarged prostate    • GERD (gastroesophageal reflux disease)    • Heart murmur    • Indigestion    • Neck pain      There are no active hospital problems to display for this patient.      Past Surgical History:  Past Surgical History:   Procedure Laterality Date   • COLOSTOMY TAKEDOWN  3/21/2018    Procedure: COLOSTOMY TAKEDOWN;  Surgeon: Jorge Rodriguez M.D.;  Location: SURGERY [facility];  Service: General   • LUMBAR DECOMPRESSION  12/2017   • EXPLORATORY LAPAROTOMY  7/25/2017    Procedure: EXPLORATORY LAPAROTOMY- ABDOMINAL WASH OUT;  Surgeon: Jorge Rodriguez M.D.;  Location: SURGERY [facility];  Service:    • SIGMOID COLON RESECTION  7/25/2017    Procedure: SIGMOID COLON RESECTION;  Surgeon: Jorge Rodriguez M.D.;  Location: SURGERY [facility];  Service:    • COLOSTOMY  7/25/2017    Procedure: COLOSTOMY- FOR OSTOMY PLACEMENT AND OTHER PROCEDURES AS INDICATED;  Surgeon: Jorge Rodriguez M.D.;  Location: SURGERY [facility];  Service:    • APPENDECTOMY  7/25/2017    Procedure: APPENDECTOMY;  Surgeon: Jorge Rodriguez M.D.;  Location: SURGERY [facility];  Service:    • EXC./BIOPSY MASS BACK  [address] [place] [address] Medications:    Current Facility-Administered Medications:   •  nystatin/triamcinolone (MYCOLOG) 670932-5.1 UNIT/GM-% cream, , Topical, BID, Letha Hillman M.D.  •  ferrous sulfate tablet 325 mg, 325 mg, Oral, DAILY, Ricky Ga M.D., 325 mg at 07/13/18 0538  •  gabapentin (NEURONTIN) capsule 300 mg, 300 mg, Oral, TID, Ricky Ga M.D., 300 mg at 07/13/18 1758  •  lactobacillus granules (LACTINEX/FLORANEX) packet 1 Packet, 1 Packet, Oral, TID WITH MEALS, Ricky Ga M.D.  •  lamoTRIgine (LAMICTAL) tablet 200 mg, 200 mg, Oral, DAILY, Ricky Ga M.D., 200 mg at 07/13/18 0539  •  oxyCODONE immediate-release (ROXICODONE) tablet 5 mg, 5 mg, Oral, Q6HRS PRN, Ricky Ga M.D., 5 mg at 07/13/18 1804  •  potassium chloride SA (Kdur) tablet 20 mEq, 20 mEq,  Oral, DAILY, Ricky Ga M.D., 20 mEq at 07/13/18 0539  •  tamsulosin (FLOMAX) capsule 0.4 mg, 0.4 mg, Oral, DAILY, Ricky Ga M.D., 0.4 mg at 07/13/18 0539  •  NS infusion, , Intravenous, Continuous, Ricky Ga M.D., Last Rate: 125 mL/hr at 07/13/18 1507  •  enoxaparin (LOVENOX) inj 40 mg, 40 mg, Subcutaneous, DAILY, Ricky Ga M.D., 40 mg at 07/13/18 0540  •  labetalol (NORMODYNE,TRANDATE) injection 10 mg, 10 mg, Intravenous, Q4HRS PRN, Ricky Ga M.D.  •  vancomycin 50 mg/mL oral soln 125 mg, 125 mg, Oral, Q6HRS, 125 mg at 07/13/18 1759 **FOLLOWED BY** [START ON 7/26/2018] vancomycin 50 mg/mL oral soln 125 mg, 125 mg, Oral, Q12HRS **FOLLOWED BY** [START ON 8/3/2018] vancomycin 50 mg/mL oral soln 125 mg, 125 mg, Oral, Q24HRS **FOLLOWED BY** [START ON 8/10/2018] vancomycin 50 mg/mL oral soln 125 mg, 125 mg, Oral, Q48HRS **FOLLOWED BY** [START ON 8/18/2018] vancomycin 50 mg/mL oral soln 125 mg, 125 mg, Oral, Q72HRS, Ricky Ga M.D.  •  bacitracin ointment, , Topical, BID, Soo Chun M.D.  •  famotidine (PEPCID) tablet 20 mg, 20 mg, Oral, QHS, Soo Chun M.D.    Current Outpatient Medications:  Prescriptions Prior to Admission   Medication Sig Dispense Refill Last Dose   • vancomycin (VANCOCIN HCL) 125 MG capsule Take 1 Cap by mouth 4 times a day. 40 Cap 0  at unknown   • alfuzosin (UROXATRAL) 10 MG SR tablet Take 10 mg by mouth.   7/12/2018 at AM   • oxyCODONE immediate-release (ROXICODONE) 5 MG Tab Take 7.5 mg by mouth every 6 hours as needed.   7/12/2018 at AM   • tamsulosin (FLOMAX) 0.4 MG capsule Take 0.4 mg by mouth every day.    at unknown   • therapeutic multivitamin-minerals (THERAGRAN-M) Tab Take 1 Tab by mouth every day. 30 Tab 11  at unkown   • lactobacillus granules (LACTINEX/FLORANEX) Pack Take 1 Packet by mouth 3 times a day, with meals. 60 Packet 0 7/12/2018 at AM   • calcium polycarbophil 625 MG Tab Take 1 Tab by mouth 3 times a day, with meals. 90 Tab 0  at  "Unknown time   • vancomycin (VANCOCIN) 125 MG capsule Take 1 Cap by mouth See Admin Instructions. 90 Cap 0  at unknown   • ferrous sulfate 325 (65 Fe) MG tablet Take 325 mg by mouth every day.   4/7/2018 at unknown   • gabapentin (NEURONTIN) 300 MG Cap Take 400 mg by mouth 3 times a day.   7/12/2018   • lamotrigine (LAMICTAL) 200 MG tablet Take 200 mg by mouth every day.   7/12/2018 at AM   • potassium chloride SA (KDUR) 20 MEQ Tab CR Take 20 mEq by mouth every day.   4/10/2018 at unknown   • tramadol (ULTRAM) 50 MG TABS Take 100 mg by mouth every four hours as needed for Mild Pain.    at unknown       Medication Allergy:  Allergies   Allergen Reactions   • Acetaminophen Nausea     Very nauseated        Family History:  No family history on file.    Social History:  Social History     Social History   • Marital status: Single     Spouse name: N/A   • Number of children: N/A   • Years of education: N/A     Occupational History   • Not on file.     Social History Main Topics   • Smoking status: Former Smoker     Packs/day: 2.00     Years: 14.00     Quit date: 3/21/1973   • Smokeless tobacco: Never Used   • Alcohol use 0.0 oz/week      Comment: 3 glasses wine/day   • Drug use: Yes      Comment: history of cocaine and marijuana use still about 10 years ago. Denies any IV drug use.   • Sexual activity: Not on file     Other Topics Concern   • Not on file     Social History Narrative   • No narrative on file         Physical Exam:  Vitals/ General Appearance:   Weight/BMI: Body mass index is 19.22 kg/m².  Blood pressure (!) 93/46, pulse 86, temperature 37.4 °C (99.4 °F), resp. rate 18, height 1.727 m (5' 7.99\"), weight 57.3 kg (126 lb 6 oz), SpO2 94 %.  Vitals:    07/12/18 2315 07/13/18 0400 07/13/18 0755 07/13/18 1620   BP: 130/71 115/55 (!) 99/50 (!) 93/46   Pulse: 85 66 64 86   Resp: 17 18 18 18   Temp: 36.7 °C (98 °F) 36.3 °C (97.4 °F) 37.6 °C (99.7 °F) 37.4 °C (99.4 °F)   SpO2: 92% 94% 92% 94%   Weight:       Height: "           Constitutional:   Well developed, Well nourished, No acute distress  HENMT:  Normocephalic, Atraumatic, Oropharynx moist mucous membranes, No oral exudates, Nose normal.  No thyromegaly.  Eyes:  EOMI, Conjunctiva normal, No discharge.  Neck:  Normal range of motion, No cervical tenderness,  no JVD.  Cardiovascular:  Regular rate and rhythm  Lungs:  Normal breathing  Abdomen: Soft, non-tender, non-distended.  Skin: Warm, Dry, No erythema, No rash, no induration.  Neurologic: Alert & oriented x 3, No focal deficits noted, cranial nerves II through X are grossly intact.  Psychiatric: Affect normal, Judgment normal, Mood normal.  Musculoskeletal: Exam of the left wrist reveals well healing surgical incision without signs of infection.  Sutures were removed.  Exam of the left elbow reveals staples have already been removed.  There is some mild surrounding erythema without drainage or wound dehiscence.  No fluctuance is present.  Motor intact to radial, median and ulnar nerves    Lab Data Review:  Recent Results (from the past 24 hour(s))   CBC WITH DIFFERENTIAL    Collection Time: 07/12/18  9:09 PM   Result Value Ref Range    WBC 12.4 (H) 4.8 - 10.8 K/uL    RBC 4.59 (L) 4.70 - 6.10 M/uL    Hemoglobin 12.1 (L) 14.0 - 18.0 g/dL    Hematocrit 37.8 (L) 42.0 - 52.0 %    MCV 82.4 81.4 - 97.8 fL    MCH 26.4 (L) 27.0 - 33.0 pg    MCHC 32.0 (L) 33.7 - 35.3 g/dL    RDW 56.7 (H) 35.9 - 50.0 fL    Platelet Count 221 164 - 446 K/uL    MPV 9.0 9.0 - 12.9 fL    Neutrophils-Polys 80.10 (H) 44.00 - 72.00 %    Lymphocytes 11.20 (L) 22.00 - 41.00 %    Monocytes 7.40 0.00 - 13.40 %    Eosinophils 0.50 0.00 - 6.90 %    Basophils 0.20 0.00 - 1.80 %    Immature Granulocytes 0.60 0.00 - 0.90 %    Nucleated RBC 0.00 /100 WBC    Neutrophils (Absolute) 9.94 (H) 1.82 - 7.42 K/uL    Lymphs (Absolute) 1.39 1.00 - 4.80 K/uL    Monos (Absolute) 0.92 (H) 0.00 - 0.85 K/uL    Eos (Absolute) 0.06 0.00 - 0.51 K/uL    Baso (Absolute) 0.02 0.00 -  0.12 K/uL    Immature Granulocytes (abs) 0.08 0.00 - 0.11 K/uL    NRBC (Absolute) 0.00 K/uL   COMP METABOLIC PANEL    Collection Time: 07/12/18  9:09 PM   Result Value Ref Range    Sodium 138 135 - 145 mmol/L    Potassium 3.6 3.6 - 5.5 mmol/L    Chloride 104 96 - 112 mmol/L    Co2 25 20 - 33 mmol/L    Anion Gap 9.0 0.0 - 11.9    Glucose 108 (H) 65 - 99 mg/dL    Bun 14 8 - 22 mg/dL    Creatinine 1.20 0.50 - 1.40 mg/dL    Calcium 8.8 8.5 - 10.5 mg/dL    AST(SGOT) 11 (L) 12 - 45 U/L    ALT(SGPT) <5 2 - 50 U/L    Alkaline Phosphatase 71 30 - 99 U/L    Total Bilirubin 0.6 0.1 - 1.5 mg/dL    Albumin 3.5 3.2 - 4.9 g/dL    Total Protein 5.8 (L) 6.0 - 8.2 g/dL    Globulin 2.3 1.9 - 3.5 g/dL    A-G Ratio 1.5 g/dL   ESTIMATED GFR    Collection Time: 07/12/18  9:09 PM   Result Value Ref Range    GFR If African American >60 >60 mL/min/1.73 m 2    GFR If Non African American 59 (A) >60 mL/min/1.73 m 2   URINALYSIS    Collection Time: 07/12/18 11:00 PM   Result Value Ref Range    Color DK Yellow     Character Clear     Specific Gravity 1.031 <1.035    Ph 5.5 5.0 - 8.0    Glucose Negative Negative mg/dL    Ketones Trace (A) Negative mg/dL    Protein Negative Negative mg/dL    Bilirubin Negative Negative    Urobilinogen, Urine 0.2 Negative    Nitrite Negative Negative    Leukocyte Esterase Trace (A) Negative    Occult Blood Small (A) Negative    Micro Urine Req Microscopic    URINE MICROSCOPIC (W/UA)    Collection Time: 07/12/18 11:00 PM   Result Value Ref Range    WBC 10-20 (A) /hpf    RBC 5-10 (A) /hpf    Bacteria Negative None /hpf    Epithelial Cells Few /hpf    Hyaline Cast 3-5 (A) /lpf   CBC with Differential    Collection Time: 07/13/18  3:25 AM   Result Value Ref Range    WBC 11.9 (H) 4.8 - 10.8 K/uL    RBC 4.21 (L) 4.70 - 6.10 M/uL    Hemoglobin 11.1 (L) 14.0 - 18.0 g/dL    Hematocrit 34.7 (L) 42.0 - 52.0 %    MCV 82.4 81.4 - 97.8 fL    MCH 26.4 (L) 27.0 - 33.0 pg    MCHC 32.0 (L) 33.7 - 35.3 g/dL    RDW 56.4 (H) 35.9 -  50.0 fL    Platelet Count 189 164 - 446 K/uL    MPV 9.2 9.0 - 12.9 fL    Neutrophils-Polys 73.10 (H) 44.00 - 72.00 %    Lymphocytes 16.50 (L) 22.00 - 41.00 %    Monocytes 8.80 0.00 - 13.40 %    Eosinophils 0.80 0.00 - 6.90 %    Basophils 0.20 0.00 - 1.80 %    Immature Granulocytes 0.60 0.00 - 0.90 %    Nucleated RBC 0.00 /100 WBC    Neutrophils (Absolute) 8.71 (H) 1.82 - 7.42 K/uL    Lymphs (Absolute) 1.97 1.00 - 4.80 K/uL    Monos (Absolute) 1.05 (H) 0.00 - 0.85 K/uL    Eos (Absolute) 0.10 0.00 - 0.51 K/uL    Baso (Absolute) 0.02 0.00 - 0.12 K/uL    Immature Granulocytes (abs) 0.07 0.00 - 0.11 K/uL    NRBC (Absolute) 0.00 K/uL   Comp Metabolic Panel (CMP)    Collection Time: 07/13/18  3:25 AM   Result Value Ref Range    Sodium 138 135 - 145 mmol/L    Potassium 3.5 (L) 3.6 - 5.5 mmol/L    Chloride 107 96 - 112 mmol/L    Co2 24 20 - 33 mmol/L    Anion Gap 7.0 0.0 - 11.9    Glucose 121 (H) 65 - 99 mg/dL    Bun 14 8 - 22 mg/dL    Creatinine 1.04 0.50 - 1.40 mg/dL    Calcium 8.0 (L) 8.5 - 10.5 mg/dL    AST(SGOT) 7 (L) 12 - 45 U/L    ALT(SGPT) <5 2 - 50 U/L    Alkaline Phosphatase 62 30 - 99 U/L    Total Bilirubin 0.5 0.1 - 1.5 mg/dL    Albumin 3.0 (L) 3.2 - 4.9 g/dL    Total Protein 5.1 (L) 6.0 - 8.2 g/dL    Globulin 2.1 1.9 - 3.5 g/dL    A-G Ratio 1.4 g/dL   ESTIMATED GFR    Collection Time: 07/13/18  3:25 AM   Result Value Ref Range    GFR If African American >60 >60 mL/min/1.73 m 2    GFR If Non African American >60 >60 mL/min/1.73 m 2       Assessment: Left cubital and carpal tunnel syndrome now s/p releases.  Concern for possible early post op wound infection.    C-diff infection    Plan: Current PO abx should be sufficient coverage in the event there is an early post op infection at the cubital tunnel surgical site.  May shower and wash surgical sites.  No lifting restrictions.  I will return in 2 days for repeat evaluation.

## 2018-07-14 NOTE — CARE PLAN
Problem: Infection  Goal: Will remain free from infection  Outcome: PROGRESSING AS EXPECTED  Continue contact precautions, utilize bleach wipes on equipment as needed    Problem: Bowel/Gastric:  Goal: Normal bowel function is maintained or improved  Outcome: PROGRESSING SLOWER THAN EXPECTED  Provide opportunity for pt to go to bathroom often. Be available for help with ambulation

## 2018-07-14 NOTE — PROGRESS NOTES
[facility] [place] FAMILY MEDICINE PROGRESS NOTE     Attending:   [place]    Resident:   Demetrius Owen M.D.    PATIENT:   Marcelo Colon; [id number]; 1940    ID:   77 y.o. male admitted for recurrent C. Diff w/ history of sigmoid colonic resection.     SUBJECTIVE:   No acute events overnight, pt gave varying reports to team members this a.m alternately stating that he had no loose stools overnight and then later reporting 5 loose stools with the last one being black in color. Tolerated diet and liquids well with no nausea, vomiting, fever or chills. Reports improvement in belly pain since yesterday.    OBJECTIVE:  Vitals:    07/13/18 1620 07/13/18 2000 07/14/18 0000 07/14/18 0350   BP: (!) 93/46 107/67  (!) 98/43   Pulse: 86 78  66   Resp: 18 16  16   Temp: 37.4 °C (99.4 °F) 37.4 °C (99.3 °F)  37.6 °C (99.7 °F)   SpO2: 94% 93%  96%   Weight:   59.1 kg (130 lb 4.7 oz)    Height:           Intake/Output Summary (Last 24 hours) at 07/14/18 0753  Last data filed at 07/14/18 0400   Gross per 24 hour   Intake             2460 ml   Output              920 ml   Net             1540 ml       PHYSICAL EXAM:  General: No acute distress, afebrile, resting comfortably, cachetic  HEENT: NC/AT. EOMI. MMM  Cardiovascular: RRR, normal S1/S2 no murmurs rubs, or gallops, cap refill brisk.  Respiratory: CTAB, no tachypnea or retractions  Abdomen: soft, NT/ND, no masses  EXT:  No rashes or skin changes noted. Pulses 2+ DP and radial bilaterally. L elbow with healing surgical wound erythema not progressed since yesterday and no obvious drainage. Slightly warm to touch, non-tender  Neuro: Non-focal, A&Ox3 doesn't know the day this morning    LABS:  Recent Labs      07/12/18   2109  07/13/18   0325  07/14/18   0348   WBC  12.4*  11.9*  9.1   RBC  4.59*  4.21*  4.04*   HEMOGLOBIN  12.1*  11.1*  10.5*   HEMATOCRIT  37.8*  34.7*  33.9*   MCV  82.4  82.4  83.9   MCH  26.4*  26.4*  26.0*   RDW  56.7*  56.4*  59.8*   PLATELETCT  221  189  189   MPV  9.0  9.2   8.8*   NEUTSPOLYS  80.10*  73.10*   --    LYMPHOCYTES  11.20*  16.50*   --    MONOCYTES  7.40  8.80   --    EOSINOPHILS  0.50  0.80   --    BASOPHILS  0.20  0.20   --      Recent Labs      07/12/18 2109 07/13/18 0325 07/14/18 0348   SODIUM  138  138  139   POTASSIUM  3.6  3.5*  3.5*   CHLORIDE  104  107  113*   CO2  25  24  21   BUN  14  14  6*   CREATININE  1.20  1.04  0.70   CALCIUM  8.8  8.0*  7.6*   ALBUMIN  3.5  3.0*  2.7*     Estimated GFR/CRCL = Estimated Creatinine Clearance: 75 mL/min (by C-G formula based on SCr of 0.7 mg/dL).  Recent Labs      07/12/18 2109 07/13/18 0325 07/14/18 0348   GLUCOSE  108*  121*  105*     Recent Labs      07/12/18 2109 07/13/18 0325 07/14/18 0348   ASTSGOT  11*  7*  6*   ALTSGPT  <5  <5  <5   TBILIRUBIN  0.6  0.5  0.4   ALKPHOSPHAT  71  62  46   GLOBULIN  2.3  2.1  1.8*     C. Diff toxin positive and PCR pending    IMAGING:  None new in last 24 hours    MEDS:  Current Facility-Administered Medications   Medication Last Dose   • nystatin/triamcinolone (MYCOLOG) 316004-7.1 UNIT/GM-% cream     • ferrous sulfate tablet 325 mg 325 mg at 07/14/18 0558   • gabapentin (NEURONTIN) capsule 300 mg 300 mg at 07/14/18 0559   • lactobacillus granules (LACTINEX/FLORANEX) packet 1 Packet     • lamoTRIgine (LAMICTAL) tablet 200 mg 200 mg at 07/14/18 0558   • oxyCODONE immediate-release (ROXICODONE) tablet 5 mg 5 mg at 07/14/18 0025   • potassium chloride SA (Kdur) tablet 20 mEq 20 mEq at 07/14/18 0558   • tamsulosin (FLOMAX) capsule 0.4 mg 0.4 mg at 07/14/18 0558   • NS infusion     • enoxaparin (LOVENOX) inj 40 mg 40 mg at 07/13/18 0540   • labetalol (NORMODYNE,TRANDATE) injection 10 mg     • vancomycin 50 mg/mL oral soln 125 mg 125 mg at 07/14/18 0559    Followed by   • [START ON 7/26/2018] vancomycin 50 mg/mL oral soln 125 mg      Followed by   • [START ON 8/3/2018] vancomycin 50 mg/mL oral soln 125 mg      Followed by   • [START ON 8/10/2018] vancomycin 50 mg/mL oral  soln 125 mg      Followed by   • [START ON 8/18/2018] vancomycin 50 mg/mL oral soln 125 mg     • bacitracin ointment     • famotidine (PEPCID) tablet 20 mg 20 mg at 07/13/18 5793       ASSESSMENT/PLAN: 78 yo male with hx of sigmoid bowel resection and recurrent C. Diff      #Recurrent C. Diff  - patient with complicated history of c. diff infections x3              - followed by [facility]              - has been on oral vancomycin              - stool transplant scheduled for September  - possibly improvement in bowel movements last night, hx variable with pt this a.m.  - repeat stool culture pending, toxin positive  - PO vancomycin and continue for 2 week course with taper until transplant  - NS @ 125mL/hr  - disscussed with GI consultants. They are aware of patient and will call him early next week              -recommended 2 week course oral vancomycin              -will not take back now for fecal transplant as patient is non-toxic              -did not recommend adding rifaximin to PO vanco     #Hypotension  - in outpatient clinic, systolic BP approximately 30 points lower than normal  - on admission, /49              - cont to closely monitor  - afebrile  - patient, is at this time, hemodynamically stable, less concern for sepsis  - already on appropriate antibiotics  - urine culture pending as some concern for UTI on admission     #s/p Carpal Tunnel Release  - was performed by Dr. Gonzalez              - staples removed 7/14  - some erythema on exam not advanced since yesterday, mildly tender to palpation  - will continue to follow closely, do not think infection as this time  - apply bacitracin and cont dressing changes     #Dementia  - currently not on treatment  - continues to have capacity  - AOx3 on exam              - patient states he has difficulty with dates and times  - follow-up outpatient     #Chronic Conditions  BPD - cont lamictal  BPH - cont tamsulosin  GERD - famotidine while  inpatient     Dispo: Admit to Med/Surg for antibiotics and close monitoring  PCP: Luke  Code Status: DNR/DNI     Core Measures:  Lines: PIV  Abx: PO Vancomycin  IVF: NS @ 125 cc/hr

## 2018-07-14 NOTE — DISCHARGE PLANNING
Anticipated Discharge Disposition: Home with HH    Action: LSW spoke with bedside RN who stated that pt will be discharging home with HH and pt has a history of dementia. Currently, pt is oriented and bedside RN does not have any concerns for pt's ability to take medications upon discharge home. LSW faxed choice form to [facility]. Pt has already made choice for [facility].     Barriers to Discharge: None    Plan: Awaiting HH acceptance.

## 2018-07-14 NOTE — PROGRESS NOTES
Bedside report received.  Assessment complete.  A&O x 4. Patient calls appropriately.  Patient up with x1 assist. Bed alarm refused.   Patient has 7/10 pain. Given PRN oxy, see MAR  Denies N&V. Tolerating regular diet  Has briefs on for bouts of incontinence/having loose bowel movements.  + void, + flatus  Patient denies SOB.  SCD's on.  Patient pleasant with staff, would like to be DCd soon, ready to go home..  Review plan with of care with patient. Call light and personal belongings with in reach. Hourly rounding in place. All needs met at this time.

## 2018-07-14 NOTE — PROGRESS NOTES
AA&Ox4. RA. Denies SOB.  Denies pain at this time  Tolerating regular diet. - N/V.  + void. Pt still having loose BMs.  Pt up with SBA using single point cane.  Special contact iso precautions in place.  POC discussed. All questions answered. Call light within reach. Pt calls appropriately.

## 2018-07-14 NOTE — CARE PLAN
Problem: Infection  Goal: Will remain free from infection  Outcome: PROGRESSING AS EXPECTED  Proper isolation precautions in place.    Problem: Pain Management  Goal: Pain level will decrease to patient's comfort goal  Outcome: PROGRESSING AS EXPECTED  Pain controlled with PRN PO med at this time.

## 2018-07-14 NOTE — THERAPY
"Physical Therapy Evaluation completed.   Bed Mobility:  Supine to Sit: Modified Independent  Transfers: Sit to Stand: Stand by Assist  Gait: Level Of Assist: Stand by Assist with Single Point Cane (close SBA 2' to current pain and general fatigue)    Plan of Care: Will benefit from Physical Therapy 3 times per week  Discharge Recommendations: Equipment: Will Continue to Assess for Equipment Needs. See below    Pt presents to PT with impaired endurance, balance and gait associated with recent deconditoining and medical co-morbidities. He is able to demonstrate short distnace ambulation with SPC with close SBA with no nam LOB. However he is limited in distance currently 2' to abdominal pain and has abnormal/antalgic gait currently which is adversely affecting balance. Anticipate as pain managed and co-morbidities resolve, pt will continue to progress with OOB/ambulatory activtiy . WIll continue to visit and anticipate pt will be functionally capable of dc to home once medically cleared. Would defer to OT recs for concerns with LUE hand contracture and IADl/ADL management recs.     See \"Rehab Therapy-Acute\" Patient Summary Report for complete documentation.     "

## 2018-07-15 LAB
ALBUMIN SERPL BCP-MCNC: 2.8 G/DL (ref 3.2–4.9)
ALBUMIN/GLOB SERPL: 2 G/DL
ALP SERPL-CCNC: 45 U/L (ref 30–99)
ALT SERPL-CCNC: <5 U/L (ref 2–50)
ANION GAP SERPL CALC-SCNC: 4 MMOL/L (ref 0–11.9)
AST SERPL-CCNC: 5 U/L (ref 12–45)
BILIRUB SERPL-MCNC: 0.5 MG/DL (ref 0.1–1.5)
BUN SERPL-MCNC: 11 MG/DL (ref 8–22)
CALCIUM SERPL-MCNC: 7.6 MG/DL (ref 8.5–10.5)
CHLORIDE SERPL-SCNC: 112 MMOL/L (ref 96–112)
CO2 SERPL-SCNC: 22 MMOL/L (ref 20–33)
CREAT SERPL-MCNC: 0.77 MG/DL (ref 0.5–1.4)
ERYTHROCYTE [DISTWIDTH] IN BLOOD BY AUTOMATED COUNT: 58.4 FL (ref 35.9–50)
GLOBULIN SER CALC-MCNC: 1.4 G/DL (ref 1.9–3.5)
GLUCOSE SERPL-MCNC: 103 MG/DL (ref 65–99)
HCT VFR BLD AUTO: 31.9 % (ref 42–52)
HGB BLD-MCNC: 10 G/DL (ref 14–18)
MCH RBC QN AUTO: 26.1 PG (ref 27–33)
MCHC RBC AUTO-ENTMCNC: 31.3 G/DL (ref 33.7–35.3)
MCV RBC AUTO: 83.3 FL (ref 81.4–97.8)
PLATELET # BLD AUTO: 181 K/UL (ref 164–446)
PMV BLD AUTO: 8.9 FL (ref 9–12.9)
POTASSIUM SERPL-SCNC: 3.8 MMOL/L (ref 3.6–5.5)
PROT SERPL-MCNC: 4.2 G/DL (ref 6–8.2)
RBC # BLD AUTO: 3.83 M/UL (ref 4.7–6.1)
SODIUM SERPL-SCNC: 138 MMOL/L (ref 135–145)
WBC # BLD AUTO: 8.4 K/UL (ref 4.8–10.8)

## 2018-07-15 PROCEDURE — 700102 HCHG RX REV CODE 250 W/ 637 OVERRIDE(OP): Performed by: FAMILY MEDICINE

## 2018-07-15 PROCEDURE — A9270 NON-COVERED ITEM OR SERVICE: HCPCS | Performed by: FAMILY MEDICINE

## 2018-07-15 PROCEDURE — 700111 HCHG RX REV CODE 636 W/ 250 OVERRIDE (IP): Performed by: ORTHOPAEDIC SURGERY

## 2018-07-15 PROCEDURE — 770021 HCHG ROOM/CARE - ISO PRIVATE

## 2018-07-15 PROCEDURE — 80053 COMPREHEN METABOLIC PANEL: CPT

## 2018-07-15 PROCEDURE — 36415 COLL VENOUS BLD VENIPUNCTURE: CPT

## 2018-07-15 PROCEDURE — 700111 HCHG RX REV CODE 636 W/ 250 OVERRIDE (IP): Performed by: FAMILY MEDICINE

## 2018-07-15 PROCEDURE — 85027 COMPLETE CBC AUTOMATED: CPT

## 2018-07-15 PROCEDURE — A9270 NON-COVERED ITEM OR SERVICE: HCPCS | Performed by: STUDENT IN AN ORGANIZED HEALTH CARE EDUCATION/TRAINING PROGRAM

## 2018-07-15 PROCEDURE — 700102 HCHG RX REV CODE 250 W/ 637 OVERRIDE(OP): Performed by: STUDENT IN AN ORGANIZED HEALTH CARE EDUCATION/TRAINING PROGRAM

## 2018-07-15 PROCEDURE — 700105 HCHG RX REV CODE 258: Performed by: FAMILY MEDICINE

## 2018-07-15 RX ADMIN — LACTOBACILLUS ACIDOPHILUS / LACTOBACILLUS BULGARICUS 1 PACKET: 100 MILLION CFU STRENGTH GRANULES at 16:46

## 2018-07-15 RX ADMIN — BACITRACIN ZINC: 500 OINTMENT TOPICAL at 05:53

## 2018-07-15 RX ADMIN — VANCOMYCIN HYDROCHLORIDE 125 MG: 10 INJECTION, POWDER, LYOPHILIZED, FOR SOLUTION INTRAVENOUS at 12:39

## 2018-07-15 RX ADMIN — BACITRACIN ZINC: 500 OINTMENT TOPICAL at 16:47

## 2018-07-15 RX ADMIN — OXYCODONE HYDROCHLORIDE 5 MG: 5 TABLET ORAL at 12:38

## 2018-07-15 RX ADMIN — FAMOTIDINE 20 MG: 20 TABLET ORAL at 20:19

## 2018-07-15 RX ADMIN — TAMSULOSIN HYDROCHLORIDE 0.4 MG: 0.4 CAPSULE ORAL at 05:49

## 2018-07-15 RX ADMIN — LAMOTRIGINE 200 MG: 100 TABLET ORAL at 05:49

## 2018-07-15 RX ADMIN — CEFAZOLIN SODIUM 1 G: 1 INJECTION, SOLUTION INTRAVENOUS at 23:11

## 2018-07-15 RX ADMIN — POTASSIUM CHLORIDE 20 MEQ: 1500 TABLET, EXTENDED RELEASE ORAL at 05:49

## 2018-07-15 RX ADMIN — NYSTATIN AND TRIAMCINOLONE ACETONIDE: 100000; 1 CREAM TOPICAL at 05:53

## 2018-07-15 RX ADMIN — OXYCODONE HYDROCHLORIDE 5 MG: 5 TABLET ORAL at 20:19

## 2018-07-15 RX ADMIN — LACTOBACILLUS ACIDOPHILUS / LACTOBACILLUS BULGARICUS 1 PACKET: 100 MILLION CFU STRENGTH GRANULES at 12:38

## 2018-07-15 RX ADMIN — VANCOMYCIN HYDROCHLORIDE 125 MG: 10 INJECTION, POWDER, LYOPHILIZED, FOR SOLUTION INTRAVENOUS at 05:49

## 2018-07-15 RX ADMIN — GABAPENTIN 300 MG: 300 CAPSULE ORAL at 16:46

## 2018-07-15 RX ADMIN — VANCOMYCIN HYDROCHLORIDE 125 MG: 10 INJECTION, POWDER, LYOPHILIZED, FOR SOLUTION INTRAVENOUS at 23:11

## 2018-07-15 RX ADMIN — GABAPENTIN 300 MG: 300 CAPSULE ORAL at 12:38

## 2018-07-15 RX ADMIN — SODIUM CHLORIDE: 9 INJECTION, SOLUTION INTRAVENOUS at 05:49

## 2018-07-15 RX ADMIN — GABAPENTIN 300 MG: 300 CAPSULE ORAL at 05:49

## 2018-07-15 RX ADMIN — VANCOMYCIN HYDROCHLORIDE 125 MG: 10 INJECTION, POWDER, LYOPHILIZED, FOR SOLUTION INTRAVENOUS at 16:47

## 2018-07-15 RX ADMIN — LACTOBACILLUS ACIDOPHILUS / LACTOBACILLUS BULGARICUS 1 PACKET: 100 MILLION CFU STRENGTH GRANULES at 09:20

## 2018-07-15 RX ADMIN — OXYCODONE HYDROCHLORIDE 5 MG: 5 TABLET ORAL at 05:49

## 2018-07-15 ASSESSMENT — PAIN SCALES - GENERAL
PAINLEVEL_OUTOF10: 8
PAINLEVEL_OUTOF10: 9
PAINLEVEL_OUTOF10: 2

## 2018-07-15 NOTE — PROGRESS NOTES
"/55   Pulse 66   Temp 36.7 °C (98 °F)   Resp 16   Ht 1.727 m (5' 7.99\")   Wt 63.3 kg (139 lb 8.8 oz)   SpO2 97%   BMI 21.22 kg/m²     Patient is A&Ox4.   Denies pain.   REESE, CMS intact, reports baseline numbness and tingling.   Mobilizes with x 1 assist and a cane, calls appropriately.   On RA, denies SOB, chest pain.   Normoactive BS x 4. Tolerating diet. Denies nausea/vomiting.   + flatus, + BM (multiple loose stools), pt is voiding.   PIV running IVF   Special contact isolation in place.   Updated on POC. Belongings and call light within reach. All needs met at this time.   "

## 2018-07-15 NOTE — DISCHARGE PLANNING
Received Choice form at 0805  Agency/Facility Name: [facility]   Referral sent per Choice form at 0810.  Choice obtained be [facility]

## 2018-07-15 NOTE — PROGRESS NOTES
Bedside report received.  Assessment complete.  A&O x 4. Patient calls appropriately.  Patient up  with x1 assist with cane from home..   Patient has 2/10 pain. Currently sleeping  Denies N&V. Tolerating chopped diet..  + void, + flatus. Pt stating some stools have been formed, some diarrhea  Patient denies SOB.  SCD's refused by pt despite education.  Patient pleasant with staff, has been seen by multiple MDs this morning already. Awaiting approval from previous home health care in order to go home as well insurance approval for oral vanco. Other than that, pt is relatively stable, however fatigued.   Review plan with of care with patient. Call light and personal belongings with in reach. Hourly rounding in place. All needs met at this time.

## 2018-07-15 NOTE — PROGRESS NOTES
[facility] [place] [facility] [place]     Attending: Merced    Resident: Sara    PATIENT: Marcelo Colon; [id number]; 1940    ID: 77 y.o. 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 Stools have still been somewhat loose, but have firmed up somewhat. He feels well and would like to go home. He denies melena or hematochezia. He is concerned with a rash on his penis.    OBJECTIVE:     Vitals:    07/14/18 1610 07/14/18 2000 07/14/18 2335 07/15/18 0400   BP: 125/62 117/55 125/66 121/62   Pulse: (!) 51 66 60 68   Resp: 16 16 16 16   Temp: 37.4 °C (99.3 °F) 36.7 °C (98 °F) 36.1 °C (97 °F) 36.6 °C (97.8 °F)   SpO2: 96% 97% 96% 96%   Weight:  63.3 kg (139 lb 8.8 oz)  61.8 kg (136 lb 3.9 oz)   Height:           Intake/Output Summary (Last 24 hours) at 07/15/18 0844  Last data filed at 07/15/18 0400   Gross per 24 hour   Intake             3580 ml   Output             1250 ml   Net             2330 ml       PE:  General: No acute distress, resting comfortably in bed.  HEENT: NC/AT. EOMI. MMM  Cardiovascular: RRR with no M/R/G.  Respiratory: Symmetrical chest. CTAB with no W/R/R  Abdomen: soft, NT/ND, no masses   EXT:  REESE, No C/C/E  Neuro: non focal with no numbness, tingling or changes in sensation    LABS:  Recent Labs      07/12/18   2109  07/13/18   0325  07/14/18   0348  07/15/18   0436   WBC  12.4*  11.9*  9.1  8.4   RBC  4.59*  4.21*  4.04*  3.83*   HEMOGLOBIN  12.1*  11.1*  10.5*  10.0*   HEMATOCRIT  37.8*  34.7*  33.9*  31.9*   MCV  82.4  82.4  83.9  83.3   MCH  26.4*  26.4*  26.0*  26.1*   RDW  56.7*  56.4*  59.8*  58.4*   PLATELETCT  221  189  189  181   MPV  9.0  9.2  8.8*  8.9*   NEUTSPOLYS  80.10*  73.10*   --    --    LYMPHOCYTES  11.20*  16.50*   --    --    MONOCYTES  7.40  8.80   --    --    EOSINOPHILS  0.50  0.80   --    --    BASOPHILS  0.20  0.20   --    --      Recent Labs      07/13/18   0325  07/14/18   0348  07/15/18   0436    SODIUM  138  139  138   POTASSIUM  3.5*  3.5*  3.8   CHLORIDE  107  113*  112   CO2  24  21  22   BUN  14  6*  11   CREATININE  1.04  0.70  0.77   CALCIUM  8.0*  7.6*  7.6*   ALBUMIN  3.0*  2.7*  2.8*     Estimated GFR/CRCL = Estimated Creatinine Clearance: 71.3 mL/min (by C-G formula based on SCr of 0.77 mg/dL).  Recent Labs      07/13/18   0325  07/14/18   0348  07/15/18   0436   GLUCOSE  121*  105*  103*     Recent Labs      07/13/18 0325 07/14/18 0348  07/15/18   0436   ASTSGOT  7*  6*  5*   ALTSGPT  <5  <5  <5   TBILIRUBIN  0.5  0.4  0.5   ALKPHOSPHAT  62  46  45   GLOBULIN  2.1  1.8*  1.4*               Invalid input(s): [id number]  No results for input(s): INR, APTT, FIBRINOGEN in the last 72 hours.    Invalid input(s): DIMER    MICROBIOLOGY:   Results     Procedure Component Value Units Date/Time    URINALYSIS [[id number]] Collected:  07/14/18 0100    Order Status:  Completed Specimen:  Urine from Urine, Clean Catch Updated:  07/14/18 0146     Color Yellow     Character Clear     Specific Gravity 1.015     Ph 5.5     Glucose Negative mg/dL      Ketones Negative mg/dL      Protein Negative mg/dL      Bilirubin Negative     Urobilinogen, Urine 0.2     Nitrite Negative     Leukocyte Esterase Negative     Occult Blood Negative     Micro Urine Req see below     Comment: Microscopic examination not performed when specimen is clear  and chemically negative for protein, blood, leukocyte esterase  and nitrite.         Narrative:       Special Contact [id number] JOELLE EASTON E.  Indication for culture:->Dysuria/Frequency/Burning    URINE CULTURE(NEW) [[id number]] Collected:  07/14/18 0100    Order Status:  Completed Specimen:  Urine from Urine, Clean Catch Updated:  07/14/18 0135    Narrative:       Special Contact [id number] JOELLE EASTON E.  Indication for culture:->Dysuria/Frequency/Burning    URINE CULTURE(NEW) [[id number]] Collected:  07/14/18 0100    Order Status:  Canceled Specimen:  Other  from Urine, Clean Catch     C DIFF TOXIN [[id number]]  (Abnormal) Collected:  07/13/18 1225    Order Status:  Completed Updated:  07/13/18 2306     C.Diff Toxin A&B Positive (A)     Comment: TOXIN POSITIVE  Toxin detected by EIA; C. difficile detected by PCR.  If clinically correlated, treatment indicated per guidelines.  Test of cure is not recommended.         Narrative:       141 tel. [phone] 07/13/2018, 23:05, RB PERF. RESULTS CALLED TO:[id number],  faxed [facility].  Special Contact [id number] ISHMAEL BOWMAN.  Does this patient have risk factors for C-diff?->Yes    C Diff by PCR rflx Toxin [[id number]] Collected:  07/13/18 1225    Order Status:  Completed Specimen:  Stool from Stool Updated:  07/13/18 2301     C Diff by PCR See Toxin     027-NAP1-BI Presumptive POSITIVE     Comment: Presumptive 027/NAP1/BI target DNA sequences are DETECTED.       Narrative:       Special Contact [id number] ISHMAEL BOWMAN.  Does this patient have risk factors for C-diff?->Yes    URINE CULTURE(NEW) [[id number]]     Order Status:  Canceled Specimen:  Urine     URINALYSIS [[id number]]  (Abnormal) Collected:  07/12/18 2300    Order Status:  Completed Updated:  07/12/18 2338     Color DK Yellow     Character Clear     Specific Gravity 1.031     Ph 5.5     Glucose Negative mg/dL      Ketones Trace (A) mg/dL      Protein Negative mg/dL      Bilirubin Negative     Urobilinogen, Urine 0.2     Nitrite Negative     Leukocyte Esterase Trace (A)     Occult Blood Small (A)     Micro Urine Req Microscopic    Urinalysis [[id number]] Collected:  07/12/18 2300    Order Status:  Canceled Specimen:  Urine from Urine, Clean Catch           IMAGING:   No orders to display       MEDS:  Current Facility-Administered Medications   Medication Last Dose   • nystatin/triamcinolone (MYCOLOG) 139613-0.1 UNIT/GM-% cream     • ferrous sulfate tablet 325 mg 325 mg at 07/14/18 0558   • gabapentin (NEURONTIN) capsule 300 mg 300 mg at 07/15/18 0549   •  lactobacillus granules (LACTINEX/FLORANEX) packet 1 Packet 1 Packet at 07/14/18 1707   • lamoTRIgine (LAMICTAL) tablet 200 mg 200 mg at 07/15/18 0549   • oxyCODONE immediate-release (ROXICODONE) tablet 5 mg 5 mg at 07/15/18 0549   • potassium chloride SA (Kdur) tablet 20 mEq 20 mEq at 07/15/18 0549   • tamsulosin (FLOMAX) capsule 0.4 mg 0.4 mg at 07/15/18 0549   • NS infusion     • enoxaparin (LOVENOX) inj 40 mg 40 mg at 07/13/18 0540   • labetalol (NORMODYNE,TRANDATE) injection 10 mg     • vancomycin 50 mg/mL oral soln 125 mg 125 mg at 07/15/18 0549    Followed by   • [START ON 7/26/2018] vancomycin 50 mg/mL oral soln 125 mg      Followed by   • [START ON 8/3/2018] vancomycin 50 mg/mL oral soln 125 mg      Followed by   • [START ON 8/10/2018] vancomycin 50 mg/mL oral soln 125 mg      Followed by   • [START ON 8/18/2018] vancomycin 50 mg/mL oral soln 125 mg     • bacitracin ointment     • famotidine (PEPCID) tablet 20 mg 20 mg at 07/14/18 2120       PROBLEM LIST:  No problems updated.    ASSESSMENT/PLAN:78 yo male with hx of sigmoid bowel resection and recurrent C. Diff      #Recurrent C. Diff  C. diff infections x3  Improving  Followed by GI Consultants  PO vancomycin  Disscussed with GI consultants. They are aware of patient and will call him early next week              -recommended 2 week course oral vancomycin              -will not take back now for fecal transplant as patient is non-toxic              -did not recommend adding rifaximin to PO vanco    Plan  -Continue PO vancomycin and continue for 2 week course with taper until transplant  -GI consultant follow-up and stool transplant as outpatient     # Anemia  Chronic, but somewhat worse since admission  May be partially due to hemodilution vs GI bleed  Overall down to 10.0 from 12.4 on admission    Plan  -CTM H&H  -Discuss with GI    #s/p Carpal Tunnel Release  was performed by Dr. Gonzalez              - staples removed 7/14    Plan  -f/u outpatient as  scheduled     #Dementia  currently not on treatment  AOx3 on exam  May factor into care as an outpatient with medication administration     Plan  - follow-up outpatient     #Chronic Conditions  BPD - cont lamictal  BPH - cont tamsulosin  GERD - famotidine while inpatient     Dispo: Med/Surg for antibiotics and close monitoring  PCP: Luke  Code Status: DNR/DNI     Core Measures:  Lines: PIV  Abx: PO Vancomycin  IVF: NS @ 125 cc/hr

## 2018-07-15 NOTE — CARE PLAN
Problem: Safety  Goal: Will remain free from injury  Outcome: PROGRESSING AS EXPECTED  Safety precautions in place. Bed in locked/low position. 2 side rails up. Treaded socks. Call light in reach, calls appropriately. Hourly rounding practiced.    Problem: Skin Integrity  Goal: Risk for impaired skin integrity will decrease  Outcome: PROGRESSING AS EXPECTED  Barrier cream and Nystatin cream applied to sacrum. Pt does mobilize out of bed.

## 2018-07-15 NOTE — CARE PLAN
Problem: Infection  Goal: Will remain free from infection  Outcome: PROGRESSING AS EXPECTED  Maintain contact precautions for active cdiff infx    Problem: Pain Management  Goal: Pain level will decrease to patient's comfort goal  Outcome: PROGRESSING AS EXPECTED  Give PRN meds for pain if pt requests. Provide distraction

## 2018-07-16 LAB
ANION GAP SERPL CALC-SCNC: 5 MMOL/L (ref 0–11.9)
APPEARANCE UR: CLEAR
BACTERIA #/AREA URNS HPF: NEGATIVE /HPF
BACTERIA UR CULT: ABNORMAL
BACTERIA UR CULT: ABNORMAL
BASOPHILS # BLD AUTO: 0.3 % (ref 0–1.8)
BASOPHILS # BLD: 0.02 K/UL (ref 0–0.12)
BILIRUB UR QL STRIP.AUTO: NEGATIVE
BUN SERPL-MCNC: 13 MG/DL (ref 8–22)
CALCIUM SERPL-MCNC: 8.3 MG/DL (ref 8.5–10.5)
CHLORIDE SERPL-SCNC: 111 MMOL/L (ref 96–112)
CO2 SERPL-SCNC: 26 MMOL/L (ref 20–33)
COLOR UR: YELLOW
CREAT SERPL-MCNC: 0.75 MG/DL (ref 0.5–1.4)
EOSINOPHIL # BLD AUTO: 0.14 K/UL (ref 0–0.51)
EOSINOPHIL NFR BLD: 2 % (ref 0–6.9)
EPI CELLS #/AREA URNS HPF: NEGATIVE /HPF
ERYTHROCYTE [DISTWIDTH] IN BLOOD BY AUTOMATED COUNT: 57.7 FL (ref 35.9–50)
GLUCOSE SERPL-MCNC: 104 MG/DL (ref 65–99)
GLUCOSE UR STRIP.AUTO-MCNC: NEGATIVE MG/DL
HCT VFR BLD AUTO: 32 % (ref 42–52)
HGB BLD-MCNC: 10 G/DL (ref 14–18)
HYALINE CASTS #/AREA URNS LPF: ABNORMAL /LPF
IMM GRANULOCYTES # BLD AUTO: 0.05 K/UL (ref 0–0.11)
IMM GRANULOCYTES NFR BLD AUTO: 0.7 % (ref 0–0.9)
KETONES UR STRIP.AUTO-MCNC: NEGATIVE MG/DL
LEUKOCYTE ESTERASE UR QL STRIP.AUTO: ABNORMAL
LYMPHOCYTES # BLD AUTO: 1.77 K/UL (ref 1–4.8)
LYMPHOCYTES NFR BLD: 24.8 % (ref 22–41)
MCH RBC QN AUTO: 25.8 PG (ref 27–33)
MCHC RBC AUTO-ENTMCNC: 31.3 G/DL (ref 33.7–35.3)
MCV RBC AUTO: 82.7 FL (ref 81.4–97.8)
MICRO URNS: ABNORMAL
MONOCYTES # BLD AUTO: 0.49 K/UL (ref 0–0.85)
MONOCYTES NFR BLD AUTO: 6.9 % (ref 0–13.4)
NEUTROPHILS # BLD AUTO: 4.68 K/UL (ref 1.82–7.42)
NEUTROPHILS NFR BLD: 65.3 % (ref 44–72)
NITRITE UR QL STRIP.AUTO: NEGATIVE
NRBC # BLD AUTO: 0 K/UL
NRBC BLD-RTO: 0 /100 WBC
PH UR STRIP.AUTO: 5 [PH]
PLATELET # BLD AUTO: 208 K/UL (ref 164–446)
PMV BLD AUTO: 8.9 FL (ref 9–12.9)
POTASSIUM SERPL-SCNC: 4.1 MMOL/L (ref 3.6–5.5)
PROT UR QL STRIP: NEGATIVE MG/DL
RBC # BLD AUTO: 3.87 M/UL (ref 4.7–6.1)
RBC # URNS HPF: >150 /HPF
RBC UR QL AUTO: ABNORMAL
SIGNIFICANT IND 70042: ABNORMAL
SITE SITE: ABNORMAL
SODIUM SERPL-SCNC: 142 MMOL/L (ref 135–145)
SOURCE SOURCE: ABNORMAL
SP GR UR STRIP.AUTO: 1.02
UROBILINOGEN UR STRIP.AUTO-MCNC: 0.2 MG/DL
WBC # BLD AUTO: 7.2 K/UL (ref 4.8–10.8)
WBC #/AREA URNS HPF: ABNORMAL /HPF

## 2018-07-16 PROCEDURE — 81001 URINALYSIS AUTO W/SCOPE: CPT

## 2018-07-16 PROCEDURE — 80048 BASIC METABOLIC PNL TOTAL CA: CPT

## 2018-07-16 PROCEDURE — G8987 SELF CARE CURRENT STATUS: HCPCS | Mod: CI

## 2018-07-16 PROCEDURE — 700111 HCHG RX REV CODE 636 W/ 250 OVERRIDE (IP): Performed by: ORTHOPAEDIC SURGERY

## 2018-07-16 PROCEDURE — G8988 SELF CARE GOAL STATUS: HCPCS | Mod: CI

## 2018-07-16 PROCEDURE — 36415 COLL VENOUS BLD VENIPUNCTURE: CPT

## 2018-07-16 PROCEDURE — 770021 HCHG ROOM/CARE - ISO PRIVATE

## 2018-07-16 PROCEDURE — A9270 NON-COVERED ITEM OR SERVICE: HCPCS | Performed by: FAMILY MEDICINE

## 2018-07-16 PROCEDURE — 700102 HCHG RX REV CODE 250 W/ 637 OVERRIDE(OP): Performed by: STUDENT IN AN ORGANIZED HEALTH CARE EDUCATION/TRAINING PROGRAM

## 2018-07-16 PROCEDURE — A9270 NON-COVERED ITEM OR SERVICE: HCPCS | Performed by: STUDENT IN AN ORGANIZED HEALTH CARE EDUCATION/TRAINING PROGRAM

## 2018-07-16 PROCEDURE — 97165 OT EVAL LOW COMPLEX 30 MIN: CPT

## 2018-07-16 PROCEDURE — 87086 URINE CULTURE/COLONY COUNT: CPT

## 2018-07-16 PROCEDURE — 85025 COMPLETE CBC W/AUTO DIFF WBC: CPT

## 2018-07-16 PROCEDURE — 700102 HCHG RX REV CODE 250 W/ 637 OVERRIDE(OP): Performed by: FAMILY MEDICINE

## 2018-07-16 RX ORDER — OXYCODONE HYDROCHLORIDE 5 MG/1
TABLET ORAL
Status: COMPLETED
Start: 2018-07-16 | End: 2018-07-16

## 2018-07-16 RX ADMIN — FAMOTIDINE 20 MG: 20 TABLET ORAL at 18:12

## 2018-07-16 RX ADMIN — LAMOTRIGINE 200 MG: 100 TABLET ORAL at 06:31

## 2018-07-16 RX ADMIN — CEFAZOLIN SODIUM 1 G: 1 INJECTION, SOLUTION INTRAVENOUS at 22:27

## 2018-07-16 RX ADMIN — GABAPENTIN 300 MG: 300 CAPSULE ORAL at 18:12

## 2018-07-16 RX ADMIN — LACTOBACILLUS ACIDOPHILUS / LACTOBACILLUS BULGARICUS 1 PACKET: 100 MILLION CFU STRENGTH GRANULES at 08:54

## 2018-07-16 RX ADMIN — TAMSULOSIN HYDROCHLORIDE 0.4 MG: 0.4 CAPSULE ORAL at 06:31

## 2018-07-16 RX ADMIN — NYSTATIN AND TRIAMCINOLONE ACETONIDE: 100000; 1 CREAM TOPICAL at 06:31

## 2018-07-16 RX ADMIN — OXYCODONE HYDROCHLORIDE 5 MG: 5 TABLET ORAL at 08:54

## 2018-07-16 RX ADMIN — CEFAZOLIN SODIUM 1 G: 1 INJECTION, SOLUTION INTRAVENOUS at 06:31

## 2018-07-16 RX ADMIN — BACITRACIN ZINC: 500 OINTMENT TOPICAL at 06:31

## 2018-07-16 RX ADMIN — VANCOMYCIN HYDROCHLORIDE 125 MG: 10 INJECTION, POWDER, LYOPHILIZED, FOR SOLUTION INTRAVENOUS at 18:12

## 2018-07-16 RX ADMIN — OXYCODONE HYDROCHLORIDE 5 MG: 5 TABLET ORAL at 16:31

## 2018-07-16 RX ADMIN — LACTOBACILLUS ACIDOPHILUS / LACTOBACILLUS BULGARICUS 1 PACKET: 100 MILLION CFU STRENGTH GRANULES at 13:48

## 2018-07-16 RX ADMIN — LACTOBACILLUS ACIDOPHILUS / LACTOBACILLUS BULGARICUS 1 PACKET: 100 MILLION CFU STRENGTH GRANULES at 18:13

## 2018-07-16 RX ADMIN — GABAPENTIN 300 MG: 300 CAPSULE ORAL at 13:47

## 2018-07-16 RX ADMIN — VANCOMYCIN HYDROCHLORIDE 125 MG: 10 INJECTION, POWDER, LYOPHILIZED, FOR SOLUTION INTRAVENOUS at 06:31

## 2018-07-16 RX ADMIN — GABAPENTIN 300 MG: 300 CAPSULE ORAL at 06:32

## 2018-07-16 RX ADMIN — VANCOMYCIN HYDROCHLORIDE 125 MG: 10 INJECTION, POWDER, LYOPHILIZED, FOR SOLUTION INTRAVENOUS at 13:47

## 2018-07-16 RX ADMIN — CEFAZOLIN SODIUM 1 G: 1 INJECTION, SOLUTION INTRAVENOUS at 13:48

## 2018-07-16 RX ADMIN — POTASSIUM CHLORIDE 20 MEQ: 1500 TABLET, EXTENDED RELEASE ORAL at 06:32

## 2018-07-16 RX ADMIN — OXYCODONE HYDROCHLORIDE 5 MG: 5 TABLET ORAL at 02:49

## 2018-07-16 RX ADMIN — OXYCODONE HYDROCHLORIDE 5 MG: 5 TABLET ORAL at 22:27

## 2018-07-16 ASSESSMENT — PAIN SCALES - GENERAL
PAINLEVEL_OUTOF10: 7
PAINLEVEL_OUTOF10: 8
PAINLEVEL_OUTOF10: 7
PAINLEVEL_OUTOF10: 7
PAINLEVEL_OUTOF10: 3
PAINLEVEL_OUTOF10: 0

## 2018-07-16 ASSESSMENT — COGNITIVE AND FUNCTIONAL STATUS - GENERAL
SUGGESTED CMS G CODE MODIFIER DAILY ACTIVITY: CJ
DRESSING REGULAR LOWER BODY CLOTHING: A LITTLE
DAILY ACTIVITIY SCORE: 22
HELP NEEDED FOR BATHING: A LITTLE

## 2018-07-16 ASSESSMENT — ACTIVITIES OF DAILY LIVING (ADL): TOILETING: INDEPENDENT

## 2018-07-16 NOTE — PROGRESS NOTES
"/64   Pulse 65   Temp 37.2 °C (99 °F)   Resp 18   Ht 1.727 m (5' 7.99\")   Wt 61.8 kg (136 lb 3.9 oz)   SpO2 96%   BMI 20.72 kg/m²     Patient is A&Ox4.   Reports pain to abdomen, medicated per MAR  Generalized weakness, REESE, CMS intact, reports baseline numbness and tingling to BUE, BLE.   Mobilizes with SBA and his cane, educated to call for assistance.   On RA, denies SOB, chest pain.   Normoactive BS x 4. Tolerating diet. Denies nausea/vomiting.   + flatus, + BM (loose, dark colored). Pt is voiding.   Noted redness and swelling to left elbow.   PIV SL.   Updated on POC. Belongings and call light within reach. All needs met at this time.   "

## 2018-07-16 NOTE — DOCUMENTATION QUERY
"DOCUMENTATION QUERY    PROVIDERS: Please select “Cosign w/ note”to reply to query.    To better represent the severity of illness of your patient, please review the following information and exercise your independent professional judgment in responding to this query.     \"Sepsis\" is documented in the History and Physical but is no longer being documented in the Progress Notes. Based upon the clinical findings, risk factors, and treatment, please clarify if sepsis is ruled in/out or has resolved.    • Sepsis has resolved  • Sepsis has been ruled in  • Sepsis has been ruled out  • Other explanation of clinical findings  • Unable to determine    The medical record reflects the following:   Clinical Findings 7/12 - 7/13 Vital Sign Range (within 12 hours of admit) - BP: 93/46 - 130/71; T: 97.7 -  99.7; P: 60 - 86; RR: 17 - 18  7/12 WBC's 12.4   7/12 H&P: \"Sepsis syndrome --- elevated wbc, hypotension\", \"acute enteritis/diarrhea\", \"hypotension\", \"pyuria\" is documented.  7/13 Stool Culture: Toxin positive - C. Difficile detected by PCR   7/14 Urine Culture: Positive - klebsiella pneumoniae   Treatment IVNS; ancef; vancomycin; lab testing   Risk Factors C difficile; surgical site infection; pyuria   Location within medical record History and Physical, Lab Results, Vitals Flowsheet and MAR     Thank you,   Teagan Pratt RN  Clinical   [phone]          "

## 2018-07-16 NOTE — NON-PROVIDER
[facility] – [place] FAMILY MEDICINE PROGRESS NOTE     Attending:   Nick Key M.D.    Resident:   Letha Hillman M.D.    PATIENT:   Marcelo Colon; [id number]; 1940    ID:   77 y.o. male admitted for hypotension and suspected C. difficile infection.    SUBJECTIVE:   No acute events overnight. Patient reports feeling less abdominal pain and reduced bloating. He is producing firmer stools, but still has diarrhea. Denies any blood in stool, although he has noted his stool to be dark-colored or black. Has been refusing iron sulfate treatment because he believes it is causing his stool to appear black in color.    Patient also complains of finding blood in urine when he attempted to void this morning. He says it was excruciating to urinate and has been avoiding urination out of concern for the blood.    OBJECTIVE:  Vitals:    07/15/18 1523 07/15/18 2015 07/16/18 0245 07/16/18 0807   BP: 101/51 145/64 149/66 (!) 99/48   Pulse: 64 65 71 60   Resp: 17 18 18 15   Temp: 36.9 °C (98.5 °F) 37.2 °C (99 °F) 37.1 °C (98.8 °F) 37.1 °C (98.8 °F)   SpO2: 94% 96% 96% 89%   Weight:       Height:           Intake/Output Summary (Last 24 hours) at 07/16/18 1315  Last data filed at 07/16/18 1200   Gross per 24 hour   Intake              960 ml   Output             1700 ml   Net             -740 ml       PHYSICAL EXAM:  General: No acute distress, afebrile, resting comfortably  HEENT: NC/AT. EOMI. MMM, neck supple without adenopathy  Cardiovascular: RRR, normal S1/S2 no murmurs rubs, or gallops, cap refill brisk.  Respiratory: CTAB, no tachypnea or retractions  Abdomen: Healed surgical incision along midline; slightly distended in both lower abdominal quadrants; no abdominal pain; bowel sounds present  EXT:  Healing surgical incision on left elbow; erythematous and warm; nontender; no purulent exudate. Pulses 2+ DP and radial bilaterally  : Small lesion on head of penis near the meatus; does not appear swollen, no purulent exudate; urine sample  demonstrated a small amount of blood present      LABS:  Recent Labs      07/14/18   0348  07/15/18   0436  07/16/18   0226   WBC  9.1  8.4  7.2   RBC  4.04*  3.83*  3.87*   HEMOGLOBIN  10.5*  10.0*  10.0*   HEMATOCRIT  33.9*  31.9*  32.0*   MCV  83.9  83.3  82.7   MCH  26.0*  26.1*  25.8*   RDW  59.8*  58.4*  57.7*   PLATELETCT  189  181  208   MPV  8.8*  8.9*  8.9*   NEUTSPOLYS   --    --   65.30   LYMPHOCYTES   --    --   24.80   MONOCYTES   --    --   6.90   EOSINOPHILS   --    --   2.00   BASOPHILS   --    --   0.30     Recent Labs      07/14/18   0348  07/15/18   0436  07/16/18   0226   SODIUM  139  138  142   POTASSIUM  3.5*  3.8  4.1   CHLORIDE  113*  112  111   CO2  21  22  26   BUN  6*  11  13   CREATININE  0.70  0.77  0.75   CALCIUM  7.6*  7.6*  8.3*   ALBUMIN  2.7*  2.8*   --      Estimated GFR/CRCL = Estimated Creatinine Clearance: 73.2 mL/min (by C-G formula based on SCr of 0.75 mg/dL).  Recent Labs      07/14/18   0348  07/15/18   0436  07/16/18   0226   GLUCOSE  105*  103*  104*     Recent Labs      07/14/18   0348  07/15/18   0436   ASTSGOT  6*  5*   ALTSGPT  <5  <5   TBILIRUBIN  0.4  0.5   ALKPHOSPHAT  46  45   GLOBULIN  1.8*  1.4*       IMAGING:  No imaging performed in the last 48 hours.    MEDS:  Current Facility-Administered Medications   Medication Last Dose   • ceFAZolin in D5W (ANCEF) IVPB premix 1 g 1 g at 07/16/18 0631   • nystatin/triamcinolone (MYCOLOG) 977271-4.1 UNIT/GM-% cream     • ferrous sulfate tablet 325 mg 325 mg at 07/14/18 0558   • gabapentin (NEURONTIN) capsule 300 mg 300 mg at 07/16/18 0632   • lactobacillus granules (LACTINEX/FLORANEX) packet 1 Packet 1 Packet at 07/16/18 0854   • lamoTRIgine (LAMICTAL) tablet 200 mg 200 mg at 07/16/18 0631   • oxyCODONE immediate-release (ROXICODONE) tablet 5 mg 5 mg at 07/16/18 0854   • potassium chloride SA (Kdur) tablet 20 mEq 20 mEq at 07/16/18 0632   • tamsulosin (FLOMAX) capsule 0.4 mg 0.4 mg at 07/16/18 0631   • enoxaparin (LOVENOX)  inj 40 mg 40 mg at 07/13/18 0540   • labetalol (NORMODYNE,TRANDATE) injection 10 mg     • vancomycin 50 mg/mL oral soln 125 mg 125 mg at 07/16/18 0631    Followed by   • [START ON 7/26/2018] vancomycin 50 mg/mL oral soln 125 mg      Followed by   • [START ON 8/3/2018] vancomycin 50 mg/mL oral soln 125 mg      Followed by   • [START ON 8/10/2018] vancomycin 50 mg/mL oral soln 125 mg      Followed by   • [START ON 8/18/2018] vancomycin 50 mg/mL oral soln 125 mg     • bacitracin ointment     • famotidine (PEPCID) tablet 20 mg 20 mg at 07/15/18 2019       ASSESSMENT/PLAN:  76yo M with history of repeated C. difficile infections and confirmed current C. difficile infection; infected surgical incision on elbow; anemia; and hematuria. The patient appears to be on the mend, but will need to stay at least one more night in the hospital while receiving IV antibiotics for his infected skin lesion on his elbow.    # C. difficile Infection   -Oral vancomycin (125 mg, PO, q6hr) for 2 weeks   -Patient has a stool transplant scheduled for September; we are communicating with GI to see if we can possibly move up the procedure date   -Monitor patient's blood pressure and metabolic panels, as the patient was presenting with hypotension and hypokalemia on admisssion    # Infected Elbow Wound   -Apply bacitracin ointment   -Add IV cefazolin (1 g, IV, q8hr)   -Shower/wash infection site PRN   -Monitor wound for signs of infection spread   -Follow up with Dr. Gonzalez over further treatment    #Anemia   -Repeat H&H for duration of patient's stay in hospital   -Add iron sulfate (325 mg, PO, qday); after discussing the need for iron supplementation in treatment of anemia, the patient agreed to take the supplement and had no further concerns regarding the medication    #Hematuria   -Repeat urinalysis   -Monitor patient's status; encourage patient to void as necessary with no restrictions and notify hospital staff of further  bleeding    #Chronic Conditions   -Continue on medications the patient has already been prescribed/taking

## 2018-07-16 NOTE — DOCUMENTATION QUERY
"DOCUMENTATION QUERY    PROVIDERS: Please select “Cosign w/ note”to reply to query.    To better represent the severity of illness of your patient, please review the following information and exercise your independent professional judgment in responding to this query. Please reference the information at the bottom of this query for clinical criteria to support malnutrition.    \"Protein calorie malnutrition\" is documented in the History and Physical. Based upon the clinical findings, risk factors, and treatment, can this diagnosis be further specified?     • Mild Protein Calorie Malnutrition  • Moderate Protein Calorie Malnutrition  • Severe Protein Calorie Malnutrition  • Other explanation of clinical findings  • Unable to determine    The medical record reflects the following:   Clinical Findings 7/12 Admit Wt: 126 lb 6 oz; Ht: 5' 7.99\"; BMI: 19.22  7/12 H&P: \"Protein calorie malnutrition --- albumin 3.0; evidence of muscle wasting\" is documented.  7/15 Sitting Scale Wt: 136 lb 3.9 oz; BMI: 20.72  7/16 Progress Note: \"cachectic\" is documented   Treatment Dietary consult   Risk Factors C-diff infection; diarrhea, GERD; abdominal pain   Location within medical record History and Physical, Lab Results and Dietary Note     Thank you,  Teagan Pratt RN  Clinical   [phone]             "

## 2018-07-16 NOTE — DISCHARGE PLANNING
Agency/Facility Name: [facility]   Spoke To: Carmella   Outcome: Patient has been accepted.     BART Mcgregor has been notified.

## 2018-07-16 NOTE — CARE PLAN
Problem: Pain Management  Goal: Pain level will decrease to patient's comfort goal  Outcome: PROGRESSING AS EXPECTED    Intervention: Follow pain managment plan developed in collaboration with patient and Interdisciplinary Team  Patient has cramping pain in abdomen. PO pain medication PRN available. Patient also has gabapentin on board      Problem: Skin Integrity  Goal: Risk for impaired skin integrity will decrease  Outcome: PROGRESSING AS EXPECTED    Intervention: Assess risk factors for impaired skin integrity and/or pressure ulcers  Patient is incontinent at times due to urgency  Intervention: Implement precautions to protect skin integrity in collaboration with the interdisciplinary team  PT/OT involved. Barrier wipes and creams being used. Cleaning up regularly to avoid skin breakdown.

## 2018-07-16 NOTE — PROGRESS NOTES
NAC overnight, no new c/o, pain well controlled    AF/VSS  Cubital tunnel wound continues with erythema and mild sub Q adema  No fluctuance at either the wrist or elbow wounds  Wrist wound well healing without signs of infection     Assessment: Left cubital and carpal tunnel syndrome now s/p releases.  Concern for possible early post op wound infection.      Recurrent C-diff infection     Plan: Will add IV ancef to his regimen to help with left elbow surgical site infection  May shower and wash surgical sites.  No lifting restrictions.  Will continue to follow

## 2018-07-16 NOTE — PROGRESS NOTES
Refuses BA, educated on risk to fall, verbalizes understanding and still declines. Call light in reach, calls appropriately for assistance.

## 2018-07-16 NOTE — PROGRESS NOTES
Bedside report received.  Assessment complete.  A&O x 4. Patient calls appropriately.  Patient up with stand by assist.   Patient has 7/10 pain. Pain medication given  Denies N&V. Tolerating regular diet.  + void, + flatus  Patient denies SOB.  Patient had episode of bloody urine, will notify [facility].  Review plan with of care with patient. Call light and personal belongings with in reach. Hourly rounding in place. All needs met at this time.

## 2018-07-16 NOTE — CARE PLAN
Problem: Pain Management  Goal: Pain level will decrease to patient's comfort goal  Outcome: PROGRESSING AS EXPECTED  Oxy 5 mg PRN administered for pain management     Problem: Mobility  Goal: Risk for activity intolerance will decrease  Outcome: PROGRESSING AS EXPECTED  Pt mobilizing to bathroom with SBA and cane.

## 2018-07-16 NOTE — THERAPY
"Occupational Therapy Evaluation completed.   Functional Status:  Supervision supine to sit.  SBA UB/LB dressing.  Pt walked in room without AD supervised.  Pt wearing briefs and reported he was soiled but refusing to shower or clean up/change briefs with OT or CNA until he sees a doctor today (pt concerned with blood in urine earlier).  Pt reports no concerns with self-care at this time but would like to resume HH services at [facility].  Plan of Care: Patient with no further skilled OT needs in the acute care setting at this time  Discharge Recommendations:  Equipment: No Equipment Needed. Home Health Therapy.    See \"Rehab Therapy-Acute\" Patient Summary Report for complete documentation.    "

## 2018-07-16 NOTE — PROGRESS NOTES
[facility] [place] FAMILY MEDICINE PROGRESS NOTE     Attending:   Yesi    Resident:   Demetrius Owen M.D.    PATIENT:   Marcelo Colon; [id number]; 1940    ID:   77 y.o. male admitted for recurrent C. Diff x4 and surgical site infection.    SUBJECTIVE:   No acute events overnight, pt continues to have multiple loose stools. Tolerated PO well yesterday after stopping IVF. Denies any nausea, vomiting, stomach pain, tarry stool, fever, chills.     OBJECTIVE:  Vitals:    07/15/18 0747 07/15/18 1523 07/15/18 2015 07/16/18 0245   BP: 119/58 101/51 145/64 149/66   Pulse: 69 64 65 71   Resp: 17 17 18 18   Temp: 36.6 °C (97.8 °F) 36.9 °C (98.5 °F) 37.2 °C (99 °F) 37.1 °C (98.8 °F)   SpO2: 96% 94% 96% 96%   Weight:       Height:           Intake/Output Summary (Last 24 hours) at 07/16/18 0746  Last data filed at 07/16/18 0639   Gross per 24 hour   Intake             1700 ml   Output             2750 ml   Net            -1050 ml       PHYSICAL EXAM:  General: No acute distress, afebrile, resting comfortably. Cachectic  HEENT: NC/AT. EOMI. MMM  Cardiovascular: RRR, normal S1/S2 no murmurs rubs, or gallops, cap refill brisk.  Respiratory: CTAB, no tachypnea or retractions  Abdomen: soft, NT, mildly distended in lower quadrants-stable, no rebound, no guarding  EXT:  No rashes or skin changes noted. Pulses 2+ DP and radial bilaterally, L upper extremity now erythematous around L elbow and area has expanded since yesterday, swollen locally. Erythema now marked with pen line  Neuro: Non-focal, A&Ox3    LABS:  Recent Labs      07/14/18   0348  07/15/18   0436  07/16/18   0226   WBC  9.1  8.4  7.2   RBC  4.04*  3.83*  3.87*   HEMOGLOBIN  10.5*  10.0*  10.0*   HEMATOCRIT  33.9*  31.9*  32.0*   MCV  83.9  83.3  82.7   MCH  26.0*  26.1*  25.8*   RDW  59.8*  58.4*  57.7*   PLATELETCT  189  181  208   MPV  8.8*  8.9*  8.9*   NEUTSPOLYS   --    --   65.30   LYMPHOCYTES   --    --   24.80   MONOCYTES   --    --   6.90   EOSINOPHILS   --    --   2.00    BASOPHILS   --    --   0.30     Recent Labs      07/14/18   0348  07/15/18   0436  07/16/18   0226   SODIUM  139  138  142   POTASSIUM  3.5*  3.8  4.1   CHLORIDE  113*  112  111   CO2  21  22  26   BUN  6*  11  13   CREATININE  0.70  0.77  0.75   CALCIUM  7.6*  7.6*  8.3*   ALBUMIN  2.7*  2.8*   --      Estimated GFR/CRCL = Estimated Creatinine Clearance: 73.2 mL/min (by C-G formula based on SCr of 0.75 mg/dL).  Recent Labs      07/14/18   0348  07/15/18   0436  07/16/18   0226   GLUCOSE  105*  103*  104*     Recent Labs      07/14/18   0348  07/15/18   0436   ASTSGOT  6*  5*   ALTSGPT  <5  <5   TBILIRUBIN  0.4  0.5   ALKPHOSPHAT  46  45   GLOBULIN  1.8*  1.4*       IMAGING:  None new    MEDS:  Current Facility-Administered Medications   Medication Last Dose   • ceFAZolin in D5W (ANCEF) IVPB premix 1 g 1 g at 07/16/18 0631   • nystatin/triamcinolone (MYCOLOG) 274864-8.1 UNIT/GM-% cream     • ferrous sulfate tablet 325 mg 325 mg at 07/14/18 0558   • gabapentin (NEURONTIN) capsule 300 mg 300 mg at 07/16/18 0632   • lactobacillus granules (LACTINEX/FLORANEX) packet 1 Packet 1 Packet at 07/15/18 1646   • lamoTRIgine (LAMICTAL) tablet 200 mg 200 mg at 07/16/18 0631   • oxyCODONE immediate-release (ROXICODONE) tablet 5 mg 5 mg at 07/16/18 0249   • potassium chloride SA (Kdur) tablet 20 mEq 20 mEq at 07/16/18 0632   • tamsulosin (FLOMAX) capsule 0.4 mg 0.4 mg at 07/16/18 0631   • enoxaparin (LOVENOX) inj 40 mg 40 mg at 07/13/18 0540   • labetalol (NORMODYNE,TRANDATE) injection 10 mg     • vancomycin 50 mg/mL oral soln 125 mg 125 mg at 07/16/18 0631    Followed by   • [START ON 7/26/2018] vancomycin 50 mg/mL oral soln 125 mg      Followed by   • [START ON 8/3/2018] vancomycin 50 mg/mL oral soln 125 mg      Followed by   • [START ON 8/10/2018] vancomycin 50 mg/mL oral soln 125 mg      Followed by   • [START ON 8/18/2018] vancomycin 50 mg/mL oral soln 125 mg     • bacitracin ointment     • famotidine (PEPCID) tablet 20 mg  20 mg at 07/15/18 2019       ASSESSMENT/PLAN:76 yo male with hx of sigmoid bowel resection and recurrent C. Diff      #Recurrent C. Diff  C. diff infections x3  Improving  Followed by GI Consultants  PO vancomycin  Disscussed with GI consultants. They are aware of patient and will call him early next week              -recommended 2 week course oral vancomycin              -will not take back now for fecal transplant as patient is non-toxic              -did not recommend adding rifaximin to PO vanco     Plan  -Continue PO vancomycin and continue for 2 week course with taper until transplant  -GI consultant follow-up and stool transplant as outpatient     # Anemia  Chronic, but somewhat worse since admission  May be partially due to hemodilution vs GI bleed  Overall down to 10.0 from 12.4 on admission, stable this a.m. At 10.0     Plan  -CTM H&H  -Discuss with GI     #s/p Carpal Tunnel Release  was performed by Dr. Gonzalez              - staples removed 7/14  Erythema worsened last 24 hours along with increased swelling, warm to touch, mildly tender     Plan  -infected now started IV ancef 7/15 per ortho  -ortho following       #Dementia  currently not on treatment  AOx3 on exam  May factor into care as an outpatient with medication administration      Plan  - follow-up outpatient     #Chronic Conditions  BPD - cont lamictal  BPH - cont tamsulosin  GERD - famotidine while inpatient     Dispo: Med/Surg for antibiotics and close monitoring  PCP: Luke  Code Status: DNR/DNI     Core Measures:  Lines: PIV  Abx: PO Vancomycin, IV ancef day 1  IVF: none

## 2018-07-16 NOTE — PROGRESS NOTES
Refuses BA, educated on risk to fall, verbalizes understanding and still declines. Call light in reach, calls appropriately for assistance. Charge RN notified of high fall risk.     Pt is A&Ox4. Mobilizes with his cane.

## 2018-07-17 LAB
ANION GAP SERPL CALC-SCNC: 5 MMOL/L (ref 0–11.9)
BUN SERPL-MCNC: 10 MG/DL (ref 8–22)
CALCIUM SERPL-MCNC: 8.1 MG/DL (ref 8.5–10.5)
CHLORIDE SERPL-SCNC: 109 MMOL/L (ref 96–112)
CO2 SERPL-SCNC: 28 MMOL/L (ref 20–33)
CREAT SERPL-MCNC: 0.88 MG/DL (ref 0.5–1.4)
ERYTHROCYTE [DISTWIDTH] IN BLOOD BY AUTOMATED COUNT: 58.4 FL (ref 35.9–50)
GLUCOSE SERPL-MCNC: 114 MG/DL (ref 65–99)
HCT VFR BLD AUTO: 29.3 % (ref 42–52)
HGB BLD-MCNC: 9.3 G/DL (ref 14–18)
MCH RBC QN AUTO: 26.2 PG (ref 27–33)
MCHC RBC AUTO-ENTMCNC: 31.7 G/DL (ref 33.7–35.3)
MCV RBC AUTO: 82.5 FL (ref 81.4–97.8)
PLATELET # BLD AUTO: 211 K/UL (ref 164–446)
PMV BLD AUTO: 8.7 FL (ref 9–12.9)
POTASSIUM SERPL-SCNC: 3.9 MMOL/L (ref 3.6–5.5)
RBC # BLD AUTO: 3.55 M/UL (ref 4.7–6.1)
SODIUM SERPL-SCNC: 142 MMOL/L (ref 135–145)
WBC # BLD AUTO: 7.2 K/UL (ref 4.8–10.8)

## 2018-07-17 PROCEDURE — A9270 NON-COVERED ITEM OR SERVICE: HCPCS | Performed by: FAMILY MEDICINE

## 2018-07-17 PROCEDURE — 700111 HCHG RX REV CODE 636 W/ 250 OVERRIDE (IP): Performed by: ORTHOPAEDIC SURGERY

## 2018-07-17 PROCEDURE — G8979 MOBILITY GOAL STATUS: HCPCS | Mod: CI

## 2018-07-17 PROCEDURE — 700111 HCHG RX REV CODE 636 W/ 250 OVERRIDE (IP): Performed by: FAMILY MEDICINE

## 2018-07-17 PROCEDURE — 700102 HCHG RX REV CODE 250 W/ 637 OVERRIDE(OP): Performed by: STUDENT IN AN ORGANIZED HEALTH CARE EDUCATION/TRAINING PROGRAM

## 2018-07-17 PROCEDURE — 80048 BASIC METABOLIC PNL TOTAL CA: CPT

## 2018-07-17 PROCEDURE — 770021 HCHG ROOM/CARE - ISO PRIVATE

## 2018-07-17 PROCEDURE — 700102 HCHG RX REV CODE 250 W/ 637 OVERRIDE(OP): Performed by: FAMILY MEDICINE

## 2018-07-17 PROCEDURE — 85027 COMPLETE CBC AUTOMATED: CPT

## 2018-07-17 PROCEDURE — 36415 COLL VENOUS BLD VENIPUNCTURE: CPT

## 2018-07-17 PROCEDURE — 700105 HCHG RX REV CODE 258: Performed by: FAMILY MEDICINE

## 2018-07-17 PROCEDURE — G8980 MOBILITY D/C STATUS: HCPCS | Mod: CI

## 2018-07-17 PROCEDURE — 97535 SELF CARE MNGMENT TRAINING: CPT

## 2018-07-17 PROCEDURE — A9270 NON-COVERED ITEM OR SERVICE: HCPCS | Performed by: STUDENT IN AN ORGANIZED HEALTH CARE EDUCATION/TRAINING PROGRAM

## 2018-07-17 RX ORDER — GABAPENTIN 300 MG/1
CAPSULE ORAL
Status: COMPLETED
Start: 2018-07-17 | End: 2018-07-17

## 2018-07-17 RX ORDER — POTASSIUM CHLORIDE 20 MEQ/1
TABLET, EXTENDED RELEASE ORAL
Status: COMPLETED
Start: 2018-07-17 | End: 2018-07-17

## 2018-07-17 RX ORDER — LAMOTRIGINE 100 MG/1
TABLET ORAL
Status: COMPLETED
Start: 2018-07-17 | End: 2018-07-17

## 2018-07-17 RX ORDER — FERROUS SULFATE 325(65) MG
TABLET ORAL
Status: COMPLETED
Start: 2018-07-17 | End: 2018-07-17

## 2018-07-17 RX ORDER — GABAPENTIN 400 MG/1
400 CAPSULE ORAL 3 TIMES DAILY
Status: DISCONTINUED | OUTPATIENT
Start: 2018-07-17 | End: 2018-07-18 | Stop reason: HOSPADM

## 2018-07-17 RX ORDER — OXYCODONE HYDROCHLORIDE 5 MG/1
7.5 TABLET ORAL EVERY 6 HOURS PRN
Status: DISCONTINUED | OUTPATIENT
Start: 2018-07-17 | End: 2018-07-18 | Stop reason: HOSPADM

## 2018-07-17 RX ORDER — OXYCODONE HYDROCHLORIDE 5 MG/1
TABLET ORAL
Status: COMPLETED
Start: 2018-07-17 | End: 2018-07-17

## 2018-07-17 RX ORDER — TAMSULOSIN HYDROCHLORIDE 0.4 MG/1
CAPSULE ORAL
Status: COMPLETED
Start: 2018-07-17 | End: 2018-07-17

## 2018-07-17 RX ADMIN — CEFAZOLIN SODIUM 1 G: 1 INJECTION, SOLUTION INTRAVENOUS at 06:28

## 2018-07-17 RX ADMIN — NYSTATIN AND TRIAMCINOLONE ACETONIDE: 100000; 1 CREAM TOPICAL at 06:29

## 2018-07-17 RX ADMIN — LAMOTRIGINE 200 MG: 100 TABLET ORAL at 06:29

## 2018-07-17 RX ADMIN — ENOXAPARIN SODIUM 40 MG: 100 INJECTION SUBCUTANEOUS at 06:29

## 2018-07-17 RX ADMIN — GABAPENTIN 400 MG: 400 CAPSULE ORAL at 17:25

## 2018-07-17 RX ADMIN — VANCOMYCIN HYDROCHLORIDE 125 MG: 10 INJECTION, POWDER, LYOPHILIZED, FOR SOLUTION INTRAVENOUS at 17:27

## 2018-07-17 RX ADMIN — TAMSULOSIN HYDROCHLORIDE 0.4 MG: 0.4 CAPSULE ORAL at 06:29

## 2018-07-17 RX ADMIN — VANCOMYCIN HYDROCHLORIDE 125 MG: 10 INJECTION, POWDER, LYOPHILIZED, FOR SOLUTION INTRAVENOUS at 06:30

## 2018-07-17 RX ADMIN — POTASSIUM CHLORIDE 20 MEQ: 1500 TABLET, EXTENDED RELEASE ORAL at 06:29

## 2018-07-17 RX ADMIN — GABAPENTIN 400 MG: 400 CAPSULE ORAL at 12:58

## 2018-07-17 RX ADMIN — BACITRACIN ZINC: 500 OINTMENT TOPICAL at 17:25

## 2018-07-17 RX ADMIN — VANCOMYCIN HYDROCHLORIDE 125 MG: 10 INJECTION, POWDER, LYOPHILIZED, FOR SOLUTION INTRAVENOUS at 00:10

## 2018-07-17 RX ADMIN — NYSTATIN AND TRIAMCINOLONE ACETONIDE: 100000; 1 CREAM TOPICAL at 17:25

## 2018-07-17 RX ADMIN — VANCOMYCIN HYDROCHLORIDE 125 MG: 10 INJECTION, POWDER, LYOPHILIZED, FOR SOLUTION INTRAVENOUS at 13:00

## 2018-07-17 RX ADMIN — OXYCODONE HYDROCHLORIDE 5 MG: 5 TABLET ORAL at 06:38

## 2018-07-17 RX ADMIN — LACTOBACILLUS ACIDOPHILUS / LACTOBACILLUS BULGARICUS 1 PACKET: 100 MILLION CFU STRENGTH GRANULES at 12:58

## 2018-07-17 RX ADMIN — OXYCODONE HYDROCHLORIDE 7.5 MG: 5 TABLET ORAL at 19:38

## 2018-07-17 RX ADMIN — FAMOTIDINE 20 MG: 20 TABLET ORAL at 17:26

## 2018-07-17 RX ADMIN — LACTOBACILLUS ACIDOPHILUS / LACTOBACILLUS BULGARICUS 1 PACKET: 100 MILLION CFU STRENGTH GRANULES at 08:33

## 2018-07-17 RX ADMIN — LACTOBACILLUS ACIDOPHILUS / LACTOBACILLUS BULGARICUS 1 PACKET: 100 MILLION CFU STRENGTH GRANULES at 17:26

## 2018-07-17 RX ADMIN — CEFTRIAXONE 2 G: 2 INJECTION, POWDER, FOR SOLUTION INTRAMUSCULAR; INTRAVENOUS at 08:32

## 2018-07-17 RX ADMIN — OXYCODONE HYDROCHLORIDE 7.5 MG: 5 TABLET ORAL at 12:57

## 2018-07-17 RX ADMIN — BACITRACIN ZINC: 500 OINTMENT TOPICAL at 06:29

## 2018-07-17 RX ADMIN — Medication 325 MG: at 06:29

## 2018-07-17 RX ADMIN — GABAPENTIN 300 MG: 300 CAPSULE ORAL at 06:38

## 2018-07-17 ASSESSMENT — PAIN SCALES - GENERAL
PAINLEVEL_OUTOF10: 6
PAINLEVEL_OUTOF10: 4
PAINLEVEL_OUTOF10: 3
PAINLEVEL_OUTOF10: 7
PAINLEVEL_OUTOF10: 7
PAINLEVEL_OUTOF10: 5
PAINLEVEL_OUTOF10: 6

## 2018-07-17 ASSESSMENT — GAIT ASSESSMENTS
GAIT LEVEL OF ASSIST: SUPERVISED
DEVIATION: OTHER (COMMENT)
DISTANCE (FEET): 30

## 2018-07-17 ASSESSMENT — COGNITIVE AND FUNCTIONAL STATUS - GENERAL
SUGGESTED CMS G CODE MODIFIER MOBILITY: CJ
MOVING TO AND FROM BED TO CHAIR: A LITTLE
TURNING FROM BACK TO SIDE WHILE IN FLAT BAD: A LITTLE
MOBILITY SCORE: 22

## 2018-07-17 NOTE — PROGRESS NOTES
Assumed care at 1900 received report from day shift RN.     A&Ox4.    6/10 Pain medication Q6 hours, patient denies that other inventions work as well and was taking 7.5 mg of oxy previously and found that more helpful will discuss with day RN  denies nausea,   tolerating regular diet,     +voiding, + BM +c-diff      Room air.      Patient call light within reach, bed in the lowest position, hourly rounding in place.

## 2018-07-17 NOTE — CARE PLAN
Problem: Pain Management  Goal: Pain level will decrease to patient's comfort goal  Outcome: PROGRESSING SLOWER THAN EXPECTED  Patient stated that at home he takes 400mg of gabapentin and 7.5 mg of Oxy.  This RN will discuss with day RN about getting his medication increased to what he takes at home.      Problem: Skin Integrity  Goal: Risk for impaired skin integrity will decrease  Outcome: PROGRESSING AS EXPECTED  Patient showered and cleaned himself.  Patient educated about not sitting in wet brief because of skin breakdown.  Patient medicated per MAR and Bazo barrier creamed applied.

## 2018-07-17 NOTE — CARE PLAN
Problem: Knowledge Deficit  Goal: Knowledge of disease process/condition, treatment plan, diagnostic tests, and medications will improve  Outcome: PROGRESSING AS EXPECTED    Intervention: Explain information regarding disease process/condition, treatment plan, diagnostic tests, and medications and document in education  Patient asked Md and Rn questions regarding plan of care. Questions answered and patient verbalized understanding.      Problem: Pain Management  Goal: Pain level will decrease to patient's comfort goal  Outcome: PROGRESSING AS EXPECTED    Intervention: Follow pain managment plan developed in collaboration with patient and Interdisciplinary Team  Pain medications got changed from 5mg Oxy to 7.5mg & Gabapentin 300mg to 400mg. This is the patient's baseline medication regimen at home

## 2018-07-17 NOTE — THERAPY
"Physical Therapy Treatment completed.   Bed Mobility:  Supine to Sit: Supervised  Transfers: Sit to Stand: Supervised  Gait: Level Of Assist: Supervised with No Equipment Needed       Plan of Care: Patient with no further skilled PT needs in the acute care setting at this time  Discharge Recommendations: Equipment: No Equipment Needed. See below    Pt progressing well as expected now that pain appears controlled. Pt reports no concerns with functiona ability to dc to home once medically cleared. He is able to demonstrate short distance ambulation without AD with improved gait mechancis this visit and reports he is at baseline with re: gait and balance. He has no further skilled acute PT needs and would recommend pt continue prior outpatient PT/OT with regards to LUE hand contracture as appropriate after dc. Would defer to OT recs for concerns with LUE hand contracture and IADl/ADL management recs. He should continue to mobilize with staff as able/appropriate.     See \"Rehab Therapy-Acute\" Patient Summary Report for complete documentation.       "

## 2018-07-17 NOTE — DISCHARGE SUMMARY
"[facility] MEDICINE DISCHARGE SUMMARY     PATIENT ID:    Name:             Marcelo Colon   YOB: 1940  Age:                 77 y.o.  male   MRN:               [id number]  Address:         [address] [place] [address] [place] [address]  Phone:            [phone] (home)    ADMISSION DATE: 7/12/2018    DISCHARGE DATE: 7/17/2018    DISCHARGE DIAGNOSES:   See hospital course.     ATTENDING PHYSICIAN: Dr. Ponce    SENIOR RESIDENT: Dr. Letha Hillman    CLARA RESIDENT: Dr. Pj Alvarez    CONSULTANTS:    Dr. Gonzalez (Ortho)    PROCEDURES: none      LABS:  Recent Labs      07/15/18   0436  07/16/18   0226  07/17/18   0328   WBC  8.4  7.2  7.2   RBC  3.83*  3.87*  3.55*   HEMOGLOBIN  10.0*  10.0*  9.3*   HEMATOCRIT  31.9*  32.0*  29.3*   MCV  83.3  82.7  82.5   MCH  26.1*  25.8*  26.2*   RDW  58.4*  57.7*  58.4*   PLATELETCT  181  208  211   MPV  8.9*  8.9*  8.7*   NEUTSPOLYS   --   65.30   --    LYMPHOCYTES   --   24.80   --    MONOCYTES   --   6.90   --    EOSINOPHILS   --   2.00   --    BASOPHILS   --   0.30   --      Recent Labs      07/15/18   0436  07/16/18 0226 07/17/18   0328   SODIUM  138  142  142   POTASSIUM  3.8  4.1  3.9   CHLORIDE  112  111  109   CO2  22  26  28   GLUCOSE  103*  104*  114*   BUN  11  13  10     Lab Results   Component Value Date/Time    CHOLSTRLTOT 85 (L) 12/19/2017 05:53 AM    LDL 54 12/19/2017 05:53 AM    HDL 16 (A) 12/19/2017 05:53 AM    TRIGLYCERIDE 77 12/19/2017 05:53 AM       Lab Results   Component Value Date/Time    TROPONINI <0.02 06/11/2018 06:54 PM       Lab Results   Component Value Date/Time    TROPONINI <0.02 06/11/2018 06:54 PM         IMAGING: none       HISTORY OF PRESENT ILLNESS:   \"Marcelo Colon is a 77 y.o. male who was directed admitted from the [facility] clinic complaining of abdominal pain and diarrhea. He has been diagnosed with C. Diff three times previously and has been treated by GI consultants; he has a stool transplant scheduled for September. Approximately 1 " "month ago, patient's symptoms had improved and he had formed stool, was having approximately 1 bowel movement daily. Over the past month, the diarrhea has worsened and he is currently having 5-6 bowel movements a day. He reports increased weakness, difficulty with ambulation, and fatigue (sleeping approximately 10 hours a day). Abdominal pain is present with bowel movements and he describes it as a cramping; has a bowel movement when he attempts to walk or move extensively. Denies nausea/vomiting, dizziness, loss of consciousness, pain with urination. He presented to see his PCP today and was directly admitted as there was concern for his low blood pressure and increasing abdominal pain and distension.     Patient also had a carpal tunnel procedure recently with [facility]/Dr. Gonzalez. Has susanna in his L elbow. Reports some mild clear drainage. He was supposed to have the staples removed today but he could not make his appointment as he was admitted to the hospital. No pain, no foul smelling drainage, normal range of motion, no loss of sensation.\"    HOSPITAL COURSE:   #Recurrent C. Diff: given history of recurernt C.diff infections and diarrhea, patient was admitted for worsening symptoms and low blood pressure. During hospital course, patient was given oral Vancomycin for his diarrhea and IVF as well as regular diets for his dehydration. GI consultants for his fecal transplant were aware of his current hospitalization and recommended 2 weeks course of oral Vanc with tapering followed by scheduled transplant on September. Throughout the hospital stay, patient's diarrhea has been imrpoving with decreasing frequency and increasing firmness. On HD 6, pt was clinically stable enough to be discharged home. He was discharged home with oral Vancomycin 125mg and instruction to conitnue for 6 weeks until transplant.       #UTI: patient had complaints about dysuria and hematuria on admission. Urine culture " on HD 4 showed growth of Klebsiella from 10k-50k, significant enough to be clinically positive. Patient was given 2g of Rocephin to cover the UTI and elbow cellulitis. Urine culture on HD 5 collected prior to administration of Rocephin was normal. Upon discahrge on HD 6, patient had no complaints of dysuria and frequency, has clnically improved, however was concerned about some mild discoloration on his penis. Patient was then discharged home on Cefdinir 300mg for 5 more days to complete 7 day-course. Patient was given Nystatin cream to apply on for the discoloration.      #s/p Carpal Tunnel Release: during hospitalization, right elbow incision site showed some signs of cellulitis with erythema and edema and intermittent serosanguinous discharges. Patient was put on IV ancef for 3 days by Ortho team and Dr. Gonzalez with minimal signs of improvement. On HD 5, in light of his newly found UTI, decision was made by both primiray and ortho teams that patient be put on Rocephin to replace Ancef. Upon discharge, patient's elbow showed less erythema and edema, less pain, minimal discharges with clean bandage. Patient was then discharged home on Cefdinir as mentioned above.     # Anemia: during hospital stay, his Hg has mildly dropped to 10 from 12 on admission. This was thought to be caused by either hemodillution or GI bleed in diarrhea or both. Upon discahrge, pat Hg was satble at 10 and pt was asymptomatic. Patient was encouraged to go home with Fe pills and follow up with PCP and GI for further workup of his chronic anemia.      #Dementia: currently patient is not taking any treatment and neurologically stable during hospital stay. Patient is encouraged to follow up outpatient with his PCP.     #Other chronic Conditions not actively managed in hospital  BPD - cont lamictal  BPH - cont tamsulosin  GERD - famotidine while inpatient    DISCHARGE CONDITION:  Stable    DISPOSITION: Home     DISCHARGE MEDICATIONS:       Marcelo Colon Stiven   Home Medication Instructions FLORENCIO:[id number]    Printed on:07/17/18 6913   Medication Information                      alfuzosin (UROXATRAL) 10 MG SR tablet  Take 10 mg by mouth.             calcium polycarbophil 625 MG Tab  Take 1 Tab by mouth 3 times a day, with meals.             ferrous sulfate 325 (65 Fe) MG tablet  Take 325 mg by mouth every day.             gabapentin (NEURONTIN) 300 MG Cap  Take 400 mg by mouth 3 times a day.             lactobacillus granules (LACTINEX/FLORANEX) Pack  Take 1 Packet by mouth 3 times a day, with meals.             lamotrigine (LAMICTAL) 200 MG tablet  Take 200 mg by mouth every day.             oxyCODONE immediate-release (ROXICODONE) 5 MG Tab  Take 7.5 mg by mouth every 6 hours as needed.             potassium chloride SA (KDUR) 20 MEQ Tab CR  Take 20 mEq by mouth every day.             tamsulosin (FLOMAX) 0.4 MG capsule  Take 0.4 mg by mouth every day.             therapeutic multivitamin-minerals (THERAGRAN-M) Tab  Take 1 Tab by mouth every day.             tramadol (ULTRAM) 50 MG TABS  Take 100 mg by mouth every four hours as needed for Mild Pain.             vancomycin (VANCOCIN HCL) 125 MG capsule  Take 1 Cap by mouth 4 times a day.             vancomycin (VANCOCIN) 125 MG capsule  Take 1 Cap by mouth See Admin Instructions.                 ACTIVITY:  Normal Activity as Tolerated.    DIET: Healthy     DISCHARGE INSTRUCTIONS:    Patient was instructed to return to the ER or contact the primary care physician immediately with any concerns or symptoms including but not limited to fever, chills, loss of appetite, weight loss, chest pain, shortness of breath, fatigue, nausea, vomiting, diarrhea, bleeding, the development of any new wounds or lesions or any developing redness, swelling or warmth in any existing wounds or lesions.  Patient understands that failure to do so may indicate worsening of his/her medical condition(s) and result in adverse clinical  outcomes including fatality. We have counseled the patient on the importance of compliance and the patient has agreed to proceed with all medical recommendations and follow up plan indicated above. The patient understands that the failure to do so may result in result in adverse clinical outcomes including fatality.     FOLLOW UP:   Follow up with Dr. Butler     CC:

## 2018-07-17 NOTE — PROGRESS NOTES
"   Orthopaedic Progress Note    Interval changes:  Patient doing well  Min erythema of left elbow   Not able to express any purulence from incision    ROS - Patient denies any new issues.  Pain well controlled.    Blood pressure 102/41, pulse 86, temperature 37.1 °C (98.8 °F), resp. rate 18, height 1.727 m (5' 7.99\"), weight 61.8 kg (136 lb 3.9 oz), SpO2 90 %.      Patient seen and examined  No acute distress  Breathing non labored  RRR  Left elbow with erythema aprox 1inch extension from prior incision.    Recent Labs      07/14/18   0348  07/15/18   0436  07/16/18   0226   WBC  9.1  8.4  7.2   RBC  4.04*  3.83*  3.87*   HEMOGLOBIN  10.5*  10.0*  10.0*   HEMATOCRIT  33.9*  31.9*  32.0*   MCV  83.9  83.3  82.7   MCH  26.0*  26.1*  25.8*   MCHC  31.0*  31.3*  31.3*   RDW  59.8*  58.4*  57.7*   PLATELETCT  189  181  208   MPV  8.8*  8.9*  8.9*       There are no active hospital problems to display for this patient.      Assessment/Plan:  Patient doing well  Continue Abx  Continued erythema   Ortho to continue to follow      "

## 2018-07-17 NOTE — PROGRESS NOTES
[facility] MEDICINE PROGRESS NOTE     Attending:   Yesi    Resident:   Pj Alvarez M.D.    PATIENT:   Marcelo Colon; [id number]; 1940    ID:   77 y.o. male admitted for recurrent C.Diff x 4 and surgical site infection    SUBJECTIVE:   No acute events overnight, pt still has multiple stools overnight, he tolerates PO well. Pt denies any pain or bleeding in his left arm incision site. Pt denies fever/chills, HA, n/v, coughs, CP, SOB, abdominal pain.    OBJECTIVE:  Vitals:    07/16/18 1535 07/16/18 2018 07/17/18 0500 07/17/18 0732   BP: 110/48 102/41 109/44 100/54   Pulse: 72 86 81 63   Resp: 18 18 18 18   Temp: 37.6 °C (99.6 °F) 37.1 °C (98.8 °F) 37.3 °C (99.1 °F) 37.1 °C (98.7 °F)   SpO2: 95% 90% 93% 94%   Weight:       Height:           Intake/Output Summary (Last 24 hours) at 07/17/18 0800  Last data filed at 07/17/18 0600   Gross per 24 hour   Intake              730 ml   Output             1775 ml   Net            -1045 ml       PHYSICAL EXAM:  General: No acute distress, afebrile, resting comfortably  HEENT: NC/AT. EOMI. MMM, neck supple without adenopathy  Cardiovascular: RRR, normal S1/S2 no murmurs rubs, or gallops, cap refill brisk.  Respiratory: CTAB, no tachypnea or retractions  Abdomen: soft, NT/ND, no masses  EXT:  No rashes or skin changes noted. Pulses 2+ DP and radial bilaterally. L upper extremity now erythematous around 3-4cm, minimally swollen.   Neuro: Non-focal    LABS:  Recent Labs      07/15/18   0436  07/16/18   0226  07/17/18   0328   WBC  8.4  7.2  7.2   RBC  3.83*  3.87*  3.55*   HEMOGLOBIN  10.0*  10.0*  9.3*   HEMATOCRIT  31.9*  32.0*  29.3*   MCV  83.3  82.7  82.5   MCH  26.1*  25.8*  26.2*   RDW  58.4*  57.7*  58.4*   PLATELETCT  181  208  211   MPV  8.9*  8.9*  8.7*   NEUTSPOLYS   --   65.30   --    LYMPHOCYTES   --   24.80   --    MONOCYTES   --   6.90   --    EOSINOPHILS   --   2.00   --    BASOPHILS   --   0.30   --      Recent Labs      07/15/18   0436  07/16/18   0226  07/17/18    0328   SODIUM  138  142  142   POTASSIUM  3.8  4.1  3.9   CHLORIDE  112  111  109   CO2  22  26  28   BUN  11  13  10   CREATININE  0.77  0.75  0.88   CALCIUM  7.6*  8.3*  8.1*   ALBUMIN  2.8*   --    --      Estimated GFR/CRCL = Estimated Creatinine Clearance: 62.4 mL/min (by C-G formula based on SCr of 0.88 mg/dL).  Recent Labs      07/15/18   0436  07/16/18   0226  07/17/18   0328   GLUCOSE  103*  104*  114*     Recent Labs      07/15/18   0436   ASTSGOT  5*   ALTSGPT  <5   TBILIRUBIN  0.5   ALKPHOSPHAT  45   GLOBULIN  1.4*     View [employer]     URINE CULTURE(NEW) (Order #[id number]) on 7/13/18   Component Results     Component   Significant Indicator  (Positive)   POS (POS)    Source   UR    Site   URINE, CLEAN CATCH    Urine Culture  (Abnormal)   Mixed skin miranda <10,000 cfu/mL     Urine Culture  (Abnormal)      Klebsiella pneumoniae   10-50,000 cfu/mL          IMAGING: none    MEDS:  Current Facility-Administered Medications   Medication Last Dose   • cefTRIAXone (ROCEPHIN) 2 g in  mL IVPB     • oxyCODONE immediate-release (ROXICODONE) tablet 7.5 mg     • gabapentin (NEURONTIN) capsule 400 mg     • ceFAZolin in D5W (ANCEF) IVPB premix 1 g 1 g at 07/17/18 0628   • nystatin/triamcinolone (MYCOLOG) 552001-3.1 UNIT/GM-% cream     • ferrous sulfate tablet 325 mg 325 mg at 07/17/18 0629   • lactobacillus granules (LACTINEX/FLORANEX) packet 1 Packet 1 Packet at 07/16/18 1813   • lamoTRIgine (LAMICTAL) tablet 200 mg 200 mg at 07/17/18 0629   • potassium chloride SA (Kdur) tablet 20 mEq 20 mEq at 07/17/18 0629   • tamsulosin (FLOMAX) capsule 0.4 mg 0.4 mg at 07/17/18 0629   • enoxaparin (LOVENOX) inj 40 mg 40 mg at 07/17/18 0629   • labetalol (NORMODYNE,TRANDATE) injection 10 mg     • vancomycin 50 mg/mL oral soln 125 mg 125 mg at 07/17/18 0630    Followed by   • [START ON 7/26/2018] vancomycin 50 mg/mL oral soln 125 mg      Followed by   • [START ON 8/3/2018] vancomycin 50 mg/mL oral soln 125 mg       Followed by   • [START ON 8/10/2018] vancomycin 50 mg/mL oral soln 125 mg      Followed by   • [START ON 8/18/2018] vancomycin 50 mg/mL oral soln 125 mg     • bacitracin ointment     • famotidine (PEPCID) tablet 20 mg 20 mg at 07/16/18 1812       ASSESSMENT/PLAN: 78 yo male with hx of sigmoid bowel resection and recurrent C. Diff     #Recurrent C. Diff  C. diff infections x3  Improving  Followed by GI Consultants  PO vancomycin day 6  Disscussed with GI consultants. They are aware of patient and will call him early next week              -recommended 2 week course oral vancomycin              -will not take back now for fecal transplant as patient is non-toxic              -did not recommend adding rifaximin to PO vanco     Plan  -Continue PO vancomycin and continue for 2 week course with taper until transplant  -GI consultant follow-up and stool transplant as outpatient    #UTI:  Pt c.o dysuria and hematuria over weekend, improved today  Ur Cx show K. Pneumonia    Plan  -Start Rocephin IV 2g today and 1g Q24 starting tomorrow     # Anemia  Chronic, but somewhat worse since admission  May be partially due to hemodilution vs GI bleed  Overall down to 10.0 from 12.4 on admission, down trended to 9.3 this morning     Plan  -CTM H&H  -Discuss with GI     #s/p Carpal Tunnel Release  was performed by Dr. Gonzalez              - staples removed 7/14  Erythema mildly improved with less pain and swelling     Plan  -DC'ed IV ancef (since 7/15 per ortho) since Rocephin started  -Re-assess clincal status and wound tomorrow  -ortho following        #Dementia  currently not on treatment  AOx3 on exam  May factor into care as an outpatient with medication administration      Plan  - follow-up outpatient     #Chronic Conditions  BPD - cont lamictal  BPH - cont tamsulosin  GERD - famotidine while inpatient     Dispo: Med/Surg for antibiotics and close monitoring  PCP: Luke  Code Status: DNR/DNI     Core Measures:  Lines: TKO  Abx:  PO Vancomycin day 6, IV ancef 3 days, IV Rocephin day 1  IVF: none

## 2018-07-17 NOTE — NON-PROVIDER
[facility] FAMILY MEDICINE PROGRESS NOTE     Attending:   Nick Key M.D.    Resident:   Letha Hillman M.D.    PATIENT:   Marcelo Colon; [id number]; 1940    ID:   77 y.o. male admitted for C. difficile infection    SUBJECTIVE:   No acute events overnight. Patient has been receiving his medications without issue (including the iron supplements). He appears well and remarks that his abdominal pain is much better. He believes his stools are firming up. His hematuria has not been an issue since yesterday when he last spoke to us about it. There is only pain/burning when he first wakes up in the morning and has to urinate. Reports no pain/burning during urination after that. No blood in urine, no chills, no flank pain, no malaise or fatigue, no shortness of breath. His elbow incision continues to be painless.    OBJECTIVE:  Vitals:    07/16/18 1535 07/16/18 2018 07/17/18 0500 07/17/18 0732   BP: 110/48 102/41 109/44 100/54   Pulse: 72 86 81 63   Resp: 18 18 18 18   Temp: 37.6 °C (99.6 °F) 37.1 °C (98.8 °F) 37.3 °C (99.1 °F) 37.1 °C (98.7 °F)   SpO2: 95% 90% 93% 94%   Weight:       Height:           Intake/Output Summary (Last 24 hours) at 07/17/18 1200  Last data filed at 07/17/18 0941   Gross per 24 hour   Intake              850 ml   Output             1775 ml   Net             -925 ml       PHYSICAL EXAM:  General: No acute distress, afebrile, resting comfortably, does not appear pale  HEENT: NC/AT. EOMI. MMM, neck supple without adenopathy  Cardiovascular: RRR, normal S1/S2 no murmurs rubs, or gallops, cap refill brisk  Respiratory: CTAB, no tachypnea or retractions  Abdomen: Bloated in lower quadrants; slight pain to deep palpation, but no pain with superficial palpation; bowel sounds present  EXT:  No rashes or skin changes noted. Pulses 2+ DP and radial bilaterally. Wound on elbow appears the same as noted previously, with some serosanguinous drainage. Erythema extends about 1 inch from incision. Depuytren's  contracture noted on left hand  Neuro: No loss of sensation in left forearm or hand distributions  : No costovertebral angle tenderness; no flank pain    LABS:  Recent Labs      07/15/18   0436  07/16/18   0226  07/17/18   0328   WBC  8.4  7.2  7.2   RBC  3.83*  3.87*  3.55*   HEMOGLOBIN  10.0*  10.0*  9.3*   HEMATOCRIT  31.9*  32.0*  29.3*   MCV  83.3  82.7  82.5   MCH  26.1*  25.8*  26.2*   RDW  58.4*  57.7*  58.4*   PLATELETCT  181  208  211   MPV  8.9*  8.9*  8.7*   NEUTSPOLYS   --   65.30   --    LYMPHOCYTES   --   24.80   --    MONOCYTES   --   6.90   --    EOSINOPHILS   --   2.00   --    BASOPHILS   --   0.30   --      Recent Labs      07/15/18   0436  07/16/18   0226  07/17/18   0328   SODIUM  138  142  142   POTASSIUM  3.8  4.1  3.9   CHLORIDE  112  111  109   CO2  22  26  28   BUN  11  13  10   CREATININE  0.77  0.75  0.88   CALCIUM  7.6*  8.3*  8.1*   ALBUMIN  2.8*   --    --      Estimated GFR/CRCL = Estimated Creatinine Clearance: 62.4 mL/min (by C-G formula based on SCr of 0.88 mg/dL).  Recent Labs      07/15/18   0436  07/16/18   0226  07/17/18   0328   GLUCOSE  103*  104*  114*     Recent Labs      07/15/18   0436   ASTSGOT  5*   ALTSGPT  <5   TBILIRUBIN  0.5   ALKPHOSPHAT  45   GLOBULIN  1.4*       IMAGING:  No new imaging.    MEDS:  Current Facility-Administered Medications   Medication Last Dose   • oxyCODONE immediate-release (ROXICODONE) tablet 7.5 mg     • gabapentin (NEURONTIN) capsule 400 mg     • HYOSCYAMINE-MAALOX-LIDO VISC     • LAMOTRIGINE 100 MG PO TABS     • POTASSIUM CHLORIDE ADY ER 20 MEQ PO TBCR     • TAMSULOSIN HCL 0.4 MG PO CAPS     • OXYCODONE HCL 5 MG PO TABS     • GABAPENTIN 300 MG PO CAPS     • [START ON 7/18/2018] cefTRIAXone (ROCEPHIN) 1 g in  mL IVPB     • nystatin/triamcinolone (MYCOLOG) 588614-1.1 UNIT/GM-% cream     • ferrous sulfate tablet 325 mg 325 mg at 07/17/18 0629   • lactobacillus granules (LACTINEX/FLORANEX) packet 1 Packet 1 Packet at 07/17/18 0833   •  lamoTRIgine (LAMICTAL) tablet 200 mg 200 mg at 07/17/18 0629   • potassium chloride SA (Kdur) tablet 20 mEq 20 mEq at 07/17/18 0629   • tamsulosin (FLOMAX) capsule 0.4 mg 0.4 mg at 07/17/18 0629   • enoxaparin (LOVENOX) inj 40 mg 40 mg at 07/17/18 0629   • labetalol (NORMODYNE,TRANDATE) injection 10 mg     • vancomycin 50 mg/mL oral soln 125 mg 125 mg at 07/17/18 0630    Followed by   • [START ON 7/26/2018] vancomycin 50 mg/mL oral soln 125 mg      Followed by   • [START ON 8/3/2018] vancomycin 50 mg/mL oral soln 125 mg      Followed by   • [START ON 8/10/2018] vancomycin 50 mg/mL oral soln 125 mg      Followed by   • [START ON 8/18/2018] vancomycin 50 mg/mL oral soln 125 mg     • bacitracin ointment     • famotidine (PEPCID) tablet 20 mg 20 mg at 07/16/18 1812       ASSESSMENT/PLAN:  78yo M on hospital day 5 being treated for C. difficile infection, surgical site infection, and normocytic anemia, with new UTI as shown in urine culture.    #C. difficile Infection --> Appears to be controlled by oral vancomycin. Patient feels better and continues to improve.   -Continue vancomycin (125 mg, PO, [id number])   -Stool Transplant is scheduled for September; waiting to hear back from GI over whether it is possible to move that date up   -Monitor BP/CMP    #UTI --> Patient has been symptomatic and was positive for Klebsiella infection, so we will move forward with treatment.   -Start on ceftriaxone (1 g, IV, Qday)   -We are cleared to discontinue the patient's IV cefazolin and replace with ceftriaxone, per Dr. Gonzalez      #Surgical Site Infection --> Appears to be controlled by the IV antibiotics. It is not progressing, but it is not getting worse either.   -Continue IV ceftriaxone (as noted above) and reevaluate the patient's symptoms tomorrow   -Follow up with Dr. Gonzalez/ortho as needed.    #Anemia --> Patient's hemoglobin/hematocrit continue trending downward, but he remains asymptomatic.   -Continue daily  H&H   -Continue iron supplementation   -If labs continue to fall, we will order a blood smear with reticulocyte count to determine the cause of the anemia    Disposition: Anticipate discharge as soon as tomorrow, if the patient shows improvement and his blood labs are acceptable. Will be discharged with PO antibiotics.

## 2018-07-18 VITALS
SYSTOLIC BLOOD PRESSURE: 111 MMHG | BODY MASS INDEX: 19.55 KG/M2 | TEMPERATURE: 98.8 F | DIASTOLIC BLOOD PRESSURE: 47 MMHG | RESPIRATION RATE: 18 BRPM | WEIGHT: 128.97 LBS | OXYGEN SATURATION: 90 % | HEART RATE: 65 BPM | HEIGHT: 68 IN

## 2018-07-18 LAB
BACTERIA UR CULT: NORMAL
BASOPHILS # BLD AUTO: 0.4 % (ref 0–1.8)
BASOPHILS # BLD: 0.02 K/UL (ref 0–0.12)
EOSINOPHIL # BLD AUTO: 0.21 K/UL (ref 0–0.51)
EOSINOPHIL NFR BLD: 3.8 % (ref 0–6.9)
ERYTHROCYTE [DISTWIDTH] IN BLOOD BY AUTOMATED COUNT: 59.2 FL (ref 35.9–50)
HCT VFR BLD AUTO: 29.6 % (ref 42–52)
HGB BLD-MCNC: 9.4 G/DL (ref 14–18)
IMM GRANULOCYTES # BLD AUTO: 0.04 K/UL (ref 0–0.11)
IMM GRANULOCYTES NFR BLD AUTO: 0.7 % (ref 0–0.9)
LYMPHOCYTES # BLD AUTO: 1.55 K/UL (ref 1–4.8)
LYMPHOCYTES NFR BLD: 27.7 % (ref 22–41)
MCH RBC QN AUTO: 26.6 PG (ref 27–33)
MCHC RBC AUTO-ENTMCNC: 31.8 G/DL (ref 33.7–35.3)
MCV RBC AUTO: 83.6 FL (ref 81.4–97.8)
MONOCYTES # BLD AUTO: 0.59 K/UL (ref 0–0.85)
MONOCYTES NFR BLD AUTO: 10.6 % (ref 0–13.4)
NEUTROPHILS # BLD AUTO: 3.18 K/UL (ref 1.82–7.42)
NEUTROPHILS NFR BLD: 56.8 % (ref 44–72)
NRBC # BLD AUTO: 0 K/UL
NRBC BLD-RTO: 0 /100 WBC
PLATELET # BLD AUTO: 195 K/UL (ref 164–446)
PMV BLD AUTO: 8.9 FL (ref 9–12.9)
RBC # BLD AUTO: 3.54 M/UL (ref 4.7–6.1)
SIGNIFICANT IND 70042: NORMAL
SITE SITE: NORMAL
SOURCE SOURCE: NORMAL
WBC # BLD AUTO: 5.6 K/UL (ref 4.8–10.8)

## 2018-07-18 PROCEDURE — 700105 HCHG RX REV CODE 258: Performed by: FAMILY MEDICINE

## 2018-07-18 PROCEDURE — 85025 COMPLETE CBC W/AUTO DIFF WBC: CPT

## 2018-07-18 PROCEDURE — 700111 HCHG RX REV CODE 636 W/ 250 OVERRIDE (IP): Performed by: FAMILY MEDICINE

## 2018-07-18 PROCEDURE — A9270 NON-COVERED ITEM OR SERVICE: HCPCS | Performed by: FAMILY MEDICINE

## 2018-07-18 PROCEDURE — 700102 HCHG RX REV CODE 250 W/ 637 OVERRIDE(OP): Performed by: FAMILY MEDICINE

## 2018-07-18 PROCEDURE — 36415 COLL VENOUS BLD VENIPUNCTURE: CPT

## 2018-07-18 RX ORDER — FERROUS SULFATE 325(65) MG
325 TABLET ORAL DAILY
Qty: 30 TAB | Refills: 3 | Status: SHIPPED | OUTPATIENT
Start: 2018-07-19 | End: 2019-08-06

## 2018-07-18 RX ORDER — CEFDINIR 300 MG/1
300 CAPSULE ORAL 2 TIMES DAILY
Qty: 10 CAP | Refills: 0 | Status: SHIPPED | OUTPATIENT
Start: 2018-07-18 | End: 2018-07-23

## 2018-07-18 RX ORDER — VANCOMYCIN HYDROCHLORIDE 125 MG/1
125 CAPSULE ORAL SEE ADMIN INSTRUCTIONS
Qty: 90 CAP | Refills: 0 | Status: SHIPPED | OUTPATIENT
Start: 2018-07-18 | End: 2018-07-18

## 2018-07-18 RX ORDER — CEFDINIR 300 MG/1
300 CAPSULE ORAL 2 TIMES DAILY
Qty: 10 CAP | Refills: 0 | Status: SHIPPED | OUTPATIENT
Start: 2018-07-18 | End: 2018-07-18

## 2018-07-18 RX ORDER — NYSTATIN 100000 U/G
CREAM TOPICAL
Qty: 1 TUBE | Status: ON HOLD | OUTPATIENT
Start: 2018-07-18 | End: 2018-09-20 | Stop reason: CLARIF

## 2018-07-18 RX ORDER — FERROUS SULFATE 325(65) MG
325 TABLET ORAL DAILY
Qty: 30 TAB | Refills: 3 | Status: SHIPPED | OUTPATIENT
Start: 2018-07-19 | End: 2018-07-18

## 2018-07-18 RX ORDER — VANCOMYCIN HYDROCHLORIDE 125 MG/1
125 CAPSULE ORAL SEE ADMIN INSTRUCTIONS
Qty: 65 CAP | Refills: 0 | Status: SHIPPED | OUTPATIENT
Start: 2018-07-18 | End: 2019-08-06

## 2018-07-18 RX ADMIN — GABAPENTIN 400 MG: 400 CAPSULE ORAL at 11:35

## 2018-07-18 RX ADMIN — GABAPENTIN 400 MG: 400 CAPSULE ORAL at 06:08

## 2018-07-18 RX ADMIN — VANCOMYCIN HYDROCHLORIDE 125 MG: 10 INJECTION, POWDER, LYOPHILIZED, FOR SOLUTION INTRAVENOUS at 06:06

## 2018-07-18 RX ADMIN — POTASSIUM CHLORIDE 20 MEQ: 1500 TABLET, EXTENDED RELEASE ORAL at 06:08

## 2018-07-18 RX ADMIN — VANCOMYCIN HYDROCHLORIDE 125 MG: 10 INJECTION, POWDER, LYOPHILIZED, FOR SOLUTION INTRAVENOUS at 11:27

## 2018-07-18 RX ADMIN — VANCOMYCIN HYDROCHLORIDE 125 MG: 10 INJECTION, POWDER, LYOPHILIZED, FOR SOLUTION INTRAVENOUS at 00:21

## 2018-07-18 RX ADMIN — LACTOBACILLUS ACIDOPHILUS / LACTOBACILLUS BULGARICUS 1 PACKET: 100 MILLION CFU STRENGTH GRANULES at 09:13

## 2018-07-18 RX ADMIN — TAMSULOSIN HYDROCHLORIDE 0.4 MG: 0.4 CAPSULE ORAL at 06:07

## 2018-07-18 RX ADMIN — LAMOTRIGINE 200 MG: 100 TABLET ORAL at 06:07

## 2018-07-18 RX ADMIN — CEFTRIAXONE SODIUM 1 G: 1 INJECTION, POWDER, FOR SOLUTION INTRAMUSCULAR; INTRAVENOUS at 06:06

## 2018-07-18 RX ADMIN — NYSTATIN AND TRIAMCINOLONE ACETONIDE: 100000; 1 CREAM TOPICAL at 06:20

## 2018-07-18 RX ADMIN — OXYCODONE HYDROCHLORIDE 7.5 MG: 5 TABLET ORAL at 06:07

## 2018-07-18 RX ADMIN — Medication 325 MG: at 06:08

## 2018-07-18 RX ADMIN — BACITRACIN ZINC: 500 OINTMENT TOPICAL at 07:13

## 2018-07-18 NOTE — CARE PLAN
Problem: Pain Management  Goal: Pain level will decrease to patient's comfort goal  Outcome: PROGRESSING AS EXPECTED  Patient is a lot more content with the medication adjustments.  Pain is more controlled and the patient is more active.      Problem: Skin Integrity  Goal: Risk for impaired skin integrity will decrease  Outcome: PROGRESSING AS EXPECTED  Skin cream being applied per MAR, patient also is using barrier cream.  Patient educated about the importance of not sitting in a soiled brief for extended period of time.

## 2018-07-18 NOTE — PROGRESS NOTES
Report received, poc discussed, assumed care of pt.   Call light in reach, hourly rounding in place.   Pt gets up self.   Reg diet.  + void. LBM 7/18.   Oxy for pain.  Contact iso for c diff.  No further needs.  To DC later today after [employer] attending rounds.

## 2018-07-18 NOTE — NON-PROVIDER
"[facility] [place] FAMILY MEDICINE PROGRESS NOTE     Attending:   Jamar Ponce M.D.    Resident:   Letha Hillman M.D.    PATIENT:   Marcelo Colon; [id number]; 1940    ID:   77 y.o. male admitted for C. difficile infection and additionally being treated for infected surgical incision site on left elbow.    SUBJECTIVE:   No acute events overnight. Patient is progressing well. He continues to be compliant with his medications and reports feeling well. His abdominal pain is much better than it was on admission, and his stool is steadily firming up.    Patient complains of nam blood shooting out of urethra after urinating. He noticed no blood in the urine, but had small amount of blood shoot out as he was trying to \"squeeze out the last few drops of urine.\" He only notices pain/burning with urination in the mornings. He also notes some minor pain at the tip of his penis, as well as a small rash-like lesion on the tip.    OBJECTIVE:  Vitals:    07/17/18 0732 07/17/18 1709 07/17/18 2047 07/18/18 0400   BP: 100/54 106/51 119/59 105/52   Pulse: 63 68 68 60   Resp: 18 12 16 16   Temp: 37.1 °C (98.7 °F) 36.9 °C (98.5 °F) 37.2 °C (98.9 °F) 37.1 °C (98.8 °F)   SpO2: 94% 94% 95% 92%   Weight:    58.5 kg (128 lb 15.5 oz)   Height:           Intake/Output Summary (Last 24 hours) at 07/18/18 0736  Last data filed at 07/18/18 0400   Gross per 24 hour   Intake             1490 ml   Output             1275 ml   Net              215 ml       PHYSICAL EXAM:  General: No acute distress, afebrile, resting comfortably, no pallor  HEENT: NC/AT. EOMI. MMM, neck supple without adenopathy  Cardiovascular: RRR, normal S1/S2 no murmurs rubs, or gallops, cap refill brisk, no swelling or edema present  Respiratory: CTAB, no tachypnea or retractions  Abdomen: lower quadrants are slightly distended; slight pain to deep palpation; bowel sounds present  EXT:  Healing surgical incision on left elbow; erythema about 0.5 inches from incision; mild swelling of " left elbow joint; pulses 2+ DP and radial bilaterally; depuytren's contracture on left hand;   Neuro: Slightly increased sensation in left forearm dorsal surface; hand dermatomal distributions are equal bilaterally with no change in sensation  : No costovertebral angle tenderness; no suprapubic tenderness; small erythematous skin lesion on dorsal tip of penis lying under the foreskin; tip is slightly painful to palpation    LABS:  Recent Labs      07/16/18 0226 07/17/18 0328  07/18/18   0402   WBC  7.2  7.2  5.6   RBC  3.87*  3.55*  3.54*   HEMOGLOBIN  10.0*  9.3*  9.4*   HEMATOCRIT  32.0*  29.3*  29.6*   MCV  82.7  82.5  83.6   MCH  25.8*  26.2*  26.6*   RDW  57.7*  58.4*  59.2*   PLATELETCT  208  211  195   MPV  8.9*  8.7*  8.9*   NEUTSPOLYS  65.30   --   56.80   LYMPHOCYTES  24.80   --   27.70   MONOCYTES  6.90   --   10.60   EOSINOPHILS  2.00   --   3.80   BASOPHILS  0.30   --   0.40     Recent Labs      07/16/18 0226 07/17/18   0328   SODIUM  142  142   POTASSIUM  4.1  3.9   CHLORIDE  111  109   CO2  26  28   BUN  13  10   CREATININE  0.75  0.88   CALCIUM  8.3*  8.1*     Estimated GFR/CRCL = Estimated Creatinine Clearance: 59.1 mL/min (by C-G formula based on SCr of 0.88 mg/dL).  Recent Labs      07/16/18 0226 07/17/18   0328   GLUCOSE  104*  114*       IMAGING:  No recent imaging    MEDS:  Current Facility-Administered Medications   Medication Last Dose   • oxyCODONE immediate-release (ROXICODONE) tablet 7.5 mg 7.5 mg at 07/18/18 0607   • gabapentin (NEURONTIN) capsule 400 mg 400 mg at 07/18/18 0608   • cefTRIAXone (ROCEPHIN) 1 g in  mL IVPB Stopped at 07/18/18 0636   • nystatin/triamcinolone (MYCOLOG) 603522-0.1 UNIT/GM-% cream     • ferrous sulfate tablet 325 mg 325 mg at 07/18/18 0608   • lactobacillus granules (LACTINEX/FLORANEX) packet 1 Packet 1 Packet at 07/17/18 1726   • lamoTRIgine (LAMICTAL) tablet 200 mg 200 mg at 07/18/18 0607   • potassium chloride SA (Kdur) tablet 20 mEq 20 mEq  at 07/18/18 0608   • tamsulosin (FLOMAX) capsule 0.4 mg 0.4 mg at 07/18/18 0607   • labetalol (NORMODYNE,TRANDATE) injection 10 mg     • vancomycin 50 mg/mL oral soln 125 mg 125 mg at 07/18/18 0606    Followed by   • [START ON 7/26/2018] vancomycin 50 mg/mL oral soln 125 mg      Followed by   • [START ON 8/3/2018] vancomycin 50 mg/mL oral soln 125 mg      Followed by   • [START ON 8/10/2018] vancomycin 50 mg/mL oral soln 125 mg      Followed by   • [START ON 8/18/2018] vancomycin 50 mg/mL oral soln 125 mg     • bacitracin ointment     • famotidine (PEPCID) tablet 20 mg 20 mg at 07/17/18 1726       ASSESSMENT/PLAN:    #C. difficile infection --> Appears to be controlled by the oral vancomycin. Patient continues improvement daily.   -Continue vancomycin (125, PO, [id number])   -Stool transplant scheduled for September   -Monitor BP/CMP    #Surgical Site Infection --> Improving. Erythema around the incision is reduced and continues to be painless. Appears to be controlled by IV ceftriaxone.   -Discontinue IV antibiotics and consider transitioning the patient to PO antibiotics after his discharge   -Follow up with Dr. Gonzalez and family clinic in a week    #UTI --> Patient has progressed well since starting treatment with the above-mentioned IV antibiotics. Most recent urine culture noted minimal growth of mixed skin miranda. The bleeding during/after urination merits future workup if it does not heal in the next week or so.   -Instruct patient to follow up in the outpatient clinic with family medicine or with his urologist if the bleeding continues    #Penile rash --> Mild yeast infection on the dorsal tip of the penis.   -Instruct patient regarding adequate hygiene and use of antifungal topical cream   -Follow up at the outpatient clinic in a week if no improvement    #Anemia --> H&H improved from yesterday and patient is asymptomatic.    -No further workup necessary unless patient becomes sypmtomatic.   -Continue iron  supplementation      Disposition: Patient is cleared to be discharged today and will continue on PO antibiotics. He has been instructed to follow up with family medicine in the outpatient clinic in a week from now.

## 2018-07-18 NOTE — PROGRESS NOTES
"   Orthopaedic Progress Note    Interval changes:  Patient doing well- reports no change  Min erythema of left elbow unchanged from yesterday    ROS - Patient denies any new issues.  Pain well controlled.    Blood pressure 119/59, pulse 68, temperature 37.2 °C (98.9 °F), resp. rate 16, height 1.727 m (5' 7.99\"), weight 61.8 kg (136 lb 3.9 oz), SpO2 95 %.      Patient seen and examined  No acute distress  Breathing non labored  RRR  Left elbow erythema border traced, amount of erythema seems unchanged.     Recent Labs      07/15/18   0436  07/16/18   0226  07/17/18   0328   WBC  8.4  7.2  7.2   RBC  3.83*  3.87*  3.55*   HEMOGLOBIN  10.0*  10.0*  9.3*   HEMATOCRIT  31.9*  32.0*  29.3*   MCV  83.3  82.7  82.5   MCH  26.1*  25.8*  26.2*   MCHC  31.3*  31.3*  31.7*   RDW  58.4*  57.7*  58.4*   PLATELETCT  181  208  211   MPV  8.9*  8.9*  8.7*       There are no active hospital problems to display for this patient.      Assessment/Plan:  Patient doing well  Continue Abx  Continued erythema   Ortho to continue to follow      "

## 2018-07-18 NOTE — PROGRESS NOTES
Discharge ordered. Paperwork completed and explained to pt. Went over medications. Prescriptions written and given to pt. Appt to be scheduled by pt. Follow up information provided.  Pt verbalized understanding of instructions and had no further questions. Pt left unit with CNA in . Pt has all belongings.

## 2018-07-18 NOTE — PROGRESS NOTES
Assumed care at 1900 received report from day shift RN.  ELIGIO&Ox4.    Pain well controlled with medications per MAR, denies nausea,   tolerating regular diet,   +voiding, + BM patient wears briefs and refuses to change them frequently.  Patient educated about the importance of good hygiene.  Patient is in iso for C-diff  Patient educated about changing gown and socks before ambulating the halls.      Patient is up self      Patient call light within reach, bed in the lowest position, hourly rounding in place.

## 2018-07-18 NOTE — DISCHARGE INSTRUCTIONS
Okay to have pt D/C once [facility] Attending rounds on pt. Please have him follow up with [facility] in 1-2 weeks and with GI as scheduled for his fecal transplant and with Ortho as instructed.    Discharge Instructions    Discharged to home by car with relative. Discharged via wheelchair, hospital escort: Yes.  Special equipment needed: Not Applicable    Be sure to schedule a follow-up appointment with your primary care doctor or any specialists as instructed.     Discharge Plan:   Diet Plan: Discussed  Activity Level: Discussed  Confirmed Follow up Appointment: Patient to Call and Schedule Appointment  Confirmed Symptoms Management: Discussed  Medication Reconciliation Updated: Yes  Influenza Vaccine Indication: Patient Refuses    I understand that a diet low in cholesterol, fat, and sodium is recommended for good health. Unless I have been given specific instructions below for another diet, I accept this instruction as my diet prescription.   Other diet: Regular, as tolerated    Special Instructions: None    · Is patient discharged on Warfarin / Coumadin?   No     Depression / Suicide Risk    As you are discharged from this [facility] facility, it is important to learn how to keep safe from harming yourself.    Recognize the warning signs:  · Abrupt changes in personality, positive or negative- including increase in energy   · Giving away possessions  · Change in eating patterns- significant weight changes-  positive or negative  · Change in sleeping patterns- unable to sleep or sleeping all the time   · Unwillingness or inability to communicate  · Depression  · Unusual sadness, discouragement and loneliness  · Talk of wanting to die  · Neglect of personal appearance   · Rebelliousness- reckless behavior  · Withdrawal from people/activities they love  · Confusion- inability to concentrate     If you or a loved one observes any of these behaviors or has concerns about self-harm, here's what you can  do:  · Talk about it- your feelings and reasons for harming yourself  · Remove any means that you might use to hurt yourself (examples: pills, rope, extension cords, firearm)  · Get professional help from the community (Mental Health, Substance Abuse, psychological counseling)  · Do not be alone:Call your Safe Contact- someone whom you trust who will be there for you.  · Call your local CRISIS HOTLINE 743-4028 or 976-102-0862  · Call your local Children's Mobile Crisis Response Team [place] (056) 856-9356 or wwwSinch  · Call the toll free National Suicide Prevention Hotlines   · National Suicide Prevention Lifeline 650-491-ZQJH (7132)  · [facility] Line Network 800-SUICIDE (995-3297)      Clostridium Difficile Infection  Introduction   Clostridium difficile (C. difficile or C. diff) infection causes inflammation of the large intestine (colon). This condition can result in damage to the lining of your colon and may lead to another condition called colitis. This infection can be passed from person to person (is contagious).  Follow these instructions at home:  Eating and drinking  · Drink enough fluid to keep your pee (urine) clear or pale yellow.  · Avoid drinking:  ¨ Milk.  ¨ Caffeine.  ¨ Alcohol.  · Follow exact instructions from your doctor about how to get enough fluid in your body (rehydrate).  · Eat small meals often instead of large meals.  Medicines  · Take your antibiotic medicine as told by your doctor. Do not stop taking the antibiotic even if you start to feel better unless your doctor told you to do that.  · Take over-the-counter and prescription medicines only as told by your doctor.  · Do not use medicines to help with watery poop (diarrhea).  General instructions  · Wash your hands fully before you prepare food and after you use the bathroom. Make sure people who live with you also wash their  · hands often.  · Clean the surfaces that you touch. Use a product that contains  chlorine bleach.  · Keep all follow-up visits as told by your doctor. This is important.  Contact a doctor if:  · Your symptoms do not get better with treatment.  · Your symptoms get worse with treatment.  · Your symptoms go away and then come back.  · You have a fever.  · You have new symptoms.  Get help right away if:  · You have more pain or tenderness in your belly (abdomen).  · Your poop (stool) is mostly bloody.  · Your poop looks dark black and tarry.  · You cannot eat or drink without throwing up (vomiting).  · You have signs of dehydration, such as:  ¨ Dark pee, very little pee, or no pee.  ¨ Cracked lips.  ¨ Not making tears when you cry.  ¨ Dry mouth.  ¨ Sunken eyes.  ¨ Feeling sleepy.  ¨ Feeling weak.  ¨ Feeling dizzy.  This information is not intended to replace advice given to you by your health care provider. Make sure you discuss any questions you have with your health care provider.  Document Released: 10/15/2010 Document Revised: 05/25/2017 Document Reviewed: 06/20/2016  © 2017 [employer]      Incision Care, Adult  An incision is a surgical cut that is made through your skin. Most incisions are closed after surgery. Your incision may be closed with stitches (sutures), staples, skin glue, or adhesive strips. You may need to return to your health care provider to have sutures or staples removed. This may occur several days to several weeks after your surgery. The incision needs to be cared for properly to prevent infection.  How to care for your incision  Incision care   · Follow instructions from your health care provider about how to take care of your incision. Make sure you:  ¨ Wash your hands with soap and water before you change the bandage (dressing). If soap and water are not available, use hand .  ¨ Change your dressing as told by your health care provider.  ¨ Leave sutures, skin glue, or adhesive strips in place. These skin closures may need to stay in place for 2 weeks or longer. If  adhesive strip edges start to loosen and curl up, you may trim the loose edges. Do not remove adhesive strips completely unless your health care provider tells you to do that.  · Check your incision area every day for signs of infection. Check for:  ¨ More redness, swelling, or pain.  ¨ More fluid or blood.  ¨ Warmth.  ¨ Pus or a bad smell.  · Ask your health care provider how to clean the incision. This may include:  ¨ Using mild soap and water.  ¨ Using a clean towel to pat the incision dry after cleaning it.  ¨ Applying a cream or ointment. Do this only as told by your health care provider.  ¨ Covering the incision with a clean dressing.  · Ask your health care provider when you can leave the incision uncovered.  · Do not take baths, swim, or use a hot tub until your health care provider approves. Ask your health care provider if you can take showers. You may only be allowed to take sponge baths for bathing.  Medicines  · If you were prescribed an antibiotic medicine, cream, or ointment, take or apply the antibiotic as told by your health care provider. Do not stop taking or applying the antibiotic even if your condition improves.  · Take over-the-counter and prescription medicines only as told by your health care provider.  General instructions  · Limit movement around your incision to improve healing.  ¨ Avoid straining, lifting, or exercise for the first month, or for as long as told by your health care provider.  ¨ Follow instructions from your health care provider about returning to your normal activities.  ¨ Ask your health care provider what activities are safe.  · Protect your incision from the sun when you are outside for the first 6 months, or for as long as told by your health care provider. Apply sunscreen around the scar or cover it up.  · Keep all follow-up visits as told by your health care provider. This is important.  Contact a health care provider if:  · Your have more redness, swelling, or pain  around the incision.  · You have more fluid or blood coming from the incision.  · Your incision feels warm to the touch.  · You have pus or a bad smell coming from the incision.  · You have a fever or shaking chills.  · You are nauseous or you vomit.  · You are dizzy.  · Your sutures or staples come undone.  Get help right away if:  · You have a red streak coming from your incision.  · Your incision bleeds through the dressing and the bleeding does not stop with gentle pressure.  · The edges of your incision open up and separate.  · You have severe pain.  · You have a rash.  · You are confused.  · You faint.  · You have trouble breathing and a fast heartbeat.  This information is not intended to replace advice given to you by your health care provider. Make sure you discuss any questions you have with your health care provider.  Document Released: 07/07/2006 Document Revised: 08/25/2017 Document Reviewed: 07/05/2017  [employer] Interactive Patient Education © 2017 Elsevier Inc.

## 2018-07-19 ENCOUNTER — PATIENT OUTREACH (OUTPATIENT)
Dept: HEALTH INFORMATION MANAGEMENT | Facility: OTHER | Age: 78
End: 2018-07-19

## 2018-08-20 ENCOUNTER — PATIENT OUTREACH (OUTPATIENT)
Dept: HEALTH INFORMATION MANAGEMENT | Facility: OTHER | Age: 78
End: 2018-08-20

## 2018-08-20 NOTE — PROGRESS NOTES
Patient Marcelo Colon was admitted to [facility] on 7/12/18 for C. Diff and UTI.  The patient was discharged on 7/18/18 and instructed to follow up with his PCP. [facility] confirmed that the patient successfully filled his discharge medications and the start of  [facility] on 7/19.  [facility] also confirmed the patient kept his PCP appointment on 7/20/18.  In the next few months the patient is hoping to travel to the [place] area for a needle Aponeurotomy procedure. [facility] Patient Advocate assisted the patient in locating facilities in the [place] area that perform this procedure and contacted [facility] regarding Pre-Authorizations and procedure for out of area referrals.  The patient will be requesting a referral from his PCP.  The patient is also scheduled for a Fecal Transplant procedure on 9/20/18.  PPS 70%

## 2018-09-20 ENCOUNTER — HOSPITAL ENCOUNTER (OUTPATIENT)
Facility: MEDICAL CENTER | Age: 78
End: 2018-09-20
Attending: INTERNAL MEDICINE | Admitting: INTERNAL MEDICINE
Payer: MEDICARE

## 2018-09-20 VITALS
BODY MASS INDEX: 19.31 KG/M2 | RESPIRATION RATE: 14 BRPM | SYSTOLIC BLOOD PRESSURE: 138 MMHG | OXYGEN SATURATION: 100 % | DIASTOLIC BLOOD PRESSURE: 40 MMHG | TEMPERATURE: 98.3 F | HEIGHT: 68 IN | WEIGHT: 127.43 LBS | HEART RATE: 55 BPM

## 2018-09-20 PROCEDURE — 160035 HCHG PACU - 1ST 60 MINS PHASE I: Performed by: INTERNAL MEDICINE

## 2018-09-20 PROCEDURE — 160048 HCHG OR STATISTICAL LEVEL 1-5: Performed by: INTERNAL MEDICINE

## 2018-09-20 PROCEDURE — 700101 HCHG RX REV CODE 250: Performed by: ANESTHESIOLOGY

## 2018-09-20 PROCEDURE — 160025 RECOVERY II MINUTES (STATS): Performed by: INTERNAL MEDICINE

## 2018-09-20 PROCEDURE — 160046 HCHG PACU - 1ST 60 MINS PHASE II: Performed by: INTERNAL MEDICINE

## 2018-09-20 PROCEDURE — 502000 HCHG MISC OR IMPLANTS RC 0278: Performed by: INTERNAL MEDICINE

## 2018-09-20 PROCEDURE — 160204 HCHG ENDO MINUTES - 1ST 30 MINS LEVEL 5: Performed by: INTERNAL MEDICINE

## 2018-09-20 PROCEDURE — 160209 HCHG ENDO MINUTES - EA ADDL 1 MIN LEVEL 5: Performed by: INTERNAL MEDICINE

## 2018-09-20 PROCEDURE — 160002 HCHG RECOVERY MINUTES (STAT): Performed by: INTERNAL MEDICINE

## 2018-09-20 PROCEDURE — 700101 HCHG RX REV CODE 250

## 2018-09-20 PROCEDURE — 160009 HCHG ANES TIME/MIN: Performed by: INTERNAL MEDICINE

## 2018-09-20 PROCEDURE — 88305 TISSUE EXAM BY PATHOLOGIST: CPT

## 2018-09-20 PROCEDURE — 700111 HCHG RX REV CODE 636 W/ 250 OVERRIDE (IP)

## 2018-09-20 RX ORDER — MEPERIDINE HYDROCHLORIDE 25 MG/ML
6.25 INJECTION INTRAMUSCULAR; INTRAVENOUS; SUBCUTANEOUS
Status: DISCONTINUED | OUTPATIENT
Start: 2018-09-20 | End: 2018-09-20 | Stop reason: HOSPADM

## 2018-09-20 RX ORDER — GABAPENTIN 400 MG/1
400 CAPSULE ORAL 3 TIMES DAILY
Status: ON HOLD | COMMUNITY
End: 2019-08-10

## 2018-09-20 RX ORDER — SODIUM CHLORIDE, SODIUM GLUCONATE, SODIUM ACETATE, POTASSIUM CHLORIDE AND MAGNESIUM CHLORIDE 526; 502; 368; 37; 30 MG/100ML; MG/100ML; MG/100ML; MG/100ML; MG/100ML
500 INJECTION, SOLUTION INTRAVENOUS ONCE
Status: DISCONTINUED | OUTPATIENT
Start: 2018-09-20 | End: 2018-09-20 | Stop reason: HOSPADM

## 2018-09-20 RX ORDER — ALBUTEROL SULFATE 2.5 MG/3ML
2.5 SOLUTION RESPIRATORY (INHALATION)
Status: DISCONTINUED | OUTPATIENT
Start: 2018-09-20 | End: 2018-09-20 | Stop reason: HOSPADM

## 2018-09-20 RX ORDER — OXYCODONE HCL 5 MG/5 ML
10 SOLUTION, ORAL ORAL
Status: DISCONTINUED | OUTPATIENT
Start: 2018-09-20 | End: 2018-09-20 | Stop reason: HOSPADM

## 2018-09-20 RX ORDER — OXYCODONE AND ACETAMINOPHEN 7.5; 325 MG/1; MG/1
1 TABLET ORAL EVERY 8 HOURS PRN
COMMUNITY
End: 2023-08-28

## 2018-09-20 RX ORDER — SODIUM CHLORIDE, SODIUM LACTATE, POTASSIUM CHLORIDE, CALCIUM CHLORIDE 600; 310; 30; 20 MG/100ML; MG/100ML; MG/100ML; MG/100ML
INJECTION, SOLUTION INTRAVENOUS CONTINUOUS
Status: DISCONTINUED | OUTPATIENT
Start: 2018-09-20 | End: 2018-09-20

## 2018-09-20 RX ORDER — ONDANSETRON 2 MG/ML
4 INJECTION INTRAMUSCULAR; INTRAVENOUS
Status: DISCONTINUED | OUTPATIENT
Start: 2018-09-20 | End: 2018-09-20 | Stop reason: HOSPADM

## 2018-09-20 RX ORDER — LIDOCAINE HYDROCHLORIDE 10 MG/ML
INJECTION, SOLUTION INFILTRATION; PERINEURAL
Status: COMPLETED
Start: 2018-09-20 | End: 2018-09-20

## 2018-09-20 RX ORDER — HYDRALAZINE HYDROCHLORIDE 20 MG/ML
5 INJECTION INTRAMUSCULAR; INTRAVENOUS
Status: DISCONTINUED | OUTPATIENT
Start: 2018-09-20 | End: 2018-09-20 | Stop reason: HOSPADM

## 2018-09-20 RX ORDER — NALOXONE HYDROCHLORIDE 0.4 MG/ML
0.1 INJECTION, SOLUTION INTRAMUSCULAR; INTRAVENOUS; SUBCUTANEOUS PRN
Status: DISCONTINUED | OUTPATIENT
Start: 2018-09-20 | End: 2018-09-20 | Stop reason: HOSPADM

## 2018-09-20 RX ORDER — LIDOCAINE HYDROCHLORIDE 10 MG/ML
0.5 INJECTION, SOLUTION INFILTRATION; PERINEURAL
Status: DISCONTINUED | OUTPATIENT
Start: 2018-09-20 | End: 2018-09-20 | Stop reason: HOSPADM

## 2018-09-20 RX ORDER — OXYCODONE HCL 5 MG/5 ML
5 SOLUTION, ORAL ORAL
Status: DISCONTINUED | OUTPATIENT
Start: 2018-09-20 | End: 2018-09-20 | Stop reason: HOSPADM

## 2018-09-20 RX ORDER — HALOPERIDOL 5 MG/ML
1 INJECTION INTRAMUSCULAR
Status: DISCONTINUED | OUTPATIENT
Start: 2018-09-20 | End: 2018-09-20 | Stop reason: HOSPADM

## 2018-09-20 RX ORDER — LABETALOL HYDROCHLORIDE 5 MG/ML
5 INJECTION, SOLUTION INTRAVENOUS
Status: DISCONTINUED | OUTPATIENT
Start: 2018-09-20 | End: 2018-09-20 | Stop reason: HOSPADM

## 2018-09-20 RX ADMIN — LIDOCAINE HYDROCHLORIDE 0.5 ML: 10 INJECTION, SOLUTION INFILTRATION; PERINEURAL at 09:53

## 2018-09-20 RX ADMIN — SODIUM CHLORIDE, SODIUM LACTATE, POTASSIUM CHLORIDE, CALCIUM CHLORIDE: 600; 310; 30; 20 INJECTION, SOLUTION INTRAVENOUS at 09:53

## 2018-09-20 ASSESSMENT — PAIN SCALES - GENERAL
PAINLEVEL_OUTOF10: 0

## 2018-09-20 NOTE — DISCHARGE INSTRUCTIONS
ACTIVITY: Rest and take it easy for the first 24 hours.  A responsible adult is recommended to remain with you during that time.  It is normal to feel sleepy.  We encourage you to not do anything that requires balance, judgment or coordination.    MILD FLU-LIKE SYMPTOMS ARE NORMAL. YOU MAY EXPERIENCE GENERALIZED MUSCLE ACHES, THROAT IRRITATION, HEADACHE AND/OR SOME NAUSEA.    FOR 24 HOURS DO NOT:  Drive, operate machinery or run household appliances.  Drink beer or alcoholic beverages.   Make important decisions or sign legal documents.    SPECIAL INSTRUCTIONS:   Colonoscopy, Care After  Refer to this sheet in the next few weeks. These instructions provide you with information on caring for yourself after your procedure. Your health care provider may also give you more specific instructions. Your treatment has been planned according to current medical practices, but problems sometimes occur. Call your health care provider if you have any problems or questions after your procedure.  WHAT TO EXPECT AFTER THE PROCEDURE   After your procedure, it is typical to have the following:  · A small amount of blood in your stool.  · Moderate amounts of gas and mild abdominal cramping or bloating.  HOME CARE INSTRUCTIONS  · Do not drive, operate machinery, or sign important documents for 24 hours.  · You may shower and resume your regular physical activities, but move at a slower pace for the first 24 hours.  · Take frequent rest periods for the first 24 hours.  · Walk around or put a warm pack on your abdomen to help reduce abdominal cramping and bloating.  · Drink enough fluids to keep your urine clear or pale yellow.  · You may resume your normal diet as instructed by your health care provider. Avoid heavy or fried foods that are hard to digest.  · Avoid drinking alcohol for 24 hours or as instructed by your health care provider.  · Only take over-the-counter or prescription medicines as directed by your health care  provider.  · If a tissue sample (biopsy) was taken during your procedure:  ¨ Do not take aspirin or blood thinners for 7 days, or as instructed by your health care provider.  ¨ Do not drink alcohol for 7 days, or as instructed by your health care provider.  ¨ Eat soft foods for the first 24 hours.  SEEK MEDICAL CARE IF:  You have persistent spotting of blood in your stool 2-3 days after the procedure.  SEEK IMMEDIATE MEDICAL CARE IF:  · You have more than a small spotting of blood in your stool.  · You pass large blood clots in your stool.  · Your abdomen is swollen (distended).  · You have nausea or vomiting.  · You have a fever.  · You have increasing abdominal pain that is not relieved with medicine.     This information is not intended to replace advice given to you by your health care provider. Make sure you discuss any questions you have with your health care provider.     Document Released: 08/01/2005 Document Revised: 10/08/2014 Document Reviewed: 08/25/2014  [employer] Interactive Patient Education ©2016 Elsevier Inc.    Fecal Transplant  Fecal transplant (fecal microbiota transplantation, or FMT) is a procedure to replace helpful bacteria in your gastrointestinal (GI) tract. The procedure may be done if the bacteria in your GI tract have gotten out of balance. This can occur after you take antibiotic medicine because this medicine may destroy helpful bacteria as well as the harmful bacteria that cause infection. This imbalance can allow the harmful bacteria to overgrow. Putting helpful bacteria into your body can restore the balance.  You may need this procedure if:  · You have an infection caused by a type of bacteria called Clostridium difficile (C. difficile) and other treatments have not helped.  · You have a C. difficile infection that occurs repeatedly.  For this procedure, your health care provider uses a solution made from the stool sample (fecal matter) of a healthy donor. Donors are thoroughly  screened and tested before providing a sample to be used in a fecal transplant. The sample is mixed with saline or another type of solution (infusion) before it is put into your body. During the procedure, the sample may be placed in:  · The last part of your lower intestine (colon). This may be done using a thin, flexible tube inserted into your anus (colonoscope or sigmoidoscope) or using a bulb syringe (enema).  · Your upper GI tract. This may be done using a thin, flexible tube (nasogastric tube or endoscope) inserted through your nose or mouth. It can sometimes be done by having you swallow specially designed capsules.  Tell a health care provider about:  · Any bowel problems you have had.  · Any allergies you have.  · All medicines you are taking, including vitamins, herbs, eye drops, creams, and over-the-counter medicines.  · Any problems you or family members have had with anesthetic medicines.  · Any blood disorders you have.  · Any surgeries you have had.  · Any medical conditions you have.  What are the risks?  Generally, this is a safe procedure. However, problems may occur, including:  · Infection.  · Bleeding in the GI tract.  · Allergic reactions to medicines.  · Abdominal pain.  · Gas or bloating.  · Nausea or vomiting.  · Sore throat from a nasogastric (NG) tube or endoscope.  What happens before the procedure?  · Ask your health care provider about:  ¨ Changing or stopping your regular medicines. This is especially important if you are taking diabetes medicines or blood thinners.  ¨ Taking medicines such as aspirin and ibuprofen. These medicines can thin your blood. Do not take these medicines before your procedure if your health care provider instructs you not to.  · Follow instructions from your health care provider about eating or drinking restrictions.  · You may be asked to take a laxative or use an enema before the procedure (bowel prep).  · Plan to have someone take you home from the  hospital or clinic.  · If you will be going home right after the procedure, plan to have someone with you for 24 hours.  What happens during the procedure?  This procedure may vary among health care providers and hospitals. You may be given a medicine to help you relax (sedative). The fecal solution can be placed using any of the following methods.  Colonoscopy or sigmoidoscopy   Your health care provider will insert the colonoscope or sigmoidoscope into your anus and advance it into your colon to place the solution. The scope may be flushed with saline solution. This may be repeated until enough of the solution is properly placed in your colon.  NG tube insertion or endoscopy  · Your health care provider will insert the NG tube through your nose or insert the endoscope through your mouth. The tube or scope will be advanced down through your stomach to your small intestines. If an NG tube is being used, an X-ray may be done to confirm that the tube is in the right place.  · The solution will be given through the NG tube or endoscope.  · The tube or scope may be flushed with saline solution.  Enema   Your health care provider will insert a syringe into your rectum to deliver the fecal solution.  What happens after the procedure?  · Your blood pressure, heart rate, breathing rate, and blood oxygen level will be monitored often until the medicines you were given have worn off.  · Do not drive for 24 hours if you received a sedative.  This information is not intended to replace advice given to you by your health care provider. Make sure you discuss any questions you have with your health care provider.  Document Released: 05/04/2017 Document Revised: 05/31/2017 Document Reviewed: 05/04/2017  Elsevier Interactive Patient Education © 2017 [employer]          DIET: To avoid nausea, slowly advance diet as tolerated, avoiding spicy or greasy foods for the first day.  Add more substantial food to your diet according to  your physician's instructions.  B INCREASE FLUIDS AND FIBER TO AVOID CONSTIPATION.    SURGICAL DRESSING/BATHING: Follow MD instruction    FOLLOW-UP APPOINTMENT:  A follow-up appointment should be arranged with your doctor in 1-2 weeks; call to schedule.    You should CALL YOUR PHYSICIAN if you develop:  Fever greater than 101 degrees F.  Pain not relieved by medication, or persistent nausea or vomiting.  Excessive bleeding (blood soaking through dressing) or unexpected drainage from the wound.  Extreme redness or swelling around the incision site, drainage of pus or foul smelling drainage.  Inability to urinate or empty your bladder within 8 hours.  Problems with breathing or chest pain.    You should call 911 if you develop problems with breathing or chest pain.  If you are unable to contact your doctor or surgical center, you should go to the nearest emergency room or urgent care center.  Physician's telephone #: Dr. Johnson [phone]    If any questions arise, call your doctor.  If your doctor is not available, please feel free to call the Surgical Center at [phone].  The Center is open Monday through Friday from 7AM to 7PM.  You can also call the [facility] HOTLINE open 24 hours/day, 7 days/week and speak to a nurse at [phone], or toll free at [phone].    A registered nurse may call you a few days after your surgery to see how you are doing after your procedure.    MEDICATIONS: Resume taking daily medication.  Take prescribed pain medication with food.  If no medication is prescribed, you may take non-aspirin pain medication if needed.  PAIN MEDICATION CAN BE VERY CONSTIPATING.  Take a stool softener or laxative such as senokot, pericolace, or milk of magnesia if needed.    Prescription given for           .  Pain medication may be taken anytime.    If your physician has prescribed pain medication that includes Acetaminophen (Tylenol), do not take additional Acetaminophen (Tylenol) while taking  the prescribed medication.    Depression / Suicide Risk    As you are discharged from this [facility] facility, it is important to learn how to keep safe from harming yourself.    Recognize the warning signs:  · Abrupt changes in personality, positive or negative- including increase in energy   · Giving away possessions  · Change in eating patterns- significant weight changes-  positive or negative  · Change in sleeping patterns- unable to sleep or sleeping all the time   · Unwillingness or inability to communicate  · Depression  · Unusual sadness, discouragement and loneliness  · Talk of wanting to die  · Neglect of personal appearance   · Rebelliousness- reckless behavior  · Withdrawal from people/activities they love  · Confusion- inability to concentrate     If you or a loved one observes any of these behaviors or has concerns about self-harm, here's what you can do:  · Talk about it- your feelings and reasons for harming yourself  · Remove any means that you might use to hurt yourself (examples: pills, rope, extension cords, firearm)  · Get professional help from the community (Mental Health, Substance Abuse, psychological counseling)  · Do not be alone:Call your Safe Contact- someone whom you trust who will be there for you.  · Call your local CRISIS HOTLINE 038-9048 or 932-573-8678  · Call your local Children's Mobile Crisis Response Team [place] (558) 049-8570 or www.Readmill  · Call the toll free National Suicide Prevention Hotlines   · National Suicide Prevention Lifeline 043-426-LWGO (8772)  · National Hope Line Network 800-SUICIDE (118-0853)

## 2018-09-20 NOTE — OR NURSING
1107: received to PACU via gurney. Awake. Denies any pain  or nausea. Abdomen soft.  1134: sips of water given. Tolerated well.  1203: report called to Ana ARRIAGA  1206: transported via gurney to PACU 2. Stable.

## 2018-09-20 NOTE — OR SURGEON
Immediate Post OP Note    PreOp Diagnosis: Diarrhea/C diff    PostOp Diagnosis: Same    Procedure(s):  COLONOSCOPY - Wound Class: Clean Contaminated  FECAL TRANSPLANT - Wound Class: Clean Contaminated    Surgeon(s):  Skyler Johnson M.D.    Anesthesiologist/Type of Anesthesia:  Anesthesiologist: Lesli Hogue M.D./MAC    Surgical Staff:  Circulator: Jacky Anguiano R.N.  Endoscopy Technician: Saw Joseph    Specimens removed if any:  ID Type Source Tests Collected by Time Destination   A : random bx r/o microscopic colits Tissue Colon HISTOLOGY REQUEST Skyler Johnson M.D. 9/20/2018 10:41 AM        Estimated Blood Loss: none    Findings: 1) Sub-optimal prep.     Complications: None    FMT performed. Random biopsies performed to rule out microscopic colitis      EMO        9/20/2018 11:01 AM Skyler Johnson M.D.

## 2018-09-20 NOTE — OR NURSING
Pt's VSS; denies N/V; states no pain. D/c orders received. IV dc'd. Pt changed into clothing with assistance. Discharge instructions given; pt and family verbalized understanding and questions answered. Patient states ready to d/c home. No prescriptions given. Pt dc'd in w/c with CNA in stable condition.

## 2018-09-20 NOTE — PROCEDURES
DATE OF SERVICE:  09/20/2018    PROCEDURES PERFORMED:  Colonoscopy with biopsy report and fecal microbiota   transfer.    INDICATION FOR PROCEDURE:  Diarrhea and recurrent Clostridium difficile.    ENDOSCOPIST:  Skyler Johnson MD    MEDICATIONS:  Patient received monitored anesthesia care by Dr. Hogue.  Please   see anesthesia flow sheets for details.    PROCEDURE:  The patient was informed in detail of the indications as well as   the risks, the benefits, the alternatives of the procedure, and he indicates   understanding of all this and agrees to proceed.  Consent is signed and placed   on the chart.  After the patient was deemed adequately sedated, a standard   Olympus variable stiffness adult colonoscope was lubricated and gently placed   in the anus and carefully maneuvered all the way to the cecum.  Cecal   landmarks identified included the ileocecal valve.  There was some retained   formed stool in the cecal cap unable to see the appendiceal orifice.  Biopsies   were obtained throughout the colon on our way in to rule out microscopic   colitis as well.  Attempts were made to intubate the terminal ileum, but we   were unable to do so given some of the retained stool in the cecum.  Hence, we   proceed with planned fecal microbiota transfer and all samples were placed   via syringe into the cecum.  The scope was then slowly and carefully   withdrawn.  Attempts were made to look at mucosa; however, given the transfer   there was way too much to visualize anything at this point adequately.    Efforts were also made to suck out any excess air to maximize retention.    Retroflexion was not performed and patient will be transferred to the recovery   room and encouraged to take the Imodium that he was instructed to bring and   follow up to be announced, and I will send a pathology letter with the final   results.    COMPLICATIONS:  None.    TISSUE AND BIOPSIES:  Random colon biopsies.    THERAPY:  Fecal bilateral  transfer.    FINDINGS:  Suboptimal preparation result.    IMPRESSION, PLAN AND MEDICAL DECISION MAKIN.  Diarrhea.  The patient with chronic recurrent diarrhea attributed to   Clostridium difficile, now undergoing fecal transfer with good results.    Random biopsies obtained just in case we need to rule out microscopic colitis.  2.  Recurrent Clostridium difficile infection.  See discussion above.       ____________________________________     MD ROXANE MONK / CITLALY    DD:  2018 11:07:00  DT:  2018 11:21:35    D#:  [id number]  Job#:  [id number]

## 2018-09-20 NOTE — PROGRESS NOTES
Med rec updated and complete  Allergies reviewed  Pt reports that he was told to stop his VANCOMYCIN 125MG on 9/18/2018.  Pt started his VANCOMYCIN 125MG on 7/18/2018.

## 2018-11-07 ENCOUNTER — PATIENT OUTREACH (OUTPATIENT)
Dept: HEALTH INFORMATION MANAGEMENT | Facility: OTHER | Age: 78
End: 2018-11-07

## 2018-11-16 ENCOUNTER — TELEPHONE (OUTPATIENT)
Dept: HEALTH INFORMATION MANAGEMENT | Facility: OTHER | Age: 78
End: 2018-11-16

## 2018-11-28 ENCOUNTER — PATIENT OUTREACH (OUTPATIENT)
Dept: HEALTH INFORMATION MANAGEMENT | Facility: OTHER | Age: 78
End: 2018-11-28

## 2018-11-28 NOTE — PROGRESS NOTES
Follow up call attempt regarding the Community Care Management Program. No answer, no voicemail.     Plan: Will call again later in time.

## 2018-12-06 ENCOUNTER — PATIENT OUTREACH (OUTPATIENT)
Dept: HEALTH INFORMATION MANAGEMENT | Facility: OTHER | Age: 78
End: 2018-12-06

## 2018-12-07 NOTE — PROGRESS NOTES
Called and left message for patient regarding the Community Care Management Program. Left number for return call.     2. Attempt

## 2019-02-20 NOTE — ED NOTES
Estuardo Christian is a 72 y.o. male here for a non-provider visit for Testosterone injection.    Reason for injection: Low testosterone  Order in MAR?: Yes  Patient supplied?:Yes  Minimum interval has been met for this injection (per MAR order): Yes    Order and dose verified by: Glen  Patient tolerated injection and no adverse effects were observed or reported: Yes    # of Administrations remaining in MAR: 0   Dr. Grande at bedside. Pharmacist at bedside.

## 2019-04-08 NOTE — CARE PLAN
Problem: Infection  Goal: Will remain free from infection  Outcome: PROGRESSING AS EXPECTED  WBC 7.6. ID rounding on patient. Pt is on rocephin, fluconazole, flagyl and vanco. L arm PICC in place    Problem: Pain Management  Goal: Pain level will decrease to patient’s comfort goal  Outcome: PROGRESSING AS EXPECTED  Pt has a morphine PCA; pt encouraged to press button as needed     Problem: Urinary Elimination:  Goal: Ability to reestablish a normal urinary elimination pattern will improve  Outcome: PROGRESSING AS EXPECTED  Ruff d/c'd awaiting void     Yes

## 2019-06-25 NOTE — PROGRESS NOTES
Bedside report received.  Assessment complete.  A&O x 4. Patient calls appropriately.  Patient up with stand by assist.   Patient has 3/10 pain. Pain medication increased to Oxy 7.5mg and Gabapentin 400mg since that is what patient takes at home  Denies N&V. Tolerating regular diet.  Left elbow has gauze on, CDI, area is red and swollen.  + void, + flatus  Patient denies SOB.  Patient had multiple BM overnight, patient feels it is beginning to be more formed. Continuing to monitor urine and BM.  Review plan with of care with patient. Call light and personal belongings with in reach. Hourly rounding in place. All needs met at this time.   Refill passed per [facility] protocol.   Hypertensive Medications  Protocol Criteria:  · Appointment scheduled in the past 6 months or in the next 3 months  · BMP or CMP in the past 12 months  · Creatinine result < 2  Recent Outpatient Visits

## 2019-07-16 NOTE — CARE PLAN
07-Jul-2019 15:52 Problem: Infection  Goal: Will remain free from infection  Outcome: MET Date Met: 03/21/18      Problem: Bowel/Gastric:  Goal: Normal bowel function is maintained or improved  Outcome: NOT MET

## 2019-08-06 ENCOUNTER — HOSPITAL ENCOUNTER (INPATIENT)
Facility: MEDICAL CENTER | Age: 79
LOS: 4 days | DRG: 372 | End: 2019-08-10
Attending: EMERGENCY MEDICINE | Admitting: INTERNAL MEDICINE
Payer: MEDICARE

## 2019-08-06 DIAGNOSIS — E86.0 DEHYDRATION: ICD-10-CM

## 2019-08-06 DIAGNOSIS — R19.7 DIARRHEA, UNSPECIFIED TYPE: ICD-10-CM

## 2019-08-06 PROBLEM — Z66 DNR (DO NOT RESUSCITATE): Status: ACTIVE | Noted: 2019-08-06

## 2019-08-06 LAB
ALBUMIN SERPL BCP-MCNC: 4.6 G/DL (ref 3.2–4.9)
ALBUMIN/GLOB SERPL: 1.4 G/DL
ALP SERPL-CCNC: 107 U/L (ref 30–99)
ALT SERPL-CCNC: 6 U/L (ref 2–50)
ANION GAP SERPL CALC-SCNC: 10 MMOL/L (ref 0–11.9)
AST SERPL-CCNC: 12 U/L (ref 12–45)
BASOPHILS # BLD AUTO: 0.3 % (ref 0–1.8)
BASOPHILS # BLD: 0.03 K/UL (ref 0–0.12)
BILIRUB SERPL-MCNC: 1.9 MG/DL (ref 0.1–1.5)
BUN SERPL-MCNC: 16 MG/DL (ref 8–22)
C DIFF DNA SPEC QL NAA+PROBE: NORMAL
C DIFF TOX A+B STL QL IA: POSITIVE
C DIFF TOX GENS STL QL NAA+PROBE: NORMAL
CALCIUM SERPL-MCNC: 9.5 MG/DL (ref 8.5–10.5)
CHLORIDE SERPL-SCNC: 101 MMOL/L (ref 96–112)
CO2 SERPL-SCNC: 27 MMOL/L (ref 20–33)
CREAT SERPL-MCNC: 1.4 MG/DL (ref 0.5–1.4)
EOSINOPHIL # BLD AUTO: 0.01 K/UL (ref 0–0.51)
EOSINOPHIL NFR BLD: 0.1 % (ref 0–6.9)
ERYTHROCYTE [DISTWIDTH] IN BLOOD BY AUTOMATED COUNT: 46.4 FL (ref 35.9–50)
GLOBULIN SER CALC-MCNC: 3.3 G/DL (ref 1.9–3.5)
GLUCOSE SERPL-MCNC: 122 MG/DL (ref 65–99)
HCT VFR BLD AUTO: 48.3 % (ref 42–52)
HGB BLD-MCNC: 15.9 G/DL (ref 14–18)
IMM GRANULOCYTES # BLD AUTO: 0.04 K/UL (ref 0–0.11)
IMM GRANULOCYTES NFR BLD AUTO: 0.4 % (ref 0–0.9)
LACTATE BLD-SCNC: 1.3 MMOL/L (ref 0.5–2)
LIPASE SERPL-CCNC: <3 U/L (ref 11–82)
LYMPHOCYTES # BLD AUTO: 1.42 K/UL (ref 1–4.8)
LYMPHOCYTES NFR BLD: 13.1 % (ref 22–41)
MCH RBC QN AUTO: 31.4 PG (ref 27–33)
MCHC RBC AUTO-ENTMCNC: 32.9 G/DL (ref 33.7–35.3)
MCV RBC AUTO: 95.3 FL (ref 81.4–97.8)
MONOCYTES # BLD AUTO: 1.15 K/UL (ref 0–0.85)
MONOCYTES NFR BLD AUTO: 10.6 % (ref 0–13.4)
NEUTROPHILS # BLD AUTO: 8.18 K/UL (ref 1.82–7.42)
NEUTROPHILS NFR BLD: 75.5 % (ref 44–72)
NRBC # BLD AUTO: 0 K/UL
NRBC BLD-RTO: 0 /100 WBC
PLATELET # BLD AUTO: 174 K/UL (ref 164–446)
PMV BLD AUTO: 9.6 FL (ref 9–12.9)
POTASSIUM SERPL-SCNC: 4 MMOL/L (ref 3.6–5.5)
PROT SERPL-MCNC: 7.9 G/DL (ref 6–8.2)
RBC # BLD AUTO: 5.07 M/UL (ref 4.7–6.1)
SODIUM SERPL-SCNC: 138 MMOL/L (ref 135–145)
WBC # BLD AUTO: 10.8 K/UL (ref 4.8–10.8)

## 2019-08-06 PROCEDURE — 770021 HCHG ROOM/CARE - ISO PRIVATE

## 2019-08-06 PROCEDURE — 99285 EMERGENCY DEPT VISIT HI MDM: CPT

## 2019-08-06 PROCEDURE — 700102 HCHG RX REV CODE 250 W/ 637 OVERRIDE(OP): Performed by: INTERNAL MEDICINE

## 2019-08-06 PROCEDURE — 99223 1ST HOSP IP/OBS HIGH 75: CPT | Mod: 25 | Performed by: INTERNAL MEDICINE

## 2019-08-06 PROCEDURE — 99497 ADVNCD CARE PLAN 30 MIN: CPT | Performed by: INTERNAL MEDICINE

## 2019-08-06 PROCEDURE — 87324 CLOSTRIDIUM AG IA: CPT

## 2019-08-06 PROCEDURE — 96360 HYDRATION IV INFUSION INIT: CPT

## 2019-08-06 PROCEDURE — 80053 COMPREHEN METABOLIC PANEL: CPT

## 2019-08-06 PROCEDURE — 83690 ASSAY OF LIPASE: CPT

## 2019-08-06 PROCEDURE — 85025 COMPLETE CBC W/AUTO DIFF WBC: CPT

## 2019-08-06 PROCEDURE — 700105 HCHG RX REV CODE 258: Performed by: EMERGENCY MEDICINE

## 2019-08-06 PROCEDURE — 83605 ASSAY OF LACTIC ACID: CPT

## 2019-08-06 PROCEDURE — A9270 NON-COVERED ITEM OR SERVICE: HCPCS | Performed by: INTERNAL MEDICINE

## 2019-08-06 PROCEDURE — 700105 HCHG RX REV CODE 258: Performed by: INTERNAL MEDICINE

## 2019-08-06 PROCEDURE — 87493 C DIFF AMPLIFIED PROBE: CPT

## 2019-08-06 RX ORDER — SODIUM CHLORIDE 9 MG/ML
1000 INJECTION, SOLUTION INTRAVENOUS ONCE
Status: COMPLETED | OUTPATIENT
Start: 2019-08-06 | End: 2019-08-06

## 2019-08-06 RX ORDER — POLYETHYLENE GLYCOL 3350 17 G/17G
1 POWDER, FOR SOLUTION ORAL
Status: DISCONTINUED | OUTPATIENT
Start: 2019-08-06 | End: 2019-08-06

## 2019-08-06 RX ORDER — BISACODYL 10 MG
10 SUPPOSITORY, RECTAL RECTAL
Status: DISCONTINUED | OUTPATIENT
Start: 2019-08-06 | End: 2019-08-06

## 2019-08-06 RX ORDER — GABAPENTIN 400 MG/1
400 CAPSULE ORAL 3 TIMES DAILY
Status: DISCONTINUED | OUTPATIENT
Start: 2019-08-06 | End: 2019-08-09

## 2019-08-06 RX ORDER — TAMSULOSIN HYDROCHLORIDE 0.4 MG/1
0.4 CAPSULE ORAL
Status: DISCONTINUED | OUTPATIENT
Start: 2019-08-07 | End: 2019-08-10 | Stop reason: HOSPADM

## 2019-08-06 RX ORDER — AMOXICILLIN 250 MG
2 CAPSULE ORAL 2 TIMES DAILY
Status: DISCONTINUED | OUTPATIENT
Start: 2019-08-06 | End: 2019-08-06

## 2019-08-06 RX ORDER — ACETAMINOPHEN 325 MG/1
650 TABLET ORAL EVERY 6 HOURS PRN
Status: DISCONTINUED | OUTPATIENT
Start: 2019-08-06 | End: 2019-08-10 | Stop reason: HOSPADM

## 2019-08-06 RX ORDER — ALFUZOSIN HYDROCHLORIDE 10 MG/1
10 TABLET, EXTENDED RELEASE ORAL DAILY
Status: DISCONTINUED | OUTPATIENT
Start: 2019-08-06 | End: 2019-08-06

## 2019-08-06 RX ORDER — OXYCODONE HYDROCHLORIDE AND ACETAMINOPHEN 5; 325 MG/1; MG/1
1 TABLET ORAL EVERY 8 HOURS PRN
Status: DISCONTINUED | OUTPATIENT
Start: 2019-08-06 | End: 2019-08-07

## 2019-08-06 RX ORDER — SODIUM CHLORIDE 9 MG/ML
INJECTION, SOLUTION INTRAVENOUS CONTINUOUS
Status: DISCONTINUED | OUTPATIENT
Start: 2019-08-06 | End: 2019-08-09

## 2019-08-06 RX ADMIN — GABAPENTIN 400 MG: 400 CAPSULE ORAL at 20:21

## 2019-08-06 RX ADMIN — OXYCODONE HYDROCHLORIDE AND ACETAMINOPHEN 1 TABLET: 5; 325 TABLET ORAL at 20:27

## 2019-08-06 RX ADMIN — VANCOMYCIN HYDROCHLORIDE 125 MG: 10 INJECTION, POWDER, LYOPHILIZED, FOR SOLUTION INTRAVENOUS at 20:21

## 2019-08-06 RX ADMIN — SODIUM CHLORIDE: 9 INJECTION, SOLUTION INTRAVENOUS at 20:27

## 2019-08-06 RX ADMIN — SODIUM CHLORIDE 1000 ML: 9 INJECTION, SOLUTION INTRAVENOUS at 13:36

## 2019-08-06 ASSESSMENT — COPD QUESTIONNAIRES
DURING THE PAST 4 WEEKS HOW MUCH DID YOU FEEL SHORT OF BREATH: NONE/LITTLE OF THE TIME
DO YOU EVER COUGH UP ANY MUCUS OR PHLEGM?: NO/ONLY WITH OCCASIONAL COLDS OR INFECTIONS
COPD SCREENING SCORE: 2
IN THE PAST 12 MONTHS DO YOU DO LESS THAN YOU USED TO BECAUSE OF YOUR BREATHING PROBLEMS: DISAGREE/UNSURE
HAVE YOU SMOKED AT LEAST 100 CIGARETTES IN YOUR ENTIRE LIFE: NO/DON'T KNOW

## 2019-08-06 ASSESSMENT — LIFESTYLE VARIABLES
HAVE PEOPLE ANNOYED YOU BY CRITICIZING YOUR DRINKING: NO
SUBSTANCE_ABUSE: 0
HAVE YOU EVER FELT YOU SHOULD CUT DOWN ON YOUR DRINKING: NO
EVER HAD A DRINK FIRST THING IN THE MORNING TO STEADY YOUR NERVES TO GET RID OF A HANGOVER: NO
ON A TYPICAL DAY WHEN YOU DRINK ALCOHOL HOW MANY DRINKS DO YOU HAVE: 2
DOES PATIENT WANT TO STOP DRINKING: NO
EVER_SMOKED: NEVER
HOW MANY TIMES IN THE PAST YEAR HAVE YOU HAD 5 OR MORE DRINKS IN A DAY: 0
TOTAL SCORE: 0
ALCOHOL_USE: YES
AVERAGE NUMBER OF DAYS PER WEEK YOU HAVE A DRINK CONTAINING ALCOHOL: 7
EVER FELT BAD OR GUILTY ABOUT YOUR DRINKING: NO
CONSUMPTION TOTAL: NEGATIVE
TOTAL SCORE: 0
TOTAL SCORE: 0

## 2019-08-06 ASSESSMENT — ENCOUNTER SYMPTOMS
WHEEZING: 0
DEPRESSION: 0
DIZZINESS: 0
SEIZURES: 0
ABDOMINAL PAIN: 0
SPEECH CHANGE: 0
DIARRHEA: 1
PND: 0
BACK PAIN: 0
COUGH: 0
BLURRED VISION: 0
NECK PAIN: 1
CONSTIPATION: 0
EYE PAIN: 0
SHORTNESS OF BREATH: 0
WEAKNESS: 0
NAUSEA: 0
CHILLS: 0
HEARTBURN: 0
FEVER: 0
WEIGHT LOSS: 0
FALLS: 0
VOMITING: 0
HEADACHES: 0
PALPITATIONS: 0
TREMORS: 0
SPUTUM PRODUCTION: 0

## 2019-08-06 ASSESSMENT — COGNITIVE AND FUNCTIONAL STATUS - GENERAL
MOBILITY SCORE: 24
SUGGESTED CMS G CODE MODIFIER DAILY ACTIVITY: CH
SUGGESTED CMS G CODE MODIFIER MOBILITY: CH
DAILY ACTIVITIY SCORE: 24

## 2019-08-06 ASSESSMENT — PATIENT HEALTH QUESTIONNAIRE - PHQ9
1. LITTLE INTEREST OR PLEASURE IN DOING THINGS: NOT AT ALL
2. FEELING DOWN, DEPRESSED, IRRITABLE, OR HOPELESS: NOT AT ALL
SUM OF ALL RESPONSES TO PHQ9 QUESTIONS 1 AND 2: 0

## 2019-08-06 NOTE — ED TRIAGE NOTES
"Chief Complaint   Patient presents with   • Abdominal Pain     cramping   • Diarrhea     History of c-dif and fecal transplant.  Last week on abx for UTI.  Today, \"diarrhea is pouring out of me.\"  Pt here for above complaint.  Connected to BP cuff, and continuous pulse ox upon arrival.  Pt in gown, call light in reach, bed in lowest position.  Chart up for ERP.    "

## 2019-08-06 NOTE — ED NOTES
Med rec updated and complete. Allergies reviewed. Met with pt at bedside and dicussed current medications and last doses taken.  Pt denies antibiotic use in last 14 days  Home pharmacy [facility] [address]

## 2019-08-06 NOTE — ED PROVIDER NOTES
"ED Provider Note    CHIEF COMPLAINT  Chief Complaint   Patient presents with   • Abdominal Pain     cramping   • Diarrhea       HPI  Marcelo Colon is a 78 y.o. male with a history of C. difficile, status post fecal transplant, GERD, enlarged prostate, colon abscess, status post resection with colostomy, status post takedown of the colostomy who presents with complaints of profuse diarrhea for the past 2 days.  The patient was recently on antibiotics secondary to a urinary tract infection, and finished antibiotics on Thursday.  He says he was feeling somewhat constipated so he took a Movantik that was given to him after his back surgery at [facility] earlier this year.  He had never taken the medication until Friday.  He said he started having some loose stools and diarrhea and thought that was secondary to the medication.  However he notes that the diarrhea has gotten much worse today and was near uncontrollable.  He has had some intermittent abdominal cramping.  He has had no fever, shaking chills, sore throat, cough, congestion, difficulty breathing.  He has had no bloody stools or black stools.    REVIEW OF SYSTEMS  See HPI for further details. All other systems are negative.     PAST MEDICAL HISTORY  Past Medical History:   Diagnosis Date   • Anesthesia     \"takes very little and takes forever to come out of it\"   • Anxiety disorder    • Arthritis     some in my back   • Bipolar disorder (HCC)    • Clostridium difficile infection    • Depression    • Enlarged prostate    • GERD (gastroesophageal reflux disease)    • Heart murmur    • Indigestion    • Neck pain        FAMILY HISTORY  No family history on file.    SOCIAL HISTORY  Social History     Socioeconomic History   • Marital status: Single     Spouse name: Not on file   • Number of children: Not on file   • Years of education: Not on file   • Highest education level: Not on file   Occupational History   • Not on file   Social Needs   • " Financial resource strain: Not on file   • Food insecurity:     Worry: Not on file     Inability: Not on file   • Transportation needs:     Medical: Not on file     Non-medical: Not on file   Tobacco Use   • Smoking status: Former Smoker     Packs/day: 2.00     Years: 14.00     Pack years: 28.00     Last attempt to quit: 3/21/1973     Years since quittin.4   • Smokeless tobacco: Never Used   Substance and Sexual Activity   • Alcohol use: Yes     Alcohol/week: 0.0 oz     Comment: 3 glasses wine/day   • Drug use: Yes     Comment: history of cocaine and marijuana use still about 10 years ago. Denies any IV drug use.   • Sexual activity: Not on file   Lifestyle   • Physical activity:     Days per week: Not on file     Minutes per session: Not on file   • Stress: Not on file   Relationships   • Social connections:     Talks on phone: Not on file     Gets together: Not on file     Attends Zoroastrian service: Not on file     Active member of club or organization: Not on file     Attends meetings of clubs or organizations: Not on file     Relationship status: Not on file   • Intimate partner violence:     Fear of current or ex partner: Not on file     Emotionally abused: Not on file     Physically abused: Not on file     Forced sexual activity: Not on file   Other Topics Concern   • Not on file   Social History Narrative   • Not on file       SURGICAL HISTORY  Past Surgical History:   Procedure Laterality Date   • COLONOSCOPY N/A 2018    Procedure: COLONOSCOPY;  Surgeon: Skyler Johnson M.D.;  Location: [facility];  Service: Gastroenterology   • FECAL TRANSPLANT N/A 2018    Procedure: FECAL TRANSPLANT;  Surgeon: Skyler Johnson M.D.;  Location: SURGERY [facility];  Service: Gastroenterology   • COLOSTOMY TAKEDOWN  3/21/2018    Procedure: COLOSTOMY TAKEDOWN;  Surgeon: Jorge Rodriguez M.D.;  Location: SURGERY [facility];  Service: General   • LUMBAR DECOMPRESSION  2017   •  "EXPLORATORY LAPAROTOMY  7/25/2017    Procedure: EXPLORATORY LAPAROTOMY- ABDOMINAL WASH OUT;  Surgeon: Jorge Rodriguez M.D.;  Location: SURGERY [facility];  Service:    • SIGMOID COLON RESECTION  7/25/2017    Procedure: SIGMOID COLON RESECTION;  Surgeon: Jorge Rodriguez M.D.;  Location: SURGERY [facility];  Service:    • COLOSTOMY  7/25/2017    Procedure: COLOSTOMY- FOR OSTOMY PLACEMENT AND OTHER PROCEDURES AS INDICATED;  Surgeon: Jorge Rodriguez M.D.;  Location: SURGERY [facility];  Service:    • APPENDECTOMY  7/25/2017    Procedure: APPENDECTOMY;  Surgeon: Jorge Rodriguez M.D.;  Location: SURGERY [facility];  Service:    • EXC./BIOPSY MASS BACK  1997       CURRENT MEDICATIONS  Home Medications    **Home medications have not yet been reviewed for this encounter**         ALLERGIES  Allergies   Allergen Reactions   • Acetaminophen Nausea     Very nauseated        PHYSICAL EXAM  VITAL SIGNS: /60   Pulse 88   Temp 36.5 °C (97.7 °F) (Temporal)   Resp 16   Ht 1.727 m (5' 8\")   Wt 61.2 kg (135 lb)   SpO2 95%   BMI 20.53 kg/m²   Constitutional: Awake, alert, in no acute distress, Non-toxic appearance.   HENT: Atraumatic. Bilateral external ears normal, mucous membranes dry, throat nonerythematous without exudates, nose is normal.  Eyes: PERRL, EOMI, conjunctiva moist, noninjected.  Neck: Nontender, Normal range of motion, No nuchal rigidity, No stridor.   Lymphatic: No lymphadenopathy noted.   Cardiovascular: Regular rate and rhythm, no murmurs, rubs, gallops.  Thorax & Lungs:  Good breath sounds bilaterally, no wheezes, rales, or retractions.  No chest tenderness.  Abdomen: Bowel sounds normal, Soft, nontender, nondistended, no rebound, guarding, masses.  Back: No CVA or spinal tenderness.  Extremities: Intact distal pulses, No edema, No tenderness.   Skin: Warm, Dry, No rashes.   Musculoskeletal: No joint swelling or tenderness.  Neurologic: Alert & oriented x 3, " "sensory and motor function normal. No focal deficits.   Psychiatric: Affect normal, Judgment normal, Mood normal.         RADIOLOGY/PROCEDURES  No orders to display         COURSE & MEDICAL DECISION MAKING  Pertinent Labs & Imaging studies reviewed. (See chart for details)  The patient presents with above complaints.  Clinically he has dry mucous membranes, appears dehydrated, and has had multiple episodes diarrhea.  IV is placed, he was given a bolus of normal saline.    HYDRATION: Based on the patient's presentation of Acute Diarrhea and Dehydration the patient was given IV fluids. IV Hydration was used because oral hydration was not adequate alone. Upon recheck following hydration, the patient was feeling improved.      The patient initially was unable to provide a stool specimen.  He was given some oral fluids and had an \"explosive\" episode of diarrhea per nursing.  Lab work shows a normal CBC, chemistry showed a increased creatinine 1.4 from previous of 0.88.  SGOT and SGPT normal, total bilirubin mildly elevated 1.9.  On reexamination the patient's abdomen remains very benign.  However given his continued diarrhea, and elevated creatinine, he will be admitted.  Stool specimen for C. difficile is pending.  Case discussed with Dr. Collins for admission.      FINAL IMPRESSION  1.  Diarrhea  2.  Dehydration  3.         Electronically signed by: Palomo Bustillo, 8/6/2019 1:23 PM    "

## 2019-08-06 NOTE — ED NOTES
"Pt refusing to keep nasal cannula in nares.  Pt states, \"the oxygen down here is not good for you.\"  Pt educated about use of oxygen and keeping spo2 between 94% and 99%.  Pt continues to refuse o2.  "

## 2019-08-07 LAB
ANION GAP SERPL CALC-SCNC: 8 MMOL/L (ref 0–11.9)
BASOPHILS # BLD AUTO: 0.4 % (ref 0–1.8)
BASOPHILS # BLD: 0.03 K/UL (ref 0–0.12)
BUN SERPL-MCNC: 21 MG/DL (ref 8–22)
CALCIUM SERPL-MCNC: 8.4 MG/DL (ref 8.5–10.5)
CHLORIDE SERPL-SCNC: 105 MMOL/L (ref 96–112)
CO2 SERPL-SCNC: 26 MMOL/L (ref 20–33)
CREAT SERPL-MCNC: 1.22 MG/DL (ref 0.5–1.4)
EOSINOPHIL # BLD AUTO: 0.08 K/UL (ref 0–0.51)
EOSINOPHIL NFR BLD: 1.1 % (ref 0–6.9)
ERYTHROCYTE [DISTWIDTH] IN BLOOD BY AUTOMATED COUNT: 48.2 FL (ref 35.9–50)
GLUCOSE SERPL-MCNC: 99 MG/DL (ref 65–99)
HCT VFR BLD AUTO: 40.5 % (ref 42–52)
HGB BLD-MCNC: 12.9 G/DL (ref 14–18)
IMM GRANULOCYTES # BLD AUTO: 0.02 K/UL (ref 0–0.11)
IMM GRANULOCYTES NFR BLD AUTO: 0.3 % (ref 0–0.9)
LYMPHOCYTES # BLD AUTO: 1.69 K/UL (ref 1–4.8)
LYMPHOCYTES NFR BLD: 22.2 % (ref 22–41)
MCH RBC QN AUTO: 31.1 PG (ref 27–33)
MCHC RBC AUTO-ENTMCNC: 31.9 G/DL (ref 33.7–35.3)
MCV RBC AUTO: 97.6 FL (ref 81.4–97.8)
MONOCYTES # BLD AUTO: 1 K/UL (ref 0–0.85)
MONOCYTES NFR BLD AUTO: 13.1 % (ref 0–13.4)
NEUTROPHILS # BLD AUTO: 4.79 K/UL (ref 1.82–7.42)
NEUTROPHILS NFR BLD: 62.9 % (ref 44–72)
NRBC # BLD AUTO: 0 K/UL
NRBC BLD-RTO: 0 /100 WBC
PLATELET # BLD AUTO: 153 K/UL (ref 164–446)
PMV BLD AUTO: 9.7 FL (ref 9–12.9)
POTASSIUM SERPL-SCNC: 3.7 MMOL/L (ref 3.6–5.5)
RBC # BLD AUTO: 4.15 M/UL (ref 4.7–6.1)
SODIUM SERPL-SCNC: 139 MMOL/L (ref 135–145)
WBC # BLD AUTO: 7.6 K/UL (ref 4.8–10.8)

## 2019-08-07 PROCEDURE — A9270 NON-COVERED ITEM OR SERVICE: HCPCS | Performed by: INTERNAL MEDICINE

## 2019-08-07 PROCEDURE — 700105 HCHG RX REV CODE 258: Performed by: INTERNAL MEDICINE

## 2019-08-07 PROCEDURE — A9270 NON-COVERED ITEM OR SERVICE: HCPCS | Performed by: HOSPITALIST

## 2019-08-07 PROCEDURE — 700102 HCHG RX REV CODE 250 W/ 637 OVERRIDE(OP): Performed by: INTERNAL MEDICINE

## 2019-08-07 PROCEDURE — 770021 HCHG ROOM/CARE - ISO PRIVATE

## 2019-08-07 PROCEDURE — 700111 HCHG RX REV CODE 636 W/ 250 OVERRIDE (IP): Performed by: INTERNAL MEDICINE

## 2019-08-07 PROCEDURE — 99233 SBSQ HOSP IP/OBS HIGH 50: CPT | Performed by: HOSPITALIST

## 2019-08-07 PROCEDURE — 85025 COMPLETE CBC W/AUTO DIFF WBC: CPT

## 2019-08-07 PROCEDURE — 80048 BASIC METABOLIC PNL TOTAL CA: CPT

## 2019-08-07 PROCEDURE — 36415 COLL VENOUS BLD VENIPUNCTURE: CPT

## 2019-08-07 PROCEDURE — 700102 HCHG RX REV CODE 250 W/ 637 OVERRIDE(OP): Performed by: HOSPITALIST

## 2019-08-07 RX ORDER — OXYCODONE HYDROCHLORIDE AND ACETAMINOPHEN 5; 325 MG/1; MG/1
1.5 TABLET ORAL EVERY 8 HOURS PRN
Status: DISCONTINUED | OUTPATIENT
Start: 2019-08-07 | End: 2019-08-09

## 2019-08-07 RX ORDER — LACTOBACILLUS RHAMNOSUS GG 10B CELL
1 CAPSULE ORAL
Status: DISCONTINUED | OUTPATIENT
Start: 2019-08-08 | End: 2019-08-10 | Stop reason: HOSPADM

## 2019-08-07 RX ADMIN — VANCOMYCIN HYDROCHLORIDE 125 MG: 10 INJECTION, POWDER, LYOPHILIZED, FOR SOLUTION INTRAVENOUS at 11:17

## 2019-08-07 RX ADMIN — VANCOMYCIN HYDROCHLORIDE 125 MG: 10 INJECTION, POWDER, LYOPHILIZED, FOR SOLUTION INTRAVENOUS at 23:33

## 2019-08-07 RX ADMIN — VANCOMYCIN HYDROCHLORIDE 125 MG: 10 INJECTION, POWDER, LYOPHILIZED, FOR SOLUTION INTRAVENOUS at 05:50

## 2019-08-07 RX ADMIN — GABAPENTIN 400 MG: 400 CAPSULE ORAL at 05:50

## 2019-08-07 RX ADMIN — OXYCODONE HYDROCHLORIDE AND ACETAMINOPHEN 1.5 TABLET: 5; 325 TABLET ORAL at 11:45

## 2019-08-07 RX ADMIN — TAMSULOSIN HYDROCHLORIDE 0.4 MG: 0.4 CAPSULE ORAL at 11:17

## 2019-08-07 RX ADMIN — GABAPENTIN 400 MG: 400 CAPSULE ORAL at 11:17

## 2019-08-07 RX ADMIN — GABAPENTIN 400 MG: 400 CAPSULE ORAL at 17:16

## 2019-08-07 RX ADMIN — VANCOMYCIN HYDROCHLORIDE 125 MG: 10 INJECTION, POWDER, LYOPHILIZED, FOR SOLUTION INTRAVENOUS at 00:20

## 2019-08-07 RX ADMIN — VANCOMYCIN HYDROCHLORIDE 125 MG: 10 INJECTION, POWDER, LYOPHILIZED, FOR SOLUTION INTRAVENOUS at 17:16

## 2019-08-07 RX ADMIN — OXYCODONE HYDROCHLORIDE AND ACETAMINOPHEN 1.5 TABLET: 5; 325 TABLET ORAL at 21:15

## 2019-08-07 RX ADMIN — SODIUM CHLORIDE: 9 INJECTION, SOLUTION INTRAVENOUS at 05:53

## 2019-08-07 ASSESSMENT — ENCOUNTER SYMPTOMS
DIAPHORESIS: 0
SPUTUM PRODUCTION: 0
FEVER: 0
PALPITATIONS: 0
SPEECH CHANGE: 0
COUGH: 0
CLAUDICATION: 0
SHORTNESS OF BREATH: 0
EYE PAIN: 0
ABDOMINAL PAIN: 1
BRUISES/BLEEDS EASILY: 0
EYE DISCHARGE: 0
NECK PAIN: 0
WEAKNESS: 0
VOMITING: 0
CONSTIPATION: 0
CHILLS: 0
DIARRHEA: 1
LOSS OF CONSCIOUSNESS: 0
SENSORY CHANGE: 0
FOCAL WEAKNESS: 0
BACK PAIN: 0
WHEEZING: 0
NAUSEA: 0
DEPRESSION: 0
SORE THROAT: 0
DIZZINESS: 0
HEADACHES: 0
MYALGIAS: 0
HEMOPTYSIS: 0

## 2019-08-07 ASSESSMENT — LIFESTYLE VARIABLES: SUBSTANCE_ABUSE: 0

## 2019-08-07 NOTE — PROGRESS NOTES
2 RN Skin check completed with BART Ramirez.    All pressure areas checked and are intact.   Pt has tiny scattered scabs on his legs.  No other issues.  Will continue to monitor.

## 2019-08-07 NOTE — ASSESSMENT & PLAN NOTE
Patient still have diarrhea for the past several days.  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  History of use of recent antibiotics due to possible UTI.  Prescribed by urologist.  C. difficile PCR positive  p.o. vancomycin 21-day taper.  If patient feels vancomycin 21-day taper then he would more than likely qualify for fecal transplant.  Patient has no significant signs of sepsis at this moment.

## 2019-08-07 NOTE — PROGRESS NOTES
Nemesio from Lab called with critical result of Cdiff + at 2159. Critical lab result read back to Nemesio.   This critical lab result is within parameters established by  for this patient

## 2019-08-07 NOTE — PROGRESS NOTES
Bedside shift report complete w/ Yohan ARRIAGA. Plan of care and prior shift events reviewed with patient and RN. Lines/drains/airways assessed as appropriate. Environment assessed for pt safety, walkways clear of obstacles and well lit, personal items/call light in reach, bed locked/low. Pt alert and responds appropriately, educated about hourly rounding, assessed for needs, see MAR/OBS/ADL doc flow sheets for interventions. Care board updated with changes to plan of care, current staff names, and date as appropriate.

## 2019-08-07 NOTE — ASSESSMENT & PLAN NOTE
Without long-term use of insulin.  Blood sugar controlled.  Uncomplicated.  I start patient on diabetic diet.  I ordered insulin sliding scale  I also ordered hypoglycemic protocol to prevent hypoglycemia

## 2019-08-07 NOTE — PROGRESS NOTES
Report received from BART Morrison. Pt sitting in chair in room. He needed help getting situated in room and wanted to order food. I gathered supplies and brought them to the patient. Pt NO FALL RISK. No bed alarm needed at this time. Will continue to monitor

## 2019-08-07 NOTE — CARE PLAN
Problem: Communication  Goal: The ability to communicate needs accurately and effectively will improve  Outcome: PROGRESSING AS EXPECTED     Problem: Safety  Goal: Will remain free from injury  Outcome: PROGRESSING AS EXPECTED  Goal: Will remain free from falls  Outcome: PROGRESSING AS EXPECTED     Problem: Pain Management  Goal: Pain level will decrease to patient's comfort goal  Outcome: PROGRESSING AS EXPECTED

## 2019-08-07 NOTE — DISCHARGE PLANNING
Care Transition Team Assessment      Patient's friend will provide the patient with the ride at the time of discharge.  Information Source  Information Given By: Patient         Elopement Risk  Legal Hold: No  Ambulatory or Self Mobile in Wheelchair: Yes  Disoriented: No  Psychiatric Symptoms: None  History of Wandering: No  Elopement this Admit: No  Vocalizing Wanting to Leave: No  Displays Behaviors, Body Language Wanting to Leave: No-Not at Risk for Elopement  Elopement Risk: Not at Risk for Elopement    Interdisciplinary Discharge Planning  Primary Care Physician: Dr Sellers  Lives with - Patient's Self Care Capacity: Alone and Able to Care For Self  Patient or legal guardian wants to designate a caregiver (see row info): No  Support Systems: Friends / Neighbors  Housing / Facility: 1 Story House  Able to Return to Previous ADL's: Yes  Mobility Issues: No  Prior Services: None  Patient Expects to be Discharged to:: home  Durable Medical Equipment: Other - Specify(cane)    Discharge Preparedness  What is your plan after discharge?: Home with help  What are your discharge supports?: Other (comment)(friends)  Prior Functional Level: Ambulatory, Independent with Activities of Daily Living, Independent with Medication Management    Functional Assesment  Prior Functional Level: Ambulatory, Independent with Activities of Daily Living, Independent with Medication Management         Vision / Hearing Impairment  Vision Impairment : Yes  Right Eye Vision: Impaired, Wears Glasses  Left Eye Vision: Impaired, Wears Glasses  Hearing Impairment : Yes  Hearing Impairment: Both Ears  Does Pt Need Special Equipment for the Hearing Impaired?: No         Advance Directive  Advance Directive?: DPOA for Health Care    Domestic Abuse  Have you ever been the victim of abuse or violence?: No  Physical Abuse or Sexual Abuse: No  Verbal Abuse or Emotional Abuse: No  Possible Abuse Reported to:: Not Applicable              Anticipated  Discharge Information  Anticipated discharge disposition: Home  Discharge Address: [address] [place] [address]  Discharge Contact Phone Number: [phone]

## 2019-08-08 ENCOUNTER — APPOINTMENT (OUTPATIENT)
Dept: RADIOLOGY | Facility: MEDICAL CENTER | Age: 79
DRG: 372 | End: 2019-08-08
Attending: HOSPITALIST
Payer: MEDICARE

## 2019-08-08 LAB
ANION GAP SERPL CALC-SCNC: 9 MMOL/L (ref 0–11.9)
BASOPHILS # BLD AUTO: 0.2 % (ref 0–1.8)
BASOPHILS # BLD: 0.01 K/UL (ref 0–0.12)
BUN SERPL-MCNC: 16 MG/DL (ref 8–22)
CALCIUM SERPL-MCNC: 8.7 MG/DL (ref 8.5–10.5)
CHLORIDE SERPL-SCNC: 109 MMOL/L (ref 96–112)
CO2 SERPL-SCNC: 25 MMOL/L (ref 20–33)
CREAT SERPL-MCNC: 0.84 MG/DL (ref 0.5–1.4)
EOSINOPHIL # BLD AUTO: 0.2 K/UL (ref 0–0.51)
EOSINOPHIL NFR BLD: 3.7 % (ref 0–6.9)
ERYTHROCYTE [DISTWIDTH] IN BLOOD BY AUTOMATED COUNT: 49 FL (ref 35.9–50)
GLUCOSE SERPL-MCNC: 137 MG/DL (ref 65–99)
HCT VFR BLD AUTO: 42.2 % (ref 42–52)
HGB BLD-MCNC: 13.5 G/DL (ref 14–18)
IMM GRANULOCYTES # BLD AUTO: 0.02 K/UL (ref 0–0.11)
IMM GRANULOCYTES NFR BLD AUTO: 0.4 % (ref 0–0.9)
LYMPHOCYTES # BLD AUTO: 1.65 K/UL (ref 1–4.8)
LYMPHOCYTES NFR BLD: 30.2 % (ref 22–41)
MCH RBC QN AUTO: 31.3 PG (ref 27–33)
MCHC RBC AUTO-ENTMCNC: 32 G/DL (ref 33.7–35.3)
MCV RBC AUTO: 97.9 FL (ref 81.4–97.8)
MONOCYTES # BLD AUTO: 0.57 K/UL (ref 0–0.85)
MONOCYTES NFR BLD AUTO: 10.4 % (ref 0–13.4)
NEUTROPHILS # BLD AUTO: 3.01 K/UL (ref 1.82–7.42)
NEUTROPHILS NFR BLD: 55.1 % (ref 44–72)
NRBC # BLD AUTO: 0 K/UL
NRBC BLD-RTO: 0 /100 WBC
PLATELET # BLD AUTO: 165 K/UL (ref 164–446)
PMV BLD AUTO: 9.5 FL (ref 9–12.9)
POTASSIUM SERPL-SCNC: 3.6 MMOL/L (ref 3.6–5.5)
RBC # BLD AUTO: 4.31 M/UL (ref 4.7–6.1)
SODIUM SERPL-SCNC: 143 MMOL/L (ref 135–145)
WBC # BLD AUTO: 5.5 K/UL (ref 4.8–10.8)

## 2019-08-08 PROCEDURE — 770021 HCHG ROOM/CARE - ISO PRIVATE

## 2019-08-08 PROCEDURE — 700102 HCHG RX REV CODE 250 W/ 637 OVERRIDE(OP): Performed by: INTERNAL MEDICINE

## 2019-08-08 PROCEDURE — 36415 COLL VENOUS BLD VENIPUNCTURE: CPT

## 2019-08-08 PROCEDURE — 700102 HCHG RX REV CODE 250 W/ 637 OVERRIDE(OP): Performed by: HOSPITALIST

## 2019-08-08 PROCEDURE — 74176 CT ABD & PELVIS W/O CONTRAST: CPT

## 2019-08-08 PROCEDURE — 80048 BASIC METABOLIC PNL TOTAL CA: CPT

## 2019-08-08 PROCEDURE — A9270 NON-COVERED ITEM OR SERVICE: HCPCS | Performed by: INTERNAL MEDICINE

## 2019-08-08 PROCEDURE — 85025 COMPLETE CBC W/AUTO DIFF WBC: CPT

## 2019-08-08 PROCEDURE — 700111 HCHG RX REV CODE 636 W/ 250 OVERRIDE (IP): Performed by: INTERNAL MEDICINE

## 2019-08-08 PROCEDURE — 99232 SBSQ HOSP IP/OBS MODERATE 35: CPT | Performed by: HOSPITALIST

## 2019-08-08 PROCEDURE — A9270 NON-COVERED ITEM OR SERVICE: HCPCS | Performed by: HOSPITALIST

## 2019-08-08 RX ADMIN — VANCOMYCIN HYDROCHLORIDE 125 MG: 10 INJECTION, POWDER, LYOPHILIZED, FOR SOLUTION INTRAVENOUS at 12:22

## 2019-08-08 RX ADMIN — OXYCODONE HYDROCHLORIDE AND ACETAMINOPHEN 1.5 TABLET: 5; 325 TABLET ORAL at 17:39

## 2019-08-08 RX ADMIN — VANCOMYCIN HYDROCHLORIDE 125 MG: 10 INJECTION, POWDER, LYOPHILIZED, FOR SOLUTION INTRAVENOUS at 06:08

## 2019-08-08 RX ADMIN — OXYCODONE HYDROCHLORIDE AND ACETAMINOPHEN 1.5 TABLET: 5; 325 TABLET ORAL at 06:17

## 2019-08-08 RX ADMIN — VANCOMYCIN HYDROCHLORIDE 125 MG: 10 INJECTION, POWDER, LYOPHILIZED, FOR SOLUTION INTRAVENOUS at 23:23

## 2019-08-08 RX ADMIN — GABAPENTIN 400 MG: 400 CAPSULE ORAL at 17:38

## 2019-08-08 RX ADMIN — Medication 1 CAPSULE: at 07:43

## 2019-08-08 RX ADMIN — TAMSULOSIN HYDROCHLORIDE 0.4 MG: 0.4 CAPSULE ORAL at 07:43

## 2019-08-08 RX ADMIN — GABAPENTIN 400 MG: 400 CAPSULE ORAL at 06:08

## 2019-08-08 RX ADMIN — VANCOMYCIN HYDROCHLORIDE 125 MG: 10 INJECTION, POWDER, LYOPHILIZED, FOR SOLUTION INTRAVENOUS at 17:40

## 2019-08-08 RX ADMIN — GABAPENTIN 400 MG: 400 CAPSULE ORAL at 12:21

## 2019-08-08 ASSESSMENT — PATIENT HEALTH QUESTIONNAIRE - PHQ9
2. FEELING DOWN, DEPRESSED, IRRITABLE, OR HOPELESS: NOT AT ALL
SUM OF ALL RESPONSES TO PHQ9 QUESTIONS 1 AND 2: 0
1. LITTLE INTEREST OR PLEASURE IN DOING THINGS: NOT AT ALL

## 2019-08-08 ASSESSMENT — ENCOUNTER SYMPTOMS
MYALGIAS: 0
CONSTIPATION: 0
EYE PAIN: 0
NAUSEA: 0
BRUISES/BLEEDS EASILY: 0
FEVER: 0
SHORTNESS OF BREATH: 0
SPEECH CHANGE: 0
HEADACHES: 0
CHILLS: 0
NECK PAIN: 0
BACK PAIN: 0
SENSORY CHANGE: 0
DIAPHORESIS: 0
DEPRESSION: 0
ABDOMINAL PAIN: 1
CLAUDICATION: 0
SORE THROAT: 0
DIARRHEA: 1
VOMITING: 0
EYE DISCHARGE: 0
WEAKNESS: 0
WHEEZING: 0
HEMOPTYSIS: 0
DIZZINESS: 0
FOCAL WEAKNESS: 0
SPUTUM PRODUCTION: 0
LOSS OF CONSCIOUSNESS: 0
PALPITATIONS: 0
COUGH: 0

## 2019-08-08 ASSESSMENT — LIFESTYLE VARIABLES
DO YOU DRINK ALCOHOL: YES
SUBSTANCE_ABUSE: 0

## 2019-08-08 NOTE — PROGRESS NOTES
Bedside report received from night shift RN. Assumed care. Pt is A&Ox4. Pt is in bed. Pt denies pain at this time.   Pt was updated on the plan of care for the day. All questions answered.   Pt has call light within reach and bed is in lowest position.  All fall precautions in place. Pt had no other needs at this time, hourly rounding in place.  Patient reports that for 3 days he has not had bed alarm on and this morning it was turned on by night nurse for persistent diarrhea. Pt is angered, refuses to use call light and wants alarm off or he will leave. Complaint escalated to supervisor.

## 2019-08-08 NOTE — PROGRESS NOTES
[facility] Medicine Daily Progress Note    Date of Service  8/7/2019    Chief Complaint  78 y.o. male admitted 8/6/2019 with excessive diarrhea.    Hospital Course    8/7: This 78-year-old male presents with severe diarrhea after he was given antibiotics by his urologist for a presumed UTI.  I do not see evidence of urine culture in epic.  C. difficile was positive.  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  He was started on vancomycin p.o. every 6 hours on admission and will need a 21-day course taper.  Patient has no nausea or vomiting he did have a previous colostomy with colostomy takedown 1 year ago with Dr. Granados time presumably for diverticulitis of his colon.  He is currently getting IV fluids normal saline 83 an hour.  I have ordered CT abdomen pelvis with IV contrast only to ensure that there is no megacolon or abscess or rupture.  Patient does appear nontoxic on exam with only mild tenderness with palpation.  No rebound guarding is noted.*      Consultants/Specialty  none    Code Status  DNR    Disposition  Home once medically cleared and diarrhea better controlled.    Review of Systems  Review of Systems   Constitutional: Negative for chills, diaphoresis, fever and malaise/fatigue.   HENT: Negative for congestion and sore throat.    Eyes: Negative for pain and discharge.   Respiratory: Negative for cough, hemoptysis, sputum production, shortness of breath and wheezing.    Cardiovascular: Negative for chest pain, palpitations, claudication and leg swelling.   Gastrointestinal: Positive for abdominal pain and diarrhea. Negative for constipation, melena, nausea and vomiting.   Genitourinary: Negative for dysuria, frequency and urgency.   Musculoskeletal: Negative for back pain, joint pain, myalgias and neck pain.   Skin: Negative for itching and rash.   Neurological: Negative for dizziness, sensory change, speech change, focal weakness, loss of  consciousness, weakness and headaches.   Endo/Heme/Allergies: Does not bruise/bleed easily.   Psychiatric/Behavioral: Negative for depression, substance abuse and suicidal ideas.        Physical Exam  Temp:  [36.5 °C (97.7 °F)-36.7 °C (98.1 °F)] 36.6 °C (97.8 °F)  Pulse:  [60-77] 62  Resp:  [16-18] 16  BP: (109-122)/(42-67) 111/55  SpO2:  [90 %-94 %] 94 %    Physical Exam   Constitutional: He is oriented to person, place, and time. He appears well-developed and well-nourished. No distress.   HENT:   Head: Normocephalic and atraumatic.   Mouth/Throat: Oropharynx is clear and moist. No oropharyngeal exudate.   Eyes: Pupils are equal, round, and reactive to light. Conjunctivae and EOM are normal. Right eye exhibits no discharge. Left eye exhibits no discharge. No scleral icterus.   Neck: Normal range of motion. Neck supple. No JVD present. No tracheal deviation present. No thyromegaly present.   Cardiovascular: Normal rate, regular rhythm and normal heart sounds. Exam reveals no gallop and no friction rub.   No murmur heard.  Pulmonary/Chest: Effort normal and breath sounds normal. No respiratory distress. He has no wheezes. He has no rales. He exhibits no tenderness.   Abdominal: Soft. He exhibits no distension and no mass. There is no tenderness. There is no rebound and no guarding.   Well-healed colostomy scar from previous colostomy takedown.  Hyperactive bowel sounds noted on exam.   Musculoskeletal: Normal range of motion. He exhibits no edema or tenderness.   Lymphadenopathy:     He has no cervical adenopathy.   Neurological: He is alert and oriented to person, place, and time. No cranial nerve deficit. He exhibits normal muscle tone.   Skin: Skin is warm and dry. No rash noted. He is not diaphoretic. No erythema.   Psychiatric: He has a normal mood and affect. His behavior is normal. Judgment and thought content normal.   Nursing note and vitals reviewed.      Fluids    Intake/Output Summary (Last 24 hours) at  8/7/2019 1846  Last data filed at 8/7/2019 0800  Gross per 24 hour   Intake 958.65 ml   Output --   Net 958.65 ml       Laboratory  Recent Labs     08/06/19  1310 08/07/19  0018   WBC 10.8 7.6   RBC 5.07 4.15*   HEMOGLOBIN 15.9 12.9*   HEMATOCRIT 48.3 40.5*   MCV 95.3 97.6   MCH 31.4 31.1   MCHC 32.9* 31.9*   RDW 46.4 48.2   PLATELETCT 174 153*   MPV 9.6 9.7     Recent Labs     08/06/19  1310 08/07/19  0018   SODIUM 138 139   POTASSIUM 4.0 3.7   CHLORIDE 101 105   CO2 27 26   GLUCOSE 122* 99   BUN 16 21   CREATININE 1.40 1.22   CALCIUM 9.5 8.4*                   Imaging  CT-ABDOMEN-PELVIS WITH    (Results Pending)        Assessment/Plan  DNR (do not resuscitate)  Assessment & Plan  Total time spent on advanced care planning, excluding time spent on daily care: 16 minutes.    1st 30 minutes 33021   Each add’l 30 minutes [id number]        I discussed extensively with patient and patient's family is regarding code status and plan of care. We also discussed advanced care planning including diagnosis, prognosis, plan of care, risks and benefits of any therapies that could be offered, as well as alternatives including palliation and hospice, as appropriate. My discussion is summarized above in detail. Confirmed with DNR      Debility- (present on admission)  Assessment & Plan  Chronic debilitation.  I ordered PT OT    Chronic neck pain- (present on admission)  Assessment & Plan  Chronic neck pain currently no significant worsening signs  No focal neurological deficit    Type 2 diabetes mellitus (HCC)- (present on admission)  Assessment & Plan  Without long-term use of insulin.  Blood sugar controlled.  Uncomplicated.  I start patient on diabetic diet.  I ordered insulin sliding scale  I also ordered hypoglycemic protocol to prevent hypoglycemia    C. difficile diarrhea- (present on admission)  Assessment & Plan  Patient still have diarrhea for the past several days.  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  History of use of recent antibiotics due to possible UTI.  Prescribed by urologist.  C. difficile PCR positive  p.o. vancomycin 21-day taper.  If patient feels vancomycin 21-day taper then he would more than likely qualify for fecal transplant.  Patient has no significant signs of sepsis at this moment.      Benign prostatic hyperplasia- (present on admission)  Assessment & Plan  Patient continue to use Flomax  Symptoms okay  Continue outpatient medication       VTE prophylaxis: lovenox

## 2019-08-08 NOTE — PROGRESS NOTES
[facility] Medicine Daily Progress Note    Date of Service  8/8/2019    Chief Complaint  78 y.o. male admitted 8/6/2019 with excessive diarrhea.    Hospital Course    8/7: This 78-year-old male presents with severe diarrhea after he was given antibiotics by his urologist for a presumed UTI.  I do not see evidence of urine culture in epic.  C. difficile was positive.  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  He was started on vancomycin p.o. every 6 hours on admission and will need a 21-day course taper.  Patient has no nausea or vomiting he did have a previous colostomy with colostomy takedown 1 year ago with Dr. Granados time presumably for diverticulitis of his colon.  He is currently getting IV fluids normal saline 83 an hour.  I have ordered CT abdomen pelvis with IV contrast only to ensure that there is no megacolon or abscess or rupture.  Patient does appear nontoxic on exam with only mild tenderness with palpation.  No rebound guarding is noted.  8/8: Patient still has hyperactive bowel sounds, but states that since last night his total amounts of frequency of diarrhea has decreased.  He is able to tolerate p.o. diet.  CT abdomen pelvis negative, anastomosis appears normal.  Patient states he had a difficult time getting his vancomycin filled previously due to high cost.  I have sent it to the [facility] pharmacy.  I will have social work check on the medication cost.*      Consultants/Specialty  none    Code Status  DNR    Disposition  Home once medically cleared and diarrhea better controlled.  Sent vancomycin 21-day taper to [facility] pharmacy due to high cost.  Patient unable to afford medication.    Review of Systems  Review of Systems   Constitutional: Negative for chills, diaphoresis, fever and malaise/fatigue.   HENT: Negative for congestion and sore throat.    Eyes: Negative for pain and discharge.   Respiratory: Negative for  cough, hemoptysis, sputum production, shortness of breath and wheezing.    Cardiovascular: Negative for chest pain, palpitations, claudication and leg swelling.   Gastrointestinal: Positive for abdominal pain and diarrhea. Negative for constipation, melena, nausea and vomiting.   Genitourinary: Negative for dysuria, frequency and urgency.   Musculoskeletal: Negative for back pain, joint pain, myalgias and neck pain.   Skin: Negative for itching and rash.   Neurological: Negative for dizziness, sensory change, speech change, focal weakness, loss of consciousness, weakness and headaches.   Endo/Heme/Allergies: Does not bruise/bleed easily.   Psychiatric/Behavioral: Negative for depression, substance abuse and suicidal ideas.        Physical Exam  Temp:  [36.5 °C (97.7 °F)-36.9 °C (98.5 °F)] 36.5 °C (97.7 °F)  Pulse:  [60-66] 60  Resp:  [16] 16  BP: (104-138)/(48-60) 138/60  SpO2:  [92 %-95 %] 95 %    Physical Exam   Constitutional: He is oriented to person, place, and time. He appears well-developed and well-nourished. No distress.   HENT:   Head: Normocephalic and atraumatic.   Mouth/Throat: Oropharynx is clear and moist. No oropharyngeal exudate.   Eyes: Pupils are equal, round, and reactive to light. Conjunctivae and EOM are normal. Right eye exhibits no discharge. Left eye exhibits no discharge. No scleral icterus.   Neck: Normal range of motion. Neck supple. No JVD present. No tracheal deviation present. No thyromegaly present.   Cardiovascular: Normal rate, regular rhythm and normal heart sounds. Exam reveals no gallop and no friction rub.   No murmur heard.  Pulmonary/Chest: Effort normal and breath sounds normal. No respiratory distress. He has no wheezes. He has no rales. He exhibits no tenderness.   Abdominal: Soft. He exhibits no distension and no mass. There is no tenderness. There is no rebound and no guarding.   Well-healed colostomy scar from previous colostomy takedown.  Hyperactive bowel sounds noted  on exam.   Musculoskeletal: Normal range of motion. He exhibits no edema or tenderness.   Lymphadenopathy:     He has no cervical adenopathy.   Neurological: He is alert and oriented to person, place, and time. No cranial nerve deficit. He exhibits normal muscle tone.   Skin: Skin is warm and dry. No rash noted. He is not diaphoretic. No erythema.   Psychiatric: He has a normal mood and affect. His behavior is normal. Judgment and thought content normal.   Nursing note and vitals reviewed.      Fluids    Intake/Output Summary (Last 24 hours) at 8/8/2019 1457  Last data filed at 8/8/2019 0635  Gross per 24 hour   Intake 2114.42 ml   Output --   Net 2114.42 ml       Laboratory  Recent Labs     08/06/19  1310 08/07/19  0018 08/08/19  0020   WBC 10.8 7.6 5.5   RBC 5.07 4.15* 4.31*   HEMOGLOBIN 15.9 12.9* 13.5*   HEMATOCRIT 48.3 40.5* 42.2   MCV 95.3 97.6 97.9*   MCH 31.4 31.1 31.3   MCHC 32.9* 31.9* 32.0*   RDW 46.4 48.2 49.0   PLATELETCT 174 153* 165   MPV 9.6 9.7 9.5     Recent Labs     08/06/19  1310 08/07/19  0018 08/08/19  0020   SODIUM 138 139 143   POTASSIUM 4.0 3.7 3.6   CHLORIDE 101 105 109   CO2 27 26 25   GLUCOSE 122* 99 137*   BUN 16 21 16   CREATININE 1.40 1.22 0.84   CALCIUM 9.5 8.4* 8.7                   Imaging  CT-ABDOMEN-PELVIS W/O   Final Result      1.  No acute abnormality in the abdomen or pelvis.   2.  Colorectal anastomosis.   3.  Stable small to moderate hiatal hernia.           Assessment/Plan  DNR (do not resuscitate)  Assessment & Plan  Total time spent on advanced care planning, excluding time spent on daily care: 16 minutes.    1st 30 minutes 80582   Each add’l 30 minutes 32114        I discussed extensively with patient and patient's family is regarding code status and plan of care. We also discussed advanced care planning including diagnosis, prognosis, plan of care, risks and benefits of any therapies that could be offered, as well as alternatives including palliation and hospice, as  appropriate. My discussion is summarized above in detail. Confirmed with DNR      Debility- (present on admission)  Assessment & Plan  Chronic debilitation.  I ordered PT OT    Chronic neck pain- (present on admission)  Assessment & Plan  Chronic neck pain currently no significant worsening signs  No focal neurological deficit    Type 2 diabetes mellitus (HCC)- (present on admission)  Assessment & Plan  Without long-term use of insulin.  Blood sugar controlled.  Uncomplicated.  I start patient on diabetic diet.  I ordered insulin sliding scale  I also ordered hypoglycemic protocol to prevent hypoglycemia    C. 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  History of use of recent antibiotics due to possible UTI.  Prescribed by urologist.  C. difficile PCR positive  p.o. vancomycin 21-day taper.  If patient feels vancomycin 21-day taper then he would more than likely qualify for fecal transplant.  Patient has no significant signs of sepsis at this moment.  CT abdomen pelvis revealing normal anastomosis.      Benign prostatic hyperplasia- (present on admission)  Assessment & Plan  Patient continue to use Flomax  Symptoms okay  Continue outpatient medication       VTE prophylaxis: lovenox

## 2019-08-08 NOTE — CARE PLAN
Problem: Knowledge Deficit  Goal: Knowledge of disease process/condition, treatment plan, diagnostic tests, and medications will improve  Outcome: PROGRESSING AS EXPECTED  Intervention: Explain information regarding disease process/condition, treatment plan, diagnostic tests, and medications and document in education  Note:   Plan of care reviewed with patient as well as c. Diff infection precautions including hand washing.

## 2019-08-08 NOTE — PROGRESS NOTES
Received report from day RN and assumed care of patient at 1900.  Assessed patient and reviewed labs and notes.  Patient is AOx4, up SBA, steady.  Special contact precautions in place for clostridium difficile.  Patient medicated one time overnight for bilateral hand and shoulder pain.  Patient continues to have loose stools overnight; oral vanco administered per MAR.  CT abdomen-pelvis with contrast scheduled for 1000 this AM. Consent printed and reviewed with patient; however, patient is refusing to sign until he speaks with MD.  MD note and reason for CT read and explained to patient.  Consent at bedside (computer).  Patient is upset this AM after placement of bed alarm per unit protocol.  Plan of care reviewed, patient board updated, safety precautions in place, and patient calling appropriately.

## 2019-08-08 NOTE — ASSESSMENT & PLAN NOTE
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  History of use of recent antibiotics due to possible UTI.  Prescribed by urologist.  C. difficile PCR positive  p.o. vancomycin 21-day taper.  If patient feels vancomycin 21-day taper then he would more than likely qualify for fecal transplant.  Patient has no significant signs of sepsis at this moment.  CT abdomen pelvis revealing normal anastomosis.

## 2019-08-09 LAB
ANION GAP SERPL CALC-SCNC: 8 MMOL/L (ref 0–11.9)
BASOPHILS # BLD AUTO: 0.4 % (ref 0–1.8)
BASOPHILS # BLD: 0.02 K/UL (ref 0–0.12)
BUN SERPL-MCNC: 13 MG/DL (ref 8–22)
CALCIUM SERPL-MCNC: 8.6 MG/DL (ref 8.5–10.5)
CHLORIDE SERPL-SCNC: 108 MMOL/L (ref 96–112)
CO2 SERPL-SCNC: 25 MMOL/L (ref 20–33)
CREAT SERPL-MCNC: 0.85 MG/DL (ref 0.5–1.4)
EOSINOPHIL # BLD AUTO: 0.18 K/UL (ref 0–0.51)
EOSINOPHIL NFR BLD: 3.4 % (ref 0–6.9)
ERYTHROCYTE [DISTWIDTH] IN BLOOD BY AUTOMATED COUNT: 47.1 FL (ref 35.9–50)
GLUCOSE SERPL-MCNC: 114 MG/DL (ref 65–99)
HCT VFR BLD AUTO: 38.2 % (ref 42–52)
HGB BLD-MCNC: 12.7 G/DL (ref 14–18)
IMM GRANULOCYTES # BLD AUTO: 0.03 K/UL (ref 0–0.11)
IMM GRANULOCYTES NFR BLD AUTO: 0.6 % (ref 0–0.9)
LYMPHOCYTES # BLD AUTO: 1.56 K/UL (ref 1–4.8)
LYMPHOCYTES NFR BLD: 29.4 % (ref 22–41)
MCH RBC QN AUTO: 31.7 PG (ref 27–33)
MCHC RBC AUTO-ENTMCNC: 33.2 G/DL (ref 33.7–35.3)
MCV RBC AUTO: 95.3 FL (ref 81.4–97.8)
MONOCYTES # BLD AUTO: 0.6 K/UL (ref 0–0.85)
MONOCYTES NFR BLD AUTO: 11.3 % (ref 0–13.4)
NEUTROPHILS # BLD AUTO: 2.92 K/UL (ref 1.82–7.42)
NEUTROPHILS NFR BLD: 54.9 % (ref 44–72)
NRBC # BLD AUTO: 0 K/UL
NRBC BLD-RTO: 0 /100 WBC
PLATELET # BLD AUTO: 165 K/UL (ref 164–446)
PMV BLD AUTO: 9.5 FL (ref 9–12.9)
POTASSIUM SERPL-SCNC: 4 MMOL/L (ref 3.6–5.5)
RBC # BLD AUTO: 4.01 M/UL (ref 4.7–6.1)
SODIUM SERPL-SCNC: 141 MMOL/L (ref 135–145)
WBC # BLD AUTO: 5.3 K/UL (ref 4.8–10.8)

## 2019-08-09 PROCEDURE — 99232 SBSQ HOSP IP/OBS MODERATE 35: CPT | Performed by: HOSPITALIST

## 2019-08-09 PROCEDURE — 85025 COMPLETE CBC W/AUTO DIFF WBC: CPT

## 2019-08-09 PROCEDURE — 700102 HCHG RX REV CODE 250 W/ 637 OVERRIDE(OP): Performed by: INTERNAL MEDICINE

## 2019-08-09 PROCEDURE — A9270 NON-COVERED ITEM OR SERVICE: HCPCS | Performed by: INTERNAL MEDICINE

## 2019-08-09 PROCEDURE — 700102 HCHG RX REV CODE 250 W/ 637 OVERRIDE(OP): Performed by: HOSPITALIST

## 2019-08-09 PROCEDURE — 770021 HCHG ROOM/CARE - ISO PRIVATE

## 2019-08-09 PROCEDURE — 36415 COLL VENOUS BLD VENIPUNCTURE: CPT

## 2019-08-09 PROCEDURE — A9270 NON-COVERED ITEM OR SERVICE: HCPCS | Performed by: HOSPITALIST

## 2019-08-09 PROCEDURE — 80048 BASIC METABOLIC PNL TOTAL CA: CPT

## 2019-08-09 RX ORDER — GABAPENTIN 300 MG/1
600 CAPSULE ORAL 3 TIMES DAILY
Status: DISCONTINUED | OUTPATIENT
Start: 2019-08-09 | End: 2019-08-10 | Stop reason: HOSPADM

## 2019-08-09 RX ORDER — KETOROLAC TROMETHAMINE 30 MG/ML
30 INJECTION, SOLUTION INTRAMUSCULAR; INTRAVENOUS EVERY 6 HOURS PRN
Status: DISCONTINUED | OUTPATIENT
Start: 2019-08-09 | End: 2019-08-10 | Stop reason: HOSPADM

## 2019-08-09 RX ORDER — OXYCODONE HYDROCHLORIDE AND ACETAMINOPHEN 5; 325 MG/1; MG/1
1.5 TABLET ORAL EVERY 4 HOURS PRN
Status: DISCONTINUED | OUTPATIENT
Start: 2019-08-09 | End: 2019-08-10 | Stop reason: HOSPADM

## 2019-08-09 RX ADMIN — GABAPENTIN 400 MG: 400 CAPSULE ORAL at 12:30

## 2019-08-09 RX ADMIN — VANCOMYCIN HYDROCHLORIDE 125 MG: 10 INJECTION, POWDER, LYOPHILIZED, FOR SOLUTION INTRAVENOUS at 12:31

## 2019-08-09 RX ADMIN — Medication 1 CAPSULE: at 07:42

## 2019-08-09 RX ADMIN — VANCOMYCIN HYDROCHLORIDE 125 MG: 10 INJECTION, POWDER, LYOPHILIZED, FOR SOLUTION INTRAVENOUS at 04:46

## 2019-08-09 RX ADMIN — VANCOMYCIN HYDROCHLORIDE 125 MG: 10 INJECTION, POWDER, LYOPHILIZED, FOR SOLUTION INTRAVENOUS at 23:43

## 2019-08-09 RX ADMIN — GABAPENTIN 600 MG: 300 CAPSULE ORAL at 18:04

## 2019-08-09 RX ADMIN — OXYCODONE HYDROCHLORIDE AND ACETAMINOPHEN 1.5 TABLET: 5; 325 TABLET ORAL at 23:56

## 2019-08-09 RX ADMIN — OXYCODONE HYDROCHLORIDE AND ACETAMINOPHEN 1.5 TABLET: 5; 325 TABLET ORAL at 04:46

## 2019-08-09 RX ADMIN — OXYCODONE HYDROCHLORIDE AND ACETAMINOPHEN 1.5 TABLET: 5; 325 TABLET ORAL at 13:05

## 2019-08-09 RX ADMIN — GABAPENTIN 400 MG: 400 CAPSULE ORAL at 04:46

## 2019-08-09 RX ADMIN — VANCOMYCIN HYDROCHLORIDE 125 MG: 10 INJECTION, POWDER, LYOPHILIZED, FOR SOLUTION INTRAVENOUS at 18:04

## 2019-08-09 RX ADMIN — TAMSULOSIN HYDROCHLORIDE 0.4 MG: 0.4 CAPSULE ORAL at 07:42

## 2019-08-09 ASSESSMENT — ENCOUNTER SYMPTOMS
NECK PAIN: 0
DIAPHORESIS: 0
CONSTIPATION: 0
DIARRHEA: 1
EYE PAIN: 0
SORE THROAT: 0
EYE DISCHARGE: 0
DIZZINESS: 0
FOCAL WEAKNESS: 0
HEADACHES: 0
WEAKNESS: 0
NAUSEA: 0
SHORTNESS OF BREATH: 0
SPEECH CHANGE: 0
BACK PAIN: 0
ABDOMINAL PAIN: 1
HEMOPTYSIS: 0
VOMITING: 0
SPUTUM PRODUCTION: 0
COUGH: 0
MYALGIAS: 0
FEVER: 0
PALPITATIONS: 0
CLAUDICATION: 0
DEPRESSION: 0
WHEEZING: 0
LOSS OF CONSCIOUSNESS: 0
CHILLS: 0
BRUISES/BLEEDS EASILY: 0
SENSORY CHANGE: 0

## 2019-08-09 ASSESSMENT — LIFESTYLE VARIABLES
DO YOU DRINK ALCOHOL: YES
SUBSTANCE_ABUSE: 0

## 2019-08-09 NOTE — CARE PLAN
"  Problem: Safety  Goal: Will remain free from falls  Outcome: PROGRESSING AS EXPECTED  Intervention: Implement fall precautions  Flowsheets  Taken 8/9/2019 0032  Bed Alarm: Yes - Alarm On  IV Pole on Same Side of Bed as Bathroom: Yes  Bedrails: Bedrails Closest to Bathroom Down  Chair/Bed Strip Alarm: Yes - Alarm On  Taken 8/8/2019 2200  Environmental Precautions: Treaded Slipper Socks on Patient;Personal Belongings, Wastebasket, Call Bell etc. in Easy Reach;Report Given to Other Health Care Providers Regarding Fall Risk;Communication Sign for Patients & Families;Bed in Low Position;Mobility Assessed & Appropriate Sign Placed  Note:   Patient is very upset about bed alarm and assistance to toilet but safety measures are in place.     Problem: Pain Management  Goal: Pain level will decrease to patient's comfort goal  Outcome: PROGRESSING SLOWER THAN EXPECTED  Intervention: Follow pain managment plan developed in collaboration with patient and Interdisciplinary Team  Note:   Patient requested pain medication prior to PRN q8h.  Patient educated and nonpharmacologic measures offered.  Patient very upset and stated, \"Forget it, just forget it.  Why is this happening all of a sudden? It wasn't like this before now.  I'm leaving tomorrow anyway.  It doesn't matter.\"     "

## 2019-08-09 NOTE — PROGRESS NOTES
"Received report from day RN and assumed care of patient at 1900.  Assessed patient and reviewed labs and notes.  Patient is AOx4, up SBA, steady.  Special contact precautions in place for clostridium difficile.  Patient has loose stools with decreasing frequency overnight; oral vanco administered per MAR.  Patient remains upset over bed alarm and lack of privacy toileting.  Patient became upset when pain medication was not available before scheduled PRN q8h summer.  Patient believes this is new.  Education provided to patient but patient refuses, stating, \"Forgit it. It doesn't matter. I'm leaving tomorrow anyway.\"  Plan of care reviewed, patient board updated, safety precautions in place, and frequent rounding in practice.   "

## 2019-08-09 NOTE — CARE PLAN
Problem: Communication  Goal: The ability to communicate needs accurately and effectively will improve  Outcome: PROGRESSING AS EXPECTED  Note:   Patient is able to express needs. Asks appropriate questions.      Problem: Pain Management  Goal: Pain level will decrease to patient's comfort goal  Outcome: PROGRESSING SLOWER THAN EXPECTED  Note:   Patient reports pain in his hands despite pain medications. Medicated per MAR.

## 2019-08-09 NOTE — PROGRESS NOTES
Hospital Medicine Daily Progress Note    Date of Service  8/9/2019    Chief Complaint  78 y.o. male admitted 8/6/2019 with excessive diarrhea.    Hospital Course    8/7: This 78-year-old male presents with severe diarrhea after he was given antibiotics by his urologist for a presumed UTI.  I do not see evidence of urine culture in epic.  C. difficile was positive.  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  He was started on vancomycin p.o. every 6 hours on admission and will need a 21-day course taper.  Patient has no nausea or vomiting he did have a previous colostomy with colostomy takedown 1 year ago with Dr. Granados time presumably for diverticulitis of his colon.  He is currently getting IV fluids normal saline 83 an hour.  I have ordered CT abdomen pelvis with IV contrast only to ensure that there is no megacolon or abscess or rupture.  Patient does appear nontoxic on exam with only mild tenderness with palpation.  No rebound guarding is noted.  8/8: Patient still has hyperactive bowel sounds, but states that since last night his total amounts of frequency of diarrhea has decreased.  He is able to tolerate p.o. diet.  CT abdomen pelvis negative, anastomosis appears normal.  Patient states he had a difficult time getting his vancomycin filled previously due to high cost.  I have sent it to the [facility] pharmacy.  I will have social work check on the medication cost.  8/9: Patient states his frequency of diarrhea has decreased significantly since started on vancomycin.  Social work did go to the [facility] pharmacy to retrieve p.o. vancomycin 21-day course for patient for discharge tomorrow.  He is requesting for increased pain medications for his chronic wrist arthritic pain.  I have added Toradol IV as needed increase the Percocet home dose to every 4 hours as needed.*      Consultants/Specialty  none    Code Status  DNR    Disposition  Home  once medically cleared and diarrhea better controlled.  Sent vancomycin 21-day taper to [facility] pharmacy due to high cost.  Patient unable to afford medication.    Review of Systems  Review of Systems   Constitutional: Negative for chills, diaphoresis, fever and malaise/fatigue.   HENT: Negative for congestion and sore throat.    Eyes: Negative for pain and discharge.   Respiratory: Negative for cough, hemoptysis, sputum production, shortness of breath and wheezing.    Cardiovascular: Negative for chest pain, palpitations, claudication and leg swelling.   Gastrointestinal: Positive for abdominal pain and diarrhea. Negative for constipation, melena, nausea and vomiting.   Genitourinary: Negative for dysuria, frequency and urgency.   Musculoskeletal: Negative for back pain, joint pain, myalgias and neck pain.   Skin: Negative for itching and rash.   Neurological: Negative for dizziness, sensory change, speech change, focal weakness, loss of consciousness, weakness and headaches.   Endo/Heme/Allergies: Does not bruise/bleed easily.   Psychiatric/Behavioral: Negative for depression, substance abuse and suicidal ideas.        Physical Exam  Temp:  [36.2 °C (97.1 °F)-37.1 °C (98.8 °F)] 36.9 °C (98.5 °F)  Pulse:  [49-63] 63  Resp:  [16-18] 18  BP: (123-151)/(55-72) 151/72  SpO2:  [92 %-94 %] 92 %    Physical Exam   Constitutional: He is oriented to person, place, and time. He appears well-developed and well-nourished. No distress.   HENT:   Head: Normocephalic and atraumatic.   Mouth/Throat: Oropharynx is clear and moist. No oropharyngeal exudate.   Eyes: Pupils are equal, round, and reactive to light. Conjunctivae and EOM are normal. Right eye exhibits no discharge. Left eye exhibits no discharge. No scleral icterus.   Neck: Normal range of motion. Neck supple. No JVD present. No tracheal deviation present. No thyromegaly present.   Cardiovascular: Normal rate, regular rhythm and normal heart sounds. Exam  reveals no gallop and no friction rub.   No murmur heard.  Pulmonary/Chest: Effort normal and breath sounds normal. No respiratory distress. He has no wheezes. He has no rales. He exhibits no tenderness.   Abdominal: Soft. He exhibits no distension and no mass. There is no tenderness. There is no rebound and no guarding.   Well-healed colostomy scar from previous colostomy takedown.  Hyperactive bowel sounds noted on exam.   Musculoskeletal: Normal range of motion. He exhibits no edema or tenderness.   Lymphadenopathy:     He has no cervical adenopathy.   Neurological: He is alert and oriented to person, place, and time. No cranial nerve deficit. He exhibits normal muscle tone.   Skin: Skin is warm and dry. No rash noted. He is not diaphoretic. No erythema.   Psychiatric: He has a normal mood and affect. His behavior is normal. Judgment and thought content normal.   Nursing note and vitals reviewed.      Fluids    Intake/Output Summary (Last 24 hours) at 8/9/2019 1612  Last data filed at 8/9/2019 0612  Gross per 24 hour   Intake 2200.18 ml   Output --   Net 2200.18 ml       Laboratory  Recent Labs     08/07/19  0018 08/08/19  0020 08/09/19  0038   WBC 7.6 5.5 5.3   RBC 4.15* 4.31* 4.01*   HEMOGLOBIN 12.9* 13.5* 12.7*   HEMATOCRIT 40.5* 42.2 38.2*   MCV 97.6 97.9* 95.3   MCH 31.1 31.3 31.7   MCHC 31.9* 32.0* 33.2*   RDW 48.2 49.0 47.1   PLATELETCT 153* 165 165   MPV 9.7 9.5 9.5     Recent Labs     08/07/19  0018 08/08/19  0020 08/09/19  0038   SODIUM 139 143 141   POTASSIUM 3.7 3.6 4.0   CHLORIDE 105 109 108   CO2 26 25 25   GLUCOSE 99 137* 114*   BUN 21 16 13   CREATININE 1.22 0.84 0.85   CALCIUM 8.4* 8.7 8.6                   Imaging  CT-ABDOMEN-PELVIS W/O   Final Result      1.  No acute abnormality in the abdomen or pelvis.   2.  Colorectal anastomosis.   3.  Stable small to moderate hiatal hernia.           Assessment/Plan  C. difficile diarrhea- (present on admission)  Assessment & Plan    History of C.  Difficile x4 previous episodes including a fecal transplant.  History of use of recent antibiotics due to possible UTI.  Prescribed by urologist.  C. difficile PCR positive  p.o. vancomycin 21-day taper.  If patient feels vancomycin 21-day taper then he would more than likely qualify for fecal transplant.  Patient has no significant signs of sepsis at this moment.  CT abdomen pelvis revealing normal anastomosis.      Debility- (present on admission)  Assessment & Plan  Chronic debilitation.  I ordered PT OT    Type 2 diabetes mellitus (HCC)- (present on admission)  Assessment & Plan  Without long-term use of insulin.  Blood sugar controlled.  Uncomplicated.  I start patient on diabetic diet.  I ordered insulin sliding scale  I also ordered hypoglycemic protocol to prevent hypoglycemia    DNR (do not resuscitate)  Assessment & Plan  Total time spent on advanced care planning, excluding time spent on daily care: 16 minutes.    1st 30 minutes [id number]   Each add’l 30 minutes [id number]        I discussed extensively with patient and patient's family is regarding code status and plan of care. We also discussed advanced care planning including diagnosis, prognosis, plan of care, risks and benefits of any therapies that could be offered, as well as alternatives including palliation and hospice, as appropriate. My discussion is summarized above in detail. Confirmed with DNR      Chronic neck pain- (present on admission)  Assessment & Plan  Chronic neck pain currently no significant worsening signs  No focal neurological deficit    Benign prostatic hyperplasia- (present on admission)  Assessment & Plan  Patient continue to use Flomax  Symptoms okay  Continue outpatient medication       VTE prophylaxis: lovenox

## 2019-08-09 NOTE — CARE PLAN
Problem: Safety  Goal: Will remain free from falls  Outcome: PROGRESSING AS EXPECTED  Note:   Patient has remained free from falls when ambulating to the restroom. Fall precautions in place.      Problem: Knowledge Deficit  Goal: Knowledge of the prescribed therapeutic regimen will improve  Outcome: PROGRESSING AS EXPECTED

## 2019-08-09 NOTE — DISCHARGE PLANNING
Called [facility] to check on a cost for vancomycin. Vancomycin's co-pay is $3.40 and it is ready for .

## 2019-08-10 ENCOUNTER — PATIENT OUTREACH (OUTPATIENT)
Dept: HEALTH INFORMATION MANAGEMENT | Facility: OTHER | Age: 79
End: 2019-08-10

## 2019-08-10 VITALS
TEMPERATURE: 99 F | OXYGEN SATURATION: 97 % | SYSTOLIC BLOOD PRESSURE: 128 MMHG | HEART RATE: 55 BPM | HEIGHT: 68 IN | RESPIRATION RATE: 17 BRPM | WEIGHT: 135 LBS | DIASTOLIC BLOOD PRESSURE: 59 MMHG | BODY MASS INDEX: 20.46 KG/M2

## 2019-08-10 PROCEDURE — 99239 HOSP IP/OBS DSCHRG MGMT >30: CPT | Performed by: HOSPITALIST

## 2019-08-10 PROCEDURE — 700102 HCHG RX REV CODE 250 W/ 637 OVERRIDE(OP): Performed by: HOSPITALIST

## 2019-08-10 PROCEDURE — 700102 HCHG RX REV CODE 250 W/ 637 OVERRIDE(OP): Performed by: INTERNAL MEDICINE

## 2019-08-10 PROCEDURE — A9270 NON-COVERED ITEM OR SERVICE: HCPCS | Performed by: INTERNAL MEDICINE

## 2019-08-10 PROCEDURE — A9270 NON-COVERED ITEM OR SERVICE: HCPCS | Performed by: HOSPITALIST

## 2019-08-10 RX ORDER — LACTOBACILLUS RHAMNOSUS GG 10B CELL
1 CAPSULE ORAL
Qty: 30 CAP | Refills: 11 | Status: SHIPPED | OUTPATIENT
Start: 2019-08-10 | End: 2022-09-29

## 2019-08-10 RX ORDER — GABAPENTIN 300 MG/1
600 CAPSULE ORAL 3 TIMES DAILY
Qty: 90 CAP | Refills: 3 | Status: SHIPPED | OUTPATIENT
Start: 2019-08-10 | End: 2019-09-17

## 2019-08-10 RX ADMIN — OXYCODONE HYDROCHLORIDE AND ACETAMINOPHEN 1.5 TABLET: 5; 325 TABLET ORAL at 06:05

## 2019-08-10 RX ADMIN — VANCOMYCIN HYDROCHLORIDE 125 MG: 10 INJECTION, POWDER, LYOPHILIZED, FOR SOLUTION INTRAVENOUS at 11:49

## 2019-08-10 RX ADMIN — TAMSULOSIN HYDROCHLORIDE 0.4 MG: 0.4 CAPSULE ORAL at 09:33

## 2019-08-10 RX ADMIN — GABAPENTIN 600 MG: 300 CAPSULE ORAL at 06:05

## 2019-08-10 RX ADMIN — VANCOMYCIN HYDROCHLORIDE 125 MG: 10 INJECTION, POWDER, LYOPHILIZED, FOR SOLUTION INTRAVENOUS at 06:05

## 2019-08-10 RX ADMIN — Medication 1 CAPSULE: at 09:33

## 2019-08-10 ASSESSMENT — LIFESTYLE VARIABLES: DO YOU DRINK ALCOHOL: YES

## 2019-08-10 ASSESSMENT — PATIENT HEALTH QUESTIONNAIRE - PHQ9
2. FEELING DOWN, DEPRESSED, IRRITABLE, OR HOPELESS: NOT AT ALL
1. LITTLE INTEREST OR PLEASURE IN DOING THINGS: NOT AT ALL
SUM OF ALL RESPONSES TO PHQ9 QUESTIONS 1 AND 2: 0

## 2019-08-10 NOTE — PROGRESS NOTES
Notified patient that he was unable to receive transportation from [facility] because he lives only 1 mile away. Pt was very upset.

## 2019-08-10 NOTE — DISCHARGE SUMMARY
Discharge Summary    CHIEF COMPLAINT ON ADMISSION  Chief Complaint   Patient presents with   • Abdominal Pain     cramping   • Diarrhea       Reason for Admission  Abdominal pain and diarrhea, + Cdificil on lab testing.    Admission Date  8/6/2019    CODE STATUS  DNAR/DNI    HPI & HOSPITAL COURSE  This is a 78 y.o. male here with excessive explosive diarrhea.   8/7: This 78-year-old male presents with severe diarrhea after he was given antibiotics by his urologist for a presumed UTI.  I do not see evidence of urine culture in epic.  C. difficile was positive.  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  He was started on vancomycin p.o. every 6 hours on admission and will need a 21-day course taper.  Patient has no nausea or vomiting he did have a previous colostomy with colostomy takedown 1 year ago with Dr. Granados time presumably for diverticulitis of his colon.  He is currently getting IV fluids normal saline 83 an hour.  I have ordered CT abdomen pelvis with IV contrast only to ensure that there is no megacolon or abscess or rupture.  Patient does appear nontoxic on exam with only mild tenderness with palpation.  No rebound guarding is noted.  8/8: Patient still has hyperactive bowel sounds, but states that since last night his total amounts of frequency of diarrhea has decreased.  He is able to tolerate p.o. diet.  CT abdomen pelvis negative, anastomosis appears normal.  Patient states he had a difficult time getting his vancomycin filled previously due to high cost.  I have sent it to the [facility] pharmacy.  I will have social work check on the medication cost.  8/9: Patient states his frequency of diarrhea has decreased significantly since started on vancomycin.  Social work did go to the [facility] pharmacy to retrieve p.o. vancomycin 21-day course for patient for discharge tomorrow.  He is requesting for increased pain medications for his  chronic wrist arthritic pain.  I have added Toradol IV as needed increase the Percocet home dose to every 4 hours as needed.*     The patient had improvement of his amount of diarrhea.  He was able to get up self to use the bathroom and had no accidents prior to discharge.  His vancomycin medication was obtained by SW prior to dc and given to patient for the full 21 day course of treatment.  He had no further abdominal pain, but had chronic arthritic pain to his wrist.  He was discharged with high dosage of neurontin 400 tid to 600 tid.       Therefore, he is discharged in good and stable condition to home with close outpatient follow-up.    The patient met 2-midnight criteria for an inpatient stay at the time of discharge.    Discharge Date  8/10/2019    FOLLOW UP ITEMS POST DISCHARGE  Follow up with PCP in 1-2 weeks for recheck.  Patient given 1 year supply of probiotics.  He is to inform physicians of his 5 episodes of Cdif confirmed prior to antibiotics.   I would recommend urine culture confirmations in the future.    DISCHARGE DIAGNOSES  Active Problems:    C. difficile diarrhea POA: Yes    Type 2 diabetes mellitus (HCC) (Chronic) POA: Yes    Debility (Chronic) POA: Yes    Chronic neck pain POA: Yes    DNR (do not resuscitate) POA: Yes    Benign prostatic hyperplasia (Chronic) POA: Yes    Bipolar 1 disorder, depressed (HCC) (Chronic) POA: Yes  Resolved Problems:    * No resolved hospital problems. *      FOLLOW UP  No future appointments.  No follow-up provider specified.    MEDICATIONS ON DISCHARGE     Medication List      START taking these medications      Instructions   lactobacillus rhamnosus Caps capsule   Take 1 Cap by mouth every morning with breakfast.  Dose:  1 Cap     * vancomycin 50 mg/mL 50 mg/mL Soln   Take 2.5 mL by mouth every 6 hours for 9 days.  Dose:  125 mg     * vancomycin 50 mg/mL 50 mg/mL Soln  Start taking on:  8/17/2019   Take 2.5 mL by mouth every 12 hours for 7 days.  Dose:  125 mg      * vancomycin 50 mg/mL 50 mg/mL Soln  Start taking on:  8/25/2019   Take 2.5 mL by mouth every 24 hours for 6 days.  Dose:  125 mg     * vancomycin 50 mg/mL 50 mg/mL Soln  Start taking on:  9/1/2019   Take 2.5 mL by mouth every 48 hours for 6 days.  Dose:  125 mg     * vancomycin 50 mg/mL 50 mg/mL Soln  Start taking on:  9/9/2019   Take 2.5 mL by mouth every 72 hours for 12 days.  Dose:  125 mg         * This list has 5 medication(s) that are the same as other medications prescribed for you. Read the directions carefully, and ask your doctor or other care provider to review them with you.            CHANGE how you take these medications      Instructions   gabapentin 300 MG Caps  What changed:    · medication strength  · how much to take  · when to take this  Commonly known as:  NEURONTIN   Take 2 Caps by mouth 3 times a day.  Dose:  600 mg        CONTINUE taking these medications      Instructions   alfuzosin 10 MG SR tablet  Commonly known as:  UROXATRAL   Take 10 mg by mouth every day.  Dose:  10 mg     LAMICTAL 200 MG tablet  Generic drug:  lamotrigine   Take 200 mg by mouth every day.  Dose:  200 mg     oxyCODONE-acetaminophen 7.5-325 MG per tablet  Commonly known as:  PERCOCET   Take 1 Tab by mouth every 8 hours as needed for Severe Pain.  Dose:  1 Tab            Allergies  Allergies   Allergen Reactions   • Acetaminophen Nausea     Very nauseated        DIET  Orders Placed This Encounter   Procedures   • Diet Order Regular     Standing Status:   Standing     Number of Occurrences:   1     Order Specific Question:   Diet:     Answer:   Regular [1]       ACTIVITY  As tolerated.  Weight bearing as tolerated    CONSULTATIONS  none    PROCEDURES  CT abdomen/pelvis w/o:    1.  No acute abnormality in the abdomen or pelvis.  2.  Colorectal anastomosis.  3.  Stable small to moderate hiatal hernia.   IR Documentation Timeline (8/10/2019 07:44:23 to 8/10/2019 07:44:23)     Reading Provider Reading Date   Yara DUNN  MONIQUE Nova Aug 8, 2019     C Diff by PCR rflx Toxin   Order: [id number]   Status:  Final result   Visible to patient:  No (Not Released) Next appt:  None   Specimen Information: Stool         Ref Range & Units 4d ago  (8/6/19) 1yr ago  (7/13/18) 1yr ago  (6/11/18) 1yr ago  (4/10/18)   C Diff by PCR Negative See Toxin  See Toxin  See Toxin  See Toxin    027-NAP1-BI Presumptive Negative See Toxin  POSITIVE CM See Toxin CM POSITIVE CM   Comment: Presumptive 027/NAP1/BI target DNA sequences are DETECTED.               LABORATORY  Lab Results   Component Value Date    SODIUM 141 08/09/2019    POTASSIUM 4.0 08/09/2019    CHLORIDE 108 08/09/2019    CO2 25 08/09/2019    GLUCOSE 114 (H) 08/09/2019    BUN 13 08/09/2019    CREATININE 0.85 08/09/2019        Lab Results   Component Value Date    WBC 5.3 08/09/2019    HEMOGLOBIN 12.7 (L) 08/09/2019    HEMATOCRIT 38.2 (L) 08/09/2019    PLATELETCT 165 08/09/2019        Total time of the discharge process exceeds 40 minutes.

## 2019-08-10 NOTE — PROGRESS NOTES
Received report from day RN and assumed care of patient at 1900.  Assessed patient and reviewed labs and notes.  Patient is AOx4, up SBA, steady.  Special contact precautions in place for clostridium difficile.  Patient has loose stools with decreasing frequency overnight; oral vanco administered per MAR.  Patient medicated for chronic shoulder pain one time overnight per MAR.  Plan of care reviewed, patient board updated, safety precautions in place, and frequent rounding in practice.

## 2019-08-10 NOTE — PROGRESS NOTES
Patient discharged. IV removed. Patient left unit via wheelchair with nurse. Personal belongings with patient when leaving unit. Patient given bag of vancomycin and instructions of when to visit with physician. Verbalizes understanding. Copy of discharge instructions with patient and in the chart.

## 2019-08-10 NOTE — PROGRESS NOTES
Patient very irritable, complains of care, coldness of food, and being in hospital in general. RN provided reassurance and met needs. Patient resting in chair.

## 2019-08-10 NOTE — CARE PLAN
Problem: Communication  Goal: The ability to communicate needs accurately and effectively will improve  Outcome: PROGRESSING AS EXPECTED  Note:   Patient able to communicate needs and asks appropriate questions.      Problem: Bowel/Gastric:  Goal: Normal bowel function is maintained or improved  Outcome: PROGRESSING SLOWER THAN EXPECTED  Note:   Patient experiencing bouts of diarrhea, 4 in the night.

## 2019-08-10 NOTE — PROGRESS NOTES
Bedside report received from night shift RN. Assumed care. Pt is A&Ox4. Pt is in bed. Pt denies pain at this time.   Pt was updated on the plan of care for the day. All questions answered.   Pt has call light within reach and bed is in lowest position.  All fall precautions in place. Pt had no other needs at this time, hourly rounding in place.

## 2019-08-10 NOTE — DISCHARGE PLANNING
Anticipated Discharge Disposition:   Home    Action:   RN CM contacted by day shift RN for transportation back to home.  Patient is insisting on Uber for transport to home.  Patient lives one mile from hospital.     Barriers to Discharge:   None    Plan:   Google map given to patient with walking instructions as well as bus route.  Bus ticket given to patient for transportation back to home.

## 2019-08-10 NOTE — CARE PLAN
Problem: Communication  Goal: The ability to communicate needs accurately and effectively will improve  Outcome: PROGRESSING AS EXPECTED     Problem: Safety  Goal: Will remain free from falls  Outcome: PROGRESSING AS EXPECTED  Intervention: Implement fall precautions  Flowsheets  Taken 8/9/2019 2100  Environmental Precautions: Treaded Slipper Socks on Patient;Personal Belongings, Wastebasket, Call Bell etc. in Easy Reach;Report Given to Other Health Care Providers Regarding Fall Risk;Communication Sign for Patients & Families;Bed in Low Position;Mobility Assessed & Appropriate Sign Placed  Taken 8/9/2019 0032  Chair/Bed Strip Alarm: Yes - Alarm On

## 2019-08-10 NOTE — DISCHARGE INSTRUCTIONS
Discharge Instructions    Discharged to home by car with friend. Discharged via wheelchair, hospital escort: Yes.  Special equipment needed: Not Applicable    Be sure to schedule a follow-up appointment with your primary care doctor or any specialists as instructed.     Discharge Plan:   Diet Plan: Discussed  Activity Level: Discussed  Confirmed Follow up Appointment: Appointment Scheduled  Confirmed Symptoms Management: Discussed  Medication Reconciliation Updated: Yes  Influenza Vaccine Indication: Not indicated: Previously immunized this influenza season and > 8 years of age    I understand that a diet low in cholesterol, fat, and sodium is recommended for good health. Unless I have been given specific instructions below for another diet, I accept this instruction as my diet prescription.   Other diet: Heart healthy    Special Instructions: None    · Is patient discharged on Warfarin / Coumadin?   No     Clostridium Difficile Infection  Introduction   Clostridium difficile (C. difficile or C. diff) infection causes inflammation of the large intestine (colon). This condition can result in damage to the lining of your colon and may lead to another condition called colitis. This infection can be passed from person to person (is contagious).  Follow these instructions at home:  Eating and drinking  · Drink enough fluid to keep your pee (urine) clear or pale yellow.  · Avoid drinking:  ¨ Milk.  ¨ Caffeine.  ¨ Alcohol.  · Follow exact instructions from your doctor about how to get enough fluid in your body (rehydrate).  · Eat small meals often instead of large meals.  Medicines  · Take your antibiotic medicine as told by your doctor. Do not stop taking the antibiotic even if you start to feel better unless your doctor told you to do that.  · Take over-the-counter and prescription medicines only as told by your doctor.  · Do not use medicines to help with watery poop (diarrhea).  General instructions  · Wash your hands  fully before you prepare food and after you use the bathroom. Make sure people who live with you also wash their  · hands often.  · Clean the surfaces that you touch. Use a product that contains chlorine bleach.  · Keep all follow-up visits as told by your doctor. This is important.  Contact a doctor if:  · Your symptoms do not get better with treatment.  · Your symptoms get worse with treatment.  · Your symptoms go away and then come back.  · You have a fever.  · You have new symptoms.  Get help right away if:  · You have more pain or tenderness in your belly (abdomen).  · Your poop (stool) is mostly bloody.  · Your poop looks dark black and tarry.  · You cannot eat or drink without throwing up (vomiting).  · You have signs of dehydration, such as:  ¨ Dark pee, very little pee, or no pee.  ¨ Cracked lips.  ¨ Not making tears when you cry.  ¨ Dry mouth.  ¨ Sunken eyes.  ¨ Feeling sleepy.  ¨ Feeling weak.  ¨ Feeling dizzy.  This information is not intended to replace advice given to you by your health care provider. Make sure you discuss any questions you have with your health care provider.  Document Released: 10/15/2010 Document Revised: 05/25/2017 Document Reviewed: 06/20/2016  © 2017 [employer]      Depression / Suicide Risk    As you are discharged from this [facility] facility, it is important to learn how to keep safe from harming yourself.    Recognize the warning signs:  · Abrupt changes in personality, positive or negative- including increase in energy   · Giving away possessions  · Change in eating patterns- significant weight changes-  positive or negative  · Change in sleeping patterns- unable to sleep or sleeping all the time   · Unwillingness or inability to communicate  · Depression  · Unusual sadness, discouragement and loneliness  · Talk of wanting to die  · Neglect of personal appearance   · Rebelliousness- reckless behavior  · Withdrawal from people/activities they love  · Confusion- inability to  concentrate     If you or a loved one observes any of these behaviors or has concerns about self-harm, here's what you can do:  · Talk about it- your feelings and reasons for harming yourself  · Remove any means that you might use to hurt yourself (examples: pills, rope, extension cords, firearm)  · Get professional help from the community (Mental Health, Substance Abuse, psychological counseling)  · Do not be alone:Call your Safe Contact- someone whom you trust who will be there for you.  · Call your local CRISIS HOTLINE 804-4102 or 944-087-1628  · Call your local Children's Mobile Crisis Response Team [place] (408) 758-5875 or www.[employer]  · Call the toll free National Suicide Prevention Hotlines   · National Suicide Prevention Lifeline 798-094-AOHL (9069)  · National Hope Line Network 800-SUICIDE (678-1197)

## 2019-08-12 ENCOUNTER — HOSPITAL ENCOUNTER (OUTPATIENT)
Facility: MEDICAL CENTER | Age: 79
DRG: 372 | End: 2019-08-12
Payer: MEDICARE

## 2019-08-12 ENCOUNTER — HOSPITAL ENCOUNTER (INPATIENT)
Facility: MEDICAL CENTER | Age: 79
LOS: 2 days | DRG: 372 | End: 2019-08-14
Attending: FAMILY MEDICINE | Admitting: FAMILY MEDICINE
Payer: MEDICARE

## 2019-08-12 ENCOUNTER — HOSPITAL ENCOUNTER (EMERGENCY)
Facility: MEDICAL CENTER | Age: 79
End: 2019-08-12
Payer: MEDICARE

## 2019-08-12 PROCEDURE — 770006 HCHG ROOM/CARE - MED/SURG/GYN SEMI*

## 2019-08-12 RX ORDER — LAMOTRIGINE 100 MG/1
200 TABLET ORAL DAILY
Status: DISCONTINUED | OUTPATIENT
Start: 2019-08-13 | End: 2019-08-14 | Stop reason: HOSPADM

## 2019-08-12 RX ORDER — HEPARIN SODIUM 5000 [USP'U]/ML
5000 INJECTION, SOLUTION INTRAVENOUS; SUBCUTANEOUS EVERY 8 HOURS
Status: DISCONTINUED | OUTPATIENT
Start: 2019-08-13 | End: 2019-08-14 | Stop reason: HOSPADM

## 2019-08-12 RX ORDER — ALFUZOSIN HYDROCHLORIDE 10 MG/1
10 TABLET, EXTENDED RELEASE ORAL DAILY
Status: DISCONTINUED | OUTPATIENT
Start: 2019-08-13 | End: 2019-08-12

## 2019-08-12 RX ORDER — AMOXICILLIN 250 MG
2 CAPSULE ORAL 2 TIMES DAILY
Status: DISCONTINUED | OUTPATIENT
Start: 2019-08-12 | End: 2019-08-12

## 2019-08-12 RX ORDER — POLYETHYLENE GLYCOL 3350 17 G/17G
1 POWDER, FOR SOLUTION ORAL
Status: DISCONTINUED | OUTPATIENT
Start: 2019-08-12 | End: 2019-08-12

## 2019-08-12 RX ORDER — GABAPENTIN 300 MG/1
600 CAPSULE ORAL 3 TIMES DAILY
Status: DISCONTINUED | OUTPATIENT
Start: 2019-08-13 | End: 2019-08-14 | Stop reason: HOSPADM

## 2019-08-12 RX ORDER — OXYCODONE AND ACETAMINOPHEN 7.5; 325 MG/1; MG/1
1 TABLET ORAL EVERY 8 HOURS PRN
Status: DISCONTINUED | OUTPATIENT
Start: 2019-08-12 | End: 2019-08-12

## 2019-08-12 RX ORDER — OXYCODONE HYDROCHLORIDE AND ACETAMINOPHEN 5; 325 MG/1; MG/1
1 TABLET ORAL EVERY 8 HOURS PRN
Status: DISCONTINUED | OUTPATIENT
Start: 2019-08-12 | End: 2019-08-14 | Stop reason: HOSPADM

## 2019-08-12 RX ORDER — LACTOBACILLUS RHAMNOSUS GG 10B CELL
1 CAPSULE ORAL
Status: DISCONTINUED | OUTPATIENT
Start: 2019-08-13 | End: 2019-08-14 | Stop reason: HOSPADM

## 2019-08-12 RX ORDER — BISACODYL 10 MG
10 SUPPOSITORY, RECTAL RECTAL
Status: DISCONTINUED | OUTPATIENT
Start: 2019-08-12 | End: 2019-08-12

## 2019-08-12 RX ORDER — SODIUM CHLORIDE 9 MG/ML
INJECTION, SOLUTION INTRAVENOUS CONTINUOUS
Status: DISCONTINUED | OUTPATIENT
Start: 2019-08-12 | End: 2019-08-14

## 2019-08-12 RX ORDER — TAMSULOSIN HYDROCHLORIDE 0.4 MG/1
0.4 CAPSULE ORAL
Status: DISCONTINUED | OUTPATIENT
Start: 2019-08-13 | End: 2019-08-14 | Stop reason: HOSPADM

## 2019-08-13 PROBLEM — D64.9 ANEMIA: Status: ACTIVE | Noted: 2018-01-30

## 2019-08-13 PROBLEM — A04.72 C. DIFFICILE DIARRHEA: Chronic | Status: ACTIVE | Noted: 2017-12-19

## 2019-08-13 LAB
ALBUMIN SERPL BCP-MCNC: 4.1 G/DL (ref 3.2–4.9)
ALBUMIN/GLOB SERPL: 1.5 G/DL
ALP SERPL-CCNC: 80 U/L (ref 30–99)
ALT SERPL-CCNC: 20 U/L (ref 2–50)
ANION GAP SERPL CALC-SCNC: 8 MMOL/L (ref 0–11.9)
AST SERPL-CCNC: 16 U/L (ref 12–45)
BASOPHILS # BLD AUTO: 0.2 % (ref 0–1.8)
BASOPHILS # BLD: 0.01 K/UL (ref 0–0.12)
BILIRUB SERPL-MCNC: 0.7 MG/DL (ref 0.1–1.5)
BUN SERPL-MCNC: 13 MG/DL (ref 8–22)
C DIFF DNA SPEC QL NAA+PROBE: NEGATIVE
C DIFF TOX GENS STL QL NAA+PROBE: NEGATIVE
CALCIUM SERPL-MCNC: 9.4 MG/DL (ref 8.5–10.5)
CHLORIDE SERPL-SCNC: 106 MMOL/L (ref 96–112)
CO2 SERPL-SCNC: 30 MMOL/L (ref 20–33)
CREAT SERPL-MCNC: 0.92 MG/DL (ref 0.5–1.4)
EOSINOPHIL # BLD AUTO: 0.08 K/UL (ref 0–0.51)
EOSINOPHIL NFR BLD: 1.3 % (ref 0–6.9)
ERYTHROCYTE [DISTWIDTH] IN BLOOD BY AUTOMATED COUNT: 46 FL (ref 35.9–50)
GLOBULIN SER CALC-MCNC: 2.7 G/DL (ref 1.9–3.5)
GLUCOSE SERPL-MCNC: 102 MG/DL (ref 65–99)
HCT VFR BLD AUTO: 41.7 % (ref 42–52)
HGB BLD-MCNC: 13.9 G/DL (ref 14–18)
IMM GRANULOCYTES # BLD AUTO: 0.06 K/UL (ref 0–0.11)
IMM GRANULOCYTES NFR BLD AUTO: 1 % (ref 0–0.9)
LYMPHOCYTES # BLD AUTO: 1.3 K/UL (ref 1–4.8)
LYMPHOCYTES NFR BLD: 21.1 % (ref 22–41)
MCH RBC QN AUTO: 31.8 PG (ref 27–33)
MCHC RBC AUTO-ENTMCNC: 33.3 G/DL (ref 33.7–35.3)
MCV RBC AUTO: 95.4 FL (ref 81.4–97.8)
MONOCYTES # BLD AUTO: 0.55 K/UL (ref 0–0.85)
MONOCYTES NFR BLD AUTO: 8.9 % (ref 0–13.4)
NEUTROPHILS # BLD AUTO: 4.17 K/UL (ref 1.82–7.42)
NEUTROPHILS NFR BLD: 67.5 % (ref 44–72)
NRBC # BLD AUTO: 0 K/UL
NRBC BLD-RTO: 0 /100 WBC
PLATELET # BLD AUTO: 203 K/UL (ref 164–446)
PMV BLD AUTO: 9.4 FL (ref 9–12.9)
POTASSIUM SERPL-SCNC: 3.8 MMOL/L (ref 3.6–5.5)
PROT SERPL-MCNC: 6.8 G/DL (ref 6–8.2)
RBC # BLD AUTO: 4.37 M/UL (ref 4.7–6.1)
SODIUM SERPL-SCNC: 144 MMOL/L (ref 135–145)
WBC # BLD AUTO: 6.2 K/UL (ref 4.8–10.8)

## 2019-08-13 PROCEDURE — 87493 C DIFF AMPLIFIED PROBE: CPT

## 2019-08-13 PROCEDURE — 700105 HCHG RX REV CODE 258: Performed by: STUDENT IN AN ORGANIZED HEALTH CARE EDUCATION/TRAINING PROGRAM

## 2019-08-13 PROCEDURE — 770006 HCHG ROOM/CARE - MED/SURG/GYN SEMI*

## 2019-08-13 PROCEDURE — A9270 NON-COVERED ITEM OR SERVICE: HCPCS | Performed by: STUDENT IN AN ORGANIZED HEALTH CARE EDUCATION/TRAINING PROGRAM

## 2019-08-13 PROCEDURE — 80053 COMPREHEN METABOLIC PANEL: CPT

## 2019-08-13 PROCEDURE — 85025 COMPLETE CBC W/AUTO DIFF WBC: CPT

## 2019-08-13 PROCEDURE — 700102 HCHG RX REV CODE 250 W/ 637 OVERRIDE(OP): Performed by: STUDENT IN AN ORGANIZED HEALTH CARE EDUCATION/TRAINING PROGRAM

## 2019-08-13 PROCEDURE — 36415 COLL VENOUS BLD VENIPUNCTURE: CPT

## 2019-08-13 RX ADMIN — OXYCODONE HYDROCHLORIDE AND ACETAMINOPHEN 1 TABLET: 5; 325 TABLET ORAL at 17:39

## 2019-08-13 RX ADMIN — LAMOTRIGINE 200 MG: 100 TABLET ORAL at 08:07

## 2019-08-13 RX ADMIN — VANCOMYCIN HYDROCHLORIDE 125 MG: 10 INJECTION, POWDER, LYOPHILIZED, FOR SOLUTION INTRAVENOUS at 23:52

## 2019-08-13 RX ADMIN — GABAPENTIN 600 MG: 300 CAPSULE ORAL at 08:07

## 2019-08-13 RX ADMIN — OXYCODONE HYDROCHLORIDE AND ACETAMINOPHEN 1 TABLET: 5; 325 TABLET ORAL at 08:59

## 2019-08-13 RX ADMIN — SODIUM CHLORIDE: 9 INJECTION, SOLUTION INTRAVENOUS at 00:10

## 2019-08-13 RX ADMIN — TAMSULOSIN HYDROCHLORIDE 0.4 MG: 0.4 CAPSULE ORAL at 08:07

## 2019-08-13 RX ADMIN — GABAPENTIN 600 MG: 300 CAPSULE ORAL at 21:06

## 2019-08-13 RX ADMIN — GABAPENTIN 600 MG: 300 CAPSULE ORAL at 12:14

## 2019-08-13 RX ADMIN — VANCOMYCIN HYDROCHLORIDE 125 MG: 10 INJECTION, POWDER, LYOPHILIZED, FOR SOLUTION INTRAVENOUS at 06:28

## 2019-08-13 RX ADMIN — Medication 1 CAPSULE: at 08:07

## 2019-08-13 RX ADMIN — VANCOMYCIN HYDROCHLORIDE 125 MG: 10 INJECTION, POWDER, LYOPHILIZED, FOR SOLUTION INTRAVENOUS at 12:14

## 2019-08-13 RX ADMIN — SODIUM CHLORIDE: 9 INJECTION, SOLUTION INTRAVENOUS at 12:42

## 2019-08-13 RX ADMIN — VANCOMYCIN HYDROCHLORIDE 125 MG: 10 INJECTION, POWDER, LYOPHILIZED, FOR SOLUTION INTRAVENOUS at 17:39

## 2019-08-13 NOTE — H&P
"[facility] MEDICINE HISTORY AND PHYSICAL     PATIENT ID:  NAME:  Marcelo Colon  MRN:               [id number]  YOB: 1940    Date of Admission: 8/12/2019     Attending: Dr. Key    Resident: Dr. Ledesma     Primary Care Physician:  ROE    CC:  Diarrhea    HPI: Marcelo Colon is a 78 y.o. male who presented with diarrhea. Patient was recently admitted with C. Diff at [facility] under a hospital service. Patient was discharged on 8/10 and discharged on a PO van taper given this is his 5th infection. Patient states on the day of discharge his diarrhea had resolved. On the 11th he had four episodes of \"watery diarrhea\" and today, the 12th he had two episodes of formed/watery BM. Patient denies associated fever/chills, vomiting, abdominal pain, or episodes of confusion at home. A decision was made to directly admit the patient given his worsening diarrhea since discharge two days ago.     This is the patient's 5th time having C. Diff since 2017. He required a colectomy in 2018 secondary to abscesses and has since had an anastomosis. 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     REVIEW OF SYSTEMS:   Ten systems reviewed and were negative except as noted in the HPI.                PAST MEDICAL HISTORY:  Past Medical History:   Diagnosis Date   • Anesthesia     \"takes very little and takes forever to come out of it\"   • Anxiety disorder    • Arthritis     some in my back   • Bipolar disorder (HCC)    • Clostridium difficile infection    • Depression    • Enlarged prostate    • GERD (gastroesophageal reflux disease)    • Heart murmur    • Indigestion    • Neck pain        PAST SURGICAL HISTORY:  Past Surgical History:   Procedure Laterality Date   • COLONOSCOPY N/A 9/20/2018    Procedure: COLONOSCOPY;  Surgeon: Skyler Johnson M.D.;  Location: SURGERY [facility];  Service: Gastroenterology   • FECAL " TRANSPLANT N/A 9/20/2018    Procedure: FECAL TRANSPLANT;  Surgeon: Skyler Johnson M.D.;  Location: SURGERY [facility];  Service: Gastroenterology   • COLOSTOMY TAKEDOWN  3/21/2018    Procedure: COLOSTOMY TAKEDOWN;  Surgeon: Jorge Rodriguez M.D.;  Location: SURGERY [facility];  Service: General   • LUMBAR DECOMPRESSION  12/2017   • EXPLORATORY LAPAROTOMY  7/25/2017    Procedure: EXPLORATORY LAPAROTOMY- ABDOMINAL WASH OUT;  Surgeon: Jorge Rodriguez M.D.;  Location: SURGERY [facility];  Service:    • SIGMOID COLON RESECTION  7/25/2017    Procedure: SIGMOID COLON RESECTION;  Surgeon: Jorge Rodriguez M.D.;  Location: SURGERY [facility];  Service:    • COLOSTOMY  7/25/2017    Procedure: COLOSTOMY- FOR OSTOMY PLACEMENT AND OTHER PROCEDURES AS INDICATED;  Surgeon: Jorge Rodriguez M.D.;  Location: SURGERY [facility];  Service:    • APPENDECTOMY  7/25/2017    Procedure: APPENDECTOMY;  Surgeon: Jorge Rodriguez M.D.;  Location: SURGERY [facility];  Service:    • EXC./BIOPSY MASS BACK  1997       FAMILY HISTORY:  No family history on file.    SOCIAL HISTORY:   Pt lives alone  Smoking quit in his 51 years.  Etoh use 2-3 glasses of wine per night  Drug use none currently.     DIET:   No orders of the defined types were placed in this encounter.      ALLERGIES:  Allergies   Allergen Reactions   • Acetaminophen Nausea     Very nauseated        OUTPATIENT MEDICATIONS:    Current Facility-Administered Medications:   •  [START ON 8/13/2019] gabapentin (NEURONTIN) capsule 600 mg, 600 mg, Oral, TID, Richard Ledesma M.D.  •  [START ON 8/13/2019] lamoTRIgine (LAMICTAL) tablet 200 mg, 200 mg, Oral, DAILY, Richard Ledesma M.D.  •  [START ON 8/13/2019] lactobacillus rhamnosus (CULTURELLE) capsule 1 Cap, 1 Cap, Oral, QDAY with Breakfast, Richard Ledesma M.D.  •  NS infusion, , Intravenous, Continuous, Richard Ledesma M.D.  •  [START ON 8/13/2019] heparin injection  "5,000 Units, 5,000 Units, Subcutaneous, Q8HRS, Richard Ledesma M.D.  •  [START ON 8/13/2019] tamsulosin (FLOMAX) capsule 0.4 mg, 0.4 mg, Oral, AFTER BREAKFAST, Richard Ledesma M.D.  •  oxyCODONE-acetaminophen (PERCOCET) 5-325 MG per tablet 1 Tab, 1 Tab, Oral, Q8HRS PRN, Richard Ledesma M.D.  •  [START ON 8/13/2019] vancomycin 50 mg/mL oral soln 125 mg, 125 mg, Oral, Q6HRS, Richard Ledesma M.D.    PHYSICAL EXAM:  Vitals:    08/12/19 2020   Weight: 63.1 kg (139 lb 1.8 oz)   Height: 1.727 m (5' 8\")   , No data recorded.  ,      General: Pt resting in NAD, cooperative   Skin:  Pink, warm and dry.  No rashes  HEENT: NC/AT. EOMI. MMM. No nasal discharge. Oropharynx nonerythematous without exudate/plaques  Neck:  Supple without lymphadenopathy or rigidity. No JVD   Lungs:  Symmetrical.  CTAB with no W/R/R.  Good air movement   Cardiovascular:  S1/S2 RRR with a systolic  Abdomen:  Abdomen is soft NT/ND. +BS. No masses noted.  Extremities:  Full range of motion. No gross deformities noted. 2+ pulses in all extremities. No C/C/E   Spine:  Straight without vertebral anomalies.  CNS:  Muscle tone is normal. Cranial nerves II-XII grossly intact.        LAB TESTS:   No results for input(s): WBC, RBC, HEMOGLOBIN, HEMATOCRIT, MCV, MCH, RDW, PLATELETCT, MPV, NEUTSPOLYS, LYMPHOCYTES, MONOCYTES, EOSINOPHILS, BASOPHILS, RBCMORPHOLO in the last 72 hours.      No results for input(s): SODIUM, POTASSIUM, CHLORIDE, CO2, BUN, CREATININE, CALCIUM, MAGNESIUM, PHOSPHORUS, ALBUMIN in the last 72 hours.    CULTURES:   Results     ** No results found for the last 168 hours. **          IMAGES:  Unremarkable CT abdomen in previous hospital admission less than one week ago.     ASSESSMENT/PLAN: 78 y.o. male admitted for C. Diff.    # C. Diff, active infection  # Hx of four previous C. Diff infections  - Patient has had multiple C. Diff infections since 2017, this is his 5th.   - He had relief from reoccurrence for almost one " year s/p fecal transplant.  - Could possibly benefit from another fecal transplant but patient would prefer less invasive options if possible. Therefore, an ID consult tomorrow may be reasonable.   - Patient sees Dr. Johnson with [facility] as an outpatient. If GI consult is needed, Dr. Johnson's group will be contacted.   - Will continue PO van for now as the patient was doing as an outpatient given it appears patient's diarrhea is already improving.   - Gentle IVF overnight given recent diarrhea.   - CMP and CBC to assess for other concerning pathology and to assess kidney function and WBC.   - On differential is ischemic colitis given patient's age and gastroenteritis. C. Diff colitis seems more likely at this time.   - C. Diff testing will be repeated given the patient was C. Diff toxic positive less than one week ago.   - Contact precautions will be utilized.      # Bipolar disorder  - will continue home lamotrigine    #neuropathy  - will continue home gabapentin    #BPH  - will continue home alpha blocker    #DVT ppx  - SQ heparin    Code Status: DNR/DNI    Dispo: admit to general medical floor.

## 2019-08-13 NOTE — PROGRESS NOTES
"[facility] MEDICINE PROGRESS NOTE     Attending:   Nick Key MD    Resident:   Jm Ferguson DO    PATIENT:   aMrcelo Colon; [id number]; 1940    ID:   78 y.o. male with a history of C-diff x5 since 2017, fecal transplant who was admitted for C-diff from 8/6 - 8/10. Patient was discharged home with a 21 PO vancomycin taper. Patient's diarrhea resolved on admission; however, he reports that 2 days ago he started experiencing water diarrhea. He had no nausea, vomiting, abdominal pain at home.     SUBJECTIVE:   No acute events overnight, patient is doing well. He has not had any bouts of diarrhea since he has been in the hospital. He had one firm stool this morning. He denies any nausea or vomiting. He denies any fever or chills. He has no other complaints at this time.     OBJECTIVE:  Vitals:    08/12/19 2020 08/13/19 0000 08/13/19 0400   BP:  135/98 129/48   Pulse:  (!) 55 83   Resp:  16 16   Temp:  36.8 °C (98.2 °F) 36.9 °C (98.4 °F)   TempSrc:  Temporal Temporal   SpO2:  93% 90%   Weight: 63.1 kg (139 lb 1.8 oz)     Height: 1.727 m (5' 8\")       No intake or output data in the 24 hours ending 08/13/19 0557    PHYSICAL EXAM:  General: No acute distress, afebrile, resting comfortably  HEENT: NC/AT. EOMI.   Cardiovascular: RRR without murmurs. Normal capillary refill   Respiratory: CTAB, no tachypnea or retractions  Abdomen: soft, nontender, nondistended, no masses, midline surgical scar   EXT:  REESE, no edema  Skin: No erythema/lesions   Neuro: Non-focal    LABS:  Recent Labs     08/13/19  0023   WBC 6.2   RBC 4.37*   HEMOGLOBIN 13.9*   HEMATOCRIT 41.7*   MCV 95.4   MCH 31.8   RDW 46.0   PLATELETCT 203   MPV 9.4   NEUTSPOLYS 67.50   LYMPHOCYTES 21.10*   MONOCYTES 8.90   EOSINOPHILS 1.30   BASOPHILS 0.20     Recent Labs     08/13/19  0023   SODIUM 144   POTASSIUM 3.8   CHLORIDE 106   CO2 30   BUN 13   CREATININE 0.92   CALCIUM 9.4   ALBUMIN 4.1     Estimated GFR/CRCL = Estimated Creatinine Clearance: 60 mL/min " (by C-G formula based on SCr of 0.92 mg/dL).  Recent Labs     08/13/19  0023   GLUCOSE 102*     Recent Labs     08/13/19  0023   ASTSGOT 16   ALTSGPT 20   TBILIRUBIN 0.7   ALKPHOSPHAT 80   GLOBULIN 2.7             No results for input(s): INR, APTT, FIBRINOGEN in the last 72 hours.    Invalid input(s): DIMER      IMAGING:  No orders to display       MEDS:  Current Facility-Administered Medications   Medication Last Dose   • gabapentin (NEURONTIN) capsule 600 mg     • lamoTRIgine (LAMICTAL) tablet 200 mg     • lactobacillus rhamnosus (CULTURELLE) capsule 1 Cap     • NS infusion     • heparin injection 5,000 Units     • tamsulosin (FLOMAX) capsule 0.4 mg     • oxyCODONE-acetaminophen (PERCOCET) 5-325 MG per tablet 1 Tab     • vancomycin 50 mg/mL oral soln 125 mg         ASSESSMENT/PLAN:    C. difficile diarrhea  Assessment & Plan  Hx of fecal transplant  Hx of colectomy in 2018 with reanastomosis   - Pt d/c on 8/10 with 21 day taper for Vancomycin   - 5th episode of C-diff since 2017  - Patient on vancomycin 125mg q6h  - Patient's diarrhea resolved prior to his last discharge; however, he reports it returned yesterday  - No fevers, no leukocytosis, no abdominal pain, no episodes of diarrhea yet today   - Will continue IV fluids  - Patient able to tolerate PO   - No PPI use  - Patient occasionally takes Gaviscon for reflux   - Takes Percocet for chronic pain     Neuropathy (HCC)  Assessment & Plan  - Patient on gabapentin 600mg TID  - Hx of back surgery and patient reports he has had neuropathy in his hands since then     Anemia  Assessment & Plan  - Patient's Hb stable at 13.9  - Hx of iron deficiency anemia in 2018  - No bloody stools   - Continue to monitor     Bipolar 1 disorder, depressed (HCC)  Assessment & Plan  Patient on Lamotrigine 200mg daily   Continuing medication as scheduled     Benign prostatic hyperplasia  Assessment & Plan  Patient on Flomax at home  Continue here       Lines: PIV  Abx:  Vancomycin  DVT prophylaxis:   CODE Status: DNR/DNI      Jm Ferguson DO  PGY-1

## 2019-08-13 NOTE — PROGRESS NOTES
AOx4, reports to have loose stools but no BM thus far on PM shift. Contact isolation precautions in place. NS at 75cc/hr. Currently denies pain, pleasant. Refused bed alarm, education provided. Pt requests to have Vanco mixed w/ apple juice.

## 2019-08-13 NOTE — ASSESSMENT & PLAN NOTE
- Patient's Hb stable at 13.9  - Hx of iron deficiency anemia in 2018  - No bloody stools   - Continue to monitor

## 2019-08-13 NOTE — PROGRESS NOTES
Patient is a direct admit from [facility], patient of Dr. Bond's for r/o c-diff colitis.  Dr. Sellers is accepting the patient. Telephone ADT order received and entered into epic on 8/12.  Held orders to be released upon patients arrival to unit.  Please notify [facility] once patient arrives for further orders.

## 2019-08-13 NOTE — ASSESSMENT & PLAN NOTE
- Patient on gabapentin 600mg TID  - Hx of back surgery and patient reports he has had neuropathy in his hands since then

## 2019-08-13 NOTE — ASSESSMENT & PLAN NOTE
Hx of fecal transplant  Hx of colectomy in 2018 with reanastomosis   - Pt d/c on 8/10 with 21 day taper for Vancomycin   - 5th episode of C-diff since 2017  - Patient on vancomycin 125mg q6h  - Patient's diarrhea resolved prior to his last discharge; however, he reports it returned yesterday  - No fevers, no leukocytosis, no abdominal pain, no episodes of diarrhea yet today   - Will continue IV fluids  - Patient able to tolerate PO   - No PPI use  - Patient occasionally takes Gaviscon for reflux   - Takes Percocet for chronic pain

## 2019-08-13 NOTE — PROGRESS NOTES
Report received from Noc RN. Pt sleeping quietly, no s/s of distress noted. Pt refused 0600 medications until 0800, per home schedule. No reported stool overnight. POC: Stool sample; fall safety; Contact + precautions, IV fluids at 75ml.

## 2019-08-14 ENCOUNTER — PATIENT OUTREACH (OUTPATIENT)
Dept: HEALTH INFORMATION MANAGEMENT | Facility: OTHER | Age: 79
End: 2019-08-14

## 2019-08-14 VITALS
DIASTOLIC BLOOD PRESSURE: 69 MMHG | WEIGHT: 139.11 LBS | OXYGEN SATURATION: 94 % | RESPIRATION RATE: 14 BRPM | BODY MASS INDEX: 21.08 KG/M2 | SYSTOLIC BLOOD PRESSURE: 144 MMHG | HEART RATE: 53 BPM | HEIGHT: 68 IN | TEMPERATURE: 97.7 F

## 2019-08-14 PROBLEM — A04.72 C. DIFFICILE DIARRHEA: Chronic | Status: RESOLVED | Noted: 2017-12-19 | Resolved: 2019-08-14

## 2019-08-14 LAB
ALBUMIN SERPL BCP-MCNC: 3.2 G/DL (ref 3.2–4.9)
ALBUMIN/GLOB SERPL: 1.3 G/DL
ALP SERPL-CCNC: 60 U/L (ref 30–99)
ALT SERPL-CCNC: 12 U/L (ref 2–50)
ANION GAP SERPL CALC-SCNC: 7 MMOL/L (ref 0–11.9)
AST SERPL-CCNC: 9 U/L (ref 12–45)
BASOPHILS # BLD AUTO: 0.2 % (ref 0–1.8)
BASOPHILS # BLD: 0.01 K/UL (ref 0–0.12)
BILIRUB SERPL-MCNC: 0.6 MG/DL (ref 0.1–1.5)
BUN SERPL-MCNC: 16 MG/DL (ref 8–22)
CALCIUM SERPL-MCNC: 8.3 MG/DL (ref 8.5–10.5)
CHLORIDE SERPL-SCNC: 108 MMOL/L (ref 96–112)
CO2 SERPL-SCNC: 26 MMOL/L (ref 20–33)
CREAT SERPL-MCNC: 0.87 MG/DL (ref 0.5–1.4)
EOSINOPHIL # BLD AUTO: 0.12 K/UL (ref 0–0.51)
EOSINOPHIL NFR BLD: 2.5 % (ref 0–6.9)
ERYTHROCYTE [DISTWIDTH] IN BLOOD BY AUTOMATED COUNT: 47.6 FL (ref 35.9–50)
GLOBULIN SER CALC-MCNC: 2.5 G/DL (ref 1.9–3.5)
GLUCOSE SERPL-MCNC: 104 MG/DL (ref 65–99)
HCT VFR BLD AUTO: 36.3 % (ref 42–52)
HGB BLD-MCNC: 11.7 G/DL (ref 14–18)
IMM GRANULOCYTES # BLD AUTO: 0.02 K/UL (ref 0–0.11)
IMM GRANULOCYTES NFR BLD AUTO: 0.4 % (ref 0–0.9)
LYMPHOCYTES # BLD AUTO: 1.54 K/UL (ref 1–4.8)
LYMPHOCYTES NFR BLD: 32 % (ref 22–41)
MCH RBC QN AUTO: 31.5 PG (ref 27–33)
MCHC RBC AUTO-ENTMCNC: 32.2 G/DL (ref 33.7–35.3)
MCV RBC AUTO: 97.6 FL (ref 81.4–97.8)
MONOCYTES # BLD AUTO: 0.51 K/UL (ref 0–0.85)
MONOCYTES NFR BLD AUTO: 10.6 % (ref 0–13.4)
NEUTROPHILS # BLD AUTO: 2.62 K/UL (ref 1.82–7.42)
NEUTROPHILS NFR BLD: 54.3 % (ref 44–72)
NRBC # BLD AUTO: 0 K/UL
NRBC BLD-RTO: 0 /100 WBC
PLATELET # BLD AUTO: 166 K/UL (ref 164–446)
PMV BLD AUTO: 9.1 FL (ref 9–12.9)
POTASSIUM SERPL-SCNC: 4.2 MMOL/L (ref 3.6–5.5)
PROT SERPL-MCNC: 5.7 G/DL (ref 6–8.2)
RBC # BLD AUTO: 3.72 M/UL (ref 4.7–6.1)
SODIUM SERPL-SCNC: 141 MMOL/L (ref 135–145)
WBC # BLD AUTO: 4.8 K/UL (ref 4.8–10.8)

## 2019-08-14 PROCEDURE — 80053 COMPREHEN METABOLIC PANEL: CPT

## 2019-08-14 PROCEDURE — 36415 COLL VENOUS BLD VENIPUNCTURE: CPT

## 2019-08-14 PROCEDURE — 85025 COMPLETE CBC W/AUTO DIFF WBC: CPT

## 2019-08-14 PROCEDURE — 700102 HCHG RX REV CODE 250 W/ 637 OVERRIDE(OP): Performed by: STUDENT IN AN ORGANIZED HEALTH CARE EDUCATION/TRAINING PROGRAM

## 2019-08-14 PROCEDURE — A9270 NON-COVERED ITEM OR SERVICE: HCPCS | Performed by: STUDENT IN AN ORGANIZED HEALTH CARE EDUCATION/TRAINING PROGRAM

## 2019-08-14 RX ORDER — FERROUS SULFATE 325(65) MG
325 TABLET ORAL DAILY
Qty: 30 TAB | Refills: 1 | Status: SHIPPED | OUTPATIENT
Start: 2019-08-14 | End: 2019-08-14 | Stop reason: SDUPTHER

## 2019-08-14 RX ORDER — FERROUS SULFATE 325(65) MG
325 TABLET ORAL DAILY
Qty: 30 TAB | Refills: 1 | Status: SHIPPED | OUTPATIENT
Start: 2019-08-14 | End: 2019-09-17

## 2019-08-14 RX ADMIN — GABAPENTIN 600 MG: 300 CAPSULE ORAL at 12:45

## 2019-08-14 RX ADMIN — OXYCODONE HYDROCHLORIDE AND ACETAMINOPHEN 1 TABLET: 5; 325 TABLET ORAL at 02:43

## 2019-08-14 RX ADMIN — VANCOMYCIN HYDROCHLORIDE 125 MG: 10 INJECTION, POWDER, LYOPHILIZED, FOR SOLUTION INTRAVENOUS at 12:45

## 2019-08-14 RX ADMIN — GABAPENTIN 600 MG: 300 CAPSULE ORAL at 09:32

## 2019-08-14 RX ADMIN — LAMOTRIGINE 200 MG: 100 TABLET ORAL at 09:32

## 2019-08-14 RX ADMIN — OXYCODONE HYDROCHLORIDE AND ACETAMINOPHEN 1 TABLET: 5; 325 TABLET ORAL at 10:40

## 2019-08-14 RX ADMIN — Medication 1 CAPSULE: at 09:31

## 2019-08-14 RX ADMIN — VANCOMYCIN HYDROCHLORIDE 125 MG: 10 INJECTION, POWDER, LYOPHILIZED, FOR SOLUTION INTRAVENOUS at 06:21

## 2019-08-14 RX ADMIN — TAMSULOSIN HYDROCHLORIDE 0.4 MG: 0.4 CAPSULE ORAL at 09:32

## 2019-08-14 ASSESSMENT — LIFESTYLE VARIABLES
TOTAL SCORE: 0
HAVE YOU EVER FELT YOU SHOULD CUT DOWN ON YOUR DRINKING: NO
HAVE PEOPLE ANNOYED YOU BY CRITICIZING YOUR DRINKING: NO
AVERAGE NUMBER OF DAYS PER WEEK YOU HAVE A DRINK CONTAINING ALCOHOL: 0
EVER HAD A DRINK FIRST THING IN THE MORNING TO STEADY YOUR NERVES TO GET RID OF A HANGOVER: NO
TOTAL SCORE: 0
ALCOHOL_USE: NO
ON A TYPICAL DAY WHEN YOU DRINK ALCOHOL HOW MANY DRINKS DO YOU HAVE: 0
CONSUMPTION TOTAL: NEGATIVE
EVER FELT BAD OR GUILTY ABOUT YOUR DRINKING: NO
HOW MANY TIMES IN THE PAST YEAR HAVE YOU HAD 5 OR MORE DRINKS IN A DAY: 0
TOTAL SCORE: 0

## 2019-08-14 ASSESSMENT — COGNITIVE AND FUNCTIONAL STATUS - GENERAL
DAILY ACTIVITIY SCORE: 23
SUGGESTED CMS G CODE MODIFIER DAILY ACTIVITY: CI
DRESSING REGULAR UPPER BODY CLOTHING: A LITTLE
MOBILITY SCORE: 24
SUGGESTED CMS G CODE MODIFIER MOBILITY: CH

## 2019-08-14 NOTE — DISCHARGE SUMMARY
[facility] DISCHARGE SUMMARY     PATIENT ID:  Name:             Marcelo Colon   YOB: 1940  Age:                 78 y.o.  male   MRN:               [id number]  Address:         [facility] [place] [address]  Phone:            [phone] (home)    ADMISSION DATE:   8/12/2019    DISCHARGE DATE:   8/14/2019    DISCHARGE DIAGNOSES:   Problem Noted   Anemia 1/30/2018   Bipolar 1 Disorder, Depressed ([facility]) 7/29/2017   Benign Prostatic Hyperplasia 7/14/2017   Neuropathy ([facility]) 8/12/2017   C. Diff, resolved     ATTENDING PHYSICIAN:   Nick Key MD    RESIDENT:   Jm Ferguson DO    CONSULTANTS:    None    PROCEDURES:    None    IMAGING:   CT abd/pelvis: 1.  No acute abnormality in the abdomen or pelvis.  2.  Colorectal anastomosis.  3.  Stable small to moderate hiatal hernia.    PHYSICAL EXAM:   General: No acute distress, afebrile, resting comfortably  HEENT: NC/AT. EOMI.   Cardiovascular: RRR without murmurs. Normal capillary refill   Respiratory: CTAB, no tachypnea or retractions  Abdomen: soft, nontender, nondistended, no masses  EXT:  REESE, no edema  Skin: No erythema/lesions   Neuro: Non-focal    LABS:  Recent Labs     08/13/19  0023 08/14/19  0328   WBC 6.2 4.8   RBC 4.37* 3.72*   HEMOGLOBIN 13.9* 11.7*   HEMATOCRIT 41.7* 36.3*   MCV 95.4 97.6   MCH 31.8 31.5   RDW 46.0 47.6   PLATELETCT 203 166   MPV 9.4 9.1   NEUTSPOLYS 67.50 54.30   LYMPHOCYTES 21.10* 32.00   MONOCYTES 8.90 10.60   EOSINOPHILS 1.30 2.50   BASOPHILS 0.20 0.20     Recent Labs     08/13/19  0023 08/14/19  0328   SODIUM 144 141   POTASSIUM 3.8 4.2   CHLORIDE 106 108   CO2 30 26   GLUCOSE 102* 104*   BUN 13 16     Lab Results   Component Value Date/Time    CHOLSTRLTOT 85 (L) 12/19/2017 05:53 AM    LDL 54 12/19/2017 05:53 AM    HDL 16 (A) 12/19/2017 05:53 AM    TRIGLYCERIDE 77 12/19/2017 05:53 AM       Lab Results   Component Value Date/Time    TROPONINI <0.02 06/11/2018 06:54 PM       Lab Results   Component Value  Date/Time    TROPONINI <0.02 06/11/2018 06:54 PM            HOSPITAL COURSE:   Marcelo Colon is a 78 y.o. male with a history of C-diff x5 since 2017, fecal transplant, and colostomy with reanastomosis who was admitted on 8/12 for diarrhea. Patient was recently admitted from 8/6 to 8/10 for C-diff and discharged home with a 21 day PO vancomycin taper.     Patient was discharged home and notes his diarrhea had resolved at discharge. The following morning he woke up and had several bouts of diarrhea. He called [facility] family medicine who directly admitted him to the hospital. He was placed on vancomycin as well as his regular home medications. Patient did not have any bouts of diarrhea for ~24 hours in the ED. Stool sample taken was negative for C-diff. Normal WBC, no fevers or unstable vitals.      Patient is slightly anemic today at 11.7. Review of iron studies from last year indicate he is likely iron deficient. Will discharge patient home to continue with his regular medications and with an iron supplement.     Today, patient is doing well. He denies any fevers, chills, nausea, vomiting, diarrhea or abdominal pain. He has no concerns at this time and would like to be discharged home.     He should also continue his vancomycin taper as prescribed at his last discharge. The patient should follow up in clinic in 1-2 weeks for reevaluation.    DISCHARGE CONDITION:    Stable    DISPOSITION:   Home    DISCHARGE MEDICATIONS:    Marcelo Colon   Home Medication Instructions FLORENCIO:[id number]    Printed on:08/14/19 0523   Medication Information                      alfuzosin (UROXATRAL) 10 MG SR tablet  Take 10 mg by mouth every day.             gabapentin (NEURONTIN) 300 MG Cap  Take 2 Caps by mouth 3 times a day.             lactobacillus rhamnosus (CULTURELLE) Cap capsule  Take 1 Cap by mouth every morning with breakfast.             lamotrigine (LAMICTAL) 200 MG tablet  Take 200 mg by mouth every day.              oxyCODONE-acetaminophen (PERCOCET) 7.5-325 MG per tablet  Take 1 Tab by mouth every 8 hours as needed for Severe Pain.             Vancomycin HCl (VANCOMYCIN 50 MG/ML) 50 mg/mL Solution  Take 2.5 mL by mouth every 6 hours for 9 days.             Vancomycin HCl (VANCOMYCIN 50 MG/ML) 50 mg/mL Solution  Take 2.5 mL by mouth every 12 hours for 7 days.             Vancomycin HCl (VANCOMYCIN 50 MG/ML) 50 mg/mL Solution  Take 2.5 mL by mouth every 24 hours for 6 days.             Vancomycin HCl (VANCOMYCIN 50 MG/ML) 50 mg/mL Solution  Take 2.5 mL by mouth every 48 hours for 6 days.             Vancomycin HCl (VANCOMYCIN 50 MG/ML) 50 mg/mL Solution  Take 2.5 mL by mouth every 72 hours for 12 days.                  ACTIVITY:   Normal Activity as Tolerated.    DIET:   Healthy    DISCHARGE INSTRUCTIONS AND FOLLOW UP:  Patient is medically stable for discharge and will be discharged to home    Follow Up: [facility] family medicine    Discharge Instructions:   Patient was instructed to return the ER in the event of worsening symptoms including but not limited to worsening abdominal pain, diarrhea or any other major concerns. Patient understands that failure to do so may indicate worsening of her medical condition(s) and result in adverse clinical outcomes including fatality. We have counseled the patient on the importance of compliance and the patient has agreed to proceed with all medical recommendations and follow up plan indicated above. The patient understands that the failure to do so may result in result in adverse clinical outcomes including fatality.       CC:   Joy Sellers M.D.

## 2019-08-14 NOTE — DISCHARGE PLANNING
Anticipated Discharge Disposition: Home    Action: LSW informed by bedside RNFran that pt needs transportation. LSW spoke to pt and pt stated that they had no family or friends to provide transportation, no funds, has bags to carry and states he feels the walk is too far at the moment upon d/c.  LSW provided a cab voucher to bedside RNFran.    Barriers to Discharge: None    Plan: LSW to assist as needed.

## 2019-08-14 NOTE — DISCHARGE INSTRUCTIONS
Discharge Instructions    Discharged to home by car with relative. Discharged via wheelchair, hospital escort: Yes.  Special equipment needed: Not Applicable    Be sure to schedule a follow-up appointment with your primary care doctor or any specialists as instructed.     Discharge Plan:   Diet Plan: Discussed  Activity Level: Discussed  Confirmed Follow up Appointment: Patient to Call and Schedule Appointment  Confirmed Symptoms Management: Discussed  Medication Reconciliation Updated: Yes  Influenza Vaccine Indication: Indicated: Not available from distributor/    I understand that a diet low in cholesterol, fat, and sodium is recommended for good health. Unless I have been given specific instructions below for another diet, I accept this instruction as my diet prescription.   Other diet: regular diet    Special Instructions: None    · Is patient discharged on Warfarin / Coumadin?   No     Depression / Suicide Risk    As you are discharged from this [facility] facility, it is important to learn how to keep safe from harming yourself.    Recognize the warning signs:  · Abrupt changes in personality, positive or negative- including increase in energy   · Giving away possessions  · Change in eating patterns- significant weight changes-  positive or negative  · Change in sleeping patterns- unable to sleep or sleeping all the time   · Unwillingness or inability to communicate  · Depression  · Unusual sadness, discouragement and loneliness  · Talk of wanting to die  · Neglect of personal appearance   · Rebelliousness- reckless behavior  · Withdrawal from people/activities they love  · Confusion- inability to concentrate     If you or a loved one observes any of these behaviors or has concerns about self-harm, here's what you can do:  · Talk about it- your feelings and reasons for harming yourself  · Remove any means that you might use to hurt yourself (examples: pills, rope, extension cords,  firearm)  · Get professional help from the community (Mental Health, Substance Abuse, psychological counseling)  · Do not be alone:Call your Safe Contact- someone whom you trust who will be there for you.  · Call your local CRISIS HOTLINE 007-4266 or 496-704-6057  · Call your local Children's Mobile Crisis Response Team [place] (298) 298-9385 or www.Ziptr  · Call the toll free National Suicide Prevention Hotlines   · National Suicide Prevention Lifeline 140-982-BRLU (2753)  · National Hope Line Network 800-SUICIDE (311-4595)

## 2019-08-15 NOTE — PROGRESS NOTES
Discharge instructions reviewed with patient; questions answered. Lactobacillus rhamnosus 1 cap QD called into [facility] pharmacy per verbal order from Dr. Bond and patient handed iron script. Pt ambulates in room w/o difficulty and no mobility device needed. Pt requesting taxi voucher since unable to find ride home with family. Lolly with case management notified and will get voucher. PIV removed and catheter intact.

## 2019-09-16 ENCOUNTER — HOSPITAL ENCOUNTER (EMERGENCY)
Facility: MEDICAL CENTER | Age: 79
End: 2019-09-16
Attending: EMERGENCY MEDICINE
Payer: MEDICARE

## 2019-09-16 ENCOUNTER — APPOINTMENT (OUTPATIENT)
Dept: RADIOLOGY | Facility: MEDICAL CENTER | Age: 79
End: 2019-09-16
Attending: EMERGENCY MEDICINE
Payer: MEDICARE

## 2019-09-16 VITALS
OXYGEN SATURATION: 96 % | HEIGHT: 68 IN | WEIGHT: 133.38 LBS | RESPIRATION RATE: 16 BRPM | TEMPERATURE: 97.3 F | SYSTOLIC BLOOD PRESSURE: 110 MMHG | BODY MASS INDEX: 20.21 KG/M2 | DIASTOLIC BLOOD PRESSURE: 78 MMHG | HEART RATE: 62 BPM

## 2019-09-16 DIAGNOSIS — R19.7 DIARRHEA, UNSPECIFIED TYPE: ICD-10-CM

## 2019-09-16 LAB
ALBUMIN SERPL BCP-MCNC: 4.4 G/DL (ref 3.2–4.9)
ALBUMIN/GLOB SERPL: 1.6 G/DL
ALP SERPL-CCNC: 80 U/L (ref 30–99)
ALT SERPL-CCNC: 8 U/L (ref 2–50)
ANION GAP SERPL CALC-SCNC: 9 MMOL/L (ref 0–11.9)
AST SERPL-CCNC: 14 U/L (ref 12–45)
BASOPHILS # BLD AUTO: 0.5 % (ref 0–1.8)
BASOPHILS # BLD: 0.02 K/UL (ref 0–0.12)
BILIRUB SERPL-MCNC: 0.9 MG/DL (ref 0.1–1.5)
BUN SERPL-MCNC: 14 MG/DL (ref 8–22)
CALCIUM SERPL-MCNC: 9.4 MG/DL (ref 8.5–10.5)
CHLORIDE SERPL-SCNC: 103 MMOL/L (ref 96–112)
CO2 SERPL-SCNC: 29 MMOL/L (ref 20–33)
CREAT SERPL-MCNC: 1.16 MG/DL (ref 0.5–1.4)
EOSINOPHIL # BLD AUTO: 0.08 K/UL (ref 0–0.51)
EOSINOPHIL NFR BLD: 1.8 % (ref 0–6.9)
ERYTHROCYTE [DISTWIDTH] IN BLOOD BY AUTOMATED COUNT: 50.6 FL (ref 35.9–50)
GLOBULIN SER CALC-MCNC: 2.8 G/DL (ref 1.9–3.5)
GLUCOSE SERPL-MCNC: 100 MG/DL (ref 65–99)
HCT VFR BLD AUTO: 43.6 % (ref 42–52)
HGB BLD-MCNC: 14 G/DL (ref 14–18)
IMM GRANULOCYTES # BLD AUTO: 0.01 K/UL (ref 0–0.11)
IMM GRANULOCYTES NFR BLD AUTO: 0.2 % (ref 0–0.9)
LIPASE SERPL-CCNC: 7 U/L (ref 11–82)
LYMPHOCYTES # BLD AUTO: 1.78 K/UL (ref 1–4.8)
LYMPHOCYTES NFR BLD: 40.1 % (ref 22–41)
MCH RBC QN AUTO: 32 PG (ref 27–33)
MCHC RBC AUTO-ENTMCNC: 32.1 G/DL (ref 33.7–35.3)
MCV RBC AUTO: 99.5 FL (ref 81.4–97.8)
MONOCYTES # BLD AUTO: 0.6 K/UL (ref 0–0.85)
MONOCYTES NFR BLD AUTO: 13.5 % (ref 0–13.4)
NEUTROPHILS # BLD AUTO: 1.95 K/UL (ref 1.82–7.42)
NEUTROPHILS NFR BLD: 43.9 % (ref 44–72)
NRBC # BLD AUTO: 0 K/UL
NRBC BLD-RTO: 0 /100 WBC
PLATELET # BLD AUTO: 180 K/UL (ref 164–446)
PMV BLD AUTO: 9.4 FL (ref 9–12.9)
POTASSIUM SERPL-SCNC: 4 MMOL/L (ref 3.6–5.5)
PROT SERPL-MCNC: 7.2 G/DL (ref 6–8.2)
RBC # BLD AUTO: 4.38 M/UL (ref 4.7–6.1)
SODIUM SERPL-SCNC: 141 MMOL/L (ref 135–145)
WBC # BLD AUTO: 4.4 K/UL (ref 4.8–10.8)

## 2019-09-16 PROCEDURE — 700117 HCHG RX CONTRAST REV CODE 255: Performed by: EMERGENCY MEDICINE

## 2019-09-16 PROCEDURE — 83690 ASSAY OF LIPASE: CPT

## 2019-09-16 PROCEDURE — 74177 CT ABD & PELVIS W/CONTRAST: CPT

## 2019-09-16 PROCEDURE — 87493 C DIFF AMPLIFIED PROBE: CPT

## 2019-09-16 PROCEDURE — 80053 COMPREHEN METABOLIC PANEL: CPT

## 2019-09-16 PROCEDURE — 85025 COMPLETE CBC W/AUTO DIFF WBC: CPT

## 2019-09-16 PROCEDURE — 87324 CLOSTRIDIUM AG IA: CPT

## 2019-09-16 PROCEDURE — 99284 EMERGENCY DEPT VISIT MOD MDM: CPT

## 2019-09-16 RX ADMIN — IOHEXOL 100 ML: 350 INJECTION, SOLUTION INTRAVENOUS at 19:25

## 2019-09-16 ASSESSMENT — LIFESTYLE VARIABLES: DO YOU DRINK ALCOHOL: NO

## 2019-09-16 NOTE — ED TRIAGE NOTES
"Chief Complaint   Patient presents with   • Diarrhea     reports he has c diff. fecal transplant in July.    • Sent by MD     PCP sent him      Pt to triage for above. Denies fevers. NAD noted. Charge RN notified.     /58   Pulse (!) 58   Temp 36.3 °C (97.3 °F) (Temporal)   Resp 16   Ht 1.727 m (5' 8\")   Wt 60.5 kg (133 lb 6.1 oz)   SpO2 93%   BMI 20.28 kg/m²     "

## 2019-09-17 ENCOUNTER — HOSPITAL ENCOUNTER (INPATIENT)
Facility: MEDICAL CENTER | Age: 79
LOS: 3 days | DRG: 373 | End: 2019-09-20
Attending: EMERGENCY MEDICINE | Admitting: FAMILY MEDICINE
Payer: MEDICARE

## 2019-09-17 DIAGNOSIS — A49.8 CLOSTRIDIUM DIFFICILE INFECTION: ICD-10-CM

## 2019-09-17 PROBLEM — E53.8 VITAMIN B12 DEFICIENCY: Status: ACTIVE | Noted: 2019-09-17

## 2019-09-17 PROBLEM — A04.71 RECURRENT CLOSTRIDIUM DIFFICILE DIARRHEA: Chronic | Status: ACTIVE | Noted: 2017-12-19

## 2019-09-17 PROBLEM — Z78.9 ALCOHOL USE: Status: ACTIVE | Noted: 2019-09-17

## 2019-09-17 PROBLEM — D72.819 LEUKOPENIA: Status: ACTIVE | Noted: 2019-09-17

## 2019-09-17 LAB
ALBUMIN SERPL BCP-MCNC: 4.1 G/DL (ref 3.2–4.9)
ALBUMIN/GLOB SERPL: 1.6 G/DL
ALP SERPL-CCNC: 74 U/L (ref 30–99)
ALT SERPL-CCNC: 8 U/L (ref 2–50)
ANION GAP SERPL CALC-SCNC: 6 MMOL/L (ref 0–11.9)
AST SERPL-CCNC: 13 U/L (ref 12–45)
BASOPHILS # BLD AUTO: 0.2 % (ref 0–1.8)
BASOPHILS # BLD: 0.01 K/UL (ref 0–0.12)
BILIRUB SERPL-MCNC: 0.8 MG/DL (ref 0.1–1.5)
BUN SERPL-MCNC: 15 MG/DL (ref 8–22)
C DIFF DNA SPEC QL NAA+PROBE: NORMAL
C DIFF TOX A+B STL QL IA: POSITIVE
C DIFF TOX GENS STL QL NAA+PROBE: NORMAL
CALCIUM SERPL-MCNC: 9.3 MG/DL (ref 8.5–10.5)
CHLORIDE SERPL-SCNC: 104 MMOL/L (ref 96–112)
CO2 SERPL-SCNC: 28 MMOL/L (ref 20–33)
CREAT SERPL-MCNC: 1.09 MG/DL (ref 0.5–1.4)
EOSINOPHIL # BLD AUTO: 0.07 K/UL (ref 0–0.51)
EOSINOPHIL NFR BLD: 1.5 % (ref 0–6.9)
ERYTHROCYTE [DISTWIDTH] IN BLOOD BY AUTOMATED COUNT: 49.7 FL (ref 35.9–50)
GLOBULIN SER CALC-MCNC: 2.6 G/DL (ref 1.9–3.5)
GLUCOSE SERPL-MCNC: 89 MG/DL (ref 65–99)
HCT VFR BLD AUTO: 42.4 % (ref 42–52)
HGB BLD-MCNC: 14 G/DL (ref 14–18)
IMM GRANULOCYTES # BLD AUTO: 0.02 K/UL (ref 0–0.11)
IMM GRANULOCYTES NFR BLD AUTO: 0.4 % (ref 0–0.9)
LIPASE SERPL-CCNC: 16 U/L (ref 11–82)
LYMPHOCYTES # BLD AUTO: 1.25 K/UL (ref 1–4.8)
LYMPHOCYTES NFR BLD: 26.7 % (ref 22–41)
MCH RBC QN AUTO: 32.4 PG (ref 27–33)
MCHC RBC AUTO-ENTMCNC: 33 G/DL (ref 33.7–35.3)
MCV RBC AUTO: 98.1 FL (ref 81.4–97.8)
MONOCYTES # BLD AUTO: 0.49 K/UL (ref 0–0.85)
MONOCYTES NFR BLD AUTO: 10.5 % (ref 0–13.4)
NEUTROPHILS # BLD AUTO: 2.84 K/UL (ref 1.82–7.42)
NEUTROPHILS NFR BLD: 60.7 % (ref 44–72)
NRBC # BLD AUTO: 0 K/UL
NRBC BLD-RTO: 0 /100 WBC
PLATELET # BLD AUTO: 171 K/UL (ref 164–446)
PMV BLD AUTO: 9.3 FL (ref 9–12.9)
POTASSIUM SERPL-SCNC: 4.2 MMOL/L (ref 3.6–5.5)
PROT SERPL-MCNC: 6.7 G/DL (ref 6–8.2)
RBC # BLD AUTO: 4.32 M/UL (ref 4.7–6.1)
SODIUM SERPL-SCNC: 138 MMOL/L (ref 135–145)
WBC # BLD AUTO: 4.7 K/UL (ref 4.8–10.8)

## 2019-09-17 PROCEDURE — 80053 COMPREHEN METABOLIC PANEL: CPT

## 2019-09-17 PROCEDURE — 85025 COMPLETE CBC W/AUTO DIFF WBC: CPT

## 2019-09-17 PROCEDURE — 99223 1ST HOSP IP/OBS HIGH 75: CPT | Performed by: INTERNAL MEDICINE

## 2019-09-17 PROCEDURE — A9270 NON-COVERED ITEM OR SERVICE: HCPCS | Performed by: FAMILY MEDICINE

## 2019-09-17 PROCEDURE — 99285 EMERGENCY DEPT VISIT HI MDM: CPT

## 2019-09-17 PROCEDURE — 83690 ASSAY OF LIPASE: CPT

## 2019-09-17 PROCEDURE — 770001 HCHG ROOM/CARE - MED/SURG/GYN PRIV*

## 2019-09-17 PROCEDURE — 99222 1ST HOSP IP/OBS MODERATE 55: CPT | Performed by: FAMILY MEDICINE

## 2019-09-17 PROCEDURE — 700102 HCHG RX REV CODE 250 W/ 637 OVERRIDE(OP): Performed by: FAMILY MEDICINE

## 2019-09-17 RX ORDER — MORPHINE SULFATE 4 MG/ML
2 INJECTION, SOLUTION INTRAMUSCULAR; INTRAVENOUS
Status: DISCONTINUED | OUTPATIENT
Start: 2019-09-17 | End: 2019-09-19

## 2019-09-17 RX ORDER — FERROUS SULFATE 325(65) MG
325 TABLET ORAL DAILY
Status: DISCONTINUED | OUTPATIENT
Start: 2019-09-18 | End: 2019-09-20 | Stop reason: HOSPADM

## 2019-09-17 RX ORDER — OXYCODONE HYDROCHLORIDE 5 MG/1
5 TABLET ORAL
Status: DISCONTINUED | OUTPATIENT
Start: 2019-09-17 | End: 2019-09-19

## 2019-09-17 RX ORDER — GABAPENTIN 300 MG/1
600 CAPSULE ORAL 3 TIMES DAILY
Status: DISCONTINUED | OUTPATIENT
Start: 2019-09-17 | End: 2019-09-20 | Stop reason: HOSPADM

## 2019-09-17 RX ORDER — ONDANSETRON 2 MG/ML
4 INJECTION INTRAMUSCULAR; INTRAVENOUS EVERY 4 HOURS PRN
Status: DISCONTINUED | OUTPATIENT
Start: 2019-09-17 | End: 2019-09-17

## 2019-09-17 RX ORDER — GABAPENTIN 300 MG/1
600 CAPSULE ORAL 3 TIMES DAILY
COMMUNITY
End: 2021-12-17

## 2019-09-17 RX ORDER — LAMOTRIGINE 100 MG/1
200 TABLET ORAL DAILY
Status: DISCONTINUED | OUTPATIENT
Start: 2019-09-18 | End: 2019-09-20 | Stop reason: HOSPADM

## 2019-09-17 RX ORDER — ONDANSETRON 4 MG/1
4 TABLET, ORALLY DISINTEGRATING ORAL EVERY 4 HOURS PRN
Status: DISCONTINUED | OUTPATIENT
Start: 2019-09-17 | End: 2019-09-17

## 2019-09-17 RX ORDER — TAMSULOSIN HYDROCHLORIDE 0.4 MG/1
0.4 CAPSULE ORAL DAILY
Status: DISCONTINUED | OUTPATIENT
Start: 2019-09-18 | End: 2019-09-20 | Stop reason: HOSPADM

## 2019-09-17 RX ORDER — LACTOBACILLUS RHAMNOSUS GG 10B CELL
1 CAPSULE ORAL
Status: DISCONTINUED | OUTPATIENT
Start: 2019-09-18 | End: 2019-09-18

## 2019-09-17 RX ORDER — FERROUS SULFATE 325(65) MG
325 TABLET ORAL DAILY
COMMUNITY
End: 2022-02-01

## 2019-09-17 RX ORDER — OXYCODONE HYDROCHLORIDE 5 MG/1
2.5 TABLET ORAL
Status: DISCONTINUED | OUTPATIENT
Start: 2019-09-17 | End: 2019-09-19

## 2019-09-17 RX ADMIN — GABAPENTIN 600 MG: 300 CAPSULE ORAL at 18:33

## 2019-09-17 RX ADMIN — VANCOMYCIN HYDROCHLORIDE 125 MG: 10 INJECTION, POWDER, LYOPHILIZED, FOR SOLUTION INTRAVENOUS at 23:54

## 2019-09-17 RX ADMIN — VANCOMYCIN HYDROCHLORIDE 125 MG: 10 INJECTION, POWDER, LYOPHILIZED, FOR SOLUTION INTRAVENOUS at 18:33

## 2019-09-17 RX ADMIN — OXYCODONE HYDROCHLORIDE 5 MG: 5 TABLET ORAL at 18:33

## 2019-09-17 RX ADMIN — GABAPENTIN 600 MG: 300 CAPSULE ORAL at 23:53

## 2019-09-17 ASSESSMENT — LIFESTYLE VARIABLES
EVER FELT BAD OR GUILTY ABOUT YOUR DRINKING: NO
DOES PATIENT WANT TO STOP DRINKING: NO
HAVE YOU EVER FELT YOU SHOULD CUT DOWN ON YOUR DRINKING: NO
CONSUMPTION TOTAL: POSITIVE
AVERAGE NUMBER OF DAYS PER WEEK YOU HAVE A DRINK CONTAINING ALCOHOL: 3
TOTAL SCORE: 0
TOTAL SCORE: 0
DOES PATIENT WANT TO STOP DRINKING: NO
EVER HAD A DRINK FIRST THING IN THE MORNING TO STEADY YOUR NERVES TO GET RID OF A HANGOVER: NO
TOTAL SCORE: 0
EVER FELT BAD OR GUILTY ABOUT YOUR DRINKING: NO
HOW MANY TIMES IN THE PAST YEAR HAVE YOU HAD 5 OR MORE DRINKS IN A DAY: 0
EVER HAD A DRINK FIRST THING IN THE MORNING TO STEADY YOUR NERVES TO GET RID OF A HANGOVER: NO
TOTAL SCORE: 0
HAVE PEOPLE ANNOYED YOU BY CRITICIZING YOUR DRINKING: NO
ALCOHOL_USE: YES
TOTAL SCORE: 0
CONSUMPTION TOTAL: INCOMPLETE
HAVE YOU EVER FELT YOU SHOULD CUT DOWN ON YOUR DRINKING: NO
ALCOHOL_USE: YES
ON A TYPICAL DAY WHEN YOU DRINK ALCOHOL HOW MANY DRINKS DO YOU HAVE: 1
EVER_SMOKED: UNABLE TO EVALUATE AT THIS TIME - NEEDS ASSESSMENT PRIOR TO DISCHARGE
AVERAGE NUMBER OF DAYS PER WEEK YOU HAVE A DRINK CONTAINING ALCOHOL: 3
TOTAL SCORE: 0
HOW MANY TIMES IN THE PAST YEAR HAVE YOU HAD 5 OR MORE DRINKS IN A DAY: 2
CONSUMPTION TOTAL: INCOMPLETE
HAVE PEOPLE ANNOYED YOU BY CRITICIZING YOUR DRINKING: NO
ON A TYPICAL DAY WHEN YOU DRINK ALCOHOL HOW MANY DRINKS DO YOU HAVE: 1

## 2019-09-17 ASSESSMENT — PATIENT HEALTH QUESTIONNAIRE - PHQ9
SUM OF ALL RESPONSES TO PHQ9 QUESTIONS 1 AND 2: 0
1. LITTLE INTEREST OR PLEASURE IN DOING THINGS: NOT AT ALL
2. FEELING DOWN, DEPRESSED, IRRITABLE, OR HOPELESS: NOT AT ALL

## 2019-09-17 ASSESSMENT — ENCOUNTER SYMPTOMS
NERVOUS/ANXIOUS: 0
BLOOD IN STOOL: 0
FEVER: 0
BACK PAIN: 0
VOMITING: 0
HEADACHES: 0
DIZZINESS: 0
DIARRHEA: 1
SHORTNESS OF BREATH: 0
SORE THROAT: 0
BLURRED VISION: 0
ABDOMINAL PAIN: 1
COUGH: 0
DIAPHORESIS: 0
WHEEZING: 0
HEARTBURN: 0
FLANK PAIN: 0
CHILLS: 0
NAUSEA: 0
NECK PAIN: 0
PALPITATIONS: 0

## 2019-09-17 ASSESSMENT — COGNITIVE AND FUNCTIONAL STATUS - GENERAL
MOBILITY SCORE: 24
SUGGESTED CMS G CODE MODIFIER DAILY ACTIVITY: CH
DAILY ACTIVITIY SCORE: 24
SUGGESTED CMS G CODE MODIFIER MOBILITY: CH

## 2019-09-17 NOTE — ASSESSMENT & PLAN NOTE
Continue Neurontin  Uses oxycodone chronically, try to minimize use with C. Difficile, risk of megacolon.

## 2019-09-17 NOTE — ED NOTES
Pt ambulatory to room with steady gait. Placed in gown, in gurney, on monitor, call light in reach, educated on plan for care. Pt assessed-- speaking in full sentences at this time.

## 2019-09-17 NOTE — ED PROVIDER NOTES
"ED Provider Note    CHIEF COMPLAINT  Chief Complaint   Patient presents with   • Sent by MD     seen here yesterday and d/c, called back because cdiff came back positive, has hx of same about a month ago   • Diarrhea     3-4 days       HPI  Marcelo Colon is a 78 y.o. male who presents to the emergency department for evaluation of diarrhea.  The patient is a diarrhea off and on for a long time secondary to C. difficile.  Was on oral vancomycin until yesterday for C. difficile.  He came into the emergency department here for diarrhea.  He is worked up with labs and a CT scan and a C. difficile culture and discharged home because he appeared clinically well.  Since that time is a little bit of abdominal discomfort.  This in his lower abdomen.  Is mild to moderate in severity and is in both lower quadrants.  Nothing makes it better or worse.  Is been associate with diarrhea.  Diarrhea is typical for the C. difficile that he has had.  Denies any fevers or chills.  Has had decreased intake of food and fluids.  Denies any other aggravating leaving factors or associated complaints.    Today he was called because of a positive C. difficile study and told to come to the ER.    REVIEW OF SYSTEMS  See HPI for further details. All other systems are negative.    PAST MEDICAL HISTORY  Past Medical History:   Diagnosis Date   • Anesthesia     \"takes very little and takes forever to come out of it\"   • Anxiety disorder    • Arthritis     some in my back   • Bipolar disorder (HCC)    • Clostridium difficile infection    • Depression    • Enlarged prostate    • GERD (gastroesophageal reflux disease)    • Heart murmur    • Indigestion    • Neck pain        FAMILY HISTORY  History reviewed. No pertinent family history.    SOCIAL HISTORY  Social History     Socioeconomic History   • Marital status: Single     Spouse name: Not on file   • Number of children: Not on file   • Years of education: Not on file   • Highest education level: " Not on file   Occupational History   • Not on file   Social Needs   • Financial resource strain: Not on file   • Food insecurity:     Worry: Not on file     Inability: Not on file   • Transportation needs:     Medical: Not on file     Non-medical: Not on file   Tobacco Use   • Smoking status: Former Smoker     Packs/day: 2.00     Years: 14.00     Pack years: 28.00     Last attempt to quit: 3/21/1973     Years since quittin.5   • Smokeless tobacco: Never Used   Substance and Sexual Activity   • Alcohol use: Yes     Alcohol/week: 0.0 oz     Comment: 3 glasses wine/day   • Drug use: Yes     Comment: history of cocaine and marijuana use still about 10 years ago. Denies any IV drug use.   • Sexual activity: Not on file   Lifestyle   • Physical activity:     Days per week: Not on file     Minutes per session: Not on file   • Stress: Not on file   Relationships   • Social connections:     Talks on phone: Not on file     Gets together: Not on file     Attends Che[employer] service: Not on file     Active member of club or organization: Not on file     Attends meetings of clubs or organizations: Not on file     Relationship status: Not on file   • Intimate partner violence:     Fear of current or ex partner: Not on file     Emotionally abused: Not on file     Physically abused: Not on file     Forced sexual activity: Not on file   Other Topics Concern   • Not on file   Social History Narrative   • Not on file       SURGICAL HISTORY  Past Surgical History:   Procedure Laterality Date   • COLONOSCOPY N/A 2018    Procedure: COLONOSCOPY;  Surgeon: Skyler Johnson M.D.;  Location: [facility];  Service: Gastroenterology   • FECAL TRANSPLANT N/A 2018    Procedure: FECAL TRANSPLANT;  Surgeon: Skyler Johnson M.D.;  Location: [facility];  Service: Gastroenterology   • COLOSTOMY TAKEDOWN  3/21/2018    Procedure: COLOSTOMY TAKEDOWN;  Surgeon: Jorge Rodriguez M.D.;  Location: [facility]  "ORS;  Service: General   • LUMBAR DECOMPRESSION  12/2017   • EXPLORATORY LAPAROTOMY  7/25/2017    Procedure: EXPLORATORY LAPAROTOMY- ABDOMINAL WASH OUT;  Surgeon: Jorge Rodriguez M.D.;  Location: [facility];  Service:    • SIGMOID COLON RESECTION  7/25/2017    Procedure: SIGMOID COLON RESECTION;  Surgeon: Jorge Rodriguez M.D.;  Location: SURGERY [facility];  Service:    • COLOSTOMY  7/25/2017    Procedure: COLOSTOMY- FOR OSTOMY PLACEMENT AND OTHER PROCEDURES AS INDICATED;  Surgeon: Jorge Rodriguez M.D.;  Location: SURGERY [facility];  Service:    • APPENDECTOMY  7/25/2017    Procedure: APPENDECTOMY;  Surgeon: Jorge Rodriguez M.D.;  Location: SURGERY [facility];  Service:    • EXC./BIOPSY MASS BACK  1997       CURRENT MEDICATIONS  Home Medications     Reviewed by Enid Perez R.N. (Registered Nurse) on 09/17/19 at 1333  Med List Status: Partial   Medication Last Dose Status   alfuzosin (UROXATRAL) 10 MG SR tablet  Active   ferrous sulfate 325 (65 Fe) MG tablet  Active   gabapentin (NEURONTIN) 300 MG Cap  Active   lactobacillus rhamnosus (CULTURELLE) Cap capsule  Active   lamotrigine (LAMICTAL) 200 MG tablet  Active   oxyCODONE-acetaminophen (PERCOCET) 7.5-325 MG per tablet  Active                ALLERGIES  Allergies   Allergen Reactions   • Acetaminophen Nausea     Very nauseated        PHYSICAL EXAM  VITAL SIGNS: /60   Pulse 84   Temp 36.4 °C (97.5 °F) (Temporal)   Resp 16   Ht 1.727 m (5' 8\")   Wt 60.6 kg (133 lb 9.6 oz)   SpO2 93%   BMI 20.31 kg/m²    Constitutional: Well developed, Well nourished, No acute distress, Non-toxic appearance.   HENT: Normocephalic, Atraumatic, Bilateral external ears normal, Oropharynx moist, No oral exudates, Nose normal.   Eyes: PERRL, EOMI, Conjunctiva normal, No discharge.   Neck: Normal range of motion, No tenderness, Supple, No stridor.   Lymphatic: No lymphadenopathy noted.   Cardiovascular: Normal heart rate, " Normal rhythm, No murmurs, No rubs, No gallops.   Thorax & Lungs: Normal breath sounds, No respiratory distress, No wheezing   Abdomen: Bowel sounds normal, Soft, No tenderness,  Skin: Warm, Dry, No erythema, No rash.   Back: No tenderness, No CVA tenderness.   Musculoskeletal: Good range of motion in all major joints.   Neurologic: Alert,  No focal deficits noted.   Psychiatric: Affect normal    Results for orders placed or performed during the hospital encounter of 09/16/19   CBC WITH DIFFERENTIAL   Result Value Ref Range    WBC 4.4 (L) 4.8 - 10.8 K/uL    RBC 4.38 (L) 4.70 - 6.10 M/uL    Hemoglobin 14.0 14.0 - 18.0 g/dL    Hematocrit 43.6 42.0 - 52.0 %    MCV 99.5 (H) 81.4 - 97.8 fL    MCH 32.0 27.0 - 33.0 pg    MCHC 32.1 (L) 33.7 - 35.3 g/dL    RDW 50.6 (H) 35.9 - 50.0 fL    Platelet Count 180 164 - 446 K/uL    MPV 9.4 9.0 - 12.9 fL    Neutrophils-Polys 43.90 (L) 44.00 - 72.00 %    Lymphocytes 40.10 22.00 - 41.00 %    Monocytes 13.50 (H) 0.00 - 13.40 %    Eosinophils 1.80 0.00 - 6.90 %    Basophils 0.50 0.00 - 1.80 %    Immature Granulocytes 0.20 0.00 - 0.90 %    Nucleated RBC 0.00 /100 WBC    Neutrophils (Absolute) 1.95 1.82 - 7.42 K/uL    Lymphs (Absolute) 1.78 1.00 - 4.80 K/uL    Monos (Absolute) 0.60 0.00 - 0.85 K/uL    Eos (Absolute) 0.08 0.00 - 0.51 K/uL    Baso (Absolute) 0.02 0.00 - 0.12 K/uL    Immature Granulocytes (abs) 0.01 0.00 - 0.11 K/uL    NRBC (Absolute) 0.00 K/uL   CMP   Result Value Ref Range    Sodium 141 135 - 145 mmol/L    Potassium 4.0 3.6 - 5.5 mmol/L    Chloride 103 96 - 112 mmol/L    Co2 29 20 - 33 mmol/L    Anion Gap 9.0 0.0 - 11.9    Glucose 100 (H) 65 - 99 mg/dL    Bun 14 8 - 22 mg/dL    Creatinine 1.16 0.50 - 1.40 mg/dL    Calcium 9.4 8.5 - 10.5 mg/dL    AST(SGOT) 14 12 - 45 U/L    ALT(SGPT) 8 2 - 50 U/L    Alkaline Phosphatase 80 30 - 99 U/L    Total Bilirubin 0.9 0.1 - 1.5 mg/dL    Albumin 4.4 3.2 - 4.9 g/dL    Total Protein 7.2 6.0 - 8.2 g/dL    Globulin 2.8 1.9 - 3.5 g/dL    A-G  Ratio 1.6 g/dL   LIPASE   Result Value Ref Range    Lipase 7 (L) 11 - 82 U/L   C Diff by PCR rflx Toxin   Result Value Ref Range    C Diff by PCR See Toxin Negative    027-NAP1-BI Presumptive See Toxin Negative   ESTIMATED GFR   Result Value Ref Range    GFR If African American >60 >60 mL/min/1.73 m 2    GFR If Non African American >60 >60 mL/min/1.73 m 2   C Diff Toxin   Result Value Ref Range    C.Diff Toxin A&B Positive (A)         RADIOLOGY/PROCEDURES  No orders to display         COURSE & MEDICAL DECISION MAKING  Pertinent Labs & Imaging studies reviewed. (See chart for details)    Patient's chart was reviewed.  Physician's notes, labs and CT were reviewed for baseline labs, comparison.    An IV is established.  Labs were repeated.  Results of his labs have been reviewed.      The patient had unremarkable CT scan yesterday is no tenderness or peritonitis I do not think he requires repeat imaging.    I discussed the case with the pharmacy department to call the patient and told to come back in.  He needs to be seen by infectious disease.  Infectious disease is recommending admission.        Labs have been obtained and reviewed.  The patient will be admitted to the hospital for continued work-up and treatment. Care is transferred to Dr Duarte      FINAL IMPRESSION  1. Clostridium difficile infection     2. Failed outpatient therapy    3.         Electronically signed by: Skyler Abbott, 9/17/2019 2:30 PM

## 2019-09-17 NOTE — ED NOTES
Current Outpatient Medications   Medication Sig Dispense Refill   • Vancomycin HCl (VANCOMYCIN 50 MG/ML) 50 mg/mL Solution Take 2.5 mL by mouth every 6 hours for 2 days. 20 mL 0   • ferrous sulfate 325 (65 Fe) MG tablet Take 1 Tab by mouth every day. 30 Tab 1   • gabapentin (NEURONTIN) 300 MG Cap Take 2 Caps by mouth 3 times a day. 90 Cap 3   • lactobacillus rhamnosus (CULTURELLE) Cap capsule Take 1 Cap by mouth every morning with breakfast. 30 Cap 11   • oxyCODONE-acetaminophen (PERCOCET) 7.5-325 MG per tablet Take 1 Tab by mouth every 8 hours as needed for Severe Pain.     • alfuzosin (UROXATRAL) 10 MG SR tablet Take 10 mg by mouth every day.     • lamotrigine (LAMICTAL) 200 MG tablet Take 200 mg by mouth every day.     The patient has been provided with discharge education and information.  The patient was also provided with instructions on follow up care and return precautions.  The patient verbalizes understanding of discharge instructions, follow up care, and return precautions.  All questions have been answered. 1 RX written by DARSHANA.  NAD, A/Anna, good color and appropriate at time of discharge.  Patient ambulated out of department.

## 2019-09-17 NOTE — ED PROVIDER NOTES
"ED Provider Note    Scribed for Giovanny Mensah M.D. by Giovanny Mensah. 9/16/2019,  5:51 PM.    CHIEF COMPLAINT  Chief Complaint   Patient presents with   • Diarrhea     reports he has c diff. fecal transplant in July.    • Sent by MD     PCP sent him        HPI  Marcelo Colon is a 78 y.o. male who presents to the Emergency Department for increasingly frequent and loose, watery diarrhea for about the past week.  He is concerned that this is a recurrence of his C. difficile diarrhea.  He has difficulty with the timeline, perhaps due to history of psychiatric illness, but it is clear from his records that he has had a difficult time with recurrent episodes of C. difficile.  He reports crampy abdominal pain, only preceding episodes of diarrhea.  He denies fevers or chills or nausea or vomiting or decreased appetite.  He has not had any dysuria.  He said that he had 5 doses of oral vancomycin left over from his last round of treatment, and has taken those over the past couple of days, and it has provided very mild improvement in the consistency of his diarrhea, which he now says is \"jelly,\" instead of \"water.\"    His most recent admission in the past month or so was in August.  He was admitted from the 6 to the 10th, and again on the 12th.  On the 10th, he was discharged home with a 21-day oral vancomycin taper.  After discharge on the 10th, he felt that his diarrhea had resolved, according to his notes per but he was readmitted on the 12th, he woke up with several new bouts of diarrhea.  He called his family medicine clinic and admitted him to the hospital directly.  There were no episodes of diarrhea for the first 24 hours of his admission, and the stool sample taken at that time was reportedly negative for C. Difficile.     REVIEW OF SYSTEMS  See HPI for further details. All other systems are negative.     PAST MEDICAL HISTORY   has a past medical history of Anesthesia, Anxiety disorder, Arthritis, Bipolar " disorder (HCC), Clostridium difficile infection, Depression, Enlarged prostate, GERD (gastroesophageal reflux disease), Heart murmur, Indigestion, and Neck pain.    SOCIAL HISTORY  Social History     Tobacco Use   • Smoking status: Former Smoker     Packs/day: 2.00     Years: 14.00     Pack years: 28.00     Last attempt to quit: 3/21/1973     Years since quittin.5   • Smokeless tobacco: Never Used   Substance and Sexual Activity   • Alcohol use: Yes     Alcohol/week: 0.0 oz     Comment: 3 glasses wine/day   • Drug use: Yes     Comment: history of cocaine and marijuana use still about 10 years ago. Denies any IV drug use.   • Sexual activity: Not on file     Social History     Substance and Sexual Activity   Drug Use Yes    Comment: history of cocaine and marijuana use still about 10 years ago. Denies any IV drug use.       SURGICAL HISTORY   has a past surgical history that includes exploratory laparotomy (2017); sigmoid colon resection (2017); colostomy (2017); appendectomy (2017); lumbar decompression (2017); exc./biopsy mass back (); colostomy takedown (3/21/2018); colonoscopy (N/A, 2018); and fecal transplant (N/A, 2018).    CURRENT MEDICATIONS  Home Medications     Reviewed by Nikita Call R.N. (Registered Nurse) on 19 at 1645  Med List Status: Partial   Medication Last Dose Status   alfuzosin (UROXATRAL) 10 MG SR tablet  Active   ferrous sulfate 325 (65 Fe) MG tablet  Active   gabapentin (NEURONTIN) 300 MG Cap  Active   lactobacillus rhamnosus (CULTURELLE) Cap capsule  Active   lamotrigine (LAMICTAL) 200 MG tablet  Active   oxyCODONE-acetaminophen (PERCOCET) 7.5-325 MG per tablet  Active   Vancomycin HCl (VANCOMYCIN 50 MG/ML) 50 mg/mL Solution  Active                ALLERGIES  Allergies   Allergen Reactions   • Acetaminophen Nausea     Very nauseated        PHYSICAL EXAM  VITAL SIGNS: /78   Pulse 62   Temp 36.3 °C (97.3 °F) (Temporal)   Resp 16   Ht  "1.727 m (5' 8\")   Wt 60.5 kg (133 lb 6.1 oz)   SpO2 96%   BMI 20.28 kg/m²   Pulse ox interpretation: I interpret this pulse ox as normal.  Constitutional: Alert in no apparent distress.  HENT: No signs of trauma, Bilateral external ears normal, Nose normal.   Eyes: Pupils are equal and reactive, Conjunctiva normal, Non-icteric.   Neck: Normal range of motion, Supple, No stridor.    Cardiovascular: Normal peripheral perfusion  Thorax & Lungs: Unlabored respirations, equal chest expansion, no accessory muscle use  Abdomen: Non-distended  Skin:  No erythema, No rash.   Back: Normal alignment and ROM  Extremities: No gross deformity  Musculoskeletal: Good range of motion in all major joints.   Neurologic: Alert, Normal motor function, No focal deficits noted.   Psychiatric: Affect normal, Judgment normal, Mood normal.      DIAGNOSTIC STUDIES / PROCEDURES    LABS  Labs Reviewed   CBC WITH DIFFERENTIAL - Abnormal; Notable for the following components:       Result Value    WBC 4.4 (*)     RBC 4.38 (*)     MCV 99.5 (*)     MCHC 32.1 (*)     RDW 50.6 (*)     Neutrophils-Polys 43.90 (*)     Monocytes 13.50 (*)     All other components within normal limits   COMP METABOLIC PANEL - Abnormal; Notable for the following components:    Glucose 100 (*)     All other components within normal limits   LIPASE - Abnormal; Notable for the following components:    Lipase 7 (*)     All other components within normal limits   CDIFF BY PCR RFLX TOXIN    Narrative:     Recent c dif fecal transplant  Does this patient have risk factors for C-diff?->Yes  C-Diff Risk Factors->advanced age   recent c. dif   ESTIMATED GFR     All labs reviewed by me.    RADIOLOGY  CT-ABDOMEN-PELVIS WITH   Final Result      1.  No acute intra-abdominal findings.      2.  Moderate hiatal hernia.      3.  Atherosclerosis.      4.  Status post partial colectomy      5.  Prostate enlargement        The radiologist's interpretation of all radiological studies have " been reviewed by me.    COURSE & MEDICAL DECISION MAKING  Nursing notes, DONTAE PMSFHx reviewed in chart.     5:51 PM Patient seen and examined at bedside. Differential diagnosis includes but is not limited to recurrent C. difficile, colitis, gastroenteritis, food intolerance, viral syndrome. Ordered for screening laboratory tests, including C. difficile toxin if the patient is able to provide a sample, and a CT of the abdomen and pelvis to evaluate.  The patient's vital signs are reassuring.  He is calm and pleasant and cooperative and no signs of distress.  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  He may have a recurrence now, though it is not clear that hospitalization would benefit him, since he is been treated with oral vancomycin as an outpatient most recently.  I think that a new evaluation is in order, but I like to discuss his case with the family medicine team who knows him well, in consideration of possibly avoiding the inconvenience and cost and health risks to the patient and others if he is admitted to the hospital.    6:10 PM I spoke with the on-call family medicine resident will discuss the patient with her attending.    7:41 PM this patient's laboratory tests and abdominal CT are unremarkable.  I have paged the family medicine team again for consultation.    8:00 PM the family medicine resident has evaluated the patient at the bedside, reviewed his labs and imaging findings with me, and with her attending provider, and they have agreed and discussed with the patient that he will be followed up in clinic tomorrow or the following day, but can avoid admission to the hospital.  I assisted them by prescribing 2 days of oral vancomycin, in case patient needs reinitiation of oral treatment for C. difficile.     The patient will return for new or worsening symptoms and is stable at the time of discharge.    The patient is referred to a primary physician for blood pressure management,  diabetic screening, and for all other preventative health concerns.    DISPOSITION:  Patient will be discharged home in stable condition.    FOLLOW UP:  Joy Sellers M.D.  [address]   [address]  [place] [id number]  [phone]      or anyone at the Family Medicine Clinic.      OUTPATIENT MEDICATIONS:  Discharge Medication List as of 9/16/2019  9:46 PM            FINAL IMPRESSION  1. Diarrhea, unspecified type             Recommendation Preamble: The following details were discussed: Waiting and watching area for two months. If any change we will discuss performing a biopsy. Detail Level: Zone

## 2019-09-17 NOTE — ED NOTES
"ED Positive Culture Follow-up/Notification Note:   Date: 9/17/19    Patient seen in the ED on 9/16/2019 for increased frequency of loose, watery diarrhea x 1 wk s/p fecal trplt in July for repeated episodes of C diff infection. Was recently treated with vanco taper starting on 8/10, and on 8/12, returned to the hospital for new bouts of diarrhea but tested C diff toxin negative on 8/12.      1. Diarrhea, unspecified type      Discharge Medication List as of 9/16/2019  9:46 PM        Allergies: Acetaminophen    Vitals:    09/16/19 1642 09/16/19 1643 09/16/19 2209   BP: 151/58  110/78   Pulse: (Abnormal) 58  62   Resp: 16  16   Temp: 36.3 °C (97.3 °F)     TempSrc: Temporal     SpO2: 93%  96%   Weight:  60.5 kg (133 lb 6.1 oz)    Height: 1.727 m (5' 8\")         Final cultures:   Results     Procedure Component Value Units Date/Time    C Diff Toxin [[id number]]  (Abnormal) Collected:  09/16/19 1814    Order Status:  Completed Updated:  09/17/19 0114     C.Diff Toxin A&B Positive     Comment: TOXIN POSITIVE  Toxin detected by EIA; C. difficile detected by PCR.  If clinically correlated, treatment indicated per guidelines.  Test of cure is not recommended.         Narrative:       ER tel.  09/17/2019, 01:14, RESULTS CALLED TO: [id number] ER DISCHARGE HOTLINE  Recent c dif fecal transplant  Does this patient have risk factors for C-diff?->Yes  C-Diff Risk Factors->advanced age  ** recent c. dif    C Diff by PCR rflx Toxin [[id number]] Collected:  09/16/19 1814    Order Status:  Completed Specimen:  Blood from Stool Updated:  09/17/19 0110     C Diff by PCR See Toxin     027-NAP1-BI Presumptive See Toxin     Comment: Presumptive 027/NAP1/BI target DNA sequences are DETECTED.       Narrative:       Recent c dif fecal transplant  Does this patient have risk factors for C-diff?->Yes  C-Diff Risk Factors->advanced age  ** recent c. dif          Plan:   Prescribed therapy insufficient to treat patient's C diff infection. Given patient's " complex C diff infection history, psychiatric comorbidity, and insufficient follow-up, patient not good candidate for outpatient management. Called patient and instructed patient to return to the emergency department, where additional evaluation by GI and ID may be warranted. Notified Charge RN and ED pharmacist.    Joanie Hogue, PharmD

## 2019-09-17 NOTE — ED TRIAGE NOTES
Marcelo Colon  78 y.o.  Chief Complaint   Patient presents with   • Sent by MD     seen here yesterday and d/c, called back because cdiff came back positive, has hx of same about a month ago   • Diarrhea     3-4 days     Patient ambulatory with steady gait to triage room with above complaint.  Patient appears in mild discomfort.  Had cdiff with a fecal transplant about a month ago.  Just recently finished course of vancomycin.    Charge notified of patient isolation status.  Patient escorted to green 28.

## 2019-09-17 NOTE — ED NOTES
Pt roomed from triage. Pt contact made. Pt placed in gown and on monitor. Pt here for symptoms as stated in the triage note. Contact ISO in place. ISO cart requested from central supply. Pt made comfortable. Pending ERP to see.

## 2019-09-17 NOTE — ED NOTES
Attempted to call report x2 to BART Ramirez at ext [phone]. Unable to reach. Informed UC to have RN call for report when available.

## 2019-09-17 NOTE — DISCHARGE INSTRUCTIONS
The family medicine clinic will call you tomorrow to set up a same-day her next day appointment.  On the advice of your primary care team, we have written you a short prescription for additional vancomycin.  They will decide if clinic whether or not to continue that medication.

## 2019-09-17 NOTE — ED NOTES
Med rec updated and complete and allergies reviewed. Prior to pts   Last hospitalization he was taking Vanco capsules. Pt stated that   Yesterday he took the remaining 5 capsules he had left to slow the diarrhea down.  Home pharmacy [facility].

## 2019-09-18 LAB
ANION GAP SERPL CALC-SCNC: 6 MMOL/L (ref 0–11.9)
BASOPHILS # BLD AUTO: 0.5 % (ref 0–1.8)
BASOPHILS # BLD: 0.02 K/UL (ref 0–0.12)
BUN SERPL-MCNC: 21 MG/DL (ref 8–22)
CALCIUM SERPL-MCNC: 8.6 MG/DL (ref 8.5–10.5)
CHLORIDE SERPL-SCNC: 106 MMOL/L (ref 96–112)
CO2 SERPL-SCNC: 28 MMOL/L (ref 20–33)
CREAT SERPL-MCNC: 1.03 MG/DL (ref 0.5–1.4)
EOSINOPHIL # BLD AUTO: 0.12 K/UL (ref 0–0.51)
EOSINOPHIL NFR BLD: 3 % (ref 0–6.9)
ERYTHROCYTE [DISTWIDTH] IN BLOOD BY AUTOMATED COUNT: 48.8 FL (ref 35.9–50)
FOLATE SERPL-MCNC: 9.3 NG/ML
GLUCOSE SERPL-MCNC: 127 MG/DL (ref 65–99)
HCT VFR BLD AUTO: 38.9 % (ref 42–52)
HGB BLD-MCNC: 12.2 G/DL (ref 14–18)
IMM GRANULOCYTES # BLD AUTO: 0.02 K/UL (ref 0–0.11)
IMM GRANULOCYTES NFR BLD AUTO: 0.5 % (ref 0–0.9)
LYMPHOCYTES # BLD AUTO: 1.59 K/UL (ref 1–4.8)
LYMPHOCYTES NFR BLD: 40.2 % (ref 22–41)
MCH RBC QN AUTO: 30.7 PG (ref 27–33)
MCHC RBC AUTO-ENTMCNC: 31.4 G/DL (ref 33.7–35.3)
MCV RBC AUTO: 98 FL (ref 81.4–97.8)
MONOCYTES # BLD AUTO: 0.47 K/UL (ref 0–0.85)
MONOCYTES NFR BLD AUTO: 11.9 % (ref 0–13.4)
NEUTROPHILS # BLD AUTO: 1.74 K/UL (ref 1.82–7.42)
NEUTROPHILS NFR BLD: 43.9 % (ref 44–72)
NRBC # BLD AUTO: 0 K/UL
NRBC BLD-RTO: 0 /100 WBC
PLATELET # BLD AUTO: 176 K/UL (ref 164–446)
PMV BLD AUTO: 9 FL (ref 9–12.9)
POTASSIUM SERPL-SCNC: 4.1 MMOL/L (ref 3.6–5.5)
RBC # BLD AUTO: 3.97 M/UL (ref 4.7–6.1)
SODIUM SERPL-SCNC: 140 MMOL/L (ref 135–145)
TSH SERPL DL<=0.005 MIU/L-ACNC: 1.58 UIU/ML (ref 0.38–5.33)
VIT B12 SERPL-MCNC: 215 PG/ML (ref 211–911)
WBC # BLD AUTO: 4 K/UL (ref 4.8–10.8)

## 2019-09-18 PROCEDURE — 99233 SBSQ HOSP IP/OBS HIGH 50: CPT | Performed by: INTERNAL MEDICINE

## 2019-09-18 PROCEDURE — 80048 BASIC METABOLIC PNL TOTAL CA: CPT

## 2019-09-18 PROCEDURE — 84443 ASSAY THYROID STIM HORMONE: CPT

## 2019-09-18 PROCEDURE — 82607 VITAMIN B-12: CPT

## 2019-09-18 PROCEDURE — A9270 NON-COVERED ITEM OR SERVICE: HCPCS | Performed by: FAMILY MEDICINE

## 2019-09-18 PROCEDURE — 99232 SBSQ HOSP IP/OBS MODERATE 35: CPT | Performed by: HOSPITALIST

## 2019-09-18 PROCEDURE — 85025 COMPLETE CBC W/AUTO DIFF WBC: CPT

## 2019-09-18 PROCEDURE — 770001 HCHG ROOM/CARE - MED/SURG/GYN PRIV*

## 2019-09-18 PROCEDURE — 700102 HCHG RX REV CODE 250 W/ 637 OVERRIDE(OP): Performed by: FAMILY MEDICINE

## 2019-09-18 PROCEDURE — 82746 ASSAY OF FOLIC ACID SERUM: CPT

## 2019-09-18 PROCEDURE — 36415 COLL VENOUS BLD VENIPUNCTURE: CPT

## 2019-09-18 RX ADMIN — OXYCODONE HYDROCHLORIDE 5 MG: 5 TABLET ORAL at 16:26

## 2019-09-18 RX ADMIN — VANCOMYCIN HYDROCHLORIDE 125 MG: 10 INJECTION, POWDER, LYOPHILIZED, FOR SOLUTION INTRAVENOUS at 06:13

## 2019-09-18 RX ADMIN — VANCOMYCIN HYDROCHLORIDE 125 MG: 10 INJECTION, POWDER, LYOPHILIZED, FOR SOLUTION INTRAVENOUS at 18:38

## 2019-09-18 RX ADMIN — OXYCODONE HYDROCHLORIDE 5 MG: 5 TABLET ORAL at 06:18

## 2019-09-18 RX ADMIN — TAMSULOSIN HYDROCHLORIDE 0.4 MG: 0.4 CAPSULE ORAL at 09:25

## 2019-09-18 RX ADMIN — Medication 1 CAPSULE: at 09:24

## 2019-09-18 RX ADMIN — LAMOTRIGINE 200 MG: 100 TABLET ORAL at 09:36

## 2019-09-18 RX ADMIN — GABAPENTIN 600 MG: 300 CAPSULE ORAL at 16:26

## 2019-09-18 RX ADMIN — OXYCODONE HYDROCHLORIDE 5 MG: 5 TABLET ORAL at 09:28

## 2019-09-18 RX ADMIN — VANCOMYCIN HYDROCHLORIDE 125 MG: 10 INJECTION, POWDER, LYOPHILIZED, FOR SOLUTION INTRAVENOUS at 23:30

## 2019-09-18 RX ADMIN — GABAPENTIN 600 MG: 300 CAPSULE ORAL at 09:26

## 2019-09-18 RX ADMIN — FERROUS SULFATE TAB 325 MG (65 MG ELEMENTAL FE) 325 MG: 325 (65 FE) TAB at 09:37

## 2019-09-18 RX ADMIN — GABAPENTIN 600 MG: 300 CAPSULE ORAL at 23:29

## 2019-09-18 RX ADMIN — OXYCODONE HYDROCHLORIDE 5 MG: 5 TABLET ORAL at 12:58

## 2019-09-18 RX ADMIN — VANCOMYCIN HYDROCHLORIDE 125 MG: 10 INJECTION, POWDER, LYOPHILIZED, FOR SOLUTION INTRAVENOUS at 12:59

## 2019-09-18 ASSESSMENT — ENCOUNTER SYMPTOMS
COUGH: 0
ABDOMINAL PAIN: 1
BLOOD IN STOOL: 0
VOMITING: 0
EYE PAIN: 0
FEVER: 0
NERVOUS/ANXIOUS: 0
LOSS OF CONSCIOUSNESS: 0
MYALGIAS: 0
EYE REDNESS: 0
CHILLS: 0
DIZZINESS: 0
SHORTNESS OF BREATH: 0
SPUTUM PRODUCTION: 0
ABDOMINAL PAIN: 0
PALPITATIONS: 0
FLANK PAIN: 0
BRUISES/BLEEDS EASILY: 0
SPEECH CHANGE: 0
HALLUCINATIONS: 0
STRIDOR: 0
NAUSEA: 0
FOCAL WEAKNESS: 0
HEADACHES: 0
NAUSEA: 1
EYE DISCHARGE: 0
SORE THROAT: 0
HEMOPTYSIS: 0
SEIZURES: 0
DIARRHEA: 1

## 2019-09-18 NOTE — PROGRESS NOTES
Infectious Disease Progress Note    Author: Latonia Cadet M.D. Date & Time of service: 2019  9:52 AM    Chief Complaint:  Recurrent Clostridium difficile diarrhea    Interval History:  78 y.o. male with a history of a pelvic abscess with bowel perforation and an extensive abdominal surgical history in  with multiple episodes of Clostridium difficile colitis status post fecal transplant on 2018, recent admission in August with recurrent C. difficile on a prolonged vancomycin taper prior to admission admitted for recurrent C. Difficile.     afebrile WBC 4 patient had 2 episodes of diarrhea overnight.  No nausea or vomiting but poor appetite.  No abdominal pain    Labs Reviewed.    Review of Systems:  Review of Systems   Constitutional: Negative for chills and fever.   Respiratory: Negative for cough and shortness of breath.    Gastrointestinal: Positive for diarrhea. Negative for abdominal pain, nausea and vomiting.   Genitourinary: Negative for dysuria.   Neurological: Negative for dizziness and headaches.   All other systems reviewed and are negative.      Hemodynamics:  Temp (24hrs), Av.5 °C (97.7 °F), Min:36.2 °C (97.1 °F), Max:36.7 °C (98 °F)  Temperature: 36.2 °C (97.1 °F)  Pulse  Av.4  Min: 55  Max: 84   Blood Pressure : 108/54       Physical Exam:  Physical Exam   Constitutional: He is oriented to person, place, and time. He appears well-developed.   HENT:   Mouth/Throat: No oropharyngeal exudate.   Eyes: Pupils are equal, round, and reactive to light. Conjunctivae and EOM are normal.   Neck: Neck supple.   Cardiovascular: Normal rate, regular rhythm and normal heart sounds.   Pulmonary/Chest: Effort normal and breath sounds normal. He has no wheezes.   Abdominal: Soft. He exhibits no distension. There is no tenderness.   Abdominal surgical scars clean   Musculoskeletal: Normal range of motion. He exhibits no edema.   Neurological: He is alert and oriented to person, place, and  time. No cranial nerve deficit.   Skin: Skin is warm and dry.   Psychiatric: He has a normal mood and affect. His behavior is normal.       Meds:    Current Facility-Administered Medications:   •  tamsulosin  •  ferrous sulfate  •  gabapentin  •  lactobacillus rhamnosus  •  lamotrigine  •  enoxaparin  •  Notify provider if pain remains uncontrolled **AND** Use the numeric rating scale (NRS-11) on regular floors and Critical-Care Pain Observation Tool (CPOT) on ICUs/Trauma to assess pain **AND** Pulse Ox (Oximetry) **AND** Pharmacy Consult Request **AND** If patient difficult to arouse and/or has respiratory depression, stop any opiates that are currently infusing and call a Rapid Response. **AND** oxyCODONE immediate-release **AND** oxyCODONE immediate-release **AND** morphine injection  •  vancomycin 50 mg/mL    Labs:  Recent Labs     09/16/19 1816 09/17/19 1447 09/18/19  0235   WBC 4.4* 4.7* 4.0*   RBC 4.38* 4.32* 3.97*   HEMOGLOBIN 14.0 14.0 12.2*   HEMATOCRIT 43.6 42.4 38.9*   MCV 99.5* 98.1* 98.0*   MCH 32.0 32.4 30.7   RDW 50.6* 49.7 48.8   PLATELETCT 180 171 176   MPV 9.4 9.3 9.0   NEUTSPOLYS 43.90* 60.70 43.90*   LYMPHOCYTES 40.10 26.70 40.20   MONOCYTES 13.50* 10.50 11.90   EOSINOPHILS 1.80 1.50 3.00   BASOPHILS 0.50 0.20 0.50     Recent Labs     09/16/19 1816 09/17/19 1447 09/18/19  0235   SODIUM 141 138 140   POTASSIUM 4.0 4.2 4.1   CHLORIDE 103 104 106   CO2 29 28 28   GLUCOSE 100* 89 127*   BUN 14 15 21     Recent Labs     09/16/19 1816 09/17/19 1447 09/18/19  0235   ALBUMIN 4.4 4.1  --    TBILIRUBIN 0.9 0.8  --    ALKPHOSPHAT 80 74  --    TOTPROTEIN 7.2 6.7  --    ALTSGPT 8 8  --    ASTSGOT 14 13  --    CREATININE 1.16 1.09 1.03       Imaging:  Ct-abdomen-pelvis With    Result Date: 9/16/2019 9/16/2019 7:09 PM HISTORY/REASON FOR EXAM:  Diarrhea. TECHNIQUE/EXAM DESCRIPTION:   CT scan of the abdomen and pelvis with contrast. Contrast-enhanced helical scanning was obtained from the diaphragmatic  domes through the pubic symphysis following the bolus administration of nonionic contrast without complication. 100 mL of Omnipaque 350 nonionic contrast was administered without complication. Low dose optimization technique was utilized for this CT exam including automated exposure control and adjustment of the mA and/or kV according to patient size. COMPARISON: 8/8/2019 FINDINGS: The visualized lung bases are unremarkable. CT Abdomen: The liver, spleen, adrenal glands, pancreas, and gallbladder are unremarkable. The kidneys enhance symmetrically. Small hypodense renal lesions are indeterminate, but likely represent cysts. There is a moderate hiatal hernia. There is a duodenal diverticulum. There are postoperative changes in the distal colon. The appendix is not visualized. There are dense scattered calcifications and low-density plaque with aortic ectasia. CT Pelvis: The bladder is unremarkable. Prostate is enlarged. No significant free fluid or adenopathy is identified. Degenerative changes are in the spine.     1.  No acute intra-abdominal findings. 2.  Moderate hiatal hernia. 3.  Atherosclerosis. 4.  Status post partial colectomy 5.  Prostate enlargement      Micro:  Results     ** No results found for the last 168 hours. **          Assessment:  Active Hospital Problems    Diagnosis   • *Recurrent Clostridium difficile diarrhea [A04.71]   • Leukopenia [D72.819]       Plan:  Recurrent Clostridium difficile infection  Afebrile  Leukopenia  Patient was on a prolonged vancomycin taper prior to admission  C. difficile PCR positive on 8/6/2019  C. difficile PCR positive on 9/16/2019  Continue p.o. vancomycin 125 mg 4 times daily for 14 days followed by a prolonged taper  Recommend GI evaluation for possible repeat fecal transplant    Discussed with internal medicine/Dr. Bobo

## 2019-09-18 NOTE — PROGRESS NOTES
Pt transferred from ED via gurney accompanied by transporter.  Pt upself to bed with SBA.  Pt assessed, A&O x4. Updated pt on unit routine including call light system, hourly rounding, white board information, need for bed alarm, and visitors policy.  Bed in lowest position, locked, and pt instructed on need to call for assistance if needed prior to getting out of bed.  Call light, phone, and personal belongs within reach, white board updated.

## 2019-09-18 NOTE — PROGRESS NOTES
Isolation Precautions:    Patient is on contact isolation for C-diff.    Patient educated on reason for isolation, how the infection may be transmitted, and how to help prevent transmission to others.     Patient educated that contact precautions involves staff and visitors wearing PPE, follow  Standard Precautions and perform meticulous hand hygiene in order to prevent transmission of infection. (Contact Precautions: gown and gloves; Special Contact Precautions: gown and gloves, with the added requirement of soap and water for hand hygiene; Droplet Precautions: surgical mask worn by staff and visitors in the room, and worn by the patient when out of the room; Airborne Precautions: involves staff wearing PPE to include an N95 Respirator or Controlled Air Purifying Respirator (CAPR).  Visitors should be limited and may wear an N95 mask).         Patient transport and mobilization on unit. Patient educated that they may leave their room, but prior to exiting, the patient needs to have on a fresh patient gown, ensure the potentially infectious area is covered, perform appropriate hand hygiene immediately prior to exiting the room. (*For Airborne Precautions: Patient educated that time out of the room should be minimized and limited to transport to diagnostic procedures or other activities. Patient is to wear surgical mask when required to leave the patient room).

## 2019-09-18 NOTE — PROGRESS NOTES
Hospital Medicine Daily Progress Note    Date of Service  9/18/2019    Chief Complaint  78 y.o. male admitted 9/17/2019 with diarrhea    Hospital Course      78 years old male with past medical history of multiple episodes of C. difficile colitis, last was August 6-16, status post fecal transplant September 2018. Admitted 9/17 with frequent loose bowel movements without associated fevers or chills.  Tested positive for C. difficile colitis.  Infectious disease have been consulted.          Interval Problem Update  Heart rate 60s, blood pressure within normal limits this morning.  Saturating well on room air this morning.  Still having diarrhea, 3 episodes from midnight to noon.  Abdominal pain improving, nearly resolved.  Some nausea, no vomiting.  Thiamine, folic acid and multivitamin.     Multiple episodes, may benefit from fecal transplant.  Discussed with patient, patient wants to think about it.  Will consult GI if he is agreeable.  Tolerating oral vancomycin, infectious disease following, appreciate recommendations.  Discussed with patient, infectious disease, family medicine, patient's nurse and with multidisciplinary team during rounds including , and charge nurse.      Consultants/Specialty  ID    Code Status  DNAR/DNI     Disposition  TBD     Review of Systems  Review of Systems   Constitutional: Positive for malaise/fatigue. Negative for chills and fever.   HENT: Negative for congestion and sore throat.    Eyes: Negative for pain, discharge and redness.   Respiratory: Negative for cough, hemoptysis, sputum production, shortness of breath and stridor.    Cardiovascular: Negative for chest pain, palpitations and leg swelling.   Gastrointestinal: Positive for abdominal pain (improving ), diarrhea and nausea. Negative for blood in stool and vomiting.   Genitourinary: Negative for flank pain, hematuria and urgency.   Musculoskeletal: Negative for myalgias.   Skin: Negative.    Neurological:  Negative for speech change, focal weakness, seizures and loss of consciousness.   Endo/Heme/Allergies: Does not bruise/bleed easily.   Psychiatric/Behavioral: Negative for hallucinations and suicidal ideas. The patient is not nervous/anxious.         Physical Exam  Temp:  [35.9 °C (96.7 °F)-36.7 °C (98 °F)] 35.9 °C (96.7 °F)  Pulse:  [60-64] 62  Resp:  [16-18] 16  BP: (100-112)/(50-54) 110/53  SpO2:  [91 %-96 %] 91 %    Physical Exam   Constitutional: He is oriented to person, place, and time.   Chronically ill-appearing   HENT:   Head: Normocephalic and atraumatic.   Right Ear: External ear normal.   Left Ear: External ear normal.   Eyes: Pupils are equal, round, and reactive to light. Right eye exhibits no discharge. Left eye exhibits no discharge. No scleral icterus.   Neck: Normal range of motion. Neck supple. No JVD present. No tracheal deviation present. No thyromegaly present.   Cardiovascular: Normal rate, regular rhythm and normal heart sounds. Exam reveals no friction rub.   No murmur heard.  Pulmonary/Chest: Effort normal. No stridor. No respiratory distress. He has no wheezes. He has no rales.   Abdominal: Soft. He exhibits no distension. There is tenderness. There is no rebound.   Musculoskeletal: He exhibits no edema, tenderness or deformity.   Neurological: He is alert and oriented to person, place, and time. No cranial nerve deficit. Coordination normal.   Skin: Skin is warm and dry. There is pallor.   Psychiatric: He has a normal mood and affect. His behavior is normal. Judgment normal.   Nursing note and vitals reviewed.      Fluids    Intake/Output Summary (Last 24 hours) at 9/18/2019 1608  Last data filed at 9/18/2019 1000  Gross per 24 hour   Intake 450 ml   Output --   Net 450 ml       Laboratory  Recent Labs     09/16/19  1816 09/17/19  1447 09/18/19  0235   WBC 4.4* 4.7* 4.0*   RBC 4.38* 4.32* 3.97*   HEMOGLOBIN 14.0 14.0 12.2*   HEMATOCRIT 43.6 42.4 38.9*   MCV 99.5* 98.1* 98.0*   MCH 32.0  32.4 30.7   MCHC 32.1* 33.0* 31.4*   RDW 50.6* 49.7 48.8   PLATELETCT 180 171 176   MPV 9.4 9.3 9.0     Recent Labs     09/16/19  1816 09/17/19  1447 09/18/19  0235   SODIUM 141 138 140   POTASSIUM 4.0 4.2 4.1   CHLORIDE 103 104 106   CO2 29 28 28   GLUCOSE 100* 89 127*   BUN 14 15 21   CREATININE 1.16 1.09 1.03   CALCIUM 9.4 9.3 8.6                   Imaging  No orders to display      Assessment/Plan  * Recurrent Clostridium difficile diarrhea- (present on admission)  Assessment & Plan  Tolerated oral vancomycin  Multiple episodes, may benefit from fecal transplant.   ID consulted.     Alcohol use- (present on admission)  Assessment & Plan  Watch for withdrawal symptoms  Thiamine, folic acid and multivitamin     Vitamin B12 deficiency- (present on admission)  Assessment & Plan  Check B12 level     Leukopenia- (present on admission)  Assessment & Plan  Probably delusional from intravenous fluids.  Continue to monitor     Chronic neck pain- (present on admission)  Assessment & Plan  Continue Neurontin  Uses oxycodone chronically, try to minimize use with C. Difficile, risk of megacolon.         VTE prophylaxis: Lovenox

## 2019-09-18 NOTE — CONSULTS
ADULT INFECTIOUS DISEASE CONSULT     Date of Service: 9/17/2019    Consult Requested By: Eliseo Richard    Reason for Consultation: Recurrent C. difficile    History of Present Illness:   Marcelo Colon is a 78 y.o. male who had a prolonged hospitalization in July 2017. He had developed a pelvic abscess as well as bowel perforation. On 7/25/2017 he underwent exploratory laparotomy, abdominal washout sigmoid colon resection colostomy placement and appendectomy. .he had a postop abscess. Peritoneal fluid on 7/25 +VRE & Candida albicans.  Repeat IR drainage on 8/5 with Dr. Miranda.  Final repeat CT abd pelvis on 8/17 showed pelvic abscess down to 1.0 x 1.7 cm.  Pt was being treated with PO Zyvox, which was stopped and changed to IV Vanco d/t leukopenia and thrombocytopenia.  Pt discharged to [facility] IV Vancomycin through 8/25/17. He was readmitted on 12/18/2017 with large amount of stool from his colostomy. He was diagnosed with C. difficile colitis. He was discharged on 12/26/2017.  He was again admitted on 1/24/2018 with liquid stools.  He was evaluated by infectious diseases and he was discharged on prolonged Vanco p.o. taper.  He had his colostomy takedown on 3/21/2018.  He had fecal microbiota transfer on 9/20/2018.  He was readmitted on 8/6/2019 with recurrent C. difficile.  Apparently he had recently received antibiotics due to GI.  He was again discharged on p.o. vancomycin.  At the time he felt his diarrhea has resolved.  But again it restarted hence he came back into the emergency room on 8/16/2019.  He was sent home since he looked clinically well.  He was recalled because his C. difficile came back positive.  Infectious disease consult has been called for recurrent C. difficile.    Review Of Systems:  Gen.-Complains of fevers. Denies any chills.  HEENT- denies any sore throat, headache or vision changes  Pulmonary- denies any cough, shortness of breath  Cardiovascular- denies any chest  "pain, leg swelling.    GI- denies any nausea vomiting .  Complains of mild abdominal pain diarrhea  Musculoskeletal- denies any joint pains or swelling  Neuro- denies any weakness or sensory change  Psych- denies any depression or suicidal ideation  Genitourinary- denies any frequency or dysuria        PMH:   Past Medical History:   Diagnosis Date   • Anesthesia     \"takes very little and takes forever to come out of it\"   • Anxiety disorder    • Arthritis     some in my back   • Bipolar disorder (HCC)    • Clostridium difficile infection    • Depression    • Enlarged prostate    • GERD (gastroesophageal reflux disease)    • Heart murmur    • Indigestion    • Neck pain          PSH:  Past Surgical History:   Procedure Laterality Date   • COLONOSCOPY N/A 9/20/2018    Procedure: COLONOSCOPY;  Surgeon: Skyler Johnson M.D.;  Location: [facility];  Service: Gastroenterology   • FECAL TRANSPLANT N/A 9/20/2018    Procedure: FECAL TRANSPLANT;  Surgeon: Skyler Johnson M.D.;  Location: [facility];  Service: Gastroenterology   • COLOSTOMY TAKEDOWN  3/21/2018    Procedure: COLOSTOMY TAKEDOWN;  Surgeon: Jorge Rodriguez M.D.;  Location: [facility];  Service: General   • LUMBAR DECOMPRESSION  12/2017   • EXPLORATORY LAPAROTOMY  7/25/2017    Procedure: EXPLORATORY LAPAROTOMY- ABDOMINAL WASH OUT;  Surgeon: Jorge Rodriguez M.D.;  Location: [facility];  Service:    • SIGMOID COLON RESECTION  7/25/2017    Procedure: SIGMOID COLON RESECTION;  Surgeon: Jorge Rodriguez M.D.;  Location: [facility];  Service:    • COLOSTOMY  7/25/2017    Procedure: COLOSTOMY- FOR OSTOMY PLACEMENT AND OTHER PROCEDURES AS INDICATED;  Surgeon: Jorge Rodriguez M.D.;  Location: [facility];  Service:    • APPENDECTOMY  7/25/2017    Procedure: APPENDECTOMY;  Surgeon: Jorge Rodriguez M.D.;  Location: [facility];  Service:    • EXC./BIOPSY MASS BACK  "        FAMILY HX:  History reviewed. No pertinent family history.    SOCIAL HX:  Social History     Socioeconomic History   • Marital status: Single     Spouse name: Not on file   • Number of children: Not on file   • Years of education: Not on file   • Highest education level: Not on file   Occupational History   • Not on file   Social Needs   • Financial resource strain: Not on file   • Food insecurity:     Worry: Not on file     Inability: Not on file   • Transportation needs:     Medical: Not on file     Non-medical: Not on file   Tobacco Use   • Smoking status: Former Smoker     Packs/day: 2.00     Years: 14.00     Pack years: 28.00     Last attempt to quit: 3/21/1973     Years since quittin.5   • Smokeless tobacco: Never Used   Substance and Sexual Activity   • Alcohol use: Yes     Alcohol/week: 0.0 oz     Comment: 3 glasses wine/day   • Drug use: Yes     Comment: history of cocaine and marijuana use still about 10 years ago. Denies any IV drug use.   • Sexual activity: Not on file   Lifestyle   • Physical activity:     Days per week: Not on file     Minutes per session: Not on file   • Stress: Not on file   Relationships   • Social connections:     Talks on phone: Not on file     Gets together: Not on file     Attends Rastafari service: Not on file     Active member of club or organization: Not on file     Attends meetings of clubs or organizations: Not on file     Relationship status: Not on file   • Intimate partner violence:     Fear of current or ex partner: Not on file     Emotionally abused: Not on file     Physically abused: Not on file     Forced sexual activity: Not on file   Other Topics Concern   • Not on file   Social History Narrative   • Not on file     Social History     Tobacco Use   Smoking Status Former Smoker   • Packs/day: 2.00   • Years: 14.00   • Pack years: 28.00   • Last attempt to quit: 3/21/1973   • Years since quittin.5   Smokeless Tobacco Never Used     Social History      Substance and Sexual Activity   Alcohol Use Yes   • Alcohol/week: 0.0 oz    Comment: 3 glasses wine/day       Allergies/Intolerances:  Allergies   Allergen Reactions   • Acetaminophen Nausea     Very nauseated        History reviewed with the patient    Other Current Medications:    Current Facility-Administered Medications:   •  [START ON 9/18/2019] tamsulosin (FLOMAX) capsule 0.4 mg, 0.4 mg, Oral, DAILY, Eliseo Richard M.D.  •  [START ON 9/18/2019] ferrous sulfate tablet 325 mg, 325 mg, Oral, DAILY, Eliseo Richard M.D.  •  gabapentin (NEURONTIN) capsule 600 mg, 600 mg, Oral, TID, Eliseo Richard M.D.  •  [START ON 9/18/2019] lactobacillus rhamnosus (CULTURELLE) capsule 1 Cap, 1 Cap, Oral, QDAY with Breakfast, Eliseo Richard M.D.  •  [START ON 9/18/2019] lamoTRIgine (LAMICTAL) tablet 200 mg, 200 mg, Oral, DAILY, Eliseo Richard M.D.  •  enoxaparin (LOVENOX) inj 40 mg, 40 mg, Subcutaneous, DAILY, Eliseo Richard M.D.  •  Notify provider if pain remains uncontrolled, , , CONTINUOUS **AND** Use the numeric rating scale (NRS-11) on regular floors and Critical-Care Pain Observation Tool (CPOT) on ICUs/Trauma to assess pain, , , CONTINUOUS **AND** Pulse Ox (Oximetry), , , CONTINUOUS **AND** Pharmacy Consult Request ...Pain Management Review 1 Each, 1 Each, Other, PHARMACY TO DOSE **AND** If patient difficult to arouse and/or has respiratory depression, stop any opiates that are currently infusing and call a Rapid Response., , , CONTINUOUS **AND** oxyCODONE immediate-release (ROXICODONE) tablet 2.5 mg, 2.5 mg, Oral, Q3HRS PRN **AND** oxyCODONE immediate-release (ROXICODONE) tablet 5 mg, 5 mg, Oral, Q3HRS PRN **AND** morphine (pf) 4 mg/ml injection 2 mg, 2 mg, Intravenous, Q3HRS PRN, Eliseo Richard M.D.  •  vancomycin 50 mg/mL oral soln 125 mg, 125 mg, Oral, Q6HRS, Eliseo Richard M.D.  [unfilled]    Most Recent Vital Signs:  BP (!) 137/38   Pulse 64   Temp 36.4 °C (97.5 °F) (Temporal)    "Resp 16   Ht 1.727 m (5' 8\")   Wt 60.6 kg (133 lb 9.6 oz)   SpO2 96%   BMI 20.31 kg/m²   Temp  Av.4 °C (97.5 °F)  Min: 36.4 °C (97.5 °F)  Max: 36.4 °C (97.5 °F)    Physical Exam:  General: Nontoxic, no acute distress  HEENT: sclera anicteric, PERRL, EOMI, MMM, no oral lesions  Neck: supple, no lymphadenopathy  Chest: CTAB, no r/r/w, normal work of breathing.  Cardiac: Regular, no murmurs no gallops heard  Abdomen: + bowel sounds, soft, non-tender, non-distended, no HSM  Extremities: No edema. No joint swelling.  Skin: no rashes or erythema  Neuro: Alert and oriented times 3, non-focal exam    Pertinent Lab Results:  Recent Labs     19  1447   WBC 4.4* 4.7*      Recent Labs     19  1447   HEMOGLOBIN 14.0 14.0   HEMATOCRIT 43.6 42.4   MCV 99.5* 98.1*   MCH 32.0 32.4   PLATELETCT 180 171         Recent Labs     19  1447   SODIUM 141 138   POTASSIUM 4.0 4.2   CHLORIDE 103 104   CO2 29 28   CREATININE 1.16 1.09        Recent Labs     19  18119  1447   ALBUMIN 4.4 4.1        Pertinent Micro:  Results     ** No results found for the last 168 hours. **        Blood Culture   Date Value Ref Range Status   2017 No growth after 5 days of incubation.  Final   2017 No growth after 5 days of incubation.  Final     Blood Culture Hold   Date Value Ref Range Status   2017 Collected  Final        Studies:  Ct-abdomen-pelvis With    Result Date: 2019 7:09 PM HISTORY/REASON FOR EXAM:  Diarrhea. TECHNIQUE/EXAM DESCRIPTION:   CT scan of the abdomen and pelvis with contrast. Contrast-enhanced helical scanning was obtained from the diaphragmatic domes through the pubic symphysis following the bolus administration of nonionic contrast without complication. 100 mL of Omnipaque 350 nonionic contrast was administered without complication. Low dose optimization technique was utilized for this CT exam including automated " exposure control and adjustment of the mA and/or kV according to patient size. COMPARISON: 8/8/2019 FINDINGS: The visualized lung bases are unremarkable. CT Abdomen: The liver, spleen, adrenal glands, pancreas, and gallbladder are unremarkable. The kidneys enhance symmetrically. Small hypodense renal lesions are indeterminate, but likely represent cysts. There is a moderate hiatal hernia. There is a duodenal diverticulum. There are postoperative changes in the distal colon. The appendix is not visualized. There are dense scattered calcifications and low-density plaque with aortic ectasia. CT Pelvis: The bladder is unremarkable. Prostate is enlarged. No significant free fluid or adenopathy is identified. Degenerative changes are in the spine.     1.  No acute intra-abdominal findings. 2.  Moderate hiatal hernia. 3.  Atherosclerosis. 4.  Status post partial colectomy 5.  Prostate enlargement  IMPRESSION:     Recurrent C. difficile  Bowel perforation history    PLAN:   Marcelo Colon is a 78 y.o. male with recurrent C. difficile again admitted with C. difficile.  He has received fecal microbiota transfer in the past.  Continue with the p.o. vancomycin.  He will likely benefit from Dificid.  Continue with the supportive measures.  Consider prophylactic Vanco if needing antibiotics.  Discussed with IM. Will continue to follow    Mis Herbert M.D.     Please note that this dictation was created using voice recognition software. I have worked with technical experts from [employer] to optimize the interface.  I have made every reasonable attempt to correct obvious errors, but there may be errors of grammar and possibly content that I did not discover before finalizing the note.

## 2019-09-18 NOTE — H&P
Hospital Medicine History & Physical Note    Date of Service  9/17/2019    Primary Care Physician  Joy Sellers M.D.    Consultants  Consult ID    Code Status  Full    Chief Complaint  diarrhea    History of Presenting Illness  78 y.o. male who presented 9/17/2019 with diarrhea which started 4 days ago.  Patient also started having abdominal pain and cramps, denies any fever chills.  Denies any blood in the stool or any melena.  Patient had recent admissions for clostridium difficile, he was admitted on 8/6/2019 and sent home on vancomycin and he was readmitted on 8/16/2019 for the same.  On discharge she was given a slow taper of 21 days.  Patient states he finished a course and he thinks several days later he started having diarrhea again and the abdominal pain.  Stool studies were done yesterday which is positive for C. Difficile.    Review of Systems  Review of Systems   Constitutional: Positive for malaise/fatigue. Negative for chills, diaphoresis and fever.   HENT: Negative for hearing loss and sore throat.    Eyes: Negative for blurred vision.   Respiratory: Negative for cough, shortness of breath and wheezing.    Cardiovascular: Negative for chest pain, palpitations and leg swelling.   Gastrointestinal: Positive for abdominal pain and diarrhea. Negative for blood in stool, heartburn, melena, nausea and vomiting.   Genitourinary: Negative for dysuria, flank pain and hematuria.   Musculoskeletal: Negative for back pain and neck pain.   Skin: Negative for rash.   Neurological: Negative for dizziness and headaches.   Psychiatric/Behavioral: The patient is not nervous/anxious.        Past Medical History   has a past medical history of Anesthesia, Anxiety disorder, Arthritis, Bipolar disorder (HCC), Clostridium difficile infection, Depression, Enlarged prostate, GERD (gastroesophageal reflux disease), Heart murmur, Indigestion, and Neck pain.    Surgical History   has a past surgical history that includes  exploratory laparotomy (7/25/2017); sigmoid colon resection (7/25/2017); colostomy (7/25/2017); appendectomy (7/25/2017); lumbar decompression (12/2017); exc./biopsy mass back (1997); colostomy takedown (3/21/2018); colonoscopy (N/A, 9/20/2018); and fecal transplant (N/A, 9/20/2018).     Family History  Family history is unknown by patient.     Social History   reports that he quit smoking about 46 years ago. He has a 28.00 pack-year smoking history. He has never used smokeless tobacco. He reports that he drinks alcohol. He reports that he has current or past drug history.    Allergies  Allergies   Allergen Reactions   • Acetaminophen Nausea     Very nauseated        Medications  Prior to Admission Medications   Prescriptions Last Dose Informant Patient Reported? Taking?   alfuzosin (UROXATRAL) 10 MG SR tablet 9/17/2019 at 0800 Patient Yes No   Sig: Take 10 mg by mouth every day.   ferrous sulfate 325 (65 Fe) MG tablet 9/16/2019 at 0800 Patient Yes Yes   Sig: Take 325 mg by mouth every day.   gabapentin (NEURONTIN) 300 MG Cap 9/17/2019 at 0800 Patient Yes Yes   Sig: Take 600 mg by mouth 3 times a day. Scheduled  0000  0800  1600   lactobacillus rhamnosus (CULTURELLE) Cap capsule 9/17/2019 at 0930 Patient No No   Sig: Take 1 Cap by mouth every morning with breakfast.   lamotrigine (LAMICTAL) 200 MG tablet 9/17/2019 at 0930 Patient Yes No   Sig: Take 200 mg by mouth every day.   oxyCODONE-acetaminophen (PERCOCET) 7.5-325 MG per tablet 9/17/2019 at 0800 Patient Yes No   Sig: Take 1 Tab by mouth every 8 hours as needed for Severe Pain.      Facility-Administered Medications: None       Physical Exam  Temp:  [36.4 °C (97.5 °F)-36.7 °C (98 °F)] 36.7 °C (98 °F)  Pulse:  [55-84] 60  Resp:  [16-18] 18  BP: (100-145)/(38-65) 100/52  SpO2:  [90 %-96 %] 96 %    Physical Exam   Constitutional: He is oriented to person, place, and time. He appears well-developed and well-nourished.   HENT:   Head: Normocephalic and atraumatic.    Eyes: Pupils are equal, round, and reactive to light. Conjunctivae are normal.   Neck: No tracheal deviation present. No thyromegaly present.   Cardiovascular: Normal rate and regular rhythm.   Pulmonary/Chest: Effort normal and breath sounds normal.   Abdominal: Soft. Bowel sounds are normal. He exhibits no distension. There is no tenderness. There is no rebound and no guarding.   Musculoskeletal: He exhibits no edema.   Lymphadenopathy:     He has no cervical adenopathy.   Neurological: He is alert and oriented to person, place, and time.   Skin: Skin is warm and dry.   Nursing note and vitals reviewed.      Laboratory:  Recent Labs     09/16/19 1816 09/17/19  1447   WBC 4.4* 4.7*   RBC 4.38* 4.32*   HEMOGLOBIN 14.0 14.0   HEMATOCRIT 43.6 42.4   MCV 99.5* 98.1*   MCH 32.0 32.4   MCHC 32.1* 33.0*   RDW 50.6* 49.7   PLATELETCT 180 171   MPV 9.4 9.3     Recent Labs     09/16/19  1816 09/17/19  1447   SODIUM 141 138   POTASSIUM 4.0 4.2   CHLORIDE 103 104   CO2 29 28   GLUCOSE 100* 89   BUN 14 15   CREATININE 1.16 1.09   CALCIUM 9.4 9.3     Recent Labs     09/16/19  1816 09/17/19  1447   ALTSGPT 8 8   ASTSGOT 14 13   ALKPHOSPHAT 80 74   TBILIRUBIN 0.9 0.8   LIPASE 7* 16   GLUCOSE 100* 89         No results for input(s): NTPROBNP in the last 72 hours.      No results for input(s): TROPONINT in the last 72 hours.    Urinalysis:    No results found     Imaging:  No orders to display         Assessment/Plan:  I anticipate this patient will require at least two midnights for appropriate medical management, necessitating inpatient admission.    * Recurrent Clostridium difficile diarrhea- (present on admission)  Assessment & Plan  Start oral vancomycin  Consult ID    Alcohol use- (present on admission)  Assessment & Plan  Watch for withdrawal symptoms  Check B12, folate, TSH    Vitamin B12 deficiency- (present on admission)  Assessment & Plan  Check B12 level    Leukopenia- (present on admission)  Assessment & Plan  Follow  CBC    Chronic neck pain- (present on admission)  Assessment & Plan  Continue Neurontin        VTE prophylaxis: Lovenox

## 2019-09-18 NOTE — DISCHARGE PLANNING
Care Transition Team Assessment    This RN CM met with patient at bedside for this assessment. Pt verifies accuracy of the Face sheet. Pt's emergency contact is Jorge Franco and his contact number is [phone]. His preferred pharmacy is [facility]. He has an AD and a PCP. He denies any financial concern at this time. He also denies any history of SA and MH Disorder.      Information Source  Orientation : Oriented x 4  Information Given By: Patient  Informant's Name: Marcelo Colon  Who is responsible for making decisions for patient? : Patient    Readmission Evaluation  Is this a readmission?: No    Elopement Risk  Legal Hold: No  Ambulatory or Self Mobile in Wheelchair: No-Not an Elopement Risk  Disoriented: No  Psychiatric Symptoms: None  History of Wandering: No  Elopement this Admit: No  Vocalizing Wanting to Leave: No  Displays Behaviors, Body Language Wanting to Leave: No-Not at Risk for Elopement  Elopement Risk: Not at Risk for Elopement    Interdisciplinary Discharge Planning  Primary Care Physician: Joy Sellers MD  Patient or legal guardian wants to designate a caregiver (see row info): No  Support Systems: Friends / Neighbors  Do You Take your Prescribed Medications Regularly: Yes  Mobility Issues: No  Prior Services: Unable To Determine At This Time  Assistance Needed: Unknown at this Time    Discharge Preparedness  What is your plan after discharge?: Other (comment)(Home)  What are your discharge supports?: Other (comment)(Friend)  Prior Functional Level: Ambulatory, Independent with Activities of Daily Living, Independent with Medication Management  Difficulity with ADLs: None    Functional Assesment  Prior Functional Level: Ambulatory, Independent with Activities of Daily Living, Independent with Medication Management    Finances  Financial Barriers to Discharge: No  Prescription Coverage: Yes    Vision / Hearing Impairment  Vision Impairment : Yes  Right Eye Vision: Wears  Glasses  Left Eye Vision: Wears Glasses  Hearing Impairment : No    Advance Directive  Advance Directive?: DPOA for Health Care  Durable Power of  Name and Contact : Jessie Williamson    Domestic Abuse  Have you ever been the victim of abuse or violence?: No  Physical Abuse or Sexual Abuse: No  Verbal Abuse or Emotional Abuse: No  Possible Abuse Reported to:: Not Applicable    Psychological Assessment  History of Substance Abuse: None  History of Psychiatric Problems: No  Non-compliant with Treatment: No  Newly Diagnosed Illness: No    Discharge Risks or Barriers  Discharge risks or barriers?: No    Anticipated Discharge Information  Anticipated discharge disposition: Home  Discharge Address: [address]  Discharge Contact Phone Number: [phone]

## 2019-09-18 NOTE — PROGRESS NOTES
Code status changed to DNR/DNI per his request after my conversation with the patient this evening.

## 2019-09-19 LAB
ALBUMIN SERPL BCP-MCNC: 3.7 G/DL (ref 3.2–4.9)
ALBUMIN/GLOB SERPL: 1.6 G/DL
ALP SERPL-CCNC: 66 U/L (ref 30–99)
ALT SERPL-CCNC: 9 U/L (ref 2–50)
ANION GAP SERPL CALC-SCNC: 5 MMOL/L (ref 0–11.9)
AST SERPL-CCNC: 14 U/L (ref 12–45)
BASOPHILS # BLD AUTO: 0.5 % (ref 0–1.8)
BASOPHILS # BLD: 0.02 K/UL (ref 0–0.12)
BILIRUB SERPL-MCNC: 0.5 MG/DL (ref 0.1–1.5)
BUN SERPL-MCNC: 18 MG/DL (ref 8–22)
CALCIUM SERPL-MCNC: 8.9 MG/DL (ref 8.5–10.5)
CHLORIDE SERPL-SCNC: 105 MMOL/L (ref 96–112)
CO2 SERPL-SCNC: 28 MMOL/L (ref 20–33)
CREAT SERPL-MCNC: 0.86 MG/DL (ref 0.5–1.4)
EOSINOPHIL # BLD AUTO: 0.17 K/UL (ref 0–0.51)
EOSINOPHIL NFR BLD: 4.1 % (ref 0–6.9)
ERYTHROCYTE [DISTWIDTH] IN BLOOD BY AUTOMATED COUNT: 48.4 FL (ref 35.9–50)
GLOBULIN SER CALC-MCNC: 2.3 G/DL (ref 1.9–3.5)
GLUCOSE SERPL-MCNC: 100 MG/DL (ref 65–99)
HCT VFR BLD AUTO: 39.7 % (ref 42–52)
HGB BLD-MCNC: 12.5 G/DL (ref 14–18)
IMM GRANULOCYTES # BLD AUTO: 0.03 K/UL (ref 0–0.11)
IMM GRANULOCYTES NFR BLD AUTO: 0.7 % (ref 0–0.9)
LYMPHOCYTES # BLD AUTO: 1.76 K/UL (ref 1–4.8)
LYMPHOCYTES NFR BLD: 42.4 % (ref 22–41)
MAGNESIUM SERPL-MCNC: 2.1 MG/DL (ref 1.5–2.5)
MCH RBC QN AUTO: 30.8 PG (ref 27–33)
MCHC RBC AUTO-ENTMCNC: 31.5 G/DL (ref 33.7–35.3)
MCV RBC AUTO: 97.8 FL (ref 81.4–97.8)
MONOCYTES # BLD AUTO: 0.49 K/UL (ref 0–0.85)
MONOCYTES NFR BLD AUTO: 11.8 % (ref 0–13.4)
NEUTROPHILS # BLD AUTO: 1.68 K/UL (ref 1.82–7.42)
NEUTROPHILS NFR BLD: 40.5 % (ref 44–72)
NRBC # BLD AUTO: 0 K/UL
NRBC BLD-RTO: 0 /100 WBC
PLATELET # BLD AUTO: 183 K/UL (ref 164–446)
PMV BLD AUTO: 8.9 FL (ref 9–12.9)
POTASSIUM SERPL-SCNC: 4.1 MMOL/L (ref 3.6–5.5)
PROT SERPL-MCNC: 6 G/DL (ref 6–8.2)
RBC # BLD AUTO: 4.06 M/UL (ref 4.7–6.1)
SODIUM SERPL-SCNC: 138 MMOL/L (ref 135–145)
WBC # BLD AUTO: 4.2 K/UL (ref 4.8–10.8)

## 2019-09-19 PROCEDURE — 700102 HCHG RX REV CODE 250 W/ 637 OVERRIDE(OP): Performed by: FAMILY MEDICINE

## 2019-09-19 PROCEDURE — 770001 HCHG ROOM/CARE - MED/SURG/GYN PRIV*

## 2019-09-19 PROCEDURE — 700102 HCHG RX REV CODE 250 W/ 637 OVERRIDE(OP): Performed by: STUDENT IN AN ORGANIZED HEALTH CARE EDUCATION/TRAINING PROGRAM

## 2019-09-19 PROCEDURE — 36415 COLL VENOUS BLD VENIPUNCTURE: CPT

## 2019-09-19 PROCEDURE — 99232 SBSQ HOSP IP/OBS MODERATE 35: CPT | Performed by: INTERNAL MEDICINE

## 2019-09-19 PROCEDURE — A9270 NON-COVERED ITEM OR SERVICE: HCPCS | Performed by: STUDENT IN AN ORGANIZED HEALTH CARE EDUCATION/TRAINING PROGRAM

## 2019-09-19 PROCEDURE — A9270 NON-COVERED ITEM OR SERVICE: HCPCS | Performed by: INTERNAL MEDICINE

## 2019-09-19 PROCEDURE — 85025 COMPLETE CBC W/AUTO DIFF WBC: CPT

## 2019-09-19 PROCEDURE — A9270 NON-COVERED ITEM OR SERVICE: HCPCS | Performed by: FAMILY MEDICINE

## 2019-09-19 PROCEDURE — 83735 ASSAY OF MAGNESIUM: CPT

## 2019-09-19 PROCEDURE — 700102 HCHG RX REV CODE 250 W/ 637 OVERRIDE(OP): Performed by: INTERNAL MEDICINE

## 2019-09-19 PROCEDURE — 80053 COMPREHEN METABOLIC PANEL: CPT

## 2019-09-19 RX ORDER — OXYCODONE HYDROCHLORIDE 5 MG/1
2.5 TABLET ORAL
Status: DISCONTINUED | OUTPATIENT
Start: 2019-09-19 | End: 2019-09-20 | Stop reason: HOSPADM

## 2019-09-19 RX ORDER — MORPHINE SULFATE 4 MG/ML
2 INJECTION, SOLUTION INTRAMUSCULAR; INTRAVENOUS
Status: DISCONTINUED | OUTPATIENT
Start: 2019-09-19 | End: 2019-09-20 | Stop reason: HOSPADM

## 2019-09-19 RX ORDER — OXYCODONE HYDROCHLORIDE 5 MG/1
7.5 TABLET ORAL
Status: DISCONTINUED | OUTPATIENT
Start: 2019-09-19 | End: 2019-09-20 | Stop reason: HOSPADM

## 2019-09-19 RX ADMIN — OXYCODONE HYDROCHLORIDE 7.5 MG: 5 TABLET ORAL at 08:52

## 2019-09-19 RX ADMIN — VANCOMYCIN HYDROCHLORIDE 125 MG: 10 INJECTION, POWDER, LYOPHILIZED, FOR SOLUTION INTRAVENOUS at 05:00

## 2019-09-19 RX ADMIN — VANCOMYCIN HYDROCHLORIDE 125 MG: 10 INJECTION, POWDER, LYOPHILIZED, FOR SOLUTION INTRAVENOUS at 12:22

## 2019-09-19 RX ADMIN — VANCOMYCIN HYDROCHLORIDE 125 MG: 10 INJECTION, POWDER, LYOPHILIZED, FOR SOLUTION INTRAVENOUS at 17:13

## 2019-09-19 RX ADMIN — GABAPENTIN 600 MG: 300 CAPSULE ORAL at 08:51

## 2019-09-19 RX ADMIN — LAMOTRIGINE 200 MG: 100 TABLET ORAL at 08:50

## 2019-09-19 RX ADMIN — OXYCODONE HYDROCHLORIDE 7.5 MG: 5 TABLET ORAL at 22:25

## 2019-09-19 RX ADMIN — FERROUS SULFATE TAB 325 MG (65 MG ELEMENTAL FE) 325 MG: 325 (65 FE) TAB at 08:50

## 2019-09-19 RX ADMIN — VANCOMYCIN HYDROCHLORIDE 125 MG: 10 INJECTION, POWDER, LYOPHILIZED, FOR SOLUTION INTRAVENOUS at 22:21

## 2019-09-19 RX ADMIN — TAMSULOSIN HYDROCHLORIDE 0.4 MG: 0.4 CAPSULE ORAL at 08:52

## 2019-09-19 RX ADMIN — OXYCODONE HYDROCHLORIDE 7.5 MG: 5 TABLET ORAL at 14:22

## 2019-09-19 RX ADMIN — GABAPENTIN 600 MG: 300 CAPSULE ORAL at 17:13

## 2019-09-19 RX ADMIN — GABAPENTIN 600 MG: 300 CAPSULE ORAL at 22:21

## 2019-09-19 ASSESSMENT — ENCOUNTER SYMPTOMS
COUGH: 0
HEADACHES: 0
CHILLS: 0
NAUSEA: 0
VOMITING: 0
FEVER: 0
SHORTNESS OF BREATH: 0
DIZZINESS: 0
DIARRHEA: 1
ABDOMINAL PAIN: 0

## 2019-09-19 NOTE — CARE PLAN
Problem: Pain Management  Goal: Pain level will decrease to patient's comfort goal  Outcome: PROGRESSING AS EXPECTED  Note:   Pt stated that he understood MD explanation of risks/benefits of increase dose of oxycodone.     Problem: Bowel/Gastric:  Goal: Normal bowel function is maintained or improved  Outcome: PROGRESSING SLOWER THAN EXPECTED  Note:   Pt continues to have loose stools.

## 2019-09-19 NOTE — PROGRESS NOTES
Patient is AOx4. Patient is concerned he is only getting 5 mg oxycodone instead of his home dose of 7.5 mg. Empathetic listening and educated patient that his concerns will be brought to the day shift team's attention. Verbalized understanding

## 2019-09-19 NOTE — PROGRESS NOTES
The patient is of [facility] family medicine.  [facility] family medicine will take over the care of the patient

## 2019-09-19 NOTE — PROGRESS NOTES
Interim update:    Spoke with Dr. Gardner ([facility]) about OP evaluation for possible repeat fecal transplant at recommendation of ID. Kendra agrees to set up OP appointment in the next 4-6 weeks for evaluation of possible repeat fecal transplant. No in-house hospital GI interventions warranted at this time. Will provide [facility] information to patient at time of discharge.     Emerald Moses MD, PGY-2  [facility]  [phone]  05:00-17:00

## 2019-09-19 NOTE — CARE PLAN
Problem: Safety  Goal: Will remain free from injury  9/18/2019 1807 by Christine Bruce R.N.  Outcome: PROGRESSING AS EXPECTED    Problem: Bowel/Gastric:  Goal: Normal bowel function is maintained or improved  9/18/2019 1807 by Christine Bruce R.N.  Outcome: PROGRESSING SLOWER THAN EXPECTED  Note:   Pt continues to have loose BM.

## 2019-09-19 NOTE — PROGRESS NOTES
[facility] PROGRESS NOTE   Attending: Kennedi Wood MD  Resident: Emerald Moses MD, PGY-2    PATIENT: Marcelo Colon; [id number]; 1940    ID: 78 year old gentleman with a pmhx of recurrent c diff infection admitted onto the hospitalist service 9/17 for diarrhea. Tested positive for c diff on admission.    SUBJECTIVE: No acute events overnight. The [facility] was contacted by Hospitalist attending, Dr. Bobo, as this patient is seen in the [facility]. Allowed us to take over care for the sake of patient continuity.     Reports that he had actually been in and out of the hospital several times in the last week prior to being admitted this time around. He is tolerating the Vancomycin well.     His biggest complaint this AM is that his home dose of oxycodone was not resumed here in house. He takes 7.5mg po TID. A lower dose of ordered as a precaution to prevent the development of toxic megacolon and even death. He is aware of the risks, benefits and alternatives and he wants his home dose of pain meds now.     OBJECTIVE:     Vitals:    09/18/19 0800 09/18/19 1554 09/18/19 1955 09/19/19 0353   BP: 108/54 110/53 131/61 103/51   Pulse: 61 62 60 64   Resp: 16 16 18 18   Temp: 36.2 °C (97.1 °F) 35.9 °C (96.7 °F) 36.7 °C (98 °F) 36.2 °C (97.2 °F)   TempSrc: Temporal Temporal Temporal Temporal   SpO2: 96% 91% 93% 94%   Weight:       Height:           Intake/Output Summary (Last 24 hours) at 9/19/2019 0647  Last data filed at 9/18/2019 1400  Gross per 24 hour   Intake 950 ml   Output --   Net 950 ml       PE:  General: No acute distress, resting comfortably in bed.  HEENT: NC/AT. PERRLA. EOMI. MMM, edentulous- wears dentures  Cardiovascular: RRR with no M/R/G.  Respiratory: Symmetrical chest. CTAB with no W/R/R  Abdomen: soft, some what distended, old healed surgical scar, hyperactive bowel sounds throughout, NT, no masses  EXT:  REESE, %/5 strength, No C/C/E 2+ pulses   Neuro: non focal with  "no numbness, tingling or changes in sensation    LABS:  Recent Labs     09/17/19  1447 09/18/19  0235 09/19/19  0412   WBC 4.7* 4.0* 4.2*   RBC 4.32* 3.97* 4.06*   HEMOGLOBIN 14.0 12.2* 12.5*   HEMATOCRIT 42.4 38.9* 39.7*   MCV 98.1* 98.0* 97.8   MCH 32.4 30.7 30.8   RDW 49.7 48.8 48.4   PLATELETCT 171 176 183   MPV 9.3 9.0 8.9*   NEUTSPOLYS 60.70 43.90* 40.50*   LYMPHOCYTES 26.70 40.20 42.40*   MONOCYTES 10.50 11.90 11.80   EOSINOPHILS 1.50 3.00 4.10   BASOPHILS 0.20 0.50 0.50     Recent Labs     09/16/19 1816 09/17/19  1447 09/18/19 0235 09/19/19  0412   SODIUM 141 138 140 138   POTASSIUM 4.0 4.2 4.1 4.1   CHLORIDE 103 104 106 105   CO2 29 28 28 28   BUN 14 15 21 18   CREATININE 1.16 1.09 1.03 0.86   CALCIUM 9.4 9.3 8.6 8.9   MAGNESIUM  --   --   --  2.1   ALBUMIN 4.4 4.1  --  3.7     Estimated GFR/CRCL = Estimated Creatinine Clearance: 61.7 mL/min (by C-G formula based on SCr of 0.86 mg/dL).  Recent Labs     09/17/19 1447 09/18/19 0235 09/19/19  0412   GLUCOSE 89 127* 100*     Recent Labs     09/16/19 1816 09/17/19  1447 09/19/19  0412   ASTSGOT 14 13 14   ALTSGPT 8 8 9   TBILIRUBIN 0.9 0.8 0.5   ALKPHOSPHAT 80 74 66   GLOBULIN 2.8 2.6 2.3             No results for input(s): INR, APTT, FIBRINOGEN in the last 72 hours.    Invalid input(s): DIMER    MICROBIOLOGY: + C diff    IMAGING:   Ct abd/pelvis 9/16:   \" 1.  No acute intra-abdominal findings.  2.  Moderate hiatal hernia.  3.  Atherosclerosis.  4.  Status post partial colectomy  5.  Prostate enlargement\"    MEDS:  Current Facility-Administered Medications   Medication Last Dose   • tamsulosin (FLOMAX) capsule 0.4 mg 0.4 mg at 09/18/19 0925   • ferrous sulfate tablet 325 mg 325 mg at 09/18/19 0937   • gabapentin (NEURONTIN) capsule 600 mg 600 mg at 09/18/19 2329   • lamoTRIgine (LAMICTAL) tablet 200 mg 200 mg at 09/18/19 0936   • enoxaparin (LOVENOX) inj 40 mg     • Pharmacy Consult Request ...Pain Management Review 1 Each      And   • oxyCODONE " immediate-release (ROXICODONE) tablet 2.5 mg      And   • oxyCODONE immediate-release (ROXICODONE) tablet 5 mg 5 mg at 09/18/19 1626    And   • morphine (pf) 4 mg/ml injection 2 mg     • vancomycin 50 mg/mL oral soln 125 mg 125 mg at 09/19/19 0500       PROBLEM LIST:  H/o recurrent C. difficile infection  Alcohol use  H/o vitamin B12 deficiency  H/o Leukopenia  H/o chronic neck pain    ASSESSMENT/PLAN:   78 year old gentleman with a pmhx of recurrent c diff infection admitted onto the hospitalist service 9/17 for diarrhea. Tested positive for c diff on admission.    * Recurrent Clostridium difficile diarrhea- (present on admission)  Assessment & Plan  - Has been tolerating oral vancomycin, on day #2 of 14 day course per recommendations of ID   - Has had fecal transplant in the past  - Will consult with GI for possible repeat fecal transplant     Alcohol use- (present on admission)  Assessment & Plan  Watch for withdrawal symptoms, there has been none thus far  Thiamine, folic acid and multivitamin continued supplementation     H/o vitamin B12 deficiency- (present on admission)  Assessment & Plan  B12 level within normal limits     Leukopenia- (present on admission)  Assessment & Plan  Stable since admission, has been anywhere from 10-4 in the last year  Continue to monitor      Chronic neck pain- (present on admission)  Assessment & Plan  Continue Neurontin  Uses oxycodone chronically, he takes 7.5mg po TID on a regular basis at home  He has oxycodone 5mg po q3h PRN currently in an attempt to minimize use with C. Difficile, as patient is at a large risk of megacolon  - He acknowledges the risk of development of toxic megacolon, perforation and even death. Risks benefits and alternatives were discussed. Patient wants his regular dose and is ok if that means he develops the aforementioned complications including but not limited to death    Emerald Moses MD, PGY-2  [facility]  Res[facility]  [phone]  05:00-17:00

## 2019-09-19 NOTE — CARE PLAN
Problem: Infection  Goal: Will remain free from infection  Outcome: PROGRESSING AS EXPECTED   Educated patient on isolation precautions and the importance of hand hygiene. Verbalized understanding     Problem: Pain Management  Goal: Pain level will decrease to patient's comfort goal  Outcome: PROGRESSING AS EXPECTED   Educated patient on his pain control options. Verbalized understanding

## 2019-09-19 NOTE — PROGRESS NOTES
Assumed care of patient at change of shift.  During change of shift report, patient (pt) sitting in bed, AOx4, and on room air.   MD from [facility] at bedside after change of shift and was informed of pt concerns about pain control. Verbalized understanding of pain medication risks  after speaking with MD. No other needs at this time.

## 2019-09-19 NOTE — PROGRESS NOTES
Infectious Disease Progress Note    Author: Latonia Cadet M.D. Date & Time of service: 2019  9:52 AM    Chief Complaint:  Recurrent Clostridium difficile diarrhea    Interval History:  78 y.o. male with a history of a pelvic abscess with bowel perforation and an extensive abdominal surgical history in  with multiple episodes of Clostridium difficile colitis status post fecal transplant on 2018, recent admission in August with recurrent C. difficile on a prolonged vancomycin taper prior to admission admitted for recurrent C. Difficile.     afebrile WBC 4 patient had 2 episodes of diarrhea overnight.  No nausea or vomiting but poor appetite.  No abdominal pain   no new issues overnight.  Ongoing diarrhea.  Care now taken over by [facility] family medicine team    Labs Reviewed.    Review of Systems:  Review of Systems   Constitutional: Negative for chills and fever.   Respiratory: Negative for cough and shortness of breath.    Gastrointestinal: Positive for diarrhea. Negative for abdominal pain, nausea and vomiting.   Genitourinary: Negative for dysuria.   Neurological: Negative for dizziness and headaches.   All other systems reviewed and are negative.      Hemodynamics:  Temp (24hrs), Av.5 °C (97.7 °F), Min:35.9 °C (96.7 °F), Max:37.2 °C (98.9 °F)  Temperature: 37.2 °C (98.9 °F)  Pulse  Av.6  Min: 54  Max: 84   Blood Pressure : 114/56       Physical Exam:  Physical Exam   Constitutional: He is oriented to person, place, and time. He appears well-developed.   HENT:   Mouth/Throat: No oropharyngeal exudate.   Eyes: Pupils are equal, round, and reactive to light. Conjunctivae and EOM are normal.   Neck: Neck supple.   Cardiovascular: Normal rate, regular rhythm and normal heart sounds.   Pulmonary/Chest: Effort normal and breath sounds normal. He has no wheezes.   Abdominal: Soft. He exhibits no distension. There is no tenderness.   Abdominal surgical scars clean   Musculoskeletal: Normal  range of motion. He exhibits no edema.   Neurological: He is alert and oriented to person, place, and time. No cranial nerve deficit.   Skin: Skin is warm and dry.   Psychiatric: He has a normal mood and affect. His behavior is normal.   Pleasant       Meds:    Current Facility-Administered Medications:   •  Notify provider if pain remains uncontrolled **AND** Use the numeric rating scale (NRS-11) on regular floors and Critical-Care Pain Observation Tool (CPOT) on ICUs/Trauma to assess pain **AND** Pulse Ox (Oximetry) **AND** [DISCONTINUED] Pharmacy Consult Request **AND** If patient difficult to arouse and/or has respiratory depression, stop any opiates that are currently infusing and call a Rapid Response. **AND** oxyCODONE immediate-release **AND** oxyCODONE immediate-release **AND** morphine injection  •  tamsulosin  •  ferrous sulfate  •  gabapentin  •  lamotrigine  •  enoxaparin  •  vancomycin 50 mg/mL    Labs:  Recent Labs     09/17/19 1447 09/18/19 0235 09/19/19 0412   WBC 4.7* 4.0* 4.2*   RBC 4.32* 3.97* 4.06*   HEMOGLOBIN 14.0 12.2* 12.5*   HEMATOCRIT 42.4 38.9* 39.7*   MCV 98.1* 98.0* 97.8   MCH 32.4 30.7 30.8   RDW 49.7 48.8 48.4   PLATELETCT 171 176 183   MPV 9.3 9.0 8.9*   NEUTSPOLYS 60.70 43.90* 40.50*   LYMPHOCYTES 26.70 40.20 42.40*   MONOCYTES 10.50 11.90 11.80   EOSINOPHILS 1.50 3.00 4.10   BASOPHILS 0.20 0.50 0.50     Recent Labs     09/17/19  1447 09/18/19 0235 09/19/19  0412   SODIUM 138 140 138   POTASSIUM 4.2 4.1 4.1   CHLORIDE 104 106 105   CO2 28 28 28   GLUCOSE 89 127* 100*   BUN 15 21 18     Recent Labs     09/16/19  1816 09/17/19  1447 09/18/19 0235 09/19/19  0412   ALBUMIN 4.4 4.1  --  3.7   TBILIRUBIN 0.9 0.8  --  0.5   ALKPHOSPHAT 80 74  --  66   TOTPROTEIN 7.2 6.7  --  6.0   ALTSGPT 8 8  --  9   ASTSGOT 14 13  --  14   CREATININE 1.16 1.09 1.03 0.86       Imaging:  Ct-abdomen-pelvis With    Result Date: 9/16/2019 9/16/2019 7:09 PM HISTORY/REASON FOR EXAM:  Diarrhea.  TECHNIQUE/EXAM DESCRIPTION:   CT scan of the abdomen and pelvis with contrast. Contrast-enhanced helical scanning was obtained from the diaphragmatic domes through the pubic symphysis following the bolus administration of nonionic contrast without complication. 100 mL of Omnipaque 350 nonionic contrast was administered without complication. Low dose optimization technique was utilized for this CT exam including automated exposure control and adjustment of the mA and/or kV according to patient size. COMPARISON: 8/8/2019 FINDINGS: The visualized lung bases are unremarkable. CT Abdomen: The liver, spleen, adrenal glands, pancreas, and gallbladder are unremarkable. The kidneys enhance symmetrically. Small hypodense renal lesions are indeterminate, but likely represent cysts. There is a moderate hiatal hernia. There is a duodenal diverticulum. There are postoperative changes in the distal colon. The appendix is not visualized. There are dense scattered calcifications and low-density plaque with aortic ectasia. CT Pelvis: The bladder is unremarkable. Prostate is enlarged. No significant free fluid or adenopathy is identified. Degenerative changes are in the spine.     1.  No acute intra-abdominal findings. 2.  Moderate hiatal hernia. 3.  Atherosclerosis. 4.  Status post partial colectomy 5.  Prostate enlargement      Micro:  Results     ** No results found for the last 168 hours. **          Assessment:  Active Hospital Problems    Diagnosis   • *Recurrent Clostridium difficile diarrhea [A04.71]   • Leukopenia [D72.819]       Plan:  Recurrent Clostridium difficile infection, on treatment  Afebrile  Leukopenia  Patient was on a prolonged vancomycin taper prior to admission  C. difficile PCR positive on 8/6/2019  C. difficile PCR positive on 9/16/2019  Continue p.o. vancomycin 125 mg 4 times daily for 14 days followed by a prolonged taper  Recommend GI evaluation for possible repeat fecal transplant    Leukopenia,  persistent    I have performed a physical exam and reviewed and updated ROS and plan today 9/19/2019.  In review of yesterday's note 9/18/2019, there are no changes except as documented above.    Discussed with [facility] family medicine resident, Dr. Moses

## 2019-09-19 NOTE — PROGRESS NOTES
Assumed care of patient at change of shift.  During change of shift report, patient (pt) sitting in bed and on room air.  During assessment, pt A&Ox4 and reported no abdominal pain.  MD requested pt be given information sheet on Cymbalta. Pt was provided packet.  Pt reported 3 BM across shift. No other needs at this time.

## 2019-09-20 ENCOUNTER — PATIENT OUTREACH (OUTPATIENT)
Dept: HEALTH INFORMATION MANAGEMENT | Facility: OTHER | Age: 79
End: 2019-09-20

## 2019-09-20 VITALS
DIASTOLIC BLOOD PRESSURE: 59 MMHG | BODY MASS INDEX: 20.25 KG/M2 | WEIGHT: 133.6 LBS | OXYGEN SATURATION: 95 % | RESPIRATION RATE: 17 BRPM | TEMPERATURE: 98.2 F | HEIGHT: 68 IN | HEART RATE: 64 BPM | SYSTOLIC BLOOD PRESSURE: 123 MMHG

## 2019-09-20 PROCEDURE — 700102 HCHG RX REV CODE 250 W/ 637 OVERRIDE(OP): Performed by: FAMILY MEDICINE

## 2019-09-20 PROCEDURE — 700102 HCHG RX REV CODE 250 W/ 637 OVERRIDE(OP): Performed by: INTERNAL MEDICINE

## 2019-09-20 PROCEDURE — A9270 NON-COVERED ITEM OR SERVICE: HCPCS | Performed by: INTERNAL MEDICINE

## 2019-09-20 PROCEDURE — A9270 NON-COVERED ITEM OR SERVICE: HCPCS | Performed by: STUDENT IN AN ORGANIZED HEALTH CARE EDUCATION/TRAINING PROGRAM

## 2019-09-20 PROCEDURE — A9270 NON-COVERED ITEM OR SERVICE: HCPCS | Performed by: FAMILY MEDICINE

## 2019-09-20 PROCEDURE — 99232 SBSQ HOSP IP/OBS MODERATE 35: CPT | Performed by: INTERNAL MEDICINE

## 2019-09-20 PROCEDURE — 700102 HCHG RX REV CODE 250 W/ 637 OVERRIDE(OP): Performed by: STUDENT IN AN ORGANIZED HEALTH CARE EDUCATION/TRAINING PROGRAM

## 2019-09-20 RX ORDER — VANCOMYCIN HYDROCHLORIDE 125 MG/1
125 CAPSULE ORAL 2 TIMES DAILY
Qty: 14 CAP | Refills: 0 | Status: SHIPPED | OUTPATIENT
Start: 2019-09-20 | End: 2019-09-27

## 2019-09-20 RX ORDER — VANCOMYCIN HYDROCHLORIDE 125 MG/1
125 CAPSULE ORAL DAILY
Qty: 7 CAP | Refills: 0 | Status: SHIPPED | OUTPATIENT
Start: 2019-09-20 | End: 2019-09-27

## 2019-09-20 RX ORDER — VANCOMYCIN HYDROCHLORIDE 125 MG/1
125 CAPSULE ORAL
Qty: 5 CAP | Refills: 0 | Status: SHIPPED | OUTPATIENT
Start: 2019-09-20 | End: 2019-10-03

## 2019-09-20 RX ORDER — VANCOMYCIN HYDROCHLORIDE 125 MG/1
125 CAPSULE ORAL 4 TIMES DAILY
Qty: 44 CAP | Refills: 0 | Status: SHIPPED | OUTPATIENT
Start: 2019-09-20 | End: 2019-10-01

## 2019-09-20 RX ORDER — VANCOMYCIN HYDROCHLORIDE 125 MG/1
125 CAPSULE ORAL
Qty: 4 CAP | Refills: 0 | Status: SHIPPED | OUTPATIENT
Start: 2019-09-20 | End: 2019-09-27

## 2019-09-20 RX ADMIN — GABAPENTIN 600 MG: 300 CAPSULE ORAL at 08:38

## 2019-09-20 RX ADMIN — GABAPENTIN 600 MG: 300 CAPSULE ORAL at 15:37

## 2019-09-20 RX ADMIN — VANCOMYCIN HYDROCHLORIDE 125 MG: 10 INJECTION, POWDER, LYOPHILIZED, FOR SOLUTION INTRAVENOUS at 12:33

## 2019-09-20 RX ADMIN — OXYCODONE HYDROCHLORIDE 7.5 MG: 5 TABLET ORAL at 08:38

## 2019-09-20 RX ADMIN — FERROUS SULFATE TAB 325 MG (65 MG ELEMENTAL FE) 325 MG: 325 (65 FE) TAB at 08:36

## 2019-09-20 RX ADMIN — LAMOTRIGINE 200 MG: 100 TABLET ORAL at 09:28

## 2019-09-20 RX ADMIN — VANCOMYCIN HYDROCHLORIDE 125 MG: 10 INJECTION, POWDER, LYOPHILIZED, FOR SOLUTION INTRAVENOUS at 17:15

## 2019-09-20 RX ADMIN — TAMSULOSIN HYDROCHLORIDE 0.4 MG: 0.4 CAPSULE ORAL at 08:37

## 2019-09-20 RX ADMIN — VANCOMYCIN HYDROCHLORIDE 125 MG: 10 INJECTION, POWDER, LYOPHILIZED, FOR SOLUTION INTRAVENOUS at 08:44

## 2019-09-20 RX ADMIN — OXYCODONE HYDROCHLORIDE 7.5 MG: 5 TABLET ORAL at 12:34

## 2019-09-20 ASSESSMENT — ENCOUNTER SYMPTOMS
SHORTNESS OF BREATH: 0
FEVER: 0
NAUSEA: 0
ABDOMINAL PAIN: 0
DIARRHEA: 1
CHILLS: 0
HEADACHES: 0
DIZZINESS: 0
COUGH: 0
VOMITING: 0

## 2019-09-20 NOTE — CARE PLAN
Problem: Bowel/Gastric:  Goal: Normal bowel function is maintained or improved  Outcome: PROGRESSING AS EXPECTED  Patient having multiple bowel movements per day.      Problem: Pain Management  Goal: Pain level will decrease to patient's comfort goal  Outcome: PROGRESSING AS EXPECTED   Educated patient on his pain control options. Verbalized understanding. Administered medication per MAR

## 2019-09-20 NOTE — NON-PROVIDER
[facility] Medical Student Note  Note Author: Faisal Santiago, Student   Attending: Dr. Kennedi Wood     Name Marcelo Colon     1940   Age/Sex 78 y.o. male   MRN [id number]   Code Status FULL             Reason for interval visit  (Principal Problem)   Marcelo Colon is a 78 y.o. Male with Pmhx of pelvic abscess with bowel perforation and recurring C.Difficile presented to ED on the  for abdominal pain and loose, watery diarrhea for a week. Patient experienced no fever, chills, N/V. Patient only experienced mild improvement after talking what's left of his vancomycin form previous treatments. Admitted on -8/10 but diarrhea resolved and was sent home Patient was admitted again on the  because diarrhea came back but stool sample was negative for C.Difficile). Patient had sigmoid colon resection in , colostomy in  and fecal transplant in 2018 and was doing well until urologist gave him a course of antibiotics for UTI a few months ago. Hospital d/c him on the  and planned on having him follow up with his outpatient doctors and sent him home with vancomycin taper, but diarrhea came back and positive for C.Diff toxin. So patient admitted on the . Patient was taking vancomycin 125 mg QID yesterday and now taking vancomycin 125 mg mg TID every 6 hours. Spoke with GI consultant (Dr. Gardner) about OP evaluation for repeat fecal transplant yesterday per recommendation of ID. Dr. gardner agrees to set up OP appointment in next 4-6 weeks for evaluation of possible repeat fecal transplant.   Interval Problem Daily Status Update  (problem status, last 24 hours, new history, new data )   Patient complained of pain last night around 10 pm and oxycodone was given. Patient is tolerating current dose of vancomycin well with no nausea or abdominal pain. Patient is now back on his 7.5 mg of oxycodone that he uses for neuropathy and shoulder pain along with his gabapentin. For  the past 24 hours patient has had 6 bowel movements with soft and loose stools.       Vitals:    09/19/19 1530 09/19/19 1901 09/20/19 0351 09/20/19 0756   BP: 133/69 119/52 114/56 123/59   Pulse: 76 62 74 64   Resp: 18 17 16 17   Temp: 36.5 °C (97.7 °F) 37.1 °C (98.7 °F) 36.4 °C (97.6 °F) 36.8 °C (98.2 °F)   TempSrc: Temporal Temporal Temporal Temporal   SpO2: 90% 92% 95% 95%   Weight:       Height:         Body mass index is 20.31 kg/m².    Oxygen Therapy:  Pulse Oximetry: 95 %, O2 (LPM): 95, O2 Delivery: None (Room Air)    Physical Exam   Constitutional: He is well-developed, well-nourished, and in no distress.   Neck: Normal range of motion. Neck supple.   Cardiovascular: Normal rate, regular rhythm, normal heart sounds and intact distal pulses.   Pulmonary/Chest: Effort normal and breath sounds normal.   Abdominal: Soft. Bowel sounds are normal.   Skin: Skin is warm and dry.             Assessment/Plan   78 y.o. Male with a pmhx of recurrent c.diff inection admitted on 9/17 for diarrhea.  Tested positive for c diff on admission.    Recurrent C. Diff diarrhea:   Assessment and Plan:    -Tolerating vanco well. Patient is now on 3rd day of 14 day course follow by long taper per   Recommendation of ID.    -consulted GI and will set up appointment for evaluation for possible repeat fecal transplant in the next 4-6 weeks.   -continue vancomycin regiment post d/c      Alcohol use:   Assessment and Plan:    -No sign of withdrawal and was not on CIWA protocol in the hospital   -counseling on alcohol abstinence   Leukopenia:   Assessment and Plan:    -has been all over the place this past year\   -haven't deviated from his usual variation (10-4)   -no immediate issue, follow up with PCP    Chronic neck pain and neuropathy :  Assessment and Plan:   -patient experiences chronic pain and uses oxycodone chronically: 7.5 mg po TID and gabapentin 600 mg at  Home.    -patient has been on oxycodone and recurring C.diff for years  and have not yet had complications

## 2019-09-20 NOTE — PROGRESS NOTES
Patient is AOx4. Complains of pain, given oxycodone per MAR. Updated patient on plan of care including medications, isolation precautions, and discharge barriers. Verbalized understanding

## 2019-09-20 NOTE — CARE PLAN
Problem: Pain Management  Goal: Pain level will decrease to patient's comfort goal  Outcome: PROGRESSING AS EXPECTED     Problem: Safety  Goal: Will remain free from falls  Outcome: MET  Note:   Pt has not had fall this hospitalization.

## 2019-09-20 NOTE — DISCHARGE PLANNING
Care Transition Team Discharge Planning    Anticipates discharge disposition:  • To Home     Action:  •  This RN CM met with patient at bedside to assess his needs for discharge.  • This RN CM explained to pt that he is discharging today and I am making sure that I see him to help him with his needs.  • This RN CM requested Dr Hamilton to send all prescriptions to [facility] as pt requested.   • This RN CM confirmed with Nyasia of [facility] that she will deliver the meds and give it to [facility]  • This RN CM handed a taxi voucher to patient for his transport to home  • This RN CM assisted pt with Approved services for $3.40 for his med (vancomycin)    · This RN CM also reached out to Charge Nurse Beena about pt's complaint towards an RN who took care of him yesterday. Shellie states she will talk to pt .     Barriers to Discharge:  · None    Plan:  · Will update Care Team  · Will continue to provide service as needed

## 2019-09-20 NOTE — DISCHARGE SUMMARY
PATIENT DISCHARGE SUMMARY     Admit Date:  9/17/2019       Discharge Date:  9/20/19    Service:   [facility] Family [facility] Team  Attending Physician(s):   Dr. Kennedi Wood       Senior Resident(s):   Emerald Moses MD  Qasim Resident(s):   Onur Hamilton MD    Admission Diagnosis:   Recurrent Clostridium difficile diarrhea  Alcohol use  Discharge Diagnoses:                Recurrent Clostridium difficile diarrhea  Alcohol use  H/o Vitamin B12 deficiency  Leukopenia  Chronic Neck Pain    HPI Summary:       Per Dr. Eliseo Richard's H&P:    78 y.o. male who presented 9/17/2019 with diarrhea which started 4 days ago.  Patient also started having abdominal pain and cramps, denies any fever chills.  Denies any blood in the stool or any melena.  Patient had recent admissions for clostridium difficile, he was admitted on 8/6/2019 and sent home on vancomycin and he was readmitted on 8/16/2019 for the same.  On discharge she was given a slow taper of 21 days.  Patient states he finished a course and he thinks several days later he started having diarrhea again and the abdominal pain.  Stool studies were done yesterday which is positive for C. Difficile.    Patient /Hospital Summary:        Patient was admitted for abdominal pain and diarrhea with a history of recurrent C.Diff for which he had just completed a vancomycin taper. He was positive for C-Diff toxin. He reports being very diligent with his taper and has a calender with him with dosing for each day specified. He was seen by ID who recommended a 14 day course of PO vancomycin with a prolonged taper. GI was contacted, but not formally consulted. Dr. Moses discussed the case with Dr. Gardner ([facility]) and he recommended an outpatient appointment in 4-6 weeks with GI after course of vancomycin has been completed for evaluation for possible repeat fecal transplant. No inpatient GI interventions were needed at this time. Vancomycin treatment was started on  9/17/19.    Patient has a history of alcohol use. He did not have any withdrawal symptoms. He was started on thiamine, folic acid, and multivitamin. This should be continued as outpatient.     Patient has a history of B12 deficiency. B12 level on admission was normal at 215.    Patient was leukopenic with WBC around 4 for his entire hospital stay. This is stable and has been low in the past.     Patient has a history of chronic neck pain for which he takes Neurontin and oxycodone 7.5 PO TID. Neurontin was continued. Oxycodone was decreased to 5 PO TID due to concerns for megacolon with narcotic use and C-Diff. Patient acknowledged the risk, and stated that he needed 7.5 TID. Risks and benefits were discussed with the patient, particularly the risk of megacolon and possible death.    Physical Exam  General: No acute distress, resting comfortably in bed.  HEENT: NC/AT. PERRLA. EOMI. MMM, wears dentures  Cardiovascular: RRR with no M/R/G.  Respiratory: Symmetrical chest. CTAB with no W/R/R  Abdomen: soft, some what distended, old healed surgical scar, hyperactive bowel sounds throughout, NT, no masses  EXT:  REESE, %/5 strength, No C/C/E 2+ pulses   Neuro: non focal with no numbness, tingling or changes in sensation     Consultants:      Latonia Cadet MD, Infectious Disease  Dr. Gardner, [facility] (discussed case with, no actual consult).    Procedures:        none    Imaging/ Testing:        Recent Labs     09/17/19  1447 09/18/19  0235 09/19/19  0412   WBC 4.7* 4.0* 4.2*   RBC 4.32* 3.97* 4.06*   HEMOGLOBIN 14.0 12.2* 12.5*   HEMATOCRIT 42.4 38.9* 39.7*   MCV 98.1* 98.0* 97.8   MCH 32.4 30.7 30.8   RDW 49.7 48.8 48.4   PLATELETCT 171 176 183   MPV 9.3 9.0 8.9*   NEUTSPOLYS 60.70 43.90* 40.50*   LYMPHOCYTES 26.70 40.20 42.40*   MONOCYTES 10.50 11.90 11.80   EOSINOPHILS 1.50 3.00 4.10   BASOPHILS 0.20 0.50 0.50     Recent Labs     09/17/19  1447 09/18/19  0235 09/19/19  0412   SODIUM 138 140 138   POTASSIUM 4.2  4.1 4.1   CHLORIDE 104 106 105   CO2 28 28 28   GLUCOSE 89 127* 100*   BUN 15 21 18     Recent Labs     09/17/19  1447 09/18/19  0235 09/19/19  0412   ALBUMIN 4.1  --  3.7   TBILIRUBIN 0.8  --  0.5   ALKPHOSPHAT 74  --  66   TOTPROTEIN 6.7  --  6.0   ALTSGPT 8  --  9   ASTSGOT 13  --  14   CREATININE 1.09 1.03 0.86     Results     ** No results found for the last 168 hours. **        No orders to display       Discharge Medications:        Medication Reconciliation Completed     Medication List      START taking these medications      Instructions   * vancomycin 50 mg/mL 50 mg/mL Soln   Take 2.5 mL by mouth every 6 hours for 11 days. Prescription #1. To be started on 9/20.  Dose:  125 mg     * vancomycin 50 mg/mL 50 mg/mL Soln  Start taking on:  10/1/2019   Take 2.5 mL by mouth every 12 hours for 7 days. Prescription #2. To be started on 10/1.  Dose:  125 mg     * vancomycin 50 mg/mL 50 mg/mL Soln  Start taking on:  10/8/2019   Take 2.5 mL by mouth every 24 hours for 7 days. Prescription #3. To be started 10/8  Dose:  125 mg     * vancomycin 50 mg/mL 50 mg/mL Soln  Start taking on:  10/15/2019   Take 2.5 mL by mouth every 48 hours for 7 days. Prescription # 4. Start on 10/16  Dose:  125 mg     * vancomycin 50 mg/mL 50 mg/mL Soln  Start taking on:  10/23/2019   Take 2.5 mL by mouth every 72 hours for 15 days. Prescription # 5. Start on 10/24.  Dose:  125 mg         * This list has 5 medication(s) that are the same as other medications prescribed for you. Read the directions carefully, and ask your doctor or other care provider to review them with you.            CONTINUE taking these medications      Instructions   alfuzosin 10 MG SR tablet  Commonly known as:  UROXATRAL   Take 10 mg by mouth every day.  Dose:  10 mg     ferrous sulfate 325 (65 Fe) MG tablet   Take 325 mg by mouth every day.  Dose:  325 mg     gabapentin 300 MG Caps  Commonly known as:  NEURONTIN   Take 600 mg by mouth 3 times a day.  Scheduled  0000  0800  1600  Dose:  600 mg     lactobacillus rhamnosus Caps capsule   Take 1 Cap by mouth every morning with breakfast.  Dose:  1 Cap     LAMICTAL 200 MG tablet  Generic drug:  lamotrigine   Take 200 mg by mouth every day.  Dose:  200 mg     oxyCODONE-acetaminophen 7.5-325 MG per tablet  Commonly known as:  PERCOCET   Take 1 Tab by mouth every 8 hours as needed for Severe Pain.  Dose:  1 Tab             Disposition:   Patient will be discharging home.      Instructions:      Please follow up with Dr. Sellers in 1-2 weeks.    Please follow up with Dr. Gardner at [facility] in 4-6 weeks after taper of vancomycin is completed.    Please complete 14 days of PO vancomycin with prolonged taper. 5 prescriptions provided to the patient.    The patient was instructed to return to the ER in the event of worsening symptoms. I have counseled the patient on the importance of compliance and the patient has agreed to proceed with all medical recommendations and follow up plan indicated above.   The patient understands that all medications come with benefits and risks. Risks may include permanent injury or death and these risks can be minimized with close reassessment and monitoring.        Primary Care Provider:    Dr. Joy Sellers      Follow up appointment details :      Please follow up with [facility] in 1-2 weeks.    Please follow up with Dr. Gardner in 4-6 weeks.    CC: Dr. Joy Gardner, [facility]

## 2019-09-20 NOTE — PROGRESS NOTES
Infectious Disease Progress Note    Author: Latonia Cadet M.D. Date & Time of service: 2019  9:51 AM    Chief Complaint:  Recurrent Clostridium difficile diarrhea    Interval History:  78 y.o. male with a history of a pelvic abscess with bowel perforation and an extensive abdominal surgical history in  with multiple episodes of Clostridium difficile colitis status post fecal transplant on 2018, recent admission in August with recurrent C. difficile on a prolonged vancomycin taper prior to admission admitted for recurrent C. Difficile.     afebrile WBC 4 patient had 2 episodes of diarrhea overnight.  No nausea or vomiting but poor appetite.  No abdominal pain   no new issues overnight.  Ongoing diarrhea.  Care now taken over by [facility] family medicine team   afebrile patient had 6 episodes of diarrhea yesterday.  3 stools thus far this morning.  Will follow-up with GI as outpatient.  Denies any nausea or vomiting    Labs Reviewed.    Review of Systems:  Review of Systems   Constitutional: Negative for chills and fever.   Respiratory: Negative for cough and shortness of breath.    Gastrointestinal: Positive for diarrhea. Negative for abdominal pain, nausea and vomiting.   Genitourinary: Negative for dysuria.   Neurological: Negative for dizziness and headaches.   All other systems reviewed and are negative.      Hemodynamics:  Temp (24hrs), Av.7 °C (98.1 °F), Min:36.4 °C (97.6 °F), Max:37.1 °C (98.7 °F)  Temperature: 36.8 °C (98.2 °F)  Pulse  Av.4  Min: 54  Max: 84   Blood Pressure : 123/59       Physical Exam:  Physical Exam   Constitutional: He is oriented to person, place, and time. He appears well-developed.   HENT:   Mouth/Throat: No oropharyngeal exudate.   Eyes: Pupils are equal, round, and reactive to light. Conjunctivae and EOM are normal.   Neck: Neck supple.   Cardiovascular: Normal rate, regular rhythm and normal heart sounds.   Pulmonary/Chest: Effort normal and breath  sounds normal. He has no wheezes.   Abdominal: Soft. He exhibits no distension. There is no tenderness.   Abdominal surgical scars clean   Musculoskeletal: Normal range of motion. He exhibits no edema.   Neurological: He is alert and oriented to person, place, and time. No cranial nerve deficit.   Skin: Skin is warm and dry.   Psychiatric: He has a normal mood and affect. His behavior is normal.   Pleasant       Meds:    Current Facility-Administered Medications:   •  Notify provider if pain remains uncontrolled **AND** Use the numeric rating scale (NRS-11) on regular floors and Critical-Care Pain Observation Tool (CPOT) on ICUs/Trauma to assess pain **AND** Pulse Ox (Oximetry) **AND** [DISCONTINUED] Pharmacy Consult Request **AND** If patient difficult to arouse and/or has respiratory depression, stop any opiates that are currently infusing and call a Rapid Response. **AND** oxyCODONE immediate-release **AND** oxyCODONE immediate-release **AND** morphine injection  •  vancomycin 50 mg/mL **FOLLOWED BY** [START ON 10/1/2019] vancomycin 50 mg/mL **FOLLOWED BY** [START ON 10/8/2019] vancomycin 50 mg/mL **FOLLOWED BY** [START ON 10/15/2019] vancomycin 50 mg/mL **FOLLOWED BY** [START ON 10/23/2019] vancomycin 50 mg/mL  •  tamsulosin  •  ferrous sulfate  •  gabapentin  •  lamotrigine  •  enoxaparin    Labs:  Recent Labs     09/17/19  1447 09/18/19  0235 09/19/19  0412   WBC 4.7* 4.0* 4.2*   RBC 4.32* 3.97* 4.06*   HEMOGLOBIN 14.0 12.2* 12.5*   HEMATOCRIT 42.4 38.9* 39.7*   MCV 98.1* 98.0* 97.8   MCH 32.4 30.7 30.8   RDW 49.7 48.8 48.4   PLATELETCT 171 176 183   MPV 9.3 9.0 8.9*   NEUTSPOLYS 60.70 43.90* 40.50*   LYMPHOCYTES 26.70 40.20 42.40*   MONOCYTES 10.50 11.90 11.80   EOSINOPHILS 1.50 3.00 4.10   BASOPHILS 0.20 0.50 0.50     Recent Labs     09/17/19  1447 09/18/19  0235 09/19/19  0412   SODIUM 138 140 138   POTASSIUM 4.2 4.1 4.1   CHLORIDE 104 106 105   CO2 28 28 28   GLUCOSE 89 127* 100*   BUN 15 21 18     Recent  Labs     09/17/19  1447 09/18/19  0235 09/19/19  0412   ALBUMIN 4.1  --  3.7   TBILIRUBIN 0.8  --  0.5   ALKPHOSPHAT 74  --  66   TOTPROTEIN 6.7  --  6.0   ALTSGPT 8  --  9   ASTSGOT 13  --  14   CREATININE 1.09 1.03 0.86       Imaging:  Ct-abdomen-pelvis With    Result Date: 9/16/2019 9/16/2019 7:09 PM HISTORY/REASON FOR EXAM:  Diarrhea. TECHNIQUE/EXAM DESCRIPTION:   CT scan of the abdomen and pelvis with contrast. Contrast-enhanced helical scanning was obtained from the diaphragmatic domes through the pubic symphysis following the bolus administration of nonionic contrast without complication. 100 mL of Omnipaque 350 nonionic contrast was administered without complication. Low dose optimization technique was utilized for this CT exam including automated exposure control and adjustment of the mA and/or kV according to patient size. COMPARISON: 8/8/2019 FINDINGS: The visualized lung bases are unremarkable. CT Abdomen: The liver, spleen, adrenal glands, pancreas, and gallbladder are unremarkable. The kidneys enhance symmetrically. Small hypodense renal lesions are indeterminate, but likely represent cysts. There is a moderate hiatal hernia. There is a duodenal diverticulum. There are postoperative changes in the distal colon. The appendix is not visualized. There are dense scattered calcifications and low-density plaque with aortic ectasia. CT Pelvis: The bladder is unremarkable. Prostate is enlarged. No significant free fluid or adenopathy is identified. Degenerative changes are in the spine.     1.  No acute intra-abdominal findings. 2.  Moderate hiatal hernia. 3.  Atherosclerosis. 4.  Status post partial colectomy 5.  Prostate enlargement      Micro:  Results     ** No results found for the last 168 hours. **          Assessment:  Active Hospital Problems    Diagnosis   • *Recurrent Clostridium difficile diarrhea [A04.71]   • Leukopenia [D72.819]       Plan:  Recurrent Clostridium difficile infection, on  treatment  Afebrile  Leukopenia  Patient was on a prolonged vancomycin taper prior to admission  C. difficile PCR positive on 8/6/2019  C. difficile PCR positive on 9/16/2019  Continue p.o. vancomycin 125 mg 4 times daily for 14 days followed by a prolonged taper  Follow-up with GI as outpatient for evaluation of repeat fecal transplant    Leukopenia, persistent  Monitor    I have performed a physical exam and reviewed and updated ROS and plan today 9/20/2019.  In review of yesterday's note 9/19/2019, there are no changes except as documented above.    Discussed with [facility] family medicine resident, Dr. Moses and bedside RN.  Will sign off.  Reconsult if needed

## 2019-09-20 NOTE — DISCHARGE INSTRUCTIONS
Please discharge patient.     Please follow up with Dr. Sellers in 1-2 weeks.  Please follow up with GI in 4-6 weeks.      Discharge Instructions    Discharged to home by taxi with self. Discharged via wheelchair, hospital escort: Yes.  Special equipment needed: Not Applicable    Be sure to schedule a follow-up appointment with your primary care doctor or any specialists as instructed.     Discharge Plan:   Diet Plan: Discussed  Activity Level: Discussed  Confirmed Follow up Appointment: Patient to Call and Schedule Appointment  Confirmed Symptoms Management: Discussed  Medication Reconciliation Updated: Yes  Influenza Vaccine Indication: Indicated: 65 years and older    I understand that a diet low in cholesterol, fat, and sodium is recommended for good health. Unless I have been given specific instructions below for another diet, I accept this instruction as my diet prescription.   Other diet: heart healthy    Special Instructions: None    · Is patient discharged on Warfarin / Coumadin?   No     Depression / Suicide Risk    As you are discharged from this [facility] facility, it is important to learn how to keep safe from harming yourself.    Recognize the warning signs:  · Abrupt changes in personality, positive or negative- including increase in energy   · Giving away possessions  · Change in eating patterns- significant weight changes-  positive or negative  · Change in sleeping patterns- unable to sleep or sleeping all the time   · Unwillingness or inability to communicate  · Depression  · Unusual sadness, discouragement and loneliness  · Talk of wanting to die  · Neglect of personal appearance   · Rebelliousness- reckless behavior  · Withdrawal from people/activities they love  · Confusion- inability to concentrate     If you or a loved one observes any of these behaviors or has concerns about self-harm, here's what you can do:  · Talk about it- your feelings and reasons for harming yourself  · Remove any  "means that you might use to hurt yourself (examples: pills, rope, extension cords, firearm)  · Get professional help from the community (Mental Health, Substance Abuse, psychological counseling)  · Do not be alone:Call your Safe Contact- someone whom you trust who will be there for you.  · Call your local CRISIS HOTLINE 321-4201 or 393-321-6640  · Call your local Children's Mobile Crisis Response Team [place] (783) 395-4557 or wwwNovonics  · Call the toll free National Suicide Prevention Hotlines   · National Suicide Prevention Lifeline 172-369-RWTS (5731)  · [employer] [place] Line Network 800-SUICIDE (086-6134)        Clostridium Difficile FAQs  What is Clostridium difficile infection?   Clostridium difficile [pronounced Xgr-AIPVX-pd-um dif-uh-SEEL], also known as \"C. diff\" [See-dif], is a germ that can cause diarrhea. Most cases of C. diff infection occur in patients taking antibiotics. The most common symptoms of a C. diff infection include:  · Watery diarrhea  · Fever  · Loss of appetite  · Nausea  · Belly pain and tenderness  Who is most likely to get C. diff infection?   The elderly and people with certain medical problems have the greatest chance of getting C. diff. C. diff spores can live outside the human body for a very long time and may be found on things in the environment such as bed linens, bed rails, bathroom fixtures, and medical equipment. C. diff infection can spread from person-to-person on contaminated equipment and on the hands of doctors, nurses, other healthcare providers and visitors.  Can C. diff infection be treated?   Yes, there are antibiotics that can be used to treat C. diff. In some severe cases, a person might have to have surgery to remove the infected part of the intestines. This surgery is needed in only 1 or 2 out of every 100 persons with C. diff.  What are some of the things that hospitals are doing to prevent C. diff infections?   To prevent C. diff infections, " doctors, nurses, and other healthcare providers:  · Clean their hands with soap and water or an alcohol-based hand rub before and after caring for every patient. This can prevent C. diff and other germs from being passed from one patient to another on their hands.  · Carefully clean hospital rooms and medical equipment that have been used for patients with C. diff.  · Use Contact Precautions to prevent C. diff from spreading to other patients. Contact Precautions mean:  ¨ Whenever possible, patients with C. diff will have a single room or share a room only with someone else who also has C. diff.  ¨ Healthcare providers will put on gloves and wear a gown over their clothing while taking care of patients with C. diff.  ¨ Visitors may also be asked to wear a gown and gloves.  ¨ When leaving the room, hospital providers and visitors remove their gown and gloves and clean their hands.  ¨ Patients on Contact Precautions are asked to stay in their hospital rooms as much as possible. They should not go to common areas, such as the gift shop or cafeteria. They can go to other areas of the hospital for treatments and tests.  · Only give patients antibiotics when it is necessary.  What can I do to help prevent C. diff infections?  · Make sure that all doctors, nurses, and other healthcare providers clean their hands with soap and water or an alcohol-based hand rub before and after caring for you.  ¨ If you do not see your providers clean their hands, please ask them to do so.  · Only take antibiotics as prescribed by your doctor.  · Be sure to clean your own hands often, especially after using the bathroom and before eating.  Can my friends and family get C. diff when they visit me?   C. diff infection usually does not occur in persons who are not taking antibiotics. Visitors are not likely to get C. diff. Still, to make it safer for visitors, they should:  · Clean their hands before they enter your room and as they leave your  room  · Ask the nurse if they need to wear protective gowns and gloves when they visit you.  What do I need to do when I go home from the hospital?   Once you are back at home, you can return to your normal routine. Often, the diarrhea will be better or completely gone before you go home. This makes giving C. diff to other people much less likely. There are a few things you should do, however, to lower the chances of developing C. diff infection again or of spreading it to others.  · If you are given a prescription to treat C. diff, take the medicine exactly as prescribed by your doctor and pharmacist. Do not take half-doses or stop before you run out.  · Wash your hands often, especially after going to the bathroom and before preparing food.  · People who live with you should wash their hands often as well.  · If you develop more diarrhea after you get home, tell your doctor immediately.  · Your doctor may give you additional instructions.  If you have questions, please ask your doctor or nurse.   Developed and co-sponsored by The Society for Healthcare Epidemiology of [place] (SHEA); Infectious Diseases Society of [place] (IDSA); American Hospital Association; Association for Professionals in Infection Control and Epidemiology (APIC); Centers for Disease Control and Prevention (CDC); and The Joint Commission.   This information is not intended to replace advice given to you by your health care provider. Make sure you discuss any questions you have with your health care provider.  Document Released: 12/23/2014 Document Revised: 05/25/2017 Document Reviewed: 03/02/2016  [employer] Interactive Patient Education © 2017 [employer]

## 2019-09-21 NOTE — PROGRESS NOTES
Discharging pt per physician order.  Discharged with self.  Demonstrated understanding of discharge instructions, follow up appointments, home medications, and prescriptions.  Ambulating without assistance, voiding without difficulty, pain well controlled, tolerating oral medications, and tolerating diet.  Pt received medications at bedside, verbalized understanding of discharge instructions and educational handouts.  All questions answered, belongings with patient at the time of discharge.

## 2019-10-29 ENCOUNTER — HOSPITAL ENCOUNTER (OUTPATIENT)
Facility: MEDICAL CENTER | Age: 79
End: 2019-10-29
Attending: INTERNAL MEDICINE | Admitting: INTERNAL MEDICINE
Payer: MEDICARE

## 2019-11-20 ENCOUNTER — APPOINTMENT (OUTPATIENT)
Dept: RADIOLOGY | Facility: MEDICAL CENTER | Age: 79
End: 2019-11-20
Attending: INTERNAL MEDICINE
Payer: MEDICARE

## 2019-12-02 ENCOUNTER — HOSPITAL ENCOUNTER (OUTPATIENT)
Dept: HOSPITAL 8 - RAD | Age: 79
Discharge: HOME | End: 2019-12-02
Attending: INTERNAL MEDICINE

## 2019-12-02 DIAGNOSIS — K52.9: ICD-10-CM

## 2019-12-02 DIAGNOSIS — A04.72: ICD-10-CM

## 2019-12-02 DIAGNOSIS — K56.41: Primary | ICD-10-CM

## 2019-12-02 DIAGNOSIS — M47.816: ICD-10-CM

## 2019-12-02 DIAGNOSIS — R15.9: ICD-10-CM

## 2019-12-02 PROCEDURE — 74018 RADEX ABDOMEN 1 VIEW: CPT

## 2020-01-10 NOTE — PROGRESS NOTES
Monitor Summary     SR 62-84 w/ BBB  R PVC, R couplet, R bigeminy, MF triplet   0.14/0.14/0.38   10-Toni-2020 12:05

## 2020-01-16 ENCOUNTER — HOSPITAL ENCOUNTER (OUTPATIENT)
Dept: HOSPITAL 8 - OUT | Age: 80
Discharge: HOME | End: 2020-01-16
Attending: INTERNAL MEDICINE
Payer: MEDICARE

## 2020-01-16 VITALS — HEIGHT: 68 IN | BODY MASS INDEX: 20.25 KG/M2 | WEIGHT: 133.6 LBS

## 2020-01-16 VITALS — SYSTOLIC BLOOD PRESSURE: 151 MMHG | DIASTOLIC BLOOD PRESSURE: 68 MMHG

## 2020-01-16 DIAGNOSIS — R94.31: ICD-10-CM

## 2020-01-16 DIAGNOSIS — G20: ICD-10-CM

## 2020-01-16 DIAGNOSIS — A04.71: Primary | ICD-10-CM

## 2020-01-16 PROCEDURE — 93005 ELECTROCARDIOGRAM TRACING: CPT

## 2020-01-16 PROCEDURE — 45378 DIAGNOSTIC COLONOSCOPY: CPT

## 2020-01-28 ENCOUNTER — HOSPITAL ENCOUNTER (OUTPATIENT)
Dept: HOSPITAL 8 - RAD | Age: 80
Discharge: HOME | End: 2020-01-28
Attending: INTERNAL MEDICINE
Payer: MEDICARE

## 2020-01-28 DIAGNOSIS — K52.9: Primary | ICD-10-CM

## 2020-01-28 DIAGNOSIS — M47.816: ICD-10-CM

## 2020-01-28 PROCEDURE — 74018 RADEX ABDOMEN 1 VIEW: CPT

## 2020-03-03 NOTE — PROGRESS NOTES
Sarah Clemons Fall Risk Assessment:     Last Known Fall: No falls  Mobility: Dizziness/generalized weakness  Medications: No meds  Mental Status/LOC/Awareness: Awake, alert, and oriented to date, place, and person  Toileting Needs: Use of assistive device (Bedside commode, bedpan, urinal)  Volume/Electrolyte Status: Use of IV fluids/tube feeds  Communication/Sensory: Visual (Glasses)/hearing deficit  Behavior: Appropriate behavior  Sarah Clemons Fall Risk Total: 9  Fall Risk Level: LOW RISK    Universal Fall Precautions:  call light/belongings in reach, bed in low position and locked, use non-slip footwear, adequate lighting, clutter free and spill free environment, educate on level of risk, educate to call for assistance    Fall Risk Level Interventions:   TRIAL (TELE 8, NEURO, MED ERNESTO 5) Low Fall Risk Interventions  Place yellow fall risk ID band on patient: refused  Provide patient/family education based on risk assessment: completed  Educate patient/family to call staff for assistance when getting out of bed: completed  Place fall precaution signage outside patient door: completed      Patient Specific Interventions:     Medication: review medications with patient and family, assess for medications that can be discontinued or dosage decreased and limit combination of prn medications   Mental Status/LOC/Awareness: reinforce falls education, check on patient hourly, utilize bed/chair fall alarm, reinforce the use of call light and provide activity  Toileting: monitor intake and output/use of appropriate interventions and do not leave patient unattended in bathroom/refer to toileting scripting  Volume/Electrolyte Status: ensure patient remains hydrated, monitor abnormal lab values and ensure IV fluids are appropriate  Communication/Sensory: update plan of care on whiteboard, collaborate with doctor for possible speech therapy consult, ensure proper positioning when transferrng/ambulating and ensure patient has  glasses/contacts and hearing aids/dentures  Behavioral: not applicable  Mobility: schedule physical activity throughout the day, dangle prior to standing and utilize bed/chair fall alarm   UTI (urinary tract infection)

## 2020-05-19 ENCOUNTER — HOSPITAL ENCOUNTER (OUTPATIENT)
Dept: HOSPITAL 8 - RAD | Age: 80
Discharge: HOME | End: 2020-05-19
Attending: INTERNAL MEDICINE
Payer: MEDICARE

## 2020-05-19 DIAGNOSIS — K52.9: ICD-10-CM

## 2020-05-19 DIAGNOSIS — R15.9: Primary | ICD-10-CM

## 2020-05-19 DIAGNOSIS — R19.4: ICD-10-CM

## 2020-05-19 PROCEDURE — 74018 RADEX ABDOMEN 1 VIEW: CPT

## 2021-01-14 DIAGNOSIS — Z23 NEED FOR VACCINATION: ICD-10-CM

## 2021-02-03 NOTE — H&P
Hospital Medicine Discharge Summary    Patient ID: Chavez Plata      Patient's PCP: Ester Rosenthal MD    Admit Date: 1/29/2021     Discharge Date: 2/3/2021      Admitting Physician: Zulema Hernandez MD     Discharge Physician: Amy West MD     Discharge Diagnoses: Active Hospital Problems    Diagnosis    Iliac artery occlusion, right ([facility] [address] [I74.5]    Cellulitis of right foot [N77.129]       The patient was seen and examined on day of discharge and this discharge summary is in conjunction with any daily progress note from day of discharge. Hospital Course:   History Of Present Illness:      50 y.o. male who presented to [facility] with right foot wound and swelling. This is a patient with end-stage renal disease, diabetes mellitus type 2 with diabetic peripheral neuropathy. About a week and a half ago patient noticed sores on his right foot and toes. When he removed his sock, patch of skin fell off and noticed that it was bleeding. Does not follow-up with podiatrist.  Because of being sick and worried about foot wound, today patient missed his dialysis appointment and went to the primary care physician. From the primary care office, patient was directed to the emergency department. In the emergency department, patient was diagnosed with right lower extremity cellulitis. X-ray of the right foot did not show osteomyelitis. Patient was started on empiric antibiotics and admitted.     No other accompanying symptoms     Nothing onset makes the patient feel better or worse     Had foot issues related to peripheral neuropathy in the past, but never this bad. Right lower extremity cellulitis  Started about a week ago.  Got progressively worse. Physical and laboratory findings are not consistent with sepsis.   Continued on empiric IV antibiotics. He is receiving cefepime and vancomycin and will follow closely. Foot is much less red and seems to be improving.   Plain x-ray Hospital Medicine History & Physical Note    Date of Service  8/6/2019    Primary Care Physician  Joy Sellers M.D.    Consultants  none    Code Status  DNR    Chief Complaint  Diarrhea    History of Presenting Illness  78 y.o. male who presented 8/6/2019 with past medical history of C. difficile status post fecal transplant, GERD, BPH, status post resection of partial colon, presented with complaint of diarrhea for the past several days.  Apparently patient was recently on antibiotics secondary to UTI.  Patient finished antibiotics on last Thursday and subsequently patient started having diarrhea for the past several days.  Patient came in with complaint of diarrhea and general body achiness and neck pain which has been chronic. Patient's pain is general 4-6/10, intermittent and does not radiate to other location, sharp and with some tingling. Can be controlled by pain meds.  Patient otherwise denies fever, chills, nausea vomiting, shortness of breath or chest pain.  In the ER patient was noticed to have watery stool and stool sample was sent for C. difficile test.    Review of Systems  Review of Systems   Constitutional: Positive for malaise/fatigue. Negative for chills, fever and weight loss.   HENT: Negative for congestion, ear discharge, ear pain, hearing loss and nosebleeds.    Eyes: Negative for blurred vision and pain.   Respiratory: Negative for cough, sputum production, shortness of breath and wheezing.    Cardiovascular: Negative for chest pain, palpitations, leg swelling and PND.   Gastrointestinal: Positive for diarrhea. Negative for abdominal pain, constipation, heartburn, nausea and vomiting.   Genitourinary: Negative for dysuria, frequency and hematuria.   Musculoskeletal: Positive for neck pain. Negative for back pain, falls and joint pain.   Skin: Negative for rash.   Neurological: Negative for dizziness, tremors, speech change, seizures, weakness and headaches.   Psychiatric/Behavioral: Negative  Thoracic Surgery Simple Progress Note Admit Date: 2019 POD: * No surgery found * Procedure:  * No surgery found * Subjective:  
 
Patient has complaints: No significant medical complaints Review of Systems: 
 
CARDIAC: negative RESP: positive for left sided PTX, resp failure NEURO:  positive for memory problems EXT: Denies new swelling or pain in the legs or calves. Objective:  
 
Blood pressure 107/61, pulse 84, temperature 97.7 °F (36.5 °C), resp. rate 21, height 6' (1.829 m), weight 131 lb 11.2 oz (59.7 kg), SpO2 96 %. Temp (24hrs), Av.3 °F (36.8 °C), Min:97.7 °F (36.5 °C), Max:98.6 °F (37 °C) Hemodynamics PAP 
  CO 
  CI No intake/output data recorded.  1901 -  0700 In: 2162.5 [I.V.:2162.5] Out: [address] EXAM: 
GENERAL: VSS, afrible, alert and cooperative HEART:  regular rate and rhythm LUNG: clear to auscultation bilaterally, left sided chest tube in place, + air leak noted, 60 ml recorded output for past 24 hr. Remains on Hi Flow O2 
NEURO:  normal without focal findings 
mental status, speech normal, alert and oriented x iii EXTREMITIES:No evidence of DVT seen on physical exam. 
GI/: Abd soft, nonterder with + bowel sounds. Voiding Labs: 
Recent Results (from the past 24 hour(s)) TROPONIN I Collection Time: 19  1:03 PM  
Result Value Ref Range Troponin-I, Qt. 0.21 (H) <0.05 ng/mL POC G3 - PUL Collection Time: 19  2:43 AM  
Result Value Ref Range pH (POC) 7.419 7.35 - 7.45    
 pCO2 (POC) 37.3 35.0 - 45.0 MMHG  
 pO2 (POC) 72 (L) 80 - 100 MMHG  
 HCO3 (POC) 24.2 22 - 26 MMOL/L  
 sO2 (POC) 95 92 - 97 % Base excess (POC) 0 mmol/L Site RIGHT RADIAL Device: High Flow Nasal Cannula Flow rate (POC) 30 L/M Allens test (POC) YES Specimen type (POC) ARTERIAL Total resp. rate 16 METABOLIC PANEL, COMPREHENSIVE Collection Time: 19  4:15 AM  
Result Value Ref Range Sodium 135 (L) 136 - 145 mmol/L Potassium 4.7 3.5 - 5.1 mmol/L Chloride 104 97 - 108 mmol/L  
 CO2 28 21 - 32 mmol/L Anion gap 3 (L) 5 - 15 mmol/L Glucose 124 (H) 65 - 100 mg/dL BUN 24 (H) 6 - 20 MG/DL Creatinine 0.75 0.70 - 1.30 MG/DL  
 BUN/Creatinine ratio 32 (H) 12 - 20 GFR est AA >60 >60 ml/min/1.73m2 GFR est non-AA >60 >60 ml/min/1.73m2 Calcium 8.0 (L) 8.5 - 10.1 MG/DL Bilirubin, total 2.4 (H) 0.2 - 1.0 MG/DL  
 ALT (SGPT) 538 (H) 12 - 78 U/L  
 AST (SGOT) 96 (H) 15 - 37 U/L Alk. phosphatase 70 45 - 117 U/L Protein, total 5.6 (L) 6.4 - 8.2 g/dL Albumin 2.8 (L) 3.5 - 5.0 g/dL Globulin 2.8 2.0 - 4.0 g/dL A-G Ratio 1.0 (L) 1.1 - 2.2    
CBC WITH AUTOMATED DIFF Collection Time: 11/04/19  4:15 AM  
Result Value Ref Range WBC 18.3 (H) 4.1 - 11.1 K/uL  
 RBC 3.43 (L) 4.10 - 5.70 M/uL  
 HGB 12.2 12.1 - 17.0 g/dL HCT 37.0 36.6 - 50.3 % .9 (H) 80.0 - 99.0 FL  
 MCH 35.6 (H) 26.0 - 34.0 PG  
 MCHC 33.0 30.0 - 36.5 g/dL  
 RDW 14.1 11.5 - 14.5 % PLATELET 784 (L) 358 - 400 K/uL MPV 12.2 8.9 - 12.9 FL  
 NRBC 0.2 (H) 0  WBC ABSOLUTE NRBC 0.03 (H) 0.00 - 0.01 K/uL NEUTROPHILS 92 (H) 32 - 75 % LYMPHOCYTES 3 (L) 12 - 49 % MONOCYTES 4 (L) 5 - 13 % EOSINOPHILS 0 0 - 7 % BASOPHILS 0 0 - 1 % IMMATURE GRANULOCYTES 1 (H) 0.0 - 0.5 % ABS. NEUTROPHILS 16.9 (H) 1.8 - 8.0 K/UL  
 ABS. LYMPHOCYTES 0.5 (L) 0.8 - 3.5 K/UL  
 ABS. MONOCYTES 0.7 0.0 - 1.0 K/UL  
 ABS. EOSINOPHILS 0.0 0.0 - 0.4 K/UL  
 ABS. BASOPHILS 0.0 0.0 - 0.1 K/UL  
 ABS. IMM. GRANS. 0.2 (H) 0.00 - 0.04 K/UL  
 DF SMEAR SCANNED    
 PLATELET COMMENTS Large Platelets RBC COMMENTS ANISOCYTOSIS 1+ 
    
 RBC COMMENTS MACROCYTOSIS 1+ 
    
 RBC COMMENTS POLYCHROMASIA 1+ Assessment:  
No evidence of DVT. Principal Problem: 
  Shortness of breath (11/1/2019) Active Problems: 
  Pneumothorax on left (11/1/2019) for depression, substance abuse and suicidal ideas.       Past Medical History   has a past medical history of Anesthesia, Anxiety disorder, Arthritis, Bipolar disorder (HCC), Clostridium difficile infection, Depression, Enlarged prostate, GERD (gastroesophageal reflux disease), Heart murmur, Indigestion, and Neck pain.    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     Family History  Family history reviewed , felt nonpertinent to this encounter.       Social History   reports that he quit smoking about 46 years ago. He has a 28.00 pack-year smoking history. He has never used smokeless tobacco. He reports that he drinks alcohol. He reports that he has current or past drug history.    Allergies  Allergies   Allergen Reactions   • Acetaminophen Nausea     Very nauseated        Medications  Prior to Admission Medications   Prescriptions Last Dose Informant Patient Reported? Taking?   alfuzosin (UROXATRAL) 10 MG SR tablet 8/5/2019 at 0800 Patient Yes No   Sig: Take 10 mg by mouth every day.   gabapentin (NEURONTIN) 400 MG Cap 8/5/2019 at 2230 Patient Yes No   Sig: Take 400 mg by mouth 3 times a day.   lamotrigine (LAMICTAL) 200 MG tablet 8/5/2019 at 0800 Patient Yes No   Sig: Take 200 mg by mouth every day.   oxyCODONE-acetaminophen (PERCOCET) 7.5-325 MG per tablet 8/5/2019 at 2230 Patient Yes No   Sig: Take 1 Tab by mouth every 8 hours as needed for Severe Pain.      Facility-Administered Medications: None       Physical Exam  Temp:  [36.5 °C (97.7 °F)] 36.5 °C (97.7 °F)  Pulse:  [88] 88  Resp:  [16] 16  BP: (124)/(60) 124/60  SpO2:  [95 %] 95 %    Physical Exam   Constitutional: He is oriented to person, place, and time.  of the lower extremity is not showing any signs of underlying bone infection.   podiatry is consulted and input is appreciated. He did undergo bedside debridement of right hallux ulcer on 1/30/2021 with podiatry.  Argelia Naranjo follow closely  Vascular surgery consulted for further input.   -angiogram done 2/2 (stent of distal external Illiac, PTA of SFA/Pop/YAHIR, PTA/atherectomy of prox Peroneal)   -added low dose iv morphine prn 2/1(however apparently allergic ,was requesting dilaudid)   -switched to Keflex on dc    Diabetes mellitus type 2 with hyperglycemia  Continued basal bolus insulin protocol with Lantus and sliding scale insulin     End-stage renal disease on hemodialysis  Continued hemodialysis 3 times weekly, nephrology also consulted and following        Elevated troponin  Consistent with poor clearance associated with end-stage renal disease.  In line with troponin levels obtained during past year. Clinically stable, no symptoms.     Tobacco abuse  Unfortunately, patient is still an active smoker and is not interested in quitting smoking. Nicotine patch will be provided as inpatient.     Peripheral diabetic neuropathy  Continue Lyrica at home dose     Dyslipidemia  Continued statin    Physical Exam Performed:     BP (!) 149/67   Pulse 107   Temp 98 °F (36.7 °C) (Oral)   Resp 14   Ht 5' 9\" (1.753 m)   Wt 258 lb 9.6 oz (117.3 kg)   SpO2 91%   BMI 38.19 kg/m²       General appearance: No apparent distress, appears stated age and cooperative. HEENT: Pupils equal, round, and reactive to light. Conjunctivae/corneas clear. Neck: Supple, with full range of motion. No jugular venous distention. Trachea midline. Respiratory:  Normal respiratory effort. Clear to auscultation, bilaterally without Rales/Wheezes/Rhonchi. Cardiovascular: Regular rate and rhythm with normal S1/S2 without murmurs, rubs or gallops. Abdomen: Soft, non-tender, non-distended with normal bowel sounds.   Musculoskeletal: No clubbing, He appears well-developed and well-nourished.   HENT:   Head: Normocephalic.   Eyes: Pupils are equal, round, and reactive to light. EOM are normal.   Neck: Normal range of motion. Neck supple. No JVD present. No thyromegaly present.   Cardiovascular: Normal rate, regular rhythm and normal heart sounds. Exam reveals no gallop and no friction rub.   No murmur heard.  Pulmonary/Chest: Effort normal and breath sounds normal. No respiratory distress. He has no wheezes. He has no rales. He exhibits no tenderness.   Abdominal: Soft. Bowel sounds are normal. He exhibits no distension and no mass. There is no tenderness. There is no rebound and no guarding.   Musculoskeletal: Normal range of motion. He exhibits no edema.   Lymphadenopathy:     He has no cervical adenopathy.   Neurological: He is alert and oriented to person, place, and time. He displays normal reflexes. No cranial nerve deficit.   Skin: Skin is dry. No rash noted.   Psychiatric: He has a normal mood and affect.   Nursing note and vitals reviewed.      Laboratory:  Recent Labs     08/06/19  1310   WBC 10.8   RBC 5.07   HEMOGLOBIN 15.9   HEMATOCRIT 48.3   MCV 95.3   MCH 31.4   MCHC 32.9*   RDW 46.4   PLATELETCT 174   MPV 9.6     Recent Labs     08/06/19  1310   SODIUM 138   POTASSIUM 4.0   CHLORIDE 101   CO2 27   GLUCOSE 122*   BUN 16   CREATININE 1.40   CALCIUM 9.5     Recent Labs     08/06/19  1310   ALTSGPT 6   ASTSGOT 12   ALKPHOSPHAT 107*   TBILIRUBIN 1.9*   LIPASE <3*   GLUCOSE 122*         No results for input(s): NTPROBNP in the last 72 hours.      No results for input(s): TROPONINT in the last 72 hours.    Urinalysis:    No results found     Imaging:  No orders to display         Assessment/Plan:  I anticipate this patient will require at least two midnights for appropriate medical management, necessitating inpatient admission.    * Diarrhea- (present on admission)  Assessment & Plan  Patient still have diarrhea for the past several days.  History of  Respiratory failure ([facility] [address] (11/1/2019) Plan/Recommendations:  
Chest tube to gravity this morning at 0800 Hi Flow O2 per Pulmonary See orders Signed By: Kayode HAINES 
 
 
 
 cyanosis or edema bilaterally.  Full range of motion without deformity  Redness and tenderness on right lower extremity toes and dorsum of the foot this is improved from my exam of yesterday.   no visible drainage.    Noted a dry scabbed wound on the dorsal surface of distal ankle on the right lower extremity. .  Skin: Skin color, texture, turgor normal.  No rashes or lesions. Neurologic:  Neurovascularly intact without any focal sensory/motor deficits. Cranial nerves: II-XII intact, grossly non-focal.  Psychiatric: Alert and oriented, thought content appropriate, normal insight  Capillary Refill: Brisk,< 3 seconds   Peripheral Pulses: +2 palpable, equal bilaterally     Labs: For convenience and continuity at follow-up the following most recent labs are provided:      CBC:    Lab Results   Component Value Date    WBC 14.2 02/03/2021    HGB 9.9 02/03/2021    HCT 30.2 02/03/2021     02/03/2021       Renal:    Lab Results   Component Value Date     02/03/2021    K 3.7 02/03/2021    CL 89 02/03/2021    CO2 27 02/03/2021    BUN 41 02/03/2021    CREATININE 5.7 02/03/2021    CALCIUM 8.9 02/03/2021    PHOS 3.3 06/10/2020         Significant Diagnostic Studies    Radiology:   XR FOOT RIGHT (MIN 3 VIEWS)   Final Result   No acute osseous abnormality.                 Consults:     PHARMACY TO DOSE VANCOMYCIN  IP CONSULT TO NEPHROLOGY  IP CONSULT TO PODIATRY  PHARMACY TO DOSE VANCOMYCIN  IP CONSULT TO VASCULAR SURGERY  IP CONSULT TO HOME CARE NEEDS    Disposition:  home     Condition at Discharge: Stable    Discharge Instructions/Follow-up:  Podiatry as outpt, vasc surgery as outpt    Code Status:  Full Code     Activity: activity as tolerated    Diet: renal diet      Discharge Medications:     Discharge Medication List as of 2/3/2021  4:30 PM           Details   simethicone (MYLICON) 80 MG chewable tablet Take 1 tablet by mouth every 6 hours as needed (cramping), Disp-15 tablet, R-0Normal      cephALEXin (Rozetta Coombes) 250 MG capsule Take 1 capsule by mouth every 12 hours for 5 days Given daily and Give after HD session on dialysis days, Disp-10 capsule, R-0Normal      saccharomyces boulardii (FLORASTOR) 250 MG capsule Take 1 capsule by mouth 2 times daily for 7 days, Disp-14 capsule, R-0Normal              Details   oxyCODONE-acetaminophen (PERCOCET) 7.5-325 MG per tablet Take 1 tablet by mouth every 4 hours as needed for Pain. Historical Med      !! glucose monitoring kit (FREESTYLE) monitoring kit DAILY Starting Fri 1/8/2021, Disp-1 kit, R-0, Normal      !! blood glucose test strips (GLUCOSE METER TEST) strip 1 each by In Vitro route 5 times daily As needed. , Disp-100 each, R-3Normal      nitroGLYCERIN (NITROSTAT) 0.4 MG SL tablet DISSOLVE 1 TABLET UNDER THE TONGUE AS NEEDED FOR CHEST PAIN EVERY 5 MINUTES UP TO 3 TIMES. IF NO RELIEF CALL 911., Disp-25 tablet, R-10Normal      Insulin Pen Needle 31G X 5 MM MISC 4 TIMES DAILY Starting Mon 12/28/2020, Disp-300 each, R-3, Normal      hydrOXYzine (VISTARIL) 50 MG capsule TAKE 1 TO 2 CAPSULES BY MOUTH NIGHTLY, Disp-60 capsule, R-5Normal      pregabalin (LYRICA) 25 MG capsule Take 1 capsule by mouth 3 times daily for 5 days. , Disp-15 capsule, R-0Print      lidocaine 4 % external patch Place 1 patch onto the skin daily, Transdermal, DAILY Starting Sat 12/19/2020, Disp-30 patch, R-0, NO PRINT      hydrALAZINE (APRESOLINE) 50 MG tablet TAKE 1/2 TABLET BY MOUTH EVERY 8 HOURS, Disp-90 tablet,R-2Normal      B Complex-C-Folic Acid (VIRT-CAPS) 1 MG CAPS TK ONE C PO  QD, Disp-90 capsule,R-1Normal      Continuous Blood Gluc Sensor (FREESTYLE STEPHANY 14 DAY SENSOR) MISC 1 each by Does not apply route every 14 days, Disp-6 each,R-3Normal      insulin glargine (BASAGLAR KWIKPEN) 100 UNIT/ML injection pen Inject 70 Units into the skin nightly, Disp-15 mL,R-5Normal      traZODone (DESYREL) 150 MG tablet TAKE (1) TABLET BY MOUTH NIGHTLY, Disp-30 tablet,R-10Normal      citalopram (CELEXA) 40 MG tablet C. Difficile.  History of use of recent antibiotics.  Patient does have stool sample sent for C. difficile check.  We will continue monitor patient I will start patient on p.o. vancomycin given high risk for C. Difficile.  Patient has no significant signs of sepsis at this moment.    DNR (do not resuscitate)  Assessment & Plan  Total time spent on advanced care planning, excluding time spent on daily care: 16 minutes.    1st 30 minutes [id number]   Each add’l 30 minutes [id number]        I discussed extensively with patient and patient's family is regarding code status and plan of care. We also discussed advanced care planning including diagnosis, prognosis, plan of care, risks and benefits of any therapies that could be offered, as well as alternatives including palliation and hospice, as appropriate. My discussion is summarized above in detail. Confirmed with DNR      Debility- (present on admission)  Assessment & Plan  Chronic debilitation.  I ordered PT OT    Chronic neck pain- (present on admission)  Assessment & Plan  Chronic neck pain currently no significant worsening signs  No focal neurological deficit    Type 2 diabetes mellitus (HCC)- (present on admission)  Assessment & Plan  Without long-term use of insulin.  Blood sugar controlled.  Uncomplicated.  I start patient on diabetic diet.  I ordered insulin sliding scale  I also ordered hypoglycemic protocol to prevent hypoglycemia    Benign prostatic hyperplasia- (present on admission)  Assessment & Plan  Patient continue to use Flomax  Symptoms okay  Continue outpatient medication      VTE prophylaxis: Lovenox      Current Facility-Administered Medications:   •  [CANCELED] Special Contact Isolation, , , CONTINUOUS **AND** [COMPLETED] C Diff by PCR rflx Toxin, , , Once **AND** Pharmacy Consult Request, 1 Each, Other, PHARMACY TO DOSE, Palomo Bustillo M.D.  •  oxyCODONE-acetaminophen (PERCOCET) 5-325 MG per tablet 1 Tab, 1 Tab, Oral, Q8HRS PRN, Tete Collins M.D., 1 Tab at  08/06/19 2027  •  gabapentin (NEURONTIN) capsule 400 mg, 400 mg, Oral, TID, Tete Collins M.D., 400 mg at 08/06/19 2021  •  acetaminophen (TYLENOL) tablet 650 mg, 650 mg, Oral, Q6HRS PRN, Tete Collins M.D.  •  NS infusion, , Intravenous, Continuous, Tete Collins M.D., Last Rate: 83 mL/hr at 08/06/19 2027  •  [START ON 8/7/2019] enoxaparin (LOVENOX) inj 40 mg, 40 mg, Subcutaneous, DAILY, Tete Collins M.D.  •  vancomycin 50 mg/mL oral soln 125 mg, 125 mg, Oral, Q6HRS, Tete Collins M.D., 125 mg at 08/06/19 2021  •  [START ON 8/7/2019] tamsulosin (FLOMAX) capsule 0.4 mg, 0.4 mg, Oral, AFTER BREAKFAST, Tete Collins M.D.     TAKE (1) TABLET BY MOUTH DAILY, Disp-30 tablet,R-10Normal      insulin aspart (NOVOLOG FLEXPEN) 100 UNIT/ML injection pen Inject 20 Units into the skin 3 times daily (before meals), Disp-15 pen,R-5Normal      clopidogrel (PLAVIX) 75 MG tablet TAKE 1 TABLET BY MOUTH ONCE DAILY, Disp-90 tablet,R-3Normal      pravastatin (PRAVACHOL) 80 MG tablet TAKE (1) TABLET BY MOUTH ONCE DAILY, Disp-90 tablet,R-3Normal      QUEtiapine (SEROQUEL) 50 MG tablet TAKE 1 TABLET BY MOUTH EVERY EVENING, Disp-30 tablet,R-5Normal      isosorbide mononitrate (IMDUR) 30 MG extended release tablet TAKE 1 TABLET BY MOUTH EVERY DAY, Disp-90 tablet,R-10Normal      torsemide (DEMADEX) 100 MG tablet Take 1-2 tablets by mouth daily, Disp-180 tablet,R-3Normal      ondansetron (ZOFRAN ODT) 4 MG disintegrating tablet Take 1 tablet by mouth every 8 hours as needed for Nausea, Disp-60 tablet,R-0Normal      LINZESS 145 MCG capsule TAKE 1 CAPSULE BY MOUTH EVERY MORNING BEFORE BREAKFAST, Disp-30 capsule, R-10Normal      Calcium Acetate, Phos Binder, 667 MG CAPS TAKE 1 CAPSULE BY MOUTH THREE TIMES DAILY WITH MEALS, Disp-90 capsule, R-3Normal      tiZANidine (ZANAFLEX) 4 MG tablet TAKE 1 TABLET BY MOUTH THREE TIMES DAILY, Disp-90 tablet, R-10Normal      DULoxetine (CYMBALTA) 30 MG extended release capsule TAKE 1 CAPSULE BY MOUTH EVERY DAY, Disp-90 capsule, R-10Normal      nystatin-triamcinolone (MYCOLOG II) 953027-7.1 UNIT/GM-% cream Apply topically 2 times daily. , Disp-60 g, R-2, Normal      losartan (COZAAR) 50 MG tablet TAKE (1) TABLET BY MOUTH DAILY, Disp-90 tablet, R-10Normal      pantoprazole (PROTONIX) 40 MG tablet TAKE (1) TABLET BY MOUTH EACH MORNING BEFORE BREAKFAST, Disp-90 tablet, R-10Normal      albuterol (PROVENTIL) (2.5 MG/3ML) 0.083% nebulizer solution INHALE 1 VIAL VIA NEBULIZER EVERY 6 HOURS AS NEEDED FOR WHEEZING, Disp-300 mL, R-10Normal      nystatin (MYCOSTATIN) 505120 UNIT/GM cream Apply topically 2 times daily. , Disp-60 g, R-3, Normal Insulin Syringe-Needle U-100 30G X 1/2\" 0.5 ML MISC DAILY Starting Wed 3/4/2020, Disp-100 each, R-3, Normal      !! blood glucose test strips (FREESTYLE LITE) strip Disp-100 strip, R-3, NormalDaily As needed. !! glucose monitoring kit (FREESTYLE) monitoring kit DAILY Starting Mon 8/19/2019, Disp-1 kit, R-0, Normal      vitamin D (ERGOCALCIFEROL) 39840 units CAPS capsule TK 1 C PO WEEKLY, R-11Historical Med      flunisolide (NASALIDE) 25 MCG/ACT (0.025%) SOLN Inhale 2 sprays into the lungs every 12 hours, Disp-1 Bottle, R-5Normal      Tiotropium Bromide-Olodaterol (STIOLTO RESPIMAT) 2.5-2.5 MCG/ACT AERS Inhale 2 puffs into the lungs daily, Disp-2 Inhaler, R-02 samples given:  Lot #[id number], Exp 7/21 & Lot #[id number], Exp 7/21NO PRINT      Polyethylene Glycol 3350 GRAN Starting Wed 5/2/2018, Historical Med      !! Glucose Blood (BLOOD GLUCOSE TEST STRIPS) STRP TEST 3-4 TIMES DAILY, AS DIRECTED, Disp-100 strip, R-3Normal      Blood Glucose Monitoring Suppl ADAM Disp-1 Device, R-0, NormalUSE AS DIRECTED. Alcohol Swabs PADS Disp-300 each, R-3, NormalUSE AS DIRECTED      albuterol sulfate  (90 Base) MCG/ACT inhaler Inhale 2 puffs into the lungs every 6 hours as needed for Wheezing, Disp-1 Inhaler, R-3Print      ipratropium-albuterol (DUONEB) 0.5-2.5 (3) MG/3ML SOLN nebulizer solution Inhale 3 mLs into the lungs every 6 hours as needed for Shortness of Breath, Disp-360 mL, R-1Print      Lancets MISC Disp-100 each, R-3, PrintTest daily      calcium carbonate (TUMS) 500 MG chewable tablet Take 1 tablet by mouth 3 times daily as needed for Heartburn. aspirin 81 MG chewable tablet Take 1 tablet by mouth daily. , Disp-30 tablet, R-2       !! - Potential duplicate medications found. Please discuss with provider. Time Spent on discharge is more than 30 minutes in the examination, evaluation, counseling and review of medications and discharge plan.       Signed:    Bogdan Kumar MD   2/3/2021      Thank you Leobardo Negrete MD for the opportunity to be involved in this patient's care. If you have any questions or concerns please feel free to contact me at [phone].

## 2021-10-02 NOTE — CARE PLAN
Problem: Nutritional:  Goal: Achieve adequate nutritional intake  Patient will consume >50% of meals  Outcome: NOT MET         Additional Progress Note...

## 2021-10-04 ENCOUNTER — TELEPHONE (OUTPATIENT)
Dept: MEDICAL GROUP | Facility: CLINIC | Age: 81
End: 2021-10-04

## 2021-10-04 NOTE — TELEPHONE ENCOUNTER
Caller Name: Marcelo  Call Back Number: [phone] (home)     How would the patient prefer to be contacted with a response: Phone call OK to leave a detailed message    Marcelo called asking if he can start the wellbutrin discussed in his last visit. He also states that the medication for his tremors only worked for a short time and is no longer working.

## 2021-10-07 RX ORDER — BUPROPION HYDROCHLORIDE 150 MG/1
150 TABLET ORAL EVERY MORNING
Qty: 30 TABLET | Refills: 1 | Status: SHIPPED | OUTPATIENT
Start: 2021-10-07 | End: 2021-11-04

## 2021-10-08 NOTE — TELEPHONE ENCOUNTER
I have attempted to contact pt to inform him of his wellbutrin prescription yesterday and today and there is no answer and no ability to lvm.

## 2021-10-08 NOTE — TELEPHONE ENCOUNTER
Thank you, I reviewed the action that Dr. Wood took and her recommendation.  I agree with the plan.

## 2021-10-26 ENCOUNTER — OFFICE VISIT (OUTPATIENT)
Dept: MEDICAL GROUP | Facility: CLINIC | Age: 81
End: 2021-10-26
Payer: MEDICARE

## 2021-10-26 DIAGNOSIS — M72.0 DUPUYTREN'S CONTRACTURE: ICD-10-CM

## 2021-10-26 DIAGNOSIS — Z00.00 WELLNESS EXAMINATION: ICD-10-CM

## 2021-10-26 DIAGNOSIS — G47.10 HYPERSOMNIA: ICD-10-CM

## 2021-10-26 DIAGNOSIS — R25.1 TREMOR: ICD-10-CM

## 2021-10-26 DIAGNOSIS — G47.00 INSOMNIA, UNSPECIFIED TYPE: ICD-10-CM

## 2021-10-26 DIAGNOSIS — G47.33 OBSTRUCTIVE SLEEP APNEA SYNDROME: ICD-10-CM

## 2021-10-26 PROCEDURE — 99214 OFFICE O/P EST MOD 30 MIN: CPT | Performed by: FAMILY MEDICINE

## 2021-10-27 VITALS
SYSTOLIC BLOOD PRESSURE: 123 MMHG | HEIGHT: 65 IN | OXYGEN SATURATION: 91 % | BODY MASS INDEX: 23.54 KG/M2 | HEART RATE: 80 BPM | DIASTOLIC BLOOD PRESSURE: 82 MMHG | WEIGHT: 141.3 LBS

## 2021-10-27 PROBLEM — G47.10 HYPERSOMNIA: Status: ACTIVE | Noted: 2021-03-25

## 2021-10-27 PROBLEM — N40.1 LOWER URINARY TRACT SYMPTOMS DUE TO BENIGN PROSTATIC HYPERPLASIA: Status: ACTIVE | Noted: 2019-01-28

## 2021-10-27 PROBLEM — M72.0 DUPUYTREN'S CONTRACTURE: Status: ACTIVE | Noted: 2018-10-02

## 2021-10-27 PROBLEM — G47.30 SLEEP APNEA: Status: ACTIVE | Noted: 2021-07-16

## 2021-10-27 PROBLEM — H93.19 SUBJECTIVE TINNITUS: Status: ACTIVE | Noted: 2021-02-23

## 2021-10-27 PROBLEM — H90.3 SENSORINEURAL HEARING LOSS, BILATERAL: Status: ACTIVE | Noted: 2021-02-23

## 2021-10-27 PROBLEM — R25.1 TREMOR: Status: ACTIVE | Noted: 2020-04-16

## 2021-10-27 PROBLEM — Z00.00 WELLNESS EXAMINATION: Status: RESOLVED | Noted: 2021-10-27 | Resolved: 2021-10-27

## 2021-10-27 PROBLEM — H93.299 ABNORMAL AUDITORY PERCEPTION: Status: ACTIVE | Noted: 2021-02-04

## 2021-10-27 PROBLEM — R27.0 ATAXIA: Status: ACTIVE | Noted: 2021-01-18

## 2021-10-27 PROBLEM — L57.0 ACTINIC KERATOSES: Status: ACTIVE | Noted: 2021-07-16

## 2021-10-27 PROBLEM — B35.1 ONYCHOMYCOSIS OF TOENAIL: Status: ACTIVE | Noted: 2019-03-22

## 2021-10-27 PROBLEM — G47.00 INSOMNIA: Status: ACTIVE | Noted: 2021-10-27

## 2021-10-27 PROBLEM — Z00.00 WELLNESS EXAMINATION: Status: ACTIVE | Noted: 2021-10-27

## 2021-10-27 RX ORDER — MODAFINIL 200 MG/1
1 TABLET ORAL DAILY
COMMUNITY
Start: 2021-10-17 | End: 2021-12-17 | Stop reason: SDUPTHER

## 2021-10-27 RX ORDER — PRIMIDONE 50 MG/1
100 TABLET ORAL DAILY
Qty: 90 TABLET | Refills: 3 | Status: SHIPPED | OUTPATIENT
Start: 2021-10-27 | End: 2021-11-12

## 2021-10-27 RX ORDER — VANCOMYCIN HYDROCHLORIDE 125 MG/1
1 CAPSULE ORAL DAILY
COMMUNITY
Start: 2021-10-20 | End: 2023-06-22 | Stop reason: SDUPTHER

## 2021-10-27 RX ORDER — MELOXICAM 7.5 MG/1
7.5 TABLET ORAL
COMMUNITY
Start: 2021-09-09 | End: 2022-09-29

## 2021-10-27 RX ORDER — DOCUSATE SODIUM 100 MG/1
1 CAPSULE, LIQUID FILLED ORAL 2 TIMES DAILY
COMMUNITY
Start: 2020-01-07

## 2021-10-27 RX ORDER — PRIMIDONE 50 MG/1
2 TABLET ORAL DAILY
COMMUNITY
Start: 2021-08-04 | End: 2021-10-27 | Stop reason: SDUPTHER

## 2021-10-27 NOTE — PROGRESS NOTES
Subjective:     CC: sleep problems    HPI:   Marcelo presents today for follow-up of several concerns:    Tremor - started low dose primidone, has not seen any positive effect yet.  Wondering if can increase the dose.  Does not seem to have any adverse side effects.    Depression - started welbutrin several weeks ago.  Seems to be doing well.  No adverse effects.  Reports sleeping 12 hours on/12 hours awake.  Is having hard time falling asleep when he goes to bed around 1am.    Sleep disorder -   Severe MAXIMILIAN on home sleep study.  Patient thinks this was not done right, he doesn't think he followed the tech directions.  Wants to do the sleep study at the lab.  Has been working with [facility], was put on CPAP.  Has been back 3 times because it doesn't fit right and having problems with the machine.    Hypersomnia - sleeping less, as above.  Trouble initiating sleep now.  Has not been able to see a sleep doctor yet.  Has had problems with the referrals and his insurance.  On modafinil currently.    Bipolar/Depression - on lamictal chronically.  Not with psychiatry right now, didn't like his experience with psych through [facility].  Would like to re-establish therapy at some point.    Has some questions about gabapentin - read that tremor can be a side effect.    Also wondering about side effect of primidone that it could cause drowsiness and dupuytren's contractures.    Patient has chronic dupuytren contractures, L hand worse than R.      Patient also reports he got his flu vaccine and 2nd shingles vaccine  UTD covid vaccines      Problem   Insomnia   Actinic Keratoses   Sleep Apnea   Hypersomnia   Sensorineural Hearing Loss, Bilateral   Subjective Tinnitus   Abnormal Auditory Perception   Ataxia   Tremor   Onychomycosis of Toenail   Lower Urinary Tract Symptoms Due to Benign Prostatic Hyperplasia   Dupuytren's Contracture   Wellness Examination (Resolved)       Current Outpatient Medications Ordered in Epic    Medication Sig Dispense Refill   • docusate sodium (COLACE) 100 MG Cap Take 1 Capsule by mouth 2 times a day.     • primidone (MYSOLINE) 50 MG Tab Take 2 Tablets by mouth every day.     • meloxicam (MOBIC) 7.5 MG Tab Take 7.5 mg by mouth 1 time a day as needed.     • modafinil (PROVIGIL) 200 MG Tab Take 1 Tablet by mouth every day.     • vancomycin (VANCOCIN) 125 MG capsule Take 1 Capsule by mouth every day.     • buPROPion (WELLBUTRIN XL) 150 MG XL tablet Take 1 Tablet by mouth every morning. 30 Tablet 1   • ferrous sulfate 325 (65 Fe) MG tablet Take 325 mg by mouth every day.     • gabapentin (NEURONTIN) 300 MG Cap Take 600 mg by mouth 3 times a day. Scheduled  0000  0800  1600     • lactobacillus rhamnosus (CULTURELLE) Cap capsule Take 1 Cap by mouth every morning with breakfast. 30 Cap 11   • oxyCODONE-acetaminophen (PERCOCET) 7.5-325 MG per tablet Take 1 Tab by mouth every 8 hours as needed for Severe Pain.     • alfuzosin (UROXATRAL) 10 MG SR tablet Take 10 mg by mouth every day.     • lamotrigine (LAMICTAL) 200 MG tablet Take 200 mg by mouth every day.       No current [facility]-ordered facility-administered medications on file.       Family History   Family history unknown: Yes        ROS:  Gen: no fevers/chills, no changes in weight.  +sleep disorder   Pulm: no sob, no cough  CV: no chest pain  MSk: +chronic back pain  Skin: no rash  Neuro: +tremor  Heme/Lymph: no easy bruising  Psych: mood is a little more depressed lately.        Objective:     Exam:  There were no vitals taken for this visit. There is no height or weight on file to calculate BMI.    Gen: Alert and oriented, No acute distress. Thin.    Head:  NCAT, EOMI, sclera clear without discharge.  Eyeglasses.  Wearing mask.  Neck: Neck is supple.  Lungs: Normal effort  CV: Regular rate.    Abd:   Non-distended  Ext:  No clubbing, cyanosis, edema.  +dupuytren's contractures bilat hands L worse than R.    MSK: Unassisted gait.  +kyphosis.  Derm: No  lesions on exposed skin  Psych:  normal mood and affect      Assessment & Plan:     81 y.o. male with the following -     Problem List Items Addressed This Visit     Dupuytren's contracture     Chronic, stable.  L>R  Discussed treatment options including supportive care, injections, referral to hand surgeon.  Patient elects to monitor for now.         Hypersomnia     This is a chronic condition  Patient notices some improvement since last visit (now only sleeping apx 12 hrs)  On modafenil, though we discussed stopping this medication today as a trial since patient now having trouble initiating sleep         Sleep apnea     Severe sleep apnea based on home sleep test  Patient has been having a hard time getting CPAP machine to fit and work, despite repeat visits with [facility]  Requests second opinion with sleep study in the sleep lab and consultation with sleep medicine physician.  Placed referral today.           Tremor     Chronic, uncontrolled  Will increase primidone and wean down on gabapentin.  Currently taking 300mg qAM, 400mg qafternoon, and 300mg qPM.    Has 600mg tablets and 400mg tablets at home.  Will decrease to 400mg BID.  If tolerates, can next go down to 300mg BID.    Last time we tried to wean his gabapentin he did not tolerate it with regards to his chronic pain.         Insomnia     Difficulty initiating sleeping, worse since starting welbutrin  Will continue welbutrin which he seems to be tolerating  Stop modafenil as trial         RESOLVED: Wellness examination            No follow-ups on file.    Please note that this dictation was created using voice recognition software. I have made every reasonable attempt to correct obvious errors, but I expect that there are errors of grammar and possibly content that I did not discover before finalizing the note.

## 2021-10-27 NOTE — ASSESSMENT & PLAN NOTE
Chronic, stable.  L>R  Discussed treatment options including supportive care, injections, referral to hand surgeon.  Patient elects to monitor for now.

## 2021-10-27 NOTE — ASSESSMENT & PLAN NOTE
Severe sleep apnea based on home sleep test  Patient has been having a hard time getting CPAP machine to fit and work, despite repeat visits with [facility]  Requests second opinion with sleep study in the sleep lab and consultation with sleep medicine physician.  Placed referral today.

## 2021-10-27 NOTE — ASSESSMENT & PLAN NOTE
Chronic, uncontrolled  Will increase primidone and wean down on gabapentin.  Currently taking 300mg qAM, 400mg qafternoon, and 300mg qPM.    Has 600mg tablets and 400mg tablets at home.  Will decrease to 400mg BID.  If tolerates, can next go down to 300mg BID.    Last time we tried to wean his gabapentin he did not tolerate it with regards to his chronic pain.

## 2021-10-27 NOTE — ASSESSMENT & PLAN NOTE
Difficulty initiating sleeping, worse since starting welbutrin  Will continue welbutrin which he seems to be tolerating  Stop modafenil as trial

## 2021-10-27 NOTE — ASSESSMENT & PLAN NOTE
This is a chronic condition  Patient notices some improvement since last visit (now only sleeping apx 12 hrs)  On modafenil, though we discussed stopping this medication today as a trial since patient now having trouble initiating sleep

## 2021-12-17 ENCOUNTER — OFFICE VISIT (OUTPATIENT)
Dept: MEDICAL GROUP | Facility: CLINIC | Age: 81
End: 2021-12-17
Payer: MEDICARE

## 2021-12-17 DIAGNOSIS — F31.9 BIPOLAR 1 DISORDER, DEPRESSED (HCC): Chronic | ICD-10-CM

## 2021-12-17 DIAGNOSIS — R42 DIZZINESS: ICD-10-CM

## 2021-12-17 DIAGNOSIS — G47.10 HYPERSOMNIA: ICD-10-CM

## 2021-12-17 DIAGNOSIS — R25.1 TREMOR: ICD-10-CM

## 2021-12-17 DIAGNOSIS — F33.1 MODERATE EPISODE OF RECURRENT MAJOR DEPRESSIVE DISORDER (HCC): ICD-10-CM

## 2021-12-17 PROCEDURE — 99214 OFFICE O/P EST MOD 30 MIN: CPT | Performed by: FAMILY MEDICINE

## 2021-12-17 RX ORDER — GABAPENTIN 300 MG/1
300 CAPSULE ORAL 2 TIMES DAILY
Qty: 180 CAPSULE | Refills: 3 | Status: SHIPPED | OUTPATIENT
Start: 2021-12-17 | End: 2022-03-01 | Stop reason: SDUPTHER

## 2021-12-17 RX ORDER — MODAFINIL 200 MG/1
200 TABLET ORAL DAILY
Qty: 30 TABLET | Refills: 2 | Status: SHIPPED | OUTPATIENT
Start: 2021-12-17 | End: 2022-02-04 | Stop reason: SDUPTHER

## 2021-12-17 RX ORDER — BUPROPION HYDROCHLORIDE 150 MG/1
TABLET ORAL
Qty: 60 TABLET | Refills: 11 | Status: SHIPPED | OUTPATIENT
Start: 2021-12-17 | End: 2023-01-11

## 2021-12-18 NOTE — PROGRESS NOTES
"Subjective:     CC: follow-up medications, sleep, and tremor    HPI:   Marcelo presents today for a routine follow-up, unfortunately as he was walking down the hassan to the exam room he developed acute onset symptoms of: dizziness, shakiness, & shortness of breath.  He also developed an increased sense of feeling \"emotional\" during the episode.    On presentation, his vital signs included: elevated BP, tachycardia and hypoxia.   An EKG was performed which was abnormal but some limited utility d/t artifact from patient's tremulousness.    His HR was of normal range, though irregular, on my exam.  O2 returned to the 90s with rest.  BP also lowered with rest to the normal range.    We discussed presentation to the ER for evaluation d/t the sudden onset nature of his symptoms and given his age as well as his irregular heat rhythm on exam.  He denies any chest pain or syncope.  No recent similar episodes.    He did have some hypoxia to the mid 80s on ambulation in the clinic.  Patient has a hx of heart murmur, otherwise no hx of arrhythmia or known CAD.    He has MAXIMILIAN, using CPAP, though has had difficulty finding mask/straps that fit    On modafinil and welbutrin and feels like he can actually wake up at a reasonable hour now.  He denies further trouble initiating sleep and did not actually stop his modafinil.  Requests refill today.    He also has history of depression and bipolar disorder.  Wondering about increasing his wellbutrin because he is emotional at easy triggers like watching television.  We elected to keep his wellbutrin at 300mg for now.  Will continue to work on getting him in with psychology.  Was in therapy previously, has had difficulty establishing with new provider since insurance change.    Tremor is very concerning for him.  Primidone 50mg has not been helping, discussed increasing to 100mg.  Saw neurology, but per their records show that the evaluation was focused on his cervical radiculopathy and did " "not mention his tremor specifically.  Continues to take gabapentin 300mg BID at this time.  He has not noticed improvement in his tremor with reduced dose.  His hand pain is worse lately, but not sure if that is due to the cold weather.    Plans to go to [place] by bus for delfina, wants to know if it is ok    Recurrent c dif infections.  On daily vancomycin.  appt next year with GI  Takes probiotic  Stools normal currently    Problem   Dizziness   Hypersomnia   Tremor   Bipolar 1 Disorder, Depressed (Hcc)       Current Outpatient Medications Ordered in Epic   Medication Sig Dispense Refill   • buPROPion (WELLBUTRIN XL) 150 MG XL tablet 2 tablets PO every day in the morning 60 Tablet 11   • gabapentin (NEURONTIN) 300 MG Cap Take 1 Capsule by mouth 2 times a day. 180 Capsule 3   • modafinil (PROVIGIL) 200 MG Tab Take 1 Tablet by mouth every day for 90 days. 30 Tablet 2   • primidone (MYSOLINE) 50 MG Tab Take 2 tablets (100mg) by mouth daily 180 Tablet 3   • docusate sodium (COLACE) 100 MG Cap Take 1 Capsule by mouth 2 times a day.     • meloxicam (MOBIC) 7.5 MG Tab Take 7.5 mg by mouth 1 time a day as needed.     • vancomycin (VANCOCIN) 125 MG capsule Take 1 Capsule by mouth every day.     • ferrous sulfate 325 (65 Fe) MG tablet Take 325 mg by mouth every day.     • lactobacillus rhamnosus (CULTURELLE) Cap capsule Take 1 Cap by mouth every morning with breakfast. 30 Cap 11   • oxyCODONE-acetaminophen (PERCOCET) 7.5-325 MG per tablet Take 1 Tab by mouth every 8 hours as needed for Severe Pain.     • alfuzosin (UROXATRAL) 10 MG SR tablet Take 10 mg by mouth every day.     • lamotrigine (LAMICTAL) 200 MG tablet Take 200 mg by mouth every day.       No current [facility]-ordered facility-administered medications on file.       Past Medical History:   Diagnosis Date   • Anesthesia     \"takes very little and takes forever to come out of it\"   • Anxiety disorder    • Arthritis     some in my back   • Bipolar disorder (HCC)  "   • Clostridium difficile infection    • Depression    • Enlarged prostate    • GERD (gastroesophageal reflux disease)    • Heart murmur    • Indigestion    • Neck pain         Past Surgical History:   Procedure Laterality Date   • COLONOSCOPY N/A 9/20/2018    Procedure: COLONOSCOPY;  Surgeon: Skyler Johnson M.D.;  Location: [facility];  Service: Gastroenterology   • FECAL TRANSPLANT N/A 9/20/2018    Procedure: FECAL TRANSPLANT;  Surgeon: Skyler Johnson M.D.;  Location: [facility];  Service: Gastroenterology   • COLOSTOMY TAKEDOWN  3/21/2018    Procedure: COLOSTOMY TAKEDOWN;  Surgeon: Jorge Rodriguez M.D.;  Location: [facility];  Service: General   • LUMBAR DECOMPRESSION  12/2017   • EXPLORATORY LAPAROTOMY  7/25/2017    Procedure: EXPLORATORY LAPAROTOMY- ABDOMINAL WASH OUT;  Surgeon: Jorge Rodriguez M.D.;  Location: [facility];  Service:    • SIGMOID COLON RESECTION  7/25/2017    Procedure: SIGMOID COLON RESECTION;  Surgeon: Jorge Rodriguez M.D.;  Location: [facility];  Service:    • COLOSTOMY  7/25/2017    Procedure: COLOSTOMY- FOR OSTOMY PLACEMENT AND OTHER PROCEDURES AS INDICATED;  Surgeon: Jorge Rodriguez M.D.;  Location: [facility];  Service:    • APPENDECTOMY  7/25/2017    Procedure: APPENDECTOMY;  Surgeon: Jorge Rodriguez M.D.;  Location: SUR[facility]ERY [facility];  Service:    • EXC./BIOPSY MASS BACK  1997        Family History   Family history unknown: Yes        ROS:  Gen: +cold intolerance, +hypersomnia  Eyes: no changes in vision  ENT: no sore throat or runny nose  Pulm: +sob, no cough  CV: no chest pain, no palpitations,no syncope  GI: no nausea/vomiting, no diarrhea  : no dysuria  MSk: +chronic back pain, +hand pain  Skin: no rash  Neuro: no headaches, no weakness, +dizziness (acute onset), +tremor  Psych: +emotional      Objective:     Exam:  There were no vitals taken for this visit. There is no  height or weight on file to calculate BMI.    Gen: Alert and oriented, shaky, mild distress  Head:  NCAT, EOMI, sclera clear without discharge, +eyeglasses  Neck: Neck is supple without lymphadenopathy.  Lungs: Normal effort, CTA bilaterally, no wheezes, rhonchi, or rales  CV: Regular rate, irregular rhythm. +3/6 systolic murmur  Abd:   Non-distended, soft  Ext: No clubbing, cyanosis, edema. +bilat hands intention tremor   MSK: shuffling gait, generalized weakness without focal/unilateral weakness  Derm: No lesions on exposed skin  Psych: normal mood and affect, very polite        Assessment & Plan:     81 y.o. male with the following -     Problem List Items Addressed This Visit     Dizziness (Chronic)     Sudden onset acute, new dizziness associated with shortness of breath, hypertension, tachycardia, hypoxia and emotional dysregulation in clinic today  EKG performed which showed: HR 61, PVCs, RBBB (which is baseline for pt), QTc 492.    Difficult to interpret d/t artifact from patient's tremulousness  No obvious ST changes suggestive of acute ischemia  Appears to have p waves present, though again rhythm is somewhat dificult to interpret d/t quality of EKG  VS normalized with rest and short course of supplemental O2 for comfort  Patient agrees to present to ER following this visit ([facility] [place] ER d/t insurance) for further evaluation  Recommended ambulance or friend to drive him.  Patient declines and accepts risks to transport self to the ER.  He demonstrated ability to ambulate independently in the clinic hassan at his baseline mobility without return of presenting symptoms.   Will call patient Monday 12/20 for follow-up after the ER.  He agrees with plan           Depression    Relevant Medications    buPROPion (WELLBUTRIN XL) 150 MG XL tablet    Other Relevant Orders    Referral to Psychology    Bipolar 1 disorder, depressed (HCC)     Chronic  On lamotrigine long term  Referral for psychology for depressive  symptoms, per pt request         Relevant Orders    Referral to Psychology    Hypersomnia     Working with sleep clinic for MAXIMILIAN treatment, including CPAP supplies  Improved somewhat currently on modafinil and wellbutrin, which we will continue at this time  [facility]ed [facility] which is consistent with prescribing hx.  No red flags for abuse, misuse or diversion of this controlled substance medication.         Relevant Medications    modafinil (PROVIGIL) 200 MG Tab    Tremor     On primidone without improvement  Increase from 50mg to 100mg  Re-visit neurology referral  Continue current dose of gabapentin (300mg BID) for his chronic pain and neuropathy (he had worried gabapentin might be contributing to his tremor, though does not seem to have improved with gabapentin dose reduction)         Relevant Orders    Referral to Neurology          No follow-ups on file.    Please note that this dictation was created using voice recognition software. I have made every reasonable attempt to correct obvious errors, but I expect that there are errors of grammar and possibly content that I did not discover before finalizing the note.

## 2021-12-18 NOTE — PATIENT INSTRUCTIONS
Please go directly to [facility] ER for further evaluation of the episode you had when you came into the office.  Symptoms included: dizziness, shakiness, shortness of breath, high blood pressure, mild hypoxia, and emotional dysregulation.     Will increase your primidone to 100 mg daily, for tremor  Continue Wellbutrin 300 mg daily  Continue gabapentin 300 mg twice a day.  We tried to decrease this to see if it would help with your tremor.  However, because your hands have been hurting worse lately (which might be due to the cold) we will leave you at this current dose for now.  Continue modafinil, I will refill today.  Schedule a follow-up appointment with me in 1 month.  I will call you Monday afternoon next week to check in on you.

## 2021-12-20 PROBLEM — R42 DIZZINESS: Chronic | Status: ACTIVE | Noted: 2021-12-20

## 2021-12-20 PROBLEM — F31.9 BIPOLAR 1 DISORDER, DEPRESSED (HCC): Status: ACTIVE | Noted: 2017-07-29

## 2021-12-20 PROBLEM — R42 DIZZINESS: Status: ACTIVE | Noted: 2021-12-20

## 2021-12-20 NOTE — ASSESSMENT & PLAN NOTE
Working with sleep clinic for MAXIMILIAN treatment, including CPAP supplies  Improved somewhat currently on modafinil and wellbutrin, which we will continue at this time  Reviewed [facility]  report which is consistent with prescribing hx.  No red flags for abuse, misuse or diversion of this controlled substance medication.

## 2021-12-20 NOTE — PROGRESS NOTES
ADDENDUM:    Called patient on 12/20/21  Patient reports he presented to the ER after our visit last week as instructed.  He was diagnosed with A fib and prescribed metoprolol and eliquis.  He was told by the pharmacist that his primidone and eliquis interact.  He has an appt with a [facility] cardiologist (Dr. Carrington) on Wednesday to discuss possible ablation procedure he thinks.  He has not had any further episodes similar to the one he had on Friday for which we sent him to the ER (dizziness, shortness of breath, etc)    Plan:  Discontinue primidone  Take the eliquis and metoprolol.  We discussed potential side effects of metoprolol which he is worried about.  He would like to see the neurologist for his tremor, referral has already been placed  May need to discontinue modafinil if causes tachycardia, he will discuss with his cardiologist  Follow-up with me after seeing cardiology

## 2021-12-20 NOTE — ASSESSMENT & PLAN NOTE
Chronic  On lamotrigine long term  Referral for psychology for depressive symptoms, per pt request

## 2021-12-20 NOTE — ASSESSMENT & PLAN NOTE
On primidone without improvement  Increase from 50mg to 100mg  Re-visit neurology referral  Continue current dose of gabapentin (300mg BID) for his chronic pain and neuropathy (he had worried gabapentin might be contributing to his tremor, though does not seem to have improved with gabapentin dose reduction)

## 2021-12-20 NOTE — ASSESSMENT & PLAN NOTE
Sudden onset acute, new dizziness associated with shortness of breath, hypertension, tachycardia, hypoxia and emotional dysregulation in clinic today  EKG performed which showed: HR 61, PVCs, RBBB (which is baseline for pt), QTc 492.    Difficult to interpret d/t artifact from patient's tremulousness  No obvious ST changes suggestive of acute ischemia  Appears to have p waves present, though again rhythm is somewhat dificult to interpret d/t quality of EKG  VS normalized with rest and short course of supplemental O2 for comfort  Patient agrees to present to ER following this visit ([facility] ER d/t insurance) for further evaluation  Recommended ambulance or friend to drive him.  Patient declines and accepts risks to transport self to the ER.  He demonstrated ability to ambulate independently in the clinic hassan at his baseline mobility without return of presenting symptoms.   Will call patient Monday 12/20 for follow-up after the ER.  He agrees with plan

## 2021-12-21 DIAGNOSIS — G47.10 HYPERSOMNIA: ICD-10-CM

## 2021-12-21 RX ORDER — MODAFINIL 200 MG/1
200 TABLET ORAL DAILY
Qty: 30 TABLET | Refills: 3 | OUTPATIENT
Start: 2021-12-21 | End: 2022-01-20

## 2021-12-21 RX ORDER — MODAFINIL 200 MG/1
200 TABLET ORAL DAILY
COMMUNITY
End: 2022-02-01

## 2021-12-21 RX ORDER — MODAFINIL 200 MG/1
200 TABLET ORAL DAILY
Qty: 30 TABLET | Refills: 2 | OUTPATIENT
Start: 2021-12-21 | End: 2022-03-21

## 2022-01-27 RX ORDER — LAMOTRIGINE 200 MG/1
200 TABLET ORAL DAILY
Qty: 90 TABLET | Refills: 3 | Status: SHIPPED | OUTPATIENT
Start: 2022-01-27 | End: 2022-03-15

## 2022-02-01 ENCOUNTER — OFFICE VISIT (OUTPATIENT)
Dept: MEDICAL GROUP | Facility: CLINIC | Age: 82
End: 2022-02-01
Payer: MEDICARE

## 2022-02-01 VITALS
SYSTOLIC BLOOD PRESSURE: 108 MMHG | HEART RATE: 66 BPM | HEIGHT: 68 IN | DIASTOLIC BLOOD PRESSURE: 56 MMHG | BODY MASS INDEX: 21.14 KG/M2 | RESPIRATION RATE: 18 BRPM | WEIGHT: 139.5 LBS

## 2022-02-01 DIAGNOSIS — R58 ECCHYMOSIS: ICD-10-CM

## 2022-02-01 DIAGNOSIS — F32.A DEPRESSION, UNSPECIFIED DEPRESSION TYPE: ICD-10-CM

## 2022-02-01 DIAGNOSIS — I48.0 PAROXYSMAL ATRIAL FIBRILLATION (HCC): ICD-10-CM

## 2022-02-01 DIAGNOSIS — G47.10 HYPERSOMNIA: ICD-10-CM

## 2022-02-01 DIAGNOSIS — A04.71 RECURRENT CLOSTRIDIUM DIFFICILE DIARRHEA: Chronic | ICD-10-CM

## 2022-02-01 DIAGNOSIS — R25.1 TREMOR: ICD-10-CM

## 2022-02-01 DIAGNOSIS — R68.2 DRY MOUTH: ICD-10-CM

## 2022-02-01 PROCEDURE — 99214 OFFICE O/P EST MOD 30 MIN: CPT | Performed by: FAMILY MEDICINE

## 2022-02-01 RX ORDER — L. ACIDOPHILUS/L.BULGARICUS 1MM CELL
TABLET ORAL
COMMUNITY
Start: 2021-12-14 | End: 2022-02-01

## 2022-02-01 RX ORDER — APIXABAN 5 MG/1
5 TABLET, FILM COATED ORAL 2 TIMES DAILY
COMMUNITY
Start: 2022-01-18 | End: 2022-02-01

## 2022-02-02 NOTE — PATIENT INSTRUCTIONS
Will monitor the bruise on your foot, if you have worsening pain or other concerning bruising, please let me know.  Try to wear loose fitting shoes.  Continue your current medications for now  Consider asking your cardiologist or pharmacist about the dry mouth/taste sensation you are having since starting your metoprolol and eliquis.  I am not aware of this side effect with those medications.  You could have dry mouth from other medications such as your modafinil.  Continue good oral hygiene and drink plenty of fluids.  Will request records from urology  Follow-up with neurology and cardiology as scheduled  Continue to try and reach your sleep company to adjust your CPAP machine  Will call the pharmacy about your modafinil

## 2022-02-02 NOTE — PROGRESS NOTES
Subjective:     CC: Follow-up chronic conditions  Multiple complaints    HPI:   Marcelo presents today with:    R foot ecchymosis on dorsal aspect of foot.  Patient denies any injury or known cause.  On eliquis for atrial fibrillation.  Not significantly tender, able to ambulate normally    A fib - on metoprolol tartrate 25mg BID  Has a test planned but having a hard time reaching his cardiology office    Getting a bad taste in his mouth, goes away with eating something.  Has some dry mouth.  Noticed these symptoms only since starting metoprolol and eliquis.  Phlegm - recent onset with the last 3 weeks and the horrible taste  Dry mouth occurs when he sleeps    Sleep is going better, has decreased his daytime sleeping quantity which he is happy with  Thinks depression is doing better  Still not wearing CPAP because he has had significant trouble reaching the sleep center to get his mask fitting problem resolved    Had a neurology appt in January, but he couldn't find the office, so now it was rescheduled for February    Urology in [place] - Dr. Faust.  Had a stent appointment and urine test.  Requesting results.    When arms are out he has a burning sensation.  Doesn't occur in other positions.    Some shortness of breath and chest tightness intermittently at night, which has been ongoing.  Following with cardiology.  Seeing Dr. Nelson, who ordered several tests that he is trying to get approval from the insurance.  Wants to know the names of the tests.  Symptoms not improved since starting metoprolol.  No longer having dizziness.    c dif, chronic - still on vancomycin.  BMs are doing great per patient.  GI doctor would like patient to stay on daily vancomycin for life.  Has had multiple fecal transplants.  He did stop his probiotic as he was told it is not likely to be helpful and it is expensive.      Problem   Ecchymosis   Dry Mouth   Paroxysmal Atrial Fibrillation (Hcc)   Hypersomnia   Tremor   Recurrent  "Clostridium Difficile Diarrhea    - Patient on Van taper        Depression       Current Outpatient Medications Ordered in Epic   Medication Sig Dispense Refill   • [START ON 2/15/2022] modafinil (PROVIGIL) 200 MG Tab Take 1 Tablet by mouth every day for 90 days. 30 Tablet 2   • apixaban (ELIQUIS) 5mg Tab Take 5 mg by mouth 2 times a day.     • metoprolol tartrate (LOPRESSOR) 25 MG Tab Take 25 mg by mouth 2 times a day.     • LAMICTAL 200 MG tablet Take 1 Tablet by mouth every day. 90 Tablet 3   • buPROPion (WELLBUTRIN XL) 150 MG XL tablet 2 tablets PO every day in the morning 60 Tablet 11   • gabapentin (NEURONTIN) 300 MG Cap Take 1 Capsule by mouth 2 times a day. 180 Capsule 3   • docusate sodium (COLACE) 100 MG Cap Take 1 Capsule by mouth 2 times a day.     • meloxicam (MOBIC) 7.5 MG Tab Take 7.5 mg by mouth 1 time a day as needed.     • vancomycin (VANCOCIN) 125 MG capsule Take 1 Capsule by mouth every day.     • oxyCODONE-acetaminophen (PERCOCET) 7.5-325 MG per tablet Take 1 Tab by mouth every 8 hours as needed for Severe Pain.     • alfuzosin (UROXATRAL) 10 MG SR tablet Take 10 mg by mouth every day.     • lactobacillus rhamnosus (CULTURELLE) Cap capsule Take 1 Cap by mouth every morning with breakfast. 30 Cap 11     No current [facility]-ordered facility-administered medications on file.       Past Medical History:   Diagnosis Date   • Anesthesia     \"takes very little and takes forever to come out of it\"   • Anxiety disorder    • Arthritis     some in my back   • Bipolar disorder (HCC)    • Clostridium difficile infection    • Depression    • Enlarged prostate    • GERD (gastroesophageal reflux disease)    • Heart murmur    • Indigestion    • Neck pain         Past Surgical History:   Procedure Laterality Date   • COLONOSCOPY N/A 9/20/2018    Procedure: COLONOSCOPY;  Surgeon: Skyler Johnson M.D.;  Location: SURGERY [facility];  Service: Gastroenterology   • FECAL TRANSPLANT N/A 9/20/2018    Procedure: FECAL " "TRANSPLANT;  Surgeon: Sklyer Johnson M.D.;  Location: [facility];  Service: Gastroenterology   • COLOSTOMY TAKEDOWN  3/21/2018    Procedure: COLOSTOMY TAKEDOWN;  Surgeon: Jorge Rodriguez M.D.;  Location: [facility];  Service: General   • LUMBAR DECOMPRESSION  12/2017   • EXPLORATORY LAPAROTOMY  7/25/2017    Procedure: EXPLORATORY LAPAROTOMY- ABDOMINAL WASH OUT;  Surgeon: Jorge Rodriguez M.D.;  Location: [facility];  Service:    • SIGMOID COLON RESECTION  7/25/2017    Procedure: SIGMOID COLON RESECTION;  Surgeon: Jorge Rodriguez M.D.;  Location: SURGERY [facility];  Service:    • COLOSTOMY  7/25/2017    Procedure: COLOSTOMY- FOR OSTOMY PLACEMENT AND OTHER PROCEDURES AS INDICATED;  Surgeon: Jorge Rodriguez M.D.;  Location: [facility];  Service:    • APPENDECTOMY  7/25/2017    Procedure: APPENDECTOMY;  Surgeon: Jorge Rodriguez M.D.;  Location: SURGERY [facility];  Service:    • EXC./BIOPSY MASS BACK  1997        Family History   Problem Relation Age of Onset   • Diabetes Maternal Uncle    • Diabetes Paternal Grandfather         ROS:  Gen: no fevers/chills, no weight loss, + chronic insomnia  Eyes: no changes in vision  ENT: no sore throat or runny nose, +dry mouth and increased phlegm  Pulm: no sob, no cough  CV: + Chest pain and palpitations  GI: no nausea/vomiting, no diarrhea or constipation  : no dysuria  MSk: no myalgias  Skin: no rash, + bruise on foot (see HPI)  Neuro: no headaches, no weakness, +tremor      Objective:     Exam:  /56   Pulse 66   Resp 18   Ht 1.727 m (5' 8\")   Wt 63.3 kg (139 lb 8 oz)   BMI 21.21 kg/m²  Body mass index is 21.21 kg/m².    Gen:    Alert and oriented, thin, no acute distress, pleasant  Head:  NCAT, EOMI, sclera clear without discharge, eyeglasses, dry mouth with some phlegm in OP  Neck:   Neck is supple without lymphadenopathy.  Lungs: Normal effort, CTA bilaterally, no wheezes, " rhonchi, or rales  CV:      Regular rate, irregular rhythm. +3/6 systolic murmur  Abd:   Non-distended, soft  Ext:      No clubbing, cyanosis, edema. +bilat hands intention tremor   MSK: shuffling gait, kyphosis, generalized weakness without focal/unilateral weakness  Derm: ecchymosis R foot dorsal aspect, mildly tender over the ecchomosys, no severe point tenderness or any bone deformity or crepitus  Psych: normal mood and affect, very polite        Assessment & Plan:     81 y.o. male with the following -     Problem List Items Addressed This Visit     Recurrent Clostridium difficile diarrhea (Chronic)     Continue chronic daily vancomycin  No longer on probiotic         Depression     Stable, some improvement on the welbutrin         Hypersomnia     Diagnosed with MAXIMILIAN, currently not being treated, having trouble reaching sleep supply contact.  Patient will keep trying.  Having trouble with CPAP mask.  Hypersomnia has improved, patient sleeping less during the day and is very happy with this.  We will continue his modafinil.  Discussed this could cause dry mouth symptom.  Reviewed his in the  report which is consistent with prescribing history no red flags for abuse, misuse, or diversion of this controlled substance medication.  Will contact pharmacy as patient having trouble getting one of his refills.         Relevant Medications    modafinil (PROVIGIL) 200 MG Tab (Start on 2/15/2022)    Tremor     Chronic problem, uncontrolled  Patient is very concerned about his tremor. Low-dose primidone did not help. On metoprolol for beta-blocker currently due to atrial fibrillation. Has appointment with neurologist upcoming. Will follow up with me after speaking with the neurologist.         Paroxysmal atrial fibrillation (HCC)     Recent diagnosis  On metoprolol and eliquis  Seeing Dr. Nelson who has ordered an echo, stress test, and a 30 day cardiac monitor.           Relevant Medications    apixaban (ELIQUIS) 5mg Tab     metoprolol tartrate (LOPRESSOR) 25 MG Tab    Ecchymosis     R foot ecchymosis  No obvious trauma per patient  Not significant point tenderness  Ambulating ok  Appears to be in a healing stage  Will monitor  If worsens or does not resolve, can consider XRay of foot           Dry mouth     Unclear etiology  I am not aware of this as a side effect of his metoprolol or anticoagulation. It could be a side effect of other medications that he is on such as modafinil. However patient is adamant that it began after starting his newest medications. Advised him to speak with his pharmacist and cardiologist. I do not want to change these medications just yet without the support of cardiology but I discussed there may be alternatives if this side effect is too severe. In the meantime recommend good dental hygiene, rinsing his mouth out, and making sure to stay hydrated throughout the day.                 No follow-ups on file.    Please note that this dictation was created using voice recognition software. I have made every reasonable attempt to correct obvious errors, but I expect that there are errors of grammar and possibly content that I did not discover before finalizing the note.

## 2022-02-04 PROBLEM — R58 ECCHYMOSIS: Status: ACTIVE | Noted: 2022-02-04

## 2022-02-04 PROBLEM — R68.2 DRY MOUTH: Status: ACTIVE | Noted: 2022-02-04

## 2022-02-04 PROBLEM — I48.0 PAROXYSMAL ATRIAL FIBRILLATION (HCC): Status: ACTIVE | Noted: 2021-12-23

## 2022-02-04 RX ORDER — MODAFINIL 200 MG/1
200 TABLET ORAL DAILY
Qty: 30 TABLET | Refills: 2 | Status: SHIPPED | OUTPATIENT
Start: 2022-02-15 | End: 2022-05-16

## 2022-02-04 NOTE — ASSESSMENT & PLAN NOTE
Unclear etiology  I am not aware of this as a side effect of his metoprolol or anticoagulation. It could be a side effect of other medications that he is on such as modafinil. However patient is adamant that it began after starting his newest medications. Advised him to speak with his pharmacist and cardiologist. I do not want to change these medications just yet without the support of cardiology but I discussed there may be alternatives if this side effect is too severe. In the meantime recommend good dental hygiene, rinsing his mouth out, and making sure to stay hydrated throughout the day.

## 2022-02-04 NOTE — ASSESSMENT & PLAN NOTE
Calling regarding: In-Home Stool Test Results     Caller: Gretchen Skelton     Caller Notes: Patient would like a call back with her In-Home Stool Results.     Phone number to be reached at: [phone]    Recent diagnosis  On metoprolol and eliquis  Seeing Dr. Nelson who has ordered an echo, stress test, and a 30 day cardiac monitor.

## 2022-02-04 NOTE — ASSESSMENT & PLAN NOTE
Chronic problem, uncontrolled  Patient is very concerned about his tremor. Low-dose primidone did not help. On metoprolol for beta-blocker currently due to atrial fibrillation. Has appointment with neurologist upcoming. Will follow up with me after speaking with the neurologist.

## 2022-02-04 NOTE — ASSESSMENT & PLAN NOTE
Diagnosed with MAXIMILIAN, currently not being treated, having trouble reaching sleep supply contact.  Patient will keep trying.  Having trouble with CPAP mask.  Hypersomnia has improved, patient sleeping less during the day and is very happy with this.  We will continue his modafinil.  Discussed this could cause dry mouth symptom.  Reviewed his in the  report which is consistent with prescribing history no red flags for abuse, misuse, or diversion of this controlled substance medication.  Will contact pharmacy as patient having trouble getting one of his refills.

## 2022-02-04 NOTE — ASSESSMENT & PLAN NOTE
R foot ecchymosis  No obvious trauma per patient  Not significant point tenderness  Ambulating ok  Appears to be in a healing stage  Will monitor  If worsens or does not resolve, can consider XRay of foot

## 2022-03-01 RX ORDER — GABAPENTIN 300 MG/1
300 CAPSULE ORAL 2 TIMES DAILY
Qty: 180 CAPSULE | Refills: 11 | Status: SHIPPED | OUTPATIENT
Start: 2022-03-01 | End: 2022-05-25

## 2022-03-01 NOTE — TELEPHONE ENCOUNTER
Patient messaged me via my medical assistant Eva De La Torre who is no longer with the practice, requesting a gabapentin refill.

## 2022-03-15 RX ORDER — LAMOTRIGINE 200 MG/1
200 TABLET ORAL DAILY
Qty: 90 TABLET | Refills: 3 | Status: SHIPPED | OUTPATIENT
Start: 2022-03-15 | End: 2023-01-25

## 2022-03-15 NOTE — TELEPHONE ENCOUNTER
Can you please initiate a prior auth for this medication.  Marcelo has been on lamictal for years and is stable on the medicine.

## 2022-03-18 ENCOUNTER — TELEPHONE (OUTPATIENT)
Dept: MEDICAL GROUP | Facility: CLINIC | Age: 82
End: 2022-03-18
Payer: MEDICARE

## 2022-03-18 RX ORDER — GABAPENTIN 300 MG/1
300 CAPSULE ORAL 3 TIMES DAILY
Qty: 270 CAPSULE | Refills: 3 | Status: SHIPPED | OUTPATIENT
Start: 2022-03-18 | End: 2022-05-25 | Stop reason: SDUPTHER

## 2022-03-18 RX ORDER — GABAPENTIN 300 MG/1
3 CAPSULE ORAL DAILY
COMMUNITY
Start: 2021-12-17 | End: 2022-03-18 | Stop reason: SDUPTHER

## 2022-03-18 NOTE — TELEPHONE ENCOUNTER
DOCUMENTATION OF PAR STATUS:    1. Name of Medication & Dose: Lamictal 200mg     2. Name of Prescription Coverage Company & phone #: [employer] ([id number])    3. Date Prior Auth Submitted: 3/15/2022    4. What information was given to obtain insurance decision? ALL    5. Prior Auth Status? Pending    6. Patient Notified: yes

## 2022-03-18 NOTE — TELEPHONE ENCOUNTER
Received request via: Patient    Was the patient seen in the last year in this department? Yes    Does the patient have an active prescription (recently filled or refills available) for medication(s) requested? Yes     Pt states that his last script refill was written differently (300MG 2x/day) from how he has taken the medication in the past (300 MG 3x/day) and he'll run out next week. Please change script and refill if appropriate. Please call PT if he should be the med at 2x day.

## 2022-05-25 ENCOUNTER — OFFICE VISIT (OUTPATIENT)
Dept: MEDICAL GROUP | Facility: CLINIC | Age: 82
End: 2022-05-25
Payer: MEDICARE

## 2022-05-25 VITALS
HEIGHT: 66 IN | OXYGEN SATURATION: 92 % | SYSTOLIC BLOOD PRESSURE: 120 MMHG | WEIGHT: 135.9 LBS | BODY MASS INDEX: 21.84 KG/M2 | DIASTOLIC BLOOD PRESSURE: 92 MMHG | HEART RATE: 53 BPM

## 2022-05-25 DIAGNOSIS — G62.9 NEUROPATHY: ICD-10-CM

## 2022-05-25 DIAGNOSIS — K21.9 GASTROESOPHAGEAL REFLUX DISEASE WITHOUT ESOPHAGITIS: ICD-10-CM

## 2022-05-25 DIAGNOSIS — A04.71 RECURRENT CLOSTRIDIUM DIFFICILE DIARRHEA: Chronic | ICD-10-CM

## 2022-05-25 DIAGNOSIS — F31.9 BIPOLAR 1 DISORDER, DEPRESSED (HCC): ICD-10-CM

## 2022-05-25 DIAGNOSIS — G47.10 HYPERSOMNIA: ICD-10-CM

## 2022-05-25 DIAGNOSIS — R25.1 TREMOR: ICD-10-CM

## 2022-05-25 DIAGNOSIS — I48.0 PAROXYSMAL ATRIAL FIBRILLATION (HCC): ICD-10-CM

## 2022-05-25 PROCEDURE — 99214 OFFICE O/P EST MOD 30 MIN: CPT | Performed by: FAMILY MEDICINE

## 2022-05-25 RX ORDER — ONDANSETRON 4 MG/1
TABLET, ORALLY DISINTEGRATING ORAL
COMMUNITY
Start: 2022-03-13 | End: 2022-11-01

## 2022-05-25 RX ORDER — LOPERAMIDE HYDROCHLORIDE 2 MG/1
CAPSULE ORAL
COMMUNITY
Start: 2022-03-13

## 2022-05-25 RX ORDER — PROPRANOLOL HYDROCHLORIDE 20 MG/1
40 TABLET ORAL
COMMUNITY
Start: 2022-05-21 | End: 2022-09-29

## 2022-05-25 RX ORDER — GABAPENTIN 300 MG/1
300 CAPSULE ORAL 3 TIMES DAILY
Qty: 90 CAPSULE | Refills: 11 | Status: SHIPPED | OUTPATIENT
Start: 2022-05-25

## 2022-05-25 RX ORDER — BUPROPION HYDROCHLORIDE 150 MG/1
1 TABLET ORAL EVERY MORNING
COMMUNITY
Start: 2021-12-17 | End: 2022-05-25

## 2022-05-25 RX ORDER — FAMOTIDINE 20 MG/1
20 TABLET, FILM COATED ORAL 2 TIMES DAILY
Qty: 60 TABLET | Refills: 1 | Status: SHIPPED | OUTPATIENT
Start: 2022-05-25 | End: 2022-07-20

## 2022-05-25 RX ORDER — MODAFINIL 200 MG/1
200 TABLET ORAL DAILY
COMMUNITY
End: 2022-06-03 | Stop reason: SDUPTHER

## 2022-05-25 RX ORDER — PROPRANOLOL HYDROCHLORIDE 10 MG/1
TABLET ORAL
COMMUNITY
Start: 2022-05-24 | End: 2022-05-25

## 2022-05-25 RX ORDER — BACLOFEN 5 MG/1
2.5 TABLET ORAL 2 TIMES DAILY
COMMUNITY
Start: 2022-05-24 | End: 2022-09-29

## 2022-05-25 RX ORDER — GABAPENTIN 300 MG/1
300 CAPSULE ORAL 3 TIMES DAILY
Qty: 90 CAPSULE | Refills: 11 | Status: SHIPPED | OUTPATIENT
Start: 2022-05-25 | End: 2022-05-25

## 2022-05-25 NOTE — PROGRESS NOTES
Subjective:     CC: follow-up, several issues    HPI:   Marcelo presents today with    Seeing [facility], Dr. Ugarte.  Depression had been out of control.  Getting transcranial magnetic stimulation now.  Is about nursing home through his treatments, thinks he is having some improvement.  Had appt yesterday.  Going to be starting propranolol for tremor (has titration schedule starting with 30mg twice a day and going up to 40mg twice a day)  Will also start baclofen titration from 2.5mg twice a day up to 7.5mg twice a day)    Sees cardiology:    Stopping metoprolol tartrate because of the propranolol for tremor  Has echo and NM myocardial per stress test on 6/21/22    Gabapentin: taking 300mg TID    Still taking modafinil and welbutrin for mood and hypersomnia  Trouble getting to sleep, then cant wake up in the morning.  Sleeping til 1pm in the afternoon.  Takes modafinil and welbutrin at 7:30am  Still on lamictal for bipolar    Dentist did an xray and saw calcification in the carotid arteries.  We discussed pros/cons of checking carotid imaging.  Would not want invasive procedure if at all possible.   Not having any vision loss or stroke symptoms  Discussed pros/cons of carotid artery ultrasound and workup.  Patient     Went to the ER recently for stomach pains  Diagnosed with gastroenteritis, given immodium and sent him home  Doing better at this time  Still on vancomycin daily for his history of recurrent c dif infections (lifelong per GI)    Gets bad taste in mouth when he lies down and is belching a lot and having a lot of phlegm.  No PND.    Problem   Gastroesophageal Reflux Disease Without Esophagitis   Paroxysmal Atrial Fibrillation (Hcc)   Hypersomnia   Tremor   Recurrent Clostridium Difficile Diarrhea    - Patient on Van taper        Neuropathy   Bipolar 1 Disorder, Depressed (Hcc)       Current Outpatient Medications Ordered in Epic   Medication Sig Dispense Refill   • modafinil (PROVIGIL) 200 MG  "Tab Take 1 Tablet by mouth every day for 90 days. 30 Tablet 2   • baclofen (LIORESAL) 5 MG Tab 2.5 mg 2 times a day.     • loperamide (IMODIUM) 2 MG Cap PLEASE SEE ATTACHED FOR DETAILED DIRECTIONS     • propranolol (INDERAL) 20 MG Tab 40 mg.     • gabapentin (NEURONTIN) 300 MG Cap Take 1 Capsule by mouth 3 times a day. 90 Capsule 11   • famotidine (PEPCID) 20 MG Tab Take 1 Tablet by mouth 2 times a day. 60 Tablet 1   • LAMICTAL 200 MG tablet TAKE 1 TABLET BY MOUTH EVERY DAY 90 Tablet 3   • apixaban (ELIQUIS) 5mg Tab Take 5 mg by mouth 2 times a day.     • buPROPion (WELLBUTRIN XL) 150 MG XL tablet 2 tablets PO every day in the morning 60 Tablet 11   • docusate sodium (COLACE) 100 MG Cap Take 1 Capsule by mouth 2 times a day.     • meloxicam (MOBIC) 7.5 MG Tab Take 7.5 mg by mouth 1 time a day as needed.     • vancomycin (VANCOCIN) 125 MG capsule Take 1 Capsule by mouth every day.     • oxyCODONE-acetaminophen (PERCOCET) 7.5-325 MG per tablet Take 1 Tab by mouth every 8 hours as needed for Severe Pain.     • alfuzosin (UROXATRAL) 10 MG SR tablet Take 10 mg by mouth every day.     • ondansetron (ZOFRAN ODT) 4 MG TABLET DISPERSIBLE PLEASE SEE ATTACHED FOR DETAILED DIRECTIONS     • lactobacillus rhamnosus (CULTURELLE) Cap capsule Take 1 Cap by mouth every morning with breakfast. 30 Cap 11     No current [facility]-ordered facility-administered medications on file.       Past Medical History:   Diagnosis Date   • Anesthesia     \"takes very little and takes forever to come out of it\"   • Anxiety disorder    • Arthritis     some in my back   • Bipolar disorder (HCC)    • Clostridium difficile infection    • Depression    • Enlarged prostate    • GERD (gastroesophageal reflux disease)    • Heart murmur    • Indigestion    • Neck pain         Past Surgical History:   Procedure Laterality Date   • COLONOSCOPY N/A 9/20/2018    Procedure: COLONOSCOPY;  Surgeon: Skyler Johnson M.D.;  Location: SURGERY [facility];  Service: " "Gastroenterology   • FECAL TRANSPLANT N/A 9/20/2018    Procedure: FECAL TRANSPLANT;  Surgeon: Skyler Johnson M.D.;  Location: SURGERY [facility] [place];  Service: Gastroenterology   • COLOSTOMY TAKEDOWN  3/21/2018    Procedure: COLOSTOMY TAKEDOWN;  Surgeon: Jorge Rodriguez M.D.;  Location: SURGERY [facility] [place];  Service: General   • LUMBAR DECOMPRESSION  12/2017   • EXPLORATORY LAPAROTOMY  7/25/2017    Procedure: EXPLORATORY LAPAROTOMY- ABDOMINAL WASH OUT;  Surgeon: Jorge Rodriguez M.D.;  Location: SURGERY [facility] [place];  Service:    • SIGMOID COLON RESECTION  7/25/2017    Procedure: SIGMOID COLON RESECTION;  Surgeon: Jorge Rodriguez M.D.;  Location: SURGERY [facility] [place];  Service:    • COLOSTOMY  7/25/2017    Procedure: COLOSTOMY- FOR OSTOMY PLACEMENT AND OTHER PROCEDURES AS INDICATED;  Surgeon: Jorge Rodriguez M.D.;  Location: SURGERY [facility] [place];  Service:    • APPENDECTOMY  7/25/2017    Procedure: APPENDECTOMY;  Surgeon: Jorge Rodriguez M.D.;  Location: SURGERY [facility] [place];  Service:    • EXC./BIOPSY MASS BACK  1997        Family History   Problem Relation Age of Onset   • Diabetes Maternal Uncle    • Diabetes Paternal Grandfather         ROS:  Gen: no fevers/chills, no weight loss, +hypersomnia  HEENT: No vision loss  Pulm: no sob, no cough  CV: no chest pain, +palpitations  GI: no nausea/vomiting, no active abdominal pain or diarrhea, +belching, +bad taste in mouth when lying flat  : no dysuria  MSk: +chronic back pain, arthritis  Skin: no rash  Neuro: +tremors, +neuropathy  Psych: +depression    Objective:     Exam:  BP (!) 120/92 (BP Location: Left arm, Patient Position: Sitting, BP Cuff Size: Adult)   Pulse (!) 53   Ht 1.676 m (5' 6\")   Wt 61.6 kg (135 lb 14.4 oz)   SpO2 92%   BMI 21.93 kg/m²  Body mass index is 21.93 kg/m².    Gen: Alert and oriented, No apparent distress.  Thin.  Pleasant.  Head:  NCAT, EOMI, sclera clear without discharge  Neck: Neck is " supple without lymphadenopathy.  Lungs: Normal effort, CTA bilaterally, no wheezes, rhonchi, or rales  CV: Regular rate, irregular rhythm. No murmurs, rubs, or gallops.  Abd:   Non-distended, soft, non-tender  Ext: No clubbing, cyanosis, edema.  MSK: +kyphosis, +antalgic gait, +joint deformity in hands  Neuro: +intention tremor  Derm: No lesions on exposed skin  Psych: normal mood and affect        Assessment & Plan:     81 y.o. male with the following -     Problem List Items Addressed This Visit     Recurrent Clostridium difficile diarrhea (Chronic)     Chronic  On daily vancomycin for suppression  Follows with GI  Avoid antibiotics for other illnesses unless absolutely necessary           Bipolar 1 disorder, depressed (HCC)     On chronic lamictal for bipolar disorder  On welbutrin for depression  Has been uncontrolled recently, seeing neurology.  Started on transcranial magnetic stimulation treatments           Neuropathy     Chronic condition,  Continue gabapentin           Relevant Medications    baclofen (LIORESAL) 5 MG Tab    gabapentin (NEURONTIN) 300 MG Cap    modafinil (PROVIGIL) 200 MG Tab    Hypersomnia     Chronic  Have tried to adjust sleep schedule, patient is resistant.  Goes to sleep very late, trouble initiating sleep, sleeps in longer than he would like which he attributes partially to depression.  Have tried multiple modalities including sleep referral, antidepressants, behavioral discussions  On modafinil for daytime energy which he is tolerating and would like to continue  Refilled today  CS agreement UTD  Reviewed  report which is consistent with prescribing history  No red flags for medication abuse, misuse or diversion  Continue to follow up every 90 days at least           Relevant Medications    modafinil (PROVIGIL) 200 MG Tab    Tremor     Following with neurology  Starting propranolol trial  Tried primidone previously             Paroxysmal atrial fibrillation (HCC)     Chronic  condition  On anticoagulation  Following with cardiology  Has cardiac testing upcoming  Metoprolol stopped in lieu of propranolol for tremor  Rate controlled today             Relevant Medications    propranolol (INDERAL) 20 MG Tab    Gastroesophageal reflux disease without esophagitis     Suspected GERD  Patient c/o foul taste in mouth when he lies flat and belching  Starting trial of antacid             Relevant Medications    loperamide (IMODIUM) 2 MG Cap    ondansetron (ZOFRAN ODT) 4 MG TABLET DISPERSIBLE    famotidine (PEPCID) 20 MG Tab        Follow-up: 3 months

## 2022-05-25 NOTE — PATIENT INSTRUCTIONS
Try pepcid twice a day for 2-4 weeks to see if it helps with your bad taste sensation and burping.    Follow-up with neurology and cardiology as scheduled    Continue modafinil and welbutrin at current dosing for now.  If your depression improves with your neurostar (magnetic stimulation) treatments, we can consider decreasing your stimulant medicine doses overtime.    Start your propranolol and baclofen for tremor as scheduled.    Call your GI doctor to discuss the other medicine you were curious about for your c dif infections (metronidazole as needed?)

## 2022-06-03 PROBLEM — K21.9 GASTROESOPHAGEAL REFLUX DISEASE WITHOUT ESOPHAGITIS: Status: ACTIVE | Noted: 2022-06-03

## 2022-06-03 RX ORDER — MODAFINIL 200 MG/1
200 TABLET ORAL DAILY
Qty: 30 TABLET | Refills: 2 | Status: SHIPPED | OUTPATIENT
Start: 2022-06-03 | End: 2022-09-01

## 2022-06-04 NOTE — ASSESSMENT & PLAN NOTE
Chronic condition  On anticoagulation  Following with cardiology  Has cardiac testing upcoming  Metoprolol stopped in lieu of propranolol for tremor  Rate controlled today

## 2022-06-04 NOTE — ASSESSMENT & PLAN NOTE
On chronic lamictal for bipolar disorder  On welbutrin for depression  Has been uncontrolled recently, seeing neurology.  Started on transcranial magnetic stimulation treatments

## 2022-06-04 NOTE — ASSESSMENT & PLAN NOTE
Chronic  Have tried to adjust sleep schedule, patient is resistant.  Goes to sleep very late, trouble initiating sleep, sleeps in longer than he would like which he attributes partially to depression.  Have tried multiple modalities including sleep referral, antidepressants, behavioral discussions  On modafinil for daytime energy which he is tolerating and would like to continue  Refilled today  CS agreement UTD  Reviewed  report which is consistent with prescribing history  No red flags for medication abuse, misuse or diversion  Continue to follow up every 90 days at least

## 2022-06-04 NOTE — ASSESSMENT & PLAN NOTE
Chronic  On daily vancomycin for suppression  Follows with GI  Avoid antibiotics for other illnesses unless absolutely necessary

## 2022-06-04 NOTE — ASSESSMENT & PLAN NOTE
Suspected GERD  Patient c/o foul taste in mouth when he lies flat and belching  Starting trial of antacid

## 2022-06-17 NOTE — DISCHARGE INSTRUCTIONS
Discharge Instructions    Discharged to home by car with friend. Discharged via wheelchair, hospital escort: Yes.  Special equipment needed: Not Applicable    Be sure to schedule a follow-up appointment with your primary care doctor or any specialists as instructed.     Discharge Plan:   Diet Plan: Discussed  Activity Level: Discussed  Smoking Cessation Offered: Patient Refused  Confirmed Follow up Appointment: Patient to Call and Schedule Appointment  Confirmed Symptoms Management: Discussed  Medication Reconciliation Updated: Yes  Influenza Vaccine Indication: Not indicated: Previously immunized this influenza season and > 8 years of age    I understand that a diet low in cholesterol, fat, and sodium is recommended for good health. Unless I have been given specific instructions below for another diet, I accept this instruction as my diet prescription.   Other diet: regular    Special Instructions: None    · Is patient discharged on Warfarin / Coumadin?   No     · Is patient Post Blood Transfusion?  No    Depression / Suicide Risk    As you are discharged from this [facility] facility, it is important to learn how to keep safe from harming yourself.    Recognize the warning signs:  · Abrupt changes in personality, positive or negative- including increase in energy   · Giving away possessions  · Change in eating patterns- significant weight changes-  positive or negative  · Change in sleeping patterns- unable to sleep or sleeping all the time   · Unwillingness or inability to communicate  · Depression  · Unusual sadness, discouragement and loneliness  · Talk of wanting to die  · Neglect of personal appearance   · Rebelliousness- reckless behavior  · Withdrawal from people/activities they love  · Confusion- inability to concentrate     If you or a loved one observes any of these behaviors or has concerns about self-harm, here's what you can do:  · Talk about it- your feelings and reasons for harming  LVM in regards to A1C results.   yourself  · Remove any means that you might use to hurt yourself (examples: pills, rope, extension cords, firearm)  · Get professional help from the community (Mental Health, Substance Abuse, psychological counseling)  · Do not be alone:Call your Safe Contact- someone whom you trust who will be there for you.  · Call your local CRISIS HOTLINE 277-6762 or 763-493-0013  · Call your local Children's Mobile Crisis Response Team [place] (985) 566-5159 or wwwVigo  · Call the toll free National Suicide Prevention Hotlines   · National Suicide Prevention Lifeline 203-990-RBAK (2500)  · National Hope Line Network 800-SUICIDE (221-1892)      Colostomy Home Guide  A colostomy is an opening for stool to leave your body when a medical condition prevents it from leaving through the usual opening (rectum). During a surgery, a piece of large intestine (colon) is brought through a hole in the abdominal wall. The new opening is called a stoma or ostomy. A bag or pouch fits over the stoma to catch stool and gas. Your stool may be liquid, somewhat pasty, or formed.  CARING FOR YOUR STOMA   Normally, the stoma looks a lot like the inside of your cheek: pink, red, and moist. At first it may be swollen, but this swelling will decrease within 6 weeks.  Keep the skin around your stoma clean and dry. You can gently wash your stoma and the skin around your stoma in the shower with a clean, soft washcloth. If you develop any skin irritation, your caregiver may give you a stoma powder or ointment to help heal the area. Do not use any products other than those specifically given to you by your caregiver.   Your stoma should not be uncomfortable. If you notice any stinging or burning, your pouch may be leaking, and the skin around your stoma may be coming into contact with stool. This can cause skin irritation. If you notice stinging, replace your pouch with a new one and discard the old one.  OSTOMY POUCHES   The pouch that  fits over the ostomy can be made up of either 1 or 2 pieces. A one-piece pouch has a skin barrier piece and the pouch itself in one unit. A two-piece pouch has a skin barrier with a separate pouch that snaps on and off of the skin barrier. Either way, you should empty the pouch when it is only  to ½ full. Do not let more stool or gas build up. This could cause the pouch to leak.  Some ostomy bags have a built-in gas release valve. Ostomy deodorizer (5 drops) can be put into the pouch to prevent odor. Some people use ostomy lubricant drops inside the pouch to help the stool slide out of the bag more easily and completely.   EMPTYING YOUR OSTOMY POUCH   You may get lessons on how to empty your pouch from a wound-ostomy nurse before you leave the hospital. Here are the basic steps:  · Wash your hands with soap and water.  · Sit far back on the toilet.  · Put several pieces of toilet paper into the toilet water. This will prevent splashing as you empty the stool into the toilet bowl.  · Unclip or unvelcro the tail end of the pouch.  · Unroll the tail and empty stool into the toilet.  · Clean the tail with toilet paper.  · Reroll the tail, and clip or velcro it closed.  · Wash your hands again.  CHANGING YOUR OSTOMY POUCH   Change your ostomy pouch about every 3 to 4 days for the first 6 weeks, then every 5 to7 days. Always change the bag sooner if there is any leakage or you begin to notice any discomfort or irritation of the skin around the stoma. When possible, plan to change your ostomy pouch before eating or drinking as this will lessen the chance of stool coming out during the pouch change. A wound-ostomy nurse may teach you how to change your pouch before you leave the hospital. Here are the basic steps:  · Lay out your supplies.  · Wash your hands with soap and water.  · Carefully remove the old pouch.  · Wash the stoma and allow it to dry. Men may be advised to shave any hair around the stoma very carefully.  This will make the adhesive stick better.  · Use the stoma measuring guide that comes with your pouch set to decide what size hole you will need to cut in the skin barrier piece. Choose the smallest possible size that will hold the stoma but will not touch it.  · Use the guide to trace the [place] on the back of the skin barrier piece. Cut out the hole.  · Hold the skin barrier piece over the stoma to make sure the hole is the correct size.  · Remove the adhesive paper backing from the skin barrier piece.  · Squeeze stoma paste around the opening of the skin barrier piece.  · Clean and dry the skin around the stoma again.  · Carefully fit the skin barrier piece over your stoma.  · If you are using a two-piece pouch, snap the pouch onto the skin barrier piece.  · Close the tail of the pouch.  · Put your hand over the top of the skin barrier piece to help warm it for about 5 minutes, so that it conforms to your body better.  · Wash your hands again.  DIET TIPS   · Continue to follow your usual diet.  · Drink about eight 8 oz glasses of water each day.  · You can prevent gas by eating slowly and chewing your food thoroughly.  · If you feel concerned that you have too much gas, you can cut back on gas-producing foods, such as:  ¨ Spicy foods.  ¨ Onions and garlic.  ¨ Cruciferous vegetables (cabbage, broccoli, cauliflower, [place] sprouts).  ¨ Beans and legumes.  ¨ Some cheeses.  ¨ Eggs.  ¨ Fish.  ¨ Bubbly (carbonated) drinks.  ¨ Chewing gum.  GENERAL TIPS   · You can shower with or without the bag in place.  · Always keep the bag on if you are bathing or swimming.  · If your bag gets wet, you can dry it with a blow-dryer set to cool.  · Avoid wearing tight clothing directly over your stoma so that it does not become irritated or bleed. Tight clothing can also prevent stool from draining into the pouch.  · It is helpful to always have an extra skin barrier and pouch with you when traveling. Do not leave them anywhere too  warm, as parts of them can melt.  · Do not let your seat belt rest on your stoma. Try to keep the seat belt either above or below your stoma, or use a tiny pillow to cushion it.  · You can still participate in sports, but you should avoid activities in which there is a risk of getting hit in the abdomen.  · You can still have sex. It is a good idea to empty your pouch prior to sex. Some people and their partners feel very comfortable seeing the pouch during sex. Others choose to wear lingerie or a T-shirt that covers the device.  SEEK IMMEDIATE MEDICAL CARE IF:  · You notice a change in the size or color of the stoma, especially if it becomes very red, purple, black, or pale white.  · You have bloody stools or bleeding from the stoma.  · You have abdominal pain, nausea, vomiting, or bloating.  · There is anything unusual protruding from the stoma.  · You have irritation or red skin around the stoma.  · No stool is passing from the stoma.  · You have diarrhea (requiring more frequent than normal pouch emptying).     This information is not intended to replace advice given to you by your health care provider. Make sure you discuss any questions you have with your health care provider.     Document Released: 12/20/2004 Document Revised: 03/11/2013 Document Reviewed: 05/16/2012  [employer] Interactive Patient Education ©2016 [employer]

## 2022-07-20 RX ORDER — FAMOTIDINE 20 MG/1
TABLET, FILM COATED ORAL
Qty: 60 TABLET | Refills: 1 | Status: SHIPPED | OUTPATIENT
Start: 2022-07-20 | End: 2022-09-19

## 2022-09-19 RX ORDER — FAMOTIDINE 20 MG/1
TABLET, FILM COATED ORAL
Qty: 60 TABLET | Refills: 1 | Status: SHIPPED | OUTPATIENT
Start: 2022-09-19 | End: 2022-11-01

## 2022-09-21 RX ORDER — PRIMIDONE 50 MG/1
TABLET ORAL
COMMUNITY
Start: 2021-08-04 | End: 2022-09-29

## 2022-09-21 RX ORDER — MODAFINIL 200 MG/1
200 TABLET ORAL DAILY
COMMUNITY
End: 2022-09-29

## 2022-09-21 RX ORDER — MODAFINIL 200 MG/1
200 TABLET ORAL DAILY
Qty: 30 TABLET | Refills: 11 | OUTPATIENT
Start: 2022-09-21 | End: 2023-09-16

## 2022-09-21 NOTE — TELEPHONE ENCOUNTER
Received request via: Patient    Was the patient seen in the last year in this department? Yes    Does the patient have an active prescription (recently filled or refills available) for medication(s) requested? No    Patient requests new Rx through pharmacy

## 2022-09-29 ENCOUNTER — OFFICE VISIT (OUTPATIENT)
Dept: MEDICAL GROUP | Facility: CLINIC | Age: 82
End: 2022-09-29
Payer: MEDICARE

## 2022-09-29 VITALS
TEMPERATURE: 86 F | SYSTOLIC BLOOD PRESSURE: 110 MMHG | OXYGEN SATURATION: 91 % | DIASTOLIC BLOOD PRESSURE: 78 MMHG | HEIGHT: 65 IN | BODY MASS INDEX: 21.48 KG/M2 | WEIGHT: 128.9 LBS

## 2022-09-29 DIAGNOSIS — R21 RASH: ICD-10-CM

## 2022-09-29 DIAGNOSIS — G62.9 NEUROPATHY: ICD-10-CM

## 2022-09-29 DIAGNOSIS — G47.10 HYPERSOMNIA: ICD-10-CM

## 2022-09-29 DIAGNOSIS — G47.00 INSOMNIA, UNSPECIFIED TYPE: ICD-10-CM

## 2022-09-29 DIAGNOSIS — R25.1 TREMOR: ICD-10-CM

## 2022-09-29 DIAGNOSIS — G47.33 OBSTRUCTIVE SLEEP APNEA SYNDROME: ICD-10-CM

## 2022-09-29 DIAGNOSIS — F32.A DEPRESSION, UNSPECIFIED DEPRESSION TYPE: ICD-10-CM

## 2022-09-29 DIAGNOSIS — F31.9 BIPOLAR 1 DISORDER, DEPRESSED (HCC): ICD-10-CM

## 2022-09-29 DIAGNOSIS — A04.71 RECURRENT CLOSTRIDIUM DIFFICILE DIARRHEA: Chronic | ICD-10-CM

## 2022-09-29 DIAGNOSIS — I48.0 PAROXYSMAL ATRIAL FIBRILLATION (HCC): ICD-10-CM

## 2022-09-29 PROCEDURE — 99215 OFFICE O/P EST HI 40 MIN: CPT | Performed by: FAMILY MEDICINE

## 2022-09-29 RX ORDER — ROSUVASTATIN CALCIUM 10 MG/1
10 TABLET, COATED ORAL
COMMUNITY
Start: 2022-07-25 | End: 2022-09-29

## 2022-09-29 RX ORDER — MODAFINIL 100 MG/1
100 TABLET ORAL DAILY
Qty: 30 TABLET | Refills: 2 | Status: SHIPPED | OUTPATIENT
Start: 2022-09-29 | End: 2022-12-28

## 2022-09-29 RX ORDER — TRIAMCINOLONE ACETONIDE 1 MG/G
CREAM TOPICAL
Qty: 30 G | Refills: 2 | Status: SHIPPED | OUTPATIENT
Start: 2022-09-29

## 2022-09-29 NOTE — PATIENT INSTRUCTIONS
Stop rosuvastatin (crestor)  Call GI doctor to ask about c dif test and whether you can stop  vancomycin if it is negative  Reduce the dose of modafinil from 200mg to 100mg (I will look into the prior auth paper you gave me)  New referral placed to psychiatry  I also recommend you see Dr. Berman (psychologist) at our clinic   Stop pepcid (since it is not helping your belching)  Ok with me for you to decreasing your gabapentin (go down to 300mg twice a day and then down to just at night).  If your symptoms are better, you can stop the gabapentin altogether  Try to get your bedtime earlier slowly (example if you go to bed at 1 AM now try to start going to bed at midnight then try going to bed at 11 PM)  Will start trial of atenolol for your tremor (make sure you are not also taking metoprolol or propranolol)  Referred to a new neurologist for worsening tremor  I also placed a referral for a new sleep medicine doctor regarding your sleep apnea and hypersomnia

## 2022-09-29 NOTE — PROGRESS NOTES
Subjective:     CC:     HPI:   Marcelo presents today with:    [facility] had a negative c dif test  Patient is wondering if he will need to continue vancomycin indefinitely if this test was negative  Patient has had recurrent C. difficile infections including fecal transplants and multiple admissions.    Followed with [facility], Dr. Ugarte.  He was not pleased with his tremor care.  Stopped his propranolol because as he said it was not working.  He did not do the recommended transcranial magnetic stimulation treatments because he said they were very costly.  Also stopped the baclofen because of potential interference with welbutrin  Reports he discussed his tremor with the pharmacist who recommended benztropine or atenolol.    Depression, hypersomnia, and bipolar d/o:  On welbutrin, modafinil, and lamictal  Goes to sleep very late, trouble initiating sleep, sleeps in during the day which he is unhappy with.  Feels his depression is worse.  Not currently seeing psychiatry.  Did not tolerate the CPAP machine so stopped using it.  Did not feel that the CPAP helped when he used it.    Worried about medication side effects including jitteriness and dizziness.      Cardiology:  Parox a fib, on eliquis.  No longer on a beta blocker.  Metoprolol was stopped and lieu of propranolol which patient self discontinued as above.  Reviewed cardiology note in May.    Stress Test: 4/19/22 - [facility] -perfusion nuclear stress test, moderate size fixed inferior defect, wall motion in this area was normal possible infradiaphragmatic artifact; LVEF 60%.  Patient reports he was recently started on a statin medication, wondering if he needs to take this    Chronic pain:  On oxy-APAP 7.5-325mg and gabapentin 300mg TID  Patient continues to be worried about potential side effects from gabapentin after reading the package insert.    Belching/foul taste in mouth when lying flat:  tried pepcid, which did not help      Problem  "  Rash   Paroxysmal Atrial Fibrillation (Hcc)   Insomnia   Hypersomnia   Tremor   Recurrent Clostridium Difficile Diarrhea    - Patient on Van taper        Neuropathy   Depression       Current Outpatient Medications Ordered in Epic   Medication Sig Dispense Refill    atenolol (TENORMIN) 25 MG Tab Take 1 Tablet by mouth every day. 90 Tablet 0    triamcinolone acetonide (KENALOG) 0.1 % Cream Apply to affected area (ankles) BID PRN for itching and rash 30 g 2    modafinil (PROVIGIL) 100 MG Tab Take 1 Tablet by mouth every day for 90 days. 30 Tablet 2    famotidine (PEPCID) 20 MG Tab TAKE 1 TABLET BY MOUTH TWICE A DAY 60 Tablet 1    loperamide (IMODIUM) 2 MG Cap PLEASE SEE ATTACHED FOR DETAILED DIRECTIONS      gabapentin (NEURONTIN) 300 MG Cap Take 1 Capsule by mouth 3 times a day. 90 Capsule 11    LAMICTAL 200 MG tablet TAKE 1 TABLET BY MOUTH EVERY DAY 90 Tablet 3    apixaban (ELIQUIS) 5mg Tab Take 5 mg by mouth 2 times a day.      buPROPion (WELLBUTRIN XL) 150 MG XL tablet 2 tablets PO every day in the morning 60 Tablet 11    vancomycin (VANCOCIN) 125 MG capsule Take 1 Capsule by mouth every day.      oxyCODONE-acetaminophen (PERCOCET) 7.5-325 MG per tablet Take 1 Tab by mouth every 8 hours as needed for Severe Pain.      alfuzosin (UROXATRAL) 10 MG SR tablet Take 10 mg by mouth every day.      ondansetron (ZOFRAN ODT) 4 MG TABLET DISPERSIBLE PLEASE SEE ATTACHED FOR DETAILED DIRECTIONS      docusate sodium (COLACE) 100 MG Cap Take 1 Capsule by mouth 2 times a day.       No current [facility]-ordered facility-administered medications on file.       Past Medical History:   Diagnosis Date    Anesthesia     \"takes very little and takes forever to come out of it\"    Anxiety disorder     Arthritis     some in my back    Bipolar disorder (HCC)     Clostridium difficile infection     Depression     Enlarged prostate     GERD (gastroesophageal reflux disease)     Heart murmur     Indigestion     Neck pain         Past Surgical " "History:   Procedure Laterality Date    COLONOSCOPY N/A 9/20/2018    Procedure: COLONOSCOPY;  Surgeon: Skyler Johnson M.D.;  Location: SURGERY [facility];  Service: Gastroenterology    FECAL TRANSPLANT N/A 9/20/2018    Procedure: FECAL TRANSPLANT;  Surgeon: Skyler Johnson M.D.;  Location: SURGERY [facility];  Service: Gastroenterology    COLOSTOMY TAKEDOWN  3/21/2018    Procedure: COLOSTOMY TAKEDOWN;  Surgeon: Jorge Rodriguez M.D.;  Location: SURGERY [facility];  Service: General    LUMBAR DECOMPRESSION  12/2017    EXPLORATORY LAPAROTOMY  7/25/2017    Procedure: EXPLORATORY LAPAROTOMY- ABDOMINAL WASH OUT;  Surgeon: Jorge Rodriguez M.D.;  Location: SURGERY [facility];  Service:     SIGMOID COLON RESECTION  7/25/2017    Procedure: SIGMOID COLON RESECTION;  Surgeon: Jorge Rodriguez M.D.;  Location: SURGERY [facility];  Service:     COLOSTOMY  7/25/2017    Procedure: COLOSTOMY- FOR OSTOMY PLACEMENT AND OTHER PROCEDURES AS INDICATED;  Surgeon: Jorge Rodriguez M.D.;  Location: SURGERY [facility];  Service:     APPENDECTOMY  7/25/2017    Procedure: APPENDECTOMY;  Surgeon: Jorge Rodriguez M.D.;  Location: SURGERY [facility];  Service:     EXC./BIOPSY MASS BACK  1997        Family History   Problem Relation Age of Onset    Diabetes Maternal Uncle     Diabetes Paternal Grandfather           ROS:  Gen: no fevers/chills, + weight loss  Pulm: no sob, no cough  CV: no chest pain, no palpitations  GI: no nausea/vomiting, no diarrhea, +belching  : no dysuria  MSk: +chronic back pain  Skin: no rash  Neuro: +tremor  Psych: +depression, +feels more agitated lately, +insomnia (difficulty initiating sleep). +hypersomnia (unable to wake up during the daytime)      Objective:     Exam:  /78 (BP Location: Left arm, Patient Position: Sitting, BP Cuff Size: Small adult)   Temp (!) 30 °C (86 °F)   Ht 1.655 m (5' 5.16\")   Wt 58.5 kg (128 lb 14.4 oz)   SpO2 91%   BMI 21.35 " kg/m²  Body mass index is 21.35 kg/m².    Gen: Alert and oriented, thin, pleasant  Head:  NCAT, EOMI, sclera clear without discharge, +eyeglasses  Neck: Neck is supple without lymphadenopathy.  Lungs: Normal effort, CTA bilaterally, no wheezes, rhonchi, or rales  CV: regular rate and irregular rhythm. No murmurs, rubs, or gallops.  Abd:   Non-distended, soft  Ext: No edema.  +scattered erythematous rash bilat lower legs consistent with stasis dermatitis  MSK: kyphosis, shuffling gait, contractures of hands  Derm: No lesions on exposed skin  Psych: depressed mood, anxious affect, good eye contact, logical thought process, laughs and emotes normally during visit      Assessment & Plan:     81 y.o. male with the following -     Problem List Items Addressed This Visit       Recurrent Clostridium difficile diarrhea (Chronic)     Follows with GI  On daily vancomycin  Patient asked if he could stop the vancomycin given a negative c dif test at [facility].  I am unable to see this test record to further advise patient.  I encouraged him to reach out to his GI doctor to discuss this, as it is my impression that it was recommended for him to stay on vancomycin indefinitely given the severity and recurrent nature of his past C. difficile infections.         Depression     Patient is worried his depression is uncontrolled given increased agitation recently and ongoing daytime hypersomnia.  Patient asked if we could increase his Wellbutrin dose.  I felt that this was counter effective given that Wellbutrin is activating and could worsen his agitation as well as his difficulty initiating sleep.  I highly recommend revisiting psychiatry.  Also encouraged patient to see Dr. Berman, psychologist at [facility], for additional recommendations for psychotherapy.         Neuropathy     Patient on gabapentin 3 times daily  Again, patient is concerned about potential medication side effects  We have tried to reduce his gabapentin  dose in the past to see if it is helpful without increasing his pain significantly.  We discussed a plan to reduce gabapentin even further given patient's concerns and desire to be on less medications.         Relevant Medications    modafinil (PROVIGIL) 100 MG Tab    Hypersomnia     Chronic condition, uncontrolled  Patient has trouble initiating sleep at night and then sleeps throughout the day which she is very unhappy with.  He has moderate to severe depression and a diagnosis of chronic bipolar disorder.  He had a sleep study which showed severe sleep apnea, though he has concerns about the accuracy of the test.  He was on CPAP which he did not think helped his symptoms.  He has since had problems with the mask so stopped using it.  He takes modafinil for daytime energy which he is tolerating though he frequently has concerns about medication side affects which have been very difficult to pinpoint due to polypharmacy.  Agreed to decrease his modafinil dose from 200 mg to 100 mg today, to see if this helps his side effect profile and also to see if improves his ability to initiate sleep.  I again stressed the importance of trying to slowly bring his bedtime earlier in the night as he often does not try to go to sleep until 1 AM or later.  He has been resistant to this in the past calling himself a night owl.  I placed another referral to sleep medicine.  Would like him to discuss with the sleep specialist his issues with CPAP and daytime hypersomnia.  He agrees to this referral.    With regards to his modafinil.  I reviewed his [facility] report which is consistent with prescription history.  No red flags for medication abuse, misuse, or diversion.  He is aware that this is a controlled substance and must follow-up at least every 90 days for refills.  I sent in #30 with 2 refills at this time.  He also has a controlled substance agreement on file that is up-to-date.    I also encouraged him to schedule monthly  appointments given the complexity of his symptoms.  Since it is difficult for him to get in to see me this frequently, he agreed to follow-up with resident physician with my supervision of his care.         Relevant Medications    modafinil (PROVIGIL) 100 MG Tab    Other Relevant Orders    Referral to Pulmonary and Sleep Medicine    Sleep apnea    Relevant Orders    Referral to Pulmonary and Sleep Medicine    Tremor     Uncontrolled  Patient was unhappy with previous neurology consultation  Did not have improvement with primidone or propranolol  Discussed pharmacist's recommendations of benztropine and atenolol.  Opted not to start benztropine given age and that he has been on Lamictal for many years before having the tremor.  He is not on any antipsychotic medications.  Discussed atenolol unlikely to have significant benefit given no relief on propranolol, though it does have added benefit of rate control with his atrial fibrillation, so we sent in low dose atenolol to try.  Patient now with resting pill like tremor in office today.  He agrees to see another neurologist for second opinion as this symptom is very distressing to him and he has thus far not had relief with other treatment trials.           Relevant Orders    Referral to Neurology    Insomnia     See above in hypersomnia  Difficulty initiating sleep  Decrease modafinil dose  Reminded patient to take wellbutrin and modafinil in the morning, which he reports he is consistent with.         Paroxysmal atrial fibrillation (HCC)     Chronic condition, on anticoagulation  Heart rate within normal limits  Not currently taking a beta-blocker at this time  Reviewed cardiology note.  Was recently started on atorvastatin.  Patient asked if this was necessary.  We discussed risks/benefits/indications for statin therapy.  Given his age and preference to be on less medications we discontinued the atorvastatin at this time.         Relevant Medications    atenolol  (TENORMIN) 25 MG Tab    Rash     Patient complains of rash on shins pruritic.  He has been using over-the-counter cortisone cream which has been ineffective.  Sent Rx triamcinolone 0.1% cream  Rash consistent with stasis dermatitis in appearance, though no edema is present.            I spent a total of 55 minutes with record review, exam, communication with the patient, communication with other providers, and documentation of this encounter.    Follow-up:highly recommend monthly visits

## 2022-10-02 PROBLEM — R21 RASH: Status: ACTIVE | Noted: 2022-10-02

## 2022-10-02 RX ORDER — ATENOLOL 25 MG/1
25 TABLET ORAL DAILY
Qty: 90 TABLET | Refills: 0 | Status: SHIPPED | OUTPATIENT
Start: 2022-10-02 | End: 2022-11-01

## 2022-10-03 NOTE — ASSESSMENT & PLAN NOTE
Patient is worried his depression is uncontrolled given increased agitation recently and ongoing daytime hypersomnia.  Patient asked if we could increase his Wellbutrin dose.  I felt that this was counter effective given that Wellbutrin is activating and could worsen his agitation as well as his difficulty initiating sleep.  I highly recommend revisiting psychiatry.  Also encouraged patient to see Dr. Berman, psychologist at [facility], for additional recommendations for psychotherapy.

## 2022-10-03 NOTE — ASSESSMENT & PLAN NOTE
Chronic condition, on anticoagulation  Heart rate within normal limits  Not currently taking a beta-blocker at this time  Reviewed cardiology note.  Was recently started on atorvastatin.  Patient asked if this was necessary.  We discussed risks/benefits/indications for statin therapy.  Given his age and preference to be on less medications we discontinued the atorvastatin at this time.

## 2022-10-03 NOTE — ASSESSMENT & PLAN NOTE
Follows with GI  On daily vancomycin  Patient asked if he could stop the vancomycin given a negative c dif test at [facility].  I am unable to see this test record to further advise patient.  I encouraged him to reach out to his GI doctor to discuss this, as it is my impression that it was recommended for him to stay on vancomycin indefinitely given the severity and recurrent nature of his past C. difficile infections.

## 2022-10-03 NOTE — ASSESSMENT & PLAN NOTE
Uncontrolled  Patient was unhappy with previous neurology consultation  Did not have improvement with primidone or propranolol  Discussed pharmacist's recommendations of benztropine and atenolol.  Opted not to start benztropine given age and that he has been on Lamictal for many years before having the tremor.  He is not on any antipsychotic medications.  Discussed atenolol unlikely to have significant benefit given no relief on propranolol, though it does have added benefit of rate control with his atrial fibrillation, so we sent in low dose atenolol to try.  Patient now with resting pill like tremor in office today.  He agrees to see another neurologist for second opinion as this symptom is very distressing to him and he has thus far not had relief with other treatment trials.

## 2022-10-03 NOTE — ASSESSMENT & PLAN NOTE
Patient on gabapentin 3 times daily  Again, patient is concerned about potential medication side effects  We have tried to reduce his gabapentin dose in the past to see if it is helpful without increasing his pain significantly.  We discussed a plan to reduce gabapentin even further given patient's concerns and desire to be on less medications.

## 2022-10-03 NOTE — ASSESSMENT & PLAN NOTE
Patient complains of rash on shins pruritic.  He has been using over-the-counter cortisone cream which has been ineffective.  Sent Rx triamcinolone 0.1% cream  Rash consistent with stasis dermatitis in appearance, though no edema is present.

## 2022-10-03 NOTE — ASSESSMENT & PLAN NOTE
Chronic condition, uncontrolled  Patient has trouble initiating sleep at night and then sleeps throughout the day which she is very unhappy with.  He has moderate to severe depression and a diagnosis of chronic bipolar disorder.  He had a sleep study which showed severe sleep apnea, though he has concerns about the accuracy of the test.  He was on CPAP which he did not think helped his symptoms.  He has since had problems with the mask so stopped using it.  He takes modafinil for daytime energy which he is tolerating though he frequently has concerns about medication side affects which have been very difficult to pinpoint due to polypharmacy.  Agreed to decrease his modafinil dose from 200 mg to 100 mg today, to see if this helps his side effect profile and also to see if improves his ability to initiate sleep.  I again stressed the importance of trying to slowly bring his bedtime earlier in the night as he often does not try to go to sleep until 1 AM or later.  He has been resistant to this in the past calling himself a night owl.  I placed another referral to sleep medicine.  Would like him to discuss with the sleep specialist his issues with CPAP and daytime hypersomnia.  He agrees to this referral.    With regards to his modafinil.  I reviewed his [place]  report which is consistent with prescription history.  No red flags for medication abuse, misuse, or diversion.  He is aware that this is a controlled substance and must follow-up at least every 90 days for refills.  I sent in #30 with 2 refills at this time.  He also has a controlled substance agreement on file that is up-to-date.    I also encouraged him to schedule monthly appointments given the complexity of his symptoms.  Since it is difficult for him to get in to see me this frequently, he agreed to follow-up with resident physician with my supervision of his care.

## 2022-10-03 NOTE — ASSESSMENT & PLAN NOTE
See above in hypersomnia  Difficulty initiating sleep  Decrease modafinil dose  Reminded patient to take wellbutrin and modafinil in the morning, which he reports he is consistent with.

## 2022-11-01 ENCOUNTER — OFFICE VISIT (OUTPATIENT)
Dept: MEDICAL GROUP | Facility: CLINIC | Age: 82
End: 2022-11-01
Payer: MEDICARE

## 2022-11-01 VITALS — DIASTOLIC BLOOD PRESSURE: 70 MMHG | HEART RATE: 60 BPM | TEMPERATURE: 98.6 F | SYSTOLIC BLOOD PRESSURE: 130 MMHG

## 2022-11-01 DIAGNOSIS — I48.0 PAROXYSMAL ATRIAL FIBRILLATION (HCC): ICD-10-CM

## 2022-11-01 DIAGNOSIS — L21.9 SEBORRHEIC DERMATITIS OF SCALP: ICD-10-CM

## 2022-11-01 DIAGNOSIS — Z00.00 PREVENTATIVE HEALTH CARE: ICD-10-CM

## 2022-11-01 DIAGNOSIS — E11.00 TYPE 2 DIABETES MELLITUS WITH HYPEROSMOLARITY WITHOUT COMA, WITHOUT LONG-TERM CURRENT USE OF INSULIN (HCC): ICD-10-CM

## 2022-11-01 DIAGNOSIS — R25.1 TREMOR: ICD-10-CM

## 2022-11-01 LAB
HBA1C MFR BLD: 5.4 % (ref 0–5.6)
INT CON NEG: NORMAL
INT CON POS: NORMAL

## 2022-11-01 PROCEDURE — 83036 HEMOGLOBIN GLYCOSYLATED A1C: CPT | Performed by: STUDENT IN AN ORGANIZED HEALTH CARE EDUCATION/TRAINING PROGRAM

## 2022-11-01 PROCEDURE — 99214 OFFICE O/P EST MOD 30 MIN: CPT | Mod: GC | Performed by: STUDENT IN AN ORGANIZED HEALTH CARE EDUCATION/TRAINING PROGRAM

## 2022-11-01 RX ORDER — METOPROLOL SUCCINATE 25 MG/1
25 TABLET, EXTENDED RELEASE ORAL DAILY
Qty: 90 TABLET | Refills: 0 | Status: SHIPPED | OUTPATIENT
Start: 2022-11-01 | End: 2023-06-22 | Stop reason: SDUPTHER

## 2022-11-01 NOTE — PROGRESS NOTES
"[facility]    Chief Complaint   Patient presents with    Follow-Up       HISTORY OF PRESENT ILLNESS: Patient is a 82 y.o. male established patient who presents today to discuss the medical issues below.    #History of UTIs   -Patient states he has a history of multiple urinary infections where \"it is excruciating\" to urinate   -States that he has noticed intermittent burning over the last week  -No urgency or frequency   -No fevers/chills or belly pain   -History of C. Diff     #A-fib  -Current HR is 60 and irregularly irregular   -No chest pain, lightheadedness, diaphoresis   -Discussed starting metoprolol given side effects of atenolol and patient agreed to switch     #Rash   -Appears to be dry skin on arm and seborrhic dermatitis on forehead     Patient Active Problem List    Diagnosis Date Noted    Seborrheic dermatitis of scalp 11/01/2022    Rash 10/02/2022    Gastroesophageal reflux disease without esophagitis 06/03/2022    Ecchymosis 02/04/2022    Dry mouth 02/04/2022    Paroxysmal atrial fibrillation (HCC) 12/23/2021    Dizziness 12/20/2021    Insomnia 10/27/2021    Actinic keratoses 07/16/2021    Sleep apnea 07/16/2021    Hypersomnia 03/25/2021    Sensorineural hearing loss, bilateral 02/23/2021    Subjective tinnitus 02/23/2021    Abnormal auditory perception 02/04/2021    Ataxia 01/18/2021    Tremor 04/16/2020    Leukopenia 09/17/2019    Vitamin B12 deficiency 09/17/2019    Alcohol use 09/17/2019    DNR (do not resuscitate) 08/06/2019    Onychomycosis of toenail 03/22/2019    Lower urinary tract symptoms due to benign prostatic hyperplasia 01/28/2019    Dupuytren's contracture 10/02/2018    UTI (urinary tract infection) 04/11/2018    Cachexia (HCC) 02/03/2018    Hypokalemia, gastrointestinal losses 02/01/2018    Anemia 01/30/2018    Debility 01/26/2018    Chronic neck pain 12/24/2017    Recurrent Clostridium difficile diarrhea 12/19/2017    Type 2 diabetes mellitus with hyperosmolarity without " "coma, without long-term current use of insulin ([facility]) 12/19/2017    Neuropathy 08/12/2017    Bipolar 1 disorder, depressed ([facility]) 07/29/2017    Normocytic anemia 07/27/2017    Pelvic abscess in male ([facility]) 07/14/2017    Benign prostatic hyperplasia 07/14/2017    Depression 07/14/2017    Osteoarthritis 07/14/2017       Allergies:Acetaminophen    Current Outpatient Medications   Medication Sig Dispense Refill    metoprolol SR (TOPROL XL) 25 MG TABLET SR 24 HR Take 1 Tablet by mouth every day. 90 Tablet 0    triamcinolone acetonide (KENALOG) 0.1 % Cream Apply to affected area (ankles) BID PRN for itching and rash 30 g 2    modafinil (PROVIGIL) 100 MG Tab Take 1 Tablet by mouth every day for 90 days. 30 Tablet 2    loperamide (IMODIUM) 2 MG Cap PLEASE SEE ATTACHED FOR DETAILED DIRECTIONS      gabapentin (NEURONTIN) 300 MG Cap Take 1 Capsule by mouth 3 times a day. 90 Capsule 11    LAMICTAL 200 MG tablet TAKE 1 TABLET BY MOUTH EVERY DAY 90 Tablet 3    apixaban (ELIQUIS) 5mg Tab Take 5 mg by mouth 2 times a day.      buPROPion (WELLBUTRIN XL) 150 MG XL tablet 2 tablets PO every day in the morning 60 Tablet 11    docusate sodium (COLACE) 100 MG Cap Take 1 Capsule by mouth 2 times a day.      vancomycin (VANCOCIN) 125 MG capsule Take 1 Capsule by mouth every day.      oxyCODONE-acetaminophen (PERCOCET) 7.5-325 MG per tablet Take 1 Tab by mouth every 8 hours as needed for Severe Pain.      alfuzosin (UROXATRAL) 10 MG SR tablet Take 10 mg by mouth every day.       No current facility-administered medications for this visit.         Past Medical History:   Diagnosis Date    Anesthesia     \"takes very little and takes forever to come out of it\"    Anxiety disorder     Arthritis     some in my back    Bipolar disorder ([facility])     Clostridium difficile infection     Depression     Enlarged prostate     GERD (gastroesophageal reflux disease)     Heart murmur     Indigestion     Neck pain        Social History     Tobacco Use    Smoking " status: Former     Packs/day: 2.00     Years: 14.00     Pack years: 28.00     Types: Cigarettes     Quit date: 3/21/1973     Years since quittin.6    Smokeless tobacco: Never   Vaping Use    Vaping Use: Never used   Substance Use Topics    Alcohol use: Yes     Alcohol/week: 0.0 oz     Comment: 3 glasses wine/day    Drug use: Yes     Comment: history of cocaine and marijuana use still about 10 years ago. Denies any IV drug use.       Family Status   Relation Name Status    MUnc  (Not Specified)    PGFa  (Not Specified)     Family History   Problem Relation Age of Onset    Diabetes Maternal Uncle     Diabetes Paternal Grandfather        ROS:  Negative except as stated above.       Exam:    Vitals:    22   BP: 130/70   Pulse: 60   Temp: 37 °C (98.6 °F)      There is no height or weight on file to calculate BMI.  General:  Well nourished, well developed male in NAD.  HENT: Normocephalic, bilateral TMs are intact, nasal and oral mucosa with no lesions,   Neck: Supple without bruit. Thyroid is not enlarged, no nodules palpated.  Pulmonary: Clear to ausculation.  Normal effort. No rales, rhonchi, or wheezing.  Cardiovascular: Heart rate is irregularly irregular. No murmurs, rubs, heaves.   Abdomen: Normal bowel sounds, soft and nontender, no palpable liver, spleen, or masses.  Extremities: No LE edema noted. 5/5 strength in all extremities. Dry skin on right arm. Erythematous rash at hairline on head.   Neuro: Grossly nonfocal.  Psych: Alert and oriented to person, place, and time. Appropriate mood and conversation.            Assessment/Plan:    1. Preventative health care  POCT A1C    CANCELED: POCT Urinalysis      2. Tremor  Referral to Neurology      3. Paroxysmal atrial fibrillation (HCC)  metoprolol SR (TOPROL XL) 25 MG TABLET SR 24 HR      4. Type 2 diabetes mellitus with hyperosmolarity without coma, without long-term current use of insulin (HCC)        5. Seborrheic dermatitis of scalp             Orders Placed This Encounter    Referral to Neurology    POCT A1C    metoprolol SR (TOPROL XL) 25 MG TABLET SR 24 HR       Tremor  Chronic.  Patient has history of likely essential tremor.  Normal neuro exam.  This is been managed with multiple medications including propranolol with limited effect.  Patient was referred to neurology for further assistance with this problem, did not agree with first neurologist would like a second opinion.  Additional referral was placed today so that patient could attempt to see another neurologist.  Plan:  Stop atenolol  Refer to neurology    Paroxysmal atrial fibrillation (HCC)  Chronic.  Patient has history of paroxysmal atrial fibrillation.  Currently being managed by cardiology, recs appreciated.  He originally was taken off Metroprolol and transitioned to propranolol given to little effect with tremor, but did not tolerate this medication.  He also did not tolerate atenolol.  Plan:  Continue following with cardiology  Start Metroprolol succinate 25 mg daily for rate control      Type 2 diabetes mellitus with hyperosmolarity without coma, without long-term current use of insulin ([facility])  Repeat A1c was completed in office today and was 5.4.    Seborrheic dermatitis of scalp  Patient noted to have rash at hairline which is likely indicative of support dermatitis.  Patient advised to use head and shoulders to treat this as it was very mild and not causing patient much pain/discomfort.  We will continue to follow.      Followup: Return in about 1 month (around 12/1/2022).     Tyrese Taylor MD   [facility]   PGY-3

## 2022-11-02 ENCOUNTER — TELEPHONE (OUTPATIENT)
Dept: MEDICAL GROUP | Facility: CLINIC | Age: 82
End: 2022-11-02
Payer: MEDICARE

## 2022-11-02 NOTE — ASSESSMENT & PLAN NOTE
Chronic.  Patient has history of paroxysmal atrial fibrillation.  Currently being managed by cardiology, recs michelle.  He originally was taken off Metroprolol and transitioned to propranolol given to little effect with tremor, but did not tolerate this medication.  He also did not tolerate atenolol.  Plan:  Continue following with cardiology  Start Metroprolol succinate 25 mg daily for rate control

## 2022-11-02 NOTE — ASSESSMENT & PLAN NOTE
Patient noted to have rash at hairline which is likely indicative of support dermatitis.  Patient advised to use head and shoulders to treat this as it was very mild and not causing patient much pain/discomfort.  We will continue to follow.

## 2022-11-02 NOTE — ASSESSMENT & PLAN NOTE
Chronic.  Patient has history of likely essential tremor.  Normal neuro exam.  This is been managed with multiple medications including propranolol with limited effect.  Patient was referred to neurology for further assistance with this problem, did not agree with first neurologist would like a second opinion.  Additional referral was placed today so that patient could attempt to see another neurologist.  Plan:  Stop atenolol  Refer to neurology

## 2022-11-02 NOTE — TELEPHONE ENCOUNTER
I confirmed with [employer] [facility] that the pt's modafinil has been cleared by insurance and is ready for pickup. Patient has been notified by .

## 2022-11-04 ENCOUNTER — PATIENT MESSAGE (OUTPATIENT)
Dept: HEALTH INFORMATION MANAGEMENT | Facility: OTHER | Age: 82
End: 2022-11-04

## 2022-11-07 ENCOUNTER — OFFICE VISIT (OUTPATIENT)
Dept: MEDICAL GROUP | Facility: CLINIC | Age: 82
End: 2022-11-07
Payer: MEDICARE

## 2022-11-07 DIAGNOSIS — F32.A DEPRESSION, UNSPECIFIED DEPRESSION TYPE: ICD-10-CM

## 2022-11-07 PROCEDURE — 90834 PSYTX W PT 45 MINUTES: CPT | Performed by: PSYCHOLOGIST

## 2022-11-07 NOTE — PROGRESS NOTES
" [facility]  Psychotherapy Consult    Full therapy note has been documented and is under restricted viewing.  Please see below for summary of today's session.     Patient Name: Marcelo Colon  Patient MRN: [id number]  Today's Date:  22    Type of session: intake assessment  Session start time: 1  Session stop time: 1:45  Length of time spent face to face with patient: 45  Persons in attendance: patient    Diagnoses:   1. Depression, unspecified depression type       Pt. States \"I'm overwhelmed with depression...\" he sleeps all day, and feels depressed.  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      No SI.  No ALO.    States he has no goals.  Used to be busy and physically active.    Lives alone on a fixed income, states he is comfortable.  Few friends, many have  or moved.  No kids.  Many ex-romantic relationships.    Not connected to a spiritual community.    Loved being a , now can't do that work.    Accepted referral to [facility] and to [facility] for ongoing counseling.  And to [facility] (showed him the website, encouraged him to go get a physical catalogue), as a way of finding things to do that are interesting.  And can f/u with this writer for support/consultation.      Aspen Berman, Ph.D.       "

## 2022-12-08 ENCOUNTER — TELEPHONE (OUTPATIENT)
Dept: MEDICAL GROUP | Facility: CLINIC | Age: 82
End: 2022-12-08
Payer: MEDICARE

## 2022-12-08 NOTE — TELEPHONE ENCOUNTER
A representative from [facility] ([phone]) called to verify whether Marcelo is currently taking Rosuvastatin. If yes, they state that he needs a new script with refills submitted.    Rosuvastatin is not listed on the pt's current med list but IS listed as a historical med. I called Marcelo and left a vm asking that he call me back at [phone] to let me me know if this is a current med. I am awaiting a reply from the pt.    I will also pass along the question to Dr Sellers for consideration.

## 2022-12-09 NOTE — TELEPHONE ENCOUNTER
I called [facility] ([phone]) and left a message asked for a return call;     Per Dr Sellers, the pt has discontinued treatment with Rosuvastatin.

## 2022-12-14 NOTE — TELEPHONE ENCOUNTER
Rosuvastatin has been discontinued per Dr Sellers and confirmed with pt, Marcelo Colon.    I called [facility] at [phone] and they will clear the medication from the pt's med refill list.

## 2023-01-11 RX ORDER — BUPROPION HYDROCHLORIDE 150 MG/1
TABLET ORAL
Qty: 180 TABLET | Refills: 3 | Status: SHIPPED | OUTPATIENT
Start: 2023-01-11 | End: 2023-03-14 | Stop reason: SDUPTHER

## 2023-01-25 RX ORDER — LAMOTRIGINE 200 MG/1
TABLET ORAL
Qty: 90 TABLET | Refills: 3 | Status: SHIPPED | OUTPATIENT
Start: 2023-01-25 | End: 2023-04-17

## 2023-03-14 ENCOUNTER — HOSPITAL ENCOUNTER (OUTPATIENT)
Facility: MEDICAL CENTER | Age: 83
End: 2023-03-14
Attending: STUDENT IN AN ORGANIZED HEALTH CARE EDUCATION/TRAINING PROGRAM
Payer: MEDICARE

## 2023-03-14 ENCOUNTER — OFFICE VISIT (OUTPATIENT)
Dept: MEDICAL GROUP | Facility: CLINIC | Age: 83
End: 2023-03-14
Payer: MEDICARE

## 2023-03-14 DIAGNOSIS — F33.1 MODERATE EPISODE OF RECURRENT MAJOR DEPRESSIVE DISORDER (HCC): ICD-10-CM

## 2023-03-14 DIAGNOSIS — N48.6 PEYRONIE'S SYNDROME: ICD-10-CM

## 2023-03-14 DIAGNOSIS — R30.0 DYSURIA: ICD-10-CM

## 2023-03-14 DIAGNOSIS — N53.12 PAINFUL EJACULATION: ICD-10-CM

## 2023-03-14 LAB
APPEARANCE UR: CLEAR
BILIRUB UR STRIP-MCNC: NORMAL MG/DL
COLOR UR AUTO: YELLOW
FORWARD REASON: SPWHY: NORMAL
FORWARDED TO LAB: SPWHR: NORMAL
GLUCOSE UR STRIP.AUTO-MCNC: NORMAL MG/DL
KETONES UR STRIP.AUTO-MCNC: NORMAL MG/DL
LEUKOCYTE ESTERASE UR QL STRIP.AUTO: NORMAL
NITRITE UR QL STRIP.AUTO: NORMAL
PH UR STRIP.AUTO: 6.5 [PH] (ref 5–8)
PROT UR QL STRIP: NORMAL MG/DL
RBC UR QL AUTO: NORMAL
SP GR UR STRIP.AUTO: 1.02
SPECIMEN SENT: SPWT1: NORMAL
UROBILINOGEN UR STRIP-MCNC: 0.2 MG/DL

## 2023-03-14 PROCEDURE — 81002 URINALYSIS NONAUTO W/O SCOPE: CPT | Performed by: STUDENT IN AN ORGANIZED HEALTH CARE EDUCATION/TRAINING PROGRAM

## 2023-03-14 PROCEDURE — 99214 OFFICE O/P EST MOD 30 MIN: CPT | Mod: GE | Performed by: STUDENT IN AN ORGANIZED HEALTH CARE EDUCATION/TRAINING PROGRAM

## 2023-03-14 RX ORDER — BUPROPION HYDROCHLORIDE 150 MG/1
300 TABLET ORAL EVERY MORNING
Qty: 180 TABLET | Refills: 3 | Status: SHIPPED | OUTPATIENT
Start: 2023-03-14 | End: 2023-06-22

## 2023-03-14 ASSESSMENT — PATIENT HEALTH QUESTIONNAIRE - PHQ9
5. POOR APPETITE OR OVEREATING: 3 - NEARLY EVERY DAY
SUM OF ALL RESPONSES TO PHQ QUESTIONS 1-9: 23
CLINICAL INTERPRETATION OF PHQ2 SCORE: 6

## 2023-03-14 NOTE — PROGRESS NOTES
"[facility]    Chief Complaint   Patient presents with    Follow-Up       HISTORY OF PRESENT ILLNESS: Patient is a 82 y.o. male established patient who presents today to discuss the medical issues below.    Patient presented with multiple complaints. Each complaint was hard to tackle in entirety as patient kept bringing up other complaints and was hard to redirect. Had multiple issues with our clinic as he felt \"no one really took the time to listen to him\". Educated patient on that we were only given 20 minutes to see him in entirety per visit, but he is welcome to make multiple appointments so that we can slowly accomplish his goals. He did not seem pleased with this alternative as it was \"such a struggle to get in today\". Empathized with patient and provided support.     #Pain with ejaculation   -started 9 months ago   -Everytime he ejaculates he experiences pain   -Not maintaining normal erections   -Not currently sexually active   -Last sexual encounter years ago     #Burning with urination   -Been ongoing for 2-3 months   -No fevers/chills   -Patient experiences burning in the morning, minor burning during the day   -Trouble starting stream   -Dribbling afterwards   -No increased frequency, but increased urgency     #Depression  -poorly controlled     Patient Active Problem List    Diagnosis Date Noted    Painful ejaculation 03/15/2023    Dysuria 03/14/2023    Peyronie's syndrome 03/14/2023    Seborrheic dermatitis of scalp 11/01/2022    Rash 10/02/2022    Gastroesophageal reflux disease without esophagitis 06/03/2022    Ecchymosis 02/04/2022    Dry mouth 02/04/2022    Paroxysmal atrial fibrillation (HCC) 12/23/2021    Dizziness 12/20/2021    Insomnia 10/27/2021    Actinic keratoses 07/16/2021    Sleep apnea 07/16/2021    Hypersomnia 03/25/2021    Sensorineural hearing loss, bilateral 02/23/2021    Subjective tinnitus 02/23/2021    Abnormal auditory perception 02/04/2021    Ataxia 01/18/2021    Tremor " 04/16/2020    Leukopenia 09/17/2019    Vitamin B12 deficiency 09/17/2019    Alcohol use 09/17/2019    DNR (do not resuscitate) 08/06/2019    Onychomycosis of toenail 03/22/2019    Lower urinary tract symptoms due to benign prostatic hyperplasia 01/28/2019    Dupuytren's contracture 10/02/2018    UTI (urinary tract infection) 04/11/2018    Cachexia ([facility]) 02/03/2018    Hypokalemia, gastrointestinal losses 02/01/2018    Anemia 01/30/2018    Debility 01/26/2018    Chronic neck pain 12/24/2017    Recurrent Clostridium difficile diarrhea 12/19/2017    Type 2 diabetes mellitus with hyperosmolarity without coma, without long-term current use of insulin ([facility]) 12/19/2017    Neuropathy 08/12/2017    Bipolar 1 disorder, depressed ([facility]) 07/29/2017    Normocytic anemia 07/27/2017    Pelvic abscess in male ([facility]) 07/14/2017    Benign prostatic hyperplasia 07/14/2017    Depression 07/14/2017    Osteoarthritis 07/14/2017       Allergies:Acetaminophen    Current Outpatient Medications   Medication Sig Dispense Refill    buPROPion (WELLBUTRIN XL) 150 MG XL tablet Take 2 Tablets by mouth every morning. TAKE 2 TABLETS BY MOUTH EVERY DAY IN THE MORNING 180 Tablet 3    LAMICTAL 200 MG tablet TAKE 1 TABLET BY MOUTH EVERY DAY-PAR 90 Tablet 3    metoprolol SR (TOPROL XL) 25 MG TABLET SR 24 HR Take 1 Tablet by mouth every day. 90 Tablet 0    triamcinolone acetonide (KENALOG) 0.1 % Cream Apply to affected area (ankles) BID PRN for itching and rash 30 g 2    loperamide (IMODIUM) 2 MG Cap PLEASE SEE ATTACHED FOR DETAILED DIRECTIONS      gabapentin (NEURONTIN) 300 MG Cap Take 1 Capsule by mouth 3 times a day. 90 Capsule 11    apixaban (ELIQUIS) 5mg Tab Take 5 mg by mouth 2 times a day.      docusate sodium (COLACE) 100 MG Cap Take 1 Capsule by mouth 2 times a day.      vancomycin (VANCOCIN) 125 MG capsule Take 1 Capsule by mouth every day.      oxyCODONE-acetaminophen (PERCOCET) 7.5-325 MG per tablet Take 1 Tab by mouth every 8 hours as needed for  "Severe Pain.      alfuzosin (UROXATRAL) 10 MG SR tablet Take 10 mg by mouth every day.       No current facility-administered medications for this visit.         Past Medical History:   Diagnosis Date    Anesthesia     \"takes very little and takes forever to come out of it\"    Anxiety disorder     Arthritis     some in my back    Bipolar disorder (HCC)     Clostridium difficile infection     Depression     Enlarged prostate     GERD (gastroesophageal reflux disease)     Heart murmur     Indigestion     Neck pain        Social History     Tobacco Use    Smoking status: Former     Packs/day: 2.00     Years: 14.00     Pack years: 28.00     Types: Cigarettes     Quit date: 3/21/1973     Years since quittin.0    Smokeless tobacco: Never   Vaping Use    Vaping Use: Never used   Substance Use Topics    Alcohol use: Yes     Alcohol/week: 0.0 oz     Comment: 3 glasses wine/day    Drug use: Yes     Comment: history of cocaine and marijuana use still about 10 years ago. Denies any IV drug use.       Family Status   Relation Name Status    MUnc  (Not Specified)    PGFa  (Not Specified)     Family History   Problem Relation Age of Onset    Diabetes Maternal Uncle     Diabetes Paternal Grandfather        ROS:  Negative except as stated above.       Exam:    Vitals:    23 0853   BP: (P) 122/78   Pulse: (P) 76   Temp: (P) 36.5 °C (97.7 °F)   SpO2: (P) 96%   Body mass index is 20.63 kg/m² (pended).  General:  Well nourished, well developed male in NAD.  HENT: Normocephalic, bilateral TMs are intact, nasal and oral mucosa with no lesions,   Neck: Supple without bruit. Thyroid is not enlarged, no nodules palpated.  Pulmonary: Clear to ausculation.  Normal effort. No rales, rhonchi, or wheezing.  Cardiovascular: Regular rate and rhythm without murmur.   Abdomen: Normal bowel sounds, soft and nontender, no palpable liver, spleen, or masses.  Extremities: No LE edema noted. 5/5 strength in all extremities  Neuro: Grossly " "nonfocal.  Psych: Alert and oriented to person, place, and time. Appropriate mood and conversation.              Assessment/Plan:    1. Dysuria  POCT Urinalysis    URINE CULTURE(NEW)    CANCELED: URINALYSIS      2. Peyronie's syndrome        3. Moderate episode of recurrent major depressive disorder (HCC)  Referral to Psychiatry    Referral to Psychology    buPROPion (WELLBUTRIN XL) 150 MG XL tablet      4. Painful ejaculation            Orders Placed This Encounter    URINE CULTURE(NEW)    Referral to Psychiatry    Referral to Psychology    POCT Urinalysis    buPROPion (WELLBUTRIN XL) 150 MG XL tablet       Dysuria  Chronic.   Ongoing for 2-3 months. Not associated with other urinary symptoms. Patient states these symptoms fluctuate throughout the day which is not characteristic of a UTI and denies any systemic symptoms or flank pain. Physical exam was benign. Urinalysis was completed and largely normal other than trace leukocyte esterase. This was subsequently sent for urinary culture. Held off on antibiotics given patient's history of C. Diff infections s/p fecal transplants. We will treat if culture comes back positive.   Plan:   -Follow up on urine culture   -Treat if positive per directed cultures and sensitivities   -Directed patient to urology as may be secondary to interstitial cystitis or BPH obstructive symptoms, infectious etiology unlikely given history    Peyronie's syndrome  Patient has history of peyronie's disease and has been worked up by urology in the past. He refused repair of this because the \"symptoms have never been that bad\". Discussed this could be a factor in his urinary symptoms and provided education today. Advised patient to follow up with urology.     Painful ejaculation  Chronic.   Ongoing for 9 months. No blood noted in ejaculate. Has not been sexually active \"in years\" and denies history of STDs. Infectious etiology unlikely given lack of systemic symptoms, cloudy urine, and " infective sounding urinary symptoms. Provided education that these symptoms might be related to obstruction or his peyronie's disease.   Plan:   -Advised patient to follow up with urology   -We will follow on the urinary culture and treat accordingly if positive     Depression  Chronic.   Patient states that his depression is poorly controlled. PHQ-9 completed in office was positive for severe depression. He denies any SI/HI. Patient recently saw Dr. Berman in office and was directed to additional resources. Also provided patient with referrals to psychology and psychiatry today given that patient was confused on where to go after previous discussions. Strongly advised patient to follow up with both these specialties. Discussed increasing his wellbutrin. Patient would like to trail this and advised him of the risks/side effects.   Plan:   -Increase wellbutrin to 300mg daily   -Referral placed to psychology and psychiatry       Followup: Return in about 4 weeks (around 4/11/2023).     Tyrese Taylor MD   [facility]   PGY-3

## 2023-03-15 PROBLEM — N53.12 PAINFUL EJACULATION: Status: ACTIVE | Noted: 2023-03-15

## 2023-03-15 NOTE — ASSESSMENT & PLAN NOTE
Chronic.   Patient states that his depression is poorly controlled. PHQ-9 completed in office was positive for severe depression. He denies any SI/HI. Patient recently saw Dr. Berman in office and was directed to additional resources. Also provided patient with referrals to psychology and psychiatry today given that patient was confused on where to go after previous discussions. Strongly advised patient to follow up with both these specialties. Discussed increasing his wellbutrin. Patient would like to trail this and advised him of the risks/side effects.   Plan:   -Increase wellbutrin to 300mg daily   -Referral placed to psychology and psychiatry

## 2023-03-15 NOTE — ASSESSMENT & PLAN NOTE
"Patient has history of peyronie's disease and has been worked up by urology in the past. He refused repair of this because the \"symptoms have never been that bad\". Discussed this could be a factor in his urinary symptoms and provided education today. Advised patient to follow up with urology.   "

## 2023-03-15 NOTE — ASSESSMENT & PLAN NOTE
"Chronic.   Ongoing for 9 months. No blood noted in ejaculate. Has not been sexually active \"in years\" and denies history of STDs. Infectious etiology unlikely given lack of systemic symptoms, cloudy urine, and infective sounding urinary symptoms. Provided education that these symptoms might be related to obstruction or his peyronie's disease.   Plan:   -Advised patient to follow up with urology   -We will follow on the urinary culture and treat accordingly if positive   "

## 2023-03-15 NOTE — ASSESSMENT & PLAN NOTE
Chronic.   Ongoing for 2-3 months. Not associated with other urinary symptoms. Patient states these symptoms fluctuate throughout the day which is not characteristic of a UTI and denies any systemic symptoms or flank pain. Physical exam was benign. Urinalysis was completed and largely normal other than trace leukocyte esterase. This was subsequently sent for urinary culture. Held off on antibiotics given patient's history of C. Diff infections s/p fecal transplants. We will treat if culture comes back positive.   Plan:   -Follow up on urine culture   -Treat if positive per directed cultures and sensitivities   -Directed patient to urology as may be secondary to interstitial cystitis or BPH obstructive symptoms, infectious etiology unlikely given history

## 2023-03-20 DIAGNOSIS — N30.00 ACUTE CYSTITIS WITHOUT HEMATURIA: ICD-10-CM

## 2023-03-20 RX ORDER — NITROFURANTOIN 25; 75 MG/1; MG/1
100 CAPSULE ORAL 2 TIMES DAILY
Qty: 10 CAPSULE | Refills: 0 | Status: SHIPPED | OUTPATIENT
Start: 2023-03-20 | End: 2023-04-17

## 2023-03-23 DIAGNOSIS — N48.6 PEYRONIE'S SYNDROME: ICD-10-CM

## 2023-03-23 DIAGNOSIS — N13.8 BENIGN PROSTATIC HYPERPLASIA WITH URINARY OBSTRUCTION: ICD-10-CM

## 2023-03-23 DIAGNOSIS — N39.0 RECURRENT UTI: ICD-10-CM

## 2023-03-23 DIAGNOSIS — N53.12 PAINFUL EJACULATION: ICD-10-CM

## 2023-03-23 DIAGNOSIS — N40.1 BENIGN PROSTATIC HYPERPLASIA WITH URINARY OBSTRUCTION: ICD-10-CM

## 2023-04-06 ENCOUNTER — PATIENT MESSAGE (OUTPATIENT)
Dept: MEDICAL GROUP | Facility: CLINIC | Age: 83
End: 2023-04-06
Payer: MEDICARE

## 2023-04-17 ENCOUNTER — OFFICE VISIT (OUTPATIENT)
Dept: MEDICAL GROUP | Facility: CLINIC | Age: 83
End: 2023-04-17
Payer: MEDICARE

## 2023-04-17 DIAGNOSIS — F31.9 BIPOLAR 1 DISORDER, DEPRESSED (HCC): ICD-10-CM

## 2023-04-17 DIAGNOSIS — K21.9 GASTROESOPHAGEAL REFLUX DISEASE WITHOUT ESOPHAGITIS: ICD-10-CM

## 2023-04-17 DIAGNOSIS — G47.00 INSOMNIA, UNSPECIFIED TYPE: ICD-10-CM

## 2023-04-17 DIAGNOSIS — N39.0 RECURRENT UTI: ICD-10-CM

## 2023-04-17 DIAGNOSIS — E11.00 TYPE 2 DIABETES MELLITUS WITH HYPEROSMOLARITY WITHOUT COMA, WITHOUT LONG-TERM CURRENT USE OF INSULIN (HCC): ICD-10-CM

## 2023-04-17 LAB
HBA1C MFR BLD: 5.6 % (ref ?–5.8)
POCT INT CON NEG: NEGATIVE
POCT INT CON POS: POSITIVE

## 2023-04-17 PROCEDURE — 99213 OFFICE O/P EST LOW 20 MIN: CPT | Mod: GE | Performed by: STUDENT IN AN ORGANIZED HEALTH CARE EDUCATION/TRAINING PROGRAM

## 2023-04-17 PROCEDURE — 83036 HEMOGLOBIN GLYCOSYLATED A1C: CPT | Performed by: STUDENT IN AN ORGANIZED HEALTH CARE EDUCATION/TRAINING PROGRAM

## 2023-04-17 RX ORDER — NITROFURANTOIN 25; 75 MG/1; MG/1
100 CAPSULE ORAL 2 TIMES DAILY
Qty: 10 CAPSULE | Refills: 0 | Status: SHIPPED | OUTPATIENT
Start: 2023-04-17 | End: 2023-04-22

## 2023-04-17 RX ORDER — OMEPRAZOLE 20 MG/1
20 CAPSULE, DELAYED RELEASE ORAL DAILY
Qty: 60 CAPSULE | Refills: 0 | Status: SHIPPED | OUTPATIENT
Start: 2023-04-17 | End: 2023-05-18

## 2023-04-17 RX ORDER — MODAFINIL 100 MG/1
100 TABLET ORAL DAILY
Qty: 30 TABLET | Refills: 0 | Status: SHIPPED | OUTPATIENT
Start: 2023-04-17 | End: 2023-05-19

## 2023-04-17 RX ORDER — LAMOTRIGINE 200 MG/1
200 TABLET ORAL DAILY
Qty: 90 TABLET | Refills: 3 | Status: SHIPPED | OUTPATIENT
Start: 2023-04-17 | End: 2023-05-18

## 2023-04-17 NOTE — ASSESSMENT & PLAN NOTE
Chronic.  Patient has history of recurrent UTIs.  Has been encouraged to see urology as we feel this is likely secondary to chronic obstruction and urinary stasis.  Patient recently had positive urine culture for Enterococcus which is sensitive to Macrobid.  Patient has not started taking the antibiotics yet as he states that pharmacy had no record of these antibiotics.  Reviewed medication history today and see record of me sending antibiotics to [facility].  Will attempt to send medication again.  Plan:  Start Macrobid 100mg twice daily for 5 days  Make appoint with urology as soon as possible

## 2023-04-17 NOTE — PROGRESS NOTES
[facility]    Chief Complaint   Patient presents with    Follow-Up       HISTORY OF PRESENT ILLNESS: Patient is a 82 y.o. male established patient who presents today to discuss the medical issues below.        Patient Active Problem List    Diagnosis Date Noted    Painful ejaculation 03/15/2023    Dysuria 03/14/2023    Peyronie's syndrome 03/14/2023    Seborrheic dermatitis of scalp 11/01/2022    Rash 10/02/2022    Gastroesophageal reflux disease without esophagitis 06/03/2022    Ecchymosis 02/04/2022    Dry mouth 02/04/2022    Paroxysmal atrial fibrillation (HCC) 12/23/2021    Dizziness 12/20/2021    Insomnia 10/27/2021    Actinic keratoses 07/16/2021    Sleep apnea 07/16/2021    Hypersomnia 03/25/2021    Sensorineural hearing loss, bilateral 02/23/2021    Subjective tinnitus 02/23/2021    Abnormal auditory perception 02/04/2021    Ataxia 01/18/2021    Tremor 04/16/2020    Leukopenia 09/17/2019    Vitamin B12 deficiency 09/17/2019    Alcohol use 09/17/2019    DNR (do not resuscitate) 08/06/2019    Onychomycosis of toenail 03/22/2019    Lower urinary tract symptoms due to benign prostatic hyperplasia 01/28/2019    Dupuytren's contracture 10/02/2018    Recurrent UTI 04/11/2018    Cachexia ([facility]) 02/03/2018    Hypokalemia, gastrointestinal losses 02/01/2018    Anemia 01/30/2018    Debility 01/26/2018    Chronic neck pain 12/24/2017    Recurrent Clostridium difficile diarrhea 12/19/2017    Type 2 diabetes mellitus with hyperosmolarity without coma, without long-term current use of insulin ([facility]) 12/19/2017    Neuropathy 08/12/2017    Bipolar 1 disorder, depressed ([facility]) 07/29/2017    Normocytic anemia 07/27/2017    Pelvic abscess in male ([facility]) 07/14/2017    Benign prostatic hyperplasia 07/14/2017    Depression 07/14/2017    Osteoarthritis 07/14/2017       Allergies:Acetaminophen    Current Outpatient Medications   Medication Sig Dispense Refill    lamotrigine (LAMICTAL) 200 MG tablet Take 1 Tablet by mouth every  "day. 90 Tablet 3    nitrofurantoin (MACROBID) 100 MG Cap Take 1 Capsule by mouth 2 times a day for 5 days. 10 Capsule 0    modafinil (PROVIGIL) 100 MG Tab Take 1 Tablet by mouth every day for 30 days. Indications: Drowsiness 30 Tablet 0    omeprazole (PRILOSEC) 20 MG delayed-release capsule Take 1 Capsule by mouth every day. 60 Capsule 0    buPROPion (WELLBUTRIN XL) 150 MG XL tablet Take 2 Tablets by mouth every morning. TAKE 2 TABLETS BY MOUTH EVERY DAY IN THE MORNING 180 Tablet 3    metoprolol SR (TOPROL XL) 25 MG TABLET SR 24 HR Take 1 Tablet by mouth every day. 90 Tablet 0    triamcinolone acetonide (KENALOG) 0.1 % Cream Apply to affected area (ankles) BID PRN for itching and rash 30 g 2    loperamide (IMODIUM) 2 MG Cap PLEASE SEE ATTACHED FOR DETAILED DIRECTIONS      gabapentin (NEURONTIN) 300 MG Cap Take 1 Capsule by mouth 3 times a day. 90 Capsule 11    apixaban (ELIQUIS) 5mg Tab Take 5 mg by mouth 2 times a day.      docusate sodium (COLACE) 100 MG Cap Take 1 Capsule by mouth 2 times a day.      vancomycin (VANCOCIN) 125 MG capsule Take 1 Capsule by mouth every day.      oxyCODONE-acetaminophen (PERCOCET) 7.5-325 MG per tablet Take 1 Tab by mouth every 8 hours as needed for Severe Pain.      alfuzosin (UROXATRAL) 10 MG SR tablet Take 10 mg by mouth every day.       No current facility-administered medications for this visit.         Past Medical History:   Diagnosis Date    Anesthesia     \"takes very little and takes forever to come out of it\"    Anxiety disorder     Arthritis     some in my back    Bipolar disorder (HCC)     Clostridium difficile infection     Depression     Enlarged prostate     GERD (gastroesophageal reflux disease)     Heart murmur     Indigestion     Neck pain        Social History     Tobacco Use    Smoking status: Former     Packs/day: 2.00     Years: 14.00     Pack years: 28.00     Types: Cigarettes     Quit date: 3/21/1973     Years since quittin.1    Smokeless tobacco: Never " "  Vaping Use    Vaping Use: Never used   Substance Use Topics    Alcohol use: Yes     Alcohol/week: 0.0 oz     Comment: 3 glasses wine/day    Drug use: Yes     Comment: history of cocaine and marijuana use still about 10 years ago. Denies any IV drug use.       Family Status   Relation Name Status    MUnc  (Not Specified)    PGFa  (Not Specified)     Family History   Problem Relation Age of Onset    Diabetes Maternal Uncle     Diabetes Paternal Grandfather        ROS:  Negative except as stated above.       Exam:    BP (P) 130/82 (BP Location: Left arm, Patient Position: Sitting, BP Cuff Size: Adult)   Pulse (P) 82   Temp (P) 36.3 °C (97.3 °F) (Temporal)   Ht (P) 1.727 m (5' 8\")   Wt (P) 60.3 kg (133 lb)   SpO2 (P) 98%   BMI (P) 20.22 kg/m²  Body mass index is 20.22 kg/m² (pended).  General:  Well nourished, well developed male in NAD. Dry skin near hair line on scalp with actinic keratosis. No evidence of seborrhoic keratosis.   HENT: Normocephalic, bilateral TMs are intact, nasal and oral mucosa with no lesions,   Neck: Supple without bruit. Thyroid is not enlarged, no nodules palpated.  Pulmonary: Clear to ausculation.  Normal effort. No rales, rhonchi, or wheezing.  Cardiovascular: Regular rate and rhythm without murmur.   Abdomen: Normal bowel sounds, soft and nontender, no palpable liver, spleen, or masses.  Extremities: No LE edema noted. 5/5 strength in all extremities  Neuro: Grossly nonfocal.  Psych: Alert and oriented to person, place, and time. Appropriate mood and conversation.              Assessment/Plan:    1. Type 2 diabetes mellitus with hyperosmolarity without coma, without long-term current use of insulin (HCC)  POCT  A1C      2. Bipolar 1 disorder, depressed ([facility])  lamotrigine (LAMICTAL) 200 MG tablet      3. Recurrent UTI  nitrofurantoin (MACROBID) 100 MG Cap      4. Insomnia, unspecified type  modafinil (PROVIGIL) 100 MG Tab      5. Gastroesophageal reflux disease without esophagitis  " omeprazole (PRILOSEC) 20 MG delayed-release capsule          Orders Placed This Encounter    POCT  A1C    lamotrigine (LAMICTAL) 200 MG tablet    nitrofurantoin (MACROBID) 100 MG Cap    modafinil (PROVIGIL) 100 MG Tab    omeprazole (PRILOSEC) 20 MG delayed-release capsule       Type 2 diabetes mellitus with hyperosmolarity without coma, without long-term current use of insulin ([facility])  Repeat A1c was completed in clinic today and was 5.6.  Diabetes briefly discussed at today's visit.  We will go into further detail and complete the overdue diabetes topics at his annual wellness visit which he is going to schedule.    Insomnia  Difficulty initiating sleep  Refilled modafinil today  Reminded patient to take wellbutrin and modafinil in the morning, which he reports he is consistent with.    Gastroesophageal reflux disease without esophagitis  Chronic.  Patient complains of foul taste in his mouth which gets worse when he lays flat.  He also describes occasional belching.  Does not say he ever gets any heartburn.  No other concerning symptoms like chest pain, shortness of breath, palpitations.  Likely secondary to GERD.  Plan:  Trial of Prilosec 20 mg daily for 6 weeks    Recurrent UTI  Chronic.  Patient has history of recurrent UTIs.  Has been encouraged to see urology as we feel this is likely secondary to chronic obstruction and urinary stasis.  Patient recently had positive urine culture for Enterococcus which is sensitive to Macrobid.  Patient has not started taking the antibiotics yet as he states that pharmacy had no record of these antibiotics.  Reviewed medication history today and see record of me sending antibiotics to [facility].  Will attempt to send medication again.  Plan:  Start Macrobid 100mg twice daily for 5 days  Make appoint with urology as soon as possible    Bipolar 1 disorder, depressed (HCC)  Chronic.  Well-controlled.  Patient on chronic Lamictal for this issue.  He denies any recent episodes of arpita.   He states that his bipolar has been very stable while he has been taking this medication.  He requested a generic form of this medicine to be filled today as he received a bill at his last refill.  Generic Lamictal was prescribed today.      Followup: Return in about 1 month (around 5/17/2023).     Tyrese Taylor MD   [facility]   PGY-3

## 2023-04-17 NOTE — ASSESSMENT & PLAN NOTE
Difficulty initiating sleep  Refilled modafinil today  Reminded patient to take wellbutrin and modafinil in the morning, which he reports he is consistent with.

## 2023-04-17 NOTE — PATIENT INSTRUCTIONS
UTI   -Please take the Macrobid for 5 days total.  This medication is twice a day.    Dry skin  Please use moisturizing creams like Aveeno, Cetaphil, Eucerin cream to moisturize this.  If it becomes bothersome or itchy, it could be due to a fungal infection and this should be further evaluated by a physician.    GERD  Please take Prilosec 20 mg daily for 6 weeks.  We will follow up on the acid taste in your mouth at later visits.

## 2023-04-17 NOTE — ASSESSMENT & PLAN NOTE
Chronic.  Well-controlled.  Patient on chronic Lamictal for this issue.  He denies any recent episodes of arpita.  He states that his bipolar has been very stable while he has been taking this medication.  He requested a generic form of this medicine to be filled today as he received a bill at his last refill.  Generic Lamictal was prescribed today.

## 2023-04-17 NOTE — ASSESSMENT & PLAN NOTE
Chronic.  Patient complains of foul taste in his mouth which gets worse when he lays flat.  He also describes occasional belching.  Does not say he ever gets any heartburn.  No other concerning symptoms like chest pain, shortness of breath, palpitations.  Likely secondary to GERD.  Plan:  Trial of Prilosec 20 mg daily for 6 weeks

## 2023-04-17 NOTE — ASSESSMENT & PLAN NOTE
Repeat A1c was completed in clinic today and was 5.6.  Diabetes briefly discussed at today's visit.  We will go into further detail and complete the overdue diabetes topics at his annual wellness visit which he is going to schedule.

## 2023-04-25 ENCOUNTER — TELEPHONE (OUTPATIENT)
Dept: MEDICAL GROUP | Facility: CLINIC | Age: 83
End: 2023-04-25

## 2023-05-18 ENCOUNTER — OFFICE VISIT (OUTPATIENT)
Dept: MEDICAL GROUP | Facility: CLINIC | Age: 83
End: 2023-05-18
Payer: MEDICARE

## 2023-05-18 DIAGNOSIS — F33.1 MODERATE EPISODE OF RECURRENT MAJOR DEPRESSIVE DISORDER (HCC): ICD-10-CM

## 2023-05-18 DIAGNOSIS — G47.00 INSOMNIA, UNSPECIFIED TYPE: ICD-10-CM

## 2023-05-18 DIAGNOSIS — R25.1 TREMOR: ICD-10-CM

## 2023-05-18 DIAGNOSIS — N40.0 BENIGN PROSTATIC HYPERPLASIA, UNSPECIFIED WHETHER LOWER URINARY TRACT SYMPTOMS PRESENT: Chronic | ICD-10-CM

## 2023-05-18 DIAGNOSIS — K21.9 GASTROESOPHAGEAL REFLUX DISEASE WITHOUT ESOPHAGITIS: ICD-10-CM

## 2023-05-18 DIAGNOSIS — N39.0 RECURRENT UTI: ICD-10-CM

## 2023-05-18 PROCEDURE — 99213 OFFICE O/P EST LOW 20 MIN: CPT | Mod: GE | Performed by: STUDENT IN AN ORGANIZED HEALTH CARE EDUCATION/TRAINING PROGRAM

## 2023-05-18 RX ORDER — FAMOTIDINE 20 MG/1
20 TABLET, FILM COATED ORAL DAILY
COMMUNITY
End: 2023-05-18

## 2023-05-18 RX ORDER — FAMOTIDINE 40 MG/1
40 TABLET, FILM COATED ORAL DAILY
Qty: 90 TABLET | Refills: 1 | Status: SHIPPED | OUTPATIENT
Start: 2023-05-18 | End: 2023-06-22 | Stop reason: SDUPTHER

## 2023-05-18 RX ORDER — LAMOTRIGINE 100 MG/1
200 TABLET ORAL DAILY
COMMUNITY
End: 2023-06-22 | Stop reason: SDUPTHER

## 2023-05-18 NOTE — PROGRESS NOTES
[facility]    Chief Complaint   Patient presents with    Annual Exam       HISTORY OF PRESENT ILLNESS: Patient is a 82 y.o. male established patient who presents today to discuss the medical issues below.    #Tremor   -Patient went to see neurology   -Started propranolol and it did nothing   -Has been atenolol previously with limited effect   -Patient has established with another neurologist and referred patient for MRI and EEG   -Has not gotten MRI and EEG yet       Patient Active Problem List    Diagnosis Date Noted    Painful ejaculation 03/15/2023    Dysuria 03/14/2023    Peyronie's syndrome 03/14/2023    Seborrheic dermatitis of scalp 11/01/2022    Rash 10/02/2022    Gastroesophageal reflux disease without esophagitis 06/03/2022    Ecchymosis 02/04/2022    Dry mouth 02/04/2022    Paroxysmal atrial fibrillation (HCC) 12/23/2021    Dizziness 12/20/2021    Insomnia 10/27/2021    Actinic keratoses 07/16/2021    Sleep apnea 07/16/2021    Hypersomnia 03/25/2021    Sensorineural hearing loss, bilateral 02/23/2021    Subjective tinnitus 02/23/2021    Abnormal auditory perception 02/04/2021    Ataxia 01/18/2021    Tremor 04/16/2020    Leukopenia 09/17/2019    Vitamin B12 deficiency 09/17/2019    Alcohol use 09/17/2019    DNR (do not resuscitate) 08/06/2019    Onychomycosis of toenail 03/22/2019    Lower urinary tract symptoms due to benign prostatic hyperplasia 01/28/2019    Dupuytren's contracture 10/02/2018    Recurrent UTI 04/11/2018    Cachexia ([facility]) 02/03/2018    Hypokalemia, gastrointestinal losses 02/01/2018    Anemia 01/30/2018    Debility 01/26/2018    Chronic neck pain 12/24/2017    Recurrent Clostridium difficile diarrhea 12/19/2017    Type 2 diabetes mellitus with hyperosmolarity without coma, without long-term current use of insulin ([facility]) 12/19/2017    Neuropathy 08/12/2017    Bipolar 1 disorder, depressed ([facility]) 07/29/2017    Normocytic anemia 07/27/2017    Pelvic abscess in male ([facility]) 07/14/2017  "   Benign prostatic hyperplasia 2017    Depression 2017    Osteoarthritis 2017       Allergies:Acetaminophen    Current Outpatient Medications   Medication Sig Dispense Refill    lamoTRIgine (LAMICTAL) 100 MG Tab Take 200 mg by mouth every day.      famotidine (PEPCID) 40 MG Tab Take 1 Tablet by mouth every day. 90 Tablet 1    buPROPion (WELLBUTRIN XL) 150 MG XL tablet Take 2 Tablets by mouth every morning. TAKE 2 TABLETS BY MOUTH EVERY DAY IN THE MORNING 180 Tablet 3    metoprolol SR (TOPROL XL) 25 MG TABLET SR 24 HR Take 1 Tablet by mouth every day. 90 Tablet 0    triamcinolone acetonide (KENALOG) 0.1 % Cream Apply to affected area (ankles) BID PRN for itching and rash 30 g 2    loperamide (IMODIUM) 2 MG Cap PLEASE SEE ATTACHED FOR DETAILED DIRECTIONS      gabapentin (NEURONTIN) 300 MG Cap Take 1 Capsule by mouth 3 times a day. 90 Capsule 11    apixaban (ELIQUIS) 5mg Tab Take 5 mg by mouth 2 times a day.      docusate sodium (COLACE) 100 MG Cap Take 1 Capsule by mouth 2 times a day.      vancomycin (VANCOCIN) 125 MG capsule Take 1 Capsule by mouth every day.      oxyCODONE-acetaminophen (PERCOCET) 7.5-325 MG per tablet Take 1 Tab by mouth every 8 hours as needed for Severe Pain.      alfuzosin (UROXATRAL) 10 MG SR tablet Take 10 mg by mouth every day.       No current facility-administered medications for this visit.         Past Medical History:   Diagnosis Date    Anesthesia     \"takes very little and takes forever to come out of it\"    Anxiety disorder     Arthritis     some in my back    Bipolar disorder (HCC)     Clostridium difficile infection     Depression     Enlarged prostate     GERD (gastroesophageal reflux disease)     Heart murmur     Indigestion     Neck pain        Social History     Tobacco Use    Smoking status: Former     Packs/day: 2.00     Years: 14.00     Pack years: 28.00     Types: Cigarettes     Quit date: 3/21/1973     Years since quittin.1    Smokeless tobacco: " "Never   Vaping Use    Vaping Use: Never used   Substance Use Topics    Alcohol use: Yes     Alcohol/week: 0.0 oz     Comment: 3 glasses wine/day    Drug use: Yes     Comment: history of cocaine and marijuana use still about 10 years ago. Denies any IV drug use.       Family Status   Relation Name Status    MUnc  (Not Specified)    PGFa  (Not Specified)     Family History   Problem Relation Age of Onset    Diabetes Maternal Uncle     Diabetes Paternal Grandfather        ROS:  Negative except as stated above.       Exam:    BP (P) 130/82 (BP Location: Left arm, Patient Position: Sitting, BP Cuff Size: Adult)   Pulse (P) 84   Temp (P) 36.2 °C (97.1 °F) (Temporal)   Ht (P) 1.727 m (5' 8\")   Wt (P) 60.6 kg (133 lb 11.2 oz)   SpO2 (P) 94%   BMI (P) 20.33 kg/m²  Body mass index is 20.33 kg/m² (pended).  General:  Well nourished, well developed male in NAD.  HENT: Normocephalic, bilateral TMs are intact, nasal and oral mucosa with no lesions,   Neck: Supple without bruit. Thyroid is not enlarged, no nodules palpated.  Pulmonary: Clear to ausculation.  Normal effort. No rales, rhonchi, or wheezing.  Cardiovascular: Regular rate and rhythm without murmur.   Abdomen: Normal bowel sounds, soft and nontender, no palpable liver, spleen, or masses.  Extremities: No LE edema noted. 5/5 strength in all extremities  Neuro: Grossly nonfocal.  Psych: Alert and oriented to person, place, and time. Appropriate mood and conversation.            Assessment/Plan:    1. Gastroesophageal reflux disease without esophagitis  famotidine (PEPCID) 40 MG Tab      2. Tremor        3. Benign prostatic hyperplasia, unspecified whether lower urinary tract symptoms present        4. Recurrent UTI        5. Moderate episode of recurrent major depressive disorder (HCC)            Orders Placed This Encounter    DISCONTD: famotidine (PEPCID) 20 MG Tab    lamoTRIgine (LAMICTAL) 100 MG Tab    famotidine (PEPCID) 40 MG Tab       Gastroesophageal reflux " disease without esophagitis  Chronic.  Patient complains of foul taste in his mouth which gets worse when he lays flat.  He also describes occasional belching.  Does not say he ever gets any heartburn. Patient denies any concerning symptoms like SOB, cough, chest pain, palpitations. Likely secondary to GERD. Patient does not desire referral to GI for a scope. Stopped taking prilosec secondary to personal research that increases risk for C.diff even though patient on chronic PO vancomycin.   Plan:  -STOP omeprazole   -START pepcid 40mg daily     Tremor  Chronic.  Patient has history of likely essential tremor.  Normal neuro exam.  This is been managed with multiple medications including propranolol and atenolol with limited effect.  Patient has been referred to neurology on multiple occasions and is currently following with a neurologist who he agrees with and ordered a MRI and EEG.  Plan:  -Continue to follow with neurology, recs appreciated   -Follow up on EEG and MRI    Recurrent UTI  Patient has recurrent UTIs. Recently complained of dysuria (seems to be a chronic issue) and urinalysis completed showing entercoccus fecalis which was sensitive to macrobid (least c. Diff risk out of all abx researched). Patient completed course of abx and feels a bit better, but still having symptoms. Extensive discussion with patient today on how urine testing should be avoided as patient likely colonized with bacteria due to severe BPH and would be best served by a urology referral. Patient is going to attempt to establish with new urologist that he agrees with (has experienced problems with multiple urologists in the past).     Depression  Chronic.   Patient states that his depression is poorly controlled. PHQ-9 completed in office was positive for severe depression. He denies any SI/HI. Patient recently saw Dr. Berman in office and was directed to additional resources. Also provided patient with referrals to psychology and  psychiatry today given that patient was confused on where to go after previous discussions. Strongly advised patient to follow up with both these specialties. Patient is currently on wellbutrin and describes limited satisfaction with this medication. Patient has trailed multiple SSRIs in the past with limited effect.    Plan:   -Given patient complex history, STRONGLY encouraged to follow up with psychiatry and psychology. Referral was reviewed with patient today and he was directed to location and phone number he should use to make an appointment.   -Continue Wellbutrin   -Crisis planning discussed     DMV paperwork was filled out today so that patient can get handicap pass    Followup: Return in about 4 weeks (around 6/15/2023). - will follow up for wellness visit to discuss preventative medicine.      Tyrese Taylor MD   [facility]   PGY-3

## 2023-05-18 NOTE — PATIENT INSTRUCTIONS
#GERD   #Sour taste in mouth   -Please stop omeprazole   -Please take famotidine 40 mg daily in the morning    #Depression  -Please follow-up with both psychology and psychiatry  -Please take Wellbutrin  mg (2 tablets) once daily in the morning    #Tremor  -Please follow-up with neurology and complete both MRI and EEG    #Dysuria  #Recurrent UTIs  -Please follow-up with urology and attempt to make an appointment

## 2023-05-19 RX ORDER — MODAFINIL 100 MG/1
100 TABLET ORAL DAILY
COMMUNITY
End: 2023-06-20

## 2023-05-19 RX ORDER — MODAFINIL 100 MG/1
100 TABLET ORAL DAILY
Qty: 30 TABLET | Refills: 0 | OUTPATIENT
Start: 2023-05-19 | End: 2023-06-18

## 2023-05-19 RX ORDER — MODAFINIL 100 MG/1
100 TABLET ORAL DAILY
Qty: 30 TABLET | Refills: 0 | Status: SHIPPED | OUTPATIENT
Start: 2023-05-19 | End: 2023-06-18

## 2023-05-19 NOTE — ASSESSMENT & PLAN NOTE
Chronic.  Patient has history of likely essential tremor.  Normal neuro exam.  This is been managed with multiple medications including propranolol and atenolol with limited effect.  Patient has been referred to neurology on multiple occasions and is currently following with a neurologist who he agrees with and ordered a MRI and EEG.  Plan:  -Continue to follow with neurology, recs appreciated   -Follow up on EEG and MRI

## 2023-05-19 NOTE — ASSESSMENT & PLAN NOTE
Patient has recurrent UTIs. Recently complained of dysuria (seems to be a chronic issue) and urinalysis completed showing entercoccus fecalis which was sensitive to macrobid (least c. Diff risk out of all abx researched). Patient completed course of abx and feels a bit better, but still having symptoms. Extensive discussion with patient today on how urine testing should be avoided as patient likely colonized with bacteria due to severe BPH and would be best served by a urology referral. Patient is going to attempt to establish with new urologist that he agrees with (has experienced problems with multiple urologists in the past).

## 2023-05-19 NOTE — ASSESSMENT & PLAN NOTE
Chronic.   Patient states that his depression is poorly controlled. PHQ-9 completed in office was positive for severe depression. He denies any SI/HI. Patient recently saw Dr. Berman in office and was directed to additional resources. Also provided patient with referrals to psychology and psychiatry today given that patient was confused on where to go after previous discussions. Strongly advised patient to follow up with both these specialties. Patient is currently on wellbutrin and describes limited satisfaction with this medication. Patient has trailed multiple SSRIs in the past with limited effect.    Plan:   -Given patient complex history, STRONGLY encouraged to follow up with psychiatry and psychology. Referral was reviewed with patient today and he was directed to location and phone number he should use to make an appointment.   -Continue Wellbutrin   -Crisis planning discussed

## 2023-05-19 NOTE — ASSESSMENT & PLAN NOTE
Chronic.  Patient complains of foul taste in his mouth which gets worse when he lays flat.  He also describes occasional belching.  Does not say he ever gets any heartburn. Patient denies any concerning symptoms like SOB, cough, chest pain, palpitations. Likely secondary to GERD. Patient does not desire referral to GI for a scope. Stopped taking prilosec secondary to personal research that increases risk for C.diff even though patient on chronic PO vancomycin.   Plan:  -STOP omeprazole   -START pepcid 40mg daily

## 2023-06-20 ENCOUNTER — OFFICE VISIT (OUTPATIENT)
Dept: MEDICAL GROUP | Facility: CLINIC | Age: 83
End: 2023-06-20
Payer: MEDICARE

## 2023-06-20 DIAGNOSIS — F33.1 MODERATE EPISODE OF RECURRENT MAJOR DEPRESSIVE DISORDER (HCC): ICD-10-CM

## 2023-06-20 DIAGNOSIS — A04.71 RECURRENT CLOSTRIDIUM DIFFICILE DIARRHEA: ICD-10-CM

## 2023-06-20 DIAGNOSIS — G47.10 HYPERSOMNIA: ICD-10-CM

## 2023-06-20 DIAGNOSIS — E11.00 TYPE 2 DIABETES MELLITUS WITH HYPEROSMOLARITY WITHOUT COMA, WITHOUT LONG-TERM CURRENT USE OF INSULIN (HCC): ICD-10-CM

## 2023-06-20 DIAGNOSIS — F31.9 BIPOLAR 1 DISORDER, DEPRESSED (HCC): ICD-10-CM

## 2023-06-20 DIAGNOSIS — R26.81 GAIT INSTABILITY: ICD-10-CM

## 2023-06-20 DIAGNOSIS — M72.0 DUPUYTREN'S CONTRACTURE: ICD-10-CM

## 2023-06-20 DIAGNOSIS — I48.0 PAROXYSMAL ATRIAL FIBRILLATION (HCC): ICD-10-CM

## 2023-06-20 DIAGNOSIS — E11.9 TYPE 2 DIABETES MELLITUS WITHOUT COMPLICATION, WITHOUT LONG-TERM CURRENT USE OF INSULIN (HCC): ICD-10-CM

## 2023-06-20 DIAGNOSIS — K21.9 GASTROESOPHAGEAL REFLUX DISEASE WITHOUT ESOPHAGITIS: ICD-10-CM

## 2023-06-20 PROCEDURE — G0439 PPPS, SUBSEQ VISIT: HCPCS | Performed by: STUDENT IN AN ORGANIZED HEALTH CARE EDUCATION/TRAINING PROGRAM

## 2023-06-20 RX ORDER — MODAFINIL 200 MG/1
200 TABLET ORAL DAILY
Qty: 30 TABLET | Refills: 0 | Status: SHIPPED | OUTPATIENT
Start: 2023-06-20 | End: 2023-07-20 | Stop reason: SDUPTHER

## 2023-06-20 ASSESSMENT — PATIENT HEALTH QUESTIONNAIRE - PHQ9
5. POOR APPETITE OR OVEREATING: 3 - NEARLY EVERY DAY
SUM OF ALL RESPONSES TO PHQ QUESTIONS 1-9: 12
CLINICAL INTERPRETATION OF PHQ2 SCORE: 3

## 2023-06-20 ASSESSMENT — ENCOUNTER SYMPTOMS: GENERAL WELL-BEING: FAIR

## 2023-06-20 ASSESSMENT — ACTIVITIES OF DAILY LIVING (ADL): BATHING_REQUIRES_ASSISTANCE: 1

## 2023-06-20 NOTE — PROGRESS NOTES
Chief Complaint   Patient presents with    Annual Exam         HPI:  Marcelo is a 82 y.o. here for Medicare Annual Wellness Visit   #Erectile dysfunction   -Urology prescribed viagra, patient had a question about this medicine        Patient Active Problem List    Diagnosis Date Noted    Gait instability 06/22/2023    Painful ejaculation 03/15/2023    Dysuria 03/14/2023    Peyronie's syndrome 03/14/2023    Seborrheic dermatitis of scalp 11/01/2022    Rash 10/02/2022    Gastroesophageal reflux disease without esophagitis 06/03/2022    Ecchymosis 02/04/2022    Dry mouth 02/04/2022    Paroxysmal atrial fibrillation (HCC) 12/23/2021    Dizziness 12/20/2021    Insomnia 10/27/2021    Actinic keratoses 07/16/2021    Sleep apnea 07/16/2021    Hypersomnia 03/25/2021    Sensorineural hearing loss, bilateral 02/23/2021    Subjective tinnitus 02/23/2021    Abnormal auditory perception 02/04/2021    Ataxia 01/18/2021    Tremor 04/16/2020    Leukopenia 09/17/2019    Vitamin B12 deficiency 09/17/2019    Alcohol use 09/17/2019    DNR (do not resuscitate) 08/06/2019    Onychomycosis of toenail 03/22/2019    Lower urinary tract symptoms due to benign prostatic hyperplasia 01/28/2019    Dupuytren's contracture 10/02/2018    Recurrent UTI 04/11/2018    Cachexia ([facility]) 02/03/2018    Hypokalemia, gastrointestinal losses 02/01/2018    Anemia 01/30/2018    Debility 01/26/2018    Chronic neck pain 12/24/2017    Recurrent Clostridium difficile diarrhea 12/19/2017    Type 2 diabetes mellitus with hyperosmolarity without coma, without long-term current use of insulin ([facility]) 12/19/2017    Neuropathy 08/12/2017    Bipolar 1 disorder, depressed ([facility]) 07/29/2017    Normocytic anemia 07/27/2017    Pelvic abscess in male ([facility]) 07/14/2017    Benign prostatic hyperplasia 07/14/2017    Depression 07/14/2017    Osteoarthritis 07/14/2017       Current medicines including changes today:   Current Outpatient Medications   Medication Sig Dispense Refill     buPROPion (WELLBUTRIN XL) 300 MG XL tablet Take 1 Tablet by mouth every morning. 30 Tablet 11    famotidine (PEPCID) 40 MG Tab Take 1 Tablet by mouth every day. 30 Tablet 5    lamoTRIgine (LAMICTAL) 100 MG Tab Take 2 Tablets by mouth every day. 60 Tablet 5    metoprolol SR (TOPROL XL) 25 MG TABLET SR 24 HR Take 1 Tablet by mouth every day. 30 Tablet 5    vancomycin (VANCOCIN) 125 MG capsule Take 1 Capsule by mouth every day. 30 Capsule 11    modafinil (PROVIGIL) 200 MG Tab Take 1 Tablet by mouth every day for 30 days. 30 Tablet 0    triamcinolone acetonide (KENALOG) 0.1 % Cream Apply to affected area (ankles) BID PRN for itching and rash 30 g 2    loperamide (IMODIUM) 2 MG Cap PLEASE SEE ATTACHED FOR DETAILED DIRECTIONS      gabapentin (NEURONTIN) 300 MG Cap Take 1 Capsule by mouth 3 times a day. 90 Capsule 11    docusate sodium (COLACE) 100 MG Cap Take 1 Capsule by mouth 2 times a day.      oxyCODONE-acetaminophen (PERCOCET) 7.5-325 MG per tablet Take 1 Tab by mouth every 8 hours as needed for Severe Pain.      alfuzosin (UROXATRAL) 10 MG SR tablet Take 10 mg by mouth every day.      apixaban (ELIQUIS) 5mg Tab Take 5 mg by mouth 2 times a day.       No current facility-administered medications for this visit.        The patient reports adherence to this regimen   Current supplements as per medication list.   Chronic narcotic pain medicines: no     Allergies: Acetaminophen    Current social contact/activities: Patient is minimally social, but occasionally gets out   Exercise: Patient exercises minimally    Richard Colon  reports that Richard Colon quit smoking about 50 years ago. Richard Colon has a 28.00 pack-year smoking history. Richard Colon has never used smokeless tobacco. Richard Colon reports current alcohol use. Richard Colon reports current drug use.  Counseling given: Not Answered        DPA/Advanced directive: Yes    ROS:    Gait: Uses a cane   Ostomy: no   Other tubes: no   Amputations: no   Chronic oxygen  use no   Last eye exam Unknown   : Denies incontinence.     Screening:  Depression Screening  Little interest or pleasure in doing things?  0 - not at all  Feeling down, depressed , or hopeless? 3 - nearly every day  Trouble falling or staying asleep, or sleeping too much?  3 - nearly every day  Feeling tired or having little energy?  3 - nearly every day  Poor appetite or overeating?  3 - nearly every day  Feeling bad about yourself - or that you are a failure or have let yourself or your family down? 0 - not at all  Trouble concentrating on things, such as reading the newspaper or watching television? 0 - not at all  Moving or speaking so slowly that other people could have noticed.  Or the opposite - being so fidgety or restless that you have been moving around a lot more than usual?  0 - not at all  Thoughts that you would be better off dead, or of hurting yourself?  0 - not at all  Patient Health Questionnaire Score: 12    If depressive symptoms identified deferred to follow up visit unless specifically addressed in assessment and plan.    Interpretation of PHQ-9 Total Score   Score Severity   1-4 No Depression   5-9 Mild Depression   10-14 Moderate Depression   15-19 Moderately Severe Depression   20-27 Severe Depression    Screening for Cognitive Impairment  Three Minute Recall (Banana, Sunrise, Chair) 2/3    Reji clock face with all 12 numbers and set the hands to show 20 past 8.  Yes    Cognitive concerns identified deferred for follow up unless specifically addressed in assessment and plan.    Fall Risk Assessment  Has the patient had two or more falls in the last year or any fall with injury in the last year?  No    Safety Assessment  Throw rugs on floor.  No  Handrails on all stairs.  Yes  Good lighting in all hallways.  Yes  Difficulty hearing.  Yes  Patient counseled about all safety risks that were identified.    Functional Assessment ADLs  Are there any barriers preventing you from cooking for  yourself or meeting nutritional needs?  Yes.    Are there any barriers preventing you from driving safely or obtaining transportation?  Yes.    Are there any barriers preventing you from using a telephone or calling for help?  Yes    Are there any barriers preventing you from shopping?  Yes.    Are there any barriers preventing you from taking care of your own finances?  Yes    Are there any barriers preventing you from managing your medications?  Yes    Are there any barriers preventing you from showering, bathing or dressing yourself? Yes    Are you currently engaging in any exercise or physical activity?  No.    What is your perception of your health? Fair    Advance Care Planning  Do you have an Advance Directive, Living Will, Durable Power of , or POLST? Yes        POLST is not on file - instructed patient to bring in a copy to scan into their chart    Patient was provided a POLST form at this visit to review. Patient will fill this form out and provide it at his next visit so that it can be scanned into the chart. Advanced directive was also reviewed at this visit and resources provided. Patient does not currently have an advanced directive.       Health Maintenance Summary            Overdue - Annual Wellness Visit (Every 366 Days) Overdue - never done      No completion history exists for this topic.              Overdue - URINE ACR / MICROALBUMIN (Yearly) Overdue - never done      No completion history exists for this topic.              Overdue - DIABETES MONOFILAMENT / LE EXAM (Yearly) Overdue - never done      No completion history exists for this topic.              Overdue - RETINAL SCREENING (Yearly) Overdue - never done      No completion history exists for this topic.              Ordered - FASTING LIPID PROFILE (Yearly) Ordered on 6/20/2023 12/19/2017  Lipid Profile (Lipid Panel) Fasting              Overdue - COVID-19 Vaccine (3 - Pfizer series) Overdue since 6/3/2021      04/08/2021   Imm Admin: PFIZER PURPLE CAP SARS-COV-2 VACCINATION (12+)    03/18/2021  Imm Admin: PFIZER PURPLE CAP SARS-COV-2 VACCINATION (12+)              Ordered - SERUM CREATININE (Yearly) Ordered on 6/20/2023 03/12/2022  COMP METABOLIC PANEL    09/19/2019  Comp Metabolic Panel    09/18/2019  Basic Metabolic Panel (BMP)    09/17/2019  COMP METABOLIC PANEL    09/16/2019  CMP    Only the first 5 history entries have been loaded, but more history exists.              Ordered - A1C SCREENING (Every 6 Months) Ordered on 6/20/2023 04/17/2023  POCT  A1C    11/01/2022  POCT A1C    01/14/2019  Outside Procedure: HEMOGLOBIN A1C    01/23/2018  Hemoglobin A1c    12/21/2017  HEMOGLOBIN A1C              IMM DTaP/Tdap/Td Vaccine (2 - Td or Tdap) Next due on 3/16/2027      03/16/2017  Imm Admin: Tdap Vaccine              IMM PNEUMOCOCCAL VACCINE: 65+ Years (Series Information) Completed      09/26/2019  Imm Admin: Pneumococcal polysaccharide vaccine (PPSV-23)    03/16/2017  Imm Admin: Pneumococcal Conjugate Vaccine (Prevnar/PCV-13)    09/01/2016  Imm Admin: Pneumococcal Conjugate Vaccine (Prevnar/PCV-13)              IMM ZOSTER VACCINES (Series Information) Completed      09/16/2021  Imm Admin: Zoster Vaccine Recombinant (RZV) (SHINGRIX)    10/21/2020  Imm Admin: Zoster Vaccine Recombinant (RZV) (SHINGRIX)              IMM INFLUENZA (Series Information) Completed      09/15/2022  Imm Admin: Influenza Vaccine Adult HD    10/07/2021  Imm Admin: Influenza, Unspecified - HISTORICAL DATA    10/21/2020  Imm Admin: Influenza, Unspecified - HISTORICAL DATA    09/26/2019  Imm Admin: Influenza Vaccine Adult HD    10/02/2018  Imm Admin: Influenza Vaccine Quad Inj (Pf)    Only the first 5 history entries have been loaded, but more history exists.              IMM HEP B VACCINE (Series Information) Aged Out      No completion history exists for this topic.              HPV Vaccines (Series Information) Aged Out      No completion history  exists for this topic.              IMM MENINGOCOCCAL ACWY VACCINE (Series Information) Aged Out      No completion history exists for this topic.              Discontinued - COLORECTAL CANCER SCREENING  Discontinued        Frequency changed to Never automatically (Topic No Longer Applies)    2018  Surgical Procedure: COLONOSCOPY                    Patient Care Team:  Joy Sellers M.D. as PCP - General (Family Medicine)  Pcp Pt States None (Family Medicine)      Social History     Tobacco Use    Smoking status: Former     Packs/day: 2.00     Years: 14.00     Pack years: 28.00     Types: Cigarettes     Quit date: 3/21/1973     Years since quittin.2    Smokeless tobacco: Never   Vaping Use    Vaping Use: Never used   Substance Use Topics    Alcohol use: Yes     Alcohol/week: 0.0 oz     Comment: 3 glasses wine/day    Drug use: Yes     Comment: history of cocaine and marijuana use still about 10 years ago. Denies any IV drug use.     Family History   Problem Relation Age of Onset    Diabetes Maternal Uncle     Diabetes Paternal Grandfather      Richard Colon  has a past medical history of Anesthesia, Anxiety disorder, Arthritis, Bipolar disorder (HCC), Clostridium difficile infection, Depression, Enlarged prostate, GERD (gastroesophageal reflux disease), Heart murmur, Indigestion, and Neck pain.   Past Surgical History:   Procedure Laterality Date    COLONOSCOPY N/A 2018    Procedure: COLONOSCOPY;  Surgeon: Skyler Johnson M.D.;  Location: SURGERY [facility];  Service: Gastroenterology    FECAL TRANSPLANT N/A 2018    Procedure: FECAL TRANSPLANT;  Surgeon: Skyler Johnson M.D.;  Location: [facility];  Service: Gastroenterology    COLOSTOMY TAKEDOWN  3/21/2018    Procedure: COLOSTOMY TAKEDOWN;  Surgeon: Jorge Rodriguez M.D.;  Location: SURGERY [facility];  Service: General    LUMBAR DECOMPRESSION  2017    EXPLORATORY LAPAROTOMY  2017    Procedure: EXPLORATORY  LAPAROTOMY- ABDOMINAL WASH OUT;  Surgeon: Jorge Rodriguez M.D.;  Location: SURGERY [facility];  Service:     SIGMOID COLON RESECTION  7/25/2017    Procedure: SIGMOID COLON RESECTION;  Surgeon: Jorge Rodriguez M.D.;  Location: SURGERY [facility];  Service:     COLOSTOMY  7/25/2017    Procedure: COLOSTOMY- FOR OSTOMY PLACEMENT AND OTHER PROCEDURES AS INDICATED;  Surgeon: Jorge Rodriguez M.D.;  Location: SURGERY [facility];  Service:     APPENDECTOMY  7/25/2017    Procedure: APPENDECTOMY;  Surgeon: Jorge Rodriguez M.D.;  Location: SURGERY [facility];  Service:     EXC./BIOPSY MASS BACK  1997           Exam:   Patient refused to have vitals taken at this visit.   Body mass index is 20.25 kg/m² (pended).    Hearing fair.    Dentition fair  Alert, oriented in no acute distress.  Eye contact is good, speech goal directed, affect calm      Assessment and Plan. The following treatment and monitoring plan is recommended:      Problem List Items Addressed This Visit       Recurrent Clostridium difficile diarrhea (Chronic)    Relevant Medications    vancomycin (VANCOCIN) 125 MG capsule    Depression    Relevant Medications    buPROPion (WELLBUTRIN XL) 300 MG XL tablet    Bipolar 1 disorder, depressed (HCC)    Relevant Medications    lamoTRIgine (LAMICTAL) 100 MG Tab    Type 2 diabetes mellitus with hyperosmolarity without coma, without long-term current use of insulin (HCC)    Dupuytren's contracture    Relevant Orders    Referral to Orthopedics    Referral to Occupational Therapy    Hypersomnia    Relevant Medications    modafinil (PROVIGIL) 200 MG Tab    Paroxysmal atrial fibrillation (HCC)    Relevant Medications    metoprolol SR (TOPROL XL) 25 MG TABLET SR 24 HR    Gastroesophageal reflux disease without esophagitis    Relevant Medications    famotidine (PEPCID) 40 MG Tab    Gait instability    Relevant Orders    Referral to Physical Therapy     Other Visit Diagnoses       Type 2  "diabetes mellitus without complication, without long-term current use of insulin (HCC)        Relevant Orders    Referral to Ophthalmology    Comp Metabolic Panel    Lipid Profile    HEMOGLOBIN A1C           Services needed: None  Health Care Screening recommendations as per orders   Referrals offered: PT/OT/Nutrition counseling/Behavioral Health as per orders     Discussion today about general wellness and lifestyle habits:    Prevent falls and reduce trip hazards; Cautioned about securing or removing rugs.  Have a working fire alarm and carbon monoxide detector;   Engage in regular physical activity and social activities      Patient was provided refills for all his medications at this visit. Patient requested that all his medications be prescribed on a 30 day basis so that he has \"an excuse to leave his house\" every 30 days to  these medicines. Most of these medications have been chronically prescribed, and no changes were made today. Patient was provided with resources to help connect him with other members of the community including support groups. Patient was also provided resources for psychotherapy.     Of note, patient has depression that is poorly controlled. He is currently on wellbutrin which provides minimal assistance. He also has a history of bipolar 1, so SSRIs are relatively contraindicated and he has experienced poor reactions to them in the past. Patient has not been able to find a psychiatrist that he can work with. As stated above he was provided resources for psychology at this visit. It seems that his depression may also be related to his loneliness, so support groups and other activities for people of his age were provided at this visit. 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 He was provided a referral to PT for gait therapy, OT for hand therapy, and ortho for potential injections/surgical options to assist him in " function of his hands in the hope he will be able to do carpentry again to help him with his depression. Supportive care was provided at this visit.      Follow-up: Return in about 1 month (around 7/20/2023).  5 YEAR PLAN:  Flu vaccine advised every year.  Colon cancer screening per GI recommendation .

## 2023-06-22 PROBLEM — R26.81 GAIT INSTABILITY: Status: ACTIVE | Noted: 2023-06-22

## 2023-06-22 RX ORDER — VANCOMYCIN HYDROCHLORIDE 125 MG/1
125 CAPSULE ORAL DAILY
Qty: 30 CAPSULE | Refills: 11 | Status: SHIPPED | OUTPATIENT
Start: 2023-06-22

## 2023-06-22 RX ORDER — BUPROPION HYDROCHLORIDE 300 MG/1
300 TABLET ORAL EVERY MORNING
Qty: 30 TABLET | Refills: 11 | Status: SHIPPED | OUTPATIENT
Start: 2023-06-22

## 2023-06-22 RX ORDER — METOPROLOL SUCCINATE 25 MG/1
25 TABLET, EXTENDED RELEASE ORAL DAILY
Qty: 30 TABLET | Refills: 5 | Status: SHIPPED | OUTPATIENT
Start: 2023-06-22 | End: 2023-07-20

## 2023-06-22 RX ORDER — LAMOTRIGINE 100 MG/1
200 TABLET ORAL DAILY
Qty: 60 TABLET | Refills: 5 | Status: SHIPPED | OUTPATIENT
Start: 2023-06-22 | End: 2023-12-18

## 2023-06-22 RX ORDER — FAMOTIDINE 40 MG/1
40 TABLET, FILM COATED ORAL DAILY
Qty: 30 TABLET | Refills: 5 | Status: SHIPPED | OUTPATIENT
Start: 2023-06-22

## 2023-07-20 ENCOUNTER — OFFICE VISIT (OUTPATIENT)
Dept: MEDICAL GROUP | Facility: CLINIC | Age: 83
End: 2023-07-20
Payer: MEDICARE

## 2023-07-20 VITALS
TEMPERATURE: 97.1 F | SYSTOLIC BLOOD PRESSURE: 115 MMHG | RESPIRATION RATE: 16 BRPM | HEIGHT: 68 IN | WEIGHT: 129 LBS | DIASTOLIC BLOOD PRESSURE: 70 MMHG | HEART RATE: 98 BPM | BODY MASS INDEX: 19.55 KG/M2 | OXYGEN SATURATION: 95 %

## 2023-07-20 DIAGNOSIS — I48.0 PAROXYSMAL ATRIAL FIBRILLATION (HCC): ICD-10-CM

## 2023-07-20 DIAGNOSIS — E11.00 TYPE 2 DIABETES MELLITUS WITH HYPEROSMOLARITY WITHOUT COMA, WITHOUT LONG-TERM CURRENT USE OF INSULIN (HCC): ICD-10-CM

## 2023-07-20 DIAGNOSIS — G47.10 HYPERSOMNIA: ICD-10-CM

## 2023-07-20 PROCEDURE — 99214 OFFICE O/P EST MOD 30 MIN: CPT | Mod: GC

## 2023-07-20 PROCEDURE — 3074F SYST BP LT 130 MM HG: CPT | Mod: GC

## 2023-07-20 PROCEDURE — 3078F DIAST BP <80 MM HG: CPT | Mod: GC

## 2023-07-20 RX ORDER — MODAFINIL 200 MG/1
200 TABLET ORAL DAILY
Qty: 30 NOT SPECIFIED | Refills: 0 | Status: SHIPPED | OUTPATIENT
Start: 2023-07-20 | End: 2023-08-19

## 2023-07-20 RX ORDER — SILDENAFIL 100 MG/1
1 TABLET, FILM COATED ORAL PRN
COMMUNITY

## 2023-07-20 RX ORDER — PROPRANOLOL HYDROCHLORIDE 10 MG/1
10 TABLET ORAL 3 TIMES DAILY
Qty: 90 NOT SPECIFIED | Refills: 11 | Status: SHIPPED | OUTPATIENT
Start: 2023-07-20

## 2023-07-20 NOTE — ASSESSMENT & PLAN NOTE
Patient with long-term use of modafinil. PDMP reviewed and appropriate for refill.   -Follow-up in 1 month for re-evaluation

## 2023-07-20 NOTE — ASSESSMENT & PLAN NOTE
Patient with NS rhythm on exam. History of diagnosed A fib. Saw cardiology in 05/22 who recommended continued treatment. Patient at high risk of stroke, age only factor for bleed.   -Restart propranolol 10mg   -Restart Eliquis at 2.5 mg BID   -New referral to cardiology sent at patient request

## 2023-07-20 NOTE — PATIENT INSTRUCTIONS
-Refills sent to [facility] on [address]   -Take propranolol 10mg daily and apixaban 2.5mg twice daily   -Follow in 1 month   -Blood work at earliest convenience

## 2023-07-20 NOTE — PROGRESS NOTES
"Subjective:     CC:   Chief Complaint   Patient presents with    Follow-Up     Check up          HPI:   Marcelo presents today with    Problem   Paroxysmal Atrial Fibrillation (Hcc)    Patient reports he is unsure if he should be taking metoprolol and Eliquis. States he last saw cardiology early this year but does not remember name of the doctor and would like to follow with a different doctor. No change of speech, vision, dizziness or balance difficulty, chest pain, heart palpitations, syncope. Denies ever having a fall.     On review of cardiology note 05/22, patient recommended for propranolol 10mg and Eliquis.      Hypersomnia    Patient reports hypersomnia from depression. Feels that mood is well-controlled on medications. States Modafinil is what helps him get moving and help his tiredness and depression. Would like refill.      Type 2 Diabetes Mellitus With Hyperosmolarity Without Coma, Without Long-Term Current Use of Insulin (Hcc)    Patient with history of borderline diabetes, recent A1c in April normal. Ordered at last visit, patient wants new lab slip.          ROS: See HPI.     Objective:     Exam:  /70 (BP Location: Right arm, Patient Position: Sitting, BP Cuff Size: Adult)   Pulse 98   Temp 36.2 °C (97.1 °F) (Temporal)   Resp 16   Ht 1.727 m (5' 8\")   Wt 58.5 kg (129 lb)   SpO2 95%   BMI 19.61 kg/m²  Body mass index is 19.61 kg/m².    Physical Exam:  General: Pt resting in NAD, cooperative   Skin:  No cyanosis or jaundice   HEENT: NC/AT. EOMI. No conjunctival injection or sclera icterus.   Lungs:  CTAB, good air movement. Non-labored.   Cardiovascular:  S1/S2 RRR   Abdomen:  Abdomen is soft, non-tender, non-distended, +BS  Extremities:  No LE edema   CNS:  No gross focal neurologic deficits  Psych: Appropriate mood and affect     Labs: Reviewed    Assessment & Plan:     82 y.o. adult with the following -     Problem List Items Addressed This Visit       Type 2 diabetes mellitus with " hyperosmolarity without coma, without long-term current use of insulin (HCC)     -A1c and f/u in 1 month          Relevant Orders    Comp Metabolic Panel    Lipid Profile    HEMOGLOBIN A1C    Hypersomnia     Patient with long-term use of modafinil. PDMP reviewed and appropriate for refill.   -Follow-up in 1 month for re-evaluation          Relevant Medications    modafinil (PROVIGIL) 200 MG Tab    Paroxysmal atrial fibrillation (HCC)     Patient with NS rhythm on exam. History of diagnosed A fib. Saw cardiology in 05/22 who recommended continued treatment. Patient at high risk of stroke, age only factor for bleed.   -Restart propranolol 10mg   -Restart Eliquis at 2.5 mg BID   -New referral to cardiology sent at patient request         Relevant Medications    sildenafil citrate (VIAGRA) 100 MG tablet    propranolol (INDERAL) 10 MG Tab    apixaban (ELIQUIS) 5mg Tab    Other Relevant Orders    REFERRAL TO CARDIOLOGY

## 2023-08-02 ENCOUNTER — TELEPHONE (OUTPATIENT)
Dept: MEDICAL GROUP | Facility: CLINIC | Age: 83
End: 2023-08-02
Payer: MEDICARE

## 2023-08-02 NOTE — TELEPHONE ENCOUNTER
MEDICATION PRIOR AUTHORIZATION NEEDED:    1. Name of Medication: Modafinil 200MG tablets    2. Requested By (Name of Pharmacy): [facility]     3. Is insurance on file current? Yes    4. What is the name & phone number of the 3rd party payor? Covermymeds

## 2023-08-28 ENCOUNTER — OFFICE VISIT (OUTPATIENT)
Dept: MEDICAL GROUP | Facility: CLINIC | Age: 83
End: 2023-08-28
Payer: MEDICARE

## 2023-08-28 VITALS
HEART RATE: 70 BPM | RESPIRATION RATE: 14 BRPM | TEMPERATURE: 97.1 F | WEIGHT: 127 LBS | OXYGEN SATURATION: 92 % | DIASTOLIC BLOOD PRESSURE: 68 MMHG | BODY MASS INDEX: 19.25 KG/M2 | HEIGHT: 68 IN | SYSTOLIC BLOOD PRESSURE: 110 MMHG

## 2023-08-28 DIAGNOSIS — F31.9 BIPOLAR 1 DISORDER, DEPRESSED (HCC): ICD-10-CM

## 2023-08-28 DIAGNOSIS — H53.8 BLURRY VISION, BILATERAL: ICD-10-CM

## 2023-08-28 PROCEDURE — 3074F SYST BP LT 130 MM HG: CPT

## 2023-08-28 PROCEDURE — 99213 OFFICE O/P EST LOW 20 MIN: CPT | Mod: GC

## 2023-08-28 PROCEDURE — 3078F DIAST BP <80 MM HG: CPT

## 2023-08-28 RX ORDER — OXYCODONE AND ACETAMINOPHEN 10; 325 MG/1; MG/1
1 TABLET ORAL EVERY 8 HOURS PRN
COMMUNITY

## 2023-08-28 ASSESSMENT — ANXIETY QUESTIONNAIRES
4. TROUBLE RELAXING: NEARLY EVERY DAY
3. WORRYING TOO MUCH ABOUT DIFFERENT THINGS: SEVERAL DAYS
7. FEELING AFRAID AS IF SOMETHING AWFUL MIGHT HAPPEN: NOT AT ALL
6. BECOMING EASILY ANNOYED OR IRRITABLE: NEARLY EVERY DAY
1. FEELING NERVOUS, ANXIOUS, OR ON EDGE: NOT AT ALL
2. NOT BEING ABLE TO STOP OR CONTROL WORRYING: SEVERAL DAYS
GAD7 TOTAL SCORE: 8
5. BEING SO RESTLESS THAT IT IS HARD TO SIT STILL: NOT AT ALL

## 2023-08-28 ASSESSMENT — PATIENT HEALTH QUESTIONNAIRE - PHQ9
5. POOR APPETITE OR OVEREATING: 3 - NEARLY EVERY DAY
CLINICAL INTERPRETATION OF PHQ2 SCORE: 6
SUM OF ALL RESPONSES TO PHQ QUESTIONS 1-9: 17

## 2023-08-28 NOTE — ASSESSMENT & PLAN NOTE
LUIGI-7 of 8, PHQ-9 of 17 today. No SI/HI or red flags. Discussed options for treatment. Patient not interested in therapy.   -Continue Wellbutrin, lamotrigine  -Referral to psychaitry   -CBC and TSH for organic causes

## 2023-08-28 NOTE — PROGRESS NOTES
"Subjective:     CC:   Chief Complaint   Patient presents with    Follow-Up     Lab results      HPI:   Marcelo presents today with    Problem   Blurry Vision, Bilateral    Patient reports recent worsening of vision. Having some blurry and double vision with reading his book. No acute vision loss, curtains or flashes of light across his vision. Would like to have vision reassessed.      Bipolar 1 Disorder, Depressed (Hcc)    Patient reports depression is not worse or better. He states he is quick to anger. States he is still having decreased appetite and poor sleep. Has not been able to get modafinil approved. Also states he had to switch to generic lamotrigine which he thinks is not working as well. Is taking medications as prescribed.    Says sleep has been worse. Still with difficulty falling asleep and then sleeping too much throughout the day when he does fall asleep.     Also with 6 pound recent weight loss. Believes it is due to mood and decreased appetite. Not interested in nutritionist, appetite stimulant. Eats 1 meal a day \"whatever I can find in the frozen section of the grocery store.\" Never with periods of days where he could not sleep, extremes of mood, erratic behavior (denies). No night sweats, fevers, chills. No SI/HI, intent to harm self or others.          ROS: See HPI.     Objective:     Exam:  /68 (BP Location: Left arm, Patient Position: Sitting, BP Cuff Size: Adult)   Pulse 70   Temp 36.2 °C (97.1 °F) (Temporal)   Resp 14   Ht 1.727 m (5' 8\")   Wt 57.6 kg (127 lb)   SpO2 92%   BMI 19.31 kg/m²  Body mass index is 19.31 kg/m².    Physical Exam:  General: Pt resting in NAD, cooperative   Skin:  No cyanosis or jaundice   HEENT: NC/AT. EOMI. No conjunctival injection or sclera icterus.   Lungs:  CTAB, good air movement. Non-labored.   Cardiovascular:  S1/S2 RRR   Abdomen:  Abdomen is soft, non-tender, non-distended, +BS  Extremities:  No LE edema   CNS:  No gross focal neurologic " deficits  Psych: Appropriate mood and affect       Labs: Reviewed    Assessment & Plan:     82 y.o. adult with the following -     Problem List Items Addressed This Visit       Bipolar 1 disorder, depressed (HCC)     LUIGI-7 of 8, PHQ-9 of 17 today. No SI/HI or red flags. Discussed options for treatment. Patient not interested in therapy.   -Continue Wellbutrin, lamotrigine  -Referral to psychaitry   -CBC and TSH for organic causes         Relevant Orders    CBC WITH DIFFERENTIAL    TSH WITH REFLEX TO FT4    Referral to Psychiatry    Blurry vision, bilateral     No red flag signs.   -Referral to optometry          Relevant Orders    Referral to Optometry     Follow in 1-3 months on mood, labs, vision. Return and ED precautions discussed.

## 2023-09-07 ENCOUNTER — APPOINTMENT (OUTPATIENT)
Dept: MEDICAL GROUP | Facility: CLINIC | Age: 83
End: 2023-09-07
Payer: MEDICARE

## 2023-12-17 DIAGNOSIS — F31.9 BIPOLAR 1 DISORDER, DEPRESSED (HCC): ICD-10-CM

## 2023-12-18 RX ORDER — LAMOTRIGINE 100 MG/1
200 TABLET ORAL DAILY
Qty: 60 TABLET | Refills: 11 | Status: SHIPPED | OUTPATIENT
Start: 2023-12-18

## 2024-01-29 NOTE — PROGRESS NOTES
Pt found watching tv, on room air.  Pt updated on poc.  No acute symptoms nor signs of distress. VSS.  Pt reports abd pain 4/10, medicated per request per mar.  Abd incision suma, steri strips intact, no active drainage.    LLQ colostomy in place, producing loose moderate amount stool.  IR drain to left upper buttock, minimal serous drainage.  -N/V, voids to urinal, last bm 8/8 per colostomy.  Left upper arm PICC patent.  Pt denies any other needs at this time.  Bed alarm on.  Call light within reach, will continue to monitor.     CRAMPS

## 2024-05-07 DIAGNOSIS — K21.9 GASTROESOPHAGEAL REFLUX DISEASE WITHOUT ESOPHAGITIS: ICD-10-CM

## 2024-05-08 RX ORDER — FAMOTIDINE 20 MG/1
20 TABLET, FILM COATED ORAL DAILY
Qty: 90 TABLET | Refills: 0 | Status: SHIPPED | OUTPATIENT
Start: 2024-05-08

## 2024-05-08 NOTE — TELEPHONE ENCOUNTER
Received request via: Pharmacy    Was the patient seen in the last year in this department? Yes    Does the patient have an active prescription (recently filled or refills available) for medication(s) requested? No    Pharmacy Name: [facility]    Does the patient have [facility] Plus and need 100 day supply (blood pressure, diabetes and cholesterol meds only)? Patient does not have SCP

## 2024-06-15 DIAGNOSIS — K21.9 GASTROESOPHAGEAL REFLUX DISEASE WITHOUT ESOPHAGITIS: ICD-10-CM

## 2024-06-17 RX ORDER — FAMOTIDINE 20 MG/1
20 TABLET, FILM COATED ORAL DAILY
Qty: 90 TABLET | Refills: 0 | Status: SHIPPED | OUTPATIENT
Start: 2024-06-17

## 2024-06-17 NOTE — TELEPHONE ENCOUNTER
Received request via: Pharmacy    Was the patient seen in the last year in this department? Yes    Does the patient have an active prescription (recently filled or refills available) for medication(s) requested? No    Pharmacy Name: [facility] Bond    Does the patient have [facility] Plus and need 100 day supply (blood pressure, diabetes and cholesterol meds only)? Patient does not have SCP

## 2024-08-26 DIAGNOSIS — K21.9 GASTROESOPHAGEAL REFLUX DISEASE WITHOUT ESOPHAGITIS: ICD-10-CM

## 2024-08-27 RX ORDER — FAMOTIDINE 20 MG/1
20 TABLET, FILM COATED ORAL DAILY
Qty: 90 TABLET | Refills: 0 | Status: SHIPPED | OUTPATIENT
Start: 2024-08-27

## 2024-08-27 NOTE — TELEPHONE ENCOUNTER
Received request via: Patient    Was the patient seen in the last year in this department? No, last seen 8/28/23    Does the patient have an active prescription (recently filled or refills available) for medication(s) requested? No    [facility]    Does the patient have [facility] Plus and need 100-day supply? (This applies to ALL medications) Patient does not have SCP

## 2024-10-27 ENCOUNTER — APPOINTMENT (OUTPATIENT)
Dept: RADIOLOGY | Facility: MEDICAL CENTER | Age: 84
DRG: 480 | End: 2024-10-27
Attending: EMERGENCY MEDICINE
Payer: MEDICARE

## 2024-10-27 ENCOUNTER — ANESTHESIA EVENT (OUTPATIENT)
Dept: SURGERY | Facility: MEDICAL CENTER | Age: 84
End: 2024-10-27
Payer: MEDICARE

## 2024-10-27 ENCOUNTER — ANESTHESIA (OUTPATIENT)
Dept: SURGERY | Facility: MEDICAL CENTER | Age: 84
End: 2024-10-27
Payer: MEDICARE

## 2024-10-27 ENCOUNTER — HOSPITAL ENCOUNTER (INPATIENT)
Facility: MEDICAL CENTER | Age: 84
End: 2024-10-27
Attending: EMERGENCY MEDICINE | Admitting: INTERNAL MEDICINE
Payer: MEDICARE

## 2024-10-27 ENCOUNTER — APPOINTMENT (OUTPATIENT)
Dept: RADIOLOGY | Facility: MEDICAL CENTER | Age: 84
DRG: 480 | End: 2024-10-27
Attending: STUDENT IN AN ORGANIZED HEALTH CARE EDUCATION/TRAINING PROGRAM
Payer: MEDICARE

## 2024-10-27 DIAGNOSIS — G47.00 INSOMNIA, UNSPECIFIED TYPE: ICD-10-CM

## 2024-10-27 PROBLEM — S72.001A CLOSED HIP FRACTURE REQUIRING OPERATIVE REPAIR, RIGHT, INITIAL ENCOUNTER (HCC): Status: ACTIVE | Noted: 2024-10-27

## 2024-10-27 PROBLEM — Z01.818 ENCOUNTER FOR PERIOPERATIVE CONSULTATION: Status: ACTIVE | Noted: 2021-10-27

## 2024-10-27 PROBLEM — R54 FRAILTY SYNDROME IN GERIATRIC PATIENT: Status: ACTIVE | Noted: 2024-10-27

## 2024-10-27 LAB
ALBUMIN SERPL BCP-MCNC: 4.1 G/DL (ref 3.2–4.9)
ALBUMIN/GLOB SERPL: 1.5 G/DL
ALP SERPL-CCNC: 110 U/L (ref 30–99)
ALT SERPL-CCNC: 13 U/L (ref 2–50)
ANION GAP SERPL CALC-SCNC: 17 MMOL/L (ref 7–16)
APTT PPP: 37.3 SEC (ref 24.7–36)
AST SERPL-CCNC: 25 U/L (ref 12–45)
BASOPHILS # BLD AUTO: 0.1 % (ref 0–1.8)
BASOPHILS # BLD: 0.01 K/UL (ref 0–0.12)
BILIRUB SERPL-MCNC: 1.6 MG/DL (ref 0.1–1.5)
BUN SERPL-MCNC: 22 MG/DL (ref 8–22)
CALCIUM ALBUM COR SERPL-MCNC: 9.8 MG/DL (ref 8.5–10.5)
CALCIUM SERPL-MCNC: 9.9 MG/DL (ref 8.5–10.5)
CHLORIDE SERPL-SCNC: 102 MMOL/L (ref 96–112)
CK SERPL-CCNC: 370 U/L (ref 0–154)
CO2 SERPL-SCNC: 22 MMOL/L (ref 20–33)
CREAT SERPL-MCNC: 1.43 MG/DL (ref 0.5–1.4)
EKG IMPRESSION: NORMAL
EOSINOPHIL # BLD AUTO: 0 K/UL (ref 0–0.51)
EOSINOPHIL NFR BLD: 0 % (ref 0–6.9)
ERYTHROCYTE [DISTWIDTH] IN BLOOD BY AUTOMATED COUNT: 47.4 FL (ref 35.9–50)
GFR SERPLBLD CREATININE-BSD FMLA CKD-EPI: 49 ML/MIN/1.73 M 2
GLOBULIN SER CALC-MCNC: 2.8 G/DL (ref 1.9–3.5)
GLUCOSE SERPL-MCNC: 155 MG/DL (ref 65–99)
HCT VFR BLD AUTO: 39.9 % (ref 42–52)
HGB BLD-MCNC: 13.5 G/DL (ref 14–18)
IMM GRANULOCYTES # BLD AUTO: 0.03 K/UL (ref 0–0.11)
IMM GRANULOCYTES NFR BLD AUTO: 0.4 % (ref 0–0.9)
INR PPP: 1.77 (ref 0.87–1.13)
LYMPHOCYTES # BLD AUTO: 0.68 K/UL (ref 1–4.8)
LYMPHOCYTES NFR BLD: 8.2 % (ref 22–41)
MCH RBC QN AUTO: 32 PG (ref 27–33)
MCHC RBC AUTO-ENTMCNC: 33.8 G/DL (ref 32.3–36.5)
MCV RBC AUTO: 94.5 FL (ref 81.4–97.8)
MONOCYTES # BLD AUTO: 0.92 K/UL (ref 0–0.85)
MONOCYTES NFR BLD AUTO: 11.2 % (ref 0–13.4)
NEUTROPHILS # BLD AUTO: 6.61 K/UL (ref 1.82–7.42)
NEUTROPHILS NFR BLD: 80.1 % (ref 44–72)
NRBC # BLD AUTO: 0 K/UL
NRBC BLD-RTO: 0 /100 WBC (ref 0–0.2)
PLATELET # BLD AUTO: 177 K/UL (ref 164–446)
PMV BLD AUTO: 10.1 FL (ref 9–12.9)
POTASSIUM SERPL-SCNC: 4.2 MMOL/L (ref 3.6–5.5)
PROT SERPL-MCNC: 6.9 G/DL (ref 6–8.2)
PROTHROMBIN TIME: 20.7 SEC (ref 12–14.6)
RBC # BLD AUTO: 4.22 M/UL (ref 4.7–6.1)
SODIUM SERPL-SCNC: 141 MMOL/L (ref 135–145)
WBC # BLD AUTO: 8.3 K/UL (ref 4.8–10.8)

## 2024-10-27 PROCEDURE — 160002 HCHG RECOVERY MINUTES (STAT): Performed by: STUDENT IN AN ORGANIZED HEALTH CARE EDUCATION/TRAINING PROGRAM

## 2024-10-27 PROCEDURE — 302830 HCHG BUCKS UNIVERSAL TRACTION BOOT

## 2024-10-27 PROCEDURE — 700111 HCHG RX REV CODE 636 W/ 250 OVERRIDE (IP): Performed by: ANESTHESIOLOGY

## 2024-10-27 PROCEDURE — 99221 1ST HOSP IP/OBS SF/LOW 40: CPT | Mod: 57 | Performed by: STUDENT IN AN ORGANIZED HEALTH CARE EDUCATION/TRAINING PROGRAM

## 2024-10-27 PROCEDURE — 160048 HCHG OR STATISTICAL LEVEL 1-5: Performed by: STUDENT IN AN ORGANIZED HEALTH CARE EDUCATION/TRAINING PROGRAM

## 2024-10-27 PROCEDURE — 160041 HCHG SURGERY MINUTES - EA ADDL 1 MIN LEVEL 4: Performed by: STUDENT IN AN ORGANIZED HEALTH CARE EDUCATION/TRAINING PROGRAM

## 2024-10-27 PROCEDURE — 73552 X-RAY EXAM OF FEMUR 2/>: CPT | Mod: RT

## 2024-10-27 PROCEDURE — 770001 HCHG ROOM/CARE - MED/SURG/GYN PRIV*

## 2024-10-27 PROCEDURE — 85025 COMPLETE CBC W/AUTO DIFF WBC: CPT

## 2024-10-27 PROCEDURE — 93005 ELECTROCARDIOGRAM TRACING: CPT | Performed by: INTERNAL MEDICINE

## 2024-10-27 PROCEDURE — 36415 COLL VENOUS BLD VENIPUNCTURE: CPT

## 2024-10-27 PROCEDURE — 700111 HCHG RX REV CODE 636 W/ 250 OVERRIDE (IP): Mod: JZ | Performed by: EMERGENCY MEDICINE

## 2024-10-27 PROCEDURE — 99285 EMERGENCY DEPT VISIT HI MDM: CPT

## 2024-10-27 PROCEDURE — 700111 HCHG RX REV CODE 636 W/ 250 OVERRIDE (IP): Mod: JZ | Performed by: ANESTHESIOLOGY

## 2024-10-27 PROCEDURE — 160029 HCHG SURGERY MINUTES - 1ST 30 MINS LEVEL 4: Performed by: STUDENT IN AN ORGANIZED HEALTH CARE EDUCATION/TRAINING PROGRAM

## 2024-10-27 PROCEDURE — 96374 THER/PROPH/DIAG INJ IV PUSH: CPT

## 2024-10-27 PROCEDURE — 160009 HCHG ANES TIME/MIN: Performed by: STUDENT IN AN ORGANIZED HEALTH CARE EDUCATION/TRAINING PROGRAM

## 2024-10-27 PROCEDURE — 72131 CT LUMBAR SPINE W/O DYE: CPT

## 2024-10-27 PROCEDURE — 0QS606Z REPOSITION RIGHT UPPER FEMUR WITH INTRAMEDULLARY INTERNAL FIXATION DEVICE, OPEN APPROACH: ICD-10-PCS | Performed by: STUDENT IN AN ORGANIZED HEALTH CARE EDUCATION/TRAINING PROGRAM

## 2024-10-27 PROCEDURE — 72125 CT NECK SPINE W/O DYE: CPT

## 2024-10-27 PROCEDURE — A9270 NON-COVERED ITEM OR SERVICE: HCPCS | Performed by: ANESTHESIOLOGY

## 2024-10-27 PROCEDURE — C1713 ANCHOR/SCREW BN/BN,TIS/BN: HCPCS | Performed by: STUDENT IN AN ORGANIZED HEALTH CARE EDUCATION/TRAINING PROGRAM

## 2024-10-27 PROCEDURE — 99223 1ST HOSP IP/OBS HIGH 75: CPT | Mod: AI | Performed by: INTERNAL MEDICINE

## 2024-10-27 PROCEDURE — 700102 HCHG RX REV CODE 250 W/ 637 OVERRIDE(OP): Performed by: INTERNAL MEDICINE

## 2024-10-27 PROCEDURE — 27506 TREATMENT OF THIGH FRACTURE: CPT | Mod: RT | Performed by: STUDENT IN AN ORGANIZED HEALTH CARE EDUCATION/TRAINING PROGRAM

## 2024-10-27 PROCEDURE — 160035 HCHG PACU - 1ST 60 MINS PHASE I: Performed by: STUDENT IN AN ORGANIZED HEALTH CARE EDUCATION/TRAINING PROGRAM

## 2024-10-27 PROCEDURE — 700101 HCHG RX REV CODE 250: Performed by: ANESTHESIOLOGY

## 2024-10-27 PROCEDURE — A9270 NON-COVERED ITEM OR SERVICE: HCPCS | Performed by: INTERNAL MEDICINE

## 2024-10-27 PROCEDURE — 80053 COMPREHEN METABOLIC PANEL: CPT

## 2024-10-27 PROCEDURE — 96375 TX/PRO/DX INJ NEW DRUG ADDON: CPT

## 2024-10-27 PROCEDURE — 700105 HCHG RX REV CODE 258: Performed by: EMERGENCY MEDICINE

## 2024-10-27 PROCEDURE — 82550 ASSAY OF CK (CPK): CPT

## 2024-10-27 PROCEDURE — 85730 THROMBOPLASTIN TIME PARTIAL: CPT

## 2024-10-27 PROCEDURE — 85610 PROTHROMBIN TIME: CPT

## 2024-10-27 PROCEDURE — 110371 HCHG SHELL REV 272: Performed by: STUDENT IN AN ORGANIZED HEALTH CARE EDUCATION/TRAINING PROGRAM

## 2024-10-27 PROCEDURE — 700102 HCHG RX REV CODE 250 W/ 637 OVERRIDE(OP): Performed by: ANESTHESIOLOGY

## 2024-10-27 PROCEDURE — 70450 CT HEAD/BRAIN W/O DYE: CPT

## 2024-10-27 PROCEDURE — 72170 X-RAY EXAM OF PELVIS: CPT

## 2024-10-27 PROCEDURE — 502000 HCHG MISC OR IMPLANTS RC 0278: Performed by: STUDENT IN AN ORGANIZED HEALTH CARE EDUCATION/TRAINING PROGRAM

## 2024-10-27 PROCEDURE — 700111 HCHG RX REV CODE 636 W/ 250 OVERRIDE (IP): Performed by: STUDENT IN AN ORGANIZED HEALTH CARE EDUCATION/TRAINING PROGRAM

## 2024-10-27 DEVICE — LOCKING SCREW DIA 5X65MM: Type: IMPLANTABLE DEVICE | Site: LEG | Status: FUNCTIONAL

## 2024-10-27 DEVICE — LOCKING SCREW DIA 5X50MM: Type: IMPLANTABLE DEVICE | Site: LEG | Status: FUNCTIONAL

## 2024-10-27 DEVICE — K-WIRE 3MM X 285MM (1EA): Type: IMPLANTABLE DEVICE | Site: LEG | Status: FUNCTIONAL

## 2024-10-27 DEVICE — IMPLANTABLE DEVICE: Type: IMPLANTABLE DEVICE | Site: LEG | Status: FUNCTIONAL

## 2024-10-27 DEVICE — PIN PRECISION TM TAPER D3.2/3.9 X 450MM (1EA): Type: IMPLANTABLE DEVICE | Site: LEG | Status: FUNCTIONAL

## 2024-10-27 DEVICE — HIP DALL MILES SET 2.0 V BEAD: Type: IMPLANTABLE DEVICE | Site: LEG | Status: FUNCTIONAL

## 2024-10-27 RX ORDER — SODIUM CHLORIDE 9 MG/ML
INJECTION, SOLUTION INTRAVENOUS CONTINUOUS
Status: DISCONTINUED | OUTPATIENT
Start: 2024-10-27 | End: 2024-10-28

## 2024-10-27 RX ORDER — GABAPENTIN 300 MG/1
300 CAPSULE ORAL 2 TIMES DAILY
COMMUNITY

## 2024-10-27 RX ORDER — HYDROMORPHONE HYDROCHLORIDE 1 MG/ML
0.25 INJECTION, SOLUTION INTRAMUSCULAR; INTRAVENOUS; SUBCUTANEOUS
Status: DISCONTINUED | OUTPATIENT
Start: 2024-10-27 | End: 2024-10-27

## 2024-10-27 RX ORDER — ONDANSETRON 2 MG/ML
4 INJECTION INTRAMUSCULAR; INTRAVENOUS ONCE
Status: COMPLETED | OUTPATIENT
Start: 2024-10-27 | End: 2024-10-27

## 2024-10-27 RX ORDER — OXYCODONE HCL 5 MG/5 ML
5 SOLUTION, ORAL ORAL
Status: COMPLETED | OUTPATIENT
Start: 2024-10-27 | End: 2024-10-27

## 2024-10-27 RX ORDER — ALBUTEROL SULFATE 5 MG/ML
2.5 SOLUTION RESPIRATORY (INHALATION)
Status: DISCONTINUED | OUTPATIENT
Start: 2024-10-27 | End: 2024-10-27 | Stop reason: HOSPADM

## 2024-10-27 RX ORDER — HYDRALAZINE HYDROCHLORIDE 20 MG/ML
10 INJECTION INTRAMUSCULAR; INTRAVENOUS EVERY 4 HOURS PRN
Status: DISCONTINUED | OUTPATIENT
Start: 2024-10-27 | End: 2024-11-04 | Stop reason: HOSPADM

## 2024-10-27 RX ORDER — TAMSULOSIN HYDROCHLORIDE 0.4 MG/1
0.4 CAPSULE ORAL
Status: DISCONTINUED | OUTPATIENT
Start: 2024-10-27 | End: 2024-11-04 | Stop reason: HOSPADM

## 2024-10-27 RX ORDER — POLYETHYLENE GLYCOL 3350 17 G/17G
1 POWDER, FOR SOLUTION ORAL
Status: DISCONTINUED | OUTPATIENT
Start: 2024-10-27 | End: 2024-11-04 | Stop reason: HOSPADM

## 2024-10-27 RX ORDER — FAMOTIDINE 20 MG/1
20 TABLET, FILM COATED ORAL
COMMUNITY

## 2024-10-27 RX ORDER — HYDRALAZINE HYDROCHLORIDE 20 MG/ML
5 INJECTION INTRAMUSCULAR; INTRAVENOUS
Status: DISCONTINUED | OUTPATIENT
Start: 2024-10-27 | End: 2024-10-27 | Stop reason: HOSPADM

## 2024-10-27 RX ORDER — FAMOTIDINE 20 MG/1
20 TABLET, FILM COATED ORAL
Status: DISCONTINUED | OUTPATIENT
Start: 2024-10-27 | End: 2024-11-04 | Stop reason: HOSPADM

## 2024-10-27 RX ORDER — PHENYLEPHRINE HCL IN 0.9% NACL 1 MG/10 ML
SYRINGE (ML) INTRAVENOUS PRN
Status: DISCONTINUED | OUTPATIENT
Start: 2024-10-27 | End: 2024-10-27 | Stop reason: SURG

## 2024-10-27 RX ORDER — AMOXICILLIN 250 MG
2 CAPSULE ORAL EVERY EVENING
Status: DISCONTINUED | OUTPATIENT
Start: 2024-10-27 | End: 2024-11-04 | Stop reason: HOSPADM

## 2024-10-27 RX ORDER — LIDOCAINE HYDROCHLORIDE 10 MG/ML
INJECTION, SOLUTION EPIDURAL; INFILTRATION; INTRACAUDAL; PERINEURAL PRN
Status: DISCONTINUED | OUTPATIENT
Start: 2024-10-27 | End: 2024-10-27 | Stop reason: SURG

## 2024-10-27 RX ORDER — HYDROMORPHONE HYDROCHLORIDE 1 MG/ML
0.25 INJECTION, SOLUTION INTRAMUSCULAR; INTRAVENOUS; SUBCUTANEOUS
Status: DISCONTINUED | OUTPATIENT
Start: 2024-10-27 | End: 2024-11-04 | Stop reason: HOSPADM

## 2024-10-27 RX ORDER — OXYCODONE HYDROCHLORIDE 5 MG/1
5 TABLET ORAL
Status: DISCONTINUED | OUTPATIENT
Start: 2024-10-27 | End: 2024-10-27

## 2024-10-27 RX ORDER — CEFAZOLIN SODIUM 1 G/3ML
INJECTION, POWDER, FOR SOLUTION INTRAMUSCULAR; INTRAVENOUS PRN
Status: DISCONTINUED | OUTPATIENT
Start: 2024-10-27 | End: 2024-10-27 | Stop reason: SURG

## 2024-10-27 RX ORDER — TOBRAMYCIN 1.2 G/30ML
INJECTION, POWDER, LYOPHILIZED, FOR SOLUTION INTRAVENOUS
Status: DISCONTINUED | OUTPATIENT
Start: 2024-10-27 | End: 2024-10-27 | Stop reason: HOSPADM

## 2024-10-27 RX ORDER — ONDANSETRON 4 MG/1
4 TABLET, ORALLY DISINTEGRATING ORAL EVERY 4 HOURS PRN
Status: DISCONTINUED | OUTPATIENT
Start: 2024-10-27 | End: 2024-11-04 | Stop reason: HOSPADM

## 2024-10-27 RX ORDER — BUPIVACAINE HYDROCHLORIDE 2.5 MG/ML
INJECTION, SOLUTION EPIDURAL; INFILTRATION; INTRACAUDAL
Status: DISCONTINUED | OUTPATIENT
Start: 2024-10-27 | End: 2024-10-27 | Stop reason: HOSPADM

## 2024-10-27 RX ORDER — OXYCODONE HYDROCHLORIDE 10 MG/1
10 TABLET ORAL 3 TIMES DAILY PRN
COMMUNITY

## 2024-10-27 RX ORDER — OXYCODONE HYDROCHLORIDE 5 MG/1
2.5 TABLET ORAL
Status: DISCONTINUED | OUTPATIENT
Start: 2024-10-27 | End: 2024-10-27

## 2024-10-27 RX ORDER — OXYCODONE HCL 5 MG/5 ML
10 SOLUTION, ORAL ORAL
Status: COMPLETED | OUTPATIENT
Start: 2024-10-27 | End: 2024-10-27

## 2024-10-27 RX ORDER — EPHEDRINE SULFATE 50 MG/ML
5 INJECTION, SOLUTION INTRAVENOUS
Status: DISCONTINUED | OUTPATIENT
Start: 2024-10-27 | End: 2024-10-27 | Stop reason: HOSPADM

## 2024-10-27 RX ORDER — OXYCODONE HYDROCHLORIDE 5 MG/1
5 TABLET ORAL
Status: DISCONTINUED | OUTPATIENT
Start: 2024-10-27 | End: 2024-11-04 | Stop reason: HOSPADM

## 2024-10-27 RX ORDER — GABAPENTIN 300 MG/1
300 CAPSULE ORAL 2 TIMES DAILY
Status: DISCONTINUED | OUTPATIENT
Start: 2024-10-27 | End: 2024-11-04 | Stop reason: HOSPADM

## 2024-10-27 RX ORDER — OXYCODONE HYDROCHLORIDE 10 MG/1
10 TABLET ORAL
Status: DISCONTINUED | OUTPATIENT
Start: 2024-10-27 | End: 2024-11-04 | Stop reason: HOSPADM

## 2024-10-27 RX ORDER — ONDANSETRON 2 MG/ML
4 INJECTION INTRAMUSCULAR; INTRAVENOUS
Status: DISCONTINUED | OUTPATIENT
Start: 2024-10-27 | End: 2024-10-27 | Stop reason: HOSPADM

## 2024-10-27 RX ORDER — ALFUZOSIN HYDROCHLORIDE 10 MG/1
10 TABLET, EXTENDED RELEASE ORAL
Status: DISCONTINUED | OUTPATIENT
Start: 2024-10-27 | End: 2024-10-27

## 2024-10-27 RX ORDER — MORPHINE SULFATE 4 MG/ML
4 INJECTION INTRAVENOUS ONCE
Status: COMPLETED | OUTPATIENT
Start: 2024-10-27 | End: 2024-10-27

## 2024-10-27 RX ORDER — ONDANSETRON 2 MG/ML
INJECTION INTRAMUSCULAR; INTRAVENOUS PRN
Status: DISCONTINUED | OUTPATIENT
Start: 2024-10-27 | End: 2024-10-27 | Stop reason: SURG

## 2024-10-27 RX ORDER — LAMOTRIGINE 100 MG/1
200 TABLET ORAL DAILY
Status: DISCONTINUED | OUTPATIENT
Start: 2024-10-27 | End: 2024-11-04 | Stop reason: HOSPADM

## 2024-10-27 RX ORDER — DEXAMETHASONE SODIUM PHOSPHATE 4 MG/ML
INJECTION, SOLUTION INTRA-ARTICULAR; INTRALESIONAL; INTRAMUSCULAR; INTRAVENOUS; SOFT TISSUE PRN
Status: DISCONTINUED | OUTPATIENT
Start: 2024-10-27 | End: 2024-10-27 | Stop reason: SURG

## 2024-10-27 RX ORDER — ONDANSETRON 2 MG/ML
4 INJECTION INTRAMUSCULAR; INTRAVENOUS EVERY 4 HOURS PRN
Status: DISCONTINUED | OUTPATIENT
Start: 2024-10-27 | End: 2024-11-04 | Stop reason: HOSPADM

## 2024-10-27 RX ORDER — VANCOMYCIN HYDROCHLORIDE 1 G/20ML
INJECTION, POWDER, LYOPHILIZED, FOR SOLUTION INTRAVENOUS
Status: COMPLETED | OUTPATIENT
Start: 2024-10-27 | End: 2024-10-27

## 2024-10-27 RX ADMIN — Medication 100 MCG: at 17:16

## 2024-10-27 RX ADMIN — ONDANSETRON 4 MG: 2 INJECTION INTRAMUSCULAR; INTRAVENOUS at 17:14

## 2024-10-27 RX ADMIN — TAMSULOSIN HYDROCHLORIDE 0.4 MG: 0.4 CAPSULE ORAL at 20:20

## 2024-10-27 RX ADMIN — FENTANYL CITRATE 50 MCG: 50 INJECTION, SOLUTION INTRAMUSCULAR; INTRAVENOUS at 17:49

## 2024-10-27 RX ADMIN — Medication 100 MCG: at 16:29

## 2024-10-27 RX ADMIN — OXYCODONE 5 MG: 5 TABLET ORAL at 20:20

## 2024-10-27 RX ADMIN — OXYCODONE HYDROCHLORIDE 10 MG: 5 SOLUTION ORAL at 17:46

## 2024-10-27 RX ADMIN — ROCURONIUM BROMIDE 40 MG: 10 INJECTION, SOLUTION INTRAVENOUS at 16:20

## 2024-10-27 RX ADMIN — ONDANSETRON 4 MG: 2 INJECTION INTRAMUSCULAR; INTRAVENOUS at 11:00

## 2024-10-27 RX ADMIN — FENTANYL CITRATE 25 MCG: 50 INJECTION, SOLUTION INTRAMUSCULAR; INTRAVENOUS at 16:47

## 2024-10-27 RX ADMIN — Medication 100 MCG: at 17:26

## 2024-10-27 RX ADMIN — Medication 100 MCG: at 16:55

## 2024-10-27 RX ADMIN — Medication 100 MCG: at 16:26

## 2024-10-27 RX ADMIN — FENTANYL CITRATE 25 MCG: 50 INJECTION, SOLUTION INTRAMUSCULAR; INTRAVENOUS at 17:17

## 2024-10-27 RX ADMIN — CEFAZOLIN 2 G: 1 INJECTION, POWDER, FOR SOLUTION INTRAMUSCULAR; INTRAVENOUS at 16:23

## 2024-10-27 RX ADMIN — SUGAMMADEX 200 MG: 100 INJECTION, SOLUTION INTRAVENOUS at 17:33

## 2024-10-27 RX ADMIN — FAMOTIDINE 20 MG: 20 TABLET, FILM COATED ORAL at 20:20

## 2024-10-27 RX ADMIN — OXYCODONE HYDROCHLORIDE 10 MG: 10 TABLET ORAL at 13:58

## 2024-10-27 RX ADMIN — Medication 100 MCG: at 17:07

## 2024-10-27 RX ADMIN — Medication 100 MCG: at 17:00

## 2024-10-27 RX ADMIN — LIDOCAINE HYDROCHLORIDE 40 MG: 10 INJECTION, SOLUTION EPIDURAL; INFILTRATION; INTRACAUDAL; PERINEURAL at 16:19

## 2024-10-27 RX ADMIN — PROPOFOL 100 MG: 10 INJECTION, EMULSION INTRAVENOUS at 16:19

## 2024-10-27 RX ADMIN — DEXAMETHASONE SODIUM PHOSPHATE 4 MG: 4 INJECTION INTRA-ARTICULAR; INTRALESIONAL; INTRAMUSCULAR; INTRAVENOUS; SOFT TISSUE at 16:23

## 2024-10-27 RX ADMIN — SODIUM CHLORIDE: 9 INJECTION, SOLUTION INTRAVENOUS at 11:15

## 2024-10-27 RX ADMIN — FENTANYL CITRATE 50 MCG: 50 INJECTION, SOLUTION INTRAMUSCULAR; INTRAVENOUS at 16:19

## 2024-10-27 RX ADMIN — LAMOTRIGINE 200 MG: 100 TABLET ORAL at 13:58

## 2024-10-27 RX ADMIN — MORPHINE SULFATE 4 MG: 4 INJECTION, SOLUTION INTRAMUSCULAR; INTRAVENOUS at 10:30

## 2024-10-27 SDOH — ECONOMIC STABILITY: TRANSPORTATION INSECURITY
IN THE PAST 12 MONTHS, HAS LACK OF RELIABLE TRANSPORTATION KEPT YOU FROM MEDICAL APPOINTMENTS, MEETINGS, WORK OR FROM GETTING THINGS NEEDED FOR DAILY LIVING?: NO

## 2024-10-27 SDOH — ECONOMIC STABILITY: TRANSPORTATION INSECURITY
IN THE PAST 12 MONTHS, HAS THE LACK OF TRANSPORTATION KEPT YOU FROM MEDICAL APPOINTMENTS OR FROM GETTING MEDICATIONS?: YES

## 2024-10-27 ASSESSMENT — PAIN DESCRIPTION - PAIN TYPE
TYPE: SURGICAL PAIN
TYPE: SURGICAL PAIN
TYPE: ACUTE PAIN
TYPE: ACUTE PAIN
TYPE: SURGICAL PAIN

## 2024-10-27 ASSESSMENT — ENCOUNTER SYMPTOMS
HEADACHES: 0
MYALGIAS: 0
SHORTNESS OF BREATH: 0
BLURRED VISION: 0
NAUSEA: 0
DIZZINESS: 0
CHILLS: 0
COUGH: 0
FALLS: 1
FEVER: 0
VOMITING: 0
WEAKNESS: 1
ABDOMINAL PAIN: 0
PALPITATIONS: 0
WEIGHT LOSS: 1
DEPRESSION: 1

## 2024-10-27 ASSESSMENT — COGNITIVE AND FUNCTIONAL STATUS - GENERAL
DAILY ACTIVITIY SCORE: 19
SUGGESTED CMS G CODE MODIFIER MOBILITY: CL
DRESSING REGULAR UPPER BODY CLOTHING: A LITTLE
PERSONAL GROOMING: A LITTLE
CLIMB 3 TO 5 STEPS WITH RAILING: A LOT
TURNING FROM BACK TO SIDE WHILE IN FLAT BAD: A LOT
WALKING IN HOSPITAL ROOM: A LOT
MOVING FROM LYING ON BACK TO SITTING ON SIDE OF FLAT BED: A LOT
MOVING TO AND FROM BED TO CHAIR: A LOT
MOBILITY SCORE: 12
TOILETING: A LITTLE
SUGGESTED CMS G CODE MODIFIER DAILY ACTIVITY: CK
DRESSING REGULAR LOWER BODY CLOTHING: A LITTLE
HELP NEEDED FOR BATHING: A LITTLE
STANDING UP FROM CHAIR USING ARMS: A LOT

## 2024-10-27 ASSESSMENT — LIFESTYLE VARIABLES
DOES PATIENT WANT TO STOP DRINKING: NO
EVER HAD A DRINK FIRST THING IN THE MORNING TO STEADY YOUR NERVES TO GET RID OF A HANGOVER: NO
HAVE PEOPLE ANNOYED YOU BY CRITICIZING YOUR DRINKING: NO
HAVE YOU EVER FELT YOU SHOULD CUT DOWN ON YOUR DRINKING: NO
EVER FELT BAD OR GUILTY ABOUT YOUR DRINKING: NO
TOTAL SCORE: 0
ALCOHOL_USE: YES
HOW MANY TIMES IN THE PAST YEAR HAVE YOU HAD 5 OR MORE DRINKS IN A DAY: 0
CONSUMPTION TOTAL: NEGATIVE
TOTAL SCORE: 0
AVERAGE NUMBER OF DAYS PER WEEK YOU HAVE A DRINK CONTAINING ALCOHOL: 7
ON A TYPICAL DAY WHEN YOU DRINK ALCOHOL HOW MANY DRINKS DO YOU HAVE: 2
TOTAL SCORE: 0

## 2024-10-27 ASSESSMENT — SOCIAL DETERMINANTS OF HEALTH (SDOH)
WITHIN THE PAST 12 MONTHS, YOU WORRIED THAT YOUR FOOD WOULD RUN OUT BEFORE YOU GOT THE MONEY TO BUY MORE: NEVER TRUE
WITHIN THE PAST 12 MONTHS, THE FOOD YOU BOUGHT JUST DIDN'T LAST AND YOU DIDN'T HAVE MONEY TO GET MORE: SOMETIMES TRUE
WITHIN THE LAST YEAR, HAVE YOU BEEN KICKED, HIT, SLAPPED, OR OTHERWISE PHYSICALLY HURT BY YOUR PARTNER OR EX-PARTNER?: NO
WITHIN THE LAST YEAR, HAVE YOU BEEN HUMILIATED OR EMOTIONALLY ABUSED IN OTHER WAYS BY YOUR PARTNER OR EX-PARTNER?: NO
IN THE PAST 12 MONTHS, HAS THE ELECTRIC, GAS, OIL, OR WATER COMPANY THREATENED TO SHUT OFF SERVICE IN YOUR HOME?: NO
WITHIN THE LAST YEAR, HAVE TO BEEN RAPED OR FORCED TO HAVE ANY KIND OF SEXUAL ACTIVITY BY YOUR PARTNER OR EX-PARTNER?: NO
WITHIN THE LAST YEAR, HAVE YOU BEEN AFRAID OF YOUR PARTNER OR EX-PARTNER?: NO

## 2024-10-27 ASSESSMENT — PATIENT HEALTH QUESTIONNAIRE - PHQ9
6. FEELING BAD ABOUT YOURSELF - OR THAT YOU ARE A FAILURE OR HAVE LET YOURSELF OR YOUR FAMILY DOWN: NOT AL ALL
2. FEELING DOWN, DEPRESSED, IRRITABLE, OR HOPELESS: NEARLY EVERY DAY
9. THOUGHTS THAT YOU WOULD BE BETTER OFF DEAD, OR OF HURTING YOURSELF: NOT AT ALL
4. FEELING TIRED OR HAVING LITTLE ENERGY: NOT AT ALL
SUM OF ALL RESPONSES TO PHQ9 QUESTIONS 1 AND 2: 3
1. LITTLE INTEREST OR PLEASURE IN DOING THINGS: NOT AT ALL
5. POOR APPETITE OR OVEREATING: NOT AT ALL
8. MOVING OR SPEAKING SO SLOWLY THAT OTHER PEOPLE COULD HAVE NOTICED. OR THE OPPOSITE, BEING SO FIGETY OR RESTLESS THAT YOU HAVE BEEN MOVING AROUND A LOT MORE THAN USUAL: NOT AT ALL
7. TROUBLE CONCENTRATING ON THINGS, SUCH AS READING THE NEWSPAPER OR WATCHING TELEVISION: NOT AT ALL
SUM OF ALL RESPONSES TO PHQ QUESTIONS 1-9: 6
3. TROUBLE FALLING OR STAYING ASLEEP OR SLEEPING TOO MUCH: NEARLY EVERY DAY

## 2024-10-27 ASSESSMENT — FIBROSIS 4 INDEX
FIB4 SCORE: 1.51
FIB4 SCORE: 3.25

## 2024-10-27 ASSESSMENT — PAIN SCALES - GENERAL: PAIN_LEVEL: 1

## 2024-10-27 NOTE — ED TRIAGE NOTES
Chief Complaint   Patient presents with    GLF     BIB ems from home. Pt sustained a MGLF this morning around 0200. He was stuck on the ground he states for approximately 3 hours till he could scoot on his back and call ems. Pt has swelling, pain to the right thigh. He also has internal rotation to the RLE.     Vitals:    10/27/24 0938   BP: (!) 168/122   Pulse: 70   Resp: 18   Temp: 36.8 °C (98.2 °F)   SpO2: 94%     Pt received 100mcg of IV fentanyl with ems en route.

## 2024-10-27 NOTE — H&P
Hospital Medicine History & Physical Note    Date of Service  10/27/2024    Primary Care Physician  Pcp Pt States None    Consultants  orthopedics    Specialist Names: Dr. Mann     Code Status  DNAR/DNI    Chief Complaint  Chief Complaint   Patient presents with    GLF     BIB ems from home. Pt sustained a MGLF this morning around 0200. He was stuck on the ground he states for approximately 3 hours till he could scoot on his back and call ems. Pt has swelling, pain to the right thigh. He also has internal rotation to the RLE.       History of Presenting Illness  Marcelo Colon is a 84 y.o. person who presented 10/27/2024 with ground-level fall and right leg pain.  Patient reports he got up last night to get a snack in his kitchen when he turned too quickly and lost his balance falling onto his right leg.  He denied any preceding dizziness, chest pain or shortness of breath.  He denied hitting his head to me but did admit to hitting his head to the ERP denied loss of consciousness.  Patient was on the floor for about 3 hours and had to scoot himself to his bedroom to get his cell phone to call EMS.  He denies numbness or tingling in the right leg.  On review of patient's outpatient records patient was seen by cardiology in 2022 for paroxysmal atrial fibrillation had recommended anticoagulation with Eliquis and propranolol as well as an echocardiogram as 1 had not been done previously.  Per their note he did have a nuclear stress test that showed a fixed defect that is most consistent with artifact and no ischemia.  Primary care note 7/2023 they reordered patient's Eliquis and beta-blocker and put in a referral to cardiology.  Patient does not think he follow up with cardiology does not remember getting an echocardiogram.  He states he stopped taking the medications as he was told by someone he had a clean bill of health but cannot tell me who or when he stopped taking the meds.  He does report weight loss and  poor appetite due to ongoing depression as he has been unable to work.    In the ER vitals significant for tachycardia, tachypnea and hypertension.  Labs significant for hemoglobin 13.5, Cr 1.43, GFR 49, .  Femur x-ray showed proximal right femur fracture with angulation and displacement.  CT head and spine showed no acute findings or fractures.  Orthopedic surgery consulted by ERP who recommended traction splint and keep n.p.o. for possible OR today.  Patient confirmed he is DNR/DNI.    I discussed the plan of care with patient, bedside RN, and ERP .    Review of Systems  Review of Systems   Constitutional:  Positive for malaise/fatigue and weight loss. Negative for chills and fever.   HENT:  Negative for congestion.    Eyes:  Negative for blurred vision.   Respiratory:  Negative for cough and shortness of breath.    Cardiovascular:  Negative for chest pain, palpitations and leg swelling.   Gastrointestinal:  Negative for abdominal pain, nausea and vomiting.   Genitourinary:  Negative for dysuria.   Musculoskeletal:  Positive for falls and joint pain. Negative for myalgias.   Skin:  Negative for rash.   Neurological:  Positive for weakness. Negative for dizziness and headaches.   Psychiatric/Behavioral:  Positive for depression.    All other systems reviewed and are negative.      Past Medical History   has a past medical history of Anesthesia, Anxiety disorder, Arthritis, Bipolar disorder (HCC), Clostridium difficile infection, Depression, Enlarged prostate, GERD (gastroesophageal reflux disease), Heart murmur, Indigestion, and Neck pain.    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     Family History  family  history includes Diabetes in Richard's maternal uncle and paternal grandfather.   Family history reviewed with patient. There is no family history that is pertinent to the chief complaint.     Social History   reports that Richard quit smoking about 51 years ago. Richard started smoking about 65 years ago. Richard has a 28 pack-year smoking history. Richard has never used smokeless tobacco. Richard reports current alcohol use. Richard reports current drug use.    Allergies  Allergies   Allergen Reactions    Acetaminophen Nausea     Very nauseated        Medications  Prior to Admission Medications   Prescriptions Last Dose Informant Patient Reported? Taking?   alfuzosin (UROXATRAL) 10 MG SR tablet  Patient Yes No   Sig: Take 10 mg by mouth every day.   apixaban (ELIQUIS) 5mg Tab   No No   Sig: Take 0.5 Tablets by mouth 2 times a day.   buPROPion (WELLBUTRIN XL) 300 MG XL tablet   No No   Sig: Take 1 Tablet by mouth every morning.   docusate sodium (COLACE) 100 MG Cap   Yes No   Sig: Take 1 Capsule by mouth 2 times a day.   famotidine (PEPCID) 20 MG Tab   No No   Sig: TAKE 1 TABLET BY MOUTH EVERY DAY   gabapentin (NEURONTIN) 300 MG Cap   No No   Sig: Take 1 Capsule by mouth 3 times a day.   lamoTRIgine (LAMICTAL) 100 MG Tab   No No   Sig: TAKE 2 TABLETS BY MOUTH EVERY DAY   loperamide (IMODIUM) 2 MG Cap   Yes No   Sig: PLEASE SEE ATTACHED FOR DETAILED DIRECTIONS   oxyCODONE-acetaminophen (PERCOCET-10)  MG Tab   Yes No   Sig: Take 1 Tablet by mouth every 8 hours as needed.   propranolol (INDERAL) 10 MG Tab   No No   Sig: Take 1 Tablet by mouth 3 times a day.   sildenafil citrate (VIAGRA) 100 MG tablet   Yes No   Sig: Take 1 Tablet by mouth as needed.   triamcinolone acetonide (KENALOG) 0.1 % Cream   No No   Sig: Apply to affected area (ankles) BID PRN for itching and rash   vancomycin (VANCOCIN) 125 MG capsule   No No   Sig: Take 1 Capsule by mouth every day.      Facility-Administered Medications: None       Physical Exam  Temp:  [36.8 °C  (98.2 °F)-37.1 °C (98.8 °F)] 37.1 °C (98.8 °F)  Pulse:  [] 72  Resp:  [18-25] 20  BP: (107-168)/() 118/68  SpO2:  [90 %-99 %] 96 %  Blood Pressure : 126/52   Temperature: 36.8 °C (98.2 °F)   Pulse: 76   Respiration: (!) 24   Pulse Oximetry: 99 %       Physical Exam  Vitals and nursing note reviewed.   Constitutional:       General: Richard is not in acute distress.     Appearance: Richard is ill-appearing.      Comments: Thin, chronically ill appearing male. Temporal muscle wasting noted   HENT:      Head: Normocephalic and atraumatic.      Right Ear: External ear normal.      Left Ear: External ear normal.      Nose: Nose normal.      Mouth/Throat:      Mouth: Mucous membranes are dry.      Pharynx: Oropharynx is clear.   Eyes:      Extraocular Movements: Extraocular movements intact.      Conjunctiva/sclera: Conjunctivae normal.      Pupils: Pupils are equal, round, and reactive to light.   Cardiovascular:      Rate and Rhythm: Normal rate and regular rhythm.      Pulses: Normal pulses.      Heart sounds: Murmur heard.   Pulmonary:      Effort: Pulmonary effort is normal. No respiratory distress.      Breath sounds: Normal breath sounds. No wheezing.      Comments: On NC   Abdominal:      General: Abdomen is flat. There is no distension.      Palpations: Abdomen is soft.      Tenderness: There is no abdominal tenderness.   Musculoskeletal:         General: Swelling, tenderness, deformity and signs of injury present.      Cervical back: Normal range of motion and neck supple.      Comments: Left leg in traction splint   Skin:     General: Skin is warm and dry.   Neurological:      General: No focal deficit present.      Mental Status: Richard is alert and oriented to person, place, and time.      Cranial Nerves: No cranial nerve deficit.      Sensory: No sensory deficit.      Motor: Weakness present.   Psychiatric:         Mood and Affect: Mood is depressed.         Behavior: Behavior normal.  "        Laboratory:  Recent Labs     10/27/24  0959   WBC 8.3   RBC 4.22*   HEMOGLOBIN 13.5*   HEMATOCRIT 39.9*   MCV 94.5   MCH 32.0   MCHC 33.8   RDW 47.4   PLATELETCT 177   MPV 10.1     Recent Labs     10/27/24  0959   SODIUM 141   POTASSIUM 4.2   CHLORIDE 102   CO2 22   GLUCOSE 155*   BUN 22   CREATININE 1.43*   CALCIUM 9.9     Recent Labs     10/27/24  0959   ALTSGPT 13   ASTSGOT 25   ALKPHOSPHAT 110*   TBILIRUBIN 1.6*   GLUCOSE 155*     Recent Labs     10/27/24  0959   APTT 37.3*   INR 1.77*     No results for input(s): \"NTPROBNP\" in the last 72 hours.      No results for input(s): \"TROPONINT\" in the last 72 hours.    Imaging:  CT-LSPINE W/O PLUS RECONS   Final Result      1.  Severe multilevel degenerative change of lumbar spine.   2.  No fracture or subluxation.      CT-CSPINE WITHOUT PLUS RECONS   Final Result      1.  No cervical spine fracture or subluxation.   2.  Severe multilevel degenerative change and diffuse osteopenia.   3.  Prior multilevel laminectomy and posterior fusion with finding indicating possible loosening of screws on the RIGHT at the T1 and T2 levels.      CT-HEAD W/O   Final Result      1.  Cerebral atrophy and chronic microvascular ischemic type changes.   2.  No acute intracranial abnormality.   3.  Right sphenoid sinus disease               DX-FEMUR-2+ RIGHT   Final Result   Impression:      1. Proximal right femur fracture.                     DX-PELVIS-1 OR 2 VIEWS   Final Result   Impression:      1. No pelvis fracture evident.      2. Proximal right femur fracture.      DX-PORTABLE FLUORO > 1 HOUR    (Results Pending)   DX-FEMUR-2+ RIGHT    (Results Pending)   EC-ECHOCARDIOGRAM COMPLETE W/O CONT    (Results Pending)       X-Ray:  I have personally reviewed the images and compared with prior images.  EKG:  I have personally reviewed the images and compared with prior images.    Assessment/Plan:  Justification for Admission Status  I anticipate this patient will require at least " two midnights for appropriate medical management, necessitating inpatient admission because Right femur fracture needing surgical fixation.  Patient with history of paroxysmal atrial fibrillation who has not been taking his medication or followed up with cardiology.  He never got his echocardiogram performed that was recommended by cardiology 2 years ago, urgent echocardiogram placed.  Will monitor on telemetry in the perioperative period.      * Closed hip fracture requiring operative repair, right, initial encounter (HCC)- (present on admission)  Assessment & Plan  Orthopedic surgery consulted  -N.p.o. for possible surgery today  -Patient placed in a traction  Pain control  Preop echocardiogram ordered, pending  Telemetry monitoring  PT/OT    Frailty syndrome in geriatric patient- (present on admission)  Assessment & Plan  Weight loss, depression, new hip fracture    Gastroesophageal reflux disease without esophagitis- (present on admission)  Assessment & Plan  Continue Pepcid    Paroxysmal A-fib ([facility])- (present on admission)  Assessment & Plan  Per note from cardiology 5/22 patient had paroxysmal A-fib recommended for propranolol 10 mg and Eliquis  Patient has not been taking these, states he was told he did not need them but cannot tell me by who  Telemetry monitoring  Plan for OR, will not start anticoagulation for now  Echocardiogram pending as this was recommended by cardiology 2 years ago and does not appear to have been done    Encounter for perioperative consultation- (present on admission)  Assessment & Plan  Admit to:    Telemetry with continuous pulse oximetry due to: age 65 or older with history of: cardiac arrhythmias    Cardiovascular:   Patient does not have history of CHF  Pre-op EKG: Yes  Preop echocardiogram ordered: hx of grade I diastolic dysfunction on echo 2014, paroxysmal A-fib had been seen by cardiology who recommended echo in 2022 which was not done.  Also with murmur on  exam.    Pulmonary:  Oxygen per protocol    GI:   No history of cirrhosis. Standard bowel regimen. Hold for loose stools.    Renal:   IV fluids: -150 mL/hr for 2 days   Labs: Metabolic Panel with AM labs    Musculoskeletal:   Check 25 OH vitamin D level. If 31-40 pg/mL, consider starting vitamin D3 1000 IU PO daily. If 20-30 pg/mL, consider vitamin D3 2000 IU PO daily. If <20 pg/mL, vitamin D2 50,000 IU weekly x 8 weeks then 2000 IU PO daily.    Neurologic:   Pain Control: Neuro checks every 4 hours  Avoid fentanyl (short-acting)  Acetaminophen 1000 mg PO TID; 650 mg PO TID if liver problems  Opioid Pain Management: Low Dose (vulnerable opioid naive): Dilaudid + oxycodone: oxycodone 2.5 mg PO every 3 hours PRN moderate pain; oxycodone 5 mg PO every 3 hours PRN severe pain; dilaudid 0.25 mg IV Q2 hours PRN if pain persistst one hour post oral dose OR for pain if patient is NPO     Hematologic:  Plan on pharmacologic DVT prophylaxis post operative day #1. Hold for decreasing hemoglobin. Notify provider for hemoglobin less than 8.  Order preoperative type and cross.   If patient was on anticoagulation prior to arrival risks and benefits will be weighed by teams including surgery, hospitalist, geriatrics, and anesthesia for delaying surgery more than 24 hours.   On anticoagulation prior to arrival: No    Medical Assessment Risk:  Intermediate, revised cardiac risk index, class I risk    Surgical Risk:   Intermediate      DNR (do not resuscitate)- (present on admission)  Assessment & Plan  Confirmed patient is DNR/DNI    Cachexia (HCC)- (present on admission)  Assessment & Plan  Patient reports weight loss and low appetite  Nutrition consulted    Bipolar 1 disorder, depressed (HCC)- (present on admission)  Assessment & Plan  Continue home Lamictal  Patient reports worsening depression with low appetite and weight loss  Nutrition consulted    Benign prostatic hyperplasia- (present on admission)  Assessment &  Plan  Substituted tamsulosin for patient's home alfuzosin        VTE prophylaxis: SCDs/TEDs

## 2024-10-27 NOTE — PROGRESS NOTES
Report received from Oliver WRIGHT Patient transported to room via Annika JENNINGS. Patient placed on tele. On 2L nasal cannula. A&O x4. Oriented to room. Educated on fall risk, all fall precautions in place. Call light within reach, bed locked and in lowest position, denied other needs at this time.

## 2024-10-27 NOTE — PROGRESS NOTES
4 Eyes Skin Assessment Completed by BART Jorgensen and BART Hurtado.    Head WDL  Ears Redness and Blanching  Nose WDL  Mouth WDL  Neck WDL  Breast/Chest Redness and Blanching  Shoulder Blades WDL  Spine WDL  (R) Arm/Elbow/Hand Redness, Blanching, Bruising, Abrasion, and Scab  (L) Arm/Elbow/Hand Bruising and Scab  Abdomen WDL  Groin WDL  Scrotum/Coccyx/Buttocks WDL  (R) Leg Traction device in place,   (L) Leg Scab  (R) Heel/Foot/Toe WDL  (L) Heel/Foot/Toe WDL          Devices In Places Tele Box, Blood Pressure Cuff, and Pulse Ox      Interventions In Place Pillows    Possible Skin Injury Yes    Pictures Uploaded Into Epic Yes  Wound Consult Placed N/A  RN Wound Prevention Protocol Ordered No

## 2024-10-27 NOTE — ASSESSMENT & PLAN NOTE
Admit to:    Telemetry with continuous pulse oximetry due to: age 65 or older with history of: cardiac arrhythmias    Cardiovascular:   Patient does not have history of CHF  Pre-op EKG: Yes  Preop echocardiogram ordered: hx of grade I diastolic dysfunction on echo 2014, paroxysmal A-fib had been seen by cardiology who recommended echo in 2022 which was not done.  Also with murmur on exam.    Pulmonary:  Oxygen per protocol    GI:   No history of cirrhosis. Standard bowel regimen. Hold for loose stools.    Renal:   IV fluids: -150 mL/hr for 2 days   Labs: Metabolic Panel with AM labs    Musculoskeletal:   Check 25 OH vitamin D level. If 31-40 pg/mL, consider starting vitamin D3 1000 IU PO daily. If 20-30 pg/mL, consider vitamin D3 2000 IU PO daily. If <20 pg/mL, vitamin D2 50,000 IU weekly x 8 weeks then 2000 IU PO daily.    Neurologic:   Pain Control: Neuro checks every 4 hours  Avoid fentanyl (short-acting)  Acetaminophen 1000 mg PO TID; 650 mg PO TID if liver problems  Opioid Pain Management: Low Dose (vulnerable opioid naive): Dilaudid + oxycodone: oxycodone 2.5 mg PO every 3 hours PRN moderate pain; oxycodone 5 mg PO every 3 hours PRN severe pain; dilaudid 0.25 mg IV Q2 hours PRN if pain persistst one hour post oral dose OR for pain if patient is NPO     Hematologic:  Plan on pharmacologic DVT prophylaxis post operative day #1. Hold for decreasing hemoglobin. Notify provider for hemoglobin less than 8.  Order preoperative type and cross.   If patient was on anticoagulation prior to arrival risks and benefits will be weighed by teams including surgery, hospitalist, geriatrics, and anesthesia for delaying surgery more than 24 hours.   On anticoagulation prior to arrival: No    Medical Assessment Risk:  Intermediate, revised cardiac risk index, class I risk    Surgical Risk:   Intermediate

## 2024-10-27 NOTE — PROGRESS NOTES
15 pounds of Page's traction applied to the RLE. CMS verified before and after application of traction. All weight hangers are hanging freely at the foot of the bed at application.    Contact traction with any questions or concerns regarding this traction frame.

## 2024-10-27 NOTE — ED PROVIDER NOTES
ED Provider Note    CHIEF COMPLAINT  Chief Complaint   Patient presents with    GLF     BIB ems from home. Pt sustained a MGLF this morning around 0200. He was stuck on the ground he states for approximately 3 hours till he could scoot on his back and call ems. Pt has swelling, pain to the right thigh. He also has internal rotation to the RLE.       EXTERNAL RECORDS REVIEWED  Other none germane to today's visit    HPI/ROS  LIMITATION TO HISTORY   Select: : None  OUTSIDE HISTORIAN(S):  None    Marcelo Colon is a 84 y.o. person who presents with a chief complaint of right leg pain and deformity after a mechanical ground-level fall overnight.  The patient states he got up from bed to get a beverage when he turned too quickly and lost his balance, falling onto a concrete floor.  He hit his head but did not lose consciousness and is not anticoagulated.  He laid on the ground for approximately 3 hours as he could not get up after his fall and ultimately was able to scoot himself to his bedroom on his back so that he could call EMS.  He denies any numbness in the right leg.  He has no chest pain, shortness of breath, neck or back pain.    PAST MEDICAL HISTORY   has a past medical history of Anesthesia, Anxiety disorder, Arthritis, Bipolar disorder (HCC), Clostridium difficile infection, Depression, Enlarged prostate, GERD (gastroesophageal reflux disease), Heart murmur, Indigestion, and Neck pain.    SURGICAL HISTORY   has a past surgical history that includes exploratory laparotomy (7/25/2017); sigmoid colon resection (7/25/2017); colostomy (7/25/2017); appendectomy (7/25/2017); lumbar decompression (12/2017); exc./biopsy mass back (1997); colostomy takedown (3/21/2018); colonoscopy (N/A, 9/20/2018); and fecal transplant (N/A, 9/20/2018).    FAMILY HISTORY  Family History   Problem Relation Age of Onset    Diabetes Maternal Uncle     Diabetes Paternal Grandfather        SOCIAL HISTORY  Social History     Tobacco Use  "   Smoking status: Former     Current packs/day: 0.00     Average packs/day: 2.0 packs/day for 14.0 years (28.0 ttl pk-yrs)     Types: Cigarettes     Start date: 3/21/1959     Quit date: 3/21/1973     Years since quittin.6    Smokeless tobacco: Never   Vaping Use    Vaping status: Never Used   Substance and Sexual Activity    Alcohol use: Yes     Alcohol/week: 0.0 oz     Comment: 3 glasses wine/day    Drug use: Yes     Comment: history of cocaine and marijuana use still about 10 years ago. Denies any IV drug use.    Sexual activity: Not on file       CURRENT MEDICATIONS  Home Medications       Reviewed by Oliver Jones R.N. (Registered Nurse) on 10/27/24 at 0940  Med List Status: Not Addressed     Medication Last Dose Status   alfuzosin (UROXATRAL) 10 MG SR tablet  Active   apixaban (ELIQUIS) 5mg Tab  Active   buPROPion (WELLBUTRIN XL) 300 MG XL tablet  Active   docusate sodium (COLACE) 100 MG Cap  Active   famotidine (PEPCID) 20 MG Tab  Active   gabapentin (NEURONTIN) 300 MG Cap  Active   lamoTRIgine (LAMICTAL) 100 MG Tab  Active   loperamide (IMODIUM) 2 MG Cap  Active   oxyCODONE-acetaminophen (PERCOCET-10)  MG Tab  Active   propranolol (INDERAL) 10 MG Tab  Active   sildenafil citrate (VIAGRA) 100 MG tablet  Active   triamcinolone acetonide (KENALOG) 0.1 % Cream  Active   vancomycin (VANCOCIN) 125 MG capsule  Active                  Audit from Redirected Encounters    **Home medications have not yet been reviewed for this encounter**         ALLERGIES  Allergies   Allergen Reactions    Acetaminophen Nausea and Unspecified     Very nauseated        PHYSICAL EXAM  VITAL SIGNS: BP (!) 168/122   Pulse 70   Temp 36.8 °C (98.2 °F) (Temporal)   Resp 18   Ht 1.727 m (5' 8\")   Wt 57.6 kg (127 lb)   SpO2 94%   BMI 19.31 kg/m²    Physical Exam  Vitals and nursing note reviewed.   Constitutional:       Appearance: Normal appearance.   HENT:      Head: Normocephalic and atraumatic.      Right Ear: " External ear normal.      Left Ear: External ear normal.      Nose: Nose normal.      Mouth/Throat:      Mouth: Mucous membranes are dry.      Pharynx: Oropharynx is clear.   Eyes:      Extraocular Movements: Extraocular movements intact.      Conjunctiva/sclera: Conjunctivae normal.      Pupils: Pupils are equal, round, and reactive to light.   Cardiovascular:      Rate and Rhythm: Normal rate and regular rhythm.      Comments: Difficult to palpate dorsalis pedis pulses bilaterally however easily dopplerable PT pulses bilaterally.  Pulmonary:      Effort: Pulmonary effort is normal.      Breath sounds: Normal breath sounds.      Comments: Nasal cannula in place.  Abdominal:      Palpations: Abdomen is soft.      Tenderness: There is no abdominal tenderness.   Musculoskeletal:      Cervical back: Normal range of motion and neck supple.      Comments: Deformity and tenderness in the right femur with shortening and internal rotation of the right lower extremity.  Pelvis is stable.  Mild tenderness over the lower lumbar and sacral region.   Skin:     General: Skin is warm and dry.   Neurological:      General: No focal deficit present.      Mental Status: Richard is alert and oriented to person, place, and time.   Psychiatric:         Mood and Affect: Mood normal.         Behavior: Behavior normal.       EKG/LABS  Results for orders placed or performed during the hospital encounter of 10/27/24   CBC WITH DIFFERENTIAL    Collection Time: 10/27/24  9:59 AM   Result Value Ref Range    WBC 8.3 4.8 - 10.8 K/uL    RBC 4.22 (L) 4.70 - 6.10 M/uL    Hemoglobin 13.5 (L) 14.0 - 18.0 g/dL    Hematocrit 39.9 (L) 42.0 - 52.0 %    MCV 94.5 81.4 - 97.8 fL    MCH 32.0 27.0 - 33.0 pg    MCHC 33.8 32.3 - 36.5 g/dL    RDW 47.4 35.9 - 50.0 fL    Platelet Count 177 164 - 446 K/uL    MPV 10.1 9.0 - 12.9 fL    Neutrophils-Polys 80.10 (H) 44.00 - 72.00 %    Lymphocytes 8.20 (L) 22.00 - 41.00 %    Monocytes 11.20 0.00 - 13.40 %    Eosinophils 0.00  0.00 - 6.90 %    Basophils 0.10 0.00 - 1.80 %    Immature Granulocytes 0.40 0.00 - 0.90 %    Nucleated RBC 0.00 0.00 - 0.20 /100 WBC    Neutrophils (Absolute) 6.61 1.82 - 7.42 K/uL    Lymphs (Absolute) 0.68 (L) 1.00 - 4.80 K/uL    Monos (Absolute) 0.92 (H) 0.00 - 0.85 K/uL    Eos (Absolute) 0.00 0.00 - 0.51 K/uL    Baso (Absolute) 0.01 0.00 - 0.12 K/uL    Immature Granulocytes (abs) 0.03 0.00 - 0.11 K/uL    NRBC (Absolute) 0.00 K/uL   Comp Metabolic Panel    Collection Time: 10/27/24  9:59 AM   Result Value Ref Range    Sodium 141 135 - 145 mmol/L    Potassium 4.2 3.6 - 5.5 mmol/L    Chloride 102 96 - 112 mmol/L    Co2 22 20 - 33 mmol/L    Anion Gap 17.0 (H) 7.0 - 16.0    Glucose 155 (H) 65 - 99 mg/dL    Bun 22 8 - 22 mg/dL    Creatinine 1.43 (H) 0.50 - 1.40 mg/dL    Calcium 9.9 8.5 - 10.5 mg/dL    Correct Calcium 9.8 8.5 - 10.5 mg/dL    AST(SGOT) 25 12 - 45 U/L    ALT(SGPT) 13 2 - 50 U/L    Alkaline Phosphatase 110 (H) 30 - 99 U/L    Total Bilirubin 1.6 (H) 0.1 - 1.5 mg/dL    Albumin 4.1 3.2 - 4.9 g/dL    Total Protein 6.9 6.0 - 8.2 g/dL    Globulin 2.8 1.9 - 3.5 g/dL    A-G Ratio 1.5 g/dL   CREATINE KINASE    Collection Time: 10/27/24  9:59 AM   Result Value Ref Range    CPK Total 370 (H) 0 - 154 U/L   PT/INR    Collection Time: 10/27/24  9:59 AM   Result Value Ref Range    PT 20.7 (H) 12.0 - 14.6 sec    INR 1.77 (H) 0.87 - 1.13   PTT    Collection Time: 10/27/24  9:59 AM   Result Value Ref Range    APTT 37.3 (H) 24.7 - 36.0 sec   ESTIMATED GFR    Collection Time: 10/27/24  9:59 AM   Result Value Ref Range    GFR (CKD-EPI) 49 (A) >60 mL/min/1.73 m 2     I have independently interpreted this EKG    RADIOLOGY/PROCEDURES   I have independently interpreted the diagnostic imaging associated with this visit and am waiting the final reading from the radiologist.   My preliminary interpretation is as follows: Displaced proximal femur fracture.    Radiologist interpretation:  CT-LSPINE W/O PLUS RECONS   Final Result       1.  Severe multilevel degenerative change of lumbar spine.   2.  No fracture or subluxation.      CT-CSPINE WITHOUT PLUS RECONS   Final Result      1.  No cervical spine fracture or subluxation.   2.  Severe multilevel degenerative change and diffuse osteopenia.   3.  Prior multilevel laminectomy and posterior fusion with finding indicating possible loosening of screws on the RIGHT at the T1 and T2 levels.      CT-HEAD W/O   Final Result      1.  Cerebral atrophy and chronic microvascular ischemic type changes.   2.  No acute intracranial abnormality.   3.  Right sphenoid sinus disease               DX-FEMUR-2+ RIGHT   Final Result   Impression:      1. Proximal right femur fracture.                     DX-PELVIS-1 OR 2 VIEWS   Final Result   Impression:      1. No pelvis fracture evident.      2. Proximal right femur fracture.      DX-PORTABLE FLUORO > 1 HOUR    (Results Pending)   DX-FEMUR-2+ RIGHT    (Results Pending)   EC-ECHOCARDIOGRAM COMPLETE W/O CONT    (Results Pending)     COURSE & MEDICAL DECISION MAKING    ASSESSMENT, COURSE AND PLAN  Care Narrative: This is an 84 year old male who was brought here with right leg pain and deformity after a mechanical ground level fall overnight.     Arrives hypertensive but AF with otherwise normal VS. Appears dehydrated with dry mucous membranes but non-toxic. His right femur is clearly deformed and the right leg is shortened and internally rotated. His bilateral PT pulses are easily dopplerable and his bilateral feet are warm.    Xray of the right femur reveals a prominent fracture. No pelvis fracture is noted. CT head and spine without acute fracture.    CPK is slightly elevated, creatinine is slightly worse than prior today at 1.43 and patient is NPO for surgical intervention so he was started on IV fluids.     I spoke with Dr. Mann, orthopedic surgeon, who will take the patient to the OR for operative intervention. Patient was discussed with the hospitalist and  admitted in guarded condition.    Hydration: Based on the patient's presentation of Inability to take oral fluids the patient was given IV fluids. IV Hydration was used because oral hydration was not adequate alone. Upon recheck following hydration, the patient was unchanged.    ADDITIONAL PROBLEMS MANAGED  None    DISPOSITION AND DISCUSSIONS  I have discussed management of the patient with the following physicians and HEAVENLY's:  Dr. Mann, orthopedic surgery. Dr. Cosby, hospitalist.    Discussion of management with other [facility] or appropriate source(s): None     Escalation of care considered, and ultimately not performed: N/A    Barriers to care at this time, including but not limited to:  None .     Decision tools and prescription drugs considered including, but not limited to:  N/A .    FINAL DIAGNOSIS  Proximal right femur fracture     Electronically signed by: Jose Baez M.D., 10/27/2024 9:46 AM

## 2024-10-27 NOTE — ASSESSMENT & PLAN NOTE
Femur x-ray showing proximal right femur fracture with angulation and displacement.   S/p ORIF 10/27 with Dr. Johnathan CHARLES   - Continue pain management as requested  - PT OT today; WBAT  - Will likely need PAR   - Restart DVT prophylaxis today

## 2024-10-27 NOTE — CONSULTS
84-year-old male mechanical ground-level fall resulting in right subtrochanteric femur fracture.  Plan for fixation today with cephalomedullary nail.  NPO.  Risk and benefits discussed at length.          Heron Mann MD  Orthopedic Trauma Surgery

## 2024-10-27 NOTE — PROGRESS NOTES
Patient transported to pre-op with transport. On 2L O2. Tele box removed, monitors notified. Patient off the floor

## 2024-10-27 NOTE — ASSESSMENT & PLAN NOTE
Continue home Lamictal  Patient reports worsening depression with low appetite and weight loss  Nutrition consulted

## 2024-10-27 NOTE — ED NOTES
Pt transported off unit with hospital staff for admission. Pt on the monitor, on O2. Hospitalist at bedside. Belongings sent with patient

## 2024-10-27 NOTE — ED NOTES
Med rec completed per patient at bedside, and medication dispense history per [facility] on [address] ([phone]).    Allergies reviewed with patient.    Outpatient antibiotics within the last 30 days: Patient is on vancomycin 125 mcg x 1 capsule DAILY. Patient states this is a long-term medication which he is supposed to use indefinitely.    ANTICOAGULANTS: none.    Patient's preferred pharmacy: [facility] on [address].

## 2024-10-27 NOTE — ASSESSMENT & PLAN NOTE
Per note from cardiology 5/22 patient had paroxysmal A-fib recommended for propranolol 10 mg and Eliquis  Patient has not been taking these, states he was told he did not need them but cannot tell me by who  - Continuous telemetry monitoring; monitoring for arrhythmia in the setting of acute fracture and questionable history of a fib  - Echo pending for today  - Holding AC due to POD 1 from ORIF to right hip

## 2024-10-28 ENCOUNTER — PATIENT OUTREACH (OUTPATIENT)
Dept: SCHEDULING | Facility: IMAGING CENTER | Age: 84
End: 2024-10-28

## 2024-10-28 ENCOUNTER — APPOINTMENT (OUTPATIENT)
Dept: CARDIOLOGY | Facility: MEDICAL CENTER | Age: 84
End: 2024-10-28
Attending: INTERNAL MEDICINE
Payer: MEDICARE

## 2024-10-28 PROBLEM — Z01.818 ENCOUNTER FOR PERIOPERATIVE CONSULTATION: Status: RESOLVED | Noted: 2021-10-27 | Resolved: 2024-10-28

## 2024-10-28 LAB
ANION GAP SERPL CALC-SCNC: 15 MMOL/L (ref 7–16)
BUN SERPL-MCNC: 23 MG/DL (ref 8–22)
CALCIUM SERPL-MCNC: 8.5 MG/DL (ref 8.5–10.5)
CHLORIDE SERPL-SCNC: 104 MMOL/L (ref 96–112)
CO2 SERPL-SCNC: 20 MMOL/L (ref 20–33)
CREAT SERPL-MCNC: 1.37 MG/DL (ref 0.5–1.4)
ERYTHROCYTE [DISTWIDTH] IN BLOOD BY AUTOMATED COUNT: 47.8 FL (ref 35.9–50)
GFR SERPLBLD CREATININE-BSD FMLA CKD-EPI: 51 ML/MIN/1.73 M 2
GLUCOSE SERPL-MCNC: 138 MG/DL (ref 65–99)
HCT VFR BLD AUTO: 29.2 % (ref 42–52)
HGB BLD-MCNC: 9.8 G/DL (ref 14–18)
LV EJECT FRACT MOD 2C 99903: 53.59
LV EJECT FRACT MOD 4C 99902: 73.16
LV EJECT FRACT MOD BP 99901: 60.77
MCH RBC QN AUTO: 31.9 PG (ref 27–33)
MCHC RBC AUTO-ENTMCNC: 33.6 G/DL (ref 32.3–36.5)
MCV RBC AUTO: 95.1 FL (ref 81.4–97.8)
PLATELET # BLD AUTO: 151 K/UL (ref 164–446)
PMV BLD AUTO: 9.8 FL (ref 9–12.9)
POTASSIUM SERPL-SCNC: 4.6 MMOL/L (ref 3.6–5.5)
RBC # BLD AUTO: 3.07 M/UL (ref 4.7–6.1)
SODIUM SERPL-SCNC: 139 MMOL/L (ref 135–145)
WBC # BLD AUTO: 6.3 K/UL (ref 4.8–10.8)

## 2024-10-28 PROCEDURE — 97535 SELF CARE MNGMENT TRAINING: CPT

## 2024-10-28 PROCEDURE — 700102 HCHG RX REV CODE 250 W/ 637 OVERRIDE(OP): Performed by: INTERNAL MEDICINE

## 2024-10-28 PROCEDURE — 93306 TTE W/DOPPLER COMPLETE: CPT | Mod: 26 | Performed by: INTERNAL MEDICINE

## 2024-10-28 PROCEDURE — 93306 TTE W/DOPPLER COMPLETE: CPT

## 2024-10-28 PROCEDURE — 700105 HCHG RX REV CODE 258: Performed by: EMERGENCY MEDICINE

## 2024-10-28 PROCEDURE — 36415 COLL VENOUS BLD VENIPUNCTURE: CPT

## 2024-10-28 PROCEDURE — 85027 COMPLETE CBC AUTOMATED: CPT

## 2024-10-28 PROCEDURE — 99232 SBSQ HOSP IP/OBS MODERATE 35: CPT | Performed by: STUDENT IN AN ORGANIZED HEALTH CARE EDUCATION/TRAINING PROGRAM

## 2024-10-28 PROCEDURE — 97163 PT EVAL HIGH COMPLEX 45 MIN: CPT

## 2024-10-28 PROCEDURE — 97166 OT EVAL MOD COMPLEX 45 MIN: CPT

## 2024-10-28 PROCEDURE — 700111 HCHG RX REV CODE 636 W/ 250 OVERRIDE (IP): Performed by: STUDENT IN AN ORGANIZED HEALTH CARE EDUCATION/TRAINING PROGRAM

## 2024-10-28 PROCEDURE — 80048 BASIC METABOLIC PNL TOTAL CA: CPT

## 2024-10-28 PROCEDURE — 770001 HCHG ROOM/CARE - MED/SURG/GYN PRIV*

## 2024-10-28 PROCEDURE — A9270 NON-COVERED ITEM OR SERVICE: HCPCS | Performed by: INTERNAL MEDICINE

## 2024-10-28 RX ORDER — ENOXAPARIN SODIUM 100 MG/ML
30 INJECTION SUBCUTANEOUS DAILY
Status: DISCONTINUED | OUTPATIENT
Start: 2024-10-28 | End: 2024-11-03

## 2024-10-28 RX ADMIN — FAMOTIDINE 20 MG: 20 TABLET, FILM COATED ORAL at 20:43

## 2024-10-28 RX ADMIN — GABAPENTIN 300 MG: 300 CAPSULE ORAL at 05:03

## 2024-10-28 RX ADMIN — GABAPENTIN 300 MG: 300 CAPSULE ORAL at 18:00

## 2024-10-28 RX ADMIN — OXYCODONE HYDROCHLORIDE 10 MG: 10 TABLET ORAL at 15:38

## 2024-10-28 RX ADMIN — SODIUM CHLORIDE: 9 INJECTION, SOLUTION INTRAVENOUS at 05:11

## 2024-10-28 RX ADMIN — ENOXAPARIN SODIUM 30 MG: 100 INJECTION SUBCUTANEOUS at 18:00

## 2024-10-28 RX ADMIN — LAMOTRIGINE 200 MG: 100 TABLET ORAL at 05:03

## 2024-10-28 RX ADMIN — TAMSULOSIN HYDROCHLORIDE 0.4 MG: 0.4 CAPSULE ORAL at 20:43

## 2024-10-28 ASSESSMENT — ENCOUNTER SYMPTOMS
FEVER: 0
MYALGIAS: 1
BACK PAIN: 0
CARDIOVASCULAR NEGATIVE: 1
EYES NEGATIVE: 1
ABDOMINAL PAIN: 0
NAUSEA: 0
CONSTITUTIONAL NEGATIVE: 1
GASTROINTESTINAL NEGATIVE: 1
NEUROLOGICAL NEGATIVE: 1
NECK PAIN: 0
SHORTNESS OF BREATH: 0
VOMITING: 0
RESPIRATORY NEGATIVE: 1

## 2024-10-28 ASSESSMENT — COGNITIVE AND FUNCTIONAL STATUS - GENERAL
TOILETING: A LOT
SUGGESTED CMS G CODE MODIFIER MOBILITY: CM
SUGGESTED CMS G CODE MODIFIER DAILY ACTIVITY: CK
TURNING FROM BACK TO SIDE WHILE IN FLAT BAD: A LOT
HELP NEEDED FOR BATHING: A LOT
EATING MEALS: A LITTLE
STANDING UP FROM CHAIR USING ARMS: TOTAL
DRESSING REGULAR LOWER BODY CLOTHING: A LOT
MOVING TO AND FROM BED TO CHAIR: A LOT
DRESSING REGULAR UPPER BODY CLOTHING: A LITTLE
MOBILITY SCORE: 9
CLIMB 3 TO 5 STEPS WITH RAILING: TOTAL
DAILY ACTIVITIY SCORE: 15
MOVING FROM LYING ON BACK TO SITTING ON SIDE OF FLAT BED: A LOT
PERSONAL GROOMING: A LITTLE
WALKING IN HOSPITAL ROOM: TOTAL

## 2024-10-28 ASSESSMENT — ACTIVITIES OF DAILY LIVING (ADL): TOILETING: INDEPENDENT

## 2024-10-28 ASSESSMENT — FIBROSIS 4 INDEX: FIB4 SCORE: 3.81

## 2024-10-28 ASSESSMENT — GAIT ASSESSMENTS: GAIT LEVEL OF ASSIST: UNABLE TO PARTICIPATE

## 2024-10-28 NOTE — PROGRESS NOTES
"      Trauma tertiary and SBIRT per trauma guidelines and quality measures. This note does not constitute as a formal trauma consult. Please defer all management to primary team.     Mental status adequate for full examination?: Yes    REVIEW OF SYSTEMS:  Review of Systems   Constitutional: Negative.    HENT: Negative.     Eyes: Negative.    Respiratory: Negative.     Cardiovascular: Negative.    Gastrointestinal: Negative.    Genitourinary: Negative.    Musculoskeletal:  Positive for joint pain (right hip) and myalgias. Negative for back pain and neck pain.   Neurological: Negative.      PHYSICAL EXAMINATION:  BP (!) 150/60   Pulse 88   Temp 36.6 °C (97.9 °F) (Temporal)   Resp 18   Ht 1.727 m (5' 8\")   Wt 60.9 kg (134 lb 4.2 oz)   SpO2 92%   BMI 20.41 kg/m²     Physical Exam  Constitutional:       General: Richard is not in acute distress.     Appearance: Richard is underweight.   HENT:      Head: Normocephalic and atraumatic.      Right Ear: External ear normal.      Left Ear: External ear normal.      Nose: Nose normal.   Cardiovascular:      Rate and Rhythm: Normal rate and regular rhythm.      Heart sounds: Murmur heard.   Pulmonary:      Effort: Pulmonary effort is normal. No respiratory distress.      Breath sounds: Normal breath sounds.   Abdominal:      General: There is no distension.      Palpations: Abdomen is soft.      Tenderness: There is no abdominal tenderness. There is no guarding.   Musculoskeletal:         General: Tenderness (right hip) present.      Cervical back: Normal range of motion and neck supple. No tenderness.      Right lower leg: No edema.      Left lower leg: No edema.      Comments: Right hip surgical dressing intact   Skin:     General: Skin is warm and dry.   Neurological:      Mental Status: Richard is alert.       LABORATORY VALUES:  Recent Labs     10/27/24  0959 10/28/24  0238   WBC 8.3 6.3   RBC 4.22* 3.07*   HEMOGLOBIN 13.5* 9.8*   HEMATOCRIT 39.9* 29.2*   MCV 94.5 95.1   MCH " 32.0 31.9   MCHC 33.8 33.6   RDW 47.4 47.8   PLATELETCT 177 151*   MPV 10.1 9.8     Recent Labs     10/27/24  0959 10/28/24  0238   SODIUM 141 139   POTASSIUM 4.2 4.6   CHLORIDE 102 104   CO2 22 20   GLUCOSE 155* 138*   BUN 22 23*   CREATININE 1.43* 1.37   CALCIUM 9.9 8.5     Recent Labs     10/27/24  0959   ASTSGOT 25   ALTSGPT 13   TBILIRUBIN 1.6*   ALKPHOSPHAT 110*   GLOBULIN 2.8   INR 1.77*     Recent Labs     10/27/24  0959   APTT 37.3*   INR 1.77*       IMAGING:  EC-ECHOCARDIOGRAM COMPLETE W/O CONT   Final Result      DX-PORTABLE FLUORO > 1 HOUR   Preliminary Result      Portable fluoroscopy utilized for 1 minute 18 seconds.         INTERPRETING LOCATION: [address] [place] [address], [id number]      DX-FEMUR-2+ RIGHT   Preliminary Result      Digitized intraoperative radiograph is submitted for review. This examination is not for diagnostic purpose but for guidance during a surgical procedure. Please see the patient's chart for full procedural details.         INTERPRETING LOCATION: [address] [place] [address], [id number]      CT-LSPINE W/O PLUS RECONS   Final Result      1.  Severe multilevel degenerative change of lumbar spine.   2.  No fracture or subluxation.      CT-CSPINE WITHOUT PLUS RECONS   Final Result      1.  No cervical spine fracture or subluxation.   2.  Severe multilevel degenerative change and diffuse osteopenia.   3.  Prior multilevel laminectomy and posterior fusion with finding indicating possible loosening of screws on the RIGHT at the T1 and T2 levels.      CT-HEAD W/O   Final Result      1.  Cerebral atrophy and chronic microvascular ischemic type changes.   2.  No acute intracranial abnormality.   3.  Right sphenoid sinus disease               DX-FEMUR-2+ RIGHT   Final Result   Impression:      1. Proximal right femur fracture.                     DX-PELVIS-1 OR 2 VIEWS   Final Result   Impression:      1. No pelvis fracture evident.      2. Proximal right femur fracture.        CAGE Results: negative Blood  Alcohol>0.08: not completed     PDI Score: not completed  (Score > 23 = Psychiatry consult)    Newly identified traumatic injuries.  None noted at time of exam.    Trauma tertiary and SBIRT per trauma guidelines and quality measures. This note does not constitute as a formal trauma consult. Please defer all management to primary team.

## 2024-10-28 NOTE — ASSESSMENT & PLAN NOTE
Hx of   Infectious disease recommended a taper on discharge in 2017  It is still on his med rec is being taken daily, will not start inpatient for now  Discussed with infectious disease, there is no indication for continuing long-term vancomycin unless patient is on antibiotics which they are not.

## 2024-10-28 NOTE — THERAPY
Physical Therapy Contact Note    Patient Name: Marcelo Colon  Age:  84 y.o., Sex:  person  Medical Record #: [id number]  Today's Date: 10/28/2024     10/28/24 1328   Initial Contact Note    Initial Contact Note Order Received and Verified, Physical Therapy Evaluation in Progress with Full Report to Follow.   Interdisciplinary Plan of Care Collaboration   Collaboration Comments PT order received.  Eval attempted- pt is eating his lunch.  PT will follow up as time allows.   Session Information   Date / Session Number  10/28- eating lunch  (EVAL)

## 2024-10-28 NOTE — DISCHARGE PLANNING
1421  DPA sent [facility] referrals & Rehab referral to [facility] per choice forms.     1510  Agency/Facility Name: [facility]   Spoke To: Francheska  Outcome: Francheska called to notify that pt has been accepted but must be switched to Vanco antibiotics. CM RN Pat notified.

## 2024-10-28 NOTE — DIETARY
"Nutrition Services: Initial Assessment     Day 1 of admit. Marcelo Colon is a 84 y.o. person with admitting DX of Closed hip fracture requiring operative repair, right, initial encounter.     Consult received for MST score >/= 2 and failure to thrive.    Hospital problem list:  Principal Problem:    Closed hip fracture requiring operative repair, right, initial encounter (HCC) (POA: Yes)  Active Problems:    Benign prostatic hyperplasia (Chronic) (POA: Yes)    Bipolar 1 disorder, depressed (HCC) (POA: Yes)      Overview: Patient reports depression is not worse or better. He states he is quick       to anger. States he is still having decreased appetite and poor sleep. Has       not been able to get modafinil approved. Also states he had to switch to       generic lamotrigine which he thinks is not working as well. Is taking       medications as prescribed.            Says sleep has been worse. Still with difficulty falling asleep and then       sleeping too much throughout the day when he does fall asleep.             Also with 6 pound recent weight loss. Believes it is due to mood and       decreased appetite. Not interested in nutritionist, appetite stimulant.       Eats 1 meal a day \"whatever I can find in the frozen section of the       grocery store.\" Never with periods of days where he could not sleep,       extremes of mood, erratic behavior (denies). No night sweats, fevers,       chills. No SI/HI, intent to harm self or others.     Recurrent Clostridium difficile diarrhea (Chronic) (POA: Yes)      Overview: - Patient on Van taper           Cachexia (HCC) (Chronic) (POA: Yes)    DNR (do not resuscitate) (POA: Yes)      Overview: IMO load March 2020    Paroxysmal A-fib (HCC) (POA: Yes)    Gastroesophageal reflux disease without esophagitis (POA: Yes)    Frailty syndrome in geriatric patient (POA: Yes)     Nutrition Assessment:      Height: 172.7 cm (5' 8\")  Weight: 60.9 kg (134 lb 4.2 oz)  Weight taken " via bed scale  Body mass index is 20.41 kg/m². BMI classification: Normal.  Wt Readings from Last 6 Encounters:   10/28/24 60.9 kg (134 lb 4.2 oz)   08/28/23 57.6 kg (127 lb)   07/20/23 58.5 kg (129 lb)   06/20/23 (P) 60.4 kg (133 lb 3.2 oz)   05/18/23 (P) 60.6 kg (133 lb 11.2 oz)   04/17/23 (P) 60.3 kg (133 lb)      Objective:  Pertinent labs 10/28: Glu 138 (H), BUN 23 (H). 10/27: Alb 4.1  Pertinent meds: Pepcid, Senna  Skin/wounds: Abrasions and scabs per RN skin assessment   Food Allergies: No known food allergies  Last BM:  (PTA) per flowsheets     Current diet order:   Consistent CHO diet. PO intake not yet recorded.    Subjective:   Patient reported UBW: 135 lb   Dietary recall/energy intake: Pt states he usually eats about 1 meal per day but also snacks and drinks a Boost nightly. Pt was hungry at time of visit and hoping for lunch.     Nutrition Focused Physical Exam (NFPE)   Weight loss: No weight loss noted per chart review..  Muscle mass: Moderate muscle wasting at temples and clavicle.  Subcutaneous fat: Moderate loss at triceps.  Fluid Accumulation: None noted  Reduced  Strength: N/A in acute care setting.     Nutrition Diagnosis:      Pt meets ASPEN criteria for moderate malnutrition in context of chronic illness  related to decreased intake as evidenced by moderate muscle and fat loss. Voalte text sent to MD.     Nutrition Interventions:      Chocolate Ensure with dinner daily per pt preference.  Encourage PO intake >50%.  Patient aware of active plan of care as appropriate.     Nutrition Monitoring and Evaluation:     Monitor nutrition POC  Additional fluids per MD/DO  Monitor vital signs pertinent to nutrition       RD following and will provide updated recommendations as indicated.

## 2024-10-28 NOTE — PROGRESS NOTES
[facility] Medicine Daily Progress Note    Date of Service  10/28/2024    Chief Complaint  Marcelo Colon is a 84 y.o. person admitted 10/27/2024 with ground-level fall.    Hospital Course  Richard Colon is an 84-year-old person with PMHx paroxysmal atrial fibrillation not on anticoagulation, GERD, bipolar 1, BPH, memory deficits.  Admitted 10/27 after GLF and subsequent right proximal femur fracture.    In the emergency room-femur x-ray showing proximal right femur fracture with angulation and displacement.  CT head without intracranial abnormalities.  Orthopedic surgery was consulted.  Patient underwent ORIF right proximal femur fracture on 10/27 with Dr. Mann.    Interval Problem Update  10/28: POD 1 ORIF right femur.  Vital stable overnight.  Patient remains on 2 L/min supplemental O2.  Hb 9.8 from 13.5.  Creatinine 1.37 from 1.43.  DC IV fluids today. Tolerating PO intake well. PT OT pending for today. Patient's friends will be bringing in a medication list later today.    I have discussed this patient's plan of care and discharge plan at IDT rounds today with Case Management, Nursing, Nursing leadership, and other members of the IDT team.    Consultants/Specialty  orthopedics    Code Status  DNAR/DNI    Disposition  The patient is not medically cleared for discharge to home or a post-acute facility.  Anticipate discharge to: skilled nursing facility    I have placed the appropriate orders for post-discharge needs.    Review of Systems  Review of Systems   Constitutional:  Negative for fever and malaise/fatigue.   Respiratory:  Negative for shortness of breath.    Cardiovascular:  Negative for chest pain and leg swelling.   Gastrointestinal:  Negative for abdominal pain, nausea and vomiting.   Musculoskeletal:  Positive for joint pain.        Physical Exam  Temp:  [36.1 °C (97 °F)-37.6 °C (99.7 °F)] 37.1 °C (98.8 °F)  Pulse:  [] 89  Resp:  [13-24] 18  BP: ()/(49-81) 153/60  SpO2:  [90 %-99 %] 91  %    Physical Exam  Vitals and nursing note reviewed.   Constitutional:       General: Richard is not in acute distress.     Appearance: Normal appearance. Richard is underweight. Richard is ill-appearing.   Cardiovascular:      Rate and Rhythm: Normal rate and regular rhythm.      Heart sounds: Murmur heard.   Pulmonary:      Effort: Pulmonary effort is normal. No respiratory distress.      Breath sounds: Normal breath sounds. No wheezing.   Abdominal:      General: Bowel sounds are normal. There is no distension.      Palpations: Abdomen is soft.      Tenderness: There is no abdominal tenderness.   Musculoskeletal:      Comments: Decreased ROM to right hip   Well healing right lateral hip surgical site    Skin:     General: Skin is warm and dry.   Neurological:      Mental Status: Richard is alert and oriented to person, place, and time. Mental status is at baseline.   Psychiatric:         Mood and Affect: Mood normal.         Behavior: Behavior normal.         Fluids    Intake/Output Summary (Last 24 hours) at 10/28/2024 1032  Last data filed at 10/28/2024 0300  Gross per 24 hour   Intake 1150 ml   Output 150 ml   Net 1000 ml        Laboratory  Recent Labs     10/27/24  0959 10/28/24  0238   WBC 8.3 6.3   RBC 4.22* 3.07*   HEMOGLOBIN 13.5* 9.8*   HEMATOCRIT 39.9* 29.2*   MCV 94.5 95.1   MCH 32.0 31.9   MCHC 33.8 33.6   RDW 47.4 47.8   PLATELETCT 177 151*   MPV 10.1 9.8     Recent Labs     10/27/24  0959 10/28/24  0238   SODIUM 141 139   POTASSIUM 4.2 4.6   CHLORIDE 102 104   CO2 22 20   GLUCOSE 155* 138*   BUN 22 23*   CREATININE 1.43* 1.37   CALCIUM 9.9 8.5     Recent Labs     10/27/24  0959   APTT 37.3*   INR 1.77*               Imaging  DX-PORTABLE FLUORO > 1 HOUR   Preliminary Result      Portable fluoroscopy utilized for 1 minute 18 seconds.         INTERPRETING LOCATION: [address] [place] [address] [place], [id number]      DX-FEMUR-2+ RIGHT   Preliminary Result      Digitized intraoperative radiograph is submitted for review. This  examination is not for diagnostic purpose but for guidance during a surgical procedure. Please see the patient's chart for full procedural details.         INTERPRETING LOCATION: [address] [facility], [place], [id number]      CT-LSPINE W/O PLUS RECONS   Final Result      1.  Severe multilevel degenerative change of lumbar spine.   2.  No fracture or subluxation.      CT-CSPINE WITHOUT PLUS RECONS   Final Result      1.  No cervical spine fracture or subluxation.   2.  Severe multilevel degenerative change and diffuse osteopenia.   3.  Prior multilevel laminectomy and posterior fusion with finding indicating possible loosening of screws on the RIGHT at the T1 and T2 levels.      CT-HEAD W/O   Final Result      1.  Cerebral atrophy and chronic microvascular ischemic type changes.   2.  No acute intracranial abnormality.   3.  Right sphenoid sinus disease               DX-FEMUR-2+ RIGHT   Final Result   Impression:      1. Proximal right femur fracture.                     DX-PELVIS-1 OR 2 VIEWS   Final Result   Impression:      1. No pelvis fracture evident.      2. Proximal right femur fracture.      EC-ECHOCARDIOGRAM COMPLETE W/O CONT    (Results Pending)        Assessment/Plan  * Closed hip fracture requiring operative repair, right, initial encounter (HCC)- (present on admission)  Assessment & Plan  Femur x-ray showing proximal right femur fracture with angulation and displacement.   S/p ORIF 10/27 with Dr. Johnathan CHARLES   - Continue pain management as requested  - PT OT today; WBAT  - Will likely need PAR   - Restart DVT prophylaxis today     Frailty syndrome in geriatric patient- (present on admission)  Assessment & Plan  Weight loss, depression, new hip fracture    Gastroesophageal reflux disease without esophagitis- (present on admission)  Assessment & Plan  Continue Pepcid    Paroxysmal A-fib (HCC)- (present on admission)  Assessment & Plan  Per note from cardiology 5/22 patient had paroxysmal A-fib recommended for  propranolol 10 mg and Eliquis  Patient has not been taking these, states he was told he did not need them but cannot tell me by who  - Continuous telemetry monitoring; monitoring for arrhythmia in the setting of acute fracture and questionable history of a fib  - Echo pending for today  - Holding AC due to POD 1 from ORIF to right hip     DNR (do not resuscitate)- (present on admission)  Assessment & Plan  Confirmed patient is DNR/DNI    Cachexia (HCC)- (present on admission)  Assessment & Plan  Patient reports weight loss and low appetite  Nutrition consulted    Recurrent Clostridium difficile diarrhea- (present on admission)  Assessment & Plan  Hx of   Infectious disease recommended a taper on discharge in 2017  It is still on his med rec is being taken daily, will not start inpatient for now  Discussed with infectious disease, there is no indication for continuing long-term vancomycin unless patient is on antibiotics which they are not.    Bipolar 1 disorder, depressed (HCC)- (present on admission)  Assessment & Plan  Continue home Lamictal  Patient reports worsening depression with low appetite and weight loss  Nutrition consulted    Benign prostatic hyperplasia- (present on admission)  Assessment & Plan  Substituted tamsulosin for patient's home alfuzosin         VTE prophylaxis:    enoxaparin ppx      I have performed a physical exam and reviewed and updated ROS and Plan today (10/28/2024). In review of yesterday's note (10/27/2024), there are no changes except as documented above.

## 2024-10-28 NOTE — HOSPITAL COURSE
Richard Colon is an 84-year-old person with PMHx paroxysmal atrial fibrillation not on anticoagulation, GERD, bipolar 1, BPH, memory deficits.  Admitted 10/27 after GLF and subsequent right proximal femur fracture.    In the emergency room-femur x-ray showing proximal right femur fracture with angulation and displacement.  CT head without intracranial abnormalities.  Orthopedic surgery was consulted.  Patient underwent ORIF right proximal femur fracture on 10/27 with Dr. Mann.

## 2024-10-28 NOTE — CARE PLAN
The patient is Stable - Low risk of patient condition declining or worsening    Shift Goals  Clinical Goals: hemodynamic stability, pain management, neurovascular checks  Patient Goals: pain management  Family Goals: OTONIEL    Progress made toward(s) clinical / shift goals:      Problem: Pain - Standard  Goal: Alleviation of pain or a reduction in pain to the patient’s comfort goal  Outcome: Progressing  Note: Pt c/o 4-6/10 surgical pain in R hip and leg. Pain controlled with PRN oxy, see MAR. See flowsheets for pain assessments.     Problem: Skin Integrity  Goal: Skin integrity is maintained or improved  Outcome: Progressing  Note: 2RN skin check complete, see note and flowsheets for complete assessment. RN wound protocol ordered, q2 turns in place. Sacral, heel, and elbow mepilex in place.     Problem: Fall Risk  Goal: Patient will remain free from falls  Outcome: Progressing  Note: Pt remains free from falls at this time. Safety precautions in place. Pt educated on calling for assistance when needed.        Patient is not progressing towards the following goals:

## 2024-10-28 NOTE — PROGRESS NOTES
Pt NPO from surgery and asking for food. On call hospitalist, Cora Eddy, notified. Verbal order to advance diet as tolerated.

## 2024-10-28 NOTE — OR NURSING
Patient resting comfortably, denies nausea. Update phone call to patient's friend was unanswered. VSS. Surgical dressings CDI. Right pedal pulse +2, cap refill <3 seconds, RLE warm. Report provided to Joslyn Noland RN. Patient transported off unit with transport on 2L O2 @ 96%.

## 2024-10-28 NOTE — DISCHARGE PLANNING
Care Transition Team Assessment       Met with patient at bedside. Patient lives alone in a single story home and uses no DME. He was independent with ADL's and drives self. Patient wanting to find a PCP. His preferred pharmacy is [facility] on [address]. His support is his friends/neighbors.    Information Source  Orientation Level: Oriented X4  Information Given By: Patient         Elopement Risk  Legal Hold: No  Ambulatory or Self Mobile in Wheelchair: Yes  Disoriented: No  Psychiatric Symptoms: None  History of Wandering: No  Elopement this Admit: No  Vocalizing Wanting to Leave: No  Displays Behaviors, Body Language Wanting to Leave: No-Not at Risk for Elopement  Elopement Risk: Not at Risk for Elopement    Interdisciplinary Discharge Planning  Lives with - Patient's Self Care Capacity: Alone and Able to Care For Self  Patient or legal guardian wants to designate a caregiver: Yes  Caregiver name: Ni  Caregiver contact info: [phone]  Support Systems: Friends / Neighbors  Housing / Facility: 1 Story Apartment / Condo    Discharge Preparedness  What is your plan after discharge?: Skilled nursing facility  What are your discharge supports?: Other (comment) (friends)  Prior Functional Level: Independent with Activities of Daily Living    Functional Assesment  Prior Functional Level: Independent with Activities of Daily Living         Vision / Hearing Impairment  Vision Impairment : Yes  Right Eye Vision: Impaired, Wears Glasses  Left Eye Vision: Impaired, Wears Glasses  Hearing Impairment : Yes  Hearing Impairment: Both Ears, Hearing Device Not Available  Does Pt Need Special Equipment for the Hearing Impaired?: Yes-But Does not Need for Facility to Arrange Equipment              Domestic Abuse  Have you ever been the victim of abuse or violence?: No  Possible Abuse/Neglect Reported to:: Not Applicable              Anticipated Discharge Information  Discharge Disposition: Disch to  rehab facility or distinct part  unit (62)

## 2024-10-28 NOTE — PROGRESS NOTES
"    Orthopedic PA Progress Note    Interval changes:  Patient doing well postop  RLE dressings with min serosanguineous drainage    - ok to change with gauze/tegaderm  WBAT RLE  No pending ortho procedures  Follow up with the [facility] trauma [facility] clinic 2 weeks postop.  Cleared for DC from orthopedic standpoint pending therapy recs and medical optimization    ROS - Patient denies any new issues.  Denies any numbness or tingling. Pain controlled.    /52   Pulse 74   Temp 37.1 °C (98.8 °F) (Temporal)   Resp 18   Ht 1.727 m (5' 8\")   Wt 60.9 kg (134 lb 4.2 oz)   SpO2 96%     Patient seen and examined  No acute distress  Breathing non labored  RRR  RLE: Surgical dressing intact with minimal serosanguineous drainage. Patient clearly fires tibialis anterior, EHL, and gastrocnemius/soleus. Sensation is intact to light touch throughout superficial peroneal, deep peroneal, tibial, saphenous, and sural nerve distributions. Strong and palpable 2+ dorsalis pedis and posterior tibial pulses with capillary refill less than 2 seconds.     Recent Labs     10/27/24  0959 10/28/24  0238   WBC 8.3 6.3   RBC 4.22* 3.07*   HEMOGLOBIN 13.5* 9.8*   HEMATOCRIT 39.9* 29.2*   MCV 94.5 95.1   MCH 32.0 31.9   MCHC 33.8 33.6   RDW 47.4 47.8   PLATELETCT 177 151*   MPV 10.1 9.8       Active Hospital Problems    Diagnosis     Recurrent Clostridium difficile diarrhea [A04.71]      Priority: High     - Patient on Van taper         Cachexia ([facility]) [R64]      Priority: Medium    Bipolar 1 disorder, depressed ([facility]) [F31.9]      Priority: Low     Patient reports depression is not worse or better. He states he is quick to anger. States he is still having decreased appetite and poor sleep. Has not been able to get modafinil approved. Also states he had to switch to generic lamotrigine which he thinks is not working as well. Is taking medications as prescribed.    Says sleep has been worse. Still with difficulty falling asleep and then sleeping too " "much throughout the day when he does fall asleep.     Also with 6 pound recent weight loss. Believes it is due to mood and decreased appetite. Not interested in nutritionist, appetite stimulant. Eats 1 meal a day \"whatever I can find in the frozen section of the grocery store.\" Never with periods of days where he could not sleep, extremes of mood, erratic behavior (denies). No night sweats, fevers, chills. No SI/HI, intent to harm self or others.       Benign prostatic hyperplasia [N40.0]      Priority: Low    Closed hip fracture requiring operative repair, right, initial encounter ([facility]) [S72.001A]     Frailty syndrome in geriatric patient [R54]     Gastroesophageal reflux disease without esophagitis [K21.9]     Paroxysmal A-fib ([facility]) [I48.0]      Patient reports he is unsure if he should be taking metoprolol and Eliquis. States he last saw cardiology early this year but does not remember name of the doctor and would like to follow with a different doctor. No change of speech, vision, dizziness or balance difficulty, chest pain, heart palpitations, syncope. Denies ever having a fall.     On review of cardiology note 05/22, patient recommended for propranolol 10mg and Eliquis.       DNR (do not resuscitate) [Z66]      IMO load March 2020         EC-ECHOCARDIOGRAM COMPLETE W/O CONT   Final Result      DX-PORTABLE FLUORO > 1 HOUR   Preliminary Result      Portable fluoroscopy utilized for 1 minute 18 seconds.         INTERPRETING LOCATION: [address] [place] [address], [id number]      DX-FEMUR-2+ RIGHT   Preliminary Result      Digitized intraoperative radiograph is submitted for review. This examination is not for diagnostic purpose but for guidance during a surgical procedure. Please see the patient's chart for full procedural details.         INTERPRETING LOCATION: [address] [place] [address], [id number]      CT-LSPINE W/O PLUS RECONS   Final Result      1.  Severe multilevel degenerative change of lumbar spine.   2.  No fracture or " subluxation.      CT-CSPINE WITHOUT PLUS RECONS   Final Result      1.  No cervical spine fracture or subluxation.   2.  Severe multilevel degenerative change and diffuse osteopenia.   3.  Prior multilevel laminectomy and posterior fusion with finding indicating possible loosening of screws on the RIGHT at the T1 and T2 levels.      CT-HEAD W/O   Final Result      1.  Cerebral atrophy and chronic microvascular ischemic type changes.   2.  No acute intracranial abnormality.   3.  Right sphenoid sinus disease               DX-FEMUR-2+ RIGHT   Final Result   Impression:      1. Proximal right femur fracture.                     DX-PELVIS-1 OR 2 VIEWS   Final Result   Impression:      1. No pelvis fracture evident.      2. Proximal right femur fracture.          Assessment/Plan:  Patient doing well postop  RLE dressings with min serosanguineous drainage    - ok to change with gauze/tegaderm  WBAT RLE  No pending ortho procedures  Follow up with the [facility] trauma [facility] clinic 2 weeks postop.  Cleared for DC from orthopedic standpoint pending therapy recs and medical optimization    POD#1 S/p  Open reduction internal fixation right proximal femur fracture with cephalomedullary nail   Wt bearing status - WBAT RLE  Future Procedures - None planned   Wound care/Drains - Dressings to be changed every other day by nursing. Or PRN for saturation starting POD#2  Sutures/Staples out- 14-21 days post operatively. Removal will completed by ortho [facility]'s unless transferred.  Inpatient DVT prophylaxis: Lovenox initiated 10/28  DVT Prophylaxis outpatient: ASA 81 mg PO BID x4 weeks  PT/OT-initiated  Antibiotics:  Perioperative completed  DVT Prophylaxis- TEDS/SCDs/Foot pumps  Case Coordination for Discharge Planning - Disposition per therapy recs.

## 2024-10-28 NOTE — PROGRESS NOTES
Per pt, okay to add neighbors Yasmeen and Gómez to contact list and give updates. Pt unable to provide full last names, only last initial.

## 2024-10-28 NOTE — THERAPY
"Occupational Therapy   Initial Evaluation     Patient Name: Marcelo Colon  Age:  84 y.o., Sex:  person  Medical Record #: [id number]  Today's Date: 10/28/2024     Precautions  Precautions: Fall Risk, Weight Bearing As Tolerated Right Lower Extremity    Assessment    Patient is 84 y.o. person with a PMHx significant for anxiety, arthritis, bipolar disorder, depression, GERD and heart murmur. Presented to the hospital following GLF with RLE pain. Found to have displaced R subtrochanteric femur fracture now s/p ORIF with cephalomedullary nail on 10/27. Pt greeted and seen for OT eval. Pt initially requesting to attempt to get EOB independently. Pt requested help and was able to sit with support very briefly before requesting return to bed 2/2 pain. Recommend pre-medicating prior to therapy sessions. Pt required mod A - max A for bed mobility, min A for bed-level ADLs and total A for LB dressing. Educated on role of OT, energy conservation techniques, WBAT, home safety, AE, adaptive strategies for ADLs and pathology of bedrest. Currently limited by deficits in coordination, AROM, strength, balance, activity tolerance, functional mobility, adherence to precautions and pain which are currently affecting patients ability to complete ADL/IADLs at baseline. Will continue to follow.    Plan    Occupational Therapy Initial Treatment Plan   Treatment Interventions: Self Care / Activities of Daily Living, Adaptive Equipment, Manual Therapy Techniques, Neuro Re-Education / Balance, Therapeutic Exercises, Therapeutic Activity  Treatment Frequency: 4 Times per Week  Duration: Until Therapy Goals Met    DC Equipment Recommendations: Unable to determine at this time  Discharge Recommendations: Recommend post-acute placement for additional occupational therapy services prior to discharge home     Subjective    \"Put me down, woman!\" - after requesting OT assist with getting EOB     Objective     10/28/24 1522   Prior Living Situation " "  Housing / Facility 1 Story House  (Old store front)   Steps Into Home 0   Steps In Home 0   Bathroom Set up Walk In Shower;Shower Chair   Equipment Owned Quad Cane;Tub / Shower Seat   Lives with - Patient's Self Care Capacity Unrelated Adult   Comments Pt states he lives with a roommate who leaves for varied amounts of time for work. Roommate would be able to assist intermittently. Pt has one friend in the area who could also assist intermittently.   Prior Level of ADL Function   Self Feeding Independent   Grooming / Hygiene Independent   Bathing Independent   Dressing Independent   Toileting Independent   Prior Level of IADL Function   Medication Management Independent   Laundry Independent   Kitchen Mobility Independent   Finances Independent   Home Management Independent   Shopping Independent   Prior Level Of Mobility Independent Without Device in Community;Independent Without Device in Home   Driving / Transportation Driving Independent   History of Falls   History of Falls Yes   Date of Last Fall   (Reason for admit; endorses x3 falls within the last year d/t \"not paying attention\")   Precautions   Precautions Fall Risk;Weight Bearing As Tolerated Right Lower Extremity   Vitals   Patient BP Position Mata's Position   Blood Pressure  107/52   Pulse Oximetry 96 %   O2 (LPM) 2   O2 Delivery Device Nasal Cannula   Vitals Comments No c/o dizziness or light headedness   Pain 0 - 10 Group   Location Hip   Location Orientation Right   Therapist Pain Assessment During Activity;Nurse Notified  (\"unbearable\" but not quantified.)   Cognition    Cognition / Consciousness X   Speech/ Communication Dysarthric  (intermittently difficult to understand)   Level of Consciousness Alert   Comments Very pleasant and participatory. Motivated to regain occupational independence.   Passive ROM Upper Body   Passive ROM Upper Body X   Left Hand Moderate Impaired   Comments Dupuytren's contracture L hand   Active ROM Upper Body " "  Active ROM Upper Body  X   Dominant Hand Right   Comments RUE WFL; LUE with limited digit extension 2/2 Dupuytrens contractures.   Strength Upper Body   Upper Body Strength  X   Gross Strength Generalized Weakness, Equal Bilaterally.    Coordination Upper Body   Coordination X   Comments FM and GM impaired; endorses BUEs feeling weak   Balance Assessment   Sitting Balance (Static) Trace +   Weight Shift Sitting Poor   Comments Attempted to sit up EOB; half way to edge pt returned self to bed reporting high rate of pain   Bed Mobility    Supine to Sit Maximal Assist  (partial sit)   Sit to Supine Moderate Assist  (RLE management)   Scooting Moderate Assist   Comments HOB elevated with use of rail   ADL Assessment   Eating Minimal Assist  (opening containers; spills food all over self)   Grooming Minimal Assist  (bedlevel wiping self following food spillage)   Upper Body Dressing Minimal Assist  (gown change)   Lower Body Dressing Total Assist  (socks)   Toileting Supervision  (urinal at bedlevel)   Functional Mobility   Comments Pt declined further activity beyond brief EOB d/t pain   Visual Perception   Comments glasses at baseline   Edema / Skin Assessment   Comments Dressing at R hip saturated   Activity Tolerance   Comments Limited by weakness and pain   Patient / Family Goals   Patient / Family Goal #1 \"to be able to get around the kitchen and cook some eggs\"   Short Term Goals   Short Term Goal # 1 Pt will be able to complete standing g/h routine with SPV and seated rest breaks PRN   Short Term Goal # 2 Pt will be able to complete toilet/ADL transfer with SPV   Short Term Goal # 3 Pt will be able to complete toileting ADLs with SPV   Short Term Goal # 4 Pt will be able to complete LB dressing with SPV and AE PRN   Education Group   Education Provided Energy Conservation;Home Safety;Role of Occupational Therapist;Activities of Daily Living;Adaptive Equipment;Pathology of bedrest;Weight Bearing Precautions "   Role of Occupational Therapist Patient Response Patient;Acceptance;Explanation;Verbal Demonstration   Energy Conservation Patient Response Patient;Acceptance;Explanation;Verbal Demonstration  (Educated on activity pacing and seated rest breaks PRN)   Home Safety Patient Response Patient;Acceptance;Explanation;Verbal Demonstration  (Educated on shower chair for bathing ADLs for fall reduction and energy conservation)   ADL Patient Response Patient;Acceptance;Explanation;Verbal Demonstration  (Educated on adaptive strategies for ADLs)   Adaptive Equipment Patient Response Patient;Acceptance;Explanation;Verbal Demonstration  (Educated on use of AE to maximize occupational independence.)   Weight Bearing Precautions Patient Response Patient;Acceptance;Explanation;Verbal Demonstration;Reinforcement Needed  (Educated on WBAT status)   Pathology of Bedrest Patient Response Patient;Acceptance;Explanation;Verbal Demonstration  (Educated on benefits and importance of frequent EOB/OOB activity.)   Occupational Therapy Initial Treatment Plan    Treatment Interventions Self Care / Activities of Daily Living;Adaptive Equipment;Manual Therapy Techniques;Neuro Re-Education / Balance;Therapeutic Exercises;Therapeutic Activity   Treatment Frequency 4 Times per Week   Duration Until Therapy Goals Met   Problem List   Problem List Decreased Active Daily Living Skills;Decreased Homemaking Skills;Decreased Upper Extremity Strength Left;Decreased Upper Extremity Strength Right;Decreased Upper Extremity AROM Left;Decreased Upper Extremity PROM Left;Decreased Functional Mobility;Decreased Activity Tolerance;Impaired Coordination Right Upper Extremity;Impaired Coordination Left Upper Extremity;Impaired Vision;Impaired Postural Control / Balance;Limited Knowledge of Post Op Precautions   Anticipated Discharge Equipment and Recommendations   DC Equipment Recommendations Unable to determine at this time   Discharge Recommendations Recommend  post-acute placement for additional occupational therapy services prior to discharge home   Interdisciplinary Plan of Care Collaboration   IDT Collaboration with  Nursing   Patient Position at End of Therapy In Bed;Call Light within Reach;Tray Table within Reach;Phone within Reach  (No alarm on entry)   Collaboration Comments OT report and recs; RN updated   Session Information   Date / Session Number  10/28, #1 (1/4, 11/3)

## 2024-10-28 NOTE — PROGRESS NOTES
4 Eyes Skin Assessment Completed by BART Quintero and BART Jorgensen.    Head WDL  Ears Redness and Blanching  Nose WDL  Mouth WDL  Neck WDL  Breast/Chest WDL  Shoulder Blades Redness and Blanching  Spine Redness and Blanching  (R) Arm/Elbow/Hand Redness, Blanching, Bruising, and Abrasion  (L) Arm/Elbow/Hand Redness, Blanching, Bruising, Abrasion, and Scab  Abdomen WDL  Groin WDL  Scrotum/Coccyx/Buttocks Redness and Blanching  (R) Leg Incision x3, surgical dressing in place  (L) Leg WDL  (R) Heel/Foot/Toe Redness and Blanching  (L) Heel/Foot/Toe Redness and Blanching          Devices In Places Tele Box, Blood Pressure Cuff, Pulse Ox, and Nasal Cannula      Interventions In Place Gray Ear Foams, NC W/Ear Foams, Heel Mepilex, Sacral Mepilex, TAP System, Pillows, and Elbow Mepilex    Possible Skin Injury No    Pictures Uploaded Into Epic Yes  Wound Consult Placed N/A  RN Wound Prevention Protocol Ordered Yes

## 2024-10-28 NOTE — PROGRESS NOTES
Bedside report received by RN and patient care assumed. Tele Box in place, patient is A&O4, resting in bed, and on 2LNC. Patient states 4/10 pain, cold pack applied to leg. Fall precautions in place and patient educated on use of call light for assistance. Pt updated on POC with no questions or concerns. Hourly monitoring initiated.

## 2024-10-28 NOTE — OP REPORT
DATE OF OPERATION: 10/27/2024     PREOPERATIVE DIAGNOSIS: Displaced right subtrochanteric femur fracture    POSTOPERATIVE DIAGNOSIS: Same    PROCEDURE PERFORMED: Open reduction internal fixation right proximal femur fracture with cephalomedullary nail    SURGEON: Heron Mann M.D.     ASSISTANT: None    ANESTHESIA: General    SPECIMEN: None    ESTIMATED BLOOD LOSS: 100 mL    IMPLANTS: Howell long gamma nail 11 x 420 mm, 95 mm lag screw, cerclage cable      INDICATIONS: The patient is a 84 y.o. person who presented with above.  I discussed the risks and benefits of the procedure which include but are not limited to risks of infection, wound healing complication, neurovascular injury, pain, malunion, non-union, malrotation, and the medical risks of anesthesia including MI, stroke, and death.  Alternatives to surgery were also discussed, including non-operative management, which I did not recommend.  The patient was in agreement with the plan to proceed, and the informed consent was signed and documented.  I met with the patient pre-operatively and marked the operative extremity with their agreement.  We proceeded to the operating room.     DESCRIPTION OF PROCEDURE:  Patient was seen in the preoperative holding area on the day of surgery. The operative site was marked with my initials.  Richard was taken to the operating room and placed supine on the operative table.  Anesthesia was induced.  The operative extremity was prepped and draped in the normal sterile fashion.  Operative pause was conducted and the correct patient, site, side, procedure, and surgeon's initials on extremity were identified.  A 4 cm incision was performed of the lateral proximal femur.  Fascia was split in line with skin incision subvastus approach was performed.  The fracture was reduced using ball spike pusher.  We then passed a cerclage cable holding the abducted proximal femur segment.  This reduced the fracture to near-anatomic position.   Next we obtained our start site with our guidewire checked in AP and lateral views proximally.  This was advanced and then we used the entry reamer to gain entry into the canal.  We then placed a long ball-tipped guidewire into position this was measured and then sequential reaming ensued.  Reamed up to a 12.5 mm reamer with excellent cortical chatter.  We then placed our nail over the wire.  Wire was removed and we drilled and placed a lag screw through the jig.  We then drilled and placed 2 distal interlock screws using perfect [place] technique.  Jig was removed final images were obtained showing appropriate reduction implant position.    POSTOPERATIVE PLAN: Weightbearing as tolerated right lower extremity weight bearing.  Mobilize with physical and occupational therapies.  DVT prophylaxis with SCDs and Lovenox until mobilizing independently and then can be switched to aspirin for 4 weeks.  The patient will follow up in clinic in 2 weeks to check wounds and remove sutures/staples.      ____________________________________   Heron Mann M.D.   DD: 10/27/2024  5:38 PM

## 2024-10-28 NOTE — PROGRESS NOTES
Patient transported to room 706 with transport on 2L O2. Patient placed on tele box, monitors notified. Call light with in reach. No other needs at this time.

## 2024-10-28 NOTE — DISCHARGE PLANNING
Case Management Discharge Planning    Admission Date: 10/27/2024  GMLOS: 3.1  ALOS: 1    6-Clicks ADL Score: 19  6-Clicks Mobility Score: 12  PT and/or OT Eval ordered: Yes  Post-acute Referrals Ordered: No  Post-acute Choice Obtained: Yes  Has referral(s) been sent to post-acute provider:  No      Anticipated Discharge Dispo: Discharge Disposition: Disch to IP rehab facility or distinct part unit (62)    DME Needed: No    Action(s) Taken: Updated Provider/Nurse on Discharge Plan, DC Assessment Complete (See below), and Choice obtained    Escalations Completed: None    Medically Clear: No    Next Steps: Await PT/OT evals for recommendations on post acute needs. Discussed rehab and skilled levels of care with patient. Patient signed choice for Rehab and SNF.    Barriers to Discharge: Medical clearance and Pending PT Evaluation    Is the patient up for discharge tomorrow: No

## 2024-10-29 LAB
ANION GAP SERPL CALC-SCNC: 11 MMOL/L (ref 7–16)
BUN SERPL-MCNC: 24 MG/DL (ref 8–22)
CALCIUM SERPL-MCNC: 9 MG/DL (ref 8.5–10.5)
CHLORIDE SERPL-SCNC: 106 MMOL/L (ref 96–112)
CO2 SERPL-SCNC: 23 MMOL/L (ref 20–33)
CREAT SERPL-MCNC: 0.97 MG/DL (ref 0.5–1.4)
ERYTHROCYTE [DISTWIDTH] IN BLOOD BY AUTOMATED COUNT: 47.8 FL (ref 35.9–50)
GFR SERPLBLD CREATININE-BSD FMLA CKD-EPI: 77 ML/MIN/1.73 M 2
GLUCOSE SERPL-MCNC: 129 MG/DL (ref 65–99)
HCT VFR BLD AUTO: 24.5 % (ref 42–52)
HGB BLD-MCNC: 8.1 G/DL (ref 14–18)
MAGNESIUM SERPL-MCNC: 1.9 MG/DL (ref 1.5–2.5)
MCH RBC QN AUTO: 31.8 PG (ref 27–33)
MCHC RBC AUTO-ENTMCNC: 33.1 G/DL (ref 32.3–36.5)
MCV RBC AUTO: 96.1 FL (ref 81.4–97.8)
PLATELET # BLD AUTO: 130 K/UL (ref 164–446)
PMV BLD AUTO: 10.4 FL (ref 9–12.9)
POTASSIUM SERPL-SCNC: 4.4 MMOL/L (ref 3.6–5.5)
RBC # BLD AUTO: 2.55 M/UL (ref 4.7–6.1)
SODIUM SERPL-SCNC: 140 MMOL/L (ref 135–145)
WBC # BLD AUTO: 6.2 K/UL (ref 4.8–10.8)

## 2024-10-29 PROCEDURE — 700111 HCHG RX REV CODE 636 W/ 250 OVERRIDE (IP): Performed by: STUDENT IN AN ORGANIZED HEALTH CARE EDUCATION/TRAINING PROGRAM

## 2024-10-29 PROCEDURE — A9270 NON-COVERED ITEM OR SERVICE: HCPCS | Performed by: INTERNAL MEDICINE

## 2024-10-29 PROCEDURE — 80048 BASIC METABOLIC PNL TOTAL CA: CPT

## 2024-10-29 PROCEDURE — 85027 COMPLETE CBC AUTOMATED: CPT

## 2024-10-29 PROCEDURE — 36415 COLL VENOUS BLD VENIPUNCTURE: CPT

## 2024-10-29 PROCEDURE — 99233 SBSQ HOSP IP/OBS HIGH 50: CPT | Performed by: INTERNAL MEDICINE

## 2024-10-29 PROCEDURE — 770001 HCHG ROOM/CARE - MED/SURG/GYN PRIV*

## 2024-10-29 PROCEDURE — 83735 ASSAY OF MAGNESIUM: CPT

## 2024-10-29 PROCEDURE — 700102 HCHG RX REV CODE 250 W/ 637 OVERRIDE(OP): Performed by: INTERNAL MEDICINE

## 2024-10-29 RX ORDER — QUETIAPINE FUMARATE 25 MG/1
25 TABLET, FILM COATED ORAL NIGHTLY PRN
Status: DISCONTINUED | OUTPATIENT
Start: 2024-10-29 | End: 2024-11-04 | Stop reason: HOSPADM

## 2024-10-29 RX ADMIN — LAMOTRIGINE 200 MG: 100 TABLET ORAL at 06:00

## 2024-10-29 RX ADMIN — GABAPENTIN 300 MG: 300 CAPSULE ORAL at 06:00

## 2024-10-29 RX ADMIN — ENOXAPARIN SODIUM 30 MG: 100 INJECTION SUBCUTANEOUS at 16:42

## 2024-10-29 RX ADMIN — GABAPENTIN 300 MG: 300 CAPSULE ORAL at 16:42

## 2024-10-29 ASSESSMENT — ENCOUNTER SYMPTOMS
ABDOMINAL PAIN: 0
VOMITING: 0
SHORTNESS OF BREATH: 0
FEVER: 0
NAUSEA: 0

## 2024-10-29 ASSESSMENT — FIBROSIS 4 INDEX: FIB4 SCORE: 4.43

## 2024-10-29 ASSESSMENT — PAIN DESCRIPTION - PAIN TYPE: TYPE: ACUTE PAIN

## 2024-10-29 NOTE — THERAPY
"Physical Therapy   Initial Evaluation     Patient Name: Marcelo Colon  Age:  84 y.o., Sex:  person  Medical Record #: [id number]  Today's Date: 10/28/2024     Precautions  Precautions: Fall Risk;Weight Bearing As Tolerated Right Lower Extremity  Comments: s/p R femur IMN    Assessment    Richard Colon is an 84-year-old person with PMHx paroxysmal atrial fibrillation not on anticoagulation, GERD, bipolar 1, BPH, memory deficits.  Admitted 10/27 after GLF and subsequent right proximal femur fracture. Now POD#1 S/p Open reduction internal fixation right proximal femur fracture with cephalomedullary nail.  Patient presents to PT eval with pain, impaired balance, strength and mobility.  He was able ot get to EOB with MaxA and then scooted with increased time.  He demonstrated 2 STS with Mod-MaxA and FWW.  He was unable to weight shift or tolerate steps 2/2 pain and FF posture.  He was indep PTA and recommend placement for further therapy at this time. Will continue to follow while in house.     Plan    Physical Therapy Initial Treatment Plan   Treatment Plan : Bed Mobility, Equipment, Group Therapy, Family / Caregiver Training, Gait Training, Neuro Re-Education / Balance, Stair Training, Therapeutic Exercise, Therapeutic Activities, Self Care / Home Evaluation  Treatment Frequency: 5 Times per Week  Duration: Until Therapy Goals Met    DC Equipment Recommendations: Unable to determine at this time  Discharge Recommendations: Recommend post-acute placement for additional physical therapy services prior to discharge home       Subjective    \"Can't we do this tomorrow?\"     Objective       10/28/24 1610   Precautions   Precautions Fall Risk;Weight Bearing As Tolerated Right Lower Extremity   Comments s/p R femur IMN   Pain 0 - 10 Group   Location Hip   Location Orientation Right   Therapist Pain Assessment During Activity;3;Nurse Notified  (patient premedicated)   Prior Living Situation   Prior Services Home-Independent "   Housing / Facility 1 Story House   Steps Into Home 0   Steps In Home 0   Bathroom Set up Walk In Shower;Shower Chair   Equipment Owned Quad Cane;Tub / Shower Seat   Lives with - Patient's Self Care Capacity Unrelated Adult   Comments patient reports that he lives with a roommate and they help each other out with household duties. He also has a female neighbor who can assist   Prior Level of Functional Mobility   Bed Mobility Independent   Transfer Status Independent   Ambulation Independent   Assistive Devices Used None   Comments indep with all mobility at baseline   History of Falls   History of Falls Yes   Date of Last Fall   (reason for admit)   Cognition    Cognition / Consciousness X   Speech/ Communication Dysarthric   Level of Consciousness Alert   Comments pleasant nad cooperative, demos some impaired short term memory   Active ROM Upper Body   Active ROM Upper Body  WDL   Strength Upper Body   Upper Body Strength  X   Gross Strength Generalized Weakness, Equal Bilaterally.    Active ROM Lower Body    Active ROM Lower Body  X   Comments R LE limited by pain, but functional   Strength Lower Body   Lower Body Strength  X   Comments R LE limited by pain, B LE grossly 3/5   Sensation Lower Body   Lower Extremity Sensation   WDL   Other Treatments   Other Treatments Provided extensive education about the pathologies of bedrest and the importance of mobilizing and making DC recs.  Provided multimodal cueing for sequencing for all mobility   Balance Assessment   Sitting Balance (Static) Fair -   Sitting Balance (Dynamic) Poor +   Standing Balance (Static) Poor -   Standing Balance (Dynamic) Trace +   Weight Shift Sitting Poor   Weight Shift Standing Absent   Comments w/FWW   Bed Mobility    Supine to Sit Maximal Assist   Sit to Supine Maximal Assist   Scooting Minimal Assist   Rolling Maximal Assist to Rt.;Maximum Assist to Lt.   Comments HOB elevated   Gait Analysis   Gait Level Of Assist Unable to Participate    Weight Bearing Status WBAT R LE   Functional Mobility   Sit to Stand Moderate Assist   Bed, Chair, Wheelchair Transfer Unable to Participate   Mobility STSx2 with ModAx2   6 Clicks Assessment - How much HELP from from another person do you currently need... (If the patient hasn't done an activity recently, how much help from another person do you think he/she would need if he/she tried?)   Turning from your back to your side while in a flat bed without using bedrails? 2   Moving from lying on your back to sitting on the side of a flat bed without using bedrails? 2   Moving to and from a bed to a chair (including a wheelchair)? 2   Standing up from a chair using your arms (e.g., wheelchair, or bedside chair)? 1   Walking in hospital room? 1   Climbing 3-5 steps with a railing? 1   6 clicks Mobility Score 9   Activity Tolerance   Sitting Edge of Bed 10 min   Standing 1 min   Edema / Skin Assessment   Comments R hip dressing saturated. RN notified   Patient / Family Goals    Patient / Family Goal #1 to go to rehab   Short Term Goals    Short Term Goal # 1 in 6 visits patient will demo all functional transfers with Sup and LRAD for safe DC   Short Term Goal # 2 in 6 visits patient will ambualte 100' with Latha and LRAD for safe DC   Short Term Goal # 3 in 6 visits patient will demo all bed mobility indep for safe DC   Education Group   Education Provided Role of Physical Therapist;Use of Assistive Device;Weight Bearing Precautions   Role of Physical Therapist Patient Response Patient;Acceptance;Explanation;Demonstration;Verbal Demonstration;Action Demonstration   Use of Assistive Device Patient Response Patient;Acceptance;Explanation;Demonstration;Verbal Demonstration;Action Demonstration   Weight Bearing Precautions Patient Response Patient;Acceptance;Explanation;Demonstration;Verbal Demonstration;Action Demonstration   Physical Therapy Initial Treatment Plan    Treatment Plan  Bed Mobility;Equipment;Group  Therapy;Family / Caregiver Training;Gait Training;Neuro Re-Education / Balance;Stair Training;Therapeutic Exercise;Therapeutic Activities;Self Care / Home Evaluation   Treatment Frequency 5 Times per Week   Duration Until Therapy Goals Met   Problem List    Problems Pain;Impaired Bed Mobility;Impaired Transfers;Impaired Ambulation;Functional Strength Deficit;Impaired Balance;Decreased Activity Tolerance;Safety Awareness Deficits / Cognition;Limited Knowledge of Post-Op Precautions;Motor Planning / Sequencing   Anticipated Discharge Equipment and Recommendations   DC Equipment Recommendations Unable to determine at this time   Discharge Recommendations Recommend post-acute placement for additional physical therapy services prior to discharge home     Chiqui Campos, PT, DPT, GCS

## 2024-10-29 NOTE — PROGRESS NOTES
"      Orthopaedic Progress Note    Interval changes:  Patient doing well   RLE dressings changed, incisions without issues   Cleared for DC to rehab by ortho pending medicine clearance    ROS - Patient denies any new issues.  Pain well controlled.    /48   Pulse 87   Temp 37.1 °C (98.8 °F) (Temporal)   Resp 18   Ht 1.727 m (5' 8\")   Wt 60.9 kg (134 lb 4.2 oz)   SpO2 90%     Patient seen and examined  No acute distress  Breathing non labored  RRR  RLE dressings changed, incisions without issues, DNVI, moves all toes, cap refill <2 sec.     Recent Labs     10/27/24  0959 10/28/24  0238 10/29/24  0236   WBC 8.3 6.3 6.2   RBC 4.22* 3.07* 2.55*   HEMOGLOBIN 13.5* 9.8* 8.1*   HEMATOCRIT 39.9* 29.2* 24.5*   MCV 94.5 95.1 96.1   MCH 32.0 31.9 31.8   MCHC 33.8 33.6 33.1   RDW 47.4 47.8 47.8   PLATELETCT 177 151* 130*   MPV 10.1 9.8 10.4       Active Hospital Problems    Diagnosis     Recurrent Clostridium difficile diarrhea [A04.71]      Priority: High     - Patient on Van taper         Cachexia ([facility]) [R64]      Priority: Medium    Bipolar 1 disorder, depressed ([facility]) [F31.9]      Priority: Low     Patient reports depression is not worse or better. He states he is quick to anger. States he is still having decreased appetite and poor sleep. Has not been able to get modafinil approved. Also states he had to switch to generic lamotrigine which he thinks is not working as well. Is taking medications as prescribed.    Says sleep has been worse. Still with difficulty falling asleep and then sleeping too much throughout the day when he does fall asleep.     Also with 6 pound recent weight loss. Believes it is due to mood and decreased appetite. Not interested in nutritionist, appetite stimulant. Eats 1 meal a day \"whatever I can find in the frozen section of the grocery store.\" Never with periods of days where he could not sleep, extremes of mood, erratic behavior (denies). No night sweats, fevers, chills. No SI/HI, " intent to harm self or others.       Benign prostatic hyperplasia [N40.0]      Priority: Low    Closed hip fracture requiring operative repair, right, initial encounter ([facility]) [S72.001A]     Frailty syndrome in geriatric patient [R54]     Gastroesophageal reflux disease without esophagitis [K21.9]     Paroxysmal A-fib ([facility]) [I48.0]      Patient reports he is unsure if he should be taking metoprolol and Eliquis. States he last saw cardiology early this year but does not remember name of the doctor and would like to follow with a different doctor. No change of speech, vision, dizziness or balance difficulty, chest pain, heart palpitations, syncope. Denies ever having a fall.     On review of cardiology note 05/22, patient recommended for propranolol 10mg and Eliquis.       DNR (do not resuscitate) [Z66]      IMO load March 2020         Assessment/Plan:  Patient doing well   RLE dressings changed, incisions without issues   Cleared for DC to rehab by ortho pending medicine clearance  POD#2 S/P Open reduction internal fixation right proximal femur fracture with cephalomedullary nail   Wt bearing status - WBAT  Wound care/Drains - Dressings to be changed every other day by nursing. Or PRN for saturation starting POD#2  Future Procedures - none planned   Lovenox: Start 10/28, Duration-until ambulatory > 150'  Sutures/Staples out- 14-21 days post operatively. Removal will completed by ortho mid levels only.  PT/OT-initiated  Antibiotics: Perioperative completed  DVT Prophylaxis- TEDS/SCDs/Foot pumps  Ruff-not needed per ortho  Case Coordination for Discharge Planning - Disposition per therapy recs.

## 2024-10-29 NOTE — DISCHARGE PLANNING
1043  DPA sent Rehab referral to [facility] per choice form obtained from media.

## 2024-10-29 NOTE — THERAPY
Physical Therapy Contact Note    Patient Name: Marcelo Colon  Age:  84 y.o., Sex:  person  Medical Record #: [id number]  Today's Date: 10/29/2024    Pt attempted for follow up PT session. Per discussion with NSG, pt needing to be pre-medicated prior to session. Will re-attempt as able.

## 2024-10-29 NOTE — DISCHARGE PLANNING
Antwan  contacted [facility] and they have not finished reviewing referral and ask to call back tomorrow, they do have beds available.

## 2024-10-29 NOTE — PROGRESS NOTES
Telemetry monitoring:     Rhythm: NSR  Ectopy: PAC  Rate:     0.17  0.16  0.38

## 2024-10-29 NOTE — CARE PLAN
The patient is Stable - Low risk of patient condition declining or worsening    Shift Goals  Clinical Goals: Mobilize patient as tolerated, telemetry, I&Os, monitor respiratory status  Patient Goals: Pain management, comfort, PT/OT  Family Goals: `N/A    Progress made toward(s) clinical / shift goals:    Problem: Pain - Standard  Goal: Alleviation of pain or a reduction in pain to the patient’s comfort goal  Description: Target End Date:  Prior to discharge or change in level of care    Document on Vitals flowsheet    1.  Document pain using the appropriate pain scale per order or unit policy  2.  Educate and implement non-pharmacologic comfort measures (i.e. relaxation, distraction, massage, cold/heat therapy, etc.)  3.  Pain management medications as ordered  4.  Reassess pain after pain med administration per policy  5.  If opiods administered assess patient's response to pain medication is appropriate per POSS sedation scale  6.  Follow pain management plan developed in collaboration with patient and interdisciplinary team (including palliative care or pain specialists if applicable)  Outcome: Progressing     Problem: Skin Integrity  Goal: Skin integrity is maintained or improved  Description: Target End Date:  Prior to discharge or change in level of care    Document interventions on Skin Risk/Rohith flowsheet groups and corresponding LDA    1.  Assess and monitor skin integrity, appearance and/or temperature  2.  Assess risk factors for impaired skin integrity and/or pressures ulcers  3.  Implement precautions to protect skin integrity in collaboration with interdisciplinary team  4.  Implement pressure ulcer prevention protocol if at risk for skin breakdown  5.  Confirm wound care consult if at risk for skin breakdown  6.  Ensure patient use of pressure relieving devices  (Low air loss bed, waffle overlay, heel protectors, ROHO cushion, etc)  Outcome: Progressing     Problem: Fall Risk  Goal: Patient will  remain free from falls  Description: Target End Date:  Prior to discharge or change in level of care    Document interventions on the Sarah Clemons Fall Risk Assessment    1.  Assess for fall risk factors  2.  Implement fall precautions  Outcome: Progressing       No acute events overnight. VSS, requiring 2L via NC desats to 76-78% on RA. Mobilize pt as tolerated. Encourage pulmonary toileting.

## 2024-10-29 NOTE — DISCHARGE PLANNING
Spoke with Maria M at [facility] and she confirmed they received referral and are reviewing now.

## 2024-10-29 NOTE — PROGRESS NOTES
[facility] Medicine Daily Progress Note    Date of Service  10/29/2024    Chief Complaint  Marcelo Colon is a 84 y.o. person admitted 10/27/2024 with ground-level fall.    Hospital Course  Richard Colon is an 84-year-old person with PMHx paroxysmal atrial fibrillation not on anticoagulation, GERD, bipolar 1, BPH, memory deficits.  Admitted 10/27 after GLF and subsequent right proximal femur fracture.    In the emergency room-femur x-ray showing proximal right femur fracture with angulation and displacement.  CT head without intracranial abnormalities.  Orthopedic surgery was consulted.  Patient underwent ORIF right proximal femur fracture on 10/27 with Dr. Mann.    Interval Problem Update  Patient seen and examined, afebrile no nausea or vomiting, still with some hip pain after surgery   Ortho following appreciate rec.  Pain management   PT/OT eval will need placement     I have discussed this patient's plan of care and discharge plan at IDT rounds today with Case Management, Nursing, Nursing leadership, and other members of the IDT team.    Consultants/Specialty  orthopedics    Code Status  DNAR/DNI    Disposition  The patient is not medically cleared for discharge to home or a post-acute facility.      I have placed the appropriate orders for post-discharge needs.    Review of Systems  Review of Systems   Constitutional:  Negative for fever and malaise/fatigue.   Respiratory:  Negative for shortness of breath.    Cardiovascular:  Negative for chest pain and leg swelling.   Gastrointestinal:  Negative for abdominal pain, nausea and vomiting.   Musculoskeletal:  Positive for joint pain.        Physical Exam  Temp:  [37 °C (98.6 °F)-37.5 °C (99.5 °F)] 37.1 °C (98.8 °F)  Pulse:  [63-95] 87  Resp:  [16-20] 18  BP: ()/(48-59) 122/48  SpO2:  [90 %-100 %] 90 %    Physical Exam  Vitals and nursing note reviewed.   Constitutional:       General: Richard is not in acute distress.     Appearance: Normal appearance. Richard is  underweight. Richard is ill-appearing.   Cardiovascular:      Rate and Rhythm: Normal rate and regular rhythm.      Heart sounds: Murmur heard.   Pulmonary:      Effort: Pulmonary effort is normal. No respiratory distress.      Breath sounds: Normal breath sounds. No wheezing.   Abdominal:      General: Bowel sounds are normal. There is no distension.      Palpations: Abdomen is soft.      Tenderness: There is no abdominal tenderness.   Musculoskeletal:      Comments: Decreased ROM to right hip   Well healing right lateral hip surgical site    Skin:     General: Skin is warm and dry.   Neurological:      Mental Status: Richard is alert and oriented to person, place, and time. Mental status is at baseline.   Psychiatric:         Mood and Affect: Mood normal.         Behavior: Behavior normal.         Fluids    Intake/Output Summary (Last 24 hours) at 10/29/2024 1518  Last data filed at 10/29/2024 0905  Gross per 24 hour   Intake 1340 ml   Output 250 ml   Net 1090 ml        Laboratory  Recent Labs     10/27/24  0959 10/28/24  0238 10/29/24  0236   WBC 8.3 6.3 6.2   RBC 4.22* 3.07* 2.55*   HEMOGLOBIN 13.5* 9.8* 8.1*   HEMATOCRIT 39.9* 29.2* 24.5*   MCV 94.5 95.1 96.1   MCH 32.0 31.9 31.8   MCHC 33.8 33.6 33.1   RDW 47.4 47.8 47.8   PLATELETCT 177 151* 130*   MPV 10.1 9.8 10.4     Recent Labs     10/27/24  0959 10/28/24  0238 10/29/24  0236   SODIUM 141 139 140   POTASSIUM 4.2 4.6 4.4   CHLORIDE 102 104 106   CO2 22 20 23   GLUCOSE 155* 138* 129*   BUN 22 23* 24*   CREATININE 1.43* 1.37 0.97   CALCIUM 9.9 8.5 9.0     Recent Labs     10/27/24  0959   APTT 37.3*   INR 1.77*               Imaging  EC-ECHOCARDIOGRAM COMPLETE W/O CONT   Final Result      DX-PORTABLE FLUORO > 1 HOUR   Final Result      Portable fluoroscopy utilized for 1 minute 18 seconds.         INTERPRETING LOCATION: [address] [place] [address], [id number]      DX-FEMUR-2+ RIGHT   Final Result      Digitized intraoperative radiograph is submitted for review. This examination  is not for diagnostic purpose but for guidance during a surgical procedure. Please see the patient's chart for full procedural details.         INTERPRETING LOCATION: [address] [facility], [place], [id number]      CT-LSPINE W/O PLUS RECONS   Final Result      1.  Severe multilevel degenerative change of lumbar spine.   2.  No fracture or subluxation.      CT-CSPINE WITHOUT PLUS RECONS   Final Result      1.  No cervical spine fracture or subluxation.   2.  Severe multilevel degenerative change and diffuse osteopenia.   3.  Prior multilevel laminectomy and posterior fusion with finding indicating possible loosening of screws on the RIGHT at the T1 and T2 levels.      CT-HEAD W/O   Final Result      1.  Cerebral atrophy and chronic microvascular ischemic type changes.   2.  No acute intracranial abnormality.   3.  Right sphenoid sinus disease               DX-FEMUR-2+ RIGHT   Final Result   Impression:      1. Proximal right femur fracture.                     DX-PELVIS-1 OR 2 VIEWS   Final Result   Impression:      1. No pelvis fracture evident.      2. Proximal right femur fracture.           Assessment/Plan  * Closed hip fracture requiring operative repair, right, initial encounter (HCC)- (present on admission)  Assessment & Plan  Femur x-ray showing proximal right femur fracture with angulation and displacement.   S/p ORIF 10/27 with MELVIN, Dr. Mann   - Continue pain management as requested  - PT OT today; WBAT  - Will likely need PAR   - Restart DVT prophylaxis today     Frailty syndrome in geriatric patient- (present on admission)  Assessment & Plan  Weight loss, depression, new hip fracture    Gastroesophageal reflux disease without esophagitis- (present on admission)  Assessment & Plan  Continue Pepcid    Paroxysmal A-fib ([facility])- (present on admission)  Assessment & Plan  Per note from cardiology 5/22 patient had paroxysmal A-fib recommended for propranolol 10 mg and Eliquis  Patient has not been taking these, states he  was told he did not need them but cannot tell me by who  - Continuous telemetry monitoring; monitoring for arrhythmia in the setting of acute fracture and questionable history of a fib  - Echo pending for today  - Holding AC due to POD 1 from ORIF to right hip     DNR (do not resuscitate)- (present on admission)  Assessment & Plan  Confirmed patient is DNR/DNI    Cachexia (HCC)- (present on admission)  Assessment & Plan  Patient reports weight loss and low appetite  Nutrition consulted    Recurrent Clostridium difficile diarrhea- (present on admission)  Assessment & Plan  Hx of   Infectious disease recommended a taper on discharge in 2017  It is still on his med rec is being taken daily, will not start inpatient for now  Discussed with infectious disease, there is no indication for continuing long-term vancomycin unless patient is on antibiotics which they are not.    Bipolar 1 disorder, depressed (HCC)- (present on admission)  Assessment & Plan  Continue home Lamictal  Patient reports worsening depression with low appetite and weight loss  Nutrition consulted    Benign prostatic hyperplasia- (present on admission)  Assessment & Plan  Substituted tamsulosin for patient's home alfuzosin         VTE prophylaxis: Lovenox ppx     Greater than 51 minutes spent prepping to see patient (e.g. review of tests) obtaining and/or reviewing separately obtained history. Performing a medically appropriate examination and/ evaluation.  Counseling and educating the patient/family/caregiver.  Ordering medications, tests, or procedures.  Referring and communicating with other health care professionals.  Documenting clinical information in EPIC.  Independently interpreting results and communicating results to patient/family/caregiver.  Care coordination.      I have performed a physical exam and reviewed and updated ROS and Plan today (10/29/2024). In review of yesterday's note (10/28/2024), there are no changes except as documented  above.

## 2024-10-30 ENCOUNTER — APPOINTMENT (OUTPATIENT)
Dept: RADIOLOGY | Facility: MEDICAL CENTER | Age: 84
End: 2024-10-30
Attending: INTERNAL MEDICINE
Payer: MEDICARE

## 2024-10-30 LAB
ABO GROUP BLD: NORMAL
AMMONIA PLAS-SCNC: <10 UMOL/L (ref 11–45)
ANION GAP SERPL CALC-SCNC: 11 MMOL/L (ref 7–16)
APPEARANCE UR: CLEAR
BACTERIA #/AREA URNS HPF: NORMAL /HPF
BARCODED ABORH UBTYP: 5100
BARCODED PRD CODE UBPRD: NORMAL
BARCODED UNIT NUM UBUNT: NORMAL
BILIRUB UR QL STRIP.AUTO: NEGATIVE
BLD GP AB SCN SERPL QL: NORMAL
BUN SERPL-MCNC: 22 MG/DL (ref 8–22)
CALCIUM SERPL-MCNC: 9 MG/DL (ref 8.5–10.5)
CASTS URNS QL MICRO: NORMAL /LPF (ref 0–2)
CHLORIDE SERPL-SCNC: 103 MMOL/L (ref 96–112)
CO2 SERPL-SCNC: 24 MMOL/L (ref 20–33)
COLOR UR: YELLOW
COMPONENT R 8504R: NORMAL
CREAT SERPL-MCNC: 0.94 MG/DL (ref 0.5–1.4)
EPITHELIAL CELLS 1715: NORMAL /HPF (ref 0–5)
ERYTHROCYTE [DISTWIDTH] IN BLOOD BY AUTOMATED COUNT: 46.8 FL (ref 35.9–50)
GFR SERPLBLD CREATININE-BSD FMLA CKD-EPI: 80 ML/MIN/1.73 M 2
GLUCOSE BLD STRIP.AUTO-MCNC: 106 MG/DL (ref 65–99)
GLUCOSE SERPL-MCNC: 110 MG/DL (ref 65–99)
GLUCOSE UR STRIP.AUTO-MCNC: NEGATIVE MG/DL
HCT VFR BLD AUTO: 23.3 % (ref 42–52)
HGB BLD-MCNC: 7.5 G/DL (ref 14–18)
HYALINE CAST   1831: PRESENT /LPF
KETONES UR STRIP.AUTO-MCNC: 80 MG/DL
LEUKOCYTE ESTERASE UR QL STRIP.AUTO: NEGATIVE
MCH RBC QN AUTO: 30.7 PG (ref 27–33)
MCHC RBC AUTO-ENTMCNC: 32.2 G/DL (ref 32.3–36.5)
MCV RBC AUTO: 95.5 FL (ref 81.4–97.8)
MICRO URNS: ABNORMAL
NITRITE UR QL STRIP.AUTO: NEGATIVE
PH UR STRIP.AUTO: 5.5 [PH] (ref 5–8)
PLATELET # BLD AUTO: 151 K/UL (ref 164–446)
PMV BLD AUTO: 10 FL (ref 9–12.9)
POTASSIUM SERPL-SCNC: 3.6 MMOL/L (ref 3.6–5.5)
PRODUCT TYPE UPROD: NORMAL
PROT UR QL STRIP: 30 MG/DL
RBC # BLD AUTO: 2.44 M/UL (ref 4.7–6.1)
RBC # URNS HPF: NORMAL /HPF (ref 0–2)
RBC UR QL AUTO: ABNORMAL
RH BLD: NORMAL
SODIUM SERPL-SCNC: 138 MMOL/L (ref 135–145)
SP GR UR STRIP.AUTO: 1.02
UNIT STATUS USTAT: NORMAL
UROBILINOGEN UR STRIP.AUTO-MCNC: 1 EU/DL
WBC # BLD AUTO: 6.2 K/UL (ref 4.8–10.8)
WBC #/AREA URNS HPF: NORMAL /HPF

## 2024-10-30 PROCEDURE — 86900 BLOOD TYPING SEROLOGIC ABO: CPT

## 2024-10-30 PROCEDURE — 770001 HCHG ROOM/CARE - MED/SURG/GYN PRIV*

## 2024-10-30 PROCEDURE — 80048 BASIC METABOLIC PNL TOTAL CA: CPT

## 2024-10-30 PROCEDURE — 81015 MICROSCOPIC EXAM OF URINE: CPT

## 2024-10-30 PROCEDURE — 99233 SBSQ HOSP IP/OBS HIGH 50: CPT | Performed by: INTERNAL MEDICINE

## 2024-10-30 PROCEDURE — A9270 NON-COVERED ITEM OR SERVICE: HCPCS | Performed by: INTERNAL MEDICINE

## 2024-10-30 PROCEDURE — 86923 COMPATIBILITY TEST ELECTRIC: CPT

## 2024-10-30 PROCEDURE — 85027 COMPLETE CBC AUTOMATED: CPT

## 2024-10-30 PROCEDURE — 700102 HCHG RX REV CODE 250 W/ 637 OVERRIDE(OP): Performed by: INTERNAL MEDICINE

## 2024-10-30 PROCEDURE — 36430 TRANSFUSION BLD/BLD COMPNT: CPT

## 2024-10-30 PROCEDURE — 36415 COLL VENOUS BLD VENIPUNCTURE: CPT

## 2024-10-30 PROCEDURE — 82140 ASSAY OF AMMONIA: CPT

## 2024-10-30 PROCEDURE — P9016 RBC LEUKOCYTES REDUCED: HCPCS

## 2024-10-30 PROCEDURE — 86850 RBC ANTIBODY SCREEN: CPT

## 2024-10-30 PROCEDURE — 70450 CT HEAD/BRAIN W/O DYE: CPT

## 2024-10-30 PROCEDURE — 81003 URINALYSIS AUTO W/O SCOPE: CPT

## 2024-10-30 PROCEDURE — 82962 GLUCOSE BLOOD TEST: CPT

## 2024-10-30 PROCEDURE — 86901 BLOOD TYPING SEROLOGIC RH(D): CPT

## 2024-10-30 PROCEDURE — 700111 HCHG RX REV CODE 636 W/ 250 OVERRIDE (IP): Performed by: INTERNAL MEDICINE

## 2024-10-30 RX ORDER — HALOPERIDOL 5 MG/ML
2 INJECTION INTRAMUSCULAR ONCE
Status: COMPLETED | OUTPATIENT
Start: 2024-10-30 | End: 2024-10-30

## 2024-10-30 RX ORDER — LORAZEPAM 0.5 MG/1
0.5 TABLET ORAL ONCE
Status: DISCONTINUED | OUTPATIENT
Start: 2024-10-30 | End: 2024-10-30

## 2024-10-30 RX ORDER — LORAZEPAM 2 MG/ML
0.5 INJECTION INTRAMUSCULAR ONCE
Status: COMPLETED | OUTPATIENT
Start: 2024-10-30 | End: 2024-10-30

## 2024-10-30 RX ORDER — HALOPERIDOL 5 MG/ML
5 INJECTION INTRAMUSCULAR EVERY 6 HOURS PRN
Status: DISCONTINUED | OUTPATIENT
Start: 2024-10-30 | End: 2024-11-04 | Stop reason: HOSPADM

## 2024-10-30 RX ADMIN — LORAZEPAM 0.5 MG: 2 INJECTION INTRAMUSCULAR; INTRAVENOUS at 12:27

## 2024-10-30 RX ADMIN — HALOPERIDOL LACTATE 2 MG: 5 INJECTION, SOLUTION INTRAMUSCULAR at 10:01

## 2024-10-30 ASSESSMENT — ENCOUNTER SYMPTOMS
NAUSEA: 0
SHORTNESS OF BREATH: 0
ABDOMINAL PAIN: 0
VOMITING: 0
FEVER: 0

## 2024-10-30 ASSESSMENT — FIBROSIS 4 INDEX: FIB4 SCORE: 3.81

## 2024-10-30 ASSESSMENT — PAIN DESCRIPTION - PAIN TYPE: TYPE: ACUTE PAIN

## 2024-10-30 NOTE — THERAPY
Occupational Therapy Contact Note    Patient Name: Marcelo Colon  Age:  84 y.o., Sex:  person  Medical Record #: [id number]  Today's Date: 10/30/2024    Discussed missed therapy with RN     10/30/24 0923   Treatment Variance   Reason For Missed Therapy Medical - Patient Agitated   Interdisciplinary Plan of Care Collaboration   IDT Collaboration with  Nursing;Physical Therapist Assistant (PTA)   Collaboration Comments Attempted to see pt for OT treatment session. Per RN, pt is non-compliant and refusing care. RN in process of putting patient in restraints and reports ordering head CT. Will re-attempt as appropriate/able.   Session Information   Date / Session Number  10/28, #1 (1/4, 11/3) Attempted 10/30

## 2024-10-30 NOTE — PROGRESS NOTES
"      Orthopaedic Progress Note    Interval changes:  Patient doing well   RLE dressings CDI  Cleared for DC to rehab by ortho pending medicine clearance    ROS - Patient denies any new issues.  Pain well controlled.    /54   Pulse 88   Temp 37.1 °C (98.8 °F) (Temporal)   Resp 16   Ht 1.727 m (5' 8\")   Wt 61.6 kg (135 lb 12.9 oz)   SpO2 94%     Patient seen and examined  No acute distress  Breathing non labored  RRR  RLE dressings CDI, DNVI, moves all toes, cap refill <2 sec.     Recent Labs     10/28/24  0238 10/29/24  0236 10/30/24  0037   WBC 6.3 6.2 6.2   RBC 3.07* 2.55* 2.44*   HEMOGLOBIN 9.8* 8.1* 7.5*   HEMATOCRIT 29.2* 24.5* 23.3*   MCV 95.1 96.1 95.5   MCH 31.9 31.8 30.7   MCHC 33.6 33.1 32.2*   RDW 47.8 47.8 46.8   PLATELETCT 151* 130* 151*   MPV 9.8 10.4 10.0       Active Hospital Problems    Diagnosis     Recurrent Clostridium difficile diarrhea [A04.71]      Priority: High     - Patient on Van taper         Cachexia ([facility]) [R64]      Priority: Medium    Bipolar 1 disorder, depressed ([facility]) [F31.9]      Priority: Low     Patient reports depression is not worse or better. He states he is quick to anger. States he is still having decreased appetite and poor sleep. Has not been able to get modafinil approved. Also states he had to switch to generic lamotrigine which he thinks is not working as well. Is taking medications as prescribed.    Says sleep has been worse. Still with difficulty falling asleep and then sleeping too much throughout the day when he does fall asleep.     Also with 6 pound recent weight loss. Believes it is due to mood and decreased appetite. Not interested in nutritionist, appetite stimulant. Eats 1 meal a day \"whatever I can find in the frozen section of the grocery store.\" Never with periods of days where he could not sleep, extremes of mood, erratic behavior (denies). No night sweats, fevers, chills. No SI/HI, intent to harm self or others.       Benign prostatic " hyperplasia [N40.0]      Priority: Low    Closed hip fracture requiring operative repair, right, initial encounter ([facility]) [S72.001A]     Frailty syndrome in geriatric patient [R54]     Gastroesophageal reflux disease without esophagitis [K21.9]     Paroxysmal A-fib ([facility]) [I48.0]      Patient reports he is unsure if he should be taking metoprolol and Eliquis. States he last saw cardiology early this year but does not remember name of the doctor and would like to follow with a different doctor. No change of speech, vision, dizziness or balance difficulty, chest pain, heart palpitations, syncope. Denies ever having a fall.     On review of cardiology note 05/22, patient recommended for propranolol 10mg and Eliquis.       DNR (do not resuscitate) [Z66]      IMO load March 2020         Assessment/Plan:  Patient doing well   RLE dressings CDI  Cleared for DC to rehab by ortho pending medicine clearance  POD#3 S/P Open reduction internal fixation right proximal femur fracture with cephalomedullary nail   Wt bearing status - WBAT  Wound care/Drains - Dressings to be changed every other day by nursing. Or PRN for saturation starting POD#2  Future Procedures - none planned   Lovenox: Start 10/28, Duration-until ambulatory > 150'  Sutures/Staples out- 14-21 days post operatively. Removal will completed by ortho mid levels only.  PT/OT-initiated  Antibiotics: Perioperative completed  DVT Prophylaxis- TEDS/SCDs/Foot pumps  Ruff-not needed per ortho  Case Coordination for Discharge Planning - Disposition per therapy recs.

## 2024-10-30 NOTE — DISCHARGE PLANNING
[facility] stating they would accept patient only if he is not refusing care as noted this morning. Per MD patient is no longer medically cleared and needing further workup.

## 2024-10-30 NOTE — CARE PLAN
The patient is Watcher - Medium risk of patient condition declining or worsening    Shift Goals  Clinical Goals: safety, VSS  Patient Goals: comfort  Family Goals: OTONIEL    Progress made toward(s) clinical / shift goals:    Problem: Pain - Standard  Goal: Alleviation of pain or a reduction in pain to the patient’s comfort goal  10/30/2024 0038 by Greta Jordan R.N.  Outcome: Progressing  10/30/2024 0038 by Greta Jordan R.N.  Outcome: Progressing     Problem: Skin Integrity  Goal: Skin integrity is maintained or improved  10/30/2024 0038 by Greta Jordan R.N.  Outcome: Progressing  10/30/2024 0038 by HAZEL TalaveraN.  Outcome: Progressing     Problem: Fall Risk  Goal: Patient will remain free from falls  10/30/2024 0038 by PATRICIA Talavera.RHIANNON.  Outcome: Progressing  10/30/2024 0038 by HAZEL TalaveraN.  Outcome: Progressing       Patient is not progressing towards the following goals:

## 2024-10-30 NOTE — PROGRESS NOTES
[facility] Medicine Daily Progress Note    Date of Service  10/30/2024    Chief Complaint  Marcelo Colon is a 84 y.o. person admitted 10/27/2024 with ground-level fall.    Hospital Course  Richard Colon is an 84-year-old person with PMHx paroxysmal atrial fibrillation not on anticoagulation, GERD, bipolar 1, BPH, memory deficits.  Admitted 10/27 after GLF and subsequent right proximal femur fracture.    In the emergency room-femur x-ray showing proximal right femur fracture with angulation and displacement.  CT head without intracranial abnormalities.  Orthopedic surgery was consulted.  Patient underwent ORIF right proximal femur fracture on 10/27 with Dr. Mann.    Interval Problem Update  Patient seen and examined, confused this AM, and somehow aggressive, checking CT head , which was done and negative for acute issues.  Will check a UA and ammonia   Also his hemoglobin has trended down to 7.5 from 11 on admission will give 1 unit of RBC  Close monitor for decompensation   I have discussed this patient's plan of care and discharge plan at IDT rounds today with Case Management, Nursing, Nursing leadership, and other members of the IDT team.    Consultants/Specialty  orthopedics    Code Status  DNAR/DNI    Disposition  The patient is not medically cleared for discharge to home or a post-acute facility.      I have placed the appropriate orders for post-discharge needs.    Review of Systems  Review of Systems   Constitutional:  Negative for fever and malaise/fatigue.   Respiratory:  Negative for shortness of breath.    Cardiovascular:  Negative for chest pain and leg swelling.   Gastrointestinal:  Negative for abdominal pain, nausea and vomiting.   Musculoskeletal:  Positive for joint pain.        Physical Exam  Temp:  [36.8 °C (98.2 °F)-37.2 °C (99 °F)] 37.1 °C (98.8 °F)  Pulse:  [75-90] 88  Resp:  [16-18] 16  BP: (103-122)/(48-54) 109/54  SpO2:  [90 %-97 %] 94 %    Physical Exam  Vitals and nursing note reviewed.    Constitutional:       General: Richard is not in acute distress.     Appearance: Normal appearance. Richard is underweight. Richard is ill-appearing.   Cardiovascular:      Rate and Rhythm: Normal rate and regular rhythm.      Heart sounds: Murmur heard.   Pulmonary:      Effort: Pulmonary effort is normal. No respiratory distress.      Breath sounds: Normal breath sounds. No wheezing.   Abdominal:      General: Bowel sounds are normal. There is no distension.      Palpations: Abdomen is soft.      Tenderness: There is no abdominal tenderness.   Musculoskeletal:      Comments: Decreased ROM to right hip   Well healing right lateral hip surgical site    Skin:     General: Skin is warm and dry.   Neurological:      Mental Status: Richard is alert. Mental status is at baseline.   Psychiatric:         Mood and Affect: Mood normal.         Behavior: Behavior normal.         Fluids    Intake/Output Summary (Last 24 hours) at 10/30/2024 1343  Last data filed at 10/30/2024 0927  Gross per 24 hour   Intake 160 ml   Output --   Net 160 ml        Laboratory  Recent Labs     10/28/24  0238 10/29/24  0236 10/30/24  0037   WBC 6.3 6.2 6.2   RBC 3.07* 2.55* 2.44*   HEMOGLOBIN 9.8* 8.1* 7.5*   HEMATOCRIT 29.2* 24.5* 23.3*   MCV 95.1 96.1 95.5   MCH 31.9 31.8 30.7   MCHC 33.6 33.1 32.2*   RDW 47.8 47.8 46.8   PLATELETCT 151* 130* 151*   MPV 9.8 10.4 10.0     Recent Labs     10/28/24  0238 10/29/24  0236 10/30/24  0037   SODIUM 139 140 138   POTASSIUM 4.6 4.4 3.6   CHLORIDE 104 106 103   CO2 20 23 24   GLUCOSE 138* 129* 110*   BUN 23* 24* 22   CREATININE 1.37 0.97 0.94   CALCIUM 8.5 9.0 9.0                     Imaging  CT-HEAD W/O   Final Result      1.  No acute intracranial abnormality.   2.  Senescent changes            EC-ECHOCARDIOGRAM COMPLETE W/O CONT   Final Result      DX-PORTABLE FLUORO > 1 HOUR   Final Result      Portable fluoroscopy utilized for 1 minute 18 seconds.         INTERPRETING LOCATION: [address] [place] [address], [facility]       DX-FEMUR-2+ RIGHT   Final Result      Digitized intraoperative radiograph is submitted for review. This examination is not for diagnostic purpose but for guidance during a surgical procedure. Please see the patient's chart for full procedural details.         INTERPRETING LOCATION: [address] [place], [place] [address] [address]      CT-LSPINE W/O PLUS RECONS   Final Result      1.  Severe multilevel degenerative change of lumbar spine.   2.  No fracture or subluxation.      CT-CSPINE WITHOUT PLUS RECONS   Final Result      1.  No cervical spine fracture or subluxation.   2.  Severe multilevel degenerative change and diffuse osteopenia.   3.  Prior multilevel laminectomy and posterior fusion with finding indicating possible loosening of screws on the RIGHT at the T1 and T2 levels.      CT-HEAD W/O   Final Result      1.  Cerebral atrophy and chronic microvascular ischemic type changes.   2.  No acute intracranial abnormality.   3.  Right sphenoid sinus disease               DX-FEMUR-2+ RIGHT   Final Result   Impression:      1. Proximal right femur fracture.                     DX-PELVIS-1 OR 2 VIEWS   Final Result   Impression:      1. No pelvis fracture evident.      2. Proximal right femur fracture.           Assessment/Plan  * Closed hip fracture requiring operative repair, right, initial encounter (HCC)- (present on admission)  Assessment & Plan  Femur x-ray showing proximal right femur fracture with angulation and displacement.   S/p ORIF 10/27 with Dr. Johnathan CHARLES   - Continue pain management as requested  - PT OT today; WBAT  - Will likely need PAR   - Restart DVT prophylaxis today     Frailty syndrome in geriatric patient- (present on admission)  Assessment & Plan  Weight loss, depression, new hip fracture    Gastroesophageal reflux disease without esophagitis- (present on admission)  Assessment & Plan  Continue Pepcid    Paroxysmal A-fib (HCC)- (present on admission)  Assessment & Plan  Per note from cardiology 5/22  patient had paroxysmal A-fib recommended for propranolol 10 mg and Eliquis  Patient has not been taking these, states he was told he did not need them but cannot tell me by who  - Continuous telemetry monitoring; monitoring for arrhythmia in the setting of acute fracture and questionable history of a fib  - Echo pending for today  - Holding AC due to POD 1 from ORIF to right hip     DNR (do not resuscitate)- (present on admission)  Assessment & Plan  Confirmed patient is DNR/DNI    Cachexia (HCC)- (present on admission)  Assessment & Plan  Patient reports weight loss and low appetite  Nutrition consulted    Recurrent Clostridium difficile diarrhea- (present on admission)  Assessment & Plan  Hx of   Infectious disease recommended a taper on discharge in 2017  It is still on his med rec is being taken daily, will not start inpatient for now  Discussed with infectious disease, there is no indication for continuing long-term vancomycin unless patient is on antibiotics which they are not.    Bipolar 1 disorder, depressed (HCC)- (present on admission)  Assessment & Plan  Continue home Lamictal  Patient reports worsening depression with low appetite and weight loss  Nutrition consulted    Benign prostatic hyperplasia- (present on admission)  Assessment & Plan  Substituted tamsulosin for patient's home alfuzosin       Greater than 51 minutes spent prepping to see patient (e.g. review of tests) obtaining and/or reviewing separately obtained history. Performing a medically appropriate examination and/ evaluation.  Counseling and educating the patient/family/caregiver.  Ordering medications, tests, or procedures.  Referring and communicating with other health care professionals.  Documenting clinical information in EPIC.  Independently interpreting results and communicating results to patient/family/caregiver.  Care coordination.     VTE prophylaxis: Lovenox ppx       I have performed a physical exam and reviewed and updated  ROS and Plan today (10/30/2024). In review of yesterday's note (10/29/2024), there are no changes except as documented above.

## 2024-10-30 NOTE — DISCHARGE PLANNING
0818  Agency/Facility Name: [facility]   Spoke To: Sarika   Outcome: [facility] called regarding referral updates, per Sarika the physician is still reviewing referral. Sarika will call DPA back with referral updates.     0910  Agency/Facility Name: [facility]   Spoke To: Sarika   Outcome: Sarika called because of current nurse's notes, pt is refusing nursing care. Sarika notified that pt continues to refuse it is possible that pt may not be accepted to facility. RHIANNA ARRIAGA Nicole/Pat notified.       0928  Agency/Facility Name: [facility]   Spoke To: Sarika   Outcome: DPA called to notify that pt will still need post acute after DC but is not medically cleared today. Sarika also requested pt to be seen by PT. RHIANNA ARRIAGA Pat notified.

## 2024-10-30 NOTE — PROGRESS NOTES
Bed side report received from Len Hernandes A&O2. Pt refusing VS. Pt is on RA, and is a high fall risk. Bed alarm on and fall precautions in place. Pt is currently soild, but refusing care from staff. Charge RN notified.

## 2024-10-30 NOTE — THERAPY
Physical Therapy Contact Note    Patient Name: Marcelo Colon  Age:  84 y.o., Sex:  person  Medical Record #: [id number]  Today's Date: 10/30/2024    Pt attempted for PT session. Pt refused to be pre-medicated for session, and has been refusing care. He declined to mobilized at this time. Will re-attempt when patient is participating in care.

## 2024-10-30 NOTE — PROGRESS NOTES
Patient oriented to self, place and situation, however is impulsive and intermittently confused. Patient initially agreed to taking bedtime medication, but immediately declined and threw medication to the floor. I then cleaned his skin at his prior IV site and attempted to change his soiled elbow mepilex in which patient became angry and declined. Fall and safety precautions in place. MD is aware of patient's behavior per report from dayshift RN.

## 2024-10-30 NOTE — PROGRESS NOTES
Hosp concerned for pts mentation and refusal of care. Based on rapidly dropping hgb and pt refusal to participate with care, pt was administer 2mg haldol IM and restrained. Pt educated on restraint dc criteria, and refusing to accept teaching.

## 2024-10-30 NOTE — PROGRESS NOTES
"Attempted to assess pt. Pt refusing to answer questions or comply with assessment. Pt refusing to allow nurse to empty full urinal. Pt stated \"you're just a stupid nurse, and the doctor is stupid too\". Pt asked what RN can do to make him happy. Pt states getting him to a nursing home. Pt somewhat aggreable to seeing PT/OT, however will not premedicate and states, \"They can give him the pain meds.\" RN explain to pt that they cannot. Pt no still not interested in participating in care.   "
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
## 2024-10-30 NOTE — PROGRESS NOTES
"Walked in patients room to give 1800 medications. Pt was being very sarcastic and seemed to be confused. I asked the orientation questions and he was again answering them sarcastically. I asked again do you know where you are and he rolled his eyes and said \"[place]\". I saw that he also pulled his IV out and I asked why he did that and he stated because we have duct tape around him and that's not very \"humane\". This is when I started to get more concerned of confusion. I did try to educate patient on why I need him to be serious when asking these questions and he got very rude. I reached out to MD. He is aware. MD also aware of Pt not having no IV.   "

## 2024-10-31 VITALS
DIASTOLIC BLOOD PRESSURE: 55 MMHG | OXYGEN SATURATION: 92 % | HEIGHT: 68 IN | SYSTOLIC BLOOD PRESSURE: 121 MMHG | RESPIRATION RATE: 17 BRPM | BODY MASS INDEX: 21.68 KG/M2 | WEIGHT: 143.08 LBS | HEART RATE: 84 BPM | TEMPERATURE: 98.6 F

## 2024-10-31 LAB
ANION GAP SERPL CALC-SCNC: 14 MMOL/L (ref 7–16)
BUN SERPL-MCNC: 26 MG/DL (ref 8–22)
CALCIUM SERPL-MCNC: 9.2 MG/DL (ref 8.5–10.5)
CHLORIDE SERPL-SCNC: 107 MMOL/L (ref 96–112)
CO2 SERPL-SCNC: 23 MMOL/L (ref 20–33)
CREAT SERPL-MCNC: 0.91 MG/DL (ref 0.5–1.4)
ERYTHROCYTE [DISTWIDTH] IN BLOOD BY AUTOMATED COUNT: 47.4 FL (ref 35.9–50)
GFR SERPLBLD CREATININE-BSD FMLA CKD-EPI: 84 ML/MIN/1.73 M 2
GLUCOSE SERPL-MCNC: 117 MG/DL (ref 65–99)
HCT VFR BLD AUTO: 26.1 % (ref 42–52)
HGB BLD-MCNC: 8.9 G/DL (ref 14–18)
MCH RBC QN AUTO: 31.1 PG (ref 27–33)
MCHC RBC AUTO-ENTMCNC: 34.1 G/DL (ref 32.3–36.5)
MCV RBC AUTO: 91.3 FL (ref 81.4–97.8)
PLATELET # BLD AUTO: 195 K/UL (ref 164–446)
PMV BLD AUTO: 9.5 FL (ref 9–12.9)
POTASSIUM SERPL-SCNC: 3.7 MMOL/L (ref 3.6–5.5)
RBC # BLD AUTO: 2.86 M/UL (ref 4.7–6.1)
SODIUM SERPL-SCNC: 144 MMOL/L (ref 135–145)
WBC # BLD AUTO: 6.4 K/UL (ref 4.8–10.8)

## 2024-10-31 PROCEDURE — 97530 THERAPEUTIC ACTIVITIES: CPT | Mod: CQ

## 2024-10-31 PROCEDURE — 99233 SBSQ HOSP IP/OBS HIGH 50: CPT | Performed by: INTERNAL MEDICINE

## 2024-10-31 PROCEDURE — A9270 NON-COVERED ITEM OR SERVICE: HCPCS | Performed by: INTERNAL MEDICINE

## 2024-10-31 PROCEDURE — 36415 COLL VENOUS BLD VENIPUNCTURE: CPT

## 2024-10-31 PROCEDURE — 97535 SELF CARE MNGMENT TRAINING: CPT

## 2024-10-31 PROCEDURE — 30233N1 TRANSFUSION OF NONAUTOLOGOUS RED BLOOD CELLS INTO PERIPHERAL VEIN, PERCUTANEOUS APPROACH: ICD-10-PCS | Performed by: INTERNAL MEDICINE

## 2024-10-31 PROCEDURE — 80048 BASIC METABOLIC PNL TOTAL CA: CPT

## 2024-10-31 PROCEDURE — 700102 HCHG RX REV CODE 250 W/ 637 OVERRIDE(OP): Performed by: INTERNAL MEDICINE

## 2024-10-31 PROCEDURE — 85027 COMPLETE CBC AUTOMATED: CPT

## 2024-10-31 PROCEDURE — 97530 THERAPEUTIC ACTIVITIES: CPT

## 2024-10-31 PROCEDURE — 770020 HCHG ROOM/CARE - TELE (206)

## 2024-10-31 RX ADMIN — GABAPENTIN 300 MG: 300 CAPSULE ORAL at 05:39

## 2024-10-31 RX ADMIN — LAMOTRIGINE 200 MG: 100 TABLET ORAL at 05:39

## 2024-10-31 RX ADMIN — GABAPENTIN 300 MG: 300 CAPSULE ORAL at 17:54

## 2024-10-31 RX ADMIN — TAMSULOSIN HYDROCHLORIDE 0.4 MG: 0.4 CAPSULE ORAL at 20:47

## 2024-10-31 RX ADMIN — FAMOTIDINE 20 MG: 20 TABLET, FILM COATED ORAL at 20:47

## 2024-10-31 ASSESSMENT — COGNITIVE AND FUNCTIONAL STATUS - GENERAL
MOVING TO AND FROM BED TO CHAIR: A LOT
HELP NEEDED FOR BATHING: A LOT
WALKING IN HOSPITAL ROOM: TOTAL
MOVING FROM LYING ON BACK TO SITTING ON SIDE OF FLAT BED: A LOT
EATING MEALS: A LOT
SUGGESTED CMS G CODE MODIFIER DAILY ACTIVITY: CL
CLIMB 3 TO 5 STEPS WITH RAILING: TOTAL
DAILY ACTIVITIY SCORE: 12
PERSONAL GROOMING: A LOT
TOILETING: A LOT
SUGGESTED CMS G CODE MODIFIER MOBILITY: CL
STANDING UP FROM CHAIR USING ARMS: A LOT
TURNING FROM BACK TO SIDE WHILE IN FLAT BAD: A LOT
MOBILITY SCORE: 10
DRESSING REGULAR LOWER BODY CLOTHING: A LOT
DRESSING REGULAR UPPER BODY CLOTHING: A LOT

## 2024-10-31 ASSESSMENT — GAIT ASSESSMENTS: GAIT LEVEL OF ASSIST: UNABLE TO PARTICIPATE

## 2024-10-31 ASSESSMENT — FIBROSIS 4 INDEX: FIB4 SCORE: 3.81

## 2024-10-31 ASSESSMENT — ENCOUNTER SYMPTOMS
FEVER: 0
NAUSEA: 0
SHORTNESS OF BREATH: 0
ABDOMINAL PAIN: 0
VOMITING: 0

## 2024-10-31 ASSESSMENT — PAIN DESCRIPTION - PAIN TYPE: TYPE: ACUTE PAIN

## 2024-10-31 NOTE — PROGRESS NOTES
"      Orthopaedic Progress Note    Interval changes:  Patient doing well, restrained  RLE dressings CDI  Cleared for DC to rehab by ortho pending medicine clearance    ROS - Patient denies any new issues.  Pain well controlled.    /54   Pulse 95   Temp (!) 38.2 °C (100.8 °F) (Temporal)   Resp 20   Ht 1.727 m (5' 8\")   Wt 64.9 kg (143 lb 1.3 oz)   SpO2 91%     Patient seen and examined  No acute distress  Breathing non labored  RRR  RLE dressings CDI, DNVI, moves all toes, cap refill <2 sec.     Recent Labs     10/29/24  0236 10/30/24  0037 10/31/24  0822   WBC 6.2 6.2 6.4   RBC 2.55* 2.44* 2.86*   HEMOGLOBIN 8.1* 7.5* 8.9*   HEMATOCRIT 24.5* 23.3* 26.1*   MCV 96.1 95.5 91.3   MCH 31.8 30.7 31.1   MCHC 33.1 32.2* 34.1   RDW 47.8 46.8 47.4   PLATELETCT 130* 151* 195   MPV 10.4 10.0 9.5       Active Hospital Problems    Diagnosis     Recurrent Clostridium difficile diarrhea [A04.71]      Priority: High     - Patient on Van taper         Cachexia ([facility]) [R64]      Priority: Medium    Bipolar 1 disorder, depressed ([facility]) [F31.9]      Priority: Low     Patient reports depression is not worse or better. He states he is quick to anger. States he is still having decreased appetite and poor sleep. Has not been able to get modafinil approved. Also states he had to switch to generic lamotrigine which he thinks is not working as well. Is taking medications as prescribed.    Says sleep has been worse. Still with difficulty falling asleep and then sleeping too much throughout the day when he does fall asleep.     Also with 6 pound recent weight loss. Believes it is due to mood and decreased appetite. Not interested in nutritionist, appetite stimulant. Eats 1 meal a day \"whatever I can find in the frozen section of the grocery store.\" Never with periods of days where he could not sleep, extremes of mood, erratic behavior (denies). No night sweats, fevers, chills. No SI/HI, intent to harm self or others.       Benign " prostatic hyperplasia [N40.0]      Priority: Low    Closed hip fracture requiring operative repair, right, initial encounter ([facility]) [S72.001A]     Frailty syndrome in geriatric patient [R54]     Gastroesophageal reflux disease without esophagitis [K21.9]     Paroxysmal A-fib ([facility]) [I48.0]      Patient reports he is unsure if he should be taking metoprolol and Eliquis. States he last saw cardiology early this year but does not remember name of the doctor and would like to follow with a different doctor. No change of speech, vision, dizziness or balance difficulty, chest pain, heart palpitations, syncope. Denies ever having a fall.     On review of cardiology note 05/22, patient recommended for propranolol 10mg and Eliquis.       DNR (do not resuscitate) [Z66]      IMO load March 2020         Assessment/Plan:  Patient doing well, restrained   RLE dressings CDI  Cleared for DC to rehab by ortho pending medicine clearance  POD#4 S/P Open reduction internal fixation right proximal femur fracture with cephalomedullary nail   Wt bearing status - WBAT  Wound care/Drains - Dressings to be changed every other day by nursing. Or PRN for saturation starting POD#2  Future Procedures - none planned   Lovenox: Start 10/28, Duration-until ambulatory > 150'  Sutures/Staples out- 14-21 days post operatively. Removal will completed by ortho mid levels only.  PT/OT-initiated  Antibiotics: Perioperative completed  DVT Prophylaxis- TEDS/SCDs/Foot pumps  Ruff-not needed per ortho  Case Coordination for Discharge Planning - Disposition per therapy recs.

## 2024-10-31 NOTE — PROGRESS NOTES
[facility] Medicine Daily Progress Note    Date of Service  10/31/2024    Chief Complaint  Marcelo Colon is a 84 y.o. person admitted 10/27/2024 with ground-level fall.    Hospital Course  Richard Colon is an 84-year-old person with PMHx paroxysmal atrial fibrillation not on anticoagulation, GERD, bipolar 1, BPH, memory deficits.  Admitted 10/27 after GLF and subsequent right proximal femur fracture.    In the emergency room-femur x-ray showing proximal right femur fracture with angulation and displacement.  CT head without intracranial abnormalities.  Orthopedic surgery was consulted.  Patient underwent ORIF right proximal femur fracture on 10/27 with Dr. Mann.    Interval Problem Update  Patient seen and examined, still confused but slowly improving still on restrains  CT head negative , UA also neg for infection,   Hemoglobin 8.9 today after one unit transfused  yesterday   I have discussed this patient's plan of care and discharge plan at IDT rounds today with Case Management, Nursing, Nursing leadership, and other members of the IDT team.    Consultants/Specialty  orthopedics    Code Status  DNAR/DNI    Disposition  The patient is not medically cleared for discharge to home or a post-acute facility.      I have placed the appropriate orders for post-discharge needs.    Review of Systems  Review of Systems   Constitutional:  Negative for fever and malaise/fatigue.   Respiratory:  Negative for shortness of breath.    Cardiovascular:  Negative for chest pain and leg swelling.   Gastrointestinal:  Negative for abdominal pain, nausea and vomiting.   Musculoskeletal:  Positive for joint pain.        Physical Exam  Temp:  [36.8 °C (98.2 °F)-38.2 °C (100.8 °F)] 38.2 °C (100.8 °F)  Pulse:  [80-96] 95  Resp:  [17-20] 20  BP: (110-153)/(54-77) 120/54  SpO2:  [91 %-100 %] 91 %    Physical Exam  Vitals and nursing note reviewed.   Constitutional:       General: Richard is not in acute distress.     Appearance: Normal  appearance. Richard is underweight. Richard is ill-appearing.   Cardiovascular:      Rate and Rhythm: Normal rate and regular rhythm.      Heart sounds: Murmur heard.   Pulmonary:      Effort: Pulmonary effort is normal. No respiratory distress.      Breath sounds: Normal breath sounds. No wheezing.   Abdominal:      General: Bowel sounds are normal. There is no distension.      Palpations: Abdomen is soft.      Tenderness: There is no abdominal tenderness.   Musculoskeletal:      Comments: Decreased ROM to right hip   Well healing right lateral hip surgical site    Skin:     General: Skin is warm and dry.   Neurological:      Mental Status: Richard is alert. Mental status is at baseline.   Psychiatric:         Mood and Affect: Mood normal.         Behavior: Behavior normal.         Fluids    Intake/Output Summary (Last 24 hours) at 10/31/2024 1341  Last data filed at 10/31/2024 1329  Gross per 24 hour   Intake 250 ml   Output 500 ml   Net -250 ml        Laboratory  Recent Labs     10/29/24  0236 10/30/24  0037 10/31/24  0822   WBC 6.2 6.2 6.4   RBC 2.55* 2.44* 2.86*   HEMOGLOBIN 8.1* 7.5* 8.9*   HEMATOCRIT 24.5* 23.3* 26.1*   MCV 96.1 95.5 91.3   MCH 31.8 30.7 31.1   MCHC 33.1 32.2* 34.1   RDW 47.8 46.8 47.4   PLATELETCT 130* 151* 195   MPV 10.4 10.0 9.5     Recent Labs     10/29/24  0236 10/30/24  0037 10/31/24  0822   SODIUM 140 138 144   POTASSIUM 4.4 3.6 3.7   CHLORIDE 106 103 107   CO2 23 24 23   GLUCOSE 129* 110* 117*   BUN 24* 22 26*   CREATININE 0.97 0.94 0.91   CALCIUM 9.0 9.0 9.2                     Imaging  CT-HEAD W/O   Final Result      1.  No acute intracranial abnormality.   2.  Senescent changes            EC-ECHOCARDIOGRAM COMPLETE W/O CONT   Final Result      DX-PORTABLE FLUORO > 1 HOUR   Final Result      Portable fluoroscopy utilized for 1 minute 18 seconds.         INTERPRETING LOCATION: [address] [place] [address], [address]      DX-FEMUR-2+ RIGHT   Final Result      Digitized intraoperative radiograph is submitted  for review. This examination is not for diagnostic purpose but for guidance during a surgical procedure. Please see the patient's chart for full procedural details.         INTERPRETING LOCATION: [address], [place], [address]      CT-LSPINE W/O PLUS RECONS   Final Result      1.  Severe multilevel degenerative change of lumbar spine.   2.  No fracture or subluxation.      CT-CSPINE WITHOUT PLUS RECONS   Final Result      1.  No cervical spine fracture or subluxation.   2.  Severe multilevel degenerative change and diffuse osteopenia.   3.  Prior multilevel laminectomy and posterior fusion with finding indicating possible loosening of screws on the RIGHT at the T1 and T2 levels.      CT-HEAD W/O   Final Result      1.  Cerebral atrophy and chronic microvascular ischemic type changes.   2.  No acute intracranial abnormality.   3.  Right sphenoid sinus disease               DX-FEMUR-2+ RIGHT   Final Result   Impression:      1. Proximal right femur fracture.                     DX-PELVIS-1 OR 2 VIEWS   Final Result   Impression:      1. No pelvis fracture evident.      2. Proximal right femur fracture.           Assessment/Plan  * Closed hip fracture requiring operative repair, right, initial encounter (HCC)- (present on admission)  Assessment & Plan  Femur x-ray showing proximal right femur fracture with angulation and displacement.   S/p ORIF 10/27 with Dr. Johnathan CHARLES   - Continue pain management as requested  - PT OT today; WBAT  - Will likely need PAR   - Restart DVT prophylaxis today     Frailty syndrome in geriatric patient- (present on admission)  Assessment & Plan  Weight loss, depression, new hip fracture    Gastroesophageal reflux disease without esophagitis- (present on admission)  Assessment & Plan  Continue Pepcid    Paroxysmal A-fib ([facility])- (present on admission)  Assessment & Plan  Per note from cardiology 5/22 patient had paroxysmal A-fib recommended for propranolol 10 mg and Eliquis  Patient has not  been taking these, states he was told he did not need them but cannot tell me by who  - Continuous telemetry monitoring; monitoring for arrhythmia in the setting of acute fracture and questionable history of a fib  - Echo pending for today  - Holding AC due to POD 1 from ORIF to right hip     DNR (do not resuscitate)- (present on admission)  Assessment & Plan  Confirmed patient is DNR/DNI    Cachexia (HCC)- (present on admission)  Assessment & Plan  Patient reports weight loss and low appetite  Nutrition consulted    Recurrent Clostridium difficile diarrhea- (present on admission)  Assessment & Plan  Hx of   Infectious disease recommended a taper on discharge in 2017  It is still on his med rec is being taken daily, will not start inpatient for now  Discussed with infectious disease, there is no indication for continuing long-term vancomycin unless patient is on antibiotics which they are not.    Bipolar 1 disorder, depressed (HCC)- (present on admission)  Assessment & Plan  Continue home Lamictal  Patient reports worsening depression with low appetite and weight loss  Nutrition consulted    Benign prostatic hyperplasia- (present on admission)  Assessment & Plan  Substituted tamsulosin for patient's home alfuzosin       VTE prophylaxis: Lovenox ppx     Greater than 51 minutes spent prepping to see patient (e.g. review of tests) obtaining and/or reviewing separately obtained history. Performing a medically appropriate examination and/ evaluation.  Counseling and educating the patient/family/caregiver.  Ordering medications, tests, or procedures.  Referring and communicating with other health care professionals.  Documenting clinical information in EPIC.  Independently interpreting results and communicating results to patient/family/caregiver.  Care coordination.      I have performed a physical exam and reviewed and updated ROS and Plan today (10/31/2024). In review of yesterday's note (10/30/2024), there are no  changes except as documented above.

## 2024-10-31 NOTE — DISCHARGE PLANNING
Case Management Discharge Planning    Admission Date: 10/27/2024  GMLOS: 4.4  ALOS: 4    6-Clicks ADL Score: 15  6-Clicks Mobility Score: 9  PT and/or OT Eval ordered: Yes  Post-acute Referrals Ordered: Yes  Post-acute Choice Obtained: Yes  Has referral(s) been sent to post-acute provider:  Yes      Anticipated Discharge Dispo: Discharge Disposition: Disch to IP rehab facility or distinct part unit (62)    DME Needed: No    Action(s) Taken: Updated Provider/Nurse on Discharge Plan    Escalations Completed: None    Medically Clear: No    Next Steps: Pt is not medically cleared for transfer to Rehab. Became confused and agitated yesterday. Required transfusion. CT of head negative. Currently in restraints. Rehab vs SNF to be determined. [facility] vs [facility].    Barriers to Discharge: Medical clearance, Pending Placement, and Pending Insurance Authorization    Is the patient up for discharge tomorrow: No

## 2024-10-31 NOTE — THERAPY
Physical Therapy   Daily Treatment     Patient Name: Marcelo Colon  Age:  84 y.o., Sex:  person  Medical Record #: [id number]  Today's Date: 10/31/2024     Precautions  Precautions: Fall Risk;Weight Bearing As Tolerated Right Lower Extremity  Comments: s/p R femur IMN. Pt not on B wrist on L LE restraints.    Assessment    Pt greeted and seen for PT treatment. Pt needed MaxA for bed mobility, multimodal cues given for bed mobility. Pt maintained his sitting balance. He tolerated standing twice w/ ModAx2 person, he was leaning back on the bed with his legs but was able to move his feet standing EOB. X2 person assist due to pt with aggressive behaviors towards staff, pt was pleasant this session.  Pt currently limited by impaired balance, decreased strength and decreased activity tolerance which negatively impacts functional mobility. Pt will continue to benefit from skilled PT to address deficits.       Plan    Treatment Plan Status: Continue Current Treatment Plan  Type of Treatment: Bed Mobility, Equipment, Group Therapy, Family / Caregiver Training, Gait Training, Neuro Re-Education / Balance, Stair Training, Therapeutic Exercise, Therapeutic Activities, Self Care / Home Evaluation  Treatment Frequency: 5 Times per Week  Treatment Duration: Until Therapy Goals Met    DC Equipment Recommendations: Unable to determine at this time  Discharge Recommendations: Recommend post-acute placement for additional physical therapy services prior to discharge home       10/31/24 1241   Vitals   Pulse 86   Blood Pressure  120/53   Pulse Oximetry 94 %   O2 (LPM) 1   O2 Delivery Device Nasal Cannula   Cognition    Comments Said very little, has been refusing cares with NSG but agreeable to therapy and participatory.   Supine Lower Body Exercise   Supine Lower Body Exercises Yes   Short Arc Quad 1 set of 10;Right    Comments AAROM due to weakness.   Sitting Lower Body Exercises   Comments Attempted LAQ, pt able to initiate but  unable to lift against gravity   Balance   Sitting Balance (Static) Fair -   Sitting Balance (Dynamic) Fair -   Standing Balance (Static) Poor -   Standing Balance (Dynamic) Trace +   Weight Shift Sitting Poor   Weight Shift Standing Poor   Skilled Intervention Verbal Cuing;Tactile Cuing;Sequencing;Facilitation;Compensatory Strategies   Comments x2 person   Bed Mobility    Supine to Sit Maximal Assist   Sit to Supine Maximal Assist   Scooting Maximal Assist   Rolling Maximal Assist to Rt.;Maximum Assist to Lt.   Skilled Intervention Verbal Cuing;Tactile Cuing;Sequencing;Compensatory Strategies;Facilitation   Comments HOB slightly elevated. decreased initiation. VC and assist with LE placement for rolling in bed, pt ed on technique to increase indepence and mobility.   Gait Analysis   Gait Level Of Assist Unable to Participate   Functional Mobility   Sit to Stand Moderate Assist  (x2 person)   Bed, Chair, Wheelchair Transfer Unable to Participate   Mobility STSx2   Skilled Intervention Verbal Cuing;Tactile Cuing;Sequencing;Facilitation;Compensatory Strategies   Comments Pt was leaning on the EOB w/ back of thighs unable to weight shift forward to come away from the bed. While here he was able to shift weight and just clear feet from the floor with attempting to take steps.   Short Term Goals    Short Term Goal # 1 in 6 visits patient will demo all functional transfers with Sup and LRAD for safe DC   Goal Outcome # 1 goal not met   Short Term Goal # 2 in 6 visits patient will ambualte 100' with Latha and LRAD for safe DC   Goal Outcome # 2 Goal not met   Short Term Goal # 3 in 6 visits patient will demo all bed mobility indep for safe DC   Goal Outcome # 3 Goal not met   Supervising Physical Therapist (PTA Treatments Only)   Supervising Physical Therapist Concepcion Lewis       Patient seen for team treatment with Occupational Therapist for the following reason(s):  Patient required 2 person assistance for safety and to  provide effective interventions. Each discipline assisted patient with appropriate and separate goals.  Physical Therapy Assistant facilitated bed mobility, standing balance, weight shifting, therex while Occupational Therapist simultaneously treated pt according to POC.

## 2024-10-31 NOTE — CARE PLAN
The patient is Watcher - Medium risk of patient condition declining or worsening    Shift Goals  Clinical Goals: safety, hemodynamic stability, monitor neuro status  Patient Goals: restraint discontinuation  Family Goals: kyleigh    Progress made toward(s) clinical / shift goals:    Problem: Pain - Standard  Goal: Alleviation of pain or a reduction in pain to the patient’s comfort goal  Outcome: Progressing  Note: Patient repositioned, assessed for pain. Patient denied pain.     Problem: Safety - Medical Restraint  Goal: Remains free of injury from restraints (Restraint for Interference with Medical Device)  Outcome: Progressing  Note: Other less restrictive methods attempted prior to restraints. Patient's condition at time of restraint application evaluated. Patient informed of reason for restraint. Q2 monitoring ordered.        Patient is not progressing towards the following goals:      Problem: Knowledge Deficit - Standard  Goal: Patient and family/care givers will demonstrate understanding of plan of care, disease process/condition, diagnostic tests and medications  Outcome: Not Progressing  Note: Patient educated on plan of care. No evidence of learning, reinforcement needed.      Problem: Safety - Medical Restraint  Goal: Free from restraint(s) (Restraint for Interference with Medical Device)  Outcome: Not Progressing  Note: Need for restraints assessed and documented. Restraint ordered and verified by LIP

## 2024-10-31 NOTE — PROGRESS NOTES
Bedside report received, pt care assumed, tele box on. Pt resting, breathing unlabored. Bed in lowest position, bed alarm on, call light within reach. Bilateral wrist restraints in place.

## 2024-10-31 NOTE — THERAPY
Occupational Therapy  Daily Treatment     Patient Name: Marceol Colon  Age:  84 y.o., Sex:  person  Medical Record #: [id number]  Today's Date: 10/31/2024     Precautions  Precautions: Fall Risk, Weight Bearing As Tolerated Right Lower Extremity  Comments: s/p R femur IMN. Pt not on B wrist on L LE restraints.    Assessment    Pt greeted and seen for OT treatment session. Pt seen with X2 person assist d/t aggressive behaviors toward staff; pt pleasant and participatory this session. Pt received soiled in bed and educated on importance of notifying nursing staff to protect skin integrity. Pt required max A for bed mobility but able to maintain sitting EOB with SBA. Pt required mod A - max A for ADLs this session and presents with new deficits in cognition. Pt loquacious and making needs known last session but hypophonic with little verbal output this session. Pt demonstrated difficulty drinking water during session; RN and SLP notified. Continues to be limited by deficits in cognition, coordination, AROM, strength, balance, activity tolerance, functional mobility, adherence to precautions and pain which are currently affecting patients ability to complete ADL/IADLs at baseline. Will continue to follow.    Plan    Treatment Plan Status: Continue Current Treatment Plan  Type of Treatment: Self Care / Activities of Daily Living, Adaptive Equipment, Manual Therapy Techniques, Neuro Re-Education / Balance, Therapeutic Exercises, Therapeutic Activity  Treatment Frequency: 4 Times per Week  Treatment Duration: Until Therapy Goals Met    DC Equipment Recommendations: Unable to determine at this time  Discharge Recommendations: Recommend post-acute placement for additional occupational therapy services prior to discharge home    Objective     10/31/24 1240   Vitals   Blood Pressure  120/53   Pulse Oximetry 94 %   O2 (LPM) 1   O2 Delivery Device Silicone Nasal Cannula   Pain 0 - 10 Group   Therapist Pain Assessment During  Activity;Post Activity Pain Same as Prior to Activity;Nurse Notified  (no overt c/o pain)   Cognition    Comments Agreeable to therapy this session. Has been refusing care and making threats toward nursing staff. Hypophonic and saying very little this session. When asked if he had pain would just shrug and shake his head.   Other Treatments   Other Treatments Provided Shoulder flexion/abduction x5 reps each side   Balance   Sitting Balance (Static) Fair -   Sitting Balance (Dynamic) Poor +   Standing Balance (Static) Poor -   Standing Balance (Dynamic) Trace +   Weight Shift Sitting Poor   Weight Shift Standing Absent   Skilled Intervention Compensatory Strategies;Facilitation;Sequencing;Tactile Cuing;Verbal Cuing   Comments x2 person   Bed Mobility    Supine to Sit Maximal Assist   Sit to Supine Maximal Assist   Scooting Maximal Assist   Rolling Maximal Assist to Rt.;Maximum Assist to Lt.   Skilled Intervention Compensatory Strategies;Facilitation;Sequencing;Tactile Cuing;Verbal Cuing   Comments HOB slightly elevated. Decreased initiation this session. Multimodal cueing for safety and sequencing provided.   Activities of Daily Living   Eating Moderate Assist  (hold onto cup and bring to mouth; unable to swallow water and let it roll out of his mouth onto his gown stating it was difficult to swallow. RN notified.)   Upper Body Dressing Moderate Assist  (gown change)   Lower Body Dressing Maximal Assist   Toileting Maximal Assist  (BM in bed. Stated he was aware of BM but did not notify nursing)   Skilled Intervention Compensatory Strategies;Facilitation;Sequencing;Tactile Cuing;Verbal Cuing   Functional Mobility   Sit to Stand Moderate Assist  (x2 person)   Bed, Chair, Wheelchair Transfer Unable to Participate   Toilet Transfers Unable to Participate   Mobility STS x2   Skilled Intervention Compensatory Strategies;Facilitation;Sequencing;Tactile Cuing;Verbal Cuing   Comments Pt was leaning on the EOB w/ back of  "thighs unable to weight shift forward to come away from the bed. While here he was able to shift weight and just clear feet from the floor with attempting to take steps.   Activity Tolerance   Comments Limited by weakness, pain and cognition   Patient / Family Goals   Patient / Family Goal #1 \"to be able to get around the kitchen and cook some eggs\"   Goal #1 Outcome Progressing slower than expected   Short Term Goals   Short Term Goal # 1 Pt will be able to complete standing g/h routine with SPV and seated rest breaks PRN   Goal Outcome # 1 Progressing slower than expected   Short Term Goal # 2 Pt will be able to complete toilet/ADL transfer with SPV   Goal Outcome # 2 Progressing slower than expected   Short Term Goal # 3 Pt will be able to complete toileting ADLs with SPV   Goal Outcome # 3 Progressing slower than expected   Short Term Goal # 4 Pt will be able to complete LB dressing with SPV and AE PRN   Goal Outcome # 4 Progressing slower than expected   Education Group   Education Provided Role of Occupational Therapist;Other (comments)   Role of Occupational Therapist Patient Response Patient;Acceptance;Explanation;Verbal Demonstration   Additional Comments Pt educated on importance of notifying staff if solied in bed to protect skin integrity. Pt verbalized understanding; recommend reinforcement.   Occupational Therapy Treatment Plan    O.T. Treatment Plan Continue Current Treatment Plan   Anticipated Discharge Equipment and Recommendations   DC Equipment Recommendations Unable to determine at this time   Discharge Recommendations Recommend post-acute placement for additional occupational therapy services prior to discharge home   Interdisciplinary Plan of Care Collaboration   IDT Collaboration with  Nursing;Physical Therapist Assistant (PTA)   Patient Position at End of Therapy In Bed;Bed Alarm On;Call Light within Reach;Wrist Restraints Applied  (LLE restraint)   Collaboration Comments RN updated   Session " Information   Date / Session Number  10/31, #2 (2/4, 11/3) Attempted 10/30     Patient seen for team treatment with Physical Therapy Assistant for the following reason(s):   Patient required 2 person assistance for safety and to provide effective interventions. Each discipline assisted patient with appropriate and separate goals. Pt recently displaying aggressive behaviors toward staff but pleasant this session. Occupational Therapist facilitated participation in ADLs, dynamic sitting balancing during ADLs and UE ROM exercises while Physical Therapy Assistant simultaneously treated pt according to POC.

## 2024-10-31 NOTE — CARE PLAN
The patient is Watcher - Medium risk of patient condition declining or worsening    Shift Goals  Clinical Goals: Safety  Patient Goals: DC to facility  Family Goals: OTONIEL    Progress made toward(s) clinical / shift goals:    Problem: Pain - Standard  Goal: Alleviation of pain or a reduction in pain to the patient’s comfort goal  Outcome: Progressing     Problem: Skin Integrity  Goal: Skin integrity is maintained or improved  Outcome: Progressing     Problem: Fall Risk  Goal: Patient will remain free from falls  Outcome: Progressing     Problem: Safety - Medical Restraint  Goal: Remains free of injury from restraints (Restraint for Interference with Medical Device)  Outcome: Progressing  Goal: Free from restraint(s) (Restraint for Interference with Medical Device)  Outcome: Progressing       Patient is not progressing towards the following goals:

## 2024-11-01 LAB
ANION GAP SERPL CALC-SCNC: 10 MMOL/L (ref 7–16)
BUN SERPL-MCNC: 27 MG/DL (ref 8–22)
CALCIUM SERPL-MCNC: 9.3 MG/DL (ref 8.5–10.5)
CHLORIDE SERPL-SCNC: 106 MMOL/L (ref 96–112)
CO2 SERPL-SCNC: 25 MMOL/L (ref 20–33)
CREAT SERPL-MCNC: 0.87 MG/DL (ref 0.5–1.4)
ERYTHROCYTE [DISTWIDTH] IN BLOOD BY AUTOMATED COUNT: 47.6 FL (ref 35.9–50)
GFR SERPLBLD CREATININE-BSD FMLA CKD-EPI: 86 ML/MIN/1.73 M 2
GLUCOSE SERPL-MCNC: 122 MG/DL (ref 65–99)
HCT VFR BLD AUTO: 26 % (ref 42–52)
HGB BLD-MCNC: 8.9 G/DL (ref 14–18)
MCH RBC QN AUTO: 31.4 PG (ref 27–33)
MCHC RBC AUTO-ENTMCNC: 34.2 G/DL (ref 32.3–36.5)
MCV RBC AUTO: 91.9 FL (ref 81.4–97.8)
PLATELET # BLD AUTO: 200 K/UL (ref 164–446)
PMV BLD AUTO: 9.6 FL (ref 9–12.9)
POTASSIUM SERPL-SCNC: 3.5 MMOL/L (ref 3.6–5.5)
RBC # BLD AUTO: 2.83 M/UL (ref 4.7–6.1)
SODIUM SERPL-SCNC: 141 MMOL/L (ref 135–145)
WBC # BLD AUTO: 6.2 K/UL (ref 4.8–10.8)

## 2024-11-01 PROCEDURE — 700102 HCHG RX REV CODE 250 W/ 637 OVERRIDE(OP): Performed by: INTERNAL MEDICINE

## 2024-11-01 PROCEDURE — 99233 SBSQ HOSP IP/OBS HIGH 50: CPT | Performed by: INTERNAL MEDICINE

## 2024-11-01 PROCEDURE — 85027 COMPLETE CBC AUTOMATED: CPT

## 2024-11-01 PROCEDURE — 36415 COLL VENOUS BLD VENIPUNCTURE: CPT

## 2024-11-01 PROCEDURE — 80048 BASIC METABOLIC PNL TOTAL CA: CPT

## 2024-11-01 PROCEDURE — 97110 THERAPEUTIC EXERCISES: CPT

## 2024-11-01 PROCEDURE — A9270 NON-COVERED ITEM OR SERVICE: HCPCS | Performed by: INTERNAL MEDICINE

## 2024-11-01 PROCEDURE — 97116 GAIT TRAINING THERAPY: CPT

## 2024-11-01 PROCEDURE — 92610 EVALUATE SWALLOWING FUNCTION: CPT

## 2024-11-01 PROCEDURE — 770020 HCHG ROOM/CARE - TELE (206)

## 2024-11-01 PROCEDURE — 700105 HCHG RX REV CODE 258: Performed by: INTERNAL MEDICINE

## 2024-11-01 PROCEDURE — 92612 ENDOSCOPY SWALLOW (FEES) VID: CPT

## 2024-11-01 RX ORDER — QUETIAPINE FUMARATE 25 MG/1
25 TABLET, FILM COATED ORAL NIGHTLY
Status: SHIPPED
Start: 2024-11-01

## 2024-11-01 RX ORDER — SODIUM CHLORIDE 9 MG/ML
INJECTION, SOLUTION INTRAVENOUS CONTINUOUS
Status: DISCONTINUED | OUTPATIENT
Start: 2024-11-01 | End: 2024-11-02

## 2024-11-01 RX ADMIN — LAMOTRIGINE 200 MG: 100 TABLET ORAL at 05:52

## 2024-11-01 RX ADMIN — TAMSULOSIN HYDROCHLORIDE 0.4 MG: 0.4 CAPSULE ORAL at 20:52

## 2024-11-01 RX ADMIN — GABAPENTIN 300 MG: 300 CAPSULE ORAL at 05:53

## 2024-11-01 RX ADMIN — SODIUM CHLORIDE: 9 INJECTION, SOLUTION INTRAVENOUS at 13:00

## 2024-11-01 RX ADMIN — OXYCODONE 5 MG: 5 TABLET ORAL at 09:49

## 2024-11-01 ASSESSMENT — ENCOUNTER SYMPTOMS
NAUSEA: 0
FEVER: 0
VOMITING: 0
ABDOMINAL PAIN: 0
SHORTNESS OF BREATH: 0

## 2024-11-01 ASSESSMENT — COGNITIVE AND FUNCTIONAL STATUS - GENERAL
STANDING UP FROM CHAIR USING ARMS: A LOT
WALKING IN HOSPITAL ROOM: A LITTLE
MOBILITY SCORE: 14
TURNING FROM BACK TO SIDE WHILE IN FLAT BAD: A LOT
MOVING FROM LYING ON BACK TO SITTING ON SIDE OF FLAT BED: A LOT
MOVING TO AND FROM BED TO CHAIR: A LITTLE
SUGGESTED CMS G CODE MODIFIER MOBILITY: CL
CLIMB 3 TO 5 STEPS WITH RAILING: A LOT

## 2024-11-01 ASSESSMENT — GAIT ASSESSMENTS
GAIT LEVEL OF ASSIST: MINIMAL ASSIST
ASSISTIVE DEVICE: FRONT WHEEL WALKER

## 2024-11-01 ASSESSMENT — PAIN DESCRIPTION - PAIN TYPE: TYPE: ACUTE PAIN

## 2024-11-01 ASSESSMENT — FIBROSIS 4 INDEX: FIB4 SCORE: 2.88

## 2024-11-01 NOTE — PROGRESS NOTES
"      Orthopaedic Progress Note    Interval changes:  Patient doing well   RLE dressings changed incisions without issues   Cleared for DC to rehab by ortho pending medicine clearance    ROS - Patient denies any new issues.  Pain well controlled.    BP 98/47   Pulse 85   Temp 38 °C (100.4 °F) (Temporal)   Resp 16   Ht 1.727 m (5' 8\")   Wt 65.5 kg (144 lb 6.4 oz)   SpO2 95%     Patient seen and examined  No acute distress  Breathing non labored  RRR  RLE dressings changed incisions without issues, DNVI, moves all toes, cap refill <2 sec.     Recent Labs     10/30/24  0037 10/31/24  0822 11/01/24  0006   WBC 6.2 6.4 6.2   RBC 2.44* 2.86* 2.83*   HEMOGLOBIN 7.5* 8.9* 8.9*   HEMATOCRIT 23.3* 26.1* 26.0*   MCV 95.5 91.3 91.9   MCH 30.7 31.1 31.4   MCHC 32.2* 34.1 34.2   RDW 46.8 47.4 47.6   PLATELETCT 151* 195 200   MPV 10.0 9.5 9.6       Active Hospital Problems    Diagnosis     Recurrent Clostridium difficile diarrhea [A04.71]      Priority: High     - Patient on Van taper         Cachexia ([facility]) [R64]      Priority: Medium    Bipolar 1 disorder, depressed ([facility]) [F31.9]      Priority: Low     Patient reports depression is not worse or better. He states he is quick to anger. States he is still having decreased appetite and poor sleep. Has not been able to get modafinil approved. Also states he had to switch to generic lamotrigine which he thinks is not working as well. Is taking medications as prescribed.    Says sleep has been worse. Still with difficulty falling asleep and then sleeping too much throughout the day when he does fall asleep.     Also with 6 pound recent weight loss. Believes it is due to mood and decreased appetite. Not interested in nutritionist, appetite stimulant. Eats 1 meal a day \"whatever I can find in the frozen section of the grocery store.\" Never with periods of days where he could not sleep, extremes of mood, erratic behavior (denies). No night sweats, fevers, chills. No SI/HI, intent to " harm self or others.       Benign prostatic hyperplasia [N40.0]      Priority: Low    Closed hip fracture requiring operative repair, right, initial encounter ([facility]) [S72.001A]     Frailty syndrome in geriatric patient [R54]     Gastroesophageal reflux disease without esophagitis [K21.9]     Paroxysmal A-fib ([facility]) [I48.0]      Patient reports he is unsure if he should be taking metoprolol and Eliquis. States he last saw cardiology early this year but does not remember name of the doctor and would like to follow with a different doctor. No change of speech, vision, dizziness or balance difficulty, chest pain, heart palpitations, syncope. Denies ever having a fall.     On review of cardiology note 05/22, patient recommended for propranolol 10mg and Eliquis.       DNR (do not resuscitate) [Z66]      IMO load March 2020         Assessment/Plan:  Patient doing well  RLE dressings changed incisions without issues   Cleared for DC to rehab by ortho pending medicine clearance  POD#5 S/P Open reduction internal fixation right proximal femur fracture with cephalomedullary nail   Wt bearing status - WBAT  Wound care/Drains - Dressings to be changed every other day by nursing. Or PRN for saturation starting POD#2  Future Procedures - none planned   Lovenox: Start 10/28, Duration-until ambulatory > 150'  Sutures/Staples out- 14-21 days post operatively. Removal will completed by ortho mid levels only.  PT/OT-initiated  Antibiotics: Perioperative completed  DVT Prophylaxis- TEDS/SCDs/Foot pumps  Ruff-not needed per ortho  Case Coordination for Discharge Planning - Disposition per therapy recs.

## 2024-11-01 NOTE — CARE PLAN
Problem: Pain - Standard  Goal: Alleviation of pain or a reduction in pain to the patient’s comfort goal  Description: Target End Date:  Prior to discharge or change in level of care    Document on Vitals flowsheet    1.  Document pain using the appropriate pain scale per order or unit policy  2.  Educate and implement non-pharmacologic comfort measures (i.e. relaxation, distraction, massage, cold/heat therapy, etc.)  3.  Pain management medications as ordered  4.  Reassess pain after pain med administration per policy  5.  If opiods administered assess patient's response to pain medication is appropriate per POSS sedation scale  6.  Follow pain management plan developed in collaboration with patient and interdisciplinary team (including palliative care or pain specialists if applicable)  Outcome: Progressing     Problem: Knowledge Deficit - Standard  Goal: Patient and family/care givers will demonstrate understanding of plan of care, disease process/condition, diagnostic tests and medications  Description: Target End Date:  1-3 days or as soon as patient condition allows    Document in Patient Education    1.  Patient and family/caregiver oriented to unit, equipment, visitation policy and means for communicating concern  2.  Complete/review Learning Assessment  3.  Assess knowledge level of disease process/condition, treatment plan, diagnostic tests and medications  4.  Explain disease process/condition, treatment plan, diagnostic tests and medications  Outcome: Progressing     Problem: Skin Integrity  Goal: Skin integrity is maintained or improved  Description: Target End Date:  Prior to discharge or change in level of care    Document interventions on Skin Risk/Rohith flowsheet groups and corresponding LDA    1.  Assess and monitor skin integrity, appearance and/or temperature  2.  Assess risk factors for impaired skin integrity and/or pressures ulcers  3.  Implement precautions to protect skin integrity in  collaboration with interdisciplinary team  4.  Implement pressure ulcer prevention protocol if at risk for skin breakdown  5.  Confirm wound care consult if at risk for skin breakdown  6.  Ensure patient use of pressure relieving devices  (Low air loss bed, waffle overlay, heel protectors, ROHO cushion, etc)  Outcome: Progressing     Problem: Fall Risk  Goal: Patient will remain free from falls  Description: Target End Date:  Prior to discharge or change in level of care    Document interventions on the [facility] Fall Risk Assessment    1.  Assess for fall risk factors  2.  Implement fall precautions  Outcome: Progressing     Problem: Safety - Medical Restraint  Goal: Remains free of injury from restraints (Restraint for Interference with Medical Device)  Description: INTERVENTIONS:  1. Determine that other, less restrictive measures have been tried or would not be effective before applying the restraint  2. Evaluate the patient's condition at the time of restraint application  3. Educate patient/family regarding the reason for restraint  4. Q2H: Monitor safety, psychosocial status, comfort, circulation, respiratory status, LOC, nutrition and hydration  Outcome: Progressing  Goal: Free from restraint(s) (Restraint for Interference with Medical Device)  Description: INTERVENTIONS:  1.  ONCE/SHIFT or MINIMUM Q12H: Assess and document the continuing need for restraints  2.  Q24H: Continued use of restraint requires LIP to perform face to face examination and written order  3.  Identify and implement measures to help patient regain control  4.  Educate patient/family on discontinuation criteria   5.  Assess patient's understanding and retention of education provided  6.  Assess readiness for release & initiate progressive release per protocol  7.  Identify and document criteria for restraints  Outcome: Progressing     Problem: Communication  Goal: The ability to communicate needs accurately and effectively will  improve  Outcome: Progressing     Problem: Safety  Goal: Will remain free from injury  Outcome: Progressing  Goal: Will remain free from falls  Outcome: Progressing     Problem: Pain Management  Goal: Pain level will decrease to patient's comfort goal  Outcome: Progressing     Problem: Infection  Goal: Will remain free from infection  Outcome: Progressing     Problem: Venous Thromboembolism (VTW)/Deep Vein Thrombosis (DVT) Prevention:  Goal: Patient will participate in Venous Thrombosis (VTE)/Deep Vein Thrombosis (DVT)Prevention Measures  Outcome: Progressing     Problem: Psychosocial Needs:  Goal: Level of anxiety will decrease  Outcome: Progressing     Problem: Fluid Volume:  Goal: Will maintain balanced intake and output  Outcome: Progressing     Problem: Respiratory:  Goal: Respiratory status will improve  Outcome: Progressing     Problem: Bowel/Gastric:  Goal: Normal bowel function is maintained or improved  Outcome: Progressing  Goal: Will not experience complications related to bowel motility  Outcome: Progressing     Problem: Urinary Elimination:  Goal: Ability to reestablish a normal urinary elimination pattern will improve  Outcome: Progressing     Problem: Skin Integrity  Goal: Risk for impaired skin integrity will decrease  Outcome: Progressing     Problem: Mobility  Goal: Risk for activity intolerance will decrease  Outcome: Progressing     Problem: Medication  Goal: Compliance with prescribed medication will improve  Outcome: Progressing     Problem: Knowledge Deficit  Goal: Knowledge of disease process/condition, treatment plan, diagnostic tests, and medications will improve  Outcome: Progressing  Goal: Knowledge of the prescribed therapeutic regimen will improve  Outcome: Progressing     Problem: Discharge Barriers/Planning  Goal: Patient's continuum of care needs will be met  Outcome: Progressing     The patient is Watcher - Medium risk of patient condition declining or worsening    Shift  Goals  Clinical Goals: reorientation, stability  Patient Goals: refusing  Family Goals: na

## 2024-11-01 NOTE — DISCHARGE PLANNING
"Case Management Discharge Planning    Admission Date: 10/27/2024  GMLOS: 4.4  ALOS: 5    6-Clicks ADL Score: 12  6-Clicks Mobility Score: 10  PT and/or OT Eval ordered: Yes  Post-acute Referrals Ordered: Yes  Post-acute Choice Obtained: Yes  Has referral(s) been sent to post-acute provider:  Yes      Anticipated Discharge Dispo: Discharge Disposition: Disch to IP rehab facility or distinct part unit (62)    DME Needed: No    Action(s) Taken: Chart review completed, pt discussed in am rounds. Per provider, pt is medically cleared for post acute placement, RN CM requested DPA to f/u with [facility]. Per DPA pt would need to be out of restraints \"for a couple of days\" and they would need to submit for insurance auth. RN CM followed up with [facility] regarding bed availability for today. She states able to accept patient at 1300 and they have auth and they are able to provide transport. RN CM udpated bedside RN, patient and provider regarding 1300 transport. Cobra packet initiated and in cm office.      Escalations Completed: None    Medically Clear: Yes    Next Steps: continue to follow up with DCP    Barriers to Discharge: None           "

## 2024-11-01 NOTE — THERAPY
Physical Therapy   Daily Treatment     Patient Name: Marcelo Colon  Age:  84 y.o., Sex:  person  Medical Record #: [id number]  Today's Date: 11/1/2024     Precautions  Precautions: (P) Fall Risk;Weight Bearing As Tolerated Right Lower Extremity    Assessment    Pt was pleasant and agreeable to PT.  He continues to require physical assist and multimodal cueing to mobilize, but was able to take some steps at the EOB with pre-medication.  He reports dizziness with changes of position and had a LOB to the L with no attempt to recover.  Nurse notified. Pt is most limited by pain, weakness, and decreased activity tolerance.  PT will continue to follow while in house.  Recommend post acute PT services.    Plan    Treatment Plan Status: (P) Continue Current Treatment Plan  Type of Treatment: Bed Mobility, Equipment, Group Therapy, Family / Caregiver Training, Gait Training, Neuro Re-Education / Balance, Stair Training, Therapeutic Exercise, Therapeutic Activities, Self Care / Home Evaluation  Treatment Frequency: 5 Times per Week  Treatment Duration: Until Therapy Goals Met    DC Equipment Recommendations: (P) Unable to determine at this time  Discharge Recommendations: (P) Recommend post-acute placement for additional physical therapy services prior to discharge home           Objective       11/01/24 1106   Precautions   Precautions Fall Risk;Weight Bearing As Tolerated Right Lower Extremity   Vitals   Pulse 84  (sitting, 94 standing)   Blood Pressure  99/50  (sitting  with pt falling to the L, no attempt to recover, reported dizziness; standing 99/48 with no change in dizziness)   O2 Delivery Device None - Room Air   Pain 0 - 10 Group   Therapist Pain Assessment During Activity;5  (R hip, pre-medicated)   Cognition    Level of Consciousness Alert   Supine Lower Body Exercise   Comments R hip flexion/ extension, abd, DF/ PF x10   Sitting Lower Body Exercises   Comments R LAQ with AAROM x10   Balance   Sitting Balance  (Static) Fair -   Sitting Balance (Dynamic) Fair -   Standing Balance (Static) Poor +   Standing Balance (Dynamic) Poor +   Weight Shift Sitting Poor   Weight Shift Standing Poor   Skilled Intervention Verbal Cuing;Tactile Cuing;Postural Facilitation   Comments with FWW   Bed Mobility    Supine to Sit Maximal Assist   Sit to Supine Moderate Assist   Scooting Moderate Assist  (to EOB)   Rolling Moderate Assist to Rt.   Skilled Intervention Facilitation;Sequencing;Tactile Cuing;Verbal Cuing   Gait Analysis   Gait Level Of Assist Minimal Assist   Assistive Device Front Wheel Walker   Distance (Feet)   (marching EOB 12-14 steps x2 trials)   Weight Bearing Status WBAT RLE   Skilled Intervention Facilitation;Sequencing;Tactile Cuing;Verbal Cuing  (weight shifts, increased UE WB, increased R foot clearance)   Functional Mobility   Sit to Stand Moderate Assist   Bed, Chair, Wheelchair Transfer Minimal Assist   Transfer Method Stand Step   Mobility with FWW   Skilled Intervention Facilitation;Sequencing;Tactile Cuing;Verbal Cuing   Activity Tolerance   Sitting Edge of Bed 13 min   Patient / Family Goals    Patient / Family Goal #1 to go to rehab   Goal #1 Outcome Goal not met   Short Term Goals    Short Term Goal # 1 in 6 visits patient will demo all functional transfers with Sup and LRAD for safe DC   Goal Outcome # 1 goal not met   Short Term Goal # 2 in 6 visits patient will ambualte 100' with Latha and LRAD for safe DC   Goal Outcome # 2 Goal not met   Short Term Goal # 3 in 6 visits patient will demo all bed mobility indep for safe DC   Goal Outcome # 3 Goal not met   Education Group   Education Provided Exercises - Supine;Exercises - Seated;Gait Training;Transfer Status   Gait Training Patient Response Patient;Acceptance;Explanation;Action Demonstration;Reinforcement Needed   Exercises - Supine Patient Response Patient;Acceptance;Explanation;Action Demonstration;Reinforcement Needed   Exercises - Seated Patient Response  Patient;Acceptance;Explanation;Action Demonstration;Reinforcement Needed   Transfer Status Patient Response Patient;Acceptance;Explanation;Action Demonstration;Reinforcement Needed   Physical Therapy Treatment Plan   Physical Therapy Treatment Plan Continue Current Treatment Plan   Anticipated Discharge Equipment and Recommendations   DC Equipment Recommendations Unable to determine at this time   Discharge Recommendations Recommend post-acute placement for additional physical therapy services prior to discharge home   Interdisciplinary Plan of Care Collaboration   IDT Collaboration with  Nursing   Patient Position at End of Therapy In Bed;Call Light within Reach;Tray Table within Reach   Collaboration Comments RN updated   Session Information   Date / Session Number  11/1 -3 (3/5, 11/3) att 10/29, 10/30 PTA/1

## 2024-11-01 NOTE — CARE PLAN
The patient is Watcher - Medium risk of patient condition declining or worsening    Shift Goals  Clinical Goals: reorientation, stability  Patient Goals: refusing  Family Goals: na    Progress made toward(s) clinical / shift goals:    Problem: Pain - Standard  Goal: Alleviation of pain or a reduction in pain to the patient’s comfort goal  Outcome: Progressing     Problem: Knowledge Deficit - Standard  Goal: Patient and family/care givers will demonstrate understanding of plan of care, disease process/condition, diagnostic tests and medications  Outcome: Progressing     Problem: Skin Integrity  Goal: Skin integrity is maintained or improved  Outcome: Progressing     Problem: Safety - Medical Restraint  Goal: Remains free of injury from restraints (Restraint for Interference with Medical Device)  Outcome: Progressing       Patient is not progressing towards the following goals:

## 2024-11-01 NOTE — DIETARY
Nutrition Update: Follow-up for PO intakes  Day 5 of admit.  Marcelo Colon is a 84 y.o. person with admitting DX of Closed hip fracture requiring operative repair, right, initial encounter ([facility]) [S72.001A].    Current Diet: [facility] diet  Supplements: Ensure Plus High Protein (provides 350 calories, 20 g protein per 8 fl oz) 1x/day    Problem: Nutritional:  Goal: Achieve adequate nutritional intake  Description: Patient will consume >50% of meals  Outcome: progressing    Pt previously refusing meals. Has consumed % x 2 most recent meals per flowsheets. Continue diet and supplements as ordered.     RD following.

## 2024-11-01 NOTE — PROGRESS NOTES
Monitor Summary  Rhythm: SR/ST  Rate:   Ectopy: (F) PACs (R) PVCs  Measurements: .16/.16/.41  ---12 hr Chart Review---

## 2024-11-01 NOTE — PROGRESS NOTES
[facility] Medicine Daily Progress Note    Date of Service  11/1/2024    Chief Complaint  Marcelo Colon is a 84 y.o. person admitted 10/27/2024 with ground-level fall.    Hospital Course  Richard Colon is an 84-year-old person with PMHx paroxysmal atrial fibrillation not on anticoagulation, GERD, bipolar 1, BPH, memory deficits.  Admitted 10/27 after GLF and subsequent right proximal femur fracture.    In the emergency room-femur x-ray showing proximal right femur fracture with angulation and displacement.  CT head without intracranial abnormalities.  Orthopedic surgery was consulted.  Patient underwent ORIF right proximal femur fracture on 10/27 with Dr. Mann.    Interval Problem Update  Patient seen and examined, still confused but slowly improving still on restrains  CT head negative , UA also neg for infection,   Hemoglobin 8.9 today after one unit transfused  yesterday   11/1: patient seen and examined, afebrile, resting in bed, mentation is improving, patient had positive orthostatic today when standing up  Starting IV fluid. Hemoglobin e stable at 8,9 will monitor  Ortho following regarding his hip surgery appreciate rec.    I have discussed this patient's plan of care and discharge plan at IDT rounds today with Case Management, Nursing, Nursing leadership, and other members of the IDT team.    Consultants/Specialty  orthopedics    Code Status  DNAR/DNI    Disposition  The patient is not medically cleared for discharge to home or a post-acute facility.      I have placed the appropriate orders for post-discharge needs.    Review of Systems  Review of Systems   Constitutional:  Negative for fever and malaise/fatigue.   Respiratory:  Negative for shortness of breath.    Cardiovascular:  Negative for chest pain and leg swelling.   Gastrointestinal:  Negative for abdominal pain, nausea and vomiting.   Musculoskeletal:  Positive for joint pain.        Physical Exam  Temp:  [36.9 °C (98.4 °F)-38.2 °C (100.8 °F)]  38 °C (100.4 °F)  Pulse:  [79-87] 85  Resp:  [15-17] 16  BP: ()/(47-65) 98/47  SpO2:  [90 %-95 %] 95 %    Physical Exam  Vitals and nursing note reviewed.   Constitutional:       General: Richard is not in acute distress.     Appearance: Normal appearance. Richard is underweight. Richard is ill-appearing.   Cardiovascular:      Rate and Rhythm: Normal rate and regular rhythm.      Heart sounds: Murmur heard.   Pulmonary:      Effort: Pulmonary effort is normal. No respiratory distress.      Breath sounds: Normal breath sounds. No wheezing.   Abdominal:      General: Bowel sounds are normal. There is no distension.      Palpations: Abdomen is soft.      Tenderness: There is no abdominal tenderness.   Musculoskeletal:      Comments: Decreased ROM to right hip   Well healing right lateral hip surgical site    Skin:     General: Skin is warm and dry.   Neurological:      Mental Status: Richard is alert. Mental status is at baseline.   Psychiatric:         Mood and Affect: Mood normal.         Behavior: Behavior normal.         Fluids    Intake/Output Summary (Last 24 hours) at 11/1/2024 1352  Last data filed at 11/1/2024 0750  Gross per 24 hour   Intake 936 ml   Output 700 ml   Net 236 ml        Laboratory  Recent Labs     10/30/24  0037 10/31/24  0822 11/01/24  0006   WBC 6.2 6.4 6.2   RBC 2.44* 2.86* 2.83*   HEMOGLOBIN 7.5* 8.9* 8.9*   HEMATOCRIT 23.3* 26.1* 26.0*   MCV 95.5 91.3 91.9   MCH 30.7 31.1 31.4   MCHC 32.2* 34.1 34.2   RDW 46.8 47.4 47.6   PLATELETCT 151* 195 200   MPV 10.0 9.5 9.6     Recent Labs     10/30/24  0037 10/31/24  0822 11/01/24  0006   SODIUM 138 144 141   POTASSIUM 3.6 3.7 3.5*   CHLORIDE 103 107 106   CO2 24 23 25   GLUCOSE 110* 117* 122*   BUN 22 26* 27*   CREATININE 0.94 0.91 0.87   CALCIUM 9.0 9.2 9.3                     Imaging  CT-HEAD W/O   Final Result      1.  No acute intracranial abnormality.   2.  Senescent changes            EC-ECHOCARDIOGRAM COMPLETE W/O CONT   Final Result      DX-PORTABLE  FLUORO > 1 HOUR   Final Result      Portable fluoroscopy utilized for 1 minute 18 seconds.         INTERPRETING LOCATION: [address], [place], [address]      DX-FEMUR-2+ RIGHT   Final Result      Digitized intraoperative radiograph is submitted for review. This examination is not for diagnostic purpose but for guidance during a surgical procedure. Please see the patient's chart for full procedural details.         INTERPRETING LOCATION: [address], [place], [address]      CT-LSPINE W/O PLUS RECONS   Final Result      1.  Severe multilevel degenerative change of lumbar spine.   2.  No fracture or subluxation.      CT-CSPINE WITHOUT PLUS RECONS   Final Result      1.  No cervical spine fracture or subluxation.   2.  Severe multilevel degenerative change and diffuse osteopenia.   3.  Prior multilevel laminectomy and posterior fusion with finding indicating possible loosening of screws on the RIGHT at the T1 and T2 levels.      CT-HEAD W/O   Final Result      1.  Cerebral atrophy and chronic microvascular ischemic type changes.   2.  No acute intracranial abnormality.   3.  Right sphenoid sinus disease               DX-FEMUR-2+ RIGHT   Final Result   Impression:      1. Proximal right femur fracture.                     DX-PELVIS-1 OR 2 VIEWS   Final Result   Impression:      1. No pelvis fracture evident.      2. Proximal right femur fracture.           Assessment/Plan  * Closed hip fracture requiring operative repair, right, initial encounter (HCC)- (present on admission)  Assessment & Plan  Femur x-ray showing proximal right femur fracture with angulation and displacement.   S/p ORIF 10/27 with Dr. Johnathan CHARLES   - Continue pain management as requested  - PT OT today; WBAT  - Will likely need PAR   - Restart DVT prophylaxis today     Frailty syndrome in geriatric patient- (present on admission)  Assessment & Plan  Weight loss, depression, new hip fracture    Gastroesophageal reflux disease without esophagitis- (present  on admission)  Assessment & Plan  Continue Pepcid    Paroxysmal A-fib (HCC)- (present on admission)  Assessment & Plan  Per note from cardiology 5/22 patient had paroxysmal A-fib recommended for propranolol 10 mg and Eliquis  Patient has not been taking these, states he was told he did not need them but cannot tell me by who  - Continuous telemetry monitoring; monitoring for arrhythmia in the setting of acute fracture and questionable history of a fib  - Echo pending for today  - Holding AC due to POD 1 from ORIF to right hip     DNR (do not resuscitate)- (present on admission)  Assessment & Plan  Confirmed patient is DNR/DNI    Cachexia (HCC)- (present on admission)  Assessment & Plan  Patient reports weight loss and low appetite  Nutrition consulted    Recurrent Clostridium difficile diarrhea- (present on admission)  Assessment & Plan  Hx of   Infectious disease recommended a taper on discharge in 2017  It is still on his med rec is being taken daily, will not start inpatient for now  Discussed with infectious disease, there is no indication for continuing long-term vancomycin unless patient is on antibiotics which they are not.    Bipolar 1 disorder, depressed (HCC)- (present on admission)  Assessment & Plan  Continue home Lamictal  Patient reports worsening depression with low appetite and weight loss  Nutrition consulted    Benign prostatic hyperplasia- (present on admission)  Assessment & Plan  Substituted tamsulosin for patient's home alfuzosin       VTE prophylaxis: Lovenox ppx       I have performed a physical exam and reviewed and updated ROS and Plan today (11/1/2024). In review of yesterday's note (10/31/2024), there are no changes except as documented above.    Greater than 51 minutes spent prepping to see patient (e.g. review of tests) obtaining and/or reviewing separately obtained history. Performing a medically appropriate examination and/ evaluation.  Counseling and educating the  patient/family/caregiver.  Ordering medications, tests, or procedures.  Referring and communicating with other health care professionals.  Documenting clinical information in EPIC.  Independently interpreting results and communicating results to patient/family/caregiver.  Care coordination.

## 2024-11-01 NOTE — THERAPY
Speech Language Pathology   Flexible Endoscopic Evaluation of Swallowing (FEES)        Patient Name: Marcelo Colon  AGE:  84 y.o., SEX:  person  Medical Record #: [id number]  Date of Service: 11/1/2024      History of Present Illness  84M who presented on 10/27/24 after GLF, found to have R femur fx. Patient is s/p ORIF of femur fx on 10/27. PMH notable for GERD, paroxysmal afib, bipolar 1 disorder, recurrent C diff, DM2, debility, tinnitus, b/l SNHL.    Not previously seen by service per EMR. Consult received per OT recommendation: acute difficulty swallowing with water falling out of his mouth and reported inability to swallow.     Imaging:   10/30/24 CT Head:   1.  No acute intracranial abnormality.  2.  Senescent changes    No chest imaging to review.       Pertinent Information  Current Method of Nutrition: Oral diet (RG7/TN0)  Patient Behaviors:  (Cooperative, Pleasant)   Dentition: Edentulous (prefers to eat without dentures)   Feeding Tube: None   Tracheostomy: No   Factor(s) Affecting Performance: None     Discussed the risks, benefits, and alternatives of the FEES procedure. Patient/family acknowledged and agreed to proceed.      Assessment  Flexible Endoscopic Evaluation of Swallowing (FEES) completed at bedside today. The endoscope was passed transnasally via Right nare to evaluate the anatomy and physiology of swallowing. Pt tolerated the procedure with no apparent distress.    Anatomic Findings: WFL (presbylarynx)  Vocal Fold Motion: Bilateral movement  Secretion Management: Adequate  PO Trials: Thin Liquid, Mildly Thick Liquid, Liquidised, Pudding, Regular Solid, Mixed      Consistency PAS Score Timing Residue Comments   Thin Liquid (TN0) 3 During swallow Vallecular Residue: Trace (1%-5%)  Pyriform Sinus Residue: Trace (1%-5%) Tsp: PAS 1  Cup: PAS 3, 1  Straw: PAS 1, 1, 1  Large straw: PAS 1    Mildly Thick (MT2) 1 N/A Vallecular Residue: Trace (1%-5%)  Pyriform Sinus Residue: Trace (1%-5%) Cup: PAS  1, 1  Straw: PAS 1   Liquidised (LQ3) 1 N/A Vallecular Residue: Trace (1%-5%)  Pyriform Sinus Residue: Trace (1%-5%) PAS 1, 1   Pudding (PU4) 1 N/A Vallecular Residue: Mild (5%-25%)  Pyriform Sinus Residue: Trace (1%-5%) PAS 1, 1   Regular Solid (RG7) 1 N/A Vallecular Residue: Mild (5%-25%)  Pyriform Sinus Residue: None (0%) PAS 1   Mixed (SB6/TN0) 1 N/A Vallecular Residue: Mild (5%-25%)  Pyriform Sinus Residue: None (0%) PAS 1     Penetration-Aspiration Scale (PAS)  1     No contrast enters airway  2     Contrast enters the airway, remains above the vocal folds, and is ejected from the airway (not seen in the airway at the end of the swallow).  3     Contrast enters the airway, remains above the vocal folds, and is not ejected from the airway (is seen in the airway after the swallow).  4     Contrast enters the airway, contacts the vocal folds, and is ejected from the airway.  5     Contrast enters the airway, contacts the vocal folds, and is not ejected from the airway  6     Contrast enters the airway, crosses the plane of the vocal folds, and is ejected from the airway.  7     Contrast enters the airway, crosses the plane of the vocal folds, and is not ejected from the airway despite effort.  8     Contrast enters the airway, crosses the plane of the vocal folds, is not ejected from the airway and there is no response to aspiration.      Oral Phase:   Patient with adequate labial seal around cup and straw without anterior loss of bolus. Suspected piecemeal deglutition with PO visualized entering pharynx between multiple swallows per bolus. Mastication was functional, evidenced by well mashed bolus in pharynx pre/post swallow.     Pharyngeal Phase:  - Incomplete laryngeal vestibule closure (LVC) resulted in inconsistent shallow laryngeal penetration (inferred) of TN0 by cup during the swallow. A reflexive cleansing swallow eliminated penetrate.   - Reduced base of tongue retraction (BOT) resulted in mild (PU4,  "SB6, RG7) vallecular residue.  - Impaired pharyngeal constriction resulted in mild posterior pharyngeal wall residue with LQ3.    Of note, patient demonstrated wet vocal quality with moist secretions resting on vocal folds intermittently. Material was clear and not suspected to be r/t PO.     Compensatory Strategies:  - Reflexive cleansing swallows eliminated shallow penetrate and pharyngeal residue of semi-solids; reduced chewable solid pharyngeal residue.   - Liquid wash reduced chewable solid pharyngeal residue further.     Results/recommendations discussed with patient, RN. Finished lunch tray at bedside with main entree cut up. Patient with LUE deficits. Patient reported that staff cut his food for him. Discussed modification to SB6 for increased independence, and patient agreeable (\"that would be vida\"). All questions addressed.       Severity Rating:  DALE: Functional      Clinical Impressions  Patient presents with an age appropriate pharyngeal swallow. Swallow safety and efficiency are relatively intact with reflexive swallow strategies.  Recommend diet modification to SB6/TN0 to increase patient independence given LUE weakness.Given functional pharyngeal swallow, service will sign off.       Recommendations  Diet Consistency: Soft and bite size solids, thin liquids  Medication: Whole with liquid, As tolerated  Supervision: Assist with meal tray set up, Distant supervision - check on patient 2-3 times per meal  Positioning: Fully upright and midline during oral intake  Strategies: Small bites/sips, Slow rate of intake  Oral Care: BID  Additional Instrumentation: None         SLP Treatment Plan  Treatment Plan: None Indicated  SLP Frequency: N/A - Evaluation Only  Estimated Duration: N/A - Evaluation Only      Anticipated Discharge Needs  Discharge Recommendations: Anticipate that the patient will have no further speech therapy needs after discharge from the hospital   Therapy Recommendations Upon DC: Not " "Indicated       Patient / Family Goals  Patient / Family Goal #1: \"I'm interested in that\" to swallow diagnostic  Goal #1 Outcome: Goal met  Short Term Goal # 1: Patient will consume RG7/TN0 diet without decline in pulmonary status.  Goal Outcome # 1: Goal met  Short Term Goal # 2: Patient will identify ways to mitigate the risk of aspiration PNA with Giovanny.  Goal Outcome # 2 : Goal not met (no longer indicated)      Maria M Dela Cruz, SLP  "

## 2024-11-01 NOTE — DISCHARGE PLANNING
Case Management Discharge Planning    Admission Date: 10/27/2024  GMLOS: 4.4  ALOS: 5    6-Clicks ADL Score: 12  6-Clicks Mobility Score: 10  PT and/or OT Eval ordered: Yes  Post-acute Referrals Ordered: Yes  Post-acute Choice Obtained: Yes  Has referral(s) been sent to post-acute provider:  Yes      Anticipated Discharge Dispo: Discharge Disposition: D/T to SNF with Medicare cert in anticipation of skilled care (03)    DME Needed: No    Action(s) Taken: Chart review completed. Patient discussed during IDT rounds.     Per provider, patient is no longer MC to transfer to [facility] today; patient orthostatic and symptomatic per bedside RN    TC [facility] to [facility] to provide [facility]; they will have a bed available for patient tomorrow but [place] requesting CM f/u in am regarding transport time and patient's MC status    COBRA packet initiated and in CM office    Escalations Completed: None    Medically Clear: No    Next Steps: CM to follow up with IDT regarding DCP needs/barriers    Barriers to Discharge: Medical clearance

## 2024-11-01 NOTE — THERAPY
Speech Language Pathology   Clinical Swallow Evaluation     Patient Name: Marcelo Colon  AGE:  84 y.o., SEX:  person  Medical Record #: [id number]  Date of Service: 11/1/2024      History of Present Illness  84M who presented on 10/27/24 after GLF, found to have R femur fx. Patient is s/p ORIF of femur fx on 10/27. PMH notable for GERD, paroxysmal afib, bipolar 1 disorder, recurrent C diff, DM2, debility, tinnitus, b/l SNHL.    Not previously seen by service per EMR. Consult received per OT recommendation: acute difficulty swallowing with water falling out of his mouth and reported inability to swallow.     Imaging:   10/30/24 CT Head:   1.  No acute intracranial abnormality.  2.  Senescent changes    No chest imaging to review.       General Information:  Vitals  O2 Delivery Device: None - Room Air  Level of Consciousness: Alert  Patient Behaviors:  (Cooperative, Pleasant)  Orientation: Oriented x 4  Follows Directives: Yes      Prior Living Situation & Level of Function:  Prior Services: Home-Independent  Housing / Facility: [address] [place] [address]  Lives with - Patient's Self Care Capacity: Unrelated Adult  Communication: WFL  Swallowing: WFL       Oral Mechanism Evaluation:  Dentition: Edentulous (prefers no dentures for eating)   Facial Symmetry: Equal  Facial Sensation: Equal     Labial Observations: WFL   Lingual Observations: Midline  Motor Speech: WFL        Laryngeal Function:  Secretion Management: Adequate  Voice Quality: Breathy continuous (mild, baseline)  Cough: Perceptually weak         Subjective  RN cleared patient for evaluation. Patient alert and agreeable to evaluation. He reported no dysphagia, PNA or SLP hx. He reported consuming a regular diet without dentures. Patient reported swallow feels baseline this date compared to report from OT.       Assessment  Current Method of Nutrition: Oral diet (RG7/TN0)  Positioning: Mata's (60-90 degrees)  Bolus Administration: Patient    O2 Delivery Device: None -  Room Air  Factor(s) Affecting Performance: Impaired endurance  Tracheostomy : No      Swallowing Trials:  Thin Liquid (TN0): Impaired  Liquidised (LQ3): WFL  Regular (RG7): Impaired (oral phase)      Comments: Adequate oral bolus acceptance/containment. Presumed timely oral transit of TN0 and LQ3. Suspected piecemeal deglutition with continued mastication through multiple swallows. Fair bite with prolonged but functional mastication of solids. Cough with TN0 by straw; wet vocal quality with TN0 by cup. Vocal quality resolved with cued cough; though do query effectiveness given perceptually weak cough. Single swallow completed per bolus. Discussed signs of airway invasion (wet vocal quality cough), suspected etiology (advanced age, debility), and indication for instrumentation if in alignment with patient wishes. Discussed possible compensation for chronic aspiration now limited r/t impaired mobility after femur fx. Described FEES vs MBSS and patient interested in FEES to further assess.       Clinical Impressions  Patient presents with clinical indicators of airway invasion, concerning for pharyngeal dysphagia. Presentation is likely consistent with advanced age and known debility A swallowing diagnostic is indicated to guide dysphagia management in consideration of further impaired mobility with acute femur fx. Given likely chronicity of suspected dysphagia, will leave patient on oral diet in interim.     Recommendations  Diet Consistency: Regular solids, thin liquids  Instrumentation: FEES  Medication: Whole with puree  Supervision: Assist with meal tray set up, Distant supervision - check on patient 2-3 times per meal  Positioning: Fully upright and midline during oral intake  Risk Management : Small bites/sips, Slow rate of intake (Cough to clear wet vocal quality)  Oral Care: Pre- and post-meals         SLP Treatment Plan  Treatment Plan: Dysphagia Treatment  SLP Frequency: 3x Per Week  Estimated Duration:  "Until Therapy Goals Met      Anticipated Discharge Needs  Discharge Recommendations: Recommend post-acute placement for additional speech therapy services prior to discharge home   Therapy Recommendations Upon DC: Dysphagia Training        Patient / Family Goals  Patient / Family Goal #1: \"I'm interested in that\" to swallow diagnostic  Short Term Goals  Short Term Goal # 1: Patient will consume RG7/TN0 diet without decline in pulmonary status.  Short Term Goal # 2: Patient will identify ways to mitigate the risk of aspiration PNA with Giovanny.      Maria M Dela Cruz, SLP   "

## 2024-11-01 NOTE — CARE PLAN
The patient is Stable - Low risk of patient condition declining or worsening    Shift Goals  Clinical Goals: stability, safety  Patient Goals: comfort, feel better  Family Goals: na    Progress made toward(s) clinical / shift goals:      Patient is not progressing towards the following goals:      Problem: Pain - Standard  Goal: Alleviation of pain or a reduction in pain to the patient’s comfort goal  Outcome: Progressing     Problem: Knowledge Deficit - Standard  Goal: Patient and family/care givers will demonstrate understanding of plan of care, disease process/condition, diagnostic tests and medications  Outcome: Progressing     Problem: Skin Integrity  Goal: Skin integrity is maintained or improved  Outcome: Progressing     Problem: Fall Risk  Goal: Patient will remain free from falls  Outcome: Progressing     Problem: Safety - Medical Restraint  Goal: Remains free of injury from restraints (Restraint for Interference with Medical Device)  Outcome: Progressing  Goal: Free from restraint(s) (Restraint for Interference with Medical Device)  Outcome: Progressing     Problem: Communication  Goal: The ability to communicate needs accurately and effectively will improve  Outcome: Progressing     Problem: Safety  Goal: Will remain free from injury  Outcome: Progressing  Goal: Will remain free from falls  Outcome: Progressing     Problem: Pain Management  Goal: Pain level will decrease to patient's comfort goal  Outcome: Progressing     Problem: Infection  Goal: Will remain free from infection  Outcome: Progressing     Problem: Venous Thromboembolism (VTW)/Deep Vein Thrombosis (DVT) Prevention:  Goal: Patient will participate in Venous Thrombosis (VTE)/Deep Vein Thrombosis (DVT)Prevention Measures  Outcome: Progressing     Problem: Psychosocial Needs:  Goal: Level of anxiety will decrease  Outcome: Progressing     Problem: Fluid Volume:  Goal: Will maintain balanced intake and output  Outcome: Progressing     Problem:  Respiratory:  Goal: Respiratory status will improve  Outcome: Progressing     Problem: Bowel/Gastric:  Goal: Normal bowel function is maintained or improved  Outcome: Progressing  Goal: Will not experience complications related to bowel motility  Outcome: Progressing     Problem: Urinary Elimination:  Goal: Ability to reestablish a normal urinary elimination pattern will improve  Outcome: Progressing     Problem: Skin Integrity  Goal: Risk for impaired skin integrity will decrease  Outcome: Progressing     Problem: Mobility  Goal: Risk for activity intolerance will decrease  Outcome: Progressing     Problem: Medication  Goal: Compliance with prescribed medication will improve  Outcome: Progressing     Problem: Knowledge Deficit  Goal: Knowledge of disease process/condition, treatment plan, diagnostic tests, and medications will improve  Outcome: Progressing  Goal: Knowledge of the prescribed therapeutic regimen will improve  Outcome: Progressing     Problem: Discharge Barriers/Planning  Goal: Patient's continuum of care needs will be met  Outcome: Progressing

## 2024-11-01 NOTE — DISCHARGE PLANNING
[id number]  DPA spoke with Lorrie @ [facility] she stated they have been following patient's progress for acceptance/At this time they cannot accept until patient has been out of restraints for a couple of days/Also they would need to start an insurance auth for patient/Notified Lorrie PT had put updated notes in patient's chart/Notified CM via Teams

## 2024-11-02 LAB
ANION GAP SERPL CALC-SCNC: 9 MMOL/L (ref 7–16)
BUN SERPL-MCNC: 25 MG/DL (ref 8–22)
CALCIUM SERPL-MCNC: 8.8 MG/DL (ref 8.5–10.5)
CHLORIDE SERPL-SCNC: 107 MMOL/L (ref 96–112)
CO2 SERPL-SCNC: 25 MMOL/L (ref 20–33)
CREAT SERPL-MCNC: 0.83 MG/DL (ref 0.5–1.4)
ERYTHROCYTE [DISTWIDTH] IN BLOOD BY AUTOMATED COUNT: 46.5 FL (ref 35.9–50)
GFR SERPLBLD CREATININE-BSD FMLA CKD-EPI: 87 ML/MIN/1.73 M 2
GLUCOSE SERPL-MCNC: 122 MG/DL (ref 65–99)
HCT VFR BLD AUTO: 24.7 % (ref 42–52)
HGB BLD-MCNC: 8.3 G/DL (ref 14–18)
MCH RBC QN AUTO: 31.6 PG (ref 27–33)
MCHC RBC AUTO-ENTMCNC: 33.6 G/DL (ref 32.3–36.5)
MCV RBC AUTO: 93.9 FL (ref 81.4–97.8)
PLATELET # BLD AUTO: 189 K/UL (ref 164–446)
PMV BLD AUTO: 9.2 FL (ref 9–12.9)
POTASSIUM SERPL-SCNC: 3.3 MMOL/L (ref 3.6–5.5)
RBC # BLD AUTO: 2.63 M/UL (ref 4.7–6.1)
SODIUM SERPL-SCNC: 141 MMOL/L (ref 135–145)
WBC # BLD AUTO: 4.8 K/UL (ref 4.8–10.8)

## 2024-11-02 PROCEDURE — 99232 SBSQ HOSP IP/OBS MODERATE 35: CPT | Performed by: INTERNAL MEDICINE

## 2024-11-02 PROCEDURE — 85027 COMPLETE CBC AUTOMATED: CPT

## 2024-11-02 PROCEDURE — 770020 HCHG ROOM/CARE - TELE (206)

## 2024-11-02 PROCEDURE — 700102 HCHG RX REV CODE 250 W/ 637 OVERRIDE(OP): Performed by: INTERNAL MEDICINE

## 2024-11-02 PROCEDURE — A9270 NON-COVERED ITEM OR SERVICE: HCPCS | Performed by: INTERNAL MEDICINE

## 2024-11-02 PROCEDURE — 700102 HCHG RX REV CODE 250 W/ 637 OVERRIDE(OP): Mod: JZ | Performed by: NURSE PRACTITIONER

## 2024-11-02 PROCEDURE — 80048 BASIC METABOLIC PNL TOTAL CA: CPT

## 2024-11-02 PROCEDURE — 700105 HCHG RX REV CODE 258: Performed by: INTERNAL MEDICINE

## 2024-11-02 PROCEDURE — 36415 COLL VENOUS BLD VENIPUNCTURE: CPT

## 2024-11-02 PROCEDURE — A9270 NON-COVERED ITEM OR SERVICE: HCPCS | Mod: JZ | Performed by: NURSE PRACTITIONER

## 2024-11-02 RX ORDER — POTASSIUM CHLORIDE 1500 MG/1
40 TABLET, EXTENDED RELEASE ORAL ONCE
Status: COMPLETED | OUTPATIENT
Start: 2024-11-02 | End: 2024-11-02

## 2024-11-02 RX ADMIN — POTASSIUM CHLORIDE 40 MEQ: 1500 TABLET, EXTENDED RELEASE ORAL at 05:23

## 2024-11-02 RX ADMIN — LAMOTRIGINE 200 MG: 100 TABLET ORAL at 05:23

## 2024-11-02 RX ADMIN — GABAPENTIN 300 MG: 300 CAPSULE ORAL at 05:24

## 2024-11-02 RX ADMIN — TAMSULOSIN HYDROCHLORIDE 0.4 MG: 0.4 CAPSULE ORAL at 22:04

## 2024-11-02 RX ADMIN — SODIUM CHLORIDE: 9 INJECTION, SOLUTION INTRAVENOUS at 05:29

## 2024-11-02 RX ADMIN — GABAPENTIN 300 MG: 300 CAPSULE ORAL at 17:04

## 2024-11-02 ASSESSMENT — ENCOUNTER SYMPTOMS
FEVER: 0
VOMITING: 0
NAUSEA: 0
ABDOMINAL PAIN: 0
SHORTNESS OF BREATH: 0

## 2024-11-02 ASSESSMENT — PAIN DESCRIPTION - PAIN TYPE
TYPE: ACUTE PAIN
TYPE: ACUTE PAIN

## 2024-11-02 ASSESSMENT — FIBROSIS 4 INDEX: FIB4 SCORE: 2.88

## 2024-11-02 NOTE — PROGRESS NOTES
Tonia Narayanna Patient Age: 78 year old  MESSAGE:   Farzana is calling from the [facility], and would like to speak to clinical regarding an order that needs to be placed: bilateral ultrasound, please advise.     Next and Last Visit with Provider and Department  Last visit with this provider: 5/2/2019  Last visit with this department: 5/2/2019    Next visit with this provider: Visit date not found  Next visit with this department: Visit date not found    WEIGHT AND HEIGHT:   Wt Readings from Last 1 Encounters:   05/03/19 83 kg (183 lb)     Ht Readings from Last 1 Encounters:   05/03/19 5' 3\" (1.6 m)     BMI Readings from Last 1 Encounters:   05/03/19 32.42 kg/m²       ALLERGIES:  Ace inhibitors; Nifedipine; Spironolactone; Yellow dye   (food or med); Minoxidil; Opioid analgesics; Aspirin; Fish oil; Hydrochlorothiazide w/triamterene; and Hydrocodone-acetaminophen  Current Outpatient Medications   Medication   • omeprazole (PRILOSEC) 20 MG capsule   • [START ON 5/15/2019] eplerenone (INSPRA) 25 MG tablet   • PROAIR  (90 Base) MCG/ACT inhaler   • fluocinonide (LIDEX) 0.05 % ointment   • fluocinolone (SYNALAR) 0.01 % topical solution   • fluticasone (FLOVENT HFA) 220 MCG/ACT inhaler   • metroNIDAZOLE (METROCREAM) 0.75 % cream   • nystatin-triamcinolone (MYCOLOG II) 379609-3.1 UNIT/GM-% cream   • hydroCORTisone (CORTIZONE) 2.5 % ointment   • busPIRone (BUSPAR) 10 MG tablet     No current facility-administered medications for this visit.      PHARMACY to use:           Pharmacy preference(s) on file:   [id number] - [place], [place] - [address] AT [facility] [address]  [address]  [facility] [address]  Phone: [phone] Fax: [phone]    [facility] - [place], [place] - [address] [facility]  [address] [facility] [address]  Phone: [phone] Fax: [phone]    [facility] - [facility] [place] [facility] [place]  Pt assessment complete. Denies pain in RLE. Pt would like to rest and eat breakfast later.   [address] [address]  Phone: [phone] Fax: [phone]      CALL BACK INFO: Ok to leave response (including medical information) with family member or on answering machine  ROUTING: Patient's physician/staff        PCP: Brandy De La Paz MD         INS: Payor: MEDICARE / Plan: PARTA AND B / Product Type: MEDICARE   PATIENT ADDRESS:  [address] [place] [address]

## 2024-11-02 NOTE — PROGRESS NOTES
Monitor Summary  Rhythm: SR  Rate: 65-94  Ectopy: PVC (R)  Measurements: .15/.17/.49  ---12 hr Chart Review---

## 2024-11-02 NOTE — PROGRESS NOTES
[facility] Medicine Daily Progress Note    Date of Service  11/2/2024    Chief Complaint  Marcelo Colon is a 84 y.o. person admitted 10/27/2024 with ground-level fall.    Hospital Course  Richard Colon is an 84-year-old person with PMHx paroxysmal atrial fibrillation not on anticoagulation, GERD, bipolar 1, BPH, memory deficits.  Admitted 10/27 after GLF and subsequent right proximal femur fracture.    In the emergency room-femur x-ray showing proximal right femur fracture with angulation and displacement.  CT head without intracranial abnormalities.  Orthopedic surgery was consulted.  Patient underwent ORIF right proximal femur fracture on 10/27 with Dr. Mann.    Interval Problem Update  Patient seen and examined, still confused but slowly improving still on restrains  CT head negative , UA also neg for infection,   Hemoglobin 8.9 today after one unit transfused  yesterday   11/1: patient seen and examined, afebrile, resting in bed, mentation is improving, patient had positive orthostatic today when standing up  Starting IV fluid. Hemoglobin e stable at 8,9 will monitor  Ortho following regarding his hip surgery appreciate rec.   11/2: Patient seen and examined,mentation improving, will stop IV fluid today , hemoglobin 8.3 today close monitor   PT/OT eval needs placement    I have discussed this patient's plan of care and discharge plan at IDT rounds today with Case Management, Nursing, Nursing leadership, and other members of the IDT team.    Consultants/Specialty  orthopedics    Code Status  DNAR/DNI    Disposition  The patient is not medically cleared for discharge to home or a post-acute facility.      I have placed the appropriate orders for post-discharge needs.    Review of Systems  Review of Systems   Constitutional:  Negative for fever and malaise/fatigue.   Respiratory:  Negative for shortness of breath.    Cardiovascular:  Negative for chest pain and leg swelling.   Gastrointestinal:  Negative for  abdominal pain, nausea and vomiting.   Musculoskeletal:  Positive for joint pain.        Physical Exam  Temp:  [36.5 °C (97.7 °F)-37.4 °C (99.3 °F)] 37.4 °C (99.3 °F)  Pulse:  [68-82] 75  Resp:  [15-18] 18  BP: ()/(43-51) 135/51  SpO2:  [91 %-95 %] 93 %    Physical Exam  Vitals and nursing note reviewed.   Constitutional:       General: Richard is not in acute distress.     Appearance: Normal appearance. Richard is underweight. Richard is ill-appearing.   Cardiovascular:      Rate and Rhythm: Normal rate and regular rhythm.      Heart sounds: Murmur heard.   Pulmonary:      Effort: Pulmonary effort is normal. No respiratory distress.      Breath sounds: Normal breath sounds. No wheezing.   Abdominal:      General: Bowel sounds are normal. There is no distension.      Palpations: Abdomen is soft.      Tenderness: There is no abdominal tenderness.   Musculoskeletal:      Comments: Decreased ROM to right hip   Well healing right lateral hip surgical site    Skin:     General: Skin is warm and dry.   Neurological:      Mental Status: Richard is alert. Mental status is at baseline.   Psychiatric:         Mood and Affect: Mood normal.         Behavior: Behavior normal.         Fluids    Intake/Output Summary (Last 24 hours) at 11/2/2024 1358  Last data filed at 11/2/2024 1302  Gross per 24 hour   Intake 240 ml   Output 750 ml   Net -510 ml        Laboratory  Recent Labs     10/31/24  0822 11/01/24  0006 11/02/24  0413   WBC 6.4 6.2 4.8   RBC 2.86* 2.83* 2.63*   HEMOGLOBIN 8.9* 8.9* 8.3*   HEMATOCRIT 26.1* 26.0* 24.7*   MCV 91.3 91.9 93.9   MCH 31.1 31.4 31.6   MCHC 34.1 34.2 33.6   RDW 47.4 47.6 46.5   PLATELETCT 195 200 189   MPV 9.5 9.6 9.2     Recent Labs     10/31/24  0822 11/01/24  0006 11/02/24  0413   SODIUM 144 141 141   POTASSIUM 3.7 3.5* 3.3*   CHLORIDE 107 106 107   CO2 23 25 25   GLUCOSE 117* 122* 122*   BUN 26* 27* 25*   CREATININE 0.91 0.87 0.83   CALCIUM 9.2 9.3 8.8                     Imaging  CT-HEAD W/O   Final  Result      1.  No acute intracranial abnormality.   2.  Senescent changes            EC-ECHOCARDIOGRAM COMPLETE W/O CONT   Final Result      DX-PORTABLE FLUORO > 1 HOUR   Final Result      Portable fluoroscopy utilized for 1 minute 18 seconds.         INTERPRETING LOCATION: [address], [place], [address]      DX-FEMUR-2+ RIGHT   Final Result      Digitized intraoperative radiograph is submitted for review. This examination is not for diagnostic purpose but for guidance during a surgical procedure. Please see the patient's chart for full procedural details.         INTERPRETING LOCATION: [address], [place], [address]      CT-LSPINE W/O PLUS RECONS   Final Result      1.  Severe multilevel degenerative change of lumbar spine.   2.  No fracture or subluxation.      CT-CSPINE WITHOUT PLUS RECONS   Final Result      1.  No cervical spine fracture or subluxation.   2.  Severe multilevel degenerative change and diffuse osteopenia.   3.  Prior multilevel laminectomy and posterior fusion with finding indicating possible loosening of screws on the RIGHT at the T1 and T2 levels.      CT-HEAD W/O   Final Result      1.  Cerebral atrophy and chronic microvascular ischemic type changes.   2.  No acute intracranial abnormality.   3.  Right sphenoid sinus disease               DX-FEMUR-2+ RIGHT   Final Result   Impression:      1. Proximal right femur fracture.                     DX-PELVIS-1 OR 2 VIEWS   Final Result   Impression:      1. No pelvis fracture evident.      2. Proximal right femur fracture.           Assessment/Plan  * Closed hip fracture requiring operative repair, right, initial encounter (HCC)- (present on admission)  Assessment & Plan  Femur x-ray showing proximal right femur fracture with angulation and displacement.   S/p ORIF 10/27 with Dr. Johnathan CHARLES   - Continue pain management as requested  - PT OT today; WBAT  - Will likely need PAR   - Restart DVT prophylaxis today     Frailty syndrome in geriatric  patient- (present on admission)  Assessment & Plan  Weight loss, depression, new hip fracture    Gastroesophageal reflux disease without esophagitis- (present on admission)  Assessment & Plan  Continue Pepcid    Paroxysmal A-fib (HCC)- (present on admission)  Assessment & Plan  Per note from cardiology 5/22 patient had paroxysmal A-fib recommended for propranolol 10 mg and Eliquis  Patient has not been taking these, states he was told he did not need them but cannot tell me by who  - Continuous telemetry monitoring; monitoring for arrhythmia in the setting of acute fracture and questionable history of a fib  - Echo pending for today  - Holding AC due to POD 1 from ORIF to right hip     DNR (do not resuscitate)- (present on admission)  Assessment & Plan  Confirmed patient is DNR/DNI    Cachexia (HCC)- (present on admission)  Assessment & Plan  Patient reports weight loss and low appetite  Nutrition consulted    Recurrent Clostridium difficile diarrhea- (present on admission)  Assessment & Plan  Hx of   Infectious disease recommended a taper on discharge in 2017  It is still on his med rec is being taken daily, will not start inpatient for now  Discussed with infectious disease, there is no indication for continuing long-term vancomycin unless patient is on antibiotics which they are not.    Bipolar 1 disorder, depressed (HCC)- (present on admission)  Assessment & Plan  Continue home Lamictal  Patient reports worsening depression with low appetite and weight loss  Nutrition consulted    Benign prostatic hyperplasia- (present on admission)  Assessment & Plan  Substituted tamsulosin for patient's home alfuzosin       VTE prophylaxis: Lovenox ppx       I have performed a physical exam and reviewed and updated ROS and Plan today (11/2/2024). In review of yesterday's note (11/1/2024), there are no changes except as documented above.

## 2024-11-02 NOTE — PROGRESS NOTES
Bedside report received, assumed care of patient at change of shift. Chart, labs, and orders reviewed. Pt resting in bed, breathing even and unlabored, denies pain and in no acute distress. A&O x3-4 per report. Tele monitoring in place. Fall precautions including bed alarm in place and education provided. Call light within reach, bed locked and in lowest position, denies other needs at this time.

## 2024-11-02 NOTE — DISCHARGE SUMMARY
Discharge Summary    CHIEF COMPLAINT ON ADMISSION  Chief Complaint   Patient presents with    GLF     BIB ems from home. Pt sustained a MGLF this morning around 0200. He was stuck on the ground he states for approximately 3 hours till he could scoot on his back and call ems. Pt has swelling, pain to the right thigh. He also has internal rotation to the RLE.       Reason for Admission  EMS    Admission Date  10/27/2024     CODE STATUS  DNAR/DNI    HPI & HOSPITAL COURSE  This is a 84 y.o. male with PMHx paroxysmal atrial fibrillation not on anticoagulation, GERD, bipolar 1, BPH, memory deficits.  Admitted 10/27 after GLF and subsequent right proximal femur fracture.  In the emergency room-femur x-ray showing proximal right femur fracture with angulation and displacement.  CT head without intracranial abnormalities.  Orthopedic surgery was consulted.  Patient underwent ORIF right proximal femur fracture on 10/27 with Dr. Mann.  He had anemia after surgery requiring 1 unit of RBC but since his hemoglobin has been stable.  Patient has been seen by physical therapy after surgery and placement has been recommended   Patient will be discharged to SNF today for continuation of physical therapy     The patient met 2-midnight criteria for an inpatient stay at the time of discharge.      FOLLOW UP ITEMS POST DISCHARGE  Ortho  PCP    DISCHARGE DIAGNOSES  Principal Problem:    Closed hip fracture requiring operative repair, right, initial encounter (HCC) (POA: Yes)  Active Problems:    Benign prostatic hyperplasia (Chronic) (POA: Yes)    Bipolar 1 disorder, depressed (HCC) (POA: Yes)      Overview: Patient reports depression is not worse or better. He states he is quick       to anger. States he is still having decreased appetite and poor sleep. Has       not been able to get modafinil approved. Also states he had to switch to       generic lamotrigine which he thinks is not working as well. Is taking       medications as  "prescribed.            Says sleep has been worse. Still with difficulty falling asleep and then       sleeping too much throughout the day when he does fall asleep.             Also with 6 pound recent weight loss. Believes it is due to mood and       decreased appetite. Not interested in nutritionist, appetite stimulant.       Eats 1 meal a day \"whatever I can find in the frozen section of the       grocery store.\" Never with periods of days where he could not sleep,       extremes of mood, erratic behavior (denies). No night sweats, fevers,       chills. No SI/HI, intent to harm self or others.     Recurrent Clostridium difficile diarrhea (Chronic) (POA: Yes)      Overview: - Patient on Van taper           Cachexia (HCC) (Chronic) (POA: Yes)    DNR (do not resuscitate) (POA: Yes)      Overview: IMO load March 2020    Paroxysmal A-fib (HCC) (POA: Yes)      Overview: Patient reports he is unsure if he should be taking metoprolol and       Eliquis. States he last saw cardiology early this year but does not       remember name of the doctor and would like to follow with a different       doctor. No change of speech, vision, dizziness or balance difficulty,       chest pain, heart palpitations, syncope. Denies ever having a fall.             On review of cardiology note 05/22, patient recommended for propranolol       10mg and Eliquis.     Gastroesophageal reflux disease without esophagitis (POA: Yes)    Frailty syndrome in geriatric patient (POA: Yes)  Resolved Problems:    Encounter for perioperative consultation (POA: Yes)      FOLLOW UP  No future appointments.  Kennedi Paulson M.D.  [address]  [facility] [address]  [phone]    Go on 11/18/2024  Please go to your follow up appointment with Kennedi Paulson M.D. on Monday November 18, 2024 check in at 11:15am for a 11:40am appointment.    Please bring your ID, Insurance Card(s) and list of medications you are currently taking.    [facility]  [address] [facility] [id number]  [phone]  Follow up in 14 day(s)  For suture removal      MEDICATIONS ON DISCHARGE     Medication List        START taking these medications        Instructions   QUEtiapine 25 MG Tabs  Commonly known as: SEROquel   Take 1 Tablet by mouth every evening.  Dose: 25 mg            CONTINUE taking these medications        Instructions   alfuzosin 10 MG SR tablet  Commonly known as: Uroxatral   Take 10 mg by mouth at bedtime.  Dose: 10 mg     famotidine 20 MG Tabs  Commonly known as: Pepcid   Take 20 mg by mouth at bedtime.  Dose: 20 mg     gabapentin 300 MG Caps  Commonly known as: Neurontin   Take 300 mg by mouth 2 times a day.  Dose: 300 mg     lamoTRIgine 100 MG Tabs  Commonly known as: LaMICtal   TAKE 2 TABLETS BY MOUTH EVERY DAY  Dose: 200 mg     oxyCODONE immediate release 10 MG immediate release tablet  Commonly known as: Roxicodone   Take 10 mg by mouth 3 times a day as needed for Severe Pain.  Dose: 10 mg            STOP taking these medications      vancomycin 125 MG capsule  Commonly known as: Vancocin              Allergies  Allergies   Allergen Reactions    Acetaminophen Nausea     Very nauseated        DIET  Orders Placed This Encounter   Procedures    Diet Order Diet: Level 6 - Soft and Bite Sized; Liquid level: Level 0 - Thin; Second Modifier: (optional): Consistent CHO (Diabetic)     Standing Status:   Standing     Number of Occurrences:   1     Order Specific Question:   Diet:     Answer:   Level 6 - Soft and Bite Sized [23]     Order Specific Question:   Liquid level     Answer:   Level 0 - Thin     Order Specific Question:   Second Modifier: (optional)     Answer:   Consistent CHO (Diabetic) [4]       ACTIVITY  As tolerated.  Weight bearing as tolerated    LINES, DRAINS, AND WOUNDS  This is an automated list. Peripheral IVs will be removed prior to discharge.  Peripheral IV 10/30/24 22 G Anterior;Left Forearm (Active)   Site Assessment Clean;Dry;Intact  11/02/24 0924   Dressing Type Transparent 11/02/24 0924   Line Status Infusing 11/02/24 0924   Dressing Status Clean;Dry;Intact 11/02/24 0924   Dressing Intervention N/A 11/02/24 0924   Infiltration Grading ([facility]) 0 11/02/24 0924   Phlebitis Scale ([facility] Only) 0 11/02/24 0924       Wound 10/27/24 Incision Leg HORACE, XEROFORM, 4X4, TEGADERM (Active)   Site Assessment OTONIEL 11/02/24 0912   Periwound Assessment Clean;Dry;Intact 11/01/24 2000   Margins OTONIEL 10/31/24 0800   Closure OTONIEL 10/30/24 2000   Drainage Amount None 11/02/24 0912   Treatments Site care 10/27/24 1940   Periwound Protectant Not Applicable 10/27/24 1940   Dressing Status Clean;Dry;Intact 11/02/24 0912   Dressing Changed Observed 11/02/24 0912   Dressing Cleansing/Solutions Not Applicable 10/29/24 2000   Dressing Options Dry Gauze;Transparent Film 11/02/24 0912       Wound 10/30/24 Groin;Penis;Scrotum bruising (Active)   Wound Image    10/31/24 0140   Site Assessment Clean;Dry;Intact 11/01/24 0730   Periwound Assessment Clean;Dry;Intact 11/01/24 0730       Peripheral IV 10/30/24 22 G Anterior;Left Forearm (Active)   Site Assessment Clean;Dry;Intact 11/02/24 0924   Dressing Type Transparent 11/02/24 0924   Line Status Infusing 11/02/24 0924   Dressing Status Clean;Dry;Intact 11/02/24 0924   Dressing Intervention N/A 11/02/24 0924   Infiltration Grading ([facility]) 0 11/02/24 0924   Phlebitis Scale ([facility] Only) 0 11/02/24 0924               MENTAL STATUS ON TRANSFER             CONSULTATIONS  Ortho     PROCEDURES   Open reduction internal fixation right proximal femur fracture with cephalomedullary nail     LABORATORY  Lab Results   Component Value Date    SODIUM 141 11/02/2024    POTASSIUM 3.3 (L) 11/02/2024    CHLORIDE 107 11/02/2024    CO2 25 11/02/2024    GLUCOSE 122 (H) 11/02/2024    BUN 25 (H) 11/02/2024    CREATININE 0.83 11/02/2024        Lab Results   Component Value Date    WBC 4.8 11/02/2024    HEMOGLOBIN 8.3 (L) 11/02/2024     HEMATOCRIT 24.7 (L) 11/02/2024    PLATELETCT 189 11/02/2024        Total time of the discharge process exceeds 35 minutes.

## 2024-11-02 NOTE — PROGRESS NOTES
Pt resting, breathing even and unlabored on room air. Not ready for breakfast yet. Educated pt on plan to mobilize today. Pt verbalized understanding.

## 2024-11-02 NOTE — CARE PLAN
Problem: Pain - Standard  Goal: Alleviation of pain or a reduction in pain to the patient’s comfort goal  Description: Target End Date:  Prior to discharge or change in level of care    Document on Vitals flowsheet    1.  Document pain using the appropriate pain scale per order or unit policy  2.  Educate and implement non-pharmacologic comfort measures (i.e. relaxation, distraction, massage, cold/heat therapy, etc.)  3.  Pain management medications as ordered  4.  Reassess pain after pain med administration per policy  5.  If opiods administered assess patient's response to pain medication is appropriate per POSS sedation scale  6.  Follow pain management plan developed in collaboration with patient and interdisciplinary team (including palliative care or pain specialists if applicable)  Outcome: Progressing     Problem: Knowledge Deficit - Standard  Goal: Patient and family/care givers will demonstrate understanding of plan of care, disease process/condition, diagnostic tests and medications  Description: Target End Date:  1-3 days or as soon as patient condition allows    Document in Patient Education    1.  Patient and family/caregiver oriented to unit, equipment, visitation policy and means for communicating concern  2.  Complete/review Learning Assessment  3.  Assess knowledge level of disease process/condition, treatment plan, diagnostic tests and medications  4.  Explain disease process/condition, treatment plan, diagnostic tests and medications  Outcome: Progressing     Problem: Skin Integrity  Goal: Skin integrity is maintained or improved  Description: Target End Date:  Prior to discharge or change in level of care    Document interventions on Skin Risk/Rohith flowsheet groups and corresponding LDA    1.  Assess and monitor skin integrity, appearance and/or temperature  2.  Assess risk factors for impaired skin integrity and/or pressures ulcers  3.  Implement precautions to protect skin integrity in  collaboration with interdisciplinary team  4.  Implement pressure ulcer prevention protocol if at risk for skin breakdown  5.  Confirm wound care consult if at risk for skin breakdown  6.  Ensure patient use of pressure relieving devices  (Low air loss bed, waffle overlay, heel protectors, ROHO cushion, etc)  Outcome: Progressing     Problem: Fall Risk  Goal: Patient will remain free from falls  Description: Target End Date:  Prior to discharge or change in level of care    Document interventions on the [facility] Fall Risk Assessment    1.  Assess for fall risk factors  2.  Implement fall precautions  Outcome: Progressing     Problem: Safety - Medical Restraint  Goal: Remains free of injury from restraints (Restraint for Interference with Medical Device)  Description: INTERVENTIONS:  1. Determine that other, less restrictive measures have been tried or would not be effective before applying the restraint  2. Evaluate the patient's condition at the time of restraint application  3. Educate patient/family regarding the reason for restraint  4. Q2H: Monitor safety, psychosocial status, comfort, circulation, respiratory status, LOC, nutrition and hydration  Outcome: Progressing  Goal: Free from restraint(s) (Restraint for Interference with Medical Device)  Description: INTERVENTIONS:  1.  ONCE/SHIFT or MINIMUM Q12H: Assess and document the continuing need for restraints  2.  Q24H: Continued use of restraint requires LIP to perform face to face examination and written order  3.  Identify and implement measures to help patient regain control  4.  Educate patient/family on discontinuation criteria   5.  Assess patient's understanding and retention of education provided  6.  Assess readiness for release & initiate progressive release per protocol  7.  Identify and document criteria for restraints  Outcome: Progressing     Problem: Communication  Goal: The ability to communicate needs accurately and effectively will  improve  Outcome: Progressing     Problem: Safety  Goal: Will remain free from injury  Outcome: Progressing  Goal: Will remain free from falls  Outcome: Progressing     Problem: Pain Management  Goal: Pain level will decrease to patient's comfort goal  Outcome: Progressing     Problem: Infection  Goal: Will remain free from infection  Outcome: Progressing     Problem: Venous Thromboembolism (VTW)/Deep Vein Thrombosis (DVT) Prevention:  Goal: Patient will participate in Venous Thrombosis (VTE)/Deep Vein Thrombosis (DVT)Prevention Measures  Outcome: Progressing     Problem: Psychosocial Needs:  Goal: Level of anxiety will decrease  Outcome: Progressing     Problem: Fluid Volume:  Goal: Will maintain balanced intake and output  Outcome: Progressing     Problem: Respiratory:  Goal: Respiratory status will improve  Outcome: Progressing     Problem: Bowel/Gastric:  Goal: Normal bowel function is maintained or improved  Outcome: Progressing  Goal: Will not experience complications related to bowel motility  Outcome: Progressing     Problem: Urinary Elimination:  Goal: Ability to reestablish a normal urinary elimination pattern will improve  Outcome: Progressing     Problem: Skin Integrity  Goal: Risk for impaired skin integrity will decrease  Outcome: Progressing     Problem: Mobility  Goal: Risk for activity intolerance will decrease  Outcome: Progressing     Problem: Medication  Goal: Compliance with prescribed medication will improve  Outcome: Progressing     Problem: Knowledge Deficit  Goal: Knowledge of disease process/condition, treatment plan, diagnostic tests, and medications will improve  Outcome: Progressing  Goal: Knowledge of the prescribed therapeutic regimen will improve  Outcome: Progressing     Problem: Discharge Barriers/Planning  Goal: Patient's continuum of care needs will be met  Outcome: Progressing     The patient is Watcher - Medium risk of patient condition declining or worsening    Shift  Goals  Clinical Goals: stability, safety  Patient Goals: comfort, feel better  Family Goals: na

## 2024-11-02 NOTE — DISCHARGE PLANNING
0834  DPA called Joseph @ [place] [phone] left a VM for bed availability for today/Notified CM via Teams  0906  DPA called [facility] and Lorrie will start ins auth for this patient/Notified CM via Teams

## 2024-11-02 NOTE — DISCHARGE PLANNING
Case Management Discharge Planning    Admission Date: 10/27/2024  GMLOS: 4.4  ALOS: 6    6-Clicks ADL Score: 12  6-Clicks Mobility Score: 14  PT and/or OT Eval ordered: Yes  Post-acute Referrals Ordered: Yes  Post-acute Choice Obtained: Yes  Has referral(s) been sent to post-acute provider:  Yes      Anticipated Discharge Dispo: Discharge Disposition: D/T to SNF with Medicare cert in anticipation of skilled care (03)    DME Needed: No    Action(s) Taken: Chart review completed. Patient discussed in am rounds.     Per provider, patient is MC to transfer to SNF; RNCM placed call to [facility] at [facility]: bed availability    Per [facility], admissions are on hold for the weekend; no bed available until Monday, 11/4    RNCM requested DPA placed TC to admissions at other accepting SNFs; per DPA, [facility] admissions does not open until 9am,  left for admissions at [place]    0908: Per DPA, [facility] will submit for insurance auth today    Escalations Completed: None    Medically Clear: Yes    Next Steps: CM to follow up with IDT regarding DCP needs/barriers    Barriers to Discharge: Pending Insurance Authorization    Is the patient up for discharge tomorrow: No

## 2024-11-02 NOTE — PROGRESS NOTES
Monitor Summary  Rhythm: SB  Rate: 52-57  Ectopy: PVC (R)  Measurements: .16/.08/.37  ---12 hr Chart Review---

## 2024-11-02 NOTE — PROGRESS NOTES
Pt sat at edge of bed for lunch. Requesting to go back to bed. Assisted and repositioned. Pt updated on plan of care-waiting for auth to go to [facility]. Ortho PA-C in to see pt.

## 2024-11-02 NOTE — CARE PLAN
The patient is Stable - Low risk of patient condition declining or worsening    Shift Goals  Clinical Goals: mobility, skin integrity, prepare for transfer to SNF  Patient Goals: rest, comfort  Family Goals: unable to assess    Progress made toward(s) clinical / shift goals:      Problem: Knowledge Deficit - Standard  Goal: Patient and family/care givers will demonstrate understanding of plan of care, disease process/condition, diagnostic tests and medications  11/2/2024 1528 by Sanjuana Ortiz R.N.  Outcome: Progressing  Note: Patient educated of disease process and plan of care. Treatment team has included patient in plan of care. All medications indications and side effects were explained. Patient encouraged to ask questions. Patient verbalizes understanding.     Problem: Skin Integrity  Goal: Skin integrity is maintained or improved  11/2/2024 1528 by Sanjuana Ortiz R.N.  Outcome: Progressing  Note: Patient skin assessed. Risk factors that lead to skin breakdown/impairment identified using Rohith Scale. Interventions to prevent skin breakdown implemented including EVANGELINA mattress, repositioning off sacrum, and managing incontinence.      Problem: Fall Risk  Goal: Patient will remain free from falls  11/2/2024 1528 by Sanjuana Ortiz R.N.  Outcome: Progressing  Note: Patient's risk for injury and falls assessed using Smith Deonte scale. Appropriate safety precautions in place including bed alarm. Patient educated to utilize call light for needs. Patient demonstrates compliance with fall precautions.      Problem: Mobility  Goal: Risk for activity intolerance will decrease  11/2/2024 1528 by Sanjuana Ortiz R.N.  Outcome: Progressing  Note: Patient mobilized to edge of bed. Encouraged ROM exercises.      Problem: Hemodynamics  Goal: Patient's hemodynamics, fluid balance and neurologic status will be stable or improve  Outcome: Progressing  Note: Patient vitals assessed Q4 and prn. Patient  on continuous telemetry monitoring. Patient neuro status assessed and intact. Patient assessed for signs of decreased cardiac output. Patient intake and output measured Q4.        Patient is not progressing towards the following goals: N/A

## 2024-11-02 NOTE — PROGRESS NOTES
"      Orthopaedic Progress Note    Interval changes:  Patient doing well   RLE dressings CDI  Cleared for DC to rehab by ortho pending medicine clearance    ROS - Patient denies any new issues.  Pain well controlled.    /51   Pulse 75   Temp 37.4 °C (99.3 °F) (Temporal)   Resp 18   Ht 1.727 m (5' 8\")   Wt 65 kg (143 lb 4.8 oz)   SpO2 93%     Patient seen and examined  No acute distress  Breathing non labored  RRR  RLE dressings CDI, DNVI, moves all toes, cap refill <2 sec.     Recent Labs     10/31/24  0822 11/01/24  0006 11/02/24  0413   WBC 6.4 6.2 4.8   RBC 2.86* 2.83* 2.63*   HEMOGLOBIN 8.9* 8.9* 8.3*   HEMATOCRIT 26.1* 26.0* 24.7*   MCV 91.3 91.9 93.9   MCH 31.1 31.4 31.6   MCHC 34.1 34.2 33.6   RDW 47.4 47.6 46.5   PLATELETCT 195 200 189   MPV 9.5 9.6 9.2       Active Hospital Problems    Diagnosis     Recurrent Clostridium difficile diarrhea [A04.71]      Priority: High     - Patient on Van taper         Cachexia ([facility]) [R64]      Priority: Medium    Bipolar 1 disorder, depressed ([facility]) [F31.9]      Priority: Low     Patient reports depression is not worse or better. He states he is quick to anger. States he is still having decreased appetite and poor sleep. Has not been able to get modafinil approved. Also states he had to switch to generic lamotrigine which he thinks is not working as well. Is taking medications as prescribed.    Says sleep has been worse. Still with difficulty falling asleep and then sleeping too much throughout the day when he does fall asleep.     Also with 6 pound recent weight loss. Believes it is due to mood and decreased appetite. Not interested in nutritionist, appetite stimulant. Eats 1 meal a day \"whatever I can find in the frozen section of the grocery store.\" Never with periods of days where he could not sleep, extremes of mood, erratic behavior (denies). No night sweats, fevers, chills. No SI/HI, intent to harm self or others.       Benign prostatic hyperplasia " [N40.0]      Priority: Low    Closed hip fracture requiring operative repair, right, initial encounter ([facility]) [S72.001A]     Frailty syndrome in geriatric patient [R54]     Gastroesophageal reflux disease without esophagitis [K21.9]     Paroxysmal A-fib ([facility]) [I48.0]      Patient reports he is unsure if he should be taking metoprolol and Eliquis. States he last saw cardiology early this year but does not remember name of the doctor and would like to follow with a different doctor. No change of speech, vision, dizziness or balance difficulty, chest pain, heart palpitations, syncope. Denies ever having a fall.     On review of cardiology note 05/22, patient recommended for propranolol 10mg and Eliquis.       DNR (do not resuscitate) [Z66]      IMO load March 2020         Assessment/Plan:  Patient doing well  RLE dressings CDI   Cleared for DC to rehab by ortho pending medicine clearance  POD#6 S/P Open reduction internal fixation right proximal femur fracture with cephalomedullary nail   Wt bearing status - WBAT  Wound care/Drains - Dressings to be changed every other day by nursing. Or PRN for saturation starting POD#2  Future Procedures - none planned   Lovenox: Start 10/28, Duration-until ambulatory > 150'  Sutures/Staples out- 14-21 days post operatively. Removal will completed by ortho mid levels only.  PT/OT-initiated  Antibiotics: Perioperative completed  DVT Prophylaxis- TEDS/SCDs/Foot pumps  Ruff-not needed per ortho  Case Coordination for Discharge Planning - Disposition per therapy recs.

## 2024-11-03 LAB
ANION GAP SERPL CALC-SCNC: 8 MMOL/L (ref 7–16)
BUN SERPL-MCNC: 19 MG/DL (ref 8–22)
CALCIUM SERPL-MCNC: 9.1 MG/DL (ref 8.5–10.5)
CHLORIDE SERPL-SCNC: 109 MMOL/L (ref 96–112)
CO2 SERPL-SCNC: 24 MMOL/L (ref 20–33)
CREAT SERPL-MCNC: 0.77 MG/DL (ref 0.5–1.4)
ERYTHROCYTE [DISTWIDTH] IN BLOOD BY AUTOMATED COUNT: 46.9 FL (ref 35.9–50)
GFR SERPLBLD CREATININE-BSD FMLA CKD-EPI: 89 ML/MIN/1.73 M 2
GLUCOSE SERPL-MCNC: 113 MG/DL (ref 65–99)
HCT VFR BLD AUTO: 25.6 % (ref 42–52)
HGB BLD-MCNC: 8.3 G/DL (ref 14–18)
MCH RBC QN AUTO: 30.5 PG (ref 27–33)
MCHC RBC AUTO-ENTMCNC: 32.4 G/DL (ref 32.3–36.5)
MCV RBC AUTO: 94.1 FL (ref 81.4–97.8)
PLATELET # BLD AUTO: 240 K/UL (ref 164–446)
PMV BLD AUTO: 9.2 FL (ref 9–12.9)
POTASSIUM SERPL-SCNC: 3.9 MMOL/L (ref 3.6–5.5)
RBC # BLD AUTO: 2.72 M/UL (ref 4.7–6.1)
SODIUM SERPL-SCNC: 141 MMOL/L (ref 135–145)
WBC # BLD AUTO: 4.9 K/UL (ref 4.8–10.8)

## 2024-11-03 PROCEDURE — 85027 COMPLETE CBC AUTOMATED: CPT

## 2024-11-03 PROCEDURE — A9270 NON-COVERED ITEM OR SERVICE: HCPCS | Performed by: INTERNAL MEDICINE

## 2024-11-03 PROCEDURE — 700111 HCHG RX REV CODE 636 W/ 250 OVERRIDE (IP): Mod: JZ | Performed by: INTERNAL MEDICINE

## 2024-11-03 PROCEDURE — 700102 HCHG RX REV CODE 250 W/ 637 OVERRIDE(OP): Performed by: INTERNAL MEDICINE

## 2024-11-03 PROCEDURE — 99232 SBSQ HOSP IP/OBS MODERATE 35: CPT | Performed by: INTERNAL MEDICINE

## 2024-11-03 PROCEDURE — 80048 BASIC METABOLIC PNL TOTAL CA: CPT

## 2024-11-03 PROCEDURE — 770020 HCHG ROOM/CARE - TELE (206)

## 2024-11-03 PROCEDURE — 36415 COLL VENOUS BLD VENIPUNCTURE: CPT

## 2024-11-03 RX ORDER — ENOXAPARIN SODIUM 100 MG/ML
40 INJECTION SUBCUTANEOUS DAILY
Status: DISCONTINUED | OUTPATIENT
Start: 2024-11-03 | End: 2024-11-04 | Stop reason: HOSPADM

## 2024-11-03 RX ADMIN — TAMSULOSIN HYDROCHLORIDE 0.4 MG: 0.4 CAPSULE ORAL at 20:57

## 2024-11-03 RX ADMIN — GABAPENTIN 300 MG: 300 CAPSULE ORAL at 05:15

## 2024-11-03 RX ADMIN — GABAPENTIN 300 MG: 300 CAPSULE ORAL at 17:29

## 2024-11-03 RX ADMIN — LAMOTRIGINE 200 MG: 100 TABLET ORAL at 05:15

## 2024-11-03 RX ADMIN — ENOXAPARIN SODIUM 40 MG: 100 INJECTION SUBCUTANEOUS at 17:29

## 2024-11-03 ASSESSMENT — ENCOUNTER SYMPTOMS
NAUSEA: 0
ABDOMINAL PAIN: 0
FEVER: 0
VOMITING: 0
SHORTNESS OF BREATH: 0

## 2024-11-03 ASSESSMENT — FIBROSIS 4 INDEX: FIB4 SCORE: 2.4

## 2024-11-03 ASSESSMENT — PAIN DESCRIPTION - PAIN TYPE
TYPE: ACUTE PAIN
TYPE: ACUTE PAIN

## 2024-11-03 NOTE — PROGRESS NOTES
Hospital Medicine Daily Progress Note    Date of Service  11/3/2024    Chief Complaint  Marcelo Colon is a 84 y.o. person admitted 10/27/2024 with ground-level fall.    Hospital Course  Richard Colon is an 84-year-old person with PMHx paroxysmal atrial fibrillation not on anticoagulation, GERD, bipolar 1, BPH, memory deficits.  Admitted 10/27 after GLF and subsequent right proximal femur fracture.    In the emergency room-femur x-ray showing proximal right femur fracture with angulation and displacement.  CT head without intracranial abnormalities.  Orthopedic surgery was consulted.  Patient underwent ORIF right proximal femur fracture on 10/27 with Dr. Mann.    Interval Problem Update  Patient seen and examined, still confused but slowly improving still on restrains  CT head negative , UA also neg for infection,   Hemoglobin 8.9 today after one unit transfused  yesterday   11/1: patient seen and examined, afebrile, resting in bed, mentation is improving, patient had positive orthostatic today when standing up  Starting IV fluid. Hemoglobin e stable at 8,9 will monitor  Ortho following regarding his hip surgery appreciate rec.   11/2: Patient seen and examined,mentation improving, will stop IV fluid today , hemoglobin 8.3 today close monitor   PT/OT eval needs placement    11/3: Patient resting in bed afebrile, no nausea or vomiting, feels weak, hemoglobin 8.3 today   Of IV fluid   I have discussed this patient's plan of care and discharge plan at IDT rounds today with Case Management, Nursing, Nursing leadership, and other members of the IDT team.    Consultants/Specialty  orthopedics    Code Status  DNAR/DNI    Disposition  The patient is not medically cleared for discharge to home or a post-acute facility.      I have placed the appropriate orders for post-discharge needs.    Review of Systems  Review of Systems   Constitutional:  Negative for fever and malaise/fatigue.   Respiratory:  Negative for shortness  of breath.    Cardiovascular:  Negative for chest pain and leg swelling.   Gastrointestinal:  Negative for abdominal pain, nausea and vomiting.   Musculoskeletal:  Positive for joint pain.        Physical Exam  Temp:  [37 °C (98.6 °F)-37.3 °C (99.1 °F)] 37.3 °C (99.1 °F)  Pulse:  [68-75] 70  Resp:  [16-18] 17  BP: (106-129)/(47-56) 121/56  SpO2:  [93 %-96 %] 95 %    Physical Exam  Vitals and nursing note reviewed.   Constitutional:       General: Richard is not in acute distress.     Appearance: Normal appearance. Richard is underweight. Richard is ill-appearing.   Cardiovascular:      Rate and Rhythm: Normal rate and regular rhythm.      Heart sounds: Murmur heard.   Pulmonary:      Effort: Pulmonary effort is normal. No respiratory distress.      Breath sounds: Normal breath sounds. No wheezing.   Abdominal:      General: Bowel sounds are normal. There is no distension.      Palpations: Abdomen is soft.      Tenderness: There is no abdominal tenderness.   Musculoskeletal:      Comments: Decreased ROM to right hip   Well healing right lateral hip surgical site    Skin:     General: Skin is warm and dry.   Neurological:      Mental Status: Richard is alert. Mental status is at baseline.   Psychiatric:         Mood and Affect: Mood normal.         Behavior: Behavior normal.         Fluids    Intake/Output Summary (Last 24 hours) at 11/3/2024 1308  Last data filed at 11/3/2024 0900  Gross per 24 hour   Intake 300 ml   Output 1200 ml   Net -900 ml        Laboratory  Recent Labs     11/01/24  0006 11/02/24 0413 11/03/24  0143   WBC 6.2 4.8 4.9   RBC 2.83* 2.63* 2.72*   HEMOGLOBIN 8.9* 8.3* 8.3*   HEMATOCRIT 26.0* 24.7* 25.6*   MCV 91.9 93.9 94.1   MCH 31.4 31.6 30.5   MCHC 34.2 33.6 32.4   RDW 47.6 46.5 46.9   PLATELETCT 200 189 240   MPV 9.6 9.2 9.2     Recent Labs     11/01/24  0006 11/02/24  0413 11/03/24  0143   SODIUM 141 141 141   POTASSIUM 3.5* 3.3* 3.9   CHLORIDE 106 107 109   CO2 25 25 24   GLUCOSE 122* 122* 113*   BUN 27*  25* 19   CREATININE 0.87 0.83 0.77   CALCIUM 9.3 8.8 9.1                     Imaging  CT-HEAD W/O   Final Result      1.  No acute intracranial abnormality.   2.  Senescent changes            EC-ECHOCARDIOGRAM COMPLETE W/O CONT   Final Result      DX-PORTABLE FLUORO > 1 HOUR   Final Result      Portable fluoroscopy utilized for 1 minute 18 seconds.         INTERPRETING LOCATION: [address] , [place], [address]      DX-FEMUR-2+ RIGHT   Final Result      Digitized intraoperative radiograph is submitted for review. This examination is not for diagnostic purpose but for guidance during a surgical procedure. Please see the patient's chart for full procedural details.         INTERPRETING LOCATION: [address] , [place], [address]      CT-LSPINE W/O PLUS RECONS   Final Result      1.  Severe multilevel degenerative change of lumbar spine.   2.  No fracture or subluxation.      CT-CSPINE WITHOUT PLUS RECONS   Final Result      1.  No cervical spine fracture or subluxation.   2.  Severe multilevel degenerative change and diffuse osteopenia.   3.  Prior multilevel laminectomy and posterior fusion with finding indicating possible loosening of screws on the RIGHT at the T1 and T2 levels.      CT-HEAD W/O   Final Result      1.  Cerebral atrophy and chronic microvascular ischemic type changes.   2.  No acute intracranial abnormality.   3.  Right sphenoid sinus disease               DX-FEMUR-2+ RIGHT   Final Result   Impression:      1. Proximal right femur fracture.                     DX-PELVIS-1 OR 2 VIEWS   Final Result   Impression:      1. No pelvis fracture evident.      2. Proximal right femur fracture.           Assessment/Plan  * Closed hip fracture requiring operative repair, right, initial encounter (HCC)- (present on admission)  Assessment & Plan  Femur x-ray showing proximal right femur fracture with angulation and displacement.   S/p ORIF 10/27 with Dr. Johnathan CHARLES   - Continue pain management as requested  - PT OT  today; WBAT  - Will likely need PAR   - Restart DVT prophylaxis today     Frailty syndrome in geriatric patient- (present on admission)  Assessment & Plan  Weight loss, depression, new hip fracture    Gastroesophageal reflux disease without esophagitis- (present on admission)  Assessment & Plan  Continue Pepcid    Paroxysmal A-fib (HCC)- (present on admission)  Assessment & Plan  Per note from cardiology 5/22 patient had paroxysmal A-fib recommended for propranolol 10 mg and Eliquis  Patient has not been taking these, states he was told he did not need them but cannot tell me by who  - Continuous telemetry monitoring; monitoring for arrhythmia in the setting of acute fracture and questionable history of a fib  - Echo pending for today  - Holding AC due to POD 1 from ORIF to right hip     DNR (do not resuscitate)- (present on admission)  Assessment & Plan  Confirmed patient is DNR/DNI    Cachexia (HCC)- (present on admission)  Assessment & Plan  Patient reports weight loss and low appetite  Nutrition consulted    Recurrent Clostridium difficile diarrhea- (present on admission)  Assessment & Plan  Hx of   Infectious disease recommended a taper on discharge in 2017  It is still on his med rec is being taken daily, will not start inpatient for now  Discussed with infectious disease, there is no indication for continuing long-term vancomycin unless patient is on antibiotics which they are not.    Bipolar 1 disorder, depressed (HCC)- (present on admission)  Assessment & Plan  Continue home Lamictal  Patient reports worsening depression with low appetite and weight loss  Nutrition consulted    Benign prostatic hyperplasia- (present on admission)  Assessment & Plan  Substituted tamsulosin for patient's home alfuzosin       VTE prophylaxis: Lovenox ppx       I have performed a physical exam and reviewed and updated ROS and Plan today (11/3/2024). In review of yesterday's note (11/2/2024), there are no changes except as  documented above.

## 2024-11-03 NOTE — CARE PLAN
Problem: Pain - Standard  Goal: Alleviation of pain or a reduction in pain to the patient’s comfort goal  Description: Target End Date:  Prior to discharge or change in level of care    Document on Vitals flowsheet    1.  Document pain using the appropriate pain scale per order or unit policy  2.  Educate and implement non-pharmacologic comfort measures (i.e. relaxation, distraction, massage, cold/heat therapy, etc.)  3.  Pain management medications as ordered  4.  Reassess pain after pain med administration per policy  5.  If opiods administered assess patient's response to pain medication is appropriate per POSS sedation scale  6.  Follow pain management plan developed in collaboration with patient and interdisciplinary team (including palliative care or pain specialists if applicable)  Outcome: Progressing     Problem: Knowledge Deficit - Standard  Goal: Patient and family/care givers will demonstrate understanding of plan of care, disease process/condition, diagnostic tests and medications  Description: Target End Date:  1-3 days or as soon as patient condition allows    Document in Patient Education    1.  Patient and family/caregiver oriented to unit, equipment, visitation policy and means for communicating concern  2.  Complete/review Learning Assessment  3.  Assess knowledge level of disease process/condition, treatment plan, diagnostic tests and medications  4.  Explain disease process/condition, treatment plan, diagnostic tests and medications  Outcome: Progressing     Problem: Skin Integrity  Goal: Skin integrity is maintained or improved  Description: Target End Date:  Prior to discharge or change in level of care    Document interventions on Skin Risk/Rohith flowsheet groups and corresponding LDA    1.  Assess and monitor skin integrity, appearance and/or temperature  2.  Assess risk factors for impaired skin integrity and/or pressures ulcers  3.  Implement precautions to protect skin integrity in  collaboration with interdisciplinary team  4.  Implement pressure ulcer prevention protocol if at risk for skin breakdown  5.  Confirm wound care consult if at risk for skin breakdown  6.  Ensure patient use of pressure relieving devices  (Low air loss bed, waffle overlay, heel protectors, ROHO cushion, etc)  Outcome: Progressing     Problem: Fall Risk  Goal: Patient will remain free from falls  Description: Target End Date:  Prior to discharge or change in level of care    Document interventions on the [facility] Fall Risk Assessment    1.  Assess for fall risk factors  2.  Implement fall precautions  Outcome: Progressing     Problem: Safety - Medical Restraint  Goal: Remains free of injury from restraints (Restraint for Interference with Medical Device)  Description: INTERVENTIONS:  1. Determine that other, less restrictive measures have been tried or would not be effective before applying the restraint  2. Evaluate the patient's condition at the time of restraint application  3. Educate patient/family regarding the reason for restraint  4. Q2H: Monitor safety, psychosocial status, comfort, circulation, respiratory status, LOC, nutrition and hydration  Outcome: Progressing  Goal: Free from restraint(s) (Restraint for Interference with Medical Device)  Description: INTERVENTIONS:  1.  ONCE/SHIFT or MINIMUM Q12H: Assess and document the continuing need for restraints  2.  Q24H: Continued use of restraint requires LIP to perform face to face examination and written order  3.  Identify and implement measures to help patient regain control  4.  Educate patient/family on discontinuation criteria   5.  Assess patient's understanding and retention of education provided  6.  Assess readiness for release & initiate progressive release per protocol  7.  Identify and document criteria for restraints  Outcome: Progressing     Problem: Communication  Goal: The ability to communicate needs accurately and effectively will  improve  Outcome: Progressing     Problem: Safety  Goal: Will remain free from injury  Outcome: Progressing  Goal: Will remain free from falls  Outcome: Progressing     Problem: Pain Management  Goal: Pain level will decrease to patient's comfort goal  Outcome: Progressing     Problem: Infection  Goal: Will remain free from infection  Outcome: Progressing     Problem: Venous Thromboembolism (VTW)/Deep Vein Thrombosis (DVT) Prevention:  Goal: Patient will participate in Venous Thrombosis (VTE)/Deep Vein Thrombosis (DVT)Prevention Measures  Outcome: Progressing     Problem: Psychosocial Needs:  Goal: Level of anxiety will decrease  Outcome: Progressing     Problem: Fluid Volume:  Goal: Will maintain balanced intake and output  Outcome: Progressing     Problem: Respiratory:  Goal: Respiratory status will improve  Outcome: Progressing     Problem: Bowel/Gastric:  Goal: Normal bowel function is maintained or improved  Outcome: Progressing  Goal: Will not experience complications related to bowel motility  Outcome: Progressing     Problem: Urinary Elimination:  Goal: Ability to reestablish a normal urinary elimination pattern will improve  Outcome: Progressing     Problem: Skin Integrity  Goal: Risk for impaired skin integrity will decrease  Outcome: Progressing     Problem: Mobility  Goal: Risk for activity intolerance will decrease  Outcome: Progressing     Problem: Medication  Goal: Compliance with prescribed medication will improve  Outcome: Progressing     Problem: Knowledge Deficit  Goal: Knowledge of disease process/condition, treatment plan, diagnostic tests, and medications will improve  Outcome: Progressing  Goal: Knowledge of the prescribed therapeutic regimen will improve  Outcome: Progressing     Problem: Discharge Barriers/Planning  Goal: Patient's continuum of care needs will be met  Outcome: Progressing     Problem: Hemodynamics  Goal: Patient's hemodynamics, fluid balance and neurologic status will be  stable or improve  Description: Target End Date:  Prior to discharge or change in level of care    Document on Assessment and I/O flowsheet templates    1.  Monitor vital signs, pulse oximetry and cardiac monitor per provider order and/or policy  2.  Maintain blood pressure per provider order  3.  Hemodynamic monitoring per provider order  4.  Manage IV fluids and IV infusions  5.  Monitor intake and output  6.  Daily weights per unit policy or provider order  7.  Assess peripheral pulses and capillary refill  8.  Assess color and body temperature  9.  Position patient for maximum circulation/cardiac output  10. Monitor for signs/symptoms of excessive bleeding  11. Assess mental status, restlessness and changes in level of consciousness  12. Monitor temperature and report fever or hypothermia to provider immediately. Consideration of targeted temperature management.  Outcome: Progressing     The patient is Watcher - Medium risk of patient condition declining or worsening    Shift Goals  Clinical Goals: mobility, skin integrity, prepare for transfer to SNF  Patient Goals: rest, comfort  Family Goals: unable to assess

## 2024-11-03 NOTE — PROGRESS NOTES
Upon performing dressing change, noticed pt's upper hip incision had an area of green and compared to the other two sites was more red. Notified Richard Bowden PA-C, stated the green is surgical prep, no new orders.

## 2024-11-03 NOTE — PROGRESS NOTES
Monitor Summary:     Rhythm: SR w BBB  Rate: 63-90  Ectopy: (R) PAC (R) PVC  Measurements: 0.17/0.16/0.46              12 Hour Chart Check

## 2024-11-03 NOTE — PROGRESS NOTES
Bedside report received, assumed care of patient at change of shift. Chart, labs, and orders reviewed. Pt resting in bed, breathing even and unlabored, denies pain and in no acute distress. A&O x3. Tele monitoring in place. Fall precautions including bed alarm in place and education provided. Call light within reach, bed locked and in lowest position, denies other needs at this time.

## 2024-11-03 NOTE — PROGRESS NOTES
"      Orthopaedic Progress Note    Interval changes:  Patient doing well   RLE dressings CDI  Cleared for DC to rehab by ortho pending medicine clearance    ROS - Patient denies any new issues.  Pain well controlled.    /56   Pulse 70   Temp 37.3 °C (99.1 °F) (Temporal)   Resp 17   Ht 1.727 m (5' 7.99\")   Wt 65.7 kg (144 lb 13.5 oz)   SpO2 95%     Patient seen and examined  No acute distress  Breathing non labored  RRR  RLE dressings CDI, DNVI, moves all toes, cap refill <2 sec.     Recent Labs     11/01/24  0006 11/02/24  0413 11/03/24  0143   WBC 6.2 4.8 4.9   RBC 2.83* 2.63* 2.72*   HEMOGLOBIN 8.9* 8.3* 8.3*   HEMATOCRIT 26.0* 24.7* 25.6*   MCV 91.9 93.9 94.1   MCH 31.4 31.6 30.5   MCHC 34.2 33.6 32.4   RDW 47.6 46.5 46.9   PLATELETCT 200 189 240   MPV 9.6 9.2 9.2       Active Hospital Problems    Diagnosis     Recurrent Clostridium difficile diarrhea [A04.71]      Priority: High     - Patient on Van taper         Cachexia ([facility]) [R64]      Priority: Medium    Bipolar 1 disorder, depressed ([facility]) [F31.9]      Priority: Low     Patient reports depression is not worse or better. He states he is quick to anger. States he is still having decreased appetite and poor sleep. Has not been able to get modafinil approved. Also states he had to switch to generic lamotrigine which he thinks is not working as well. Is taking medications as prescribed.    Says sleep has been worse. Still with difficulty falling asleep and then sleeping too much throughout the day when he does fall asleep.     Also with 6 pound recent weight loss. Believes it is due to mood and decreased appetite. Not interested in nutritionist, appetite stimulant. Eats 1 meal a day \"whatever I can find in the frozen section of the grocery store.\" Never with periods of days where he could not sleep, extremes of mood, erratic behavior (denies). No night sweats, fevers, chills. No SI/HI, intent to harm self or others.       Benign prostatic hyperplasia " [N40.0]      Priority: Low    Closed hip fracture requiring operative repair, right, initial encounter ([facility]) [S72.001A]     Frailty syndrome in geriatric patient [R54]     Gastroesophageal reflux disease without esophagitis [K21.9]     Paroxysmal A-fib ([facility]) [I48.0]      Patient reports he is unsure if he should be taking metoprolol and Eliquis. States he last saw cardiology early this year but does not remember name of the doctor and would like to follow with a different doctor. No change of speech, vision, dizziness or balance difficulty, chest pain, heart palpitations, syncope. Denies ever having a fall.     On review of cardiology note 05/22, patient recommended for propranolol 10mg and Eliquis.       DNR (do not resuscitate) [Z66]      IMO load March 2020         Assessment/Plan:  Patient doing well  RLE dressings CDI   Cleared for DC to rehab by ortho pending medicine clearance  POD#7 S/P Open reduction internal fixation right proximal femur fracture with cephalomedullary nail   Wt bearing status - WBAT  Wound care/Drains - Dressings to be changed every other day by nursing. Or PRN for saturation starting POD#2  Future Procedures - none planned   Lovenox: Start 10/28, Duration-until ambulatory > 150'  Sutures/Staples out- 14-21 days post operatively. Removal will completed by ortho mid levels only.  PT/OT-initiated  Antibiotics: Perioperative completed  DVT Prophylaxis- TEDS/SCDs/Foot pumps  Ruff-not needed per ortho  Case Coordination for Discharge Planning - Disposition per therapy recs.

## 2024-11-04 VITALS
DIASTOLIC BLOOD PRESSURE: 52 MMHG | TEMPERATURE: 98.8 F | OXYGEN SATURATION: 92 % | HEIGHT: 68 IN | SYSTOLIC BLOOD PRESSURE: 113 MMHG | BODY MASS INDEX: 21.28 KG/M2 | WEIGHT: 140.43 LBS | HEART RATE: 73 BPM | RESPIRATION RATE: 17 BRPM

## 2024-11-04 LAB
ANION GAP SERPL CALC-SCNC: 11 MMOL/L (ref 7–16)
BUN SERPL-MCNC: 20 MG/DL (ref 8–22)
CALCIUM SERPL-MCNC: 8.9 MG/DL (ref 8.5–10.5)
CHLORIDE SERPL-SCNC: 107 MMOL/L (ref 96–112)
CO2 SERPL-SCNC: 23 MMOL/L (ref 20–33)
CREAT SERPL-MCNC: 0.71 MG/DL (ref 0.5–1.4)
ERYTHROCYTE [DISTWIDTH] IN BLOOD BY AUTOMATED COUNT: 46.9 FL (ref 35.9–50)
GFR SERPLBLD CREATININE-BSD FMLA CKD-EPI: 91 ML/MIN/1.73 M 2
GLUCOSE SERPL-MCNC: 117 MG/DL (ref 65–99)
HCT VFR BLD AUTO: 26.7 % (ref 42–52)
HGB BLD-MCNC: 8.9 G/DL (ref 14–18)
MCH RBC QN AUTO: 31.6 PG (ref 27–33)
MCHC RBC AUTO-ENTMCNC: 33.3 G/DL (ref 32.3–36.5)
MCV RBC AUTO: 94.7 FL (ref 81.4–97.8)
PLATELET # BLD AUTO: 268 K/UL (ref 164–446)
PMV BLD AUTO: 9.1 FL (ref 9–12.9)
POTASSIUM SERPL-SCNC: 3.7 MMOL/L (ref 3.6–5.5)
RBC # BLD AUTO: 2.82 M/UL (ref 4.7–6.1)
SODIUM SERPL-SCNC: 141 MMOL/L (ref 135–145)
WBC # BLD AUTO: 5.8 K/UL (ref 4.8–10.8)

## 2024-11-04 PROCEDURE — A9270 NON-COVERED ITEM OR SERVICE: HCPCS | Performed by: INTERNAL MEDICINE

## 2024-11-04 PROCEDURE — 700102 HCHG RX REV CODE 250 W/ 637 OVERRIDE(OP): Performed by: INTERNAL MEDICINE

## 2024-11-04 PROCEDURE — 99239 HOSP IP/OBS DSCHRG MGMT >30: CPT | Performed by: INTERNAL MEDICINE

## 2024-11-04 PROCEDURE — 85027 COMPLETE CBC AUTOMATED: CPT

## 2024-11-04 PROCEDURE — 36415 COLL VENOUS BLD VENIPUNCTURE: CPT

## 2024-11-04 PROCEDURE — 97535 SELF CARE MNGMENT TRAINING: CPT

## 2024-11-04 PROCEDURE — 80048 BASIC METABOLIC PNL TOTAL CA: CPT

## 2024-11-04 RX ADMIN — GABAPENTIN 300 MG: 300 CAPSULE ORAL at 05:40

## 2024-11-04 RX ADMIN — LAMOTRIGINE 200 MG: 100 TABLET ORAL at 05:40

## 2024-11-04 ASSESSMENT — COGNITIVE AND FUNCTIONAL STATUS - GENERAL
SUGGESTED CMS G CODE MODIFIER DAILY ACTIVITY: CK
DRESSING REGULAR UPPER BODY CLOTHING: A LITTLE
TOILETING: A LOT
HELP NEEDED FOR BATHING: A LOT
EATING MEALS: A LITTLE
DRESSING REGULAR LOWER BODY CLOTHING: A LOT
PERSONAL GROOMING: A LITTLE
DAILY ACTIVITIY SCORE: 15

## 2024-11-04 ASSESSMENT — PAIN DESCRIPTION - PAIN TYPE: TYPE: ACUTE PAIN

## 2024-11-04 ASSESSMENT — FIBROSIS 4 INDEX: FIB4 SCORE: 2.15

## 2024-11-04 NOTE — CARE PLAN
The patient is Stable - Low risk of patient condition declining or worsening    Shift Goals  Clinical Goals: Skin integrity, Q2 turns,  Patient Goals: Go home  Family Goals: Unable to assess    Progress made toward(s) clinical / shift goals:      Patient educated to pain scale system. Patient encouraged to verbalize discomfort. Patient taught about non-pharmacological pain management. Patient is comfortable at this time without statements of discomfort or pain.      Problem: Pain - Standard  Goal: Alleviation of pain or a reduction in pain to the patient’s comfort goal  Outcome: Progressing     Problem: Knowledge Deficit - Standard  Goal: Patient and family/care givers will demonstrate understanding of plan of care, disease process/condition, diagnostic tests and medications  Outcome: Progressing     Problem: Skin Integrity  Goal: Skin integrity is maintained or improved  Outcome: Progressing     Problem: Fall Risk  Goal: Patient will remain free from falls  Outcome: Progressing     Problem: Safety - Medical Restraint  Goal: Remains free of injury from restraints (Restraint for Interference with Medical Device)  Outcome: Progressing     Problem: Communication  Goal: The ability to communicate needs accurately and effectively will improve  Outcome: Progressing     Problem: Safety  Goal: Will remain free from injury  Outcome: Progressing     Problem: Hemodynamics  Goal: Patient's hemodynamics, fluid balance and neurologic status will be stable or improve  Outcome: Progressing       Patient is not progressing towards the following goals:

## 2024-11-04 NOTE — CARE PLAN
The patient is Stable - Low risk of patient condition declining or worsening    Shift Goals  Clinical Goals: mobility, maintain skin integrity  Patient Goals: discharge  Family Goals: unable to assess    Progress made toward(s) clinical / shift goals:      Problem: Knowledge Deficit - Standard  Goal: Patient and family/care givers will demonstrate understanding of plan of care, disease process/condition, diagnostic tests and medications  Outcome: Progressing  Note: Patient educated of disease process and plan of care. Treatment team has included patient in plan of care. All medications indications and side effects were explained. Patient encouraged to ask questions. Patient verbalizes understanding.      Problem: Skin Integrity  Goal: Skin integrity is maintained or improved  Outcome: Progressing  Note: Patient skin assessed. Risk factors that lead to skin breakdown/impairment identified using Rohith Scale. Interventions to prevent skin breakdown implemented including repositioning and managing incontinence.     Problem: Fall Risk  Goal: Patient will remain free from falls  Outcome: Progressing  Note: Patient's risk for injury and falls assessed using Smith Deonte scale. Appropriate safety precautions in place including bed alarm. Patient educated to utilize call light for needs. Patient demonstrates compliance with fall precautions.      Problem: Venous Thromboembolism (VTW)/Deep Vein Thrombosis (DVT) Prevention:  Goal: Patient will participate in Venous Thrombosis (VTE)/Deep Vein Thrombosis (DVT)Prevention Measures  Outcome: Progressing  Note: Lovenox restarted.      Problem: Mobility  Goal: Risk for activity intolerance will decrease  Outcome: Progressing  Note: Mobilized to edge of bed. Encouraged ROM.        Patient is not progressing towards the following goals: N/A

## 2024-11-04 NOTE — PROGRESS NOTES
Brianne from [facility] called wanting report on pt, was cleaning other pt, attempted to call back and give report. She was busy. Provided call back number.     1213: Brianne called back. Gave report. Hollie jones/eldon RN removed pt PIV and tele monitor.    1239: pt belongings gathered, toileted, ready for .

## 2024-11-04 NOTE — PROGRESS NOTES
Monitor Summary:     Rhythm: SR w BBB  Rate: 69-78  Ectopy: (R) PAC (R) PVC  Measurements: 0.15/0.14/0.46              12 Hour Chart Check

## 2024-11-04 NOTE — DISCHARGE PLANNING
Case Management Discharge Planning    Admission Date: 10/27/2024  GMLOS: 4.4  ALOS: 8    6-Clicks ADL Score: 12  6-Clicks Mobility Score: 14  PT and/or OT Eval ordered: Yes  Post-acute Referrals Ordered: Yes  Post-acute Choice Obtained: Yes  Has referral(s) been sent to post-acute provider:  Yes      Anticipated Discharge Dispo: Discharge Disposition: D/T to SNF with Medicare cert in anticipation of skilled care (03)    DME Needed: No    Action(s) Taken: Updated Provider/Nurse on Discharge Plan    Escalations Completed: None    Medically Clear: Yes    Next Steps: [facility] called back. They have bed and will pick pt up at 1300 today. Pt aware of plan and agrees.    Barriers to Discharge: None    Is the patient up for discharge tomorrow: No

## 2024-11-04 NOTE — PROGRESS NOTES
"      Orthopaedic Progress Note    Interval changes:  Patient doing well   RLE dressings changed incisions without issues  Cleared for DC to rehab by ortho pending medicine clearance    ROS - Patient denies any new issues.  Pain well controlled.    /52   Pulse 73   Temp 37.1 °C (98.8 °F) (Temporal)   Resp 17   Ht 1.727 m (5' 7.99\")   Wt 63.7 kg (140 lb 6.9 oz)   SpO2 92%     Patient seen and examined  No acute distress  Breathing non labored  RRR  RLE dressings changed incisions without issues, DNVI, moves all toes, cap refill <2 sec.     Recent Labs     11/02/24  0413 11/03/24  0143 11/04/24  0028   WBC 4.8 4.9 5.8   RBC 2.63* 2.72* 2.82*   HEMOGLOBIN 8.3* 8.3* 8.9*   HEMATOCRIT 24.7* 25.6* 26.7*   MCV 93.9 94.1 94.7   MCH 31.6 30.5 31.6   MCHC 33.6 32.4 33.3   RDW 46.5 46.9 46.9   PLATELETCT 189 240 268   MPV 9.2 9.2 9.1       Active Hospital Problems    Diagnosis     Recurrent Clostridium difficile diarrhea [A04.71]      Priority: High     - Patient on Van taper         Cachexia ([facility]) [R64]      Priority: Medium    Bipolar 1 disorder, depressed ([facility]) [F31.9]      Priority: Low     Patient reports depression is not worse or better. He states he is quick to anger. States he is still having decreased appetite and poor sleep. Has not been able to get modafinil approved. Also states he had to switch to generic lamotrigine which he thinks is not working as well. Is taking medications as prescribed.    Says sleep has been worse. Still with difficulty falling asleep and then sleeping too much throughout the day when he does fall asleep.     Also with 6 pound recent weight loss. Believes it is due to mood and decreased appetite. Not interested in nutritionist, appetite stimulant. Eats 1 meal a day \"whatever I can find in the frozen section of the grocery store.\" Never with periods of days where he could not sleep, extremes of mood, erratic behavior (denies). No night sweats, fevers, chills. No SI/HI, intent to " harm self or others.       Benign prostatic hyperplasia [N40.0]      Priority: Low    Closed hip fracture requiring operative repair, right, initial encounter ([facility]) [S72.001A]     Frailty syndrome in geriatric patient [R54]     Gastroesophageal reflux disease without esophagitis [K21.9]     Paroxysmal A-fib ([facility]) [I48.0]      Patient reports he is unsure if he should be taking metoprolol and Eliquis. States he last saw cardiology early this year but does not remember name of the doctor and would like to follow with a different doctor. No change of speech, vision, dizziness or balance difficulty, chest pain, heart palpitations, syncope. Denies ever having a fall.     On review of cardiology note 05/22, patient recommended for propranolol 10mg and Eliquis.       DNR (do not resuscitate) [Z66]      IMO load March 2020         Assessment/Plan:  Patient doing well  RLE dressings changed incisions without issues  Cleared for DC to rehab by ortho pending medicine clearance  POD#9 S/P Open reduction internal fixation right proximal femur fracture with cephalomedullary nail   Wt bearing status - WBAT  Wound care/Drains - Dressings to be changed every other day by nursing. Or PRN for saturation starting POD#2  Future Procedures - none planned   Lovenox: Start 10/28, Duration-until ambulatory > 150'  Sutures/Staples out- 14-21 days post operatively. Removal will completed by ortho mid levels only.  PT/OT-initiated  Antibiotics: Perioperative completed  DVT Prophylaxis- TEDS/SCDs/Foot pumps  Ruff-not needed per ortho  Case Coordination for Discharge Planning - Disposition per therapy recs.

## 2024-11-04 NOTE — PROGRESS NOTES
Bedside report received, assumed care of patient at change of shift. Chart, labs, and orders reviewed. Pt resting in bed, breathing even and unlabored, denies pain in hip and in no acute distress. A&O x3-4. Tele monitoring in place. Fall precautions including bed alarm in place and education provided. Call light within reach, bed locked and in lowest position, no other needs at this time.

## 2024-11-04 NOTE — PROGRESS NOTES
Monitor Summary  Rhythm: SR  Rate: 68-73  Ectopy: (O)PAC/(F)PAC/  Measurements: .13/.17/.44  ---12 hr Chart Review---

## 2024-11-04 NOTE — THERAPY
"Occupational Therapy  Daily Treatment     Patient Name: Marcelo Colon  Age:  84 y.o., Sex:  person  Medical Record #: [id number]  Today's Date: 11/4/2024       Precautions: Fall Risk, Weight Bearing As Tolerated Right Lower Extremity  Comments: s/p R femur IMN. Pt not on B wrist on L LE restraints.    Assessment    Pt seen for OT tx.  Pt needed encouragement to get OOB & participate in ADL's.  Pt educated on WBAT RLE & provided tips to reduce pain with transfers. Pt was Min A for supine to sit EOB.  Pt stood with Mod A & extra time.  Pt was Max A for LB dressing.  Pt transferred to bedside chair with Mod A.  Pt litied by right hip/leg pain & impaired activity tolerance.  Recommend post-acute placement for additional occupational therapy services prior to discharge home.    Plan    Treatment Plan Status: Continue Current Treatment Plan  Type of Treatment: Self Care / Activities of Daily Living, Adaptive Equipment, Manual Therapy Techniques, Neuro Re-Education / Balance, Therapeutic Exercises, Therapeutic Activity  Treatment Frequency: 4 Times per Week  Treatment Duration: Until Therapy Goals Met    DC Equipment Recommendations: Unable to determine at this time  Discharge Recommendations: Recommend post-acute placement for additional occupational therapy services prior to discharge home    Subjective    \"My leg hurts less if I get out on the right side, that's the way I've been doing it\"     Objective      Precautions   Precautions Fall Risk;Weight Bearing As Tolerated Right Lower Extremity   Vitals   O2 Delivery Device None - Room Air   Pain   Pain Scales 0 to 10 Scale    Intervention Ambulation / Increased Activity   Pain 0 - 10 Group   Location Hip;Leg   Location Orientation Right   Description Sharp;Sore   Therapist Pain Assessment During Activity;Nurse Notified;6   Cognition    Cognition / Consciousness X   Speech/ Communication Delayed Responses;Hard of Hearing   Level of Consciousness Alert   Ability To Follow " "Commands 1 Step   New Learning Impaired   Comments Pt requires extra time & repeated cues for guidance   Strength Upper Body   Upper Body Strength  X   Gross Strength Generalized Weakness, Equal Bilaterally.    Neuro-Muscular Treatments   Neuro-Muscular Treatments Anterior weight shift;Compensatory Strategies;Postural Facilitation;Sequencing;Tactile Cuing;Verbal Cuing;Weight Shift Right;Weight Shift Left   Comments pt needs cues for upright posture   Balance   Sitting Balance (Static) Fair   Sitting Balance (Dynamic) Fair -   Standing Balance (Static) Poor +   Standing Balance (Dynamic) Poor   Weight Shift Sitting Fair   Weight Shift Standing Fair   Skilled Intervention Compensatory Strategies;Postural Facilitation;Sequencing;Tactile Cuing;Verbal Cuing   Bed Mobility    Supine to Sit Minimal Assist   Sit to Supine Minimal Assist   Scooting Minimal Assist   Rolling Minimal Assist to Rt.;Minimum Assist to Lt.   Skilled Intervention Compensatory Strategies;Postural Facilitation;Tactile Cuing;Sequencing;Verbal Cuing   Comments slowly wiith extra time   Activities of Daily Living   Grooming Minimal Assist;Seated   Upper Body Dressing Minimal Assist   Lower Body Dressing Maximal Assist   Toileting Maximal Assist   Skilled Intervention Compensatory Strategies;Postural Facilitation;Sequencing;Verbal Cuing;Tactile Cuing   Functional Mobility   Sit to Stand Moderate Assist   Bed, Chair, Wheelchair Transfer Moderate Assist   Toilet Transfers Moderate Assist   Transfer Method Stand Step   Skilled Intervention Compensatory Strategies;Postural Facilitation;Sequencing;Tactile Cuing;Verbal Cuing   Activity Tolerance   Sitting in Chair 25   Sitting Edge of Bed 12   Standing 4   Patient / Family Goals   Patient / Family Goal #1 \"to be able to get around the kitchen and cook some eggs\"   Goal #1 Outcome Goal not met   Short Term Goals   Short Term Goal # 1 Pt will be able to complete standing g/h routine with SPV and seated rest " breaks PRN   Goal Outcome # 1 Goal not met   Short Term Goal # 2 Pt will be able to complete toilet/ADL transfer with SPV   Goal Outcome # 2 Progressing as expected   Short Term Goal # 3 Pt will be able to complete toileting ADLs with SPV   Goal Outcome # 3 Progressing slower than expected   Short Term Goal # 4 Pt will be able to complete LB dressing with SPV and AE PRN   Goal Outcome # 4 Progressing slower than expected   Education Group   Role of Occupational Therapist Patient Response Patient;Acceptance;Explanation;Verbal Demonstration   Weight Bearing Precautions Patient Response Patient;Acceptance;Explanation;Demonstration;Verbal Demonstration;Action Demonstration   Occupational Therapy Treatment Plan    O.T. Treatment Plan Continue Current Treatment Plan   Anticipated Discharge Equipment and Recommendations   DC Equipment Recommendations Unable to determine at this time   Discharge Recommendations Recommend post-acute placement for additional occupational therapy services prior to discharge home   Interdisciplinary Plan of Care Collaboration   IDT Collaboration with  Nursing   Patient Position at End of Therapy Seated;Call Light within Reach;Tray Table within Reach   Collaboration Comments Nsg notified of OT findings   Session Information   Date / Session Number  11/4 #3 (1/4, 11/10)

## 2024-11-12 NOTE — DOCUMENTATION QUERY
Formerly [facility], later [facility]                                                                       Query Response Note      PATIENT:               NANCIE EARL  ACCT #:                  [id number]  MRN:                     [id number]  :                      1940  ADMIT DATE:       10/27/2024 9:29 AM  DISCH DATE:        2024 1:19 PM  RESPONDING  PROVIDER #:        [id number]           QUERY TEXT:    *** is documented in the Medical Record.    Please clarify the clinical/diagnostic relevance for the documented findings.    The patient's clinical indicators include:  Per RD consult   Pt meets ASPEN criteria for moderate malnutrition in context of chronic illness  related to decreased intake as evidenced by moderate muscle and fat loss.  Chocolate Ensure ordered  RD monitoring progress  Options provided:   -- moderate malnutrition*** is clinically significant and ruled in   -- Findings of no clinical significance   -- Other explanation, (Please specify the other explanation)   -- mild malnutrition is clinically significant and ruled in.      Query created by: Paloma Rod on 2024 8:50 AM    RESPONSE TEXT:    moderate malnutrition*** is clinically significant and ruled in          Electronically signed by:  GUSTAVO MONACO MD 2024 12:24 PM

## 2024-12-03 DIAGNOSIS — F31.9 BIPOLAR 1 DISORDER, DEPRESSED (HCC): ICD-10-CM

## 2024-12-04 RX ORDER — LAMOTRIGINE 100 MG/1
200 TABLET ORAL DAILY
Qty: 60 TABLET | Refills: 11 | Status: SHIPPED | OUTPATIENT
Start: 2024-12-04

## 2025-04-14 NOTE — ED NOTES
PT assessment complete. Agree with triage note. Pt c/o intermittent cramping and loose stools. No fever or n/v. Incisions are CDI. PT in gown. Educated on NPO status until cleared by MD. No needs. Will continue to monitor.     Fall

## (undated) DEVICE — STOCKINETTE IMPERVIOUS 12X48 - STERILELF (10/CA)"

## (undated) DEVICE — CLIP MED LG INTNL HRZN TI ESCP - (20/BX)

## (undated) DEVICE — SUCTION INSTRUMENT YANKAUER BULBOUS TIP W/O VENT (50EA/CA)

## (undated) DEVICE — CANISTER SUCTION RIGID RED 1500CC (40EA/CA)

## (undated) DEVICE — GOWN SURGEONS X-LARGE - DISP. (30/CA)

## (undated) DEVICE — GLOVE BIOGEL INDICATOR SZ 7SURGICAL PF LTX - (50/BX 4BX/CA)

## (undated) DEVICE — HEAD HOLDER JUNIOR/ADULT

## (undated) DEVICE — STAPLER SKIN DISP - (6/BX 10BX/CA) VISISTAT

## (undated) DEVICE — CANISTER SUCTION 3000ML MECHANICAL FILTER AUTO SHUTOFF MEDI-VAC NONSTERILE LF DISP (40EA/CA)

## (undated) DEVICE — STAPLER CONTOUR CURVED GREEN 4.8MM W/STAPLE (3EA/BX)

## (undated) DEVICE — SPONGE GAUZESTER 4 X 4 4PLY - (128PK/CA)

## (undated) DEVICE — GLOVE BIOGEL SZ 7 SURGICAL PF LTX - (50PR/BX 4BX/CA)

## (undated) DEVICE — OSTOMY, URO POUCH/FLANGE 2 1/4

## (undated) DEVICE — SURGIFOAM (SIZE 100) - (6EA/CA)

## (undated) DEVICE — SUTURE GENERAL

## (undated) DEVICE — CLIP LG INTNL HRZN TI ESCP LGT - (20/BX)

## (undated) DEVICE — PACK MAJOR BASIN - (2EA/CA)

## (undated) DEVICE — KIT ROOM DECONTAMINATION

## (undated) DEVICE — KIT 2.75IN COL ILSTM 2 PC DRN - 70MM 2 3/4 INCH THIS IS FOR THE OR ONLY (5/BX)

## (undated) DEVICE — TUBE E-T HI-LO CUFF 7.5MM (10EA/PK)

## (undated) DEVICE — TUBE CONNECT SUCTION CLEAR 120 X 1/4" (50EA/CA)"

## (undated) DEVICE — HEMOSTAT SURG ABSORBABLE - 4 X 8 IN SURGICEL (24EA/CA)

## (undated) DEVICE — SET EXTENSION WITH 2 PORTS (48EA/CA) ***PART #2C8610 IS A SUBSTITUTE*****

## (undated) DEVICE — GLOVE BIOGEL SZ 6.5 SURGICAL PF LTX (50PR/BX 4BX/CA)

## (undated) DEVICE — GUIDE WIRE BALL TIP 3MM X1000MM STERILE (1EA)

## (undated) DEVICE — DRAPE MAYO STAND - (30/CA)

## (undated) DEVICE — ELECTRODE DUAL RETURN W/ CORD - (50/PK)

## (undated) DEVICE — DRAPE LARGE 3 QUARTER - (20/CA)

## (undated) DEVICE — DRAPE 36X28IN RAD CARM BND BG - (25/CA) O

## (undated) DEVICE — TUBE CONNECTING SUCTION - CLEAR PLASTIC STERILE 72 IN (50EA/CA)

## (undated) DEVICE — NEPTUNE 4 PORT MANIFOLD - (20/PK)

## (undated) DEVICE — KIT ANESTHESIA W/CIRCUIT & 3/LT BAG W/FILTER (20EA/CA)

## (undated) DEVICE — STAPLE 75MM LINEAR (12EA/BX)

## (undated) DEVICE — TUBE, CULTURE AEROBIC

## (undated) DEVICE — GOWN WARMING STANDARD FLEX - (30/CA)

## (undated) DEVICE — SWAB ANAEROBIC SPEC.COLLECTOR - (25/PK 4PK/CA 100EA/CA)

## (undated) DEVICE — GLOVE BIOGEL PI INDICATOR SZ 7.0 SURGICAL PF LF - (50/BX 4BX/CA)

## (undated) DEVICE — SUTURE 1 VICRYL PLUS CTX - 8 X 18 INCH (12/BX)

## (undated) DEVICE — SLEEVE, VASO, THIGH, MED

## (undated) DEVICE — GLOVE BIOGEL SZ 8 SURGICAL PF LTX - (50PR/BX 4BX/CA)

## (undated) DEVICE — LEAD SET 6 DISP. EKG NIHON KOHDEN

## (undated) DEVICE — STAPLER 75MM LINEAR OPEN (3EA/BX)

## (undated) DEVICE — PROTECTOR ULNA NERVE - (36PR/CA)

## (undated) DEVICE — LIGASURE TISSUE FUSION  - SINGLE USE (6/CA)

## (undated) DEVICE — SUTURE 2-0 VICRYL PLUS SH - 8 X 18 INCH (12/BX)

## (undated) DEVICE — GLOVE BIOGEL INDICATOR SZ 8.5 SURGICAL PF LTX - (50/BX 4BX/CA)

## (undated) DEVICE — SPONGE GAUZE NON-STERILE 4X4 - (2000/CA 10PK/CA)

## (undated) DEVICE — SUTURE 2-0 VICRYL PLUS CT-1 - 8 X 18 INCH(12/BX)

## (undated) DEVICE — BANDAGE ELASTIC STERILE MATRIX 6 X 10 (20EA/CA)

## (undated) DEVICE — PACK MAJOR ORTHO - (2EA/CA)

## (undated) DEVICE — WRAP COBAN SELF-ADHERENT 6 IN X 5YDS STERILE TAN (12/CA)

## (undated) DEVICE — GLOVE BIOGEL INDICATOR SZ 8 SURGICAL PF LTX - (50/BX 4BX/CA)

## (undated) DEVICE — TOWELS CLOTH SURGICAL - (4/PK 20PK/CA)

## (undated) DEVICE — DRESSING LEUKOMED STERILE 11.75X4IN - (50/CA)

## (undated) DEVICE — BOVIE  BLADE 6 EXTENDED - (50/PK)

## (undated) DEVICE — DRESSING TRANSPARENT FILM TEGADERM 4 X 4.75" (50EA/BX)"

## (undated) DEVICE — COVER LIGHT HANDLE ALC PLUS DISP (18EA/BX)

## (undated) DEVICE — GLOVE SZ 6.5 BIOGEL PI MICRO - PF LF (50PR/BX)

## (undated) DEVICE — GLOVE BIOGEL INDICATOR SZ 7.5 SURGICAL PF LTX - (50PR/BX 4BX/CA)

## (undated) DEVICE — PAD LAP STERILE 18 X 18 - (5/PK 40PK/CA)

## (undated) DEVICE — LACTATED RINGERS INJ 1000 ML - (14EA/CA 60CA/PF)

## (undated) DEVICE — BAG SPONGE COUNT 10.25 X 32 - BLUE (250/CA)

## (undated) DEVICE — SUTURE 0 VICRYL PLUS CT-1 - 8 X 18 INCH (12/BX)

## (undated) DEVICE — TRAY SRGPRP PVP IOD WT PRP - (20/CA)

## (undated) DEVICE — DRAPE LAPAROTOMY T SHEET - (12EA/CA)

## (undated) DEVICE — BASIN EMESIS DISP. - (250/CA)

## (undated) DEVICE — SUTURE 0 ETHIBOND CT-1 - (12/BX) 18 INCH

## (undated) DEVICE — MANIFOLD NEPTUNE 1 PORT (20/PK)

## (undated) DEVICE — SUTURE 2-0 PROLENE SH (36PK/BX)

## (undated) DEVICE — LIGASURE LAPAROSCOPIC 5MM - (6EA/CA)

## (undated) DEVICE — TUBING CLEARLINK DUO-VENT - C-FLO (48EA/CA)

## (undated) DEVICE — GLOVE BIOGEL SZ 6 PF LATEX - (50EA/BX 4BX/CA)

## (undated) DEVICE — FREEHAND DRILL 4.2MM X 185MM (1EA)

## (undated) DEVICE — SET LEADWIRE 5 LEAD BEDSIDE DISPOSABLE ECG (1SET OF 5/EA)

## (undated) DEVICE — DRAPE U SPLIT IMP 54 X 76 - (24/CA)

## (undated) DEVICE — MASK ANESTHESIA ADULT  - (100/CA)

## (undated) DEVICE — SENSOR SPO2 NEO LNCS ADHESIVE (20/BX) SEE USER NOTES

## (undated) DEVICE — SUTURE 3-0 VICRYL PLUS SH - 8X 18 INCH (12/BX)

## (undated) DEVICE — ELECTRODE 850 FOAM ADHESIVE - HYDROGEL RADIOTRNSPRNT (50/PK)

## (undated) DEVICE — CANISTER SUCTION 3000ML MECHANICAL FILTER AUTO SHUTOFF MEDI-VAC NONSTERILE LF DISP  (40EA/CA)

## (undated) DEVICE — SUTURE 2-0 COATED VICRYL PLUS - 12 X 18 INCH (12/BX)

## (undated) DEVICE — LEGGING LITHOTOMY 31 X 48 IN - (2EA/PK 20PK/CA)

## (undated) DEVICE — KIT 2.25IN COL ILSTM 2 PC DRN - 57MM 2 1/4 INCH THIS IS FOR THE OR ONLY (5/BX)

## (undated) DEVICE — CATHETER IV 20 GA X 1-1/4 ---SURG.& SDS ONLY--- (50EA/BX)

## (undated) DEVICE — DRAPE C ARMOR (12EA/CA)

## (undated) DEVICE — DRAPE U ORTHOPEDIC - (10/BX)

## (undated) DEVICE — SODIUM CHL IRRIGATION 0.9% 1000ML (12EA/CA)

## (undated) DEVICE — GLOVE SZ 7.5 BIOGEL PI MICRO - PF LF (50PR/BX)

## (undated) DEVICE — DRAPE IOBAN II INCISE 23X17 - (10EA/BX 4BX/CA)

## (undated) DEVICE — CHLORAPREP 26 ML APPLICATOR - ORANGE TINT(25/CA)

## (undated) DEVICE — TUBE NG SALEM SUMP 14FR (50EA/BX)

## (undated) DEVICE — GVL 3 STAT DISPOSABLE - (10/BX)

## (undated) DEVICE — SYRINGE ASEPTO - (50EA/CA

## (undated) DEVICE — STAPLER 29MM CIRCULAR CURVED - (3EA/BX)

## (undated) DEVICE — CONTAINER, SPECIMEN, STERILE

## (undated) DEVICE — KIT CUSTOM PROCEDURE SINGLE FOR ENDO  (15/CA)

## (undated) DEVICE — KIT SIGMOIDOSCOPE W/BULB AND - SUCTION (1/PK 10PK/CA)

## (undated) DEVICE — CLIP MED INTNL HRZN TI ESCP - (25/BX)

## (undated) DEVICE — RETAINER 9 1/8INX5 7/8IN MED - (10/BX)

## (undated) DEVICE — PADDING CAST 6 IN STERILE - 6 X 4 YDS (24/CA)

## (undated) DEVICE — REAMER SHAFT MODIFIED TRINKLE 8MM X 510MM (1EA)

## (undated) DEVICE — GLOVE SIZE 8.0 SURGEON ACCELERATOR FREE GREEN (50PR/BX)

## (undated) DEVICE — SENSOR OXIMETER ADULT SPO2 RD SET (20EA/BX)